# Patient Record
Sex: FEMALE | Race: WHITE | NOT HISPANIC OR LATINO | Employment: OTHER | URBAN - NONMETROPOLITAN AREA
[De-identification: names, ages, dates, MRNs, and addresses within clinical notes are randomized per-mention and may not be internally consistent; named-entity substitution may affect disease eponyms.]

---

## 2017-11-30 ENCOUNTER — TRANSFERRED RECORDS (OUTPATIENT)
Dept: HEALTH INFORMATION MANAGEMENT | Facility: HOSPITAL | Age: 77
End: 2017-11-30

## 2017-12-09 ENCOUNTER — HOSPITAL ENCOUNTER (EMERGENCY)
Facility: HOSPITAL | Age: 77
Discharge: HOME | End: 2017-12-09
Attending: INTERNAL MEDICINE | Admitting: INTERNAL MEDICINE
Payer: MEDICARE

## 2017-12-09 VITALS
DIASTOLIC BLOOD PRESSURE: 90 MMHG | RESPIRATION RATE: 20 BRPM | HEIGHT: 62 IN | SYSTOLIC BLOOD PRESSURE: 178 MMHG | OXYGEN SATURATION: 99 % | TEMPERATURE: 98 F

## 2017-12-09 DIAGNOSIS — K29.00 OTHER ACUTE GASTRITIS WITHOUT HEMORRHAGE: ICD-10-CM

## 2017-12-09 PROCEDURE — 99282 EMERGENCY DEPT VISIT SF MDM: CPT | Performed by: INTERNAL MEDICINE

## 2017-12-09 PROCEDURE — A9270 NON-COVERED ITEM OR SERVICE: HCPCS | Mod: GY | Performed by: INTERNAL MEDICINE

## 2017-12-09 PROCEDURE — 25000132 ZZH RX MED GY IP 250 OP 250 PS 637: Mod: GY | Performed by: INTERNAL MEDICINE

## 2017-12-09 PROCEDURE — 99283 EMERGENCY DEPT VISIT LOW MDM: CPT

## 2017-12-09 RX ORDER — ALUMINA, MAGNESIA, AND SIMETHICONE 2400; 2400; 240 MG/30ML; MG/30ML; MG/30ML
30 SUSPENSION ORAL ONCE
Status: COMPLETED | OUTPATIENT
Start: 2017-12-09 | End: 2017-12-09

## 2017-12-09 RX ADMIN — ALUMINUM HYDROXIDE, MAGNESIUM HYDROXIDE, AND DIMETHICONE 30 ML: 400; 400; 40 SUSPENSION ORAL at 21:50

## 2017-12-09 NOTE — ED AVS SNAPSHOT
[facility]    [address] [address]    Phone:  [phone]                                       Lita Ocasio   MRN: [id number]    Department:  [facility]   Date of Visit:  12/9/2017           After Visit Summary Signature Page     I have received my discharge instructions, and my questions have been answered. I have discussed any challenges I see with this plan with the nurse or doctor.    ..........................................................................................................................................  Patient/Patient Representative Signature      ..........................................................................................................................................  Patient Representative Print Name and Relationship to Patient    ..................................................               ................................................  Date                                            Time    ..........................................................................................................................................  Reviewed by Signature/Title    ...................................................              ..............................................  Date                                                            Time

## 2017-12-09 NOTE — ED AVS SNAPSHOT
[facility]    [address] [address]    Phone:  [phone]                                       Lita Ocasio   MRN: [id number]    Department:  [facility]   Date of Visit:  12/9/2017           Patient Information     Date Of Birth          1940        Your diagnoses for this visit were:     Other acute gastritis without hemorrhage        You were seen by Jeremías Klein MD.      Follow-up Information     Schedule an appointment as soon as possible for a visit with Hannah Almazan MD.    Specialty:  Family Practice    Contact information:    [facility]  [address] [facility] [address]  [phone]          Discharge Instructions         Abdominal Pain    Abdominal pain is pain in the stomach or belly area. Everyone has this pain from time to time. In many cases it goes away on its own. But abdominal pain can sometimes be due to a serious problem, such as appendicitis. So it s important to know when to seek help.  Causes of abdominal pain  There are many possible causes of abdominal pain. Common causes in adults include:    Constipation, diarrhea, or gas    Stomach acid flowing back up into the esophagus (acid reflux or heartburn)    Severe acid reflux, called GERD (gastroesophageal reflux disease)    A sore in the lining of the stomach or small intestine (peptic ulcer)    Inflammation of the gallbladder, liver, or pancreas    Gallstones or kidney stones    Appendicitis     Intestinal blockage     An internal organ pushing through a muscle or other tissue (hernia)    Urinary tract infections    In women, menstrual cramps, fibroids, or endometriosis    Inflammation or infection of the intestines  Diagnosing the cause of abdominal pain  Your healthcare provider will do a physical exam help find the cause of your pain. If needed, tests will be ordered. Belly pain has many possible causes. So it can be hard to find the reason for your pain.  Giving details about your pain can help. Tell your provider where and when you feel the pain, and what makes it better or worse. Also let your provider know if you have other symptoms such as:    Fever    Tiredness    Upset stomach (nausea)    Vomiting    Changes in bathroom habits  Treating abdominal pain  Some causes of pain need emergency medical treatment right away. These include appendicitis or a bowel blockage. Other problems can be treated with rest, fluids, or medicines. Your healthcare provider can give you specific instructions for treatment or self-care based on what is causing your pain.  If you have vomiting or diarrhea, sip water or other clear fluids. When you are ready to eat solid foods again, start with small amounts of easy-to-digest, low-fat foods. These include apple sauce, toast, or crackers.   When to seek medical care  Call 911 or go to the hospital right away if you:    Can t pass stool and are vomiting    Are vomiting blood or have bloody diarrhea or black, tarry diarrhea    Have chest, neck, or shoulder pain    Feel like you might pass out    Have pain in your shoulder blades with nausea    Have sudden, severe belly pain    Have new, severe pain unlike any you have felt before    Have a belly that is rigid, hard, and tender to touch  Call your healthcare provider if you have:    Pain for more than 5 days    Bloating for more than 2 days    Diarrhea for more than 5 days    A fever of 100.4 F (38.0 C) or higher, or as directed by your provider    Pain that gets worse    Weight loss for no reason    Continued lack of appetite    Blood in your stool  How to prevent abdominal pain  Here are some tips to help prevent abdominal pain:    Eat smaller amounts of food at one time.    Avoid greasy, fried, or other high-fat foods.    Avoid foods that give you gas.    Exercise regularly.    Drink plenty of fluids.  To help prevent GERD symptoms:    Quit smoking.    Reduce alcohol and certain foods that  increase stomach acid.    Avoid aspirin and over-the-counter pain and fever medicines (NSAIDS or nonsteroidal anti-inflammatory drugs), if possible    Lose extra weight.    Finish eating at least 2 hours before you go to bed or lie down.    Raise the head of your bed.  Date Last Reviewed: 7/1/2016 2000-2017 [employer]. [address] [place] [address], [place] [address]. All rights reserved. This information is not intended as a substitute for professional medical care. Always follow your healthcare professional's instructions.             Review of your medicines      Our records show that you are taking the medicines listed below. If these are incorrect, please call your family doctor or clinic.        Dose / Directions Last dose taken    ALLEGRA PO        Take 1 tablet by mouth once daily - patient unsure of dose.   Refills:  0        amLODIPine 5 MG tablet   Commonly known as:  NORVASC   Dose:  5 mg   Quantity:  30 tablet        Take 1 tablet (5 mg) by mouth daily   Refills:  0        aspirin 81 MG tablet   Dose:  1 tablet        Take 1 tablet by mouth daily   Refills:  0        CLINDAMYCIN HCL PO   Dose:  300 mg        Take 300 mg by mouth   Refills:  0        HYDROcodone-acetaminophen 5-325 MG per tablet   Commonly known as:  NORCO   Dose:  1 tablet   Quantity:  40 tablet        Take 1 tablet by mouth every 4 hours as needed for pain   Refills:  0        levothyroxine 75 MCG tablet   Commonly known as:  SYNTHROID/LEVOTHROID   Dose:  75 mcg   Quantity:  5 tablet        Take 1 tablet (75 mcg) by mouth daily for 5 days   Refills:  0        POTASSIUM CHLORIDE PO   Dose:  10 mEq        Take 10 mEq by mouth daily   Refills:  0        PREDNISONE PO        Refills:  0        simvastatin 20 MG tablet   Commonly known as:  ZOCOR   Dose:  20 mg   Quantity:  5 tablet        Take 1 tablet (20 mg) by mouth At Bedtime for 5 days   Refills:  0        SYSTANE OP   Dose:  1 drop        Place 1 drop into both eyes 4  "times daily   Refills:  0        vitamin D 2000 UNITS tablet   Dose:  1 tablet        Take 1 tablet by mouth daily   Refills:  0        zafirlukast 20 MG tablet   Commonly known as:  ACCOLATE   Dose:  20 mg   Quantity:  5 tablet        Take 1 tablet (20 mg) by mouth daily for 5 days   Refills:  0                Orders Needing Specimen Collection     None      Pending Results     No orders found from 2017 to 12/10/2017.            Pending Culture Results     No orders found from 2017 to 12/10/2017.            Thank you for choosing [facility]       Thank you for choosing [facility] for your care. Our goal is always to provide you with excellent care. Hearing back from our patients is one way we can continue to improve our services. Please take a few minutes to complete the written survey that you may receive in the mail after you visit with us. Thank you!        [employer] Information     [employer] lets you send messages to your doctor, view your test results, renew your prescriptions, schedule appointments and more. To sign up, go to [email or web address] . Click on \"Log in\" on the left side of the screen, which will take you to the Welcome page. Then click on \"Sign up Now\" on the right side of the page.     You will be asked to enter the access code listed below, as well as some personal information. Please follow the directions to create your username and password.     Your access code is: [email or web address]  Expires: 3/9/2018 10:37 PM     Your access code will  in 90 days. If you need help or a new code, please call your [facility] clinic or [phone].        Care EveryWhere ID     This is your Care EveryWhere ID. This could be used by other organizations to access your [facility] medical records  [id number]        Equal Access to Services     DIAN SLAUGHTER : Jada Shah, mike ann, cookie shah. So [facility] [phone].    ATENCIÓN: " Si habla carrie, tiene a perdomo disposición servicios gratuitos de asistencia lingüística. Llame al [phone].    We comply with applicable federal civil rights laws and [place] laws. We do not discriminate on the basis of race, color, national origin, age, disability, sex, sexual orientation, or gender identity.            After Visit Summary       This is your record. Keep this with you and show to your community pharmacist(s) and doctor(s) at your next visit.

## 2017-12-10 ASSESSMENT — ENCOUNTER SYMPTOMS
DYSURIA: 0
ARTHRALGIAS: 0
FEVER: 0
ANAL BLEEDING: 0
HEADACHES: 0
HEMATURIA: 0
PALPITATIONS: 0
COLOR CHANGE: 0
ABDOMINAL DISTENTION: 0
VOICE CHANGE: 0
SHORTNESS OF BREATH: 0
NUMBNESS: 0
BLOOD IN STOOL: 0
CRAMPS: 1
FLANK PAIN: 0
MYALGIAS: 0
NECK PAIN: 0
NAUSEA: 0
CHILLS: 0
DIAPHORESIS: 0
CHEST TIGHTNESS: 0
ABDOMINAL PAIN: 0
CONFUSION: 0
WOUND: 0
DIZZINESS: 0
COUGH: 0
NECK STIFFNESS: 0
WHEEZING: 0
BACK PAIN: 0
VOMITING: 0
LIGHT-HEADEDNESS: 0

## 2017-12-10 NOTE — DISCHARGE INSTRUCTIONS

## 2017-12-10 NOTE — ED PROVIDER NOTES
"  History     Chief Complaint   Patient presents with     Abdominal Pain     \"I started prednisone on 12/1 for fluid in my ears and almost imediately I started have burning\". Pt points to epigastric area to lower abdomen.     Nausea     Pt stated she tried prilosec without relief.     Patient is a 77 year old female presenting with cramps. The history is provided by the patient.   Abdominal Cramping   Pain location:  Epigastric  Pain quality: cramping    Pain radiates to:  Periumbilical region  Pain severity:  Mild  Onset quality:  Gradual  Timing:  Intermittent  Chronicity:  New  Associated symptoms: no chest pain, no chills, no cough, no dysuria, no fever, no hematuria, no nausea, no shortness of breath and no vomiting        Problem List:    Patient Active Problem List    Diagnosis Date Noted     Primary hyperparathyroidism (H) 09/05/2013     Priority: Medium     Decreased hearing 08/30/2013     Priority: Medium     Hypercalcemia 08/29/2013     Priority: Medium     Diagnosis updated by automated process. Provider to review and confirm.       Hypercalcemia 08/28/2013     Priority: Medium     Parathyroid related hypercalcemia (H) 08/18/2013     Priority: Medium     Dysfunction of eustachian tube 08/16/2013     Priority: Medium     Sensorineural hearing loss, asymmetrical 08/16/2013     Priority: Medium     Chronic rhinitis 08/16/2013     Priority: Medium        Past Medical History:    Past Medical History:   Diagnosis Date     Asthma      Constipation      Hearing loss      Heart trouble      Hypertension      Nasal congestion      Osteoporosis      Ringing in ears      Sneezing      Snoring      Thyroid disease      Weakness      Weight loss        Past Surgical History:    Past Surgical History:   Procedure Laterality Date     COLONOSCOPY       ENT SURGERY  2011    para thyroid surgery     PARATHYROIDECTOMY  9/10/2013    Procedure: PARATHYROIDECTOMY;  Reoperative Neck Exploration, Resection of right Parathyroid " adenoma;  Surgeon: Thalia Muller MD;  Location:  OR       Family History:    Family History   Problem Relation Age of Onset     Hearing Loss Mother      HEART DISEASE Mother      CEREBROVASCULAR DISEASE Father      Hearing Loss Brother      CANCER Sister        Social History:  Marital Status:   [5]  Social History   Substance Use Topics     Smoking status: Current Every Day Smoker     Packs/day: 1.00     Years: 50.00     Types: Cigarettes     Smokeless tobacco: Never Used     Alcohol use Yes      Comment: rarely        Medications:      PREDNISONE PO   amLODIPine (NORVASC) 5 MG tablet   levothyroxine (SYNTHROID, LEVOTHROID) 75 MCG tablet   CLINDAMYCIN HCL PO   HYDROcodone-acetaminophen 5-325 MG per tablet   zafirlukast (ACCOLATE) 20 MG tablet   simvastatin (ZOCOR) 20 MG tablet   Fexofenadine HCl (ALLEGRA PO)   Polyethyl Glycol-Propyl Glycol (SYSTANE OP)   POTASSIUM CHLORIDE PO   aspirin 81 MG tablet   Cholecalciferol (VITAMIN D) 2000 UNITS tablet         Review of Systems   Constitutional: Negative for chills, diaphoresis and fever.   HENT: Negative for voice change.    Eyes: Negative for visual disturbance.   Respiratory: Negative for cough, chest tightness, shortness of breath and wheezing.    Cardiovascular: Negative for chest pain, palpitations and leg swelling.   Gastrointestinal: Negative for abdominal distention, abdominal pain, anal bleeding, blood in stool, nausea and vomiting.   Genitourinary: Negative for decreased urine volume, dysuria, flank pain and hematuria.   Musculoskeletal: Negative for arthralgias, back pain, gait problem, myalgias, neck pain and neck stiffness.   Skin: Negative for color change, pallor, rash and wound.   Neurological: Negative for dizziness, syncope, light-headedness, numbness and headaches.   Psychiatric/Behavioral: Negative for confusion and suicidal ideas.       Physical Exam   BP: 128/61  Heart Rate: 88  Temp: 98.3  F (36.8  C)  Resp: 18  Height: 157.5 cm  "(5' 2\")  SpO2: 99 %      Physical Exam   Constitutional: She is oriented to person, place, and time. She appears well-developed and well-nourished.   HENT:   Head: Normocephalic and atraumatic.   Mouth/Throat: No oropharyngeal exudate.   Eyes: Conjunctivae are normal. Pupils are equal, round, and reactive to light.   Neck: Normal range of motion. Neck supple. No JVD present. No tracheal deviation present. No thyromegaly present.   Cardiovascular: Normal rate, regular rhythm, normal heart sounds and intact distal pulses.  Exam reveals no gallop and no friction rub.    No murmur heard.  Pulmonary/Chest: Effort normal and breath sounds normal. No stridor. No respiratory distress. She has no wheezes. She has no rales. She exhibits no tenderness.   Abdominal: Soft. Bowel sounds are normal. She exhibits no distension and no mass. There is no tenderness. There is no rebound and no guarding.   Musculoskeletal: Normal range of motion. She exhibits no edema or tenderness.   Lymphadenopathy:     She has no cervical adenopathy.   Neurological: She is alert and oriented to person, place, and time.   Skin: Skin is warm and dry. No rash noted. No erythema. No pallor.   Psychiatric: Her behavior is normal.   Nursing note and vitals reviewed.      ED Course     ED Course     Procedures           Labs Ordered and Resulted from Time of ED Arrival Up to the Time of Departure from the ED - No data to display    Assessments & Plan (with Medical Decision Making)   Epigastric pain with radiation to preumblical area for 3 days , pt related symptoms to prednisone that she take for allergic otitis  Symptoms resolved after receiving Mylenta in ER  I advised her to stop taking prednisone( she already finished 8 days)   I also explained to her although prednisone use can cause gastritis,  her symptoms potentially can also be related to other medical conditions, she responded well to antiacid therapy in ER so she can be discharge home for now, " but if symptom recurred she need to return to ER for further evalution to rule out life threatening cause of abdominal pain, she voiced understanding and agreed with the plan.   I have reviewed the nursing notes.    I have reviewed the findings, diagnosis, plan and need for follow up with the patient.      Discharge Medication List as of 12/9/2017 10:37 PM          Final diagnoses:   Other acute gastritis without hemorrhage       12/9/2017   [facility]     Jeremías Klein MD  12/10/17 0135       Jeremías Klein MD  12/10/17 0159

## 2017-12-10 NOTE — ED NOTES
Patient discharged ambulatory. Verbalized understanding to  an antacid OTC per provider recommendation, will stop the prednisone. Discharged with her daughter.

## 2017-12-10 NOTE — ED NOTES
"Patient being evaluated tonight for abdominal \"burning\" and dizziness. Patient states that she was started on prednisone on December 1st for \"fluid in her ears.\" Patient reports these symptoms started just after starting her prednisone. Patient denies and nausea or vomiting. She is resting on ED cart. Call light in reach.   "

## 2017-12-12 RX ORDER — CALCIUM CARBONATE 500 MG/1
1 TABLET, CHEWABLE ORAL AT BEDTIME
COMMUNITY

## 2017-12-12 RX ORDER — FLUTICASONE PROPIONATE 50 MCG
2 SPRAY, SUSPENSION (ML) NASAL DAILY
COMMUNITY
End: 2018-01-05

## 2017-12-12 RX ORDER — B-COMPLEX WITH VITAMIN C
1 TABLET ORAL DAILY
Status: ON HOLD | COMMUNITY
End: 2020-02-20

## 2017-12-12 RX ORDER — ZAFIRLUKAST 20 MG/1
20 TABLET, FILM COATED ORAL 2 TIMES DAILY
COMMUNITY
End: 2020-03-31

## 2017-12-13 ENCOUNTER — OFFICE VISIT (OUTPATIENT)
Dept: OTOLARYNGOLOGY | Facility: OTHER | Age: 77
End: 2017-12-13
Attending: NURSE PRACTITIONER
Payer: MEDICARE

## 2017-12-13 VITALS
HEART RATE: 83 BPM | RESPIRATION RATE: 16 BRPM | TEMPERATURE: 99.3 F | DIASTOLIC BLOOD PRESSURE: 62 MMHG | SYSTOLIC BLOOD PRESSURE: 118 MMHG | HEIGHT: 62 IN | BODY MASS INDEX: 21.9 KG/M2 | WEIGHT: 119 LBS | OXYGEN SATURATION: 97 %

## 2017-12-13 DIAGNOSIS — Z97.4 WEARS HEARING AID: ICD-10-CM

## 2017-12-13 DIAGNOSIS — H61.23 BILATERAL IMPACTED CERUMEN: ICD-10-CM

## 2017-12-13 DIAGNOSIS — H61.893 IRRITATION OF EXTERNAL AUDITORY CANAL, BILATERAL: ICD-10-CM

## 2017-12-13 DIAGNOSIS — H72.91 PERFORATION OF TYMPANIC MEMBRANE, RIGHT: Primary | ICD-10-CM

## 2017-12-13 DIAGNOSIS — H90.3 SENSORINEURAL HEARING LOSS (SNHL) OF BOTH EARS: ICD-10-CM

## 2017-12-13 DIAGNOSIS — Z71.6 TOBACCO ABUSE COUNSELING: ICD-10-CM

## 2017-12-13 DIAGNOSIS — R26.81 UNSTEADINESS: ICD-10-CM

## 2017-12-13 PROCEDURE — 92504 EAR MICROSCOPY EXAMINATION: CPT | Performed by: NURSE PRACTITIONER

## 2017-12-13 PROCEDURE — 69210 REMOVE IMPACTED EAR WAX UNI: CPT | Performed by: NURSE PRACTITIONER

## 2017-12-13 PROCEDURE — 99213 OFFICE O/P EST LOW 20 MIN: CPT | Mod: 25 | Performed by: NURSE PRACTITIONER

## 2017-12-13 PROCEDURE — 99213 OFFICE O/P EST LOW 20 MIN: CPT

## 2017-12-13 RX ORDER — CIPROFLOXACIN AND DEXAMETHASONE 3; 1 MG/ML; MG/ML
4 SUSPENSION/ DROPS AURICULAR (OTIC) 2 TIMES DAILY
Qty: 7.5 ML | Refills: 0 | Status: SHIPPED | OUTPATIENT
Start: 2017-12-13 | End: 2017-12-20

## 2017-12-13 ASSESSMENT — ANXIETY QUESTIONNAIRES
3. WORRYING TOO MUCH ABOUT DIFFERENT THINGS: NOT AT ALL
1. FEELING NERVOUS, ANXIOUS, OR ON EDGE: SEVERAL DAYS
6. BECOMING EASILY ANNOYED OR IRRITABLE: NOT AT ALL
IF YOU CHECKED OFF ANY PROBLEMS ON THIS QUESTIONNAIRE, HOW DIFFICULT HAVE THESE PROBLEMS MADE IT FOR YOU TO DO YOUR WORK, TAKE CARE OF THINGS AT HOME, OR GET ALONG WITH OTHER PEOPLE: SOMEWHAT DIFFICULT
2. NOT BEING ABLE TO STOP OR CONTROL WORRYING: NOT AT ALL
5. BEING SO RESTLESS THAT IT IS HARD TO SIT STILL: NOT AT ALL
GAD7 TOTAL SCORE: 2
7. FEELING AFRAID AS IF SOMETHING AWFUL MIGHT HAPPEN: SEVERAL DAYS

## 2017-12-13 ASSESSMENT — PAIN SCALES - GENERAL: PAINLEVEL: NO PAIN (0)

## 2017-12-13 ASSESSMENT — PATIENT HEALTH QUESTIONNAIRE - PHQ9
5. POOR APPETITE OR OVEREATING: NOT AT ALL
SUM OF ALL RESPONSES TO PHQ QUESTIONS 1-9: 6

## 2017-12-13 NOTE — PATIENT INSTRUCTIONS
Both canal with irritation. Start ciprodex drops to both ears as directed.    No fluid or infection left ear drum     Right ear drum with perforation.    Keep right ear dry.    Follow up in 3 months with me, with audiogram first.    Follow up sooner with any ear drainage, pain, etc.      Thank you for allowing Gali Dominguez CNP and our ENT team to participate in your care.  If you have a scheduling or an appointment question please contact our Health Unit Coordinator at their direct line [phone].   ALL nursing questions or concerns can be directed to your ENT nurse at: [phone]

## 2017-12-13 NOTE — NURSING NOTE
"Chief Complaint   Patient presents with     Consult     Consult regarding hole in Right eardrum, Acute effusion Bilateral, vertigo. Per Ana Maria-Sami.        Initial /62  Pulse 83  Temp 99.3  F (37.4  C) (Tympanic)  Resp 16  Ht 5' 2\" (1.575 m)  Wt 119 lb (54 kg)  SpO2 97%  BMI 21.77 kg/m2 Estimated body mass index is 21.77 kg/(m^2) as calculated from the following:    Height as of this encounter: 5' 2\" (1.575 m).    Weight as of this encounter: 119 lb (54 kg).  Medication Reconciliation: complete   Constanza Quiroga      "

## 2017-12-13 NOTE — PROGRESS NOTES
Otolaryngology Note    Patient: Lita Ocasio  : 1940         Chief Complaint:     Patient presents with:  Consult: Consult regarding hole in Right eardrum, Acute effusion Bilateral, vertigo. Per Norah.          History of Present Illness:     Lita Ocasio is a 77 year old female seen today for perforated right ear drum, effusion and vertigo, referred by Norah. Lita was last ween in ENT 13 by Dr. Wolf for evaluation of hearing loss, dx with SNHL.     Lita tells me she had a bad ear infection in right ear in November. Tried otic drops, no relief. So was placed on 8 days of prednisone, not sure if relief. ER Saturday due to ABD pain, stopped prednisone (never completed full course), was told she had a perforation, which is why she is here today.     She tells me the right ear has been crackling/popping, since November. No otalgia. Left ear she feels something is in there, no crackling/popping.No otorrhea since November.     Wears bilateral hearing aides, chronic gradual bilateral hearing loss. No acute changes. No flucatuating HL.  No history of COM or otologic surgeries.  No tinnitus.  She reports dizziness since November, has gotten worse. She explains it as 'weak, some unsteadiness'. Does not experince rotary vertigo.     Last audiogram we have on record 8/15/16: Moderate or profound SNHL, Type A Tympanograms. She gets her other audiograms elsewhere.     Lita is a current every day smoker.          Medications:     Current Outpatient Rx   Medication Sig Dispense Refill     calcium carbonate (TUMS) 500 MG chewable tablet Take 1 chew tab by mouth daily       vitamin B complex with vitamin C (VITAMIN  B COMPLEX) TABS tablet Take 1 tablet by mouth daily       Tafluprost (ZIOPTAN) 0.0015 % SOLN Place 1 drop into both eyes At Bedtime       LISINOPRIL PO Take 5 mg by mouth daily       fluticasone (FLONASE) 50 MCG/ACT spray Spray 2 sprays into both nostrils daily       SIMVASTATIN PO  Take 20 mg by mouth At Bedtime       Zafirlukast (ACCOLATE PO) Take 20 mg by mouth daily       LEVOTHYROXINE SODIUM PO Take 75 mcg by mouth daily       CLINDAMYCIN HCL PO Take 600 mg by mouth 1 hour prior to dental work       amLODIPine (NORVASC) 5 MG tablet Take 1 tablet (5 mg) by mouth daily 30 tablet 0     Fexofenadine HCl (ALLEGRA PO) Take 180 mg by mouth daily        POTASSIUM CHLORIDE PO Take 20 mEq by mouth daily        aspirin 81 MG tablet Take 1 tablet by mouth daily       Cholecalciferol (VITAMIN D) 2000 UNITS tablet Take 1 tablet by mouth daily              Allergies:     Allergies: Amoxicillin trihydrate; Combigan [brimonidine tartrate-timolol]; Evista [raloxifene]; Levaquin [levofloxacin]; Penicillins; Restasis; and Latex          Past Medical History:     Past Medical History:   Diagnosis Date     Asthma      Constipation      Hearing loss      Heart trouble      Hypertension      Nasal congestion      Osteoporosis      Ringing in ears      Sneezing      Snoring      Thyroid disease      Weakness      Weight loss             Past Surgical History:     Past Surgical History:   Procedure Laterality Date     CATARACT IOL, RT/LT Bilateral      COLONOSCOPY       ENT SURGERY  2011    para thyroid surgery     ENT SURGERY  09/2013    Parathyroid     PARATHYROIDECTOMY  9/10/2013    Procedure: PARATHYROIDECTOMY;  Reoperative Neck Exploration, Resection of right Parathyroid adenoma;  Surgeon: Thalia Muller MD;  Location: [facility] OR     TONSILLECTOMY       TUBAL LIGATION         ENT family history reviewed         Social History:     Social History   Substance Use Topics     Smoking status: Current Every Day Smoker     Packs/day: 1.00     Years: 50.00     Types: Cigarettes     Smokeless tobacco: Never Used     Alcohol use Yes      Comment: rarely            Review of Systems:     ROS: 10 point ROS neg other than the symptoms noted above in the HPI.         Physical Exam:     /62  Pulse 83  Temp 99.3  F  "(37.4  C) (Tympanic)  Resp 16  Ht 5' 2\" (1.575 m)  Wt 119 lb (54 kg)  SpO2 97%  BMI 21.77 kg/m2  General - The patient is well nourished and well developed, and appears to have good nutritional status.  Alert and oriented to person and place, answers questions and cooperates with examination appropriately.   Head and Face - Normocephalic and atraumatic, with no gross asymmetry noted.  The facial nerve is intact, with strong symmetric movements.  Voice and Breathing - The patient was breathing comfortably without the use of accessory muscles. There was no wheezing, stridor, or stertor.  The patients voice was clear and strong, and had appropriate pitch and quality.  Ears - External ear normal. Ears examined under microscopy bilaterally. Left EAC with thickened dried skin around canal, attempted removal with cupped forceps, discomfort noted and irritation of canal developed, stopped debridement. TM is intact without effusion, perforation, retraction or worrisome mass. Right EAC with dried cerumen in canal, debridement with cupped forceps, discomfort when getting off of canals, developed some canal irrigation with minor bleeding proximal anterior canal that stopped without any intervention. TM with 20% dried central perforation, no otorrhea.  Eyes - Extraocular movements intact, and the pupils were reactive to light.  Sclera were not icteric or injected, conjunctiva were pink and moist.  Mouth - Examination of the oral cavity showed pink, healthy oral mucosa. Dentition in good condition. No lesions or ulcerations noted. The tongue was mobile and midline.   Throat - The walls of the oropharynx were smooth, pink, moist, symmetric, and had no lesions or ulcerations.  The tonsillar pillars and soft palate were symmetric.  The uvula was midline on elevation.    Neck - Normal midline excursion of the laryngotracheal complex during swallowing.  Full range of motion on passive movement.  Palpation of the occipital, " submental, submandibular, internal jugular chain, and supraclavicular nodes did not demonstrate any abnormal lymph nodes or masses.  Palpation of the thyroid was soft and smooth, with no nodules or goiter appreciated.  The trachea was mobile and midline.  Nose - External contour is symmetric, no gross deflection or scars.  Nasal mucosa is pink and moist with no abnormal mucus.  The septum and turbinates were evaluated: normal.  No polyps, masses, or purulence noted on examination.         Assessment and Plan:       ICD-10-CM    1. Perforation of tympanic membrane, right H72.91 ciprofloxacin-dexamethasone (CIPRODEX) otic suspension   2. Irritation of external auditory canal, bilateral H61.893 ciprofloxacin-dexamethasone (CIPRODEX) otic suspension   3. Bilateral impacted cerumen H61.23    4. Sensorineural hearing loss (SNHL) of both ears H90.3    5. Wears hearing aid Z97.4    6. Tobacco abuse counseling Z71.6    7. Unsteadiness R26.81        Discussed with patient that it takes 20 years of quitting tobacco to be at same risk of developing head and neck cancer as a person who has never used tobacco. Patient voiced understanding to ongoing risks associated with continued tobacco use. Tobacco cessation was strongly encouraged.    The ears were cleaned today. Aural hygiene for the ear canals was discussed.  Avoidance of Q-tips was highly encouraged. The patient was told to avoid flushing the ear canal as there is a risk of perforating the ear drum.    Reports her 'dizziness' resolved after I cleaned her ears.  I do not suspect vertigo. I recommended further evaluation from vestibular therapy, she refuses. Reviewed risks of untreated unsteadiness, and she verbalized her understanding.      Start Ciprodex drops to both ears as directed due to EAC irritation from cerumen debridement.    Keep right ear dry. May continue to use hearing aides. Follow up in 3 months for re-evaluation, audiogram first to see how perforation is  doing.    She is encouraged to f/u sooner with any concerns, including acute changes in hearing, otorrhea or otalgia.     Thank you for allowing me to participate in the care of your patient.      Gali Dominguez NP  ENT  [facility]  [phone]

## 2017-12-13 NOTE — MR AVS SNAPSHOT
After Visit Summary   12/13/2017    Lita Ocasio    MRN: [id number]           Patient Information     Date Of Birth          1940        Visit Information        Provider Department      12/13/2017 3:00 PM Gali Dominguez APRN CNP [facility]        Today's Diagnoses     Perforation of tympanic membrane, right    -  1    Irritation of external auditory canal, bilateral          Care Instructions    Both canal with irritation. Start ciprodex drops to both ears as directed.    No fluid or infection left ear drum     Right ear drum with perforation.    Keep right ear dry.    Follow up in 3 months with me, with audiogram first.    Follow up sooner with any ear drainage, pain, etc.      Thank you for allowing Gali Dominguez CNP and our ENT team to participate in your care.  If you have a scheduling or an appointment question please contact our Health Unit Coordinator at their direct line [phone].   ALL nursing questions or concerns can be directed to your ENT nurse at: [phone]             Follow-ups after your visit        Follow-up notes from your care team     Return in about 3 months (around 3/13/2018) for f/u right ear, audiogram first.      Who to contact     If you have questions or need follow up information about today's clinic visit or your schedule please contact [facility] directly at [phone].  Normal or non-critical lab and imaging results will be communicated to you by MyChart, letter or phone within 4 business days after the clinic has received the results. If you do not hear from us within 7 days, please contact the clinic through MyChart or phone. If you have a critical or abnormal lab result, we will notify you by phone as soon as possible.  Submit refill requests through J[employer]Deck or call your pharmacy and they will forward the refill request to us. Please allow 3 business days for your refill to be completed.          Additional  "Information About Your Visit        MyChart Information     [employer] lets you send messages to your doctor, view your test results, renew your prescriptions, schedule appointments and more. To sign up, go to [email or web address] . Click on \"Log in\" on the left side of the screen, which will take you to the Welcome page. Then click on \"Sign up Now\" on the right side of the page.     You will be asked to enter the access code listed below, as well as some personal information. Please follow the directions to create your username and password.     Your access code is: [email or web address]  Expires: 3/9/2018 10:37 PM     Your access code will  in 90 days. If you need help or a new code, please call your [place] clinic or [phone].        Care EveryWhere ID     This is your Care EveryWhere ID. This could be used by other organizations to access your [place] medical records  [id number]        Your Vitals Were     Pulse Temperature Respirations Height Pulse Oximetry BMI (Body Mass Index)    83 99.3  F (37.4  C) (Tympanic) 16 5' 2\" (1.575 m) 97% 21.77 kg/m2       Blood Pressure from Last 3 Encounters:   17 118/62   17 178/90   09/10/13 120/78    Weight from Last 3 Encounters:   17 119 lb (54 kg)   09/10/13 121 lb 4.1 oz (55 kg)   13 122 lb 3 oz (55.4 kg)              Today, you had the following     No orders found for display         Today's Medication Changes          These changes are accurate as of: 17  3:46 PM.  If you have any questions, ask your nurse or doctor.               Start taking these medicines.        Dose/Directions    ciprofloxacin-dexamethasone otic suspension   Commonly known as:  CIPRODEX   Used for:  Perforation of tympanic membrane, right, Irritation of external auditory canal, bilateral   Started by:  Gali Dominguez APRN CNP        Dose:  4 drop   Place 4 drops into both ears 2 times daily for 7 days   Quantity:  7.5 mL   Refills:  0            Where to get " your medicines      These medications were sent to [facility] - [place], [place] - [address]  [address], [place] [address]     Phone:  [phone]     ciprofloxacin-dexamethasone otic suspension                Primary Care Provider Office Phone # Fax #    Hannah Almazan [phone]       [facility] [address] [facility] [address]        Equal Access to Services     DIAN SLAUGHTER : Hadii aad ku hadasho Soomaali, waaxda luqadaha, qaybta kaalmada adeegyada, waxay idiin hayaan adeluz madsenmarcesushma carrion . So [facility] [phone].    ATENCIÓN: Si habla español, tiene a perdomo disposición servicios gratuitos de asistencia lingüística. David al [phone].    We comply with applicable federal civil rights laws and [place] laws. We do not discriminate on the basis of race, color, national origin, age, disability, sex, sexual orientation, or gender identity.            Thank you!     Thank you for choosing [facility]  for your care. Our goal is always to provide you with excellent care. Hearing back from our patients is one way we can continue to improve our services. Please take a few minutes to complete the written survey that you may receive in the mail after your visit with us. Thank you!             Your Updated Medication List - Protect others around you: Learn how to safely use, store and throw away your medicines at www.disposemymeds.org.          This list is accurate as of: 12/13/17  3:46 PM.  Always use your most recent med list.                   Brand Name Dispense Instructions for use Diagnosis    ACCOLATE PO      Take 20 mg by mouth daily        ALLEGRA PO      Take 180 mg by mouth daily        amLODIPine 5 MG tablet    NORVASC    30 tablet    Take 1 tablet (5 mg) by mouth daily    HTN (hypertension)       aspirin 81 MG tablet      Take 1 tablet by mouth daily        calcium carbonate 500 MG chewable tablet    TUMS     Take 1 chew tab by mouth daily         ciprofloxacin-dexamethasone otic suspension    CIPRODEX    7.5 mL    Place 4 drops into both ears 2 times daily for 7 days    Perforation of tympanic membrane, right, Irritation of external auditory canal, bilateral       CLINDAMYCIN HCL PO      Take 600 mg by mouth 1 hour prior to dental work        fluticasone 50 MCG/ACT spray    FLONASE     Spray 2 sprays into both nostrils daily        LEVOTHYROXINE SODIUM PO      Take 75 mcg by mouth daily        LISINOPRIL PO      Take 5 mg by mouth daily        POTASSIUM CHLORIDE PO      Take 20 mEq by mouth daily        SIMVASTATIN PO      Take 20 mg by mouth At Bedtime        vitamin B complex with vitamin C Tabs tablet      Take 1 tablet by mouth daily        vitamin D 2000 UNITS tablet      Take 1 tablet by mouth daily        ZIOPTAN 0.0015 % Soln   Generic drug:  Tafluprost      Place 1 drop into both eyes At Bedtime

## 2017-12-14 ASSESSMENT — ANXIETY QUESTIONNAIRES: GAD7 TOTAL SCORE: 2

## 2018-01-05 ENCOUNTER — OFFICE VISIT (OUTPATIENT)
Dept: OTOLARYNGOLOGY | Facility: OTHER | Age: 78
End: 2018-01-05
Attending: NURSE PRACTITIONER
Payer: MEDICARE

## 2018-01-05 VITALS
HEART RATE: 94 BPM | HEIGHT: 62 IN | TEMPERATURE: 98.4 F | BODY MASS INDEX: 21.9 KG/M2 | WEIGHT: 119 LBS | DIASTOLIC BLOOD PRESSURE: 70 MMHG | SYSTOLIC BLOOD PRESSURE: 156 MMHG | OXYGEN SATURATION: 99 % | RESPIRATION RATE: 16 BRPM

## 2018-01-05 DIAGNOSIS — J34.89 RHINORRHEA: ICD-10-CM

## 2018-01-05 DIAGNOSIS — Z71.6 TOBACCO ABUSE COUNSELING: ICD-10-CM

## 2018-01-05 DIAGNOSIS — R09.81 NASAL CONGESTION: Primary | ICD-10-CM

## 2018-01-05 DIAGNOSIS — S00.431A HEMATOMA OF RIGHT EAR, INITIAL ENCOUNTER: ICD-10-CM

## 2018-01-05 DIAGNOSIS — H69.93 DYSFUNCTION OF BOTH EUSTACHIAN TUBES: ICD-10-CM

## 2018-01-05 DIAGNOSIS — H72.91 PERFORATION OF TYMPANIC MEMBRANE, RIGHT: ICD-10-CM

## 2018-01-05 DIAGNOSIS — H61.22 CERUMEN DEBRIS ON TYMPANIC MEMBRANE OF LEFT EAR: ICD-10-CM

## 2018-01-05 PROCEDURE — G0463 HOSPITAL OUTPT CLINIC VISIT: HCPCS

## 2018-01-05 PROCEDURE — 92504 EAR MICROSCOPY EXAMINATION: CPT | Performed by: NURSE PRACTITIONER

## 2018-01-05 PROCEDURE — 69399 UNLISTED PX EXTERNAL EAR: CPT | Performed by: NURSE PRACTITIONER

## 2018-01-05 PROCEDURE — 99213 OFFICE O/P EST LOW 20 MIN: CPT | Mod: 25 | Performed by: NURSE PRACTITIONER

## 2018-01-05 RX ORDER — FLUTICASONE PROPIONATE 50 MCG
2 SPRAY, SUSPENSION (ML) NASAL DAILY
Qty: 1 BOTTLE | Refills: 11 | Status: SHIPPED | OUTPATIENT
Start: 2018-01-05 | End: 2018-09-19

## 2018-01-05 ASSESSMENT — PAIN SCALES - GENERAL: PAINLEVEL: MILD PAIN (2)

## 2018-01-05 NOTE — MR AVS SNAPSHOT
After Visit Summary   1/5/2018    Lita Ocasio    MRN: [id number]           Patient Information     Date Of Birth          1940        Visit Information        Provider Department      1/5/2018 10:00 AM Gali Dominguez APRN CNP [facility]        Care Instructions    Right ear still with stable perforation.  Left ear wax removed.  No fluid in ears.    Vinegar ear drops for itching: Mix equal amounts of distilled water and white vinegar. Apply 1-2 drops in ears every few days.     Sinuses: Flonase (nasal spray) 2 sprays once daily. Eduin Med Sinus irrigation (get over the counter) use twice daily as needed. Ibuprofen as needed.    Follow up in 3-4 months, get audiogram before seeing me and bring copy in.    Follow up sooner as needed.    Work on smoking cessation.     Thank you for allowing Gali Dominguez CNP and our ENT team to participate in your care.  If you have a scheduling or an appointment question please contact our Health Unit Coordinator at their direct line [phone].   ALL nursing questions or concerns can be directed to your ENT nurse at: [phone]             Follow-ups after your visit        Follow-up notes from your care team     Return if symptoms worsen or fail to improve.      Your next 10 appointments already scheduled     Mar 13, 2018  2:30 PM CDT   (Arrive by 2:15 PM)   Return Visit with COCO Coronado CNP   [facility] ([facility] )    [address]  [facility] [address]   [phone]              Who to contact     If you have questions or need follow up information about today's clinic visit or your schedule please contact [facility] directly at [phone].  Normal or non-critical lab and imaging results will be communicated to you by MyChart, letter or phone within 4 business days after the clinic has received the results. If you do not hear from us within 7 days, please contact the  "clinic through Tele[employer] or phone. If you have a critical or abnormal lab result, we will notify you by phone as soon as possible.  Submit refill requests through iversity or call your pharmacy and they will forward the refill request to us. Please allow 3 business days for your refill to be completed.          Additional Information About Your Visit        TeleFliphart Information     iversity lets you send messages to your doctor, view your test results, renew your prescriptions, schedule appointments and more. To sign up, go to [email or web address] . Click on \"Log in\" on the left side of the screen, which will take you to the Welcome page. Then click on \"Sign up Now\" on the right side of the page.     You will be asked to enter the access code listed below, as well as some personal information. Please follow the directions to create your username and password.     Your access code is: [email or web address]  Expires: 3/9/2018 10:37 PM     Your access code will  in 90 days. If you need help or a new code, please call your [place] clinic or [phone].        Care EveryWhere ID     This is your Care EveryWhere ID. This could be used by other organizations to access your [place] medical records  [id number]        Your Vitals Were     Pulse Temperature Respirations Height Pulse Oximetry BMI (Body Mass Index)    94 98.4  F (36.9  C) (Tympanic) 16 1.575 m (5' 2\") 99% 21.77 kg/m2       Blood Pressure from Last 3 Encounters:   18 152/70   17 118/62   17 178/90    Weight from Last 3 Encounters:   18 54 kg (119 lb)   17 54 kg (119 lb)   09/10/13 55 kg (121 lb 4.1 oz)              Today, you had the following     No orders found for display       Primary Care Provider Office Phone # Fax #    Hannah Almazan [phone]       [facility] [address]  [facility] [address]        Equal Access to Services     DIAN SLAUGHTER AH: mike Durán " seth robbinrobinson santizowarren jahcookie michel. So [facility] [phone].    ATENCIÓN: Si sherin butler, tiene a perdomo disposición servicios gratuitos de asistencia lingüística. David al [phone].    We comply with applicable federal civil rights laws and [place] laws. We do not discriminate on the basis of race, color, national origin, age, disability, sex, sexual orientation, or gender identity.            Thank you!     Thank you for choosing [facility]  for your care. Our goal is always to provide you with excellent care. Hearing back from our patients is one way we can continue to improve our services. Please take a few minutes to complete the written survey that you may receive in the mail after your visit with us. Thank you!             Your Updated Medication List - Protect others around you: Learn how to safely use, store and throw away your medicines at www.disposemymeds.org.          This list is accurate as of: 1/5/18 10:03 AM.  Always use your most recent med list.                   Brand Name Dispense Instructions for use Diagnosis    ACCOLATE PO      Take 20 mg by mouth daily        ALLEGRA PO      Take 180 mg by mouth daily        amLODIPine 5 MG tablet    NORVASC    30 tablet    Take 1 tablet (5 mg) by mouth daily    HTN (hypertension)       aspirin 81 MG tablet      Take 1 tablet by mouth daily        calcium carbonate 500 MG chewable tablet    TUMS     Take 1 chew tab by mouth daily        CLINDAMYCIN HCL PO      Take 600 mg by mouth 1 hour prior to dental work        fluticasone 50 MCG/ACT spray    FLONASE     Spray 2 sprays into both nostrils daily        LEVOTHYROXINE SODIUM PO      Take 75 mcg by mouth daily        LISINOPRIL PO      Take 5 mg by mouth daily        POTASSIUM CHLORIDE PO      Take 20 mEq by mouth daily        SIMVASTATIN PO      Take 20 mg by mouth At Bedtime        vitamin B complex with vitamin C Tabs tablet      Take 1 tablet by  mouth daily        vitamin D 2000 UNITS tablet      Take 1 tablet by mouth daily        ZIOPTAN 0.0015 % Soln   Generic drug:  Tafluprost      Place 1 drop into both eyes At Bedtime

## 2018-01-05 NOTE — NURSING NOTE
"Chief Complaint   Patient presents with     RECHECK     Follow up right TM perferation, irritation of EAC bilateral, SNHL. Concerns has fluid in ear, pain and popping.        Initial /68  Pulse 94  Temp 98.4  F (36.9  C) (Tympanic)  Resp 16  Ht 5' 2\" (1.575 m)  Wt 119 lb (54 kg)  SpO2 99%  BMI 21.77 kg/m2 Estimated body mass index is 21.77 kg/(m^2) as calculated from the following:    Height as of this encounter: 5' 2\" (1.575 m).    Weight as of this encounter: 119 lb (54 kg).  Medication Reconciliation: complete   Constanza Quiroga        "

## 2018-01-05 NOTE — PROGRESS NOTES
"Otolaryngology Progress Note           Chief Complaint:     Patient presents with:  RECHECK: Follow up right TM perferation, irritation of EAC bilateral, SNHL. Concerns has fluid in ear, pain and popping.          History of Present Illness:     Lita Ocasio is a 77 year old female here to follow up on ears. I last saw her 12/13/17, ear debridement attempted in EAC's, some canal irritation developed so I placed her on otic drops. It was noted that left TM was intact, right TM with 20% dry central perforation, with recommendation to f/u in 3 months with audiogram, sooner as needed. Preceeding this, she was treated for AOM right ear in November, most likely the cause of the perforation.    Today she reports she is here earlier because she has been experiencing bilateral ear popping and crackling L>R. Mild intermittent otalgia bilaterally. No otorrhea. She tells me she feels her sinuses are acting up, just feels her head is full. Clear/thin rhinorrhea. Denies sinus pain. Using some sort of nasal spray, not sure if it is a steroid or saline. Mild chronic tinnitus. No vertigo. No facial weakness, numbness or dysphagia. She will get occasional ear pruritis. In the past, states the PRN vinegar/distilled water otic drops have been successful.          Physical Exam:     /70  Pulse 94  Temp 98.4  F (36.9  C) (Tympanic)  Resp 16  Ht 1.575 m (5' 2\")  Wt 54 kg (119 lb)  SpO2 99%  BMI 21.77 kg/m2  General - The patient is well nourished and well developed, and appears to have good nutritional status.  Alert and oriented to person and place, interactive.  Head and Face - Normocephalic and atraumatic, with no gross asymmetry noted of the contour of the facial features.  The facial nerve is intact, with strong symmetric movements.  Neck-no palpable lymphadenopathy or thyroid mass.  Trachea is midline.  Eyes - Extraocular movements intact.   Ears- External ears normal. Ears examined under microscopy. Right EAC with " hematoma anterior canal where it became irritated last time I saw her with cerumen debridement, did not attempt debridement at area. TM with central 20% dry perforation, healthy appearing middle ear. No otorrhea. Left EAC clear, there is a large piece of dried cerumen sitting on TM, removed with cupped forceps. TM with myringosclerosis, otherwise no effusion, perforation, retraction or worrisome mass.  Nose - Nasal mucosa is pink and moist with no abnormal mucus. The septum was grossly midline and non-obstructive, turbinates of normal size and position.  No polyps, masses, or purulence noted on examination.  Mouth - Examination of the oral cavity shows pink, healthy, moist mucosa. Dentition in good condition.  No lesions or ulceration noted. The tongue is mobile and midline.  Throat - The walls of the oropharynx were smooth, pink, moist, symmetric, and had no lesions or ulcerations.  The tonsillar pillars and soft palate were symmetric.  The uvula was midline on elevation.           Assessment and Plan:     (R09.81) Nasal congestion  (primary encounter diagnosis)  Comment: No symptoms of acute sinusitis.   Plan: Flonase 2 sprays once daily. Eduin Med sinus irrigation BID and PRN. PRN ibuprofen. F/u if sx worsen or not improving.     (J34.89) Rhinorrhea  Comment: Symptoms x 1 week with no sinus pain. Afebrile.  Plan: Flonase 2 sprays once daily. Eduin Med sinus irrigation BID and PRN. PRN ibuprofen. F/u if sx worsen or not improving.     (H69.83) Dysfunction of both eustachian tubes  Comment: Stable, likely related to current sinus symptoms.   Plan: Flonase 2 sprays once daily along with Eduin Med sinus irrigation as noted above.     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 She desires to have audiogram done in [place]. Instructed to bring in copy of review. Reviewed perforations, sometimes takes 3-6 months, if becomes  chronic discussed ongoing surveillance vs tympanoplasty if chronic otorrhea or conductive hearing loss, which would have to be recommended by Dr. Dudley.     (H61.22) Cerumen debris on tympanic membrane of left ear  Comment: Could be causing some of the crackling of the ear.   Plan: Debrided with cupped forceps.     (S00.431A) Hematoma of right EAC, initial encounter  Comment: Likely from cerumen removal at last visit. No active bleeding nor signs of infection.   Plan: Ongoing surveillance. I did not attempt removal. I suspect this will resolve with time.     PRN vinegar/distilled water otic drops of equal solution. 1-2 drops every 2-3 days. Caution on use in right ear, if causes discomfort, only use in left ear.     Discussed with patient that it takes 20 years of quitting tobacco to be at same risk of developing head and neck cancer as a person who has never used tobacco. Patient voiced understanding to ongoing risks associated with continued tobacco use. Tobacco cessation was strongly encouraged.    She will follow up with me around March with audiogram prior to appointment.  Encouraged her to f/u sooner as needed.       Gali Dominguez NP  ENT  [facility], [place]  [phone]

## 2018-01-05 NOTE — PATIENT INSTRUCTIONS
Right ear still with stable perforation.  Left ear wax removed.  No fluid in ears.    Vinegar ear drops for itching: Mix equal amounts of distilled water and white vinegar. Apply 1-2 drops in ears every few days.     Sinuses: Flonase (nasal spray) 2 sprays once daily. Eduin Med Sinus irrigation (get over the counter) use twice daily as needed. Ibuprofen as needed.    Follow up in 3-4 months, get audiogram before seeing me and bring copy in.    Follow up sooner as needed.    Work on smoking cessation.     Thank you for allowing Gali Dominguez CNP and our ENT team to participate in your care.  If you have a scheduling or an appointment question please contact our Health Unit Coordinator at their direct line [phone].   ALL nursing questions or concerns can be directed to your ENT nurse at: [phone]

## 2018-02-21 ENCOUNTER — TRANSFERRED RECORDS (OUTPATIENT)
Dept: HEALTH INFORMATION MANAGEMENT | Facility: CLINIC | Age: 78
End: 2018-02-21

## 2018-03-13 ENCOUNTER — OFFICE VISIT (OUTPATIENT)
Dept: OTOLARYNGOLOGY | Facility: OTHER | Age: 78
End: 2018-03-13
Attending: NURSE PRACTITIONER
Payer: MEDICARE

## 2018-03-13 VITALS
TEMPERATURE: 98.7 F | BODY MASS INDEX: 21.9 KG/M2 | OXYGEN SATURATION: 97 % | HEART RATE: 82 BPM | WEIGHT: 119 LBS | HEIGHT: 62 IN | SYSTOLIC BLOOD PRESSURE: 158 MMHG | DIASTOLIC BLOOD PRESSURE: 72 MMHG

## 2018-03-13 DIAGNOSIS — H72.91 PERFORATION OF TYMPANIC MEMBRANE, RIGHT: Primary | ICD-10-CM

## 2018-03-13 DIAGNOSIS — J31.0 CHRONIC RHINITIS, UNSPECIFIED TYPE: ICD-10-CM

## 2018-03-13 DIAGNOSIS — Z97.4 WEARS HEARING AID: ICD-10-CM

## 2018-03-13 DIAGNOSIS — H61.22 IMPACTED CERUMEN OF LEFT EAR: ICD-10-CM

## 2018-03-13 DIAGNOSIS — H90.3 SENSORINEURAL HEARING LOSS (SNHL) OF BOTH EARS: ICD-10-CM

## 2018-03-13 PROCEDURE — 69210 REMOVE IMPACTED EAR WAX UNI: CPT | Performed by: NURSE PRACTITIONER

## 2018-03-13 PROCEDURE — 92504 EAR MICROSCOPY EXAMINATION: CPT | Performed by: NURSE PRACTITIONER

## 2018-03-13 PROCEDURE — G0463 HOSPITAL OUTPT CLINIC VISIT: HCPCS

## 2018-03-13 PROCEDURE — 99213 OFFICE O/P EST LOW 20 MIN: CPT | Mod: 25 | Performed by: NURSE PRACTITIONER

## 2018-03-13 ASSESSMENT — PAIN SCALES - GENERAL: PAINLEVEL: NO PAIN (0)

## 2018-03-13 NOTE — MR AVS SNAPSHOT
After Visit Summary   3/13/2018    Lita Ocasio    MRN: [id number]           Patient Information     Date Of Birth          1940        Visit Information        Provider Department      3/13/2018 2:30 PM Gali Dominguez APRN CNP [facility]        Today's Diagnoses     Perforation of tympanic membrane, right    -  1    Sensorineural hearing loss (SNHL) of both ears        Wears hearing aid          Care Instructions    Right ear perforation continues with no changes.  Perforation appears healthy.    Keep right ear dry.  Continue with annual audiograms, sooner with any acute hearing changes.    Follow up in 6 months for re-evaluation, sooner as needed.      Thank you for allowing Gali Dominguez CNP and our ENT team to participate in your care.  If your medications are too expensive, please give the nurse a call.  We can possibly change this medication.  If you have a scheduling or an appointment question please contact [facility] Unit Coordinator at their direct line [phone].   ALL nursing questions or concerns can be directed to your ENT nurse at: [phone]- alex            Follow-ups after your visit        Follow-up notes from your care team     Return in about 6 months (around 9/13/2018) for recheck right ear.      Who to contact     If you have questions or need follow up information about today's clinic visit or your schedule please contact [facility] directly at [phone].  Normal or non-critical lab and imaging results will be communicated to you by MyChart, letter or phone within 4 business days after the clinic has received the results. If you do not hear from us within 7 days, please contact the clinic through MyChart or phone. If you have a critical or abnormal lab result, we will notify you by phone as soon as possible.  Submit refill requests through [employer] or call your pharmacy and they will forward the refill request to us. Please  "allow 3 business days for your refill to be completed.          Additional Information About Your Visit        MyChart Information     [facility] lets you send messages to your doctor, view your test results, renew your prescriptions, schedule appointments and more. To sign up, go to [email or web address] . Click on \"Log in\" on the left side of the screen, which will take you to the Welcome page. Then click on \"Sign up Now\" on the right side of the page.     You will be asked to enter the access code listed below, as well as some personal information. Please follow the directions to create your username and password.     Your access code is: [email or web address]  Expires: 2018  2:42 PM     Your access code will  in 90 days. If you need help or a new code, please call your [facility] clinic or [phone].        Care EveryWhere ID     This is your Care EveryWhere ID. This could be used by other organizations to access your [facility] medical records  [id number]        Your Vitals Were     Pulse Temperature Height Pulse Oximetry BMI (Body Mass Index)       82 98.7  F (37.1  C) (Tympanic) 5' 2\" (1.575 m) 97% 21.77 kg/m2        Blood Pressure from Last 3 Encounters:   18 158/72   18 156/70   17 118/62    Weight from Last 3 Encounters:   18 119 lb (54 kg)   18 119 lb (54 kg)   17 119 lb (54 kg)              Today, you had the following     No orders found for display       Primary Care Provider Office Phone # Fax #    Hannah Almazan [phone]       [facility]ly [facility] [address] [facility] [address]        Equal Access to Services     DIAN SLAUGHTER : Jada Shah, mike ann, gilberto santizoalmarilu mederos, cookie powers. So [facility] [phone].    ATENCIÓN: Si habla español, tiene a perdomo disposición servicios gratuitos de asistencia lingüística. David chaudhary [phone].    We comply with applicable federal civil " rights laws and [place] laws. We do not discriminate on the basis of race, color, national origin, age, disability, sex, sexual orientation, or gender identity.            Thank you!     Thank you for choosing [facility]  for your care. Our goal is always to provide you with excellent care. Hearing back from our patients is one way we can continue to improve our services. Please take a few minutes to complete the written survey that you may receive in the mail after your visit with us. Thank you!             Your Updated Medication List - Protect others around you: Learn how to safely use, store and throw away your medicines at www.disposemymeds.org.          This list is accurate as of 3/13/18  2:42 PM.  Always use your most recent med list.                   Brand Name Dispense Instructions for use Diagnosis    ACCOLATE PO      Take 20 mg by mouth daily        ALLEGRA PO      Take 180 mg by mouth daily        amLODIPine 5 MG tablet    NORVASC    30 tablet    Take 1 tablet (5 mg) by mouth daily    HTN (hypertension)       aspirin 81 MG tablet      Take 1 tablet by mouth daily        calcium carbonate 500 MG chewable tablet    TUMS     Take 1 chew tab by mouth daily        CLINDAMYCIN HCL PO      Take 600 mg by mouth 1 hour prior to dental work        fluticasone 50 MCG/ACT spray    FLONASE    1 Bottle    Spray 2 sprays into both nostrils daily    Nasal congestion       LEVOTHYROXINE SODIUM PO      Take 75 mcg by mouth daily        LISINOPRIL PO      Take 5 mg by mouth daily        POTASSIUM CHLORIDE PO      Take 20 mEq by mouth daily        SIMVASTATIN PO      Take 20 mg by mouth At Bedtime        vitamin B complex with vitamin C Tabs tablet      Take 1 tablet by mouth daily        vitamin D 2000 UNITS tablet      Take 1 tablet by mouth daily        ZIOPTAN 0.0015 % Soln   Generic drug:  Tafluprost      Place 1 drop into both eyes At Bedtime

## 2018-03-13 NOTE — NURSING NOTE
"Chief Complaint   Patient presents with     RECHECK     f/u nasal congestion, rhinorrhea, bilateral ETD, right TM perf, menatoma of right EAC        Initial /72 (BP Location: Right arm, Patient Position: Chair, Cuff Size: Adult Regular)  Pulse 82  Temp 98.7  F (37.1  C) (Tympanic)  Ht 5' 2\" (1.575 m)  Wt 119 lb (54 kg)  SpO2 97%  BMI 21.77 kg/m2 Estimated body mass index is 21.77 kg/(m^2) as calculated from the following:    Height as of this encounter: 5' 2\" (1.575 m).    Weight as of this encounter: 119 lb (54 kg).  Medication Reconciliation: complete     Tashia Becker LPN    "

## 2018-03-13 NOTE — PROGRESS NOTES
"Otolaryngology Progress Note           Chief Complaint:     Chief Complaint   Patient presents with     RECHECK     f/u nasal congestion, rhinorrhea, bilateral ETD, right TM perf, menatoma of right EAC           History of Present Illness:     Lita Ocasio is a 77 year old female here to fu on right ear. Wears bilateral hearing aides. I last saw her 12/13/18 after she developed a right TM perforation after an acute AOM in November. When she was in to see me, right TM perforation was 20%, dry, no otorrhea. She had previously been having 'dizziness' and 'unsteadiness', but resolved after I debrided her ears. She deferred further vestibular testing. I placed her on ciprodex drops to both ears due to irritation that developed after cerumen debridement.    Today Lita reports she continues to have some intermittent clicking in the right ear. Denies otalgia and otorrhea. Denies hearing changes. No tinnitus/vertigo. Reports no further unsteadiness. Denies facial numbness/weakness or dysphagia.    Has been using PRN nasal saline and Flonase with 100% improvement in nasal congestion. Denies sinus pain/pressure/drainage.     Lita quit smoking in February of this year, use of the patch.     Audiogram from [facility] reviewed (sent to scanning) 2/21/18: Slight decline right ear, likely conductive from ear perforation. Otherwise audiologist had no further concerns.          Review of Systems:     See HPI         Physical Exam:     /72 (BP Location: Right arm, Patient Position: Chair, Cuff Size: Adult Regular)  Pulse 82  Temp 98.7  F (37.1  C) (Tympanic)  Ht 5' 2\" (1.575 m)  Wt 119 lb (54 kg)  SpO2 97%  BMI 21.77 kg/m2    General - The patient is well nourished and well developed, and appears to have good nutritional status.  Alert and oriented to person and place, interactive.  Head and Face - Normocephalic and atraumatic, with no gross asymmetry noted of the contour of the facial features.  The facial " nerve is intact, with strong symmetric movements.  Neck-no palpable lymphadenopathy or thyroid mass.  Trachea is midline.  Eyes - Extraocular movements intact.   Ears- External ears normal. Ears examined under microscope. Left EAC with thin/dried cerumen, removed with cupped forceps. TM intact without effusion/retraction/mass. Right EAC clear, TM with anterior 20% dry perforation, no otorrhea. Remaining TM intact. Bony landmarks intact.   Nose - Nasal mucosa is pink and moist with no abnormal mucus.  The septum was grossly midline and non-obstructive, turbinates of normal size and position.  No polyps, masses, or purulence noted on examination.  Mouth - Examination of the oral cavity shows pink, healthy, moist mucosa. Dentures. No lesions or ulceration noted. The tongue is mobile and midline.    Throat - The walls of the oropharynx were smooth, pink, moist, symmetric, and had no lesions or ulcerations.  The tonsillar pillars and soft palate were symmetric.  The uvula was midline on elevation.           Assessment and Plan:       ICD-10-CM    1. Perforation of tympanic membrane, right H72.91    2. Sensorineural hearing loss (SNHL) of both ears H90.3    3. Wears hearing aid Z97.4    4. Impacted cerumen of left ear H61.22    5. Chronic rhinitis, unspecified type J31.0      Rhinitis completely controlled with PRN Flonase and nasal saline irrigation. Encouraged to continue with this.    Right ear perforation continues anterior 20% dry.   She has no concerns regarding hearing loss.    Discussed surgical options vs ongoing surveillance. She defers any surgical procedure and I agree with this due to age.   Did offer appointment with Dr. Dudley regarding surgical options, she defers.    Keep right ear dry.  Yearly audiograms, sooner with any acute hearing changes.  Continue with use of hearing aides.    Congratulated on smoking cessation.     Follow up in 6 months for routine surveillance of right TM  perforation.    Encouraged her to f/u sooner as needed.     Gali Dominguez NP  ENT  [facility] [place], [place]  [phone]

## 2018-03-13 NOTE — PATIENT INSTRUCTIONS
Right ear perforation continues with no changes.  Perforation appears healthy.    Keep right ear dry.  Continue with annual audiograms, sooner with any acute hearing changes.    Follow up in 6 months for re-evaluation, sooner as needed.      Thank you for allowing Gali Dominguez CNP and our ENT team to participate in your care.  If your medications are too expensive, please give the nurse a call.  We can possibly change this medication.  If you have a scheduling or an appointment question please contact [facility] Health Unit Coordinator at their direct line [phone].   ALL nursing questions or concerns can be directed to your ENT nurse at: [phone]- [place]

## 2018-03-13 NOTE — LETTER
"    3/13/2018         RE: Lita Ocasio  [address] [facility] [address]        Dear Colleague,    Thank you for referring your patient, Lita Ocasio, to the [facility]. Please see a copy of my visit note below.    Otolaryngology Progress Note           Chief Complaint:     Chief Complaint   Patient presents with     RECHECK     f/u nasal congestion, rhinorrhea, bilateral ETD, right TM perf, menatoma of right EAC           History of Present Illness:     Lita Ocasio is a 77 year old female here to fu on right ear. Wears bilateral hearing aides. I last saw her 12/13/18 after she developed a right TM perforation after an acute AOM in November. When she was in to see me, right TM perforation was 20%, dry, no otorrhea. She had previously been having 'dizziness' and 'unsteadiness', but resolved after I debrided her ears. She deferred further vestibular testing. I placed her on ciprodex drops to both ears due to irritation that developed after cerumen debridement.    Today Lita reports she continues to have some intermittent clicking in the right ear. Denies otalgia and otorrhea. Denies hearing changes. No tinnitus/vertigo. Reports no further unsteadiness. Denies facial numbness/weakness or dysphagia.    Has been using PRN nasal saline and Flonase with 100% improvement in nasal congestion. Denies sinus pain/pressure/drainage.     Lita quit smoking in February of this year, use of the patch.     Audi[facility] from [facility] reviewed (sent to scanning) 2/21/18: Slight decline right ear, likely conductive from ear perforation. Otherwise audiologist had no further concerns.          Review of Systems:     See HPI         Physical Exam:     /72 (BP Location: Right arm, Patient Position: Chair, Cuff Size: Adult Regular)  Pulse 82  Temp 98.7  F (37.1  C) (Tympanic)  Ht 5' 2\" (1.575 m)  Wt 119 lb (54 kg)  SpO2 97%  BMI 21.77 kg/m2    General - The patient is well nourished and well developed, " and appears to have good nutritional status.  Alert and oriented to person and place, interactive.  Head and Face - Normocephalic and atraumatic, with no gross asymmetry noted of the contour of the facial features.  The facial nerve is intact, with strong symmetric movements.  Neck-no palpable lymphadenopathy or thyroid mass.  Trachea is midline.  Eyes - Extraocular movements intact.   Ears- External ears normal. Ears examined under microscope. Left EAC with thin/dried cerumen, removed with cupped forceps. TM intact without effusion/retraction/mass. Right EAC clear, TM with anterior 20% dry perforation, no otorrhea. Remaining TM intact. Bony landmarks intact.   Nose - Nasal mucosa is pink and moist with no abnormal mucus.  The septum was grossly midline and non-obstructive, turbinates of normal size and position.  No polyps, masses, or purulence noted on examination.  Mouth - Examination of the oral cavity shows pink, healthy, moist mucosa. Dentures. No lesions or ulceration noted. The tongue is mobile and midline.    Throat - The walls of the oropharynx were smooth, pink, moist, symmetric, and had no lesions or ulcerations.  The tonsillar pillars and soft palate were symmetric.  The uvula was midline on elevation.           Assessment and Plan:       ICD-10-CM    1. Perforation of tympanic membrane, right H72.91    2. Sensorineural hearing loss (SNHL) of both ears H90.3    3. Wears hearing aid Z97.4    4. Impacted cerumen of left ear H61.22    5. Chronic rhinitis, unspecified type J31.0      Rhinitis completely controlled with PRN Flonase and nasal saline irrigation. Encouraged to continue with this.    Right ear perforation continues anterior 20% dry.   She has no concerns regarding hearing loss.    Discussed surgical options vs ongoing surveillance. She defers any surgical procedure and I agree with this due to age.   Did offer appointment with Dr. Dudley regarding surgical options, she defers.    Keep right  ear dry.  Yearly audiograms, sooner with any acute hearing changes.  Continue with use of hearing aides.    Congratulated on smoking cessation.     Follow up in 6 months for routine surveillance of right TM perforation.    Encouraged her to f/u sooner as needed.     Gali Dominguez NP  ENT  [facility], [place]  [phone]      Again, thank you for allowing me to participate in the care of your patient.        Sincerely,        COCO Jarquin CNP

## 2018-03-20 ENCOUNTER — TRANSFERRED RECORDS (OUTPATIENT)
Dept: HEALTH INFORMATION MANAGEMENT | Facility: CLINIC | Age: 78
End: 2018-03-20

## 2018-05-26 ENCOUNTER — APPOINTMENT (OUTPATIENT)
Dept: GENERAL RADIOLOGY | Facility: HOSPITAL | Age: 78
End: 2018-05-26
Attending: FAMILY MEDICINE
Payer: MEDICARE

## 2018-05-26 ENCOUNTER — HOSPITAL ENCOUNTER (EMERGENCY)
Facility: HOSPITAL | Age: 78
Discharge: HOME | End: 2018-05-27
Attending: FAMILY MEDICINE | Admitting: FAMILY MEDICINE
Payer: MEDICARE

## 2018-05-26 DIAGNOSIS — R04.2 HEMOPTYSIS: ICD-10-CM

## 2018-05-26 LAB
ANION GAP SERPL CALCULATED.3IONS-SCNC: 9 MMOL/L (ref 3–14)
BASOPHILS # BLD AUTO: 0.1 10E9/L (ref 0–0.2)
BASOPHILS NFR BLD AUTO: 0.5 %
BUN SERPL-MCNC: 12 MG/DL (ref 7–30)
CALCIUM SERPL-MCNC: 8.7 MG/DL (ref 8.5–10.1)
CHLORIDE SERPL-SCNC: 107 MMOL/L (ref 94–109)
CO2 SERPL-SCNC: 25 MMOL/L (ref 20–32)
CREAT SERPL-MCNC: 0.82 MG/DL (ref 0.52–1.04)
CRP SERPL-MCNC: <2.9 MG/L (ref 0–8)
DIFFERENTIAL METHOD BLD: ABNORMAL
EOSINOPHIL # BLD AUTO: 0.2 10E9/L (ref 0–0.7)
EOSINOPHIL NFR BLD AUTO: 1.7 %
ERYTHROCYTE [DISTWIDTH] IN BLOOD BY AUTOMATED COUNT: 13.4 % (ref 10–15)
GFR SERPL CREATININE-BSD FRML MDRD: 67 ML/MIN/1.7M2
GLUCOSE SERPL-MCNC: 93 MG/DL (ref 70–99)
HCT VFR BLD AUTO: 45.5 % (ref 35–47)
HGB BLD-MCNC: 15.7 G/DL (ref 11.7–15.7)
IMM GRANULOCYTES # BLD: 0 10E9/L (ref 0–0.4)
IMM GRANULOCYTES NFR BLD: 0.3 %
LACTATE SERPL-SCNC: 0.9 MMOL/L (ref 0.4–2)
LYMPHOCYTES # BLD AUTO: 2.4 10E9/L (ref 0.8–5.3)
LYMPHOCYTES NFR BLD AUTO: 20.7 %
MCH RBC QN AUTO: 30.6 PG (ref 26.5–33)
MCHC RBC AUTO-ENTMCNC: 34.5 G/DL (ref 31.5–36.5)
MCV RBC AUTO: 89 FL (ref 78–100)
MONOCYTES # BLD AUTO: 0.9 10E9/L (ref 0–1.3)
MONOCYTES NFR BLD AUTO: 7.9 %
NEUTROPHILS # BLD AUTO: 7.8 10E9/L (ref 1.6–8.3)
NEUTROPHILS NFR BLD AUTO: 68.9 %
NRBC # BLD AUTO: 0 10*3/UL
NRBC BLD AUTO-RTO: 0 /100
PLATELET # BLD AUTO: 258 10E9/L (ref 150–450)
POTASSIUM SERPL-SCNC: 4 MMOL/L (ref 3.4–5.3)
RBC # BLD AUTO: 5.13 10E12/L (ref 3.8–5.2)
SODIUM SERPL-SCNC: 141 MMOL/L (ref 133–144)
WBC # BLD AUTO: 11.3 10E9/L (ref 4–11)

## 2018-05-26 PROCEDURE — 71046 X-RAY EXAM CHEST 2 VIEWS: CPT | Mod: TC

## 2018-05-26 PROCEDURE — 99285 EMERGENCY DEPT VISIT HI MDM: CPT | Mod: 25

## 2018-05-26 PROCEDURE — 99285 EMERGENCY DEPT VISIT HI MDM: CPT | Mod: Z6 | Performed by: FAMILY MEDICINE

## 2018-05-26 PROCEDURE — 85025 COMPLETE CBC W/AUTO DIFF WBC: CPT | Performed by: FAMILY MEDICINE

## 2018-05-26 PROCEDURE — 80048 BASIC METABOLIC PNL TOTAL CA: CPT | Performed by: FAMILY MEDICINE

## 2018-05-26 PROCEDURE — 83605 ASSAY OF LACTIC ACID: CPT | Performed by: FAMILY MEDICINE

## 2018-05-26 PROCEDURE — 86140 C-REACTIVE PROTEIN: CPT | Performed by: FAMILY MEDICINE

## 2018-05-26 PROCEDURE — 36415 COLL VENOUS BLD VENIPUNCTURE: CPT | Performed by: FAMILY MEDICINE

## 2018-05-26 PROCEDURE — 85379 FIBRIN DEGRADATION QUANT: CPT | Performed by: FAMILY MEDICINE

## 2018-05-26 ASSESSMENT — ENCOUNTER SYMPTOMS
HEMATOLOGIC/LYMPHATIC NEGATIVE: 1
TROUBLE SWALLOWING: 1
WHEEZING: 0
APNEA: 0
FEVER: 0
CHILLS: 0
UNEXPECTED WEIGHT CHANGE: 0
STRIDOR: 0
EYES NEGATIVE: 1
FATIGUE: 0
ACTIVITY CHANGE: 0
COUGH: 1
PALPITATIONS: 0
MUSCULOSKELETAL NEGATIVE: 1
CHEST TIGHTNESS: 0
DIZZINESS: 1
GASTROINTESTINAL NEGATIVE: 1
APPETITE CHANGE: 0
RHINORRHEA: 1
DIAPHORESIS: 0
SHORTNESS OF BREATH: 0

## 2018-05-26 NOTE — ED AVS SNAPSHOT
[facility]    [address] [place] [address]    Phone:  [phone]                                       Lita Ocasio   MRN: [id number]    Department:  [facility]   Date of Visit:  5/26/2018           Patient Information     Date Of Birth          1940        Your diagnoses for this visit were:     Hemoptysis        You were seen by Shellie Sethi MD.      Follow-up Information     Follow up with Hannah Almazan MD.    Specialty:  Family Practice    Why:  as may need EGD     Contact information:    [facility]  [address] [facility] [address]  [phone]          Discharge Instructions           Hemoptysis    Hemoptysis is the medical term for coughing up blood. There are many causes for this, including minor illnesses like bronchitis. Hemoptysis can also be an early sign of a more serious illness, like a blood clot in the lung (pulmonary embolism), cancer, tuberculosis, or pneumonia.  Less common causes of hemoptysis can be hard to diagnose in an emergency department or a clinic. More testing will be needed if the symptoms continue.  Home care    Stay away from cigarette smoke. Smoke irritates the bronchial passages.    Unless you are taking daily aspirin to prevent stroke or heart attack, do not take aspirin or products that contain aspirin. Aspirin affects how readily the blood clots. Medicines that prevent clotting may make hemoptysis worse.    If you have a lung infection, drinking extra fluid will help loosen secretions in the lungs.    Over-the-counter cough medicines that contain dextromethorphan may help reduce coughing. Check with a medical provider before taking dextromethorphan if you have a chronic illness, are pregnant, or take daily medications.    If you were prescribed an antibiotic, take it until it is all gone. Take it even if you are feeling better after only a few days.  Follow-up care  Follow up with your  healthcare provider, or as advised.  When to seek medical advice  Call your healthcare provider right away if any of these occur:    Fever of 100.4 F (38 C) or higher, or as directed by your healthcare provider  Call 911  Call 911 if any of these occur:    Coughing up an increased amount of blood    Trouble breathing, wheezing, or pain with breathing    Chest pain or chest pressure    Fainting or losing consciousness    Rapid heartbeat    Weakness or dizziness  Date Last Reviewed: 9/13/2015 2000-2017 [employer]. [address] [place] [address]. All rights reserved. This information is not intended as a substitute for professional medical care. Always follow your healthcare professional's instructions.      What to expect when you have contrast    During your exam, we will inject  contrast  into your vein or artery. (Contrast is a clear liquid with iodine in it. It shows up on X-rays.)    You may feel warm or hot. You may have a metal taste in your mouth and a slight upset stomach. You may also feel pressure near the kidneys and bladder. These effects will last about 1 to 3 minutes.    Please tell us if you have:    Sneezing     Itching    Hives     Swelling in the face    A hoarse voice    Breathing problems    Other new symptoms    Serious problems are rare.  They may include:    Irregular heartbeat     Seizures    Kidney failure              Tissue damage    Shock      Death    If you have any problems during the exam, we  will treat them right away.    When you get home    Call your hospital if you have any new symptoms in the next 2 days, like hives or swelling. (Phone numbers are at the bottom of this page.) Or call your family doctor.     If you have wheezing or trouble breathing, call 911.    Self-care  -Drink at least 4 extra glasses of water today.   This reduces the stress on your kidneys.  -Keep taking your regular medicines.    The contrast will pass out of your body in  your  Urine(pee). This will happen in the next 24 hours. You  will not feel this. Your urine will not  change color.    If you have kidney problems or take metformin    Drink 4 to 8 large glasses of water for the next  2 days, if you are not on a fluid restriction.    ?If you take metformin (Glucophage or Glucovance) for diabetes, keep taking it.      ?Your kidney function tests are abnormal.  If you take Metformin, do not take it for 48 hours. Please go to your clinic for a blood test within 3 days after your exam before the restarting this medicine.     (Note to provider:please give patient prescription for lab tests.)    ?Special instructions: Drink an extra 4 glasses of water to flush out the contrast.     I have read and understand the above information.    Patient Sign Here:______________________________________Date:________Time:______    Staff Sign Here:________________________________________Date:_______Time:______      Radiology Departments:     ?[facility]: [phone] ?[facility]: [phone]     ?[place]: [phone] ?[facility]:[phone]      ?[facility]: [phone]  ?[facility]: [phone]  ?[facility]:[phone]    ?[facility]:[phone]  ?[facility]:[phone]    Your next 10 appointments already scheduled     Sep 17, 2018  2:30 PM CDT   (Arrive by 2:15 PM)   Return Visit with COCO Coronado CNP   [facility] ([facility] )    [address] [place] [address]   [phone]                 Review of your medicines      START taking        Dose / Directions Last dose taken    pantoprazole 40 MG EC tablet   Commonly known as:  PROTONIX   Dose:  40 mg   Quantity:  30 tablet        Take 1 tablet (40 mg) by mouth daily for 30 doses   Refills:  0          Our records show that you are taking the medicines listed below. If these are incorrect, please call your family doctor or clinic.        Dose / Directions Last dose taken    ACCOLATE PO   Dose:   20 mg        Take 20 mg by mouth daily   Refills:  0        ALLEGRA PO   Dose:  180 mg        Take 180 mg by mouth daily   Refills:  0        amLODIPine 5 MG tablet   Commonly known as:  NORVASC   Dose:  5 mg   Quantity:  30 tablet        Take 1 tablet (5 mg) by mouth daily   Refills:  0        aspirin 81 MG tablet   Dose:  1 tablet        Take 1 tablet by mouth daily   Refills:  0        calcium carbonate 500 MG chewable tablet   Commonly known as:  TUMS   Dose:  1 chew tab        Take 1 chew tab by mouth daily   Refills:  0        CLINDAMYCIN HCL PO   Dose:  600 mg        Take 600 mg by mouth 1 hour prior to dental work   Refills:  0        fluticasone 50 MCG/ACT spray   Commonly known as:  FLONASE   Dose:  2 spray   Quantity:  1 Bottle        Spray 2 sprays into both nostrils daily   Refills:  11        LEVOTHYROXINE SODIUM PO   Dose:  75 mcg        Take 75 mcg by mouth daily   Refills:  0        LISINOPRIL PO   Dose:  5 mg        Take 5 mg by mouth daily   Refills:  0        POTASSIUM CHLORIDE PO   Dose:  20 mEq        Take 20 mEq by mouth daily   Refills:  0        SIMVASTATIN PO   Dose:  20 mg        Take 20 mg by mouth At Bedtime   Refills:  0        vitamin B complex with vitamin C Tabs tablet   Dose:  1 tablet        Take 1 tablet by mouth daily   Refills:  0        vitamin D 2000 units tablet   Dose:  1 tablet        Take 1 tablet by mouth daily   Refills:  0        ZIOPTAN 0.0015 % Soln   Dose:  1 drop   Generic drug:  Tafluprost        Place 1 drop into both eyes At Bedtime   Refills:  0                Prescriptions were sent or printed at these locations (1 Prescription)                   [facility] [address] [place], [place] [address] [address]    Telephone:  [phone]   Fax:  [phone]   Hours:                  E-Prescribed (1 of 1)         pantoprazole (PROTONIX) 40 MG EC tablet                Procedures and tests performed during your visit      "Basic metabolic panel    CBC with platelets differential    CRP inflammation    CT Chest Angio w/o & w Contrast    Chest XR,  PA & LAT    D-Dimer ([facility])    Lactic acid      Orders Needing Specimen Collection     None      Pending Results     Date and Time Order Name Status Description    2018 0053 CT Chest Angio w/o & w Contrast In process     2018 2306 Chest XR,  PA & LAT In process             Pending Culture Results     No orders found for last 3 day(s).            Thank you for choosing [facility]       Thank you for choosing [facility] for your care. Our goal is always to provide you with excellent care. Hearing back from our patients is one way we can continue to improve our services. Please take a few minutes to complete the written survey that you may receive in the mail after you visit with us. Thank you!        [employer] Information     [employer] lets you send messages to your doctor, view your test results, renew your prescriptions, schedule appointments and more. To sign up, go to [email or web address] . Click on \"Log in\" on the left side of the screen, which will take you to the Welcome page. Then click on \"Sign up Now\" on the right side of the page.     You will be asked to enter the access code listed below, as well as some personal information. Please follow the directions to create your username and password.     Your access code is: [email or web address]  Expires: 2018  2:42 PM     Your access code will  in 90 days. If you need help or a new code, please call your [facility] clinic or [phone].        Care EveryWhere ID     This is your Care EveryWhere ID. This could be used by other organizations to access your [facility] medical records  [id number]        Equal Access to Services     CHU SLAUGHTER : Jada Shah, mike ann, cookie shah. So [facility] [phone].    ATENCIÓN: Si habla español, tiene a perdomo " disposición servicios gratuitos de asistencia lingüística. David al [phone].    We comply with applicable federal civil rights laws and [place] laws. We do not discriminate on the basis of race, color, national origin, age, disability, sex, sexual orientation, or gender identity.            After Visit Summary       This is your record. Keep this with you and show to your community pharmacist(s) and doctor(s) at your next visit.

## 2018-05-27 ENCOUNTER — APPOINTMENT (OUTPATIENT)
Dept: CT IMAGING | Facility: HOSPITAL | Age: 78
End: 2018-05-27
Attending: FAMILY MEDICINE
Payer: MEDICARE

## 2018-05-27 VITALS
SYSTOLIC BLOOD PRESSURE: 128 MMHG | HEIGHT: 61 IN | HEART RATE: 74 BPM | WEIGHT: 120 LBS | DIASTOLIC BLOOD PRESSURE: 65 MMHG | RESPIRATION RATE: 16 BRPM | BODY MASS INDEX: 22.66 KG/M2 | OXYGEN SATURATION: 97 % | TEMPERATURE: 97 F

## 2018-05-27 LAB — D DIMER PPP DDU-MCNC: 248 NG/ML D-DU (ref 0–300)

## 2018-05-27 PROCEDURE — 71275 CT ANGIOGRAPHY CHEST: CPT | Mod: TC

## 2018-05-27 PROCEDURE — A9270 NON-COVERED ITEM OR SERVICE: HCPCS | Mod: GY | Performed by: FAMILY MEDICINE

## 2018-05-27 PROCEDURE — 25000128 H RX IP 250 OP 636: Performed by: FAMILY MEDICINE

## 2018-05-27 PROCEDURE — 25000132 ZZH RX MED GY IP 250 OP 250 PS 637: Mod: GY | Performed by: FAMILY MEDICINE

## 2018-05-27 RX ORDER — PANTOPRAZOLE SODIUM 40 MG/1
40 TABLET, DELAYED RELEASE ORAL DAILY
Qty: 30 TABLET | Refills: 0 | Status: SHIPPED | OUTPATIENT
Start: 2018-05-27 | End: 2018-06-26

## 2018-05-27 RX ORDER — IOPAMIDOL 755 MG/ML
75 INJECTION, SOLUTION INTRAVASCULAR ONCE
Status: COMPLETED | OUTPATIENT
Start: 2018-05-27 | End: 2018-05-27

## 2018-05-27 RX ORDER — PANTOPRAZOLE SODIUM 40 MG/1
40 TABLET, DELAYED RELEASE ORAL
Status: DISCONTINUED | OUTPATIENT
Start: 2018-05-27 | End: 2018-05-27 | Stop reason: HOSPADM

## 2018-05-27 RX ADMIN — RANITIDINE 150 MG: 150 TABLET ORAL at 02:53

## 2018-05-27 RX ADMIN — IOPAMIDOL 75 ML: 755 INJECTION, SOLUTION INTRAVENOUS at 01:33

## 2018-05-27 NOTE — ED PROVIDER NOTES
History     Chief Complaint   Patient presents with     Hemoptysis     X 1 hour, bright red blood     Sinusitis     HPI  Lita Ocasio is a 77 year old female who presents with concern of hemoptysis that started today. Has chronic cough .  Has chronic sinus problems. , sinus headache, tenderness , post nasal drainage . Takes allegra. NO shortness of breath. Did have episode of lightheadedness but that has resolved. NO fever. No chills No sweats. NO chest pain . NO new symptoms other than hemoptysis . Is a smoker of 3/4 ppd  Has not been told she has COPD or asthma.  No irregular heartbeat. Had issue with one of her pills getting stuckwhen she tried to swallow this am     Problem List:    Patient Active Problem List    Diagnosis Date Noted     Primary hyperparathyroidism (H) 09/05/2013     Priority: Medium     Decreased hearing 08/30/2013     Priority: Medium     Hypercalcemia 08/29/2013     Priority: Medium     Diagnosis updated by automated process. Provider to review and confirm.       Hypercalcemia 08/28/2013     Priority: Medium     Parathyroid related hypercalcemia (H) 08/18/2013     Priority: Medium     Dysfunction of eustachian tube 08/16/2013     Priority: Medium     Sensorineural hearing loss, asymmetrical 08/16/2013     Priority: Medium     Chronic rhinitis 08/16/2013     Priority: Medium        Past Medical History:    Past Medical History:   Diagnosis Date     Asthma      Constipation      Hearing loss      Heart trouble      Hypertension      Nasal congestion      Osteoporosis      Ringing in ears      Sneezing      Snoring      Thyroid disease      Weakness      Weight loss        Past Surgical History:    Past Surgical History:   Procedure Laterality Date     CATARACT IOL, RT/LT Bilateral      COLONOSCOPY       COLONOSCOPY - HIM SCAN  01/01/2009    Colonoscopy - [facility]  2009     ENT SURGERY  2011    para thyroid surgery     ENT SURGERY  09/2013    Parathyroid     PARATHYROIDECTOMY  9/10/2013     Procedure: PARATHYROIDECTOMY;  Reoperative Neck Exploration, Resection of right Parathyroid adenoma;  Surgeon: Thalia Muller MD;  Location: [facility] OR     TONSILLECTOMY       TUBAL LIGATION         Family History:    Family History   Problem Relation Age of Onset     Hearing Loss Mother      HEART DISEASE Mother      CEREBROVASCULAR DISEASE Father      Hearing Loss Brother      CANCER Brother      throat     Ulcerative Colitis Brother      CANCER Sister      DIABETES Sister      Glaucoma Sister      CANCER Maternal Grandmother      HEART DISEASE Maternal Grandfather        Social History:  Marital Status:   [5]  Social History   Substance Use Topics     Smoking status: Current Every Day Smoker     Packs/day: 1.00     Years: 50.00     Types: Cigarettes     Smokeless tobacco: Never Used      Comment: cutting down     Alcohol use Yes        Medications:      amLODIPine (NORVASC) 5 MG tablet   aspirin 81 MG tablet   calcium carbonate (TUMS) 500 MG chewable tablet   Cholecalciferol (VITAMIN D) 2000 UNITS tablet   Fexofenadine HCl (ALLEGRA PO)   fluticasone (FLONASE) 50 MCG/ACT spray   LEVOTHYROXINE SODIUM PO   LISINOPRIL PO   POTASSIUM CHLORIDE PO   SIMVASTATIN PO   Tafluprost (ZIOPTAN) 0.0015 % SOLN   vitamin B complex with vitamin C (VITAMIN  B COMPLEX) TABS tablet   Zafirlukast (ACCOLATE PO)   CLINDAMYCIN HCL PO         Review of Systems   Constitutional: Negative for activity change, appetite change, chills, diaphoresis, fatigue, fever and unexpected weight change.   HENT: Positive for congestion, postnasal drip, rhinorrhea and trouble swallowing. Negative for ear discharge, ear pain and nosebleeds.    Eyes: Negative.    Respiratory: Positive for cough. Negative for apnea, chest tightness, shortness of breath, wheezing and stridor.    Cardiovascular: Negative for chest pain, palpitations and leg swelling.   Gastrointestinal: Negative.    Genitourinary: Negative.    Musculoskeletal: Negative.    Skin:  "Negative.    Neurological: Positive for dizziness.   Hematological: Negative.    All other systems reviewed and are negative.      Physical Exam   BP: 109/80  Pulse: 87  Temp: 98.3  F (36.8  C)  Resp: 20  Height: 154.9 cm (5' 1\")  Weight: 54.4 kg (120 lb)  SpO2: 96 %      Physical Exam   Constitutional: She is oriented to person, place, and time. She appears well-developed and well-nourished. No distress.   HENT:   Head: Normocephalic and atraumatic.   Right Ear: External ear normal.   Left Ear: External ear normal.   Mouth/Throat: Oropharynx is clear and moist.   Eyes: Pupils are equal, round, and reactive to light.   Neck: Normal range of motion. Neck supple. No JVD present. No tracheal deviation present. No thyromegaly present.   Cardiovascular: Normal rate and regular rhythm.    Murmur heard.  Pulmonary/Chest: Effort normal. No stridor.   Diminished breath sounds throughout    Abdominal: Soft.   Musculoskeletal: She exhibits edema.   Trace edema    Lymphadenopathy:     She has no cervical adenopathy.   Neurological: She is alert and oriented to person, place, and time.   Skin: Skin is warm and dry. She is not diaphoretic.   Nursing note and vitals reviewed.      ED Course     ED Course     Procedures          Patient arrived to ER with concern of hemoptysis starting earlier today . Patient triaged to exam room . Vital signs reviewed . NO concerning abnormalities on initial vital sign review Medical records reviewed. History and exam completed. Labs and diagnostics ordered including d dimer and chest CT scan. Review of results showing no pulmonary embolus, NO pneumonia. Discussed with patient and family reassuring findings. Uncertain cause of hemoptysis. Recommend patient schedule follow up appointment with primary care for further recommendations. Patient does have RIGO and did have an episode of one of her pills getting \" stuck\" for a short period of time. Patient was placed on protonix at discharge per family " request. Again follow up recommended with her primarycare          No results found for this or any previous visit (from the past 24 hour(s)).    Medications - No data to display    Assessments & Plan (with Medical Decision Making)     I have reviewed the nursing notes.    I have reviewed the findings, diagnosis, plan and need for follow up with the patient.      New Prescriptions    No medications on file       Final diagnoses:   Hemoptysis       5/26/2018   [facility]     Shellie Sethi MD  05/27/18 1520

## 2018-05-27 NOTE — PROGRESS NOTES
Dr Henson reviewed the CT in the ED yesterday and the pt was discharged, no change to tx plan.  Nelida Hurley Certified  Physician Assistant  5/27/2018  12:55 PM  URGENT CARE CLINIC

## 2018-05-27 NOTE — PROGRESS NOTES
Dr. Henson reviewed this CXR in the ED yesterday.  Nelida Hurley Certified  Physician Assistant  5/27/2018  12:09 PM  UR[facility]ENT [facility]ARE CLINIC

## 2018-05-27 NOTE — DISCHARGE INSTRUCTIONS
Hemoptysis    Hemoptysis is the medical term for coughing up blood. There are many causes for this, including minor illnesses like bronchitis. Hemoptysis can also be an early sign of a more serious illness, like a blood clot in the lung (pulmonary embolism), cancer, tuberculosis, or pneumonia.  Less common causes of hemoptysis can be hard to diagnose in an emergency department or a clinic. More testing will be needed if the symptoms continue.  Home care    Stay away from cigarette smoke. Smoke irritates the bronchial passages.    Unless you are taking daily aspirin to prevent stroke or heart attack, do not take aspirin or products that contain aspirin. Aspirin affects how readily the blood clots. Medicines that prevent clotting may make hemoptysis worse.    If you have a lung infection, drinking extra fluid will help loosen secretions in the lungs.    Over-the-counter cough medicines that contain dextromethorphan may help reduce coughing. Check with a medical provider before taking dextromethorphan if you have a chronic illness, are pregnant, or take daily medications.    If you were prescribed an antibiotic, take it until it is all gone. Take it even if you are feeling better after only a few days.  Follow-up care  Follow up with your healthcare provider, or as advised.  When to seek medical advice  Call your healthcare provider right away if any of these occur:    Fever of 100.4 F (38 C) or higher, or as directed by your healthcare provider  Call 911  Call 911 if any of these occur:    Coughing up an increased amount of blood    Trouble breathing, wheezing, or pain with breathing    Chest pain or chest pressure    Fainting or losing consciousness    Rapid heartbeat    Weakness or dizziness  Date Last Reviewed: 9/13/2015 2000-2017 [employer]. [address] [place] [address], [place] [address]. All rights reserved. This information is not intended as a substitute for professional medical care. Always  follow your healthcare professional's instructions.      What to expect when you have contrast    During your exam, we will inject  contrast  into your vein or artery. (Contrast is a clear liquid with iodine in it. It shows up on X-rays.)    You may feel warm or hot. You may have a metal taste in your mouth and a slight upset stomach. You may also feel pressure near the kidneys and bladder. These effects will last about 1 to 3 minutes.    Please tell us if you have:    Sneezing     Itching    Hives     Swelling in the face    A hoarse voice    Breathing problems    Other new symptoms    Serious problems are rare.  They may include:    Irregular heartbeat     Seizures    Kidney failure              Tissue damage    Shock      Death    If you have any problems during the exam, we  will treat them right away.    When you get home    Call your hospital if you have any new symptoms in the next 2 days, like hives or swelling. (Phone numbers are at the bottom of this page.) Or call your family doctor.     If you have wheezing or trouble breathing, call 911.    Self-care  -Drink at least 4 extra glasses of water today.   This reduces the stress on your kidneys.  -Keep taking your regular medicines.    The contrast will pass out of your body in your  Urine(pee). This will happen in the next 24 hours. You  will not feel this. Your urine will not  change color.    If you have kidney problems or take metformin    Drink 4 to 8 large glasses of water for the next  2 days, if you are not on a fluid restriction.    ?If you take metformin (Glucophage or Glucovance) for diabetes, keep taking it.      ?Your kidney function tests are abnormal.  If you take Metformin, do not take it for 48 hours. Please go to your clinic for a blood test within 3 days after your exam before the restarting this medicine.     (Note to provider:please give patient prescription for lab tests.)    ?Special instructions: Drink an extra 4 glasses of water to  flush out the contrast.     I have read and understand the above information.    Patient Sign Here:______________________________________Date:________Time:______    Staff Sign Here:________________________________________Date:_______Time:______      Radiology Departments:     ?[facility]: [phone] ?[facility]: [phone]     ?[facility]: [phone] ?[facility]:[phone]      ?[facility]: [phone]  ?[facility]: [phone]  ?[facility]:[phone]    ?[facility] [place]:[phone]  ?[facility]:[phone]

## 2018-05-27 NOTE — ED NOTES
Presents with co coughing up bright red blood. Started at 2100 today as she was mixing herself a drink.  States about 1100 this am she took her pills and one got stuck in her throat. States she tried to drink water and then some coffee and it finally went down.  Has sinus issues also. Small amt of red blood in a coffee cup.  States if she coughs hard bright blood otherwise clear phlegm.  Family is here with her.call light in reach.

## 2018-05-27 NOTE — ED NOTES
Continues to spit small amts of bright red blood.  Offers no complaints of pain. Daughter at the desk upset about long wait.  Informed that we do not have ct results back.

## 2018-09-19 ENCOUNTER — OFFICE VISIT (OUTPATIENT)
Dept: OTOLARYNGOLOGY | Facility: OTHER | Age: 78
End: 2018-09-19
Attending: NURSE PRACTITIONER
Payer: MEDICARE

## 2018-09-19 VITALS
TEMPERATURE: 98.5 F | BODY MASS INDEX: 20.77 KG/M2 | HEART RATE: 98 BPM | WEIGHT: 110 LBS | OXYGEN SATURATION: 97 % | SYSTOLIC BLOOD PRESSURE: 132 MMHG | DIASTOLIC BLOOD PRESSURE: 70 MMHG | HEIGHT: 61 IN

## 2018-09-19 DIAGNOSIS — H61.21 IMPACTED CERUMEN OF RIGHT EAR: ICD-10-CM

## 2018-09-19 DIAGNOSIS — J31.0 CHRONIC RHINITIS, UNSPECIFIED TYPE: ICD-10-CM

## 2018-09-19 DIAGNOSIS — H90.3 SENSORINEURAL HEARING LOSS (SNHL) OF BOTH EARS: ICD-10-CM

## 2018-09-19 DIAGNOSIS — Z97.4 WEARS HEARING AID: ICD-10-CM

## 2018-09-19 DIAGNOSIS — L29.9 EAR ITCH: ICD-10-CM

## 2018-09-19 DIAGNOSIS — Z71.6 TOBACCO ABUSE COUNSELING: ICD-10-CM

## 2018-09-19 DIAGNOSIS — H72.91 PERFORATION OF TYMPANIC MEMBRANE, RIGHT: Primary | ICD-10-CM

## 2018-09-19 PROCEDURE — 69210 REMOVE IMPACTED EAR WAX UNI: CPT | Performed by: NURSE PRACTITIONER

## 2018-09-19 PROCEDURE — G0463 HOSPITAL OUTPT CLINIC VISIT: HCPCS

## 2018-09-19 PROCEDURE — 99213 OFFICE O/P EST LOW 20 MIN: CPT | Mod: 25 | Performed by: NURSE PRACTITIONER

## 2018-09-19 PROCEDURE — 92504 EAR MICROSCOPY EXAMINATION: CPT | Performed by: NURSE PRACTITIONER

## 2018-09-19 RX ORDER — FLUTICASONE PROPIONATE 50 MCG
2 SPRAY, SUSPENSION (ML) NASAL DAILY
Qty: 1 BOTTLE | Refills: 11 | Status: SHIPPED | OUTPATIENT
Start: 2018-09-19

## 2018-09-19 RX ORDER — MOMETASONE FUROATE 1 MG/G
CREAM TOPICAL
Qty: 15 G | Refills: 0 | Status: SHIPPED | OUTPATIENT
Start: 2018-09-19 | End: 2019-03-25

## 2018-09-19 ASSESSMENT — PAIN SCALES - GENERAL: PAINLEVEL: NO PAIN (0)

## 2018-09-19 NOTE — PROGRESS NOTES
"Otolaryngology Progress Note           Chief Complaint:     Patient presents with:  RECHECK: Follow Up Right Tympanic Membrane Perforation         History of Present Illness:     Lita Ocasio is a 77 year old female, history of right TM perforation (November 2017), bilateral hearing aide use and SNHL, here to follow up. I last saw her 3/13/18, she continued to have stable right anterior dry TM perforation. She deferred any surgical option at that time.     Today Lita reports no otalgia or otorrhea.  No tinnitus or vertigo.  Hearing stable. No fluctuating hearing loss.   No facial paresthesias or dysphagia.     She has been having some itching outer right ear canal.     As far as her rhinitis, she notes significant improvement with using her Flonase.     Aud[facility] from [facility] reviewed (sent to scanning) 2/21/18: Slight decline right ear, likely conductive from ear perforation. Otherwise audiologist had no further concerns.          Review of Systems:     See HPI         Physical Exam:     /70 (Cuff Size: Adult Regular)  Pulse 98  Temp 98.5  F (36.9  C) (Tympanic)  Ht 5' 1\" (1.549 m)  Wt 110 lb (49.9 kg)  SpO2 97%  BMI 20.78 kg/m2  General - The patient is well nourished and well developed, and appears to have good nutritional status.  Alert and oriented to person and place, interactive.  Head and Face - Normocephalic and atraumatic, with no gross asymmetry noted of the contour of the facial features.  The facial nerve is intact, with strong symmetric movements.  Neck-no palpable lymphadenopathy or thyroid mass.  Trachea is midline.  Eyes - Extraocular movements intact.   Ears- External ears normal. Hearing aides present. Left EAC patent, TM intact without effusion/retraction. Right EAC with cerumen, debrided with cupped forceps, TM with centra/anterior dry perforation, healthy middle ear space.    Nose - Nasal mucosa is pink and moist with no abnormal mucus.  The septum was grossly midline " and non-obstructive, turbinates of normal size and position.  No polyps, masses, or purulence noted on examination.  Mouth - Examination of the oral cavity shows pink, healthy, moist mucosa. Dentures. No lesions or ulceration noted. The tongue is mobile and midline.    Throat - The walls of the oropharynx were smooth, pink, moist, symmetric, and had no lesions or ulcerations.  The tonsillar pillars and soft palate were symmetric.  The uvula was midline on elevation.           Assessment and Plan:       ICD-10-CM    1. Perforation of tympanic membrane, right H72.91    2. Sensorineural hearing loss (SNHL) of both ears H90.3    3. Wears hearing aid Z97.4    4. Impacted cerumen of right ear H61.21    5. Tobacco abuse counseling Z71.6    6. Chronic rhinitis, unspecified type J31.0    7. Ear itch L29.9 mometasone (ELOCON) 0.1 % cream     The ears were cleaned today. Aural hygiene for the ear canals was discussed.  Avoidance of Q-tips was highly encouraged. The patient was told to avoid flushing the ear canal as there is a risk of perforating the ear drum.  Recommend annual audiograms, sooner with any acute changes in hearing. The patient may return here as needed.    Right ear perforation stable. She is not interested in any surgical procedure for perforation.    Flonase refilled, controls her rhinitis well.    Elocon as directed to right outer ear canal for ear itching. If no improvement after 2 weeks, notify me.    Discussed with patient that it takes 20 years of quitting tobacco to be at same risk of developing head and neck cancer as a person who has never used tobacco. Patient voiced understanding to ongoing risks associated with continued tobacco use. Tobacco cessation was strongly encouraged.    She will return in 6 months for routine evaluation, per her request.    Encouraged her to f/u sooner as needed.       Gali Dominguez NP  ENT  [facility], [place]  [phone]

## 2018-09-19 NOTE — MR AVS SNAPSHOT
After Visit Summary   9/19/2018    Lita Ocasio    MRN: [id number]           Patient Information     Date Of Birth          1940        Visit Information        Provider Department      9/19/2018 1:30 PM Gali Dominguez APRN CNP [facility]        Care Instructions    Right ear perforation stable.    Recommend annual audiograms, sooner with any acute hearing changes.     Routine 6 month follow up.    Return sooner as needed.    Vinegar ear drops:  Mix 1/2 distilled water and 1/2 distilled white vinegar.  Put 2-3 drops into ears every few days for ear itching.       Thank you for allowing Gali Dominguez CNP and our ENT team to participate in your care.  If your medications are too expensive, please give the nurse a call.  We can possibly change this medication.  If you have a scheduling or an appointment question please contact [facility] Unit Coordinator at their direct line [phone].   ALL nursing questions or concerns can be directed to your ENT nurse at: [phone]- alex            Follow-ups after your visit        Follow-up notes from your care team     Return in about 6 months (around 3/19/2019) for f/u right ear perforation.      Who to contact     If you have questions or need follow up information about today's clinic visit or your schedule please contact [facility] directly at [phone].  Normal or non-critical lab and imaging results will be communicated to you by MyChart, letter or phone within 4 business days after the clinic has received the results. If you do not hear from us within 7 days, please contact the clinic through MyChart or phone. If you have a critical or abnormal lab result, we will notify you by phone as soon as possible.  Submit refill requests through [employer] or call your pharmacy and they will forward the refill request to us. Please allow 3 business days for your refill to be completed.           "Additional Information About Your Visit        Care EveryWhere ID     This is your Care EveryWhere ID. This could be used by other organizations to access your [place] medical records  [id number]        Your Vitals Were     Pulse Temperature Height Pulse Oximetry BMI (Body Mass Index)       98 98.5  F (36.9  C) (Tympanic) 5' 1\" (1.549 m) 97% 20.78 kg/m2        Blood Pressure from Last 3 Encounters:   09/19/18 132/70   05/27/18 128/65   03/13/18 158/72    Weight from Last 3 Encounters:   09/19/18 110 lb (49.9 kg)   05/26/18 120 lb (54.4 kg)   03/13/18 119 lb (54 kg)              Today, you had the following     No orders found for display       Primary Care Provider Office Phone # Fax #    Hannah Almazan [phone]       [facility] [address] [facility] [address]        Equal Access to Services     [facility]: Hadii aad ku hadasho Soomaali, waaxda luqadaha, qaybta kaalmada adeegyada, waxay idiin haybethanien mike carrion . So [facility] [phone].    ATENCIÓN: Si habla español, tiene a perdomo disposición servicios gratuitos de asistencia lingüística. Llame al [phone].    We comply with applicable federal civil rights laws and [place] laws. We do not discriminate on the basis of race, color, national origin, age, disability, sex, sexual orientation, or gender identity.            Thank you!     Thank you for choosing [facility]  for your care. Our goal is always to provide you with excellent care. Hearing back from our patients is one way we can continue to improve our services. Please take a few minutes to complete the written survey that you may receive in the mail after your visit with us. Thank you!             Your Updated Medication List - Protect others around you: Learn how to safely use, store and throw away your medicines at www.disposemymeds.org.          This list is accurate as of 9/19/18  1:53 PM.  Always use your most recent med " list.                   Brand Name Dispense Instructions for use Diagnosis    ACCOLATE PO      Take 20 mg by mouth daily        ALLEGRA PO      Take 180 mg by mouth daily        amLODIPine 5 MG tablet    NORVASC    30 tablet    Take 1 tablet (5 mg) by mouth daily    HTN (hypertension)       aspirin 81 MG tablet      Take 1 tablet by mouth daily        calcium carbonate 500 MG chewable tablet    TUMS     Take 1 chew tab by mouth daily        CLINDAMYCIN HCL PO      Take 600 mg by mouth 1 hour prior to dental work        fluticasone 50 MCG/ACT spray    FLONASE    1 Bottle    Spray 2 sprays into both nostrils daily    Nasal congestion       LEVOTHYROXINE SODIUM PO      Take 75 mcg by mouth daily        LISINOPRIL PO      Take 5 mg by mouth daily        POTASSIUM CHLORIDE PO      Take 20 mEq by mouth daily        SIMVASTATIN PO      Take 20 mg by mouth At Bedtime        vitamin B complex with vitamin C Tabs tablet      Take 1 tablet by mouth daily        vitamin D 2000 units tablet      Take 1 tablet by mouth daily        ZIOPTAN 0.0015 % Soln   Generic drug:  Tafluprost      Place 1 drop into both eyes At Bedtime

## 2018-09-19 NOTE — NURSING NOTE
"Chief Complaint   Patient presents with     RECHECK     Follow Up Right Tympanic Membrane Perforation       Initial /70 (Cuff Size: Adult Regular)  Pulse 98  Temp 98.5  F (36.9  C) (Tympanic)  Ht 5' 1\" (1.549 m)  Wt 110 lb (49.9 kg)  SpO2 97%  BMI 20.78 kg/m2 Estimated body mass index is 20.78 kg/(m^2) as calculated from the following:    Height as of this encounter: 5' 1\" (1.549 m).    Weight as of this encounter: 110 lb (49.9 kg).  Medication Reconciliation: complete    Scarlett Hartley LPN    "

## 2018-09-19 NOTE — LETTER
"    9/19/2018         RE: Lita Ocasio  [address] [facility] [address]        Dear Colleague,    Thank you for referring your patient, Lita Ocasio, to the [facility]. Please see a copy of my visit note below.    Otolaryngology Progress Note           Chief Complaint:     Patient presents with:  RECHECK: Follow Up Right Tympanic Membrane Perforation         History of Present Illness:     Lita Ocasio is a 77 year old female, history of right TM perforation (November 2017), bilateral hearing aide use and SNHL, here to follow up. I last saw her 3/13/18, she continued to have stable right anterior dry TM perforation. She deferred any surgical option at that time.     Today Lita reports no otalgia or otorrhea.  No tinnitus or vertigo.  Hearing stable. No fluctuating hearing loss.   No facial paresthesias or dysphagia.     She has been having some itching outer right ear canal.     As far as her rhinitis, she notes significant improvement with using her Flonase.     [facility] from [facility] reviewed (sent to scanning) 2/21/18: Slight decline right ear, likely conductive from ear perforation. Otherwise audiologist had no further concerns.          Review of Systems:     See HPI         Physical Exam:     /70 (Cuff Size: Adult Regular)  Pulse 98  Temp 98.5  F (36.9  C) (Tympanic)  Ht 5' 1\" (1.549 m)  Wt 110 lb (49.9 kg)  SpO2 97%  BMI 20.78 kg/m2  General - The patient is well nourished and well developed, and appears to have good nutritional status.  Alert and oriented to person and place, interactive.  Head and Face - Normocephalic and atraumatic, with no gross asymmetry noted of the contour of the facial features.  The facial nerve is intact, with strong symmetric movements.  Neck-no palpable lymphadenopathy or thyroid mass.  Trachea is midline.  Eyes - Extraocular movements intact.   Ears- External ears normal. Hearing aides present. Left EAC patent, TM intact without " effusion/retraction. Right EAC with cerumen, debrided with cupped forceps, TM with centra/anterior dry perforation, healthy middle ear space.    Nose - Nasal mucosa is pink and moist with no abnormal mucus.  The septum was grossly midline and non-obstructive, turbinates of normal size and position.  No polyps, masses, or purulence noted on examination.  Mouth - Examination of the oral cavity shows pink, healthy, moist mucosa. Dentures. No lesions or ulceration noted. The tongue is mobile and midline.    Throat - The walls of the oropharynx were smooth, pink, moist, symmetric, and had no lesions or ulcerations.  The tonsillar pillars and soft palate were symmetric.  The uvula was midline on elevation.           Assessment and Plan:       ICD-10-CM    1. Perforation of tympanic membrane, right H72.91    2. Sensorineural hearing loss (SNHL) of both ears H90.3    3. Wears hearing aid Z97.4    4. Impacted cerumen of right ear H61.21    5. Tobacco abuse counseling Z71.6    6. Chronic rhinitis, unspecified type J31.0    7. Ear itch L29.9 mometasone (ELOCON) 0.1 % cream     The ears were cleaned today. Aural hygiene for the ear canals was discussed.  Avoidance of Q-tips was highly encouraged. The patient was told to avoid flushing the ear canal as there is a risk of perforating the ear drum.  Recommend annual audiograms, sooner with any acute changes in hearing. The patient may return here as needed.    Right ear perforation stable. She is not interested in any surgical procedure for perforation.    Flonase refilled, controls her rhinitis well.    Elocon as directed to right outer ear canal for ear itching. If no improvement after 2 weeks, notify me.    Discussed with patient that it takes 20 years of quitting tobacco to be at same risk of developing head and neck cancer as a person who has never used tobacco. Patient voiced understanding to ongoing risks associated with continued tobacco use. Tobacco cessation was strongly  encouraged.    She will return in 6 months for routine evaluation, per her request.    Encouraged her to f/u sooner as needed.       Gali Dominguez NP  ENT  [facility], [place]  [phone]      Again, thank you for allowing me to participate in the care of your patient.        Sincerely,        COCO Jarquin CNP

## 2018-09-19 NOTE — PATIENT INSTRUCTIONS
Right ear perforation stable.    Recommend annual audiograms, sooner with any acute hearing changes.     Routine 6 month follow up.    Return sooner as needed.    Vinegar ear drops:  Mix 1/2 distilled water and 1/2 distilled white vinegar.  Put 2-3 drops into ears every few days for ear itching.       Thank you for allowing Gali Dominguez CNP and our ENT team to participate in your care.  If your medications are too expensive, please give the nurse a call.  We can possibly change this medication.  If you have a scheduling or an appointment question please contact [facility] Unit Coordinator at their direct line [phone].   ALL nursing questions or concerns can be directed to your ENT nurse at: [phone]- Vnce

## 2018-12-31 ENCOUNTER — HOSPITAL ENCOUNTER (EMERGENCY)
Facility: HOSPITAL | Age: 78
Discharge: ANOTHER HOSPITAL | End: 2019-01-01
Attending: INTERNAL MEDICINE | Admitting: INTERNAL MEDICINE
Payer: MEDICARE

## 2018-12-31 ENCOUNTER — APPOINTMENT (OUTPATIENT)
Dept: GENERAL RADIOLOGY | Facility: HOSPITAL | Age: 78
End: 2018-12-31
Attending: INTERNAL MEDICINE
Payer: MEDICARE

## 2018-12-31 ENCOUNTER — APPOINTMENT (OUTPATIENT)
Dept: CT IMAGING | Facility: HOSPITAL | Age: 78
End: 2018-12-31
Attending: INTERNAL MEDICINE
Payer: MEDICARE

## 2018-12-31 DIAGNOSIS — I61.9 HYPERTENSIVE INTRACEREBRAL HEMORRHAGE (H): ICD-10-CM

## 2018-12-31 LAB
ALBUMIN SERPL-MCNC: 3.9 G/DL (ref 3.4–5)
ALBUMIN UR-MCNC: NEGATIVE MG/DL
ALP SERPL-CCNC: 67 U/L (ref 40–150)
ALT SERPL W P-5'-P-CCNC: 18 U/L (ref 0–50)
ANION GAP SERPL CALCULATED.3IONS-SCNC: 10 MMOL/L (ref 3–14)
APPEARANCE UR: CLEAR
AST SERPL W P-5'-P-CCNC: 14 U/L (ref 0–45)
BACTERIA #/AREA URNS HPF: ABNORMAL /HPF
BASOPHILS # BLD AUTO: 0 10E9/L (ref 0–0.2)
BASOPHILS NFR BLD AUTO: 0.3 %
BILIRUB SERPL-MCNC: 0.4 MG/DL (ref 0.2–1.3)
BILIRUB UR QL STRIP: NEGATIVE
BUN SERPL-MCNC: 14 MG/DL (ref 7–30)
CALCIUM SERPL-MCNC: 8.9 MG/DL (ref 8.5–10.1)
CHLORIDE SERPL-SCNC: 101 MMOL/L (ref 94–109)
CK SERPL-CCNC: 71 U/L (ref 30–225)
CO2 SERPL-SCNC: 24 MMOL/L (ref 20–32)
COLOR UR AUTO: ABNORMAL
CREAT SERPL-MCNC: 1.13 MG/DL (ref 0.52–1.04)
CRP SERPL-MCNC: <2.9 MG/L (ref 0–8)
DIFFERENTIAL METHOD BLD: ABNORMAL
EOSINOPHIL # BLD AUTO: 0.1 10E9/L (ref 0–0.7)
EOSINOPHIL NFR BLD AUTO: 0.8 %
ERYTHROCYTE [DISTWIDTH] IN BLOOD BY AUTOMATED COUNT: 13.2 % (ref 10–15)
ERYTHROCYTE [SEDIMENTATION RATE] IN BLOOD BY WESTERGREN METHOD: 7 MM/H (ref 0–30)
GFR SERPL CREATININE-BSD FRML MDRD: 46 ML/MIN/{1.73_M2}
GLUCOSE BLDC GLUCOMTR-MCNC: 107 MG/DL (ref 70–99)
GLUCOSE SERPL-MCNC: 81 MG/DL (ref 70–99)
GLUCOSE UR STRIP-MCNC: NEGATIVE MG/DL
HCT VFR BLD AUTO: 42.4 % (ref 35–47)
HGB BLD-MCNC: 14.7 G/DL (ref 11.7–15.7)
HGB UR QL STRIP: NEGATIVE
IMM GRANULOCYTES # BLD: 0 10E9/L (ref 0–0.4)
IMM GRANULOCYTES NFR BLD: 0.2 %
INR PPP: 0.94 (ref 0.8–1.2)
KETONES UR STRIP-MCNC: 5 MG/DL
LACTATE BLD-SCNC: 3.1 MMOL/L (ref 0.7–2)
LEUKOCYTE ESTERASE UR QL STRIP: ABNORMAL
LYMPHOCYTES # BLD AUTO: 3.2 10E9/L (ref 0.8–5.3)
LYMPHOCYTES NFR BLD AUTO: 26.4 %
MCH RBC QN AUTO: 30.2 PG (ref 26.5–33)
MCHC RBC AUTO-ENTMCNC: 34.7 G/DL (ref 31.5–36.5)
MCV RBC AUTO: 87 FL (ref 78–100)
MONOCYTES # BLD AUTO: 1.2 10E9/L (ref 0–1.3)
MONOCYTES NFR BLD AUTO: 10 %
MUCOUS THREADS #/AREA URNS LPF: PRESENT /LPF
NEUTROPHILS # BLD AUTO: 7.5 10E9/L (ref 1.6–8.3)
NEUTROPHILS NFR BLD AUTO: 62.3 %
NITRATE UR QL: NEGATIVE
NRBC # BLD AUTO: 0 10*3/UL
NRBC BLD AUTO-RTO: 0 /100
PH UR STRIP: 6.5 PH (ref 4.7–8)
PLATELET # BLD AUTO: 264 10E9/L (ref 150–450)
POTASSIUM SERPL-SCNC: 3.6 MMOL/L (ref 3.4–5.3)
PROT SERPL-MCNC: 7 G/DL (ref 6.8–8.8)
RBC # BLD AUTO: 4.86 10E12/L (ref 3.8–5.2)
RBC #/AREA URNS AUTO: 1 /HPF (ref 0–2)
SODIUM SERPL-SCNC: 135 MMOL/L (ref 133–144)
SOURCE: ABNORMAL
SP GR UR STRIP: 1.01 (ref 1–1.03)
TROPONIN I SERPL-MCNC: 0.02 UG/L (ref 0–0.04)
UROBILINOGEN UR STRIP-MCNC: NORMAL MG/DL (ref 0–2)
WBC # BLD AUTO: 12.1 10E9/L (ref 4–11)
WBC #/AREA URNS AUTO: 2 /HPF (ref 0–5)

## 2018-12-31 PROCEDURE — 99285 EMERGENCY DEPT VISIT HI MDM: CPT | Mod: 25

## 2018-12-31 PROCEDURE — 25000125 ZZHC RX 250: Performed by: INTERNAL MEDICINE

## 2018-12-31 PROCEDURE — 70450 CT HEAD/BRAIN W/O DYE: CPT | Mod: TC

## 2018-12-31 PROCEDURE — 36415 COLL VENOUS BLD VENIPUNCTURE: CPT | Performed by: INTERNAL MEDICINE

## 2018-12-31 PROCEDURE — 83605 ASSAY OF LACTIC ACID: CPT | Performed by: INTERNAL MEDICINE

## 2018-12-31 PROCEDURE — 93010 ELECTROCARDIOGRAM REPORT: CPT | Performed by: INTERNAL MEDICINE

## 2018-12-31 PROCEDURE — 85610 PROTHROMBIN TIME: CPT | Performed by: INTERNAL MEDICINE

## 2018-12-31 PROCEDURE — 00000146 ZZHCL STATISTIC GLUCOSE BY METER IP

## 2018-12-31 PROCEDURE — 85025 COMPLETE CBC W/AUTO DIFF WBC: CPT | Performed by: INTERNAL MEDICINE

## 2018-12-31 PROCEDURE — 87040 BLOOD CULTURE FOR BACTERIA: CPT | Performed by: INTERNAL MEDICINE

## 2018-12-31 PROCEDURE — 82550 ASSAY OF CK (CPK): CPT | Performed by: INTERNAL MEDICINE

## 2018-12-31 PROCEDURE — 96365 THER/PROPH/DIAG IV INF INIT: CPT

## 2018-12-31 PROCEDURE — 25000128 H RX IP 250 OP 636: Performed by: INTERNAL MEDICINE

## 2018-12-31 PROCEDURE — 85652 RBC SED RATE AUTOMATED: CPT | Performed by: INTERNAL MEDICINE

## 2018-12-31 PROCEDURE — 86140 C-REACTIVE PROTEIN: CPT | Performed by: INTERNAL MEDICINE

## 2018-12-31 PROCEDURE — 84484 ASSAY OF TROPONIN QUANT: CPT | Performed by: INTERNAL MEDICINE

## 2018-12-31 PROCEDURE — 71046 X-RAY EXAM CHEST 2 VIEWS: CPT | Mod: TC

## 2018-12-31 PROCEDURE — 96366 THER/PROPH/DIAG IV INF ADDON: CPT

## 2018-12-31 PROCEDURE — 81001 URINALYSIS AUTO W/SCOPE: CPT | Performed by: INTERNAL MEDICINE

## 2018-12-31 PROCEDURE — 80053 COMPREHEN METABOLIC PANEL: CPT | Performed by: INTERNAL MEDICINE

## 2018-12-31 PROCEDURE — 93005 ELECTROCARDIOGRAM TRACING: CPT

## 2018-12-31 PROCEDURE — 99285 EMERGENCY DEPT VISIT HI MDM: CPT | Mod: Z6 | Performed by: INTERNAL MEDICINE

## 2018-12-31 RX ORDER — LORAZEPAM 2 MG/ML
1 INJECTION INTRAMUSCULAR ONCE
Status: COMPLETED | OUTPATIENT
Start: 2018-12-31 | End: 2019-01-01

## 2018-12-31 RX ADMIN — SODIUM CHLORIDE 1000 ML: 9 INJECTION, SOLUTION INTRAVENOUS at 22:08

## 2018-12-31 RX ADMIN — SODIUM CHLORIDE 5 MG/HR: 9 INJECTION, SOLUTION INTRAVENOUS at 23:20

## 2019-01-01 ENCOUNTER — TRANSFERRED RECORDS (OUTPATIENT)
Dept: HEALTH INFORMATION MANAGEMENT | Facility: CLINIC | Age: 79
End: 2019-01-01

## 2019-01-01 VITALS
DIASTOLIC BLOOD PRESSURE: 58 MMHG | SYSTOLIC BLOOD PRESSURE: 173 MMHG | TEMPERATURE: 99.1 F | BODY MASS INDEX: 22.67 KG/M2 | RESPIRATION RATE: 20 BRPM | OXYGEN SATURATION: 98 % | WEIGHT: 120 LBS | HEART RATE: 75 BPM

## 2019-01-01 LAB — RADIOLOGIST FLAGS: ABNORMAL

## 2019-01-01 PROCEDURE — 25000128 H RX IP 250 OP 636: Performed by: INTERNAL MEDICINE

## 2019-01-01 PROCEDURE — 96375 TX/PRO/DX INJ NEW DRUG ADDON: CPT

## 2019-01-01 RX ADMIN — LORAZEPAM 1 MG: 2 INJECTION, SOLUTION INTRAMUSCULAR; INTRAVENOUS at 00:50

## 2019-01-01 ASSESSMENT — ENCOUNTER SYMPTOMS
COLOR CHANGE: 0
DIAPHORESIS: 0
DIFFICULTY URINATING: 0
ANAL BLEEDING: 0
VOMITING: 0
VOICE CHANGE: 0
COUGH: 0
ARTHRALGIAS: 0
PALPITATIONS: 0
BLOOD IN STOOL: 0
SHORTNESS OF BREATH: 0
CHILLS: 0
EYE REDNESS: 0
CONFUSION: 1
HEMATURIA: 0
NECK STIFFNESS: 0
WOUND: 0
DIZZINESS: 0
NAUSEA: 0
ABDOMINAL PAIN: 0
DISORIENTATION: 1
NUMBNESS: 0
NECK PAIN: 0
MYALGIAS: 0
FEVER: 1
DYSURIA: 0
WHEEZING: 0
ABDOMINAL DISTENTION: 0
SPEECH DIFFICULTY: 1
LIGHT-HEADEDNESS: 0
CHEST TIGHTNESS: 0
ALTERED MENTAL STATUS: 1
HEADACHES: 0
BACK PAIN: 0
FLANK PAIN: 0

## 2019-01-01 NOTE — ED NOTES
Family at bedside, aware of plan to transfer to [facility] neurology, no questions offered at time of transfer with [facility].

## 2019-01-01 NOTE — ED NOTES
Code Stroke called for having slurred speech and thought process being off at around 2045 tonight.

## 2019-01-01 NOTE — ED NOTES
Dr Klein in with pt. Code stroke canceled. Family with pt state symptoms of slurred speech started 20 minutes PTA.

## 2019-01-01 NOTE — ED PROVIDER NOTES
"  History     Chief Complaint   Patient presents with     stroke symptoms     \"slurred speech at around 2045, thought process off, speech clear at the current time,\" stated by family member      The history is provided by the patient.   Altered Mental Status   Presenting symptoms: confusion and disorientation    Severity:  Moderate  Most recent episode:  Today  Episode history:  Single  Timing:  Sporadic  Progression:  Resolved  Chronicity:  New  Context: alcohol use    Associated symptoms: fever    Associated symptoms: no abdominal pain, no headaches, no light-headedness, no nausea, no palpitations, no rash and no vomiting          Problem List:    Patient Active Problem List    Diagnosis Date Noted     Primary hyperparathyroidism (H) 09/05/2013     Priority: Medium     Decreased hearing 08/30/2013     Priority: Medium     Hypercalcemia 08/29/2013     Priority: Medium     Diagnosis updated by automated process. Provider to review and confirm.       Hypercalcemia 08/28/2013     Priority: Medium     Parathyroid related hypercalcemia (H) 08/18/2013     Priority: Medium     Dysfunction of eustachian tube 08/16/2013     Priority: Medium     Sensorineural hearing loss, asymmetrical 08/16/2013     Priority: Medium     Chronic rhinitis 08/16/2013     Priority: Medium        Past Medical History:    Past Medical History:   Diagnosis Date     Asthma      Constipation      Hearing loss      Heart trouble      Hypertension      Nasal congestion      Osteoporosis      Ringing in ears      Sneezing      Snoring      Thyroid disease      Weakness      Weight loss        Past Surgical History:    Past Surgical History:   Procedure Laterality Date     CATARACT IOL, RT/LT Bilateral      COLONOSCOPY       COLONOSCOPY - HIM SCAN  01/01/2009    Colonoscopy - [facility]/NL  2009     ENT SURGERY  2011    para thyroid surgery     ENT SURGERY  09/2013    Parathyroid     PARATHYROIDECTOMY  9/10/2013    Procedure: PARATHYROIDECTOMY;  Reoperative " Neck Exploration, Resection of right Parathyroid adenoma;  Surgeon: Thalia Muller MD;  Location: [facility] OR     TONSILLECTOMY       TUBAL LIGATION         Family History:    Family History   Problem Relation Age of Onset     Hearing Loss Mother      Heart Disease Mother      Cerebrovascular Disease Father      Hearing Loss Brother      Cancer Brother         throat     Ulcerative Colitis Brother      Cancer Sister      Diabetes Sister      Glaucoma Sister      Cancer Maternal Grandmother      Heart Disease Maternal Grandfather        Social History:  Marital Status:   [5]  Social History     Tobacco Use     Smoking status: Current Every Day Smoker     Packs/day: 1.00     Years: 50.00     Pack years: 50.00     Types: Cigarettes     Smokeless tobacco: Never Used     Tobacco comment: cutting down   Substance Use Topics     Alcohol use: Yes     Drug use: No        Medications:      amLODIPine (NORVASC) 5 MG tablet   aspirin 81 MG tablet   calcium carbonate (TUMS) 500 MG chewable tablet   Cholecalciferol (VITAMIN D) 2000 UNITS tablet   CLINDAMYCIN HCL PO   Fexofenadine HCl (ALLEGRA PO)   fluticasone (FLONASE) 50 MCG/ACT spray   LEVOTHYROXINE SODIUM PO   LISINOPRIL PO   mometasone (ELOCON) 0.1 % cream   POTASSIUM CHLORIDE PO   SIMVASTATIN PO   Tafluprost (ZIOPTAN) 0.0015 % SOLN   vitamin B complex with vitamin C (VITAMIN  B COMPLEX) TABS tablet   Zafirlukast (ACCOLATE PO)         Review of Systems   Constitutional: Positive for fever. Negative for chills and diaphoresis.   HENT: Negative for congestion and voice change.    Eyes: Negative for redness and visual disturbance.   Respiratory: Negative for cough, chest tightness, shortness of breath and wheezing.    Cardiovascular: Negative for chest pain, palpitations and leg swelling.   Gastrointestinal: Negative for abdominal distention, abdominal pain, anal bleeding, blood in stool, nausea and vomiting.   Genitourinary: Negative for decreased urine volume,  "difficulty urinating, dysuria, flank pain and hematuria.   Musculoskeletal: Negative for arthralgias, back pain, gait problem, myalgias, neck pain and neck stiffness.   Skin: Negative for color change, pallor, rash and wound.   Neurological: Positive for speech difficulty. Negative for dizziness, syncope, light-headedness, numbness and headaches.   Psychiatric/Behavioral: Positive for confusion. Negative for suicidal ideas.   All other systems reviewed and are negative.      Physical Exam   BP: 93/62(\"normal for me\" )  Pulse: 75  Heart Rate: 83  Temp: 100.3  F (37.9  C)  Resp: 16  Weight: 54.4 kg (120 lb)  SpO2: 97 %      Physical Exam   Constitutional: She appears well-developed and well-nourished. No distress.   HENT:   Head: Normocephalic and atraumatic.   Mouth/Throat: Oropharynx is clear and moist. No oropharyngeal exudate.   Eyes: Conjunctivae are normal. Pupils are equal, round, and reactive to light. No scleral icterus.   Neck: Normal range of motion. Neck supple. No JVD present. No tracheal deviation present. No thyromegaly present.   Cardiovascular: Normal rate, regular rhythm, normal heart sounds and intact distal pulses. Exam reveals no gallop and no friction rub.   No murmur heard.  Pulmonary/Chest: Effort normal and breath sounds normal. No stridor. No respiratory distress. She has no wheezes. She has no rales. She exhibits no tenderness.   Abdominal: Soft. Bowel sounds are normal. She exhibits no distension and no mass. There is no tenderness. There is no rebound and no guarding.   Musculoskeletal: Normal range of motion. She exhibits no edema or tenderness.   Lymphadenopathy:     She has no cervical adenopathy.   Neurological: She is alert. GCS eye subscore is 4. GCS verbal subscore is 5. GCS motor subscore is 6.   Skin: Skin is warm and dry. No rash noted. She is not diaphoretic. No erythema. No pallor.   Psychiatric: Her behavior is normal.   Nursing note and vitals reviewed.      ED Course      "   Procedures               No results found for this or any previous visit (from the past 24 hour(s)).    Medications   0.9% sodium chloride BOLUS (0 mLs Intravenous Stopped 12/31/18 5478)   LORazepam (ATIVAN) injection 1 mg (1 mg Intravenous Given 1/1/19 0050)       Assessments & Plan (with Medical Decision Making)   Had episode of slurred speech and confusion at home, witnessed by daughter, by the the time arrived in ER all symptoms resolved and daughter  Believes she came back to her normal status. As per daughter she drank a glass of wife a few hours before incident and after that did feel allright. Low grade fever was noticed in ER.   In ER, AAox3 with some difficulty remembering what happened to her, no other  Neuro deficit , pt was able walk without any problem, no facial weakness, extermities strong 5/5 in tone. Considering pt drank alcohol and had low grade fever and  No clinically detectable neuro deficit , I canceled code stroke. Regardless I ordered CT and head and other workup to rule out cause of transient AMS.   CT head: intraparanchymal bleed in left external capsul of basal ganglia  Nicardipine drip started( pt allergic to labetalol)  I spoke to  stroke neurologist [facility], she recommended admision to  [place] for monitoring, Dr Ca accepted patient.  No ambulance was available for indefinite time so air transfer arranged and pt transferred.  Her neuro status remained stable throughout observation in ER, 1 mg ativan given just before transfer due to severe air travel anxiety.               DR ca  I have reviewed the nursing notes.    I have reviewed the findings, diagnosis, plan and need for follow up with the patient.         Medication List      There are no discharge medications for this visit.         Final diagnoses:   Hypertensive intracerebral hemorrhage (H)       12/31/2018   [facility]     Jeremías Klein MD  01/02/19 0100

## 2019-01-06 LAB
BACTERIA SPEC CULT: NORMAL
BACTERIA SPEC CULT: NORMAL
SPECIMEN SOURCE: NORMAL
SPECIMEN SOURCE: NORMAL

## 2019-03-25 ENCOUNTER — OFFICE VISIT (OUTPATIENT)
Dept: OTOLARYNGOLOGY | Facility: OTHER | Age: 79
End: 2019-03-25
Attending: NURSE PRACTITIONER
Payer: MEDICARE

## 2019-03-25 VITALS
TEMPERATURE: 98.7 F | BODY MASS INDEX: 22.08 KG/M2 | HEART RATE: 75 BPM | HEIGHT: 62 IN | WEIGHT: 120 LBS | SYSTOLIC BLOOD PRESSURE: 138 MMHG | DIASTOLIC BLOOD PRESSURE: 76 MMHG

## 2019-03-25 DIAGNOSIS — Z97.4 WEARS HEARING AID: ICD-10-CM

## 2019-03-25 DIAGNOSIS — Z71.6 TOBACCO ABUSE COUNSELING: ICD-10-CM

## 2019-03-25 DIAGNOSIS — H90.3 SENSORINEURAL HEARING LOSS (SNHL) OF BOTH EARS: ICD-10-CM

## 2019-03-25 DIAGNOSIS — L29.9 EAR ITCH: ICD-10-CM

## 2019-03-25 DIAGNOSIS — H72.91 PERFORATION OF TYMPANIC MEMBRANE, RIGHT: Primary | ICD-10-CM

## 2019-03-25 PROCEDURE — 99213 OFFICE O/P EST LOW 20 MIN: CPT | Mod: 25 | Performed by: NURSE PRACTITIONER

## 2019-03-25 PROCEDURE — 92504 EAR MICROSCOPY EXAMINATION: CPT | Performed by: NURSE PRACTITIONER

## 2019-03-25 PROCEDURE — G0463 HOSPITAL OUTPT CLINIC VISIT: HCPCS

## 2019-03-25 PROCEDURE — G0463 HOSPITAL OUTPT CLINIC VISIT: HCPCS | Mod: 25

## 2019-03-25 ASSESSMENT — MIFFLIN-ST. JEOR: SCORE: 977.57

## 2019-03-25 ASSESSMENT — PAIN SCALES - GENERAL: PAINLEVEL: NO PAIN (0)

## 2019-03-25 NOTE — PATIENT INSTRUCTIONS
Thank you for allowing Gali Dominguez CNP and our ENT team to participate in your care.  If your medications are too expensive, please give the nurse a call.  We can possibly change this medication.  If you have a scheduling or an appointment question please contact [facility] Unit Coordinator at their direct line [phone]  ALL nursing questions or concerns can be directed to your ENT nurse at: [phone] - Hmnz    Follow up in one year sooner as needed.  Complete hearing test.

## 2019-03-25 NOTE — LETTER
"    3/25/2019         RE: Lita Ocasio  [address] [facility] [address]        Dear Colleague,    Thank you for referring your patient, Lita Ocasio, to the [facility]. Please see a copy of my visit note below.    Otolaryngology Progress Note           Chief Complaint:     Patient presents with:  Follow Up: SNHL, R TM perforation, R cerumen impaction         History of Present Illness:     Lita Ocasio is a 78 year old female, history of chronic right TM, SNHL, hearing aide use, chronic rhinitis, itchy ears, tobacco use, here for routine follow-up.     She reports that she felt the Elocon was effective in ear itching, now just using PRN.     Continues with ongoing daily clear rhinitis, not as much relief any more with the Flonase. Denies sinus pain/pressure. Tells me she is used to it and defers any further evaluation/treatment at this time.    Hearing loss remains stable with no acute hearing chances. Denies fluctuating hearing loss.   No aural fullness.   Denies tinnitus/vertigo.  Denies otalgia/otorrhea and no facial paresthesias or dysphagia.   Remains not interested in any surgery to fix right TM perforation.          Review of Systems:     ROS: See HPI         Physical Exam:     /76 (BP Location: Right arm, Cuff Size: Adult Regular)   Pulse 75   Temp 98.7  F (37.1  C) (Tympanic)   Ht 1.575 m (5' 2\")   Wt 54.4 kg (120 lb)   BMI 21.95 kg/m     General - The patient is well nourished and well developed, and appears to have good nutritional status.  Alert and oriented to person and place, interactive.  Head and Face - Normocephalic and atraumatic, with no gross asymmetry noted of the contour of the facial features.  The facial nerve is intact, with strong symmetric movements.  Neck- No palpable lymphadenopathy or thyroid mass.  Trachea is midline.  Eyes - Extraocular movements intact.   Ears- External ears normal. Ears examined under microscope. Left EAC with scant cerumen, " removed with cerumen loop. TM intact without effusion/retraction. Right EAC with scant cerumen, removed with cerumen loop. TM with stable/dry anterior central 20-30% perforation with healthy middle ear space, no otorrhea and no polyps/granulation tissue.   Nose - Nasal mucosa is pink and moist with no abnormal mucus.  The septum was grossly midline and non-obstructive, turbinates of normal size and position.  No polyps, masses, or purulence noted on examination.  Mouth - Examination of the oral cavity shows pink, healthy, moist mucosa. Dentition in good condition.  No lesions or ulceration noted. The tongue is mobile and midline.    Throat - The walls of the oropharynx were smooth, pink, moist, symmetric, and had no lesions or ulcerations.  The tonsillar pillars and soft palate were symmetric.  The uvula was midline on elevation.           Assessment and Plan:       ICD-10-CM    1. Perforation of tympanic membrane, right H72.91    2. Sensorineural hearing loss (SNHL) of both ears H90.3    3. Wears hearing aid Z97.4    4. Tobacco abuse counseling Z71.6    5. Ear itch L29.9      Continue with PRN Elocon cream to outer EAC for itching.    She is due for an audiogram. Not sure if she had one last fall or not. Will contact [facility] in [place]. Will request copy if there is a more recent one, otherwise will put in referral to have annual audiogram done.    Right TM perforation stable with no chronic otorrhea or reported changes in hearing.  Continue to keep right ear dry.   She can follow-up on yearly basis for this, but recommend returning sooner as needed, especially if chronic otorrhea/hearing changes.    Discussed with patient that it takes 20 years of quitting tobacco to be at same risk of developing head and neck cancer as a person who has never used tobacco. Patient voiced understanding to ongoing risks associated with continued tobacco use. Tobacco cessation was strongly encouraged.    I will review  audiogram once available.    Yearly follow-up, sooner as needed.    Gali Dominguez NP  ENT  [facility], [place]  [phone]              Again, thank you for allowing me to participate in the care of your patient.        Sincerely,        COCO Jarquin CNP

## 2019-03-25 NOTE — PROGRESS NOTES
"Otolaryngology Progress Note           Chief Complaint:     Patient presents with:  Follow Up: SNHL, R TM perforation, R cerumen impaction         History of Present Illness:     Lita Ocasio is a 78 year old female, history of chronic right TM, SNHL, hearing aide use, chronic rhinitis, itchy ears, tobacco use, here for routine follow-up.     She reports that she felt the Elocon was effective in ear itching, now just using PRN.     Continues with ongoing daily clear rhinitis, not as much relief any more with the Flonase. Denies sinus pain/pressure. Tells me she is used to it and defers any further evaluation/treatment at this time.    Hearing loss remains stable with no acute hearing chances. Denies fluctuating hearing loss.   No aural fullness.   Denies tinnitus/vertigo.  Denies otalgia/otorrhea and no facial paresthesias or dysphagia.   Remains not interested in any surgery to fix right TM perforation.          Review of Systems:     ROS: See HPI         Physical Exam:     /76 (BP Location: Right arm, Cuff Size: Adult Regular)   Pulse 75   Temp 98.7  F (37.1  C) (Tympanic)   Ht 1.575 m (5' 2\")   Wt 54.4 kg (120 lb)   BMI 21.95 kg/m    General - The patient is well nourished and well developed, and appears to have good nutritional status.  Alert and oriented to person and place, interactive.  Head and Face - Normocephalic and atraumatic, with no gross asymmetry noted of the contour of the facial features.  The facial nerve is intact, with strong symmetric movements.  Neck- No palpable lymphadenopathy or thyroid mass.  Trachea is midline.  Eyes - Extraocular movements intact.   Ears- External ears normal. Ears examined under microscope. Left EAC with scant cerumen, removed with cerumen loop. TM intact without effusion/retraction. Right EAC with scant cerumen, removed with cerumen loop. TM with stable/dry anterior central 20-30% perforation with healthy middle ear space, no otorrhea and no " polyps/granulation tissue.   Nose - Nasal mucosa is pink and moist with no abnormal mucus.  The septum was grossly midline and non-obstructive, turbinates of normal size and position.  No polyps, masses, or purulence noted on examination.  Mouth - Examination of the oral cavity shows pink, healthy, moist mucosa. Dentition in good condition.  No lesions or ulceration noted. The tongue is mobile and midline.    Throat - The walls of the oropharynx were smooth, pink, moist, symmetric, and had no lesions or ulcerations.  The tonsillar pillars and soft palate were symmetric.  The uvula was midline on elevation.           Assessment and Plan:       ICD-10-CM    1. Perforation of tympanic membrane, right H72.91    2. Sensorineural hearing loss (SNHL) of both ears H90.3    3. Wears hearing aid Z97.4    4. Tobacco abuse counseling Z71.6    5. Ear itch L29.9      Continue with PRN Elocon cream to outer EAC for itching.    She is due for an audiogram. Not sure if she had one last fall or not. Will contact [facility] in [place]. Will request copy if there is a more recent one, otherwise will put in referral to have annual audiogram done.    Right TM perforation stable with no chronic otorrhea or reported changes in hearing.  Continue to keep right ear dry.   She can follow-up on yearly basis for this, but recommend returning sooner as needed, especially if chronic otorrhea/hearing changes.    Discussed with patient that it takes 20 years of quitting tobacco to be at same risk of developing head and neck cancer as a person who has never used tobacco. Patient voiced understanding to ongoing risks associated with continued tobacco use. Tobacco cessation was strongly encouraged.    I will review audiogram once available.    Yearly follow-up, sooner as needed.    Gali Dominguez NP  ENT  [facility], [place]  [phone]

## 2019-03-25 NOTE — NURSING NOTE
"Chief Complaint   Patient presents with     Follow Up     SNHL, R TM perforation, R cerumen impaction       Initial /76 (BP Location: Right arm, Cuff Size: Adult Regular)   Pulse 75   Temp 98.7  F (37.1  C) (Tympanic)   Ht 1.575 m (5' 2\")   Wt 54.4 kg (120 lb)   BMI 21.95 kg/m   Estimated body mass index is 21.95 kg/m  as calculated from the following:    Height as of this encounter: 1.575 m (5' 2\").    Weight as of this encounter: 54.4 kg (120 lb).  Medication Reconciliation: complete    Rosalee Voss LPN    "

## 2019-03-28 ENCOUNTER — TRANSFERRED RECORDS (OUTPATIENT)
Dept: HEALTH INFORMATION MANAGEMENT | Facility: CLINIC | Age: 79
End: 2019-03-28

## 2019-04-24 ENCOUNTER — HOSPITAL ENCOUNTER (EMERGENCY)
Facility: HOSPITAL | Age: 79
Discharge: HOME | End: 2019-04-24
Attending: EMERGENCY MEDICINE | Admitting: EMERGENCY MEDICINE
Payer: MEDICARE

## 2019-04-24 ENCOUNTER — APPOINTMENT (OUTPATIENT)
Dept: GENERAL RADIOLOGY | Facility: HOSPITAL | Age: 79
End: 2019-04-24
Attending: EMERGENCY MEDICINE
Payer: MEDICARE

## 2019-04-24 VITALS
OXYGEN SATURATION: 95 % | SYSTOLIC BLOOD PRESSURE: 140 MMHG | TEMPERATURE: 97.6 F | DIASTOLIC BLOOD PRESSURE: 86 MMHG | RESPIRATION RATE: 20 BRPM

## 2019-04-24 DIAGNOSIS — J43.2 CENTRILOBULAR EMPHYSEMA (H): Primary | ICD-10-CM

## 2019-04-24 DIAGNOSIS — R79.89 ELEVATED BRAIN NATRIURETIC PEPTIDE (BNP) LEVEL: ICD-10-CM

## 2019-04-24 LAB
ALBUMIN SERPL-MCNC: 3.7 G/DL (ref 3.4–5)
ALP SERPL-CCNC: 70 U/L (ref 40–150)
ALT SERPL W P-5'-P-CCNC: 17 U/L (ref 0–50)
ANION GAP SERPL CALCULATED.3IONS-SCNC: 6 MMOL/L (ref 3–14)
AST SERPL W P-5'-P-CCNC: 12 U/L (ref 0–45)
BASOPHILS # BLD AUTO: 0.1 10E9/L (ref 0–0.2)
BASOPHILS NFR BLD AUTO: 0.6 %
BILIRUB SERPL-MCNC: 0.5 MG/DL (ref 0.2–1.3)
BUN SERPL-MCNC: 14 MG/DL (ref 7–30)
CALCIUM SERPL-MCNC: 9.3 MG/DL (ref 8.5–10.1)
CHLORIDE SERPL-SCNC: 110 MMOL/L (ref 94–109)
CO2 SERPL-SCNC: 24 MMOL/L (ref 20–32)
CREAT SERPL-MCNC: 1.02 MG/DL (ref 0.52–1.04)
CRP SERPL-MCNC: 3.5 MG/L (ref 0–8)
D DIMER PPP DDU-MCNC: 332 NG/ML D-DU (ref 0–300)
DIFFERENTIAL METHOD BLD: NORMAL
EOSINOPHIL # BLD AUTO: 0.1 10E9/L (ref 0–0.7)
EOSINOPHIL NFR BLD AUTO: 1.3 %
ERYTHROCYTE [DISTWIDTH] IN BLOOD BY AUTOMATED COUNT: 13.9 % (ref 10–15)
ERYTHROCYTE [SEDIMENTATION RATE] IN BLOOD BY WESTERGREN METHOD: 8 MM/H (ref 0–30)
GFR SERPL CREATININE-BSD FRML MDRD: 53 ML/MIN/{1.73_M2}
GLUCOSE SERPL-MCNC: 80 MG/DL (ref 70–99)
HCT VFR BLD AUTO: 43.7 % (ref 35–47)
HGB BLD-MCNC: 14.4 G/DL (ref 11.7–15.7)
IMM GRANULOCYTES # BLD: 0 10E9/L (ref 0–0.4)
IMM GRANULOCYTES NFR BLD: 0.3 %
LACTATE BLD-SCNC: 1.2 MMOL/L (ref 0.7–2)
LYMPHOCYTES # BLD AUTO: 2.2 10E9/L (ref 0.8–5.3)
LYMPHOCYTES NFR BLD AUTO: 24.9 %
MCH RBC QN AUTO: 29.1 PG (ref 26.5–33)
MCHC RBC AUTO-ENTMCNC: 33 G/DL (ref 31.5–36.5)
MCV RBC AUTO: 89 FL (ref 78–100)
MONOCYTES # BLD AUTO: 0.8 10E9/L (ref 0–1.3)
MONOCYTES NFR BLD AUTO: 8.4 %
NEUTROPHILS # BLD AUTO: 5.7 10E9/L (ref 1.6–8.3)
NEUTROPHILS NFR BLD AUTO: 64.5 %
NRBC # BLD AUTO: 0 10*3/UL
NRBC BLD AUTO-RTO: 0 /100
NT-PROBNP SERPL-MCNC: 4939 PG/ML (ref 0–1800)
PLATELET # BLD AUTO: 230 10E9/L (ref 150–450)
POTASSIUM SERPL-SCNC: 4.3 MMOL/L (ref 3.4–5.3)
PROT SERPL-MCNC: 7 G/DL (ref 6.8–8.8)
RBC # BLD AUTO: 4.94 10E12/L (ref 3.8–5.2)
SODIUM SERPL-SCNC: 140 MMOL/L (ref 133–144)
TROPONIN I SERPL-MCNC: 0.02 UG/L (ref 0–0.04)
WBC # BLD AUTO: 8.9 10E9/L (ref 4–11)

## 2019-04-24 PROCEDURE — 71046 X-RAY EXAM CHEST 2 VIEWS: CPT | Mod: TC

## 2019-04-24 PROCEDURE — 83605 ASSAY OF LACTIC ACID: CPT | Performed by: EMERGENCY MEDICINE

## 2019-04-24 PROCEDURE — 99285 EMERGENCY DEPT VISIT HI MDM: CPT | Mod: Z6 | Performed by: EMERGENCY MEDICINE

## 2019-04-24 PROCEDURE — 93010 ELECTROCARDIOGRAM REPORT: CPT | Performed by: INTERNAL MEDICINE

## 2019-04-24 PROCEDURE — 36415 COLL VENOUS BLD VENIPUNCTURE: CPT | Performed by: EMERGENCY MEDICINE

## 2019-04-24 PROCEDURE — 85025 COMPLETE CBC W/AUTO DIFF WBC: CPT | Performed by: EMERGENCY MEDICINE

## 2019-04-24 PROCEDURE — 86140 C-REACTIVE PROTEIN: CPT | Performed by: EMERGENCY MEDICINE

## 2019-04-24 PROCEDURE — 85379 FIBRIN DEGRADATION QUANT: CPT | Performed by: EMERGENCY MEDICINE

## 2019-04-24 PROCEDURE — 94664 DEMO&/EVAL PT USE INHALER: CPT

## 2019-04-24 PROCEDURE — 99285 EMERGENCY DEPT VISIT HI MDM: CPT | Mod: 25

## 2019-04-24 PROCEDURE — 83880 ASSAY OF NATRIURETIC PEPTIDE: CPT | Performed by: EMERGENCY MEDICINE

## 2019-04-24 PROCEDURE — 80053 COMPREHEN METABOLIC PANEL: CPT | Performed by: EMERGENCY MEDICINE

## 2019-04-24 PROCEDURE — 85652 RBC SED RATE AUTOMATED: CPT | Performed by: EMERGENCY MEDICINE

## 2019-04-24 PROCEDURE — 84484 ASSAY OF TROPONIN QUANT: CPT | Performed by: EMERGENCY MEDICINE

## 2019-04-24 PROCEDURE — 93005 ELECTROCARDIOGRAM TRACING: CPT

## 2019-04-24 ASSESSMENT — ENCOUNTER SYMPTOMS
FEVER: 0
SHORTNESS OF BREATH: 1
ABDOMINAL PAIN: 0

## 2019-04-24 NOTE — ED PROVIDER NOTES
History     Chief Complaint   Patient presents with     Shortness of Breath     HPI  Lita Ocasio is a 78 year old female who presents to the emergency department with complaints of shortness of breath that started while they were shopping in [employer] but resolved prior to arrival at the ED.  The patient's friend who is accompanying her to the ED reports that her shortness of breath is not anything new and has been recurrent over prolonged period of time.  Patient uses an inhaler at home but no diagnosis of COPD per patient or asthma.  No chest pain, palpitations, wheezing, leg edema, fever, chills, vomiting or diarrhea.    Allergies:  Allergies   Allergen Reactions     Amoxicillin Trihydrate Other (See Comments)     Amoxil       Combigan [Brimonidine Tartrate-Timolol]      Eye swelling and itchy     Evista [Raloxifene]      Levaquin [Levofloxacin]      Penicillins      Prednisone      Restasis      Pt reports using eye gtts and having red irritated eyes      Latex Rash       Problem List:    Patient Active Problem List    Diagnosis Date Noted     Primary hyperparathyroidism (H) 09/05/2013     Priority: Medium     Decreased hearing 08/30/2013     Priority: Medium     Hypercalcemia 08/29/2013     Priority: Medium     Diagnosis updated by automated process. Provider to review and confirm.       Hypercalcemia 08/28/2013     Priority: Medium     Parathyroid related hypercalcemia (H) 08/18/2013     Priority: Medium     Dysfunction of eustachian tube 08/16/2013     Priority: Medium     Sensorineural hearing loss, asymmetrical 08/16/2013     Priority: Medium     Chronic rhinitis 08/16/2013     Priority: Medium        Past Medical History:    Past Medical History:   Diagnosis Date     Asthma      Constipation      Hearing loss      Heart trouble      Hypertension      Nasal congestion      Osteoporosis      Ringing in ears      Sneezing      Snoring      Thyroid disease      Weakness      Weight loss        Past Surgical  History:    Past Surgical History:   Procedure Laterality Date     CATARACT IOL, RT/LT Bilateral      COLONOSCOPY       COLONOSCOPY - HIM SCAN  2009    Colonoscopy - UM/NL  2009     ENT SURGERY      para thyroid surgery     ENT SURGERY  2013    Parathyroid     PARATHYROIDECTOMY  9/10/2013    Procedure: PARATHYROIDECTOMY;  Reoperative Neck Exploration, Resection of right Parathyroid adenoma;  Surgeon: Thalia Muller MD;  Location: [facility] OR     TONSILLECTOMY       TUBAL LIGATION         Family History:    Family History   Problem Relation Age of Onset     Hearing Loss Mother      Heart Disease Mother      Cerebrovascular Disease Father      Hearing Loss Brother      Cancer Brother         throat     Ulcerative Colitis Brother      Cancer Sister      Diabetes Sister      Glaucoma Sister      Cancer Maternal Grandmother      Heart Disease Maternal Grandfather        Social History:  Marital Status:   [5]  Social History     Tobacco Use     Smoking status: Former Smoker     Packs/day: 1.00     Years: 50.00     Pack years: 50.00     Types: Cigarettes     Last attempt to quit: 2019     Years since quittin.3     Smokeless tobacco: Never Used   Substance Use Topics     Alcohol use: Yes     Comment: social     Drug use: No        Medications:      albuterol (PROAIR RESPICLICK) 108 (90 Base) MCG/ACT inhaler   aspirin 81 MG tablet   calcium carbonate (TUMS) 500 MG chewable tablet   Cholecalciferol (VITAMIN D) 2000 UNITS tablet   CLINDAMYCIN HCL PO   Fexofenadine HCl (ALLEGRA PO)   fluticasone (FLONASE) 50 MCG/ACT spray   LEVOTHYROXINE SODIUM PO   POTASSIUM CHLORIDE PO   SIMVASTATIN PO   Tafluprost (ZIOPTAN) 0.0015 % SOLN   vitamin B complex with vitamin C (VITAMIN  B COMPLEX) TABS tablet   Zafirlukast (ACCOLATE PO)         Review of Systems   Constitutional: Negative for fever.   Respiratory: Positive for shortness of breath.    Cardiovascular: Negative for chest pain.   Gastrointestinal:  Negative for abdominal pain.   All other systems reviewed and are negative.      Physical Exam   BP: 149/87  Heart Rate: 81  Temp: 99.1  F (37.3  C)  Resp: 24  SpO2: 94 %      Physical Exam   Constitutional: No distress.   HENT:   Head: Atraumatic.   Mouth/Throat: Oropharynx is clear and moist. No oropharyngeal exudate.   Eyes: Pupils are equal, round, and reactive to light. No scleral icterus.   Cardiovascular: Normal heart sounds and intact distal pulses.   Pulmonary/Chest: No respiratory distress. She has rales.   Abdominal: Soft. Bowel sounds are normal. There is no tenderness.   Musculoskeletal: She exhibits no edema or tenderness.   Skin: Skin is warm. No rash noted. She is not diaphoretic.       ED Course        Procedures       EKG Interpretation:      Interpreted by Joe Jerez  Time reviewed: 2:40 PM  Symptoms at time of EKG: Resolved shortness of breath  Rhythm: normal sinus   Rate: normal  Axis: normal  Ectopy: none  Conduction: normal  ST Segments/ T Waves: No ST-T wave changes  Q Waves: none  Comparison to prior: No old EKG available    Clinical Impression: Normal sinus rhythm      Results for orders placed or performed during the hospital encounter of 04/24/19 (from the past 24 hour(s))   CBC with platelets differential   Result Value Ref Range    WBC 8.9 4.0 - 11.0 10e9/L    RBC Count 4.94 3.8 - 5.2 10e12/L    Hemoglobin 14.4 11.7 - 15.7 g/dL    Hematocrit 43.7 35.0 - 47.0 %    MCV 89 78 - 100 fl    MCH 29.1 26.5 - 33.0 pg    MCHC 33.0 31.5 - 36.5 g/dL    RDW 13.9 10.0 - 15.0 %    Platelet Count 230 150 - 450 10e9/L    Diff Method Automated Method     % Neutrophils 64.5 %    % Lymphocytes 24.9 %    % Monocytes 8.4 %    % Eosinophils 1.3 %    % Basophils 0.6 %    % Immature Granulocytes 0.3 %    Nucleated RBCs 0 0 /100    Absolute Neutrophil 5.7 1.6 - 8.3 10e9/L    Absolute Lymphocytes 2.2 0.8 - 5.3 10e9/L    Absolute Monocytes 0.8 0.0 - 1.3 10e9/L    Absolute Eosinophils 0.1 0.0 - 0.7 10e9/L     Absolute Basophils 0.1 0.0 - 0.2 10e9/L    Abs Immature Granulocytes 0.0 0 - 0.4 10e9/L    Absolute Nucleated RBC 0.0    Comprehensive metabolic panel   Result Value Ref Range    Sodium 140 133 - 144 mmol/L    Potassium 4.3 3.4 - 5.3 mmol/L    Chloride 110 (H) 94 - 109 mmol/L    Carbon Dioxide 24 20 - 32 mmol/L    Anion Gap 6 3 - 14 mmol/L    Glucose 80 70 - 99 mg/dL    Urea Nitrogen 14 7 - 30 mg/dL    Creatinine 1.02 0.52 - 1.04 mg/dL    GFR Estimate 53 (L) >60 mL/min/[1.73_m2]    GFR Estimate If Black 61 >60 mL/min/[1.73_m2]    Calcium 9.3 8.5 - 10.1 mg/dL    Bilirubin Total 0.5 0.2 - 1.3 mg/dL    Albumin 3.7 3.4 - 5.0 g/dL    Protein Total 7.0 6.8 - 8.8 g/dL    Alkaline Phosphatase 70 40 - 150 U/L    ALT 17 0 - 50 U/L    AST 12 0 - 45 U/L   CRP inflammation   Result Value Ref Range    CRP Inflammation 3.5 0.0 - 8.0 mg/L   D-Dimer ([facility])   Result Value Ref Range    D-Dimer ng/mL 332 (H) 0 - 300 ng/ml D-DU   Erythrocyte sedimentation rate auto   Result Value Ref Range    Sed Rate 8 0 - 30 mm/h   Lactic acid whole blood   Result Value Ref Range    Lactic Acid 1.2 0.7 - 2.0 mmol/L   Nt probnp inpatient (BNP)   Result Value Ref Range    N-Terminal Pro BNP Inpatient 4,939 (H) 0 - 1,800 pg/mL   Troponin I   Result Value Ref Range    Troponin I ES 0.019 0.000 - 0.045 ug/L   XR Chest 2 Views    Narrative    PROCEDURE:  XR CHEST 2 VW    HISTORY:  Recurrent shortness of breath.     COMPARISON:  None.    FINDINGS:   The cardiac silhouette is normal in size. The pulmonary vasculature is  normal.  The diaphragms are low and flattened raising the possibility  of emphysema. There is blunting the costophrenic angles posteriorly.  No pulmonary infiltrates are seen. No pleural effusion or  pneumothorax.      Impression    IMPRESSION:  No acute cardiopulmonary disease.      ELLA JACOBS MD       Medications - No data to display    Assessments & Plan (with Medical Decision Making)   Severe centrilobular emphysema: Patient has  had recurrent history of shortness of breath but no diagnosis of COPD.  Review of CT chest done last year shows severe centrilobular emphysema.  Unfortunately patient cannot be on prednisone because of recurrent epigastric abdominal pain.  She is however willing to try inhaled albuterol and a prescription was given for this.  Respiratory therapist consulted, and given instructions on proper inhaler use.  We will follow-up with PCP in 1 week.    Elevated BNP: Complete echo ordered to be done as outpatient to rule out congestive heart failure.  Discharge home in stable condition.    I have reviewed the nursing notes.    I have reviewed the findings, diagnosis, plan and need for follow up with the patient.       Medication List      Started    albuterol 108 (90 Base) MCG/ACT inhaler  Commonly known as:  PROAIR RESPICLICK  2 puffs, Inhalation, EVERY 4 HOURS PRN            Final diagnoses:   Centrilobular emphysema (H)   Elevated brain natriuretic peptide (BNP) level       4/24/2019   [facility]     Joe Jerez MD  04/24/19 [id number]

## 2019-04-24 NOTE — ED TRIAGE NOTES
Patient arrives accompanied by friend for evaluation of shortness of breath. Patient quit smoking in January and reports it has been worsening since. Reports SOB at rest and significantly worse with exertion. Denies any pain. No history of COPD or asthma. Intermittent cough. RR 24-26 with sats 95% in triage.

## 2019-04-24 NOTE — ED AVS SNAPSHOT
[facility]  [address] [address]  Phone:  [phone]                                    Lita Ocasio   MRN: [id number]    Department:  [facility]   Date of Visit:  4/24/2019           After Visit Summary Signature Page    I have received my discharge instructions, and my questions have been answered. I have discussed any challenges I see with this plan with the nurse or doctor.    ..........................................................................................................................................  Patient/Patient Representative Signature      ..........................................................................................................................................  Patient Representative Print Name and Relationship to Patient    ..................................................               ................................................  Date                                   Time    ..........................................................................................................................................  Reviewed by Signature/Title    ...................................................              ..............................................  Date                                               Time          22EPIC Rev 08/18

## 2019-04-24 NOTE — ED NOTES
Ambulated to room 1 independently, accompanied by friend, gown placed, call light in reach.  Quit smoking in January 2019.  Intermittent SOB.  Worse today.  Denies pain at this time.

## 2019-04-26 ENCOUNTER — HOSPITAL ENCOUNTER (OUTPATIENT)
Dept: CARDIOLOGY | Facility: HOSPITAL | Age: 79
Discharge: HOME | End: 2019-04-26
Attending: EMERGENCY MEDICINE | Admitting: EMERGENCY MEDICINE
Payer: MEDICARE

## 2019-04-26 DIAGNOSIS — R79.89 ELEVATED BRAIN NATRIURETIC PEPTIDE (BNP) LEVEL: ICD-10-CM

## 2019-04-26 PROCEDURE — 93306 TTE W/DOPPLER COMPLETE: CPT | Mod: TC

## 2019-04-26 PROCEDURE — 93306 TTE W/DOPPLER COMPLETE: CPT | Mod: 26 | Performed by: INTERNAL MEDICINE

## 2019-05-02 ENCOUNTER — TRANSFERRED RECORDS (OUTPATIENT)
Dept: HEALTH INFORMATION MANAGEMENT | Facility: CLINIC | Age: 79
End: 2019-05-02

## 2019-05-12 ENCOUNTER — APPOINTMENT (OUTPATIENT)
Dept: CT IMAGING | Facility: HOSPITAL | Age: 79
DRG: 291 | End: 2019-05-12
Attending: EMERGENCY MEDICINE
Payer: MEDICARE

## 2019-05-12 ENCOUNTER — APPOINTMENT (OUTPATIENT)
Dept: GENERAL RADIOLOGY | Facility: HOSPITAL | Age: 79
DRG: 291 | End: 2019-05-12
Attending: EMERGENCY MEDICINE
Payer: MEDICARE

## 2019-05-12 ENCOUNTER — HOSPITAL ENCOUNTER (INPATIENT)
Facility: HOSPITAL | Age: 79
LOS: 2 days | Discharge: HOME | DRG: 291 | End: 2019-05-14
Attending: EMERGENCY MEDICINE | Admitting: INTERNAL MEDICINE
Payer: MEDICARE

## 2019-05-12 DIAGNOSIS — J90 BILATERAL PLEURAL EFFUSION: ICD-10-CM

## 2019-05-12 DIAGNOSIS — J43.2 CENTRILOBULAR EMPHYSEMA (H): ICD-10-CM

## 2019-05-12 DIAGNOSIS — I50.23 ACUTE ON CHRONIC SYSTOLIC CONGESTIVE HEART FAILURE (H): ICD-10-CM

## 2019-05-12 DIAGNOSIS — J96.01 ACUTE RESPIRATORY FAILURE WITH HYPOXIA (H): Primary | ICD-10-CM

## 2019-05-12 PROBLEM — E03.9 ACQUIRED HYPOTHYROIDISM: Chronic | Status: ACTIVE | Noted: 2019-05-12

## 2019-05-12 PROBLEM — I10 BENIGN ESSENTIAL HYPERTENSION: Status: ACTIVE | Noted: 2019-05-12

## 2019-05-12 PROBLEM — E78.5 HYPERLIPIDEMIA: Chronic | Status: ACTIVE | Noted: 2019-05-12

## 2019-05-12 PROBLEM — I50.22 CHRONIC SYSTOLIC CONGESTIVE HEART FAILURE (H): Chronic | Status: ACTIVE | Noted: 2019-05-12

## 2019-05-12 PROBLEM — M85.80 OSTEOPENIA: Chronic | Status: ACTIVE | Noted: 2019-05-12

## 2019-05-12 PROBLEM — I34.0 NON-RHEUMATIC MITRAL REGURGITATION: Chronic | Status: ACTIVE | Noted: 2019-05-12

## 2019-05-12 LAB
ALBUMIN SERPL-MCNC: 3.7 G/DL (ref 3.4–5)
ALP SERPL-CCNC: 74 U/L (ref 40–150)
ALT SERPL W P-5'-P-CCNC: 21 U/L (ref 0–50)
ANION GAP SERPL CALCULATED.3IONS-SCNC: 10 MMOL/L (ref 3–14)
AST SERPL W P-5'-P-CCNC: 15 U/L (ref 0–45)
BASE DEFICIT BLDA-SCNC: 4.6 MMOL/L
BASOPHILS # BLD AUTO: 0.1 10E9/L (ref 0–0.2)
BASOPHILS NFR BLD AUTO: 0.7 %
BILIRUB SERPL-MCNC: 0.4 MG/DL (ref 0.2–1.3)
BUN SERPL-MCNC: 16 MG/DL (ref 7–30)
CALCIUM SERPL-MCNC: 8.7 MG/DL (ref 8.5–10.1)
CHLORIDE SERPL-SCNC: 110 MMOL/L (ref 94–109)
CO2 SERPL-SCNC: 20 MMOL/L (ref 20–32)
CREAT SERPL-MCNC: 0.98 MG/DL (ref 0.52–1.04)
D DIMER PPP DDU-MCNC: 524 NG/ML D-DU (ref 0–300)
DIFFERENTIAL METHOD BLD: NORMAL
EOSINOPHIL # BLD AUTO: 0.6 10E9/L (ref 0–0.7)
EOSINOPHIL NFR BLD AUTO: 5.6 %
ERYTHROCYTE [DISTWIDTH] IN BLOOD BY AUTOMATED COUNT: 13.6 % (ref 10–15)
GFR SERPL CREATININE-BSD FRML MDRD: 55 ML/MIN/{1.73_M2}
GLUCOSE SERPL-MCNC: 147 MG/DL (ref 70–99)
GRAM STN SPEC: ABNORMAL
HCO3 BLD-SCNC: 19 MMOL/L (ref 21–28)
HCT VFR BLD AUTO: 45.1 % (ref 35–47)
HGB BLD-MCNC: 14.9 G/DL (ref 11.7–15.7)
IMM GRANULOCYTES # BLD: 0 10E9/L (ref 0–0.4)
IMM GRANULOCYTES NFR BLD: 0.3 %
INR PPP: 0.99 (ref 0.8–1.2)
LYMPHOCYTES # BLD AUTO: 3.7 10E9/L (ref 0.8–5.3)
LYMPHOCYTES NFR BLD AUTO: 34.5 %
MAGNESIUM SERPL-MCNC: 2.2 MG/DL (ref 1.6–2.3)
MCH RBC QN AUTO: 29.1 PG (ref 26.5–33)
MCHC RBC AUTO-ENTMCNC: 33 G/DL (ref 31.5–36.5)
MCV RBC AUTO: 88 FL (ref 78–100)
MONOCYTES # BLD AUTO: 1.1 10E9/L (ref 0–1.3)
MONOCYTES NFR BLD AUTO: 10 %
NEUTROPHILS # BLD AUTO: 5.2 10E9/L (ref 1.6–8.3)
NEUTROPHILS NFR BLD AUTO: 48.9 %
NRBC # BLD AUTO: 0 10*3/UL
NRBC BLD AUTO-RTO: 0 /100
NT-PROBNP SERPL-MCNC: 4190 PG/ML (ref 0–1800)
O2/TOTAL GAS SETTING VFR VENT: ABNORMAL %
OXYHGB MFR BLD: 98 % (ref 92–100)
PCO2 BLD: 31 MM HG (ref 35–45)
PH BLD: 7.4 PH (ref 7.35–7.45)
PLATELET # BLD AUTO: 305 10E9/L (ref 150–450)
PO2 BLD: 109 MM HG (ref 80–105)
POTASSIUM SERPL-SCNC: 4.2 MMOL/L (ref 3.4–5.3)
PROCALCITONIN SERPL-MCNC: <0.05 NG/ML
PROT SERPL-MCNC: 7.5 G/DL (ref 6.8–8.8)
RBC # BLD AUTO: 5.12 10E12/L (ref 3.8–5.2)
SODIUM SERPL-SCNC: 140 MMOL/L (ref 133–144)
SPECIMEN SOURCE: ABNORMAL
TROPONIN I SERPL-MCNC: 0.05 UG/L (ref 0–0.04)
TROPONIN I SERPL-MCNC: 0.07 UG/L (ref 0–0.04)
TROPONIN I SERPL-MCNC: 0.09 UG/L (ref 0–0.04)
WBC # BLD AUTO: 10.7 10E9/L (ref 4–11)

## 2019-05-12 PROCEDURE — 25000125 ZZHC RX 250: Performed by: EMERGENCY MEDICINE

## 2019-05-12 PROCEDURE — 99284 EMERGENCY DEPT VISIT MOD MDM: CPT | Mod: Z6 | Performed by: EMERGENCY MEDICINE

## 2019-05-12 PROCEDURE — 94640 AIRWAY INHALATION TREATMENT: CPT | Mod: 76

## 2019-05-12 PROCEDURE — 94640 AIRWAY INHALATION TREATMENT: CPT

## 2019-05-12 PROCEDURE — 96375 TX/PRO/DX INJ NEW DRUG ADDON: CPT

## 2019-05-12 PROCEDURE — 36415 COLL VENOUS BLD VENIPUNCTURE: CPT | Performed by: NURSE PRACTITIONER

## 2019-05-12 PROCEDURE — 99285 EMERGENCY DEPT VISIT HI MDM: CPT | Mod: 25

## 2019-05-12 PROCEDURE — 94660 CPAP INITIATION&MGMT: CPT

## 2019-05-12 PROCEDURE — 93005 ELECTROCARDIOGRAM TRACING: CPT

## 2019-05-12 PROCEDURE — 25000128 H RX IP 250 OP 636: Performed by: EMERGENCY MEDICINE

## 2019-05-12 PROCEDURE — A9270 NON-COVERED ITEM OR SERVICE: HCPCS | Performed by: NURSE PRACTITIONER

## 2019-05-12 PROCEDURE — 25000125 ZZHC RX 250: Performed by: NURSE PRACTITIONER

## 2019-05-12 PROCEDURE — 25000132 ZZH RX MED GY IP 250 OP 250 PS 637: Performed by: NURSE PRACTITIONER

## 2019-05-12 PROCEDURE — 85025 COMPLETE CBC W/AUTO DIFF WBC: CPT | Performed by: EMERGENCY MEDICINE

## 2019-05-12 PROCEDURE — 25000128 H RX IP 250 OP 636

## 2019-05-12 PROCEDURE — 5A09357 ASSISTANCE WITH RESPIRATORY VENTILATION, LESS THAN 24 CONSECUTIVE HOURS, CONTINUOUS POSITIVE AIRWAY PRESSURE: ICD-10-PCS | Performed by: EMERGENCY MEDICINE

## 2019-05-12 PROCEDURE — 36415 COLL VENOUS BLD VENIPUNCTURE: CPT | Performed by: INTERNAL MEDICINE

## 2019-05-12 PROCEDURE — 85379 FIBRIN DEGRADATION QUANT: CPT | Performed by: EMERGENCY MEDICINE

## 2019-05-12 PROCEDURE — 99223 1ST HOSP IP/OBS HIGH 75: CPT | Performed by: NURSE PRACTITIONER

## 2019-05-12 PROCEDURE — 87205 SMEAR GRAM STAIN: CPT | Performed by: EMERGENCY MEDICINE

## 2019-05-12 PROCEDURE — 36600 WITHDRAWAL OF ARTERIAL BLOOD: CPT

## 2019-05-12 PROCEDURE — 80053 COMPREHEN METABOLIC PANEL: CPT | Performed by: EMERGENCY MEDICINE

## 2019-05-12 PROCEDURE — 83880 ASSAY OF NATRIURETIC PEPTIDE: CPT | Performed by: EMERGENCY MEDICINE

## 2019-05-12 PROCEDURE — 87070 CULTURE OTHR SPECIMN AEROBIC: CPT | Performed by: EMERGENCY MEDICINE

## 2019-05-12 PROCEDURE — 96374 THER/PROPH/DIAG INJ IV PUSH: CPT

## 2019-05-12 PROCEDURE — 83735 ASSAY OF MAGNESIUM: CPT | Performed by: EMERGENCY MEDICINE

## 2019-05-12 PROCEDURE — 82805 BLOOD GASES W/O2 SATURATION: CPT | Performed by: EMERGENCY MEDICINE

## 2019-05-12 PROCEDURE — 84484 ASSAY OF TROPONIN QUANT: CPT | Performed by: NURSE PRACTITIONER

## 2019-05-12 PROCEDURE — 40000275 ZZH STATISTIC RCP TIME EA 10 MIN

## 2019-05-12 PROCEDURE — 25000128 H RX IP 250 OP 636: Performed by: NURSE PRACTITIONER

## 2019-05-12 PROCEDURE — 20000003 ZZH R&B ICU

## 2019-05-12 PROCEDURE — 84145 PROCALCITONIN (PCT): CPT | Performed by: NURSE PRACTITIONER

## 2019-05-12 PROCEDURE — 40000786 ZZHCL STATISTIC ACTIVE MRSA SURVEILLANCE CULTURE: Performed by: INTERNAL MEDICINE

## 2019-05-12 PROCEDURE — 85610 PROTHROMBIN TIME: CPT | Performed by: EMERGENCY MEDICINE

## 2019-05-12 PROCEDURE — 84484 ASSAY OF TROPONIN QUANT: CPT | Performed by: EMERGENCY MEDICINE

## 2019-05-12 PROCEDURE — 71275 CT ANGIOGRAPHY CHEST: CPT | Mod: TC

## 2019-05-12 PROCEDURE — 93010 ELECTROCARDIOGRAM REPORT: CPT | Performed by: INTERNAL MEDICINE

## 2019-05-12 PROCEDURE — 84484 ASSAY OF TROPONIN QUANT: CPT | Performed by: INTERNAL MEDICINE

## 2019-05-12 PROCEDURE — 71045 X-RAY EXAM CHEST 1 VIEW: CPT | Mod: TC

## 2019-05-12 RX ORDER — FLUTICASONE PROPIONATE 50 MCG
2 SPRAY, SUSPENSION (ML) NASAL DAILY
Status: DISCONTINUED | OUTPATIENT
Start: 2019-05-12 | End: 2019-05-14 | Stop reason: HOSPADM

## 2019-05-12 RX ORDER — CALCIUM CARBONATE 500 MG/1
500 TABLET, CHEWABLE ORAL 3 TIMES DAILY PRN
Status: DISCONTINUED | OUTPATIENT
Start: 2019-05-12 | End: 2019-05-14 | Stop reason: HOSPADM

## 2019-05-12 RX ORDER — MORPHINE SULFATE 4 MG/ML
2 INJECTION, SOLUTION INTRAMUSCULAR; INTRAVENOUS ONCE
Status: DISCONTINUED | OUTPATIENT
Start: 2019-05-12 | End: 2019-05-13

## 2019-05-12 RX ORDER — CETIRIZINE HYDROCHLORIDE 10 MG/1
10 TABLET ORAL DAILY
Status: DISCONTINUED | OUTPATIENT
Start: 2019-05-12 | End: 2019-05-12

## 2019-05-12 RX ORDER — FUROSEMIDE 20 MG
20 TABLET ORAL DAILY
COMMUNITY
End: 2019-05-22

## 2019-05-12 RX ORDER — BUSPIRONE HYDROCHLORIDE 10 MG/1
TABLET ORAL 2 TIMES DAILY
Status: ON HOLD | COMMUNITY
End: 2019-05-12 | Stop reason: DRUGHIGH

## 2019-05-12 RX ORDER — CYANOCOBALAMIN (VITAMIN B-12) 500 MCG
LOZENGE ORAL DAILY
COMMUNITY
End: 2020-02-05

## 2019-05-12 RX ORDER — NALOXONE HYDROCHLORIDE 0.4 MG/ML
.1-.4 INJECTION, SOLUTION INTRAMUSCULAR; INTRAVENOUS; SUBCUTANEOUS
Status: DISCONTINUED | OUTPATIENT
Start: 2019-05-12 | End: 2019-05-14 | Stop reason: HOSPADM

## 2019-05-12 RX ORDER — IPRATROPIUM BROMIDE AND ALBUTEROL SULFATE 2.5; .5 MG/3ML; MG/3ML
3 SOLUTION RESPIRATORY (INHALATION)
Status: DISCONTINUED | OUTPATIENT
Start: 2019-05-12 | End: 2019-05-12

## 2019-05-12 RX ORDER — PANTOPRAZOLE SODIUM 40 MG/1
40 TABLET, DELAYED RELEASE ORAL
Status: DISCONTINUED | OUTPATIENT
Start: 2019-05-13 | End: 2019-05-14 | Stop reason: HOSPADM

## 2019-05-12 RX ORDER — LORAZEPAM 2 MG/ML
0.5 INJECTION INTRAMUSCULAR EVERY 6 HOURS PRN
Status: DISCONTINUED | OUTPATIENT
Start: 2019-05-12 | End: 2019-05-14 | Stop reason: HOSPADM

## 2019-05-12 RX ORDER — FUROSEMIDE 10 MG/ML
20 INJECTION INTRAMUSCULAR; INTRAVENOUS ONCE
Status: COMPLETED | OUTPATIENT
Start: 2019-05-12 | End: 2019-05-12

## 2019-05-12 RX ORDER — IOPAMIDOL 755 MG/ML
75 INJECTION, SOLUTION INTRAVASCULAR ONCE
Status: COMPLETED | OUTPATIENT
Start: 2019-05-12 | End: 2019-05-12

## 2019-05-12 RX ORDER — SIMVASTATIN 20 MG
20 TABLET ORAL AT BEDTIME
Status: DISCONTINUED | OUTPATIENT
Start: 2019-05-12 | End: 2019-05-14 | Stop reason: HOSPADM

## 2019-05-12 RX ORDER — FEXOFENADINE HCL 180 MG/1
180 TABLET ORAL DAILY
Status: DISCONTINUED | OUTPATIENT
Start: 2019-05-12 | End: 2019-05-14 | Stop reason: HOSPADM

## 2019-05-12 RX ORDER — ZAFIRLUKAST 20 MG/1
20 TABLET, FILM COATED ORAL
Status: DISCONTINUED | OUTPATIENT
Start: 2019-05-12 | End: 2019-05-14 | Stop reason: HOSPADM

## 2019-05-12 RX ORDER — IPRATROPIUM BROMIDE AND ALBUTEROL SULFATE 2.5; .5 MG/3ML; MG/3ML
1 SOLUTION RESPIRATORY (INHALATION) EVERY 6 HOURS PRN
COMMUNITY
End: 2019-06-04

## 2019-05-12 RX ORDER — MULTIVIT-MIN/IRON/FOLIC ACID/K 18-600-40
2 CAPSULE ORAL DAILY
COMMUNITY
End: 2020-03-19

## 2019-05-12 RX ORDER — POTASSIUM CHLORIDE 750 MG/1
40 CAPSULE, EXTENDED RELEASE ORAL 2 TIMES DAILY
Status: DISCONTINUED | OUTPATIENT
Start: 2019-05-12 | End: 2019-05-12

## 2019-05-12 RX ORDER — BUSPIRONE HYDROCHLORIDE 5 MG/1
5 TABLET ORAL 2 TIMES DAILY
Refills: 0 | COMMUNITY
Start: 2019-05-06 | End: 2019-05-29

## 2019-05-12 RX ORDER — BUSPIRONE HYDROCHLORIDE 5 MG/1
5 TABLET ORAL 2 TIMES DAILY
Status: DISCONTINUED | OUTPATIENT
Start: 2019-05-12 | End: 2019-05-14 | Stop reason: HOSPADM

## 2019-05-12 RX ORDER — ACETAMINOPHEN 325 MG/1
650 TABLET ORAL EVERY 4 HOURS PRN
Status: DISCONTINUED | OUTPATIENT
Start: 2019-05-12 | End: 2019-05-14 | Stop reason: HOSPADM

## 2019-05-12 RX ORDER — FUROSEMIDE 10 MG/ML
40 INJECTION INTRAMUSCULAR; INTRAVENOUS ONCE
Status: COMPLETED | OUTPATIENT
Start: 2019-05-12 | End: 2019-05-12

## 2019-05-12 RX ORDER — IPRATROPIUM BROMIDE AND ALBUTEROL SULFATE 2.5; .5 MG/3ML; MG/3ML
3 SOLUTION RESPIRATORY (INHALATION)
Status: DISCONTINUED | OUTPATIENT
Start: 2019-05-12 | End: 2019-05-14 | Stop reason: HOSPADM

## 2019-05-12 RX ORDER — CETIRIZINE HYDROCHLORIDE 10 MG/1
10 TABLET ORAL DAILY
Status: ON HOLD | COMMUNITY
End: 2019-05-12

## 2019-05-12 RX ORDER — PANTOPRAZOLE SODIUM 40 MG/1
40 TABLET, DELAYED RELEASE ORAL
Status: DISCONTINUED | OUTPATIENT
Start: 2019-05-13 | End: 2019-05-12

## 2019-05-12 RX ORDER — POLYETHYLENE GLYCOL 3350 17 G/17G
17 POWDER, FOR SOLUTION ORAL DAILY PRN
Status: DISCONTINUED | OUTPATIENT
Start: 2019-05-12 | End: 2019-05-14 | Stop reason: HOSPADM

## 2019-05-12 RX ORDER — MORPHINE SULFATE 4 MG/ML
INJECTION, SOLUTION INTRAMUSCULAR; INTRAVENOUS
Status: COMPLETED
Start: 2019-05-12 | End: 2019-05-12

## 2019-05-12 RX ORDER — POTASSIUM CHLORIDE 750 MG/1
20 CAPSULE, EXTENDED RELEASE ORAL 2 TIMES DAILY
Status: COMPLETED | OUTPATIENT
Start: 2019-05-12 | End: 2019-05-12

## 2019-05-12 RX ORDER — ONDANSETRON 4 MG/1
4 TABLET, ORALLY DISINTEGRATING ORAL EVERY 6 HOURS PRN
Status: DISCONTINUED | OUTPATIENT
Start: 2019-05-12 | End: 2019-05-14 | Stop reason: HOSPADM

## 2019-05-12 RX ORDER — METHYLPREDNISOLONE SODIUM SUCCINATE 125 MG/2ML
125 INJECTION, POWDER, LYOPHILIZED, FOR SOLUTION INTRAMUSCULAR; INTRAVENOUS ONCE
Status: COMPLETED | OUTPATIENT
Start: 2019-05-12 | End: 2019-05-12

## 2019-05-12 RX ORDER — NITROGLYCERIN 0.4 MG/1
0.4 TABLET SUBLINGUAL EVERY 5 MIN PRN
Status: DISCONTINUED | OUTPATIENT
Start: 2019-05-12 | End: 2019-05-14 | Stop reason: HOSPADM

## 2019-05-12 RX ORDER — ONDANSETRON 2 MG/ML
4 INJECTION INTRAMUSCULAR; INTRAVENOUS EVERY 6 HOURS PRN
Status: DISCONTINUED | OUTPATIENT
Start: 2019-05-12 | End: 2019-05-14 | Stop reason: HOSPADM

## 2019-05-12 RX ADMIN — POTASSIUM CHLORIDE 20 MEQ: 750 CAPSULE, EXTENDED RELEASE ORAL at 13:21

## 2019-05-12 RX ADMIN — POTASSIUM CHLORIDE 20 MEQ: 750 CAPSULE, EXTENDED RELEASE ORAL at 16:42

## 2019-05-12 RX ADMIN — LORAZEPAM 0.5 MG: 2 INJECTION INTRAMUSCULAR; INTRAVENOUS at 22:11

## 2019-05-12 RX ADMIN — ACETAMINOPHEN 650 MG: 325 TABLET, FILM COATED ORAL at 22:07

## 2019-05-12 RX ADMIN — ZAFIRLUKAST 20 MG: 20 TABLET, COATED ORAL at 16:42

## 2019-05-12 RX ADMIN — BUSPIRONE HYDROCHLORIDE 5 MG: 5 TABLET ORAL at 13:22

## 2019-05-12 RX ADMIN — FUROSEMIDE 40 MG: 10 INJECTION, SOLUTION INTRAMUSCULAR; INTRAVENOUS at 11:00

## 2019-05-12 RX ADMIN — IOPAMIDOL 75 ML: 755 INJECTION, SOLUTION INTRAVENOUS at 07:18

## 2019-05-12 RX ADMIN — IPRATROPIUM BROMIDE AND ALBUTEROL SULFATE 3 ML: .5; 3 SOLUTION RESPIRATORY (INHALATION) at 19:14

## 2019-05-12 RX ADMIN — FLUTICASONE PROPIONATE 2 SPRAY: 50 SPRAY, METERED NASAL at 13:23

## 2019-05-12 RX ADMIN — BUSPIRONE HYDROCHLORIDE 5 MG: 5 TABLET ORAL at 22:07

## 2019-05-12 RX ADMIN — SIMVASTATIN 20 MG: 20 TABLET, FILM COATED ORAL at 22:07

## 2019-05-12 RX ADMIN — METHYLPREDNISOLONE SODIUM SUCCINATE 125 MG: 125 INJECTION, POWDER, FOR SOLUTION INTRAMUSCULAR; INTRAVENOUS at 08:04

## 2019-05-12 RX ADMIN — MORPHINE SULFATE 2 MG: 4 INJECTION INTRAVENOUS at 06:00

## 2019-05-12 RX ADMIN — FUROSEMIDE 20 MG: 10 INJECTION, SOLUTION INTRAVENOUS at 16:43

## 2019-05-12 RX ADMIN — POLYETHYLENE GLYCOL 3350 17 G: 17 POWDER, FOR SOLUTION ORAL at 16:41

## 2019-05-12 RX ADMIN — IPRATROPIUM BROMIDE AND ALBUTEROL SULFATE 3 ML: .5; 3 SOLUTION RESPIRATORY (INHALATION) at 06:16

## 2019-05-12 RX ADMIN — IPRATROPIUM BROMIDE AND ALBUTEROL SULFATE 3 ML: .5; 3 SOLUTION RESPIRATORY (INHALATION) at 12:03

## 2019-05-12 RX ADMIN — IPRATROPIUM BROMIDE AND ALBUTEROL SULFATE 3 ML: .5; 3 SOLUTION RESPIRATORY (INHALATION) at 15:43

## 2019-05-12 RX ADMIN — FEXOFENADINE HYDROCHLORIDE 180 MG: 180 TABLET, FILM COATED ORAL at 13:24

## 2019-05-12 ASSESSMENT — ACTIVITIES OF DAILY LIVING (ADL)
ADLS_ACUITY_SCORE: 13
ADLS_ACUITY_SCORE: 12
ADLS_ACUITY_SCORE: 13

## 2019-05-12 NOTE — ED PROVIDER NOTES
History     Chief Complaint   Patient presents with     Respiratory Distress     tachypnea, anxious. pale, diaphoretic.      HPI  Lita Ocasio is a 78 year old female who presented to the emergency department via EMS.  History is obtained from the patient's friend who also works with the EMS team that brought her to the ED.  Patient has had progressively worsening shortness of breath over several weeks.  He does however become worse over the last few days and tonight patient shortness of breath became so severe that she called 911.  Patient received duo nebs in the ambulance and upon arrival to the ED was noted to be severely tachypneic and anxious.  Started on BiPAP and laboratory tests drawn.  Patient was so dyspneic that she could not give any history of self.    Allergies:  Allergies   Allergen Reactions     Evista [Raloxifene] Other (See Comments)     hypercalcemia     Prednisone GI Disturbance     Combigan [Brimonidine Tartrate-Timolol] Other (See Comments)     Eye swelling and itchy     Latex Rash     Levaquin [Levofloxacin] Muscle Pain (Myalgia)     Penicillins Rash     Restasis      Pt reports using eye gtts and having red irritated eyes        Problem List:    Patient Active Problem List    Diagnosis Date Noted     Acute respiratory failure with hypoxia (H) 05/12/2019     Priority: Medium     Centrilobular emphysema (H) 05/12/2019     Priority: Medium     Acquired hypothyroidism 05/12/2019     Priority: Medium     Acute on chronic systolic congestive heart failure (H) 05/12/2019     Priority: Medium     Chronic systolic congestive heart failure (H) 05/12/2019     Priority: Medium     Benign essential hypertension 05/12/2019     Priority: Medium     Hyperlipidemia 05/12/2019     Priority: Medium     Osteopenia 05/12/2019     Priority: Medium     Non-rheumatic mitral regurgitation 05/12/2019     Priority: Medium     Primary hyperparathyroidism (H) 09/05/2013     Priority: Medium     Chronic rhinitis  08/16/2013     Priority: Medium        Past Medical History:    Past Medical History:   Diagnosis Date     Acquired hypothyroidism 5/12/2019     Asthma      Benign essential hypertension 5/12/2019     Centrilobular emphysema (H) 5/12/2019     Chronic rhinitis 8/16/2013     Chronic systolic congestive heart failure (H) 5/12/2019     Constipation      Hearing loss      Heart trouble      Hemorrhagic cerebrovascular accident (CVA) (H) 01/2019     Hyperlipidemia 5/12/2019     Hypertension      Nasal congestion      Non-rheumatic mitral regurgitation 5/12/2019     Osteopenia 5/12/2019     Osteoporosis      Parathyroid related hypercalcemia (H) 8/18/2013     Primary hyperparathyroidism (H) 9/5/2013     Ringing in ears      Sensorineural hearing loss, asymmetrical 8/16/2013     Sneezing      Snoring      Thyroid disease      Weakness      Weight loss        Past Surgical History:    Past Surgical History:   Procedure Laterality Date     CATARACT IOL, RT/LT Bilateral      COLONOSCOPY       COLONOSCOPY - HIM SCAN  01/01/2009    Colonoscopy - [facility]  2009     ENT SURGERY  2011    para thyroid surgery     ENT SURGERY  09/2013    Parathyroid     PARATHYROIDECTOMY  9/10/2013    Procedure: PARATHYROIDECTOMY;  Reoperative Neck Exploration, Resection of right Parathyroid adenoma;  Surgeon: Thalia Muller MD;  Location: [facility] OR     TONSILLECTOMY       TUBAL LIGATION         Family History:    Family History   Problem Relation Age of Onset     Hearing Loss Mother      Heart Disease Mother      Cerebrovascular Disease Father      Hearing Loss Brother      Cancer Brother         throat     Ulcerative Colitis Brother      Cancer Sister      Diabetes Sister      Glaucoma Sister      Cancer Maternal Grandmother      Heart Disease Maternal Grandfather        Social History:  Marital Status:   [5]  Social History     Tobacco Use     Smoking status: Former Smoker     Packs/day: 1.00     Years: 50.00     Pack years: 50.00      Types: Cigarettes     Last attempt to quit: 2019     Years since quittin.3     Smokeless tobacco: Never Used   Substance Use Topics     Alcohol use: Yes     Comment: social     Drug use: No        Medications:      No current outpatient medications on file.      Review of Systems   Unable to perform ROS: Acuity of condition       Physical Exam   BP: 142/93  Heart Rate: 95  Temp: 98  F (36.7  C)  Resp: 21  Weight: 51.1 kg (112 lb 10.5 oz)  SpO2: 96 %      Physical Exam   Constitutional: She appears distressed.   HENT:   Head: Normocephalic and atraumatic.   Eyes: Pupils are equal, round, and reactive to light.   Cardiovascular: Normal heart sounds and intact distal pulses.   Pulmonary/Chest: She is in respiratory distress. She has wheezes.   Abdominal: Soft. Bowel sounds are normal. She exhibits no distension. There is no tenderness.   Musculoskeletal: She exhibits edema. She exhibits no tenderness.   Skin: Skin is warm. No rash noted. She is diaphoretic.       ED Course   Evaluated and started on DuoNeb's.  Started on BiPAP.  Laboratory test drawn.     Procedures  EKG: Sinus rhythm with fusion complexes.       Results for orders placed or performed during the hospital encounter of 19 (from the past 24 hour(s))   CBC with platelets differential   Result Value Ref Range    WBC 10.7 4.0 - 11.0 10e9/L    RBC Count 5.12 3.8 - 5.2 10e12/L    Hemoglobin 14.9 11.7 - 15.7 g/dL    Hematocrit 45.1 35.0 - 47.0 %    MCV 88 78 - 100 fl    MCH 29.1 26.5 - 33.0 pg    MCHC 33.0 31.5 - 36.5 g/dL    RDW 13.6 10.0 - 15.0 %    Platelet Count 305 150 - 450 10e9/L    Diff Method Automated Method     % Neutrophils 48.9 %    % Lymphocytes 34.5 %    % Monocytes 10.0 %    % Eosinophils 5.6 %    % Basophils 0.7 %    % Immature Granulocytes 0.3 %    Nucleated RBCs 0 0 /100    Absolute Neutrophil 5.2 1.6 - 8.3 10e9/L    Absolute Lymphocytes 3.7 0.8 - 5.3 10e9/L    Absolute Monocytes 1.1 0.0 - 1.3 10e9/L    Absolute Eosinophils 0.6  0.0 - 0.7 10e9/L    Absolute Basophils 0.1 0.0 - 0.2 10e9/L    Abs Immature Granulocytes 0.0 0 - 0.4 10e9/L    Absolute Nucleated RBC 0.0    Comprehensive metabolic panel   Result Value Ref Range    Sodium 140 133 - 144 mmol/L    Potassium 4.2 3.4 - 5.3 mmol/L    Chloride 110 (H) 94 - 109 mmol/L    Carbon Dioxide 20 20 - 32 mmol/L    Anion Gap 10 3 - 14 mmol/L    Glucose 147 (H) 70 - 99 mg/dL    Urea Nitrogen 16 7 - 30 mg/dL    Creatinine 0.98 0.52 - 1.04 mg/dL    GFR Estimate 55 (L) >60 mL/min/[1.73_m2]    GFR Estimate If Black 64 >60 mL/min/[1.73_m2]    Calcium 8.7 8.5 - 10.1 mg/dL    Bilirubin Total 0.4 0.2 - 1.3 mg/dL    Albumin 3.7 3.4 - 5.0 g/dL    Protein Total 7.5 6.8 - 8.8 g/dL    Alkaline Phosphatase 74 40 - 150 U/L    ALT 21 0 - 50 U/L    AST 15 0 - 45 U/L   Magnesium   Result Value Ref Range    Magnesium 2.2 1.6 - 2.3 mg/dL   D-Dimer ([facility])   Result Value Ref Range    D-Dimer ng/mL 524 (H) 0 - 300 ng/ml D-DU   Troponin I   Result Value Ref Range    Troponin I ES 0.046 (H) 0.000 - 0.045 ug/L   INR   Result Value Ref Range    INR 0.99 0.80 - 1.20   Nt probnp inpatient   Result Value Ref Range    N-Terminal Pro BNP Inpatient 4,190 (H) 0 - 1,800 pg/mL   Gram stain   Result Value Ref Range    Specimen Description Sputum     Gram Stain <10 Squamous epithelial cells/low power field     Gram Stain >25 PMNs/low power field     Gram Stain Moderate  Gram positive cocci in clusters   (A)     Gram Stain Few  Gram positive cocci in pairs   (A)     Gram Stain Few  Gram positive rods   (A)     Gram Stain Rare  Gram negative rods   (A)    XR Chest Port 1 View    Narrative    XR CHEST PORT 1 VW    HISTORY: 78 yearsFemale Shortness of breath    TECHNIQUE: A single view of the chest was performed    COMPARISON: 4/24/2019    FINDINGS: There are small bilateral pleural effusions. Pulmonary  vascularity is mildly prominent. Lungs otherwise clear.      Impression    IMPRESSION: Small bilateral pleural effusions.    Plantar  vascularity is mildly prominent    RAFFI LOPEZ MD   Blood gas arterial and oxyhgb   Result Value Ref Range    pH Arterial 7.40 7.35 - 7.45 pH    pCO2 Arterial 31 (L) 35 - 45 mm Hg    pO2 Arterial 109 (H) 80 - 105 mm Hg    Bicarbonate Arterial 19 (L) 21 - 28 mmol/L    FIO2 40%     Oxyhemoglobin Arterial 98 92 - 100 %    Base Deficit Art 4.6 mmol/L   CTA Chest with Contrast    Narrative    CTA CHEST WITH CONTRAST    HISTORY: 78 years Female PE suspected, high pretest prob; ; Severe  shortness of breath with elevated d-dimer.    TECHNIQUE: Axial CT imaging of the chest was performed With  intravenous contrast. Coronal and sagittal reconstructions were  obtained.    COMPARISON: 5/27/2018    FINDINGS:  There are small bilateral pleural effusions. Volume on the right is  larger than the left.    There is no evidence of pulmonary embolism, aortic aneurysm or aortic  dissection.  There is a densely calcified sizable right subcarinal lymph node.    Advanced emphysematous changes are present. There is mild dependent  atelectasis.           No concerning osseous lesions are identified      Impression    IMPRESSION: No evidence of pulmonary embolism thoracic aneurysm or  thoracic aortic dissection.    Small bilateral pleural effusions, right greater than left are  present. There is associated dependent atelectasis. There is advanced  emphysematous changes, also seen on the prior study.    RAFFI LOPEZ MD   Troponin I   Result Value Ref Range    Troponin I ES 0.093 (H) 0.000 - 0.045 ug/L   Procalcitonin   Result Value Ref Range    Procalcitonin <0.05 ng/ml   Troponin I   Result Value Ref Range    Troponin I ES 0.065 (H) 0.000 - 0.045 ug/L       Medications   morphine (PF) injection 2 mg ( Intravenous Canceled Entry 5/12/19 9585)   ipratropium - albuterol 0.5 mg/2.5 mg/3 mL (DUONEB) neb solution 3 mL (3 mLs Nebulization Given 5/12/19 1914)   naloxone (NARCAN) injection 0.1-0.4 mg (has no administration in time range)    melatonin tablet 1 mg (has no administration in time range)   acetaminophen (TYLENOL) tablet 650 mg (has no administration in time range)   polyethylene glycol (MIRALAX/GLYCOLAX) Packet 17 g (17 g Oral Given 5/12/19 1641)   ondansetron (ZOFRAN-ODT) ODT tab 4 mg (has no administration in time range)     Or   ondansetron (ZOFRAN) injection 4 mg (has no administration in time range)   calcium carbonate (TUMS) chewable tablet 500 mg (has no administration in time range)   busPIRone (BUSPAR) tablet 5 mg (5 mg Oral Given 5/12/19 1322)   fluticasone (FLONASE) 50 MCG/ACT spray 2 spray (2 sprays Both Nostrils Given 5/12/19 1323)   levothyroxine (SYNTHROID/LEVOTHROID) tablet 75 mcg (has no administration in time range)   simvastatin (ZOCOR) tablet 20 mg (has no administration in time range)   zafirlukast (ACCOLATE) tablet 20 mg (20 mg Oral Given 5/12/19 1642)   Tafluprost 0.0015 % SOLN 1 drop (has no administration in time range)   LORazepam (ATIVAN) injection 0.5 mg (has no administration in time range)   fexofenadine (ALLEGRA) tablet 180 mg (180 mg Oral Given 5/12/19 1324)   pantoprazole (PROTONIX) EC tablet 40 mg (has no administration in time range)   nitroGLYcerin (NITROSTAT) sublingual tablet 0.4 mg (has no administration in time range)   morphine (PF) 4 MG/ML injection (2 mg  Given 5/12/19 0600)   iopamidol (ISOVUE-370) solution 75 mL (75 mLs Intravenous Given 5/12/19 0718)   sodium chloride (PF) 0.9% PF flush 100 mL (100 mLs Intravenous Given 5/12/19 0719)   methylPREDNISolone sodium succinate (solu-MEDROL) injection 125 mg (125 mg Intravenous Given 5/12/19 0804)   furosemide (LASIX) injection 40 mg (40 mg Intravenous Given 5/12/19 1100)   potassium chloride ER (MICRO-K) CR capsule 20 mEq (20 mEq Oral Given 5/12/19 1642)   furosemide (LASIX) injection 20 mg (20 mg Intravenous Given 5/12/19 6477)       Assessments & Plan (with Medical Decision Making)   Acute respiratory failure with hypoxia secondary to  centrilobular emphysema and bilateral pleural effusion: Patient presented to the ED via EMS and was very dyspneic.  Upon arrival was immediately started on BiPAP and multiple doses of DuoNeb's were given which improved her condition but still required BiPAP to maintain oxygen saturations above 90%.  Laboratory test done showed normal procalcitonin, slight elevation of troponin which was attributable to demand ischemia.  Patient did not have any chest pain.  Normal CBC, CMP, magnesium, INR and elevated d-dimer.  CTA was negative for pulmonary embolism but showed small bilateral pleural effusions.  BNP was elevated to 4119.  Patient admitted to ICU under the care of Dr. Franklin.  I have reviewed the nursing notes.    I have reviewed the findings, diagnosis, plan and need for follow up with the patient.       Medication List      There are no discharge medications for this visit.         Final diagnoses:   Centrilobular emphysema (H)   Acute respiratory failure with hypoxia (H)   Bilateral pleural effusion       5/12/2019   [facility]     oJe Jerez MD  05/12/19 5272

## 2019-05-12 NOTE — PROGRESS NOTES
Assisted with transferring patient to CT. Bipap in place.  Decreased O2 from 40% to 30% per MD orders.     Jennifer Phelan RT

## 2019-05-12 NOTE — PLAN OF CARE
Pt admitted for CHF exacerbation this morning. Arrived to floor on nasal cannula after she -had been on BiPAP in the ED. Weaned O2 down to 2lpm with SpO2 ranging 92-95%. Lungs have fine crackles in the bases. IV lasix given twice this shift with great urine output. No edema noted. Miralax given for constipation this evening with results. Bowel sounds were normoactive during first assessment and hyperactive at next assessment. Has remained in SR/ST. Complained of new chest pain around 0545 this evening. Notified provider and received orders for EKG, Nitro and troponin. After going to tell pt plan of care she had a very large bowel movement and stated that the chest pain went away. Held nitroglycerin due to chest pain relief. Daughter here throughout the shift and involved in plan of care.     Face to face report given with opportunity to observe patient.    Report given to TRUNG Mahoney   5/12/2019  7:27 PM

## 2019-05-12 NOTE — PLAN OF CARE
[facility] Inpatient Admission Note:    Patient admitted to 3128/3128-1 at approximately 0900 via cart accompanied by daughter from emergency room . Report received from Merna in SBAR format at 0845 via telephone. Patient ambulated to bed via self.. Patient is alert and oriented X 3, denies pain; rates at 0 on 0-10 scale.  Patient oriented to room, unit, hourly rounding, and plan of care. Explained admission packet and patient handbook with patient bill of rights brochure. Will continue to monitor and document as needed.     Inpatient Nursing criteria listed below was met:    Health care directives status obtained and documented: No    Care Everywhere authorization obtained Yes    MRSA swab completed for patient 65 years and older: Yes    Patient identifies a surrogate decision maker: Yes If yes, who:Daughter Cris Contact Information:[phone]    Core Measure diagnosis present:No.      If initial lactic acid >2.0, repeat lactic acid drawn within one hour of arrival to unit: NA. If no, state reason:     Vaccination assessment and education completed: Yes   Vaccinations received prior to admission: Pneumovax yes  Influenza(seasonal)  N/A   Vaccination(s) ordered: patient declines    Clergy visit ordered if patient requests: N/A    Skin issues/needs documented: N/A    Isolation Patient: no Education given, correct sign in place and documentation row added to PCS:  No    Fall Prevention No: Care plan updated, education given and documented, sticker and magnet in place: No    Care Plan initiated: Yes    Education Documented (including assessment): Yes    Patient has discharge needs : No

## 2019-05-12 NOTE — ED NOTES
Bought pt to CT with RT, BIPAP in place tolerated well.  at bedside upon return.   Face to face report given with opportunity to observe patient.    Report given to Merna MEJIA   5/12/2019  7:24 AM

## 2019-05-12 NOTE — H&P
[facility]    History and Physical  Hospitalist       Date of Admission:  5/12/2019  Date of Service (when I saw the patient): 05/12/19    Assessment & Plan       Acute on chronic systolic congestive heart failure (H): Progressively aken off all of her antihypertensives and diuretics due to hypotension after hemorrhagic CVA 1/2019. Chest CT showing bilateral pleural effusion. Recent ECHO done due to worsening dyspnea showed EF 45-50%. She was started on Lasix 4 days ago at 20 mg daily. She did note increased urination and day 1 but then slowed down again. 40mg IV lasix now then every 8 hours and evaluate response. Troponin drawn in ED mildly elevated so will recheck that as well.       Acute respiratory failure with hypoxia (H): Due to hyperventilation primarily, with underlying COPD and acute CHF exacerbation. EMS noted sats 98% on their arrival though she was very distressed. She did note improvement after neb, daughter noted significant improvement after morphine. ABG supports hyperventilation with low CO2 and elevated PaO2. Oxygen PRN      Centrilobular emphysema (H): No symptoms of exacerbation leading up to event this morning, exam rasheed not support acute exacerbation either. Will not continue steroids at this time. She takes duonebs every 4 hours at home, will continue here QID as they should not be necessary that often and may be contributing to her anxiety and hyperventilation.      Benign essential hypertension: She has been taken off all her antihypertensives due to hypotension after CVA in January. Pressures are stable here, but will watch for hypotension whilst diuresing. Ideally hope to get her on at least low dose beta blocker for her CHF.    History hemorrhagic CVA: 1/2019, presented with slurred speech and transferred to [facility]. She denies and residual effects.       DVT Prophylaxis: Pneumatic Compression Devices  Code Status: Full Code    Disposition: Expected discharge in 1-2  days once dyspnea improved, diuresed.    Mindy Ware, CNP    Primary Care Physician   Hannah Almazan    Chief Complaint   Dyspnea    History is obtained from the patient    History of Present Illness   Lita Ocasio is a 78 year old female who presents with respiratory distress. Daughter relates most of the information before the patient can answer for herself. States the patient has had increased shortness of breath over the last several weeks. Over the last week she has had increasing episodes of severe dyspnea, particualrly at night. On her observation she believes her Mom starts getting anxious then proceeds to hyperventilate. She has been started on Buspar for anxiety but she does not feel that has helped at all. She has not been able to lay down for the last week due to increased dyspnea so has been sleeping sitting up. Her daughter states she snores terribly as well. She feels she has not gotten truly good sleep in over 2 weeks. She denies any chest pain but did have a burning sensation under her arms this morning prior to paramedics arriving that was relieved with duoneb. She states she gets that intermittently, mostly at night. She denies any palpitations or syncope. Paramedics found her very diaphoretic. She was initially placed on bipap due to severe dyspnea but then after she was given small dose of morphine she was rapidly titrated off of it.      Past Medical History    I have reviewed this patient's medical history and updated it with pertinent information if needed.   Past Medical History:   Diagnosis Date     Acquired hypothyroidism 5/12/2019     Asthma      Benign essential hypertension 5/12/2019     Centrilobular emphysema (H) 5/12/2019     Chronic rhinitis 8/16/2013     Chronic systolic congestive heart failure (H) 5/12/2019     Constipation      Hearing loss      Heart trouble      Hemorrhagic cerebrovascular accident (CVA) (H) 01/2019     Hyperlipidemia 5/12/2019     Hypertension       Nasal congestion      Non-rheumatic mitral regurgitation 5/12/2019     Osteopenia 5/12/2019     Osteoporosis      Parathyroid related hypercalcemia (H) 8/18/2013     Primary hyperparathyroidism (H) 9/5/2013     Ringing in ears      Sensorineural hearing loss, asymmetrical 8/16/2013     Sneezing      Snoring      Thyroid disease      Weakness      Weight loss        Past Surgical History   I have reviewed this patient's surgical history and updated it with pertinent information if needed.  Past Surgical History:   Procedure Laterality Date     CATARACT IOL, RT/LT Bilateral      COLONOSCOPY       COLONOSCOPY - HIM SCAN  01/01/2009    Colonoscopy - [facility]  2009     ENT SURGERY  2011    para thyroid surgery     ENT SURGERY  09/2013    Parathyroid     PARATHYROIDECTOMY  9/10/2013    Procedure: PARATHYROIDECTOMY;  Reoperative Neck Exploration, Resection of right Parathyroid adenoma;  Surgeon: Thalia Muller MD;  Location: [facility] OR     TONSILLECTOMY       TUBAL LIGATION         Prior to Admission Medications   Prior to Admission Medications   Prescriptions Last Dose Informant Patient Reported? Taking?   CLINDAMYCIN HCL PO Unknown at Unknown time  Yes No   Sig: Take 600 mg by mouth 1 hour prior to dental work   Fexofenadine HCl (ALLEGRA PO) Past Week at Unknown time Self Yes Yes   Sig: Take 180 mg by mouth daily    LEVOTHYROXINE SODIUM PO 5/11/2019 at Unknown time  Yes Yes   Sig: Take 75 mcg by mouth daily   NONFORMULARY 5/11/2019 at Unknown time  Yes Yes   Sig: Tumeric 500mg BID   POTASSIUM CHLORIDE PO 5/11/2019 at Unknown time Self Yes Yes   Sig: Take 20 mEq by mouth daily    SIMVASTATIN PO 5/11/2019 at Unknown time  Yes Yes   Sig: Take 20 mg by mouth At Bedtime   Tafluprost (ZIOPTAN) 0.0015 % SOLN 5/11/2019 at Unknown time  Yes Yes   Sig: Place 1 drop into both eyes At Bedtime   Vitamin D, Cholecalciferol, 1000 units TABS   Yes Yes   Sig: Take 1 tablet by mouth daily   Zafirlukast (ACCOLATE PO) 5/11/2019 at  Unknown time  Yes Yes   Sig: Take 20 mg by mouth 2 times daily (before meals)    albuterol (PROAIR RESPICLICK) 108 (90 Base) MCG/ACT inhaler 5/11/2019 at Unknown time  No Yes   Sig: Inhale 2 puffs into the lungs every 4 hours as needed for shortness of breath / dyspnea or wheezing   aspirin 81 MG tablet 5/11/2019 at Unknown time Self Yes Yes   Sig: Take 1 tablet by mouth daily   busPIRone (BUSPAR) 5 MG tablet   Yes No   Sig: Take 5 mg by mouth 2 times daily   calcium carbonate (TUMS) 500 MG chewable tablet 5/11/2019 at Unknown time  Yes Yes   Sig: Take 1 chew tab by mouth daily   fluticasone (FLONASE) 50 MCG/ACT spray 5/11/2019 at Unknown time  No Yes   Sig: Spray 2 sprays into both nostrils daily   furosemide (LASIX) 20 MG tablet 5/11/2019 at Unknown time  Yes Yes   Sig: Take 20 mg by mouth daily   ipratropium - albuterol 0.5 mg/2.5 mg/3 mL (DUONEB) 0.5-2.5 (3) MG/3ML neb solution 5/12/2019 at Unknown time  Yes Yes   Sig: Take 1 vial by nebulization every 6 hours as needed for shortness of breath / dyspnea or wheezing   vitamin B complex with vitamin C (VITAMIN  B COMPLEX) tablet 5/11/2019 at Unknown time  Yes Yes   Sig: Take 1 tablet by mouth daily Takes 500mg of Vitamin B   vitamin E 400 units TABS 5/11/2019 at Unknown time  Yes Yes   Sig: Take by mouth daily      Facility-Administered Medications: None     Allergies   Allergies   Allergen Reactions     Evista [Raloxifene] Other (See Comments)     hypercalcemia     Prednisone GI Disturbance     Combigan [Brimonidine Tartrate-Timolol] Other (See Comments)     Eye swelling and itchy     Latex Rash     Levaquin [Levofloxacin] Muscle Pain (Myalgia)     Penicillins Rash     Restasis      Pt reports using eye gtts and having red irritated eyes        Social History   I have reviewed this patient's social history and updated it with pertinent information if needed. Lita Ocasio  reports that she quit smoking about 4 months ago. Her smoking use included cigarettes. She  has a 50.00 pack-year smoking history. She has never used smokeless tobacco. She reports that she drinks alcohol. She reports that she does not use drugs.    Family History   I have reviewed this patient's family history and updated it with pertinent information if needed.   Family History   Problem Relation Age of Onset     Hearing Loss Mother      Heart Disease Mother      Cerebrovascular Disease Father      Hearing Loss Brother      Cancer Brother         throat     Ulcerative Colitis Brother      Cancer Sister      Diabetes Sister      Glaucoma Sister      Cancer Maternal Grandmother      Heart Disease Maternal Grandfather        Review of Systems   CONSTITUTIONAL:  negative for  fevers, chills and sweats  HEENT:  negative for  earaches, nasal congestion and sore throat  RESPIRATORY:  positive for  dyspnea  negative for  dry cough, cough with sputum, wheezing, hemoptysis and pleuritic pain  CARDIOVASCULAR:  positive for  dyspnea, orthopnea, fatigue  negative for  chest pain, palpitations, exertional chest pressure/discomfort  GASTROINTESTINAL:  negative for nausea, vomiting, change in bowel habits, diarrhea, constipation and abdominal pain  MUSCULOSKELETAL:  negative for  myalgias, arthralgias and muscle weakness  NEUROLOGICAL:  negative for headaches, dizziness, weakness, syncope and near syncope  BEHAVIOR/PSYCH:  positive for decreased sleep, decreased energy level, depressed mood and anxiety    Physical Exam   Temp: 98  F (36.7  C) Temp src: Tympanic BP: 127/71   Heart Rate: 86 Resp: 21 SpO2: 95 % O2 Device: Nasal cannula Oxygen Delivery: 3 LPM(turned down to 2lpm)  Vital Signs with Ranges  Temp:  [98  F (36.7  C)] 98  F (36.7  C)  Heart Rate:  [82-95] 86  Resp:  [16-31] 21  BP: (127-153)/(71-93) 127/71  SpO2:  [95 %-99 %] 95 %  112 lbs 10.48 oz    Constitutional: Awake,alert, ill appearing but nontoxic  HEENT: Normocephalic, mucous membranes moist, pink.   Respiratory: No respiratory distress. 96% on 1 liter  of oxygen. Able to speak in full sentences. Vesicular sounds bilaterally but moving air well without wheezes. Bibasilar crackles noted.  No rhonchi.  Cardiovascular: HRR, no murmur, rubs, thrills. She does have 1+ pitting edema to ankles.  GI: Soft, nontender, bowel sounds are positive.   Skin: Pale, no open areas, unusual bruising or rashes.  Musculoskeletal: Moves all extremities equally. Easily able to sit up in bed unassisted.   Neurologic: Follows commands easily, no deficits noted.  Psychiatric: Flat affect, states frustration with recent health problems.     Data   Data reviewed today:  I personally reviewed .  Recent Labs   Lab 05/12/19  0555   WBC 10.7   HGB 14.9   MCV 88      INR 0.99      POTASSIUM 4.2   CHLORIDE 110*   CO2 20   BUN 16   CR 0.98   ANIONGAP 10   BECKY 8.7   *   ALBUMIN 3.7   PROTTOTAL 7.5   BILITOTAL 0.4   ALKPHOS 74   ALT 21   AST 15   TROPI 0.046*       No results found for this or any previous visit (from the past 24 hour(s)).

## 2019-05-12 NOTE — ED NOTES
"Patient presents to emergency room via [place] [facility] with c/o difficulty breathing. Pale, diaphoretic, and anxious. \"I can't breathe!\" tachypnea. Appears in respiratory distress. Updated MDKieran Jerez into see pt. No edema noted. EMS states pt has been declining over the past couple of weeks. Audible wheezing. C/o her chest burning. Pt has a stress test scheduled for Monday. Monitors on. IV established.   "

## 2019-05-13 LAB
ANION GAP SERPL CALCULATED.3IONS-SCNC: 8 MMOL/L (ref 3–14)
BACTERIA SPEC CULT: NORMAL
BASOPHILS # BLD AUTO: 0 10E9/L (ref 0–0.2)
BASOPHILS NFR BLD AUTO: 0.1 %
BUN SERPL-MCNC: 26 MG/DL (ref 7–30)
CALCIUM SERPL-MCNC: 8.2 MG/DL (ref 8.5–10.1)
CHLORIDE SERPL-SCNC: 109 MMOL/L (ref 94–109)
CO2 SERPL-SCNC: 23 MMOL/L (ref 20–32)
CREAT SERPL-MCNC: 1.17 MG/DL (ref 0.52–1.04)
DIFFERENTIAL METHOD BLD: NORMAL
EOSINOPHIL # BLD AUTO: 0 10E9/L (ref 0–0.7)
EOSINOPHIL NFR BLD AUTO: 0 %
ERYTHROCYTE [DISTWIDTH] IN BLOOD BY AUTOMATED COUNT: 13.2 % (ref 10–15)
GFR SERPL CREATININE-BSD FRML MDRD: 44 ML/MIN/{1.73_M2}
GLUCOSE SERPL-MCNC: 109 MG/DL (ref 70–99)
HCT VFR BLD AUTO: 38.6 % (ref 35–47)
HGB BLD-MCNC: 12.9 G/DL (ref 11.7–15.7)
IMM GRANULOCYTES # BLD: 0 10E9/L (ref 0–0.4)
IMM GRANULOCYTES NFR BLD: 0.4 %
LYMPHOCYTES # BLD AUTO: 1.2 10E9/L (ref 0.8–5.3)
LYMPHOCYTES NFR BLD AUTO: 12.1 %
MCH RBC QN AUTO: 29.5 PG (ref 26.5–33)
MCHC RBC AUTO-ENTMCNC: 33.4 G/DL (ref 31.5–36.5)
MCV RBC AUTO: 88 FL (ref 78–100)
MONOCYTES # BLD AUTO: 0.9 10E9/L (ref 0–1.3)
MONOCYTES NFR BLD AUTO: 9.3 %
NEUTROPHILS # BLD AUTO: 7.7 10E9/L (ref 1.6–8.3)
NEUTROPHILS NFR BLD AUTO: 78.1 %
NRBC # BLD AUTO: 0 10*3/UL
NRBC BLD AUTO-RTO: 0 /100
PLATELET # BLD AUTO: 259 10E9/L (ref 150–450)
POTASSIUM SERPL-SCNC: 4.1 MMOL/L (ref 3.4–5.3)
RBC # BLD AUTO: 4.38 10E12/L (ref 3.8–5.2)
SODIUM SERPL-SCNC: 140 MMOL/L (ref 133–144)
SPECIMEN SOURCE: NORMAL
TROPONIN I SERPL-MCNC: 0.07 UG/L (ref 0–0.04)
TSH SERPL DL<=0.005 MIU/L-ACNC: 0.63 MU/L (ref 0.4–4)
WBC # BLD AUTO: 9.8 10E9/L (ref 4–11)

## 2019-05-13 PROCEDURE — 85025 COMPLETE CBC W/AUTO DIFF WBC: CPT | Performed by: NURSE PRACTITIONER

## 2019-05-13 PROCEDURE — 20000003 ZZH R&B ICU

## 2019-05-13 PROCEDURE — 40000275 ZZH STATISTIC RCP TIME EA 10 MIN

## 2019-05-13 PROCEDURE — 80048 BASIC METABOLIC PNL TOTAL CA: CPT | Performed by: NURSE PRACTITIONER

## 2019-05-13 PROCEDURE — 99232 SBSQ HOSP IP/OBS MODERATE 35: CPT | Performed by: NURSE PRACTITIONER

## 2019-05-13 PROCEDURE — 25000128 H RX IP 250 OP 636: Performed by: NURSE PRACTITIONER

## 2019-05-13 PROCEDURE — 84443 ASSAY THYROID STIM HORMONE: CPT | Performed by: NURSE PRACTITIONER

## 2019-05-13 PROCEDURE — 94640 AIRWAY INHALATION TREATMENT: CPT

## 2019-05-13 PROCEDURE — 94640 AIRWAY INHALATION TREATMENT: CPT | Mod: 76

## 2019-05-13 PROCEDURE — 25000125 ZZHC RX 250: Performed by: NURSE PRACTITIONER

## 2019-05-13 PROCEDURE — 84484 ASSAY OF TROPONIN QUANT: CPT | Performed by: NURSE PRACTITIONER

## 2019-05-13 PROCEDURE — 25000132 ZZH RX MED GY IP 250 OP 250 PS 637: Performed by: NURSE PRACTITIONER

## 2019-05-13 PROCEDURE — 36415 COLL VENOUS BLD VENIPUNCTURE: CPT | Performed by: NURSE PRACTITIONER

## 2019-05-13 PROCEDURE — A9270 NON-COVERED ITEM OR SERVICE: HCPCS | Performed by: NURSE PRACTITIONER

## 2019-05-13 RX ORDER — LIDOCAINE 4 G/G
2 PATCH TOPICAL
Status: DISCONTINUED | OUTPATIENT
Start: 2019-05-13 | End: 2019-05-14 | Stop reason: HOSPADM

## 2019-05-13 RX ADMIN — IPRATROPIUM BROMIDE AND ALBUTEROL SULFATE 3 ML: .5; 3 SOLUTION RESPIRATORY (INHALATION) at 07:21

## 2019-05-13 RX ADMIN — IPRATROPIUM BROMIDE AND ALBUTEROL SULFATE 3 ML: .5; 3 SOLUTION RESPIRATORY (INHALATION) at 19:48

## 2019-05-13 RX ADMIN — LORAZEPAM 0.5 MG: 2 INJECTION INTRAMUSCULAR; INTRAVENOUS at 21:33

## 2019-05-13 RX ADMIN — BUSPIRONE HYDROCHLORIDE 5 MG: 5 TABLET ORAL at 21:33

## 2019-05-13 RX ADMIN — Medication 1 PATCH: at 12:46

## 2019-05-13 RX ADMIN — ZAFIRLUKAST 20 MG: 20 TABLET, COATED ORAL at 10:05

## 2019-05-13 RX ADMIN — FEXOFENADINE HYDROCHLORIDE 180 MG: 180 TABLET, FILM COATED ORAL at 10:00

## 2019-05-13 RX ADMIN — ACETAMINOPHEN 650 MG: 325 TABLET, FILM COATED ORAL at 17:53

## 2019-05-13 RX ADMIN — IPRATROPIUM BROMIDE AND ALBUTEROL SULFATE 3 ML: .5; 3 SOLUTION RESPIRATORY (INHALATION) at 15:57

## 2019-05-13 RX ADMIN — IPRATROPIUM BROMIDE AND ALBUTEROL SULFATE 3 ML: .5; 3 SOLUTION RESPIRATORY (INHALATION) at 11:19

## 2019-05-13 RX ADMIN — LEVOTHYROXINE SODIUM 75 MCG: 50 TABLET ORAL at 10:00

## 2019-05-13 RX ADMIN — CALCIUM CARBONATE (ANTACID) CHEW TAB 500 MG 500 MG: 500 CHEW TAB at 10:00

## 2019-05-13 RX ADMIN — FLUTICASONE PROPIONATE 2 SPRAY: 50 SPRAY, METERED NASAL at 10:00

## 2019-05-13 RX ADMIN — ZAFIRLUKAST 20 MG: 20 TABLET, COATED ORAL at 21:33

## 2019-05-13 RX ADMIN — SIMVASTATIN 20 MG: 20 TABLET, FILM COATED ORAL at 21:33

## 2019-05-13 RX ADMIN — ACETAMINOPHEN 650 MG: 325 TABLET, FILM COATED ORAL at 11:45

## 2019-05-13 RX ADMIN — BUSPIRONE HYDROCHLORIDE 5 MG: 5 TABLET ORAL at 10:00

## 2019-05-13 ASSESSMENT — ACTIVITIES OF DAILY LIVING (ADL)
ADLS_ACUITY_SCORE: 12
ADLS_ACUITY_SCORE: 13
ADLS_ACUITY_SCORE: 13
ADLS_ACUITY_SCORE: 12
ADLS_ACUITY_SCORE: 13
ADLS_ACUITY_SCORE: 12

## 2019-05-13 NOTE — PLAN OF CARE
Anxious throughout the day but cooperative and re-directable. Patient fixated on her left sided rib/cage pain throughout most of morning. Attempted heat which patient stated made her pain worse, Tylenol given and Lidocaine patch applied which patient did say made a noticeable improvement in pain. O2 removed this morning and has been tolerating RA throughout day. When awake sats are >92% and when sleeping 88% and >. Is MILLS. Fine crackles in lower lung lobes. Nebs q4h. BP soft but stable throughout day, patient initially stated she was dizzy this morning but since then has been up in anderson and room with no complaints. Tele discontinued, prior to removing patient was SR with intermittent periods of frequent PAC's PVC's. Patient requesting ativan again tonight at HS. Patient's daughter called this am and was updated on patient's status.

## 2019-05-13 NOTE — PROGRESS NOTES
Patient complaining of left sided rib cage pain this morning, stated it started yesterday. Refusing tylenol but agreeing to try heat.

## 2019-05-13 NOTE — PROGRESS NOTES
[facility]    Hospitalist Progress Note    Date of Service (when I saw the patient): 05/13/2019    Assessment & Plan     Acute on chronic systolic congestive heart failure (H): Progressively aken off all of her antihypertensives and diuretics due to hypotension after hemorrhagic CVA 1/2019. Chest CT showing bilateral pleural effusion. Recent ECHO done due to worsening dyspnea showed EF 45-50%. She was started on Lasix 4 days ago at 20 mg daily. She did note increased urination and day 1 but then slowed down again. 40mg IV lasix now then 20 mg 8 hours later. 3 liter net loss. Lungs now clear. Was able to sleep last night for the first time in a long time. Mild creatinine bump so will hold further diuresis today.        Acute respiratory failure with hypoxia (H): Due to hyperventilation primarily, with underlying COPD and acute CHF exacerbation. EMS noted sats 98% on their arrival though she was very distressed. She did note improvement after neb, daughter noted significant improvement after morphine. ABG supports hyperventilation with low CO2 and elevated PaO2. Oxygen PRN overnight. She is tolerating room air well now, even while asleep.    Severe anxiety: Daughter states she has been increasingly anxious for a long time now. Buspar did not seem to help. She presented with classic signs of hyperventilation. She has been very anxious while here, fixating on different things, worried she is going to stop breathing in the middle fo the night etc. She did respond well to ativan, was able to sleep most of the night after it was given. Discussed risk factors of anxiolytics with the patient and her daughter but they are quite adamant they want this available to her. If it stops her from hyperventilating and working up into a crisis I think that is reasonable. Will check TSH to rule that out as contributing factor.       Centrilobular emphysema (H): No symptoms of exacerbation leading up to event that brought her  "in, exam rasheed not support acute exacerbation either. Will not continue steroids at this time. She takes duonebs every 4 hours at home, will continue here QID as they should not be necessary that often and may be contributing to her anxiety and hyperventilation.       Benign essential hypertension: She has been taken off all her antihypertensives due to hypotension after CVA in January. Pressures are stable here, but will watch for hypotension whilst diuresing. Ideally would hope to get her on at least low dose beta blocker for her CHF.     History hemorrhagic CVA: 1/2019, presented with slurred speech and transferred to [facility]. She denies and residual effects.      DVT Prophylaxis: Pneumatic Compression Devices  Code Status: Full Code    Disposition: Expected discharge in 1-2 days once stable.    Mindy Ware, CNP    Interval History   Feels she rested well. Notes tenderness to left ribcage and anterior and laterally. Denies nausea, abdominal pain but feels \"funny\" in her stomach-resolved with tums. Denies chest pain or palpitations, does state she feels dyspneic at rest.     -Data reviewed today: I reviewed all new labs and imaging results over the last 24 hours. I personally reviewed .    Physical Exam   Temp: 98.8  F (37.1  C) Temp src: Temporal BP: 101/68   Heart Rate: 85 Resp: 20 SpO2: 93 % O2 Device: None (Room air) Oxygen Delivery: 2 LPM  Vitals:    05/12/19 1211 05/13/19 0443   Weight: 51.1 kg (112 lb 10.5 oz) 50.4 kg (111 lb 1.6 oz)     Vital Signs with Ranges  Temp:  [97.7  F (36.5  C)-99.1  F (37.3  C)] 98.8  F (37.1  C)  Heart Rate:  [] 85  Resp:  [16-20] 20  BP: ()/(61-71) 101/68  SpO2:  [90 %-98 %] 93 %  I/O last 3 completed shifts:  In: 360 [P.O.:360]  Out: 3400 [Urine:3400]    Peripheral IV 05/12/19 Left (Active)   Site Assessment WDL 5/13/2019  7:51 AM   Line Status Saline locked 5/13/2019  7:51 AM   Phlebitis Scale 0-->no symptoms 5/13/2019  7:51 AM   Infiltration Scale 0 " 5/13/2019  7:51 AM   Number of days: 1     Line/device assessment(s) completed for medical necessity    Constitutional: Awake,alert, no acute distress  Respiratory: Clear without crackles, rhonchi or wheezes. Does still have vesicular sounds on the right.    Cardiovascular: HRR, no murmurs, rubs, thrills. No peripheral edema noted  GI: Soft, nontender, bowel sounds positive.  Skin/Integumen: No rashes, bruising or open areas.      Medications       busPIRone  5 mg Oral BID     fexofenadine  180 mg Oral Daily     fluticasone  2 spray Both Nostrils Daily     ipratropium - albuterol 0.5 mg/2.5 mg/3 mL  3 mL Nebulization 4x daily     levothyroxine  75 mcg Oral QAM AC     lidocaine  2 patch Transdermal Q24H     lidocaine   Transdermal Q8H     lidocaine   Transdermal Q24h     pantoprazole  40 mg Oral QAM AC     simvastatin  20 mg Oral At Bedtime     Tafluprost  1 drop Both Eyes At Bedtime     zafirlukast  20 mg Oral BID AC       Data   Recent Labs   Lab 05/13/19  0442 05/12/19  1809 05/12/19  1322 05/12/19  0555   WBC 9.8  --   --  10.7   HGB 12.9  --   --  14.9   MCV 88  --   --  88     --   --  305   INR  --   --   --  0.99     --   --  140   POTASSIUM 4.1  --   --  4.2   CHLORIDE 109  --   --  110*   CO2 23  --   --  20   BUN 26  --   --  16   CR 1.17*  --   --  0.98   ANIONGAP 8  --   --  10   BECKY 8.2*  --   --  8.7   *  --   --  147*   ALBUMIN  --   --   --  3.7   PROTTOTAL  --   --   --  7.5   BILITOTAL  --   --   --  0.4   ALKPHOS  --   --   --  74   ALT  --   --   --  21   AST  --   --   --  15   TROPI 0.067* 0.065* 0.093* 0.046*       No results found for this or any previous visit (from the past 24 hour(s)).

## 2019-05-13 NOTE — PROGRESS NOTES
KOURTNEY ADAMES  Patient visit during  rounds.  Patient had no spiritual care requests at this time.

## 2019-05-13 NOTE — PROGRESS NOTES
Patient was able to ambulate around room basically independently and wash herself up in bathroom. Is dyspneic with activity but sats remained 93-94% after about 10 minutes of activity. Patient requesting to rest now but stated she would walk in anderson after awhile.

## 2019-05-13 NOTE — PROGRESS NOTES
Assessment completed see flow sheet.    LOC: alert   Others present: Patient     Dx: CHF with hypoxia  Chronic Disease Management: HTN, CHF,    Lives with: alone  Living at:  1 level home in [place]  Transportation: YES, drives self    Primary PCP: Hannah Almazan, dentist is JOSE Richey, sees Dr Graves for eye care  Insurance:  Medicare/ BCBS  Medicare: Inpatient letter reviewed with Lita.    Support System:  Daughter Cris and family  Homecare/PCA: no  /[facility] Services:   no  : NO      [facility] Referral line called: n/a    Health Care Directive: NO   Guardian: no  POA: no    Pharmacy: [facility]  Meds management: YES, manages own    Adequate Resources for needs (housing, utilities, food/med): YES  Household chores: does own except grass and snow, grandchildren and neighbor do those  Work/community/social activity: YES, gets out socially 3X/wk to play cards and Bocce Ball.    ADLs: independent  Ambulation:independent  Falls: no  Nutrition: no concerns  Sleep: normally sleeps well    Equipment used: Nebulizer and daughters CPAP      Oxygen supplier: n/a     Does the supplier have valid oxygen orders: n/a    Mental health: no  Substance abuse: no  Exposure to violence/abuse: no  Stressors: denies    Able to Return to Prior Living Arrangements: YES    Choice of Vendor: n/a    Barriers: anxiety    LAWANDA: average    Plan: home.    Lita lives in a one level home in [place]. She has smoke and CO detectors. She does her own shopping and food prep and drives and has reliable transportation. She enjoys playing cards, bocce ball, going to the casino and spending time with her family.     Lita plans to go home when discharged. CM remains available.

## 2019-05-13 NOTE — PLAN OF CARE
"Pt started shift extremely anxious. Did not want to fall asleep for fear of \"not waking up\" and \"not being able to breath when sleeping\". Pt then fixated on urine color. Pt c/o left rib pain with no radiation. Tried ice pack with no relief. Then given tylenol with a dose of ativan. Pt fell asleep. Appeared to sleep all night.Tele with more ectopy this AM. Lungs clear this AM. Remains 2L overnight per pt request and maintained high 90s. Pt down a lb today. Drowsy this AM.    Face to face report given with opportunity to observe patient.    Report given to Ros PRINCE   5/13/2019  7:13 AM      "

## 2019-05-14 VITALS
BODY MASS INDEX: 20.56 KG/M2 | RESPIRATION RATE: 18 BRPM | WEIGHT: 112.43 LBS | OXYGEN SATURATION: 91 % | DIASTOLIC BLOOD PRESSURE: 63 MMHG | SYSTOLIC BLOOD PRESSURE: 98 MMHG | TEMPERATURE: 99.1 F

## 2019-05-14 LAB
ANION GAP SERPL CALCULATED.3IONS-SCNC: 7 MMOL/L (ref 3–14)
BUN SERPL-MCNC: 29 MG/DL (ref 7–30)
CALCIUM SERPL-MCNC: 8.7 MG/DL (ref 8.5–10.1)
CHLORIDE SERPL-SCNC: 110 MMOL/L (ref 94–109)
CO2 SERPL-SCNC: 25 MMOL/L (ref 20–32)
CREAT SERPL-MCNC: 1.29 MG/DL (ref 0.52–1.04)
ERYTHROCYTE [DISTWIDTH] IN BLOOD BY AUTOMATED COUNT: 13.5 % (ref 10–15)
GFR SERPL CREATININE-BSD FRML MDRD: 40 ML/MIN/{1.73_M2}
GLUCOSE SERPL-MCNC: 83 MG/DL (ref 70–99)
HCT VFR BLD AUTO: 36.5 % (ref 35–47)
HGB BLD-MCNC: 12.3 G/DL (ref 11.7–15.7)
MCH RBC QN AUTO: 29.5 PG (ref 26.5–33)
MCHC RBC AUTO-ENTMCNC: 33.7 G/DL (ref 31.5–36.5)
MCV RBC AUTO: 88 FL (ref 78–100)
PLATELET # BLD AUTO: 251 10E9/L (ref 150–450)
POTASSIUM SERPL-SCNC: 4 MMOL/L (ref 3.4–5.3)
RBC # BLD AUTO: 4.17 10E12/L (ref 3.8–5.2)
SODIUM SERPL-SCNC: 142 MMOL/L (ref 133–144)
WBC # BLD AUTO: 9.8 10E9/L (ref 4–11)

## 2019-05-14 PROCEDURE — 36415 COLL VENOUS BLD VENIPUNCTURE: CPT | Performed by: NURSE PRACTITIONER

## 2019-05-14 PROCEDURE — 85027 COMPLETE CBC AUTOMATED: CPT | Performed by: NURSE PRACTITIONER

## 2019-05-14 PROCEDURE — 25000128 H RX IP 250 OP 636: Performed by: NURSE PRACTITIONER

## 2019-05-14 PROCEDURE — 94640 AIRWAY INHALATION TREATMENT: CPT

## 2019-05-14 PROCEDURE — 99239 HOSP IP/OBS DSCHRG MGMT >30: CPT | Performed by: NURSE PRACTITIONER

## 2019-05-14 PROCEDURE — 94640 AIRWAY INHALATION TREATMENT: CPT | Mod: 76

## 2019-05-14 PROCEDURE — 80048 BASIC METABOLIC PNL TOTAL CA: CPT | Performed by: NURSE PRACTITIONER

## 2019-05-14 PROCEDURE — 40000275 ZZH STATISTIC RCP TIME EA 10 MIN

## 2019-05-14 PROCEDURE — 25000125 ZZHC RX 250: Performed by: NURSE PRACTITIONER

## 2019-05-14 PROCEDURE — A9270 NON-COVERED ITEM OR SERVICE: HCPCS | Performed by: NURSE PRACTITIONER

## 2019-05-14 PROCEDURE — 25000132 ZZH RX MED GY IP 250 OP 250 PS 637: Performed by: NURSE PRACTITIONER

## 2019-05-14 RX ORDER — LORAZEPAM 0.5 MG/1
0.25 TABLET ORAL EVERY 6 HOURS PRN
Qty: 15 TABLET | Refills: 0 | Status: SHIPPED | OUTPATIENT
Start: 2019-05-14 | End: 2019-06-06

## 2019-05-14 RX ORDER — SERTRALINE HYDROCHLORIDE 25 MG/1
25 TABLET, FILM COATED ORAL DAILY
Qty: 30 TABLET | Refills: 0 | Status: SHIPPED | OUTPATIENT
Start: 2019-05-15 | End: 2019-07-23

## 2019-05-14 RX ORDER — SERTRALINE HYDROCHLORIDE 25 MG/1
25 TABLET, FILM COATED ORAL DAILY
Status: DISCONTINUED | OUTPATIENT
Start: 2019-05-14 | End: 2019-05-14 | Stop reason: HOSPADM

## 2019-05-14 RX ADMIN — IPRATROPIUM BROMIDE AND ALBUTEROL SULFATE 3 ML: .5; 3 SOLUTION RESPIRATORY (INHALATION) at 11:17

## 2019-05-14 RX ADMIN — FEXOFENADINE HYDROCHLORIDE 180 MG: 180 TABLET, FILM COATED ORAL at 09:36

## 2019-05-14 RX ADMIN — LEVOTHYROXINE SODIUM 75 MCG: 50 TABLET ORAL at 07:20

## 2019-05-14 RX ADMIN — PANTOPRAZOLE SODIUM 40 MG: 40 TABLET, DELAYED RELEASE ORAL at 07:20

## 2019-05-14 RX ADMIN — BUSPIRONE HYDROCHLORIDE 5 MG: 5 TABLET ORAL at 09:36

## 2019-05-14 RX ADMIN — IPRATROPIUM BROMIDE AND ALBUTEROL SULFATE 3 ML: .5; 3 SOLUTION RESPIRATORY (INHALATION) at 07:04

## 2019-05-14 RX ADMIN — FLUTICASONE PROPIONATE 2 SPRAY: 50 SPRAY, METERED NASAL at 09:36

## 2019-05-14 RX ADMIN — LORAZEPAM 0.5 MG: 2 INJECTION INTRAMUSCULAR; INTRAVENOUS at 07:04

## 2019-05-14 RX ADMIN — Medication 2 PATCH: at 11:41

## 2019-05-14 RX ADMIN — SERTRALINE HYDROCHLORIDE 25 MG: 25 TABLET, FILM COATED ORAL at 09:36

## 2019-05-14 RX ADMIN — ZAFIRLUKAST 20 MG: 20 TABLET, COATED ORAL at 09:45

## 2019-05-14 RX ADMIN — ACETAMINOPHEN 650 MG: 325 TABLET, FILM COATED ORAL at 09:46

## 2019-05-14 ASSESSMENT — ACTIVITIES OF DAILY LIVING (ADL)
ADLS_ACUITY_SCORE: 13

## 2019-05-14 NOTE — PLAN OF CARE
Alert and oriented. Assessment WDL as charted. VSS. Lungs have fine crackles in the bases and maintaining sats on room air. Anxiety is minimal and pt requested ativan at bedtime as needed. Lateral, R sided rib pain continues with relief from lidocaine patch and repositioning. IV- saline locked. Will continue to monitor.

## 2019-05-14 NOTE — PLAN OF CARE
Face to face report given with opportunity to observe patient.    Report given to TRUNG Shields   5/14/2019  10:47 AM

## 2019-05-14 NOTE — PLAN OF CARE
Patient discharged at 1:29 PM via wheel chair accompanied by son and staff. Prescriptions filled and sent with patient upon discharge. All belongings sent with patient.     Discharge instructions reviewed with patient. Listed belongings gathered and returned to patient.     Patient discharged to home.     Core Measures and Vaccines  Core Measures applicable during stay: Yes. If yes, state diagnosis: Heart Failure    Surgical Patient   Surgical Procedures during stay: none  Did patient receive discharge instruction on wound care and recognition of infection symptoms? N/A    MISC  Follow up appointment made:  attempted but clinic unable to make appointment have to contact Dr. Ana Maria Gallardo nurse, she will call patient with appointment.    Home and hospital aquired medications returned to patient: Yes  Patient reports pain was well managed at discharge: Yes

## 2019-05-14 NOTE — PLAN OF CARE
Face to face report given with opportunity to observe patient.    Report given to Face to face report given with opportunity to observe patient.    Report given to Lashawn Cerda   5/13/2019  7:12 PM       Ros Cerda   5/13/2019  7:11 PM

## 2019-05-14 NOTE — PLAN OF CARE
Alert and oriented. Upon initial assessment this morning pt was tachypneic and very anxious, given neb and 0.5mg ativan and improving later this morning. Educated heavily on anxiety and depression and new medication, zoloft. Pt up eating breakfast and willing to ambulate afterwards. Lungs have fine crackles in the bases. L rib cage pain treated with prn tylenol. Son at bedside most of morning being very supportive. Will continue to monitor.

## 2019-05-14 NOTE — DISCHARGE SUMMARY
[facility]    Discharge Summary  Hospitalist    Date of Admission:  5/12/2019  Date of Discharge:  5/14/2019 12:45 PM  Discharging Provider: Mindy Ware CNP  Date of Service (when I saw the patient): 05/14/19    Discharge Diagnoses   Principal Problem:    Acute on chronic systolic congestive heart failure (H)  Active Problems:    Acute respiratory failure with hypoxia (H)    Centrilobular emphysema (H)    Benign essential hypertension    Chronic anxiety      History of Present Illness   From admission:  Lita Ocasio is a 78 year old female who presents with respiratory distress. Daughter relates most of the information before the patient can answer for herself. States the patient has had increased shortness of breath over the last several weeks. Over the last week she has had increasing episodes of severe dyspnea, particualrly at night. On her observation she believes her Mom starts getting anxious then proceeds to hyperventilate. She has been started on Buspar for anxiety but she does not feel that has helped at all. She has not been able to lay down for the last week due to increased dyspnea so has been sleeping sitting up. Her daughter states she snores terribly as well. She feels she has not gotten truly good sleep in over 2 weeks. She denies any chest pain but did have a burning sensation under her arms this morning prior to paramedics arriving that was relieved with duoneb. She states she gets that intermittently, mostly at night. She denies any palpitations or syncope. Paramedics found her very diaphoretic. She was initially placed on bipap due to severe dyspnea but then after she was given small dose of morphine she was rapidly titrated off of it and down to 1 liter nc.    Hospital Course   Lita Ocasio was admitted on 5/12/2019.  The following problems were addressed during her hospitalization:    Acute on chronic systolic congestive heart failure (H): Progressively aken off all of her  antihypertensives and diuretics due to hypotension after hemorrhagic CVA 1/2019. Chest CT showing bilateral pleural effusion. Recent ECHO done due to worsening dyspnea showed EF 45-50%. She was started on Lasix 4 days ago at 20 mg daily. She did note increased urination and day 1 but then slowed down again. 40mg IV lasix now then 20 mg 8 hours later. 3 liter net loss. Lungs now clear. Has been sleeping well the last 2 nights. Blood pressures have remained stable, she has not had any dizziness. She did bump her creatinine a bit, will continue previous dose of lasix 20mg but held today. Recheck labs at follow-up.       Acute respiratory failure with hypoxia (H): Due to hyperventilation primarily, with underlying COPD and acute CHF exacerbation. EMS noted sats 98% on their arrival though she was very distressed. She did note improvement after neb, daughter noted significant improvement after morphine in the ED. ABG supports hyperventilation with low CO2 and elevated PaO2.  She is tolerating room air well since yesterday, even while asleep.     Severe anxiety: Daughter states she has been increasingly anxious for a long time now. Buspar did not seem to help. She presented with classic signs of hyperventilation. She has been very anxious while here, fixating on different things, worried she is going to stop breathing in the middle fo the night etc. She did respond well to ativan, was able to sleep most of the night after it was given. Discussed risk factors of anxiolytics with the patient and her daughter but they are quite adamant they want this available to her. If it stops her from hyperventilating and working up into a crisis I think that is reasonable. TSH is only .44 so that may be contributing, however will not change dose but will have her follow-up with her PCP for that decision. Also started on low dose of sertraline to hopefully help with the anxiety as well as any concomitant depression as she has had a flat  affect, irritability and perseverating thoughts.     Possible sleep apnea: Daughter states severe snoring. Did write referral for sleep study.        Centrilobular emphysema (H): No symptoms of exacerbation leading up to event that brought her in, exam rasheed not support acute exacerbation either. Will not continue steroids at this time. She takes duonebs every 4 hours at home, will continue here QID as they should not be necessary that often and may be contributing to her anxiety and hyperventilation.       Benign essential hypertension: She has been taken off all her antihypertensives due to hypotension after CVA in January. Pressures are stable here, but will watch for hypotension whilst diuresing. Ideally would hope to get her on at least low dose beta blocker for her CHF at some point.     History hemorrhagic CVA: 1/2019, presented with slurred speech and transferred to [facility]. She denies any residual effects.        Mindy Ware, CNP      Pending Results     Unresulted Labs Ordered in the Past 30 Days of this Admission     Date and Time Order Name Status Description    5/12/2019 0609 Sputum Culture Aerobic Bacterial Preliminary           Code Status   Full Code       Primary Care Physician   Hannah Almazan      Discharge Disposition   Discharged to home  Condition at discharge: Stable    Consultations This Hospital Stay   None    Time Spent on this Encounter   I, Mindy Ware, personally saw the patient today and spent greater than 30 minutes discharging this patient.    Discharge Orders      SLEEP EVALUATION & MANAGEMENT REFERRAL - [facility] [place] [phone] (Age 5 and up)      Reason for your hospital stay    CHF exacerbation, anxiety     Follow-up and recommended labs and tests     Follow up with primary care provider, Hannah Almazan, within 3 days for hospital follow- up.  The following labs/tests are recommended: BMP.     Activity    Your activity  upon discharge: activity as tolerated     When to contact your care team    Call your primary doctor if you have any of the following: increased shortness of breath, chest pain, fever any other new or worsening symptoms.    Weigh yourself daily. Notify your docotor if weight goes up more than 2 pounds over night or 5 pounds in 1 week.     Discharge Instructions    Weight daily and record, bring with you to doctor's appointments.     Full Code     Diet    Follow this diet upon discharge: Orders Placed This Encounter      Combination Diet Regular Diet Adult; 2 gm NA Diet     Discharge Medications   Current Discharge Medication List      START taking these medications    Details   LORazepam (ATIVAN) 0.5 MG tablet Take 0.5 tablets (0.25 mg) by mouth every 6 hours as needed for anxiety or sleep  Qty: 15 tablet, Refills: 0    Associated Diagnoses: Centrilobular emphysema (H); Acute respiratory failure with hypoxia (H); Acute on chronic systolic congestive heart failure (H)      sertraline (ZOLOFT) 25 MG tablet Take 1 tablet (25 mg) by mouth daily  Qty: 30 tablet, Refills: 0    Associated Diagnoses: Centrilobular emphysema (H); Acute respiratory failure with hypoxia (H); Acute on chronic systolic congestive heart failure (H)         CONTINUE these medications which have NOT CHANGED    Details   albuterol (PROAIR RESPICLICK) 108 (90 Base) MCG/ACT inhaler Inhale 2 puffs into the lungs every 4 hours as needed for shortness of breath / dyspnea or wheezing  Qty: 1 Inhaler, Refills: 3      aspirin 81 MG tablet Take 1 tablet by mouth daily      calcium carbonate (TUMS) 500 MG chewable tablet Take 1 chew tab by mouth daily      Fexofenadine HCl (ALLEGRA PO) Take 180 mg by mouth daily       fluticasone (FLONASE) 50 MCG/ACT spray Spray 2 sprays into both nostrils daily  Qty: 1 Bottle, Refills: 11      furosemide (LASIX) 20 MG tablet Take 20 mg by mouth daily      ipratropium - albuterol 0.5 mg/2.5 mg/3 mL (DUONEB) 0.5-2.5 (3) MG/3ML  neb solution Take 1 vial by nebulization every 6 hours as needed for shortness of breath / dyspnea or wheezing      LEVOTHYROXINE SODIUM PO Take 75 mcg by mouth daily      NONFORMULARY Tumeric 500mg BID      POTASSIUM CHLORIDE PO Take 20 mEq by mouth daily       SIMVASTATIN PO Take 20 mg by mouth At Bedtime      Tafluprost (ZIOPTAN) 0.0015 % SOLN Place 1 drop into both eyes At Bedtime      vitamin B complex with vitamin C (VITAMIN  B COMPLEX) tablet Take 1 tablet by mouth daily Takes 500mg of Vitamin B      Vitamin D, Cholecalciferol, 1000 units TABS Take 1 tablet by mouth daily      vitamin E 400 units TABS Take by mouth daily      Zafirlukast (ACCOLATE PO) Take 20 mg by mouth 2 times daily (before meals)       busPIRone (BUSPAR) 5 MG tablet Take 5 mg by mouth 2 times daily  Refills: 0      CLINDAMYCIN HCL PO Take 600 mg by mouth 1 hour prior to dental work           Allergies   Allergies   Allergen Reactions     Evista [Raloxifene] Other (See Comments)     hypercalcemia     Prednisone GI Disturbance     Combigan [Brimonidine Tartrate-Timolol] Other (See Comments)     Eye swelling and itchy     Latex Rash     Levaquin [Levofloxacin] Muscle Pain (Myalgia)     Penicillins Rash     Restasis      Pt reports using eye gtts and having red irritated eyes      Data   Most Recent 3 CBC's:  Recent Labs   Lab Test 05/14/19 0442 05/13/19  0442 05/12/19  0555   WBC 9.8 9.8 10.7   HGB 12.3 12.9 14.9   MCV 88 88 88    259 305      Most Recent 3 BMP's:  Recent Labs   Lab Test 05/14/19 0442 05/13/19  0442 05/12/19  0555    140 140   POTASSIUM 4.0 4.1 4.2   CHLORIDE 110* 109 110*   CO2 25 23 20   BUN 29 26 16   CR 1.29* 1.17* 0.98   ANIONGAP 7 8 10   BECKY 8.7 8.2* 8.7   GLC 83 109* 147*     Most Recent 2 LFT's:  Recent Labs   Lab Test 05/12/19  0555 04/24/19  1443   AST 15 12   ALT 21 17   ALKPHOS 74 70   BILITOTAL 0.4 0.5     Most Recent INR's and Anticoagulation Dosing History:  Anticoagulation Dose History      Recent Dosing and Labs Latest Ref Rng & Units 12/31/2018 5/12/2019    INR 0.80 - 1.20 0.94 0.99        Most Recent 3 Troponin's:  Recent Labs   Lab Test 05/13/19  0442 05/12/19  1809 05/12/19  1322   TROPI 0.067* 0.065* 0.093*     Most Recent Cholesterol Panel:No lab results found.  Most Recent 6 Bacteria Isolates From Any Culture (See EPIC Reports for Culture Details):  Recent Labs   Lab Test 05/12/19  0945 05/12/19  0555 12/31/18  2258 12/31/18  2247 08/29/13  0300 08/18/13  1451   CULT No MRSA isolated Culture in progress No growth after 6 days No growth after 6 days No MRSA isolated No MRSA isolated     Most Recent TSH, T4 and A1c Labs:  Recent Labs   Lab Test 05/13/19  0442   TSH 0.63     Results for orders placed or performed during the hospital encounter of 05/12/19   XR Chest Port 1 View    Narrative    XR CHEST PORT 1 VW    HISTORY: 78 yearsFemale Shortness of breath    TECHNIQUE: A single view of the chest was performed    COMPARISON: 4/24/2019    FINDINGS: There are small bilateral pleural effusions. Pulmonary  vascularity is mildly prominent. Lungs otherwise clear.      Impression    IMPRESSION: Small bilateral pleural effusions.    Plantar vascularity is mildly prominent    RAFFI LOPEZ MD   CTA Chest with Contrast    Narrative    CTA CHEST WITH CONTRAST    HISTORY: 78 years Female PE suspected, high pretest prob; ; Severe  shortness of breath with elevated d-dimer.    TECHNIQUE: Axial CT imaging of the chest was performed With  intravenous contrast. Coronal and sagittal reconstructions were  obtained.    COMPARISON: 5/27/2018    FINDINGS:  There are small bilateral pleural effusions. Volume on the right is  larger than the left.    There is no evidence of pulmonary embolism, aortic aneurysm or aortic  dissection.  There is a densely calcified sizable right subcarinal lymph node.    Advanced emphysematous changes are present. There is mild dependent  atelectasis.           No concerning osseous  lesions are identified      Impression    IMPRESSION: No evidence of pulmonary embolism thoracic aneurysm or  thoracic aortic dissection.    Small bilateral pleural effusions, right greater than left are  present. There is associated dependent atelectasis. There is advanced  emphysematous changes, also seen on the prior study.    RAFFI LOPEZ MD

## 2019-05-14 NOTE — PROGRESS NOTES
Name: Lita Ocasio    MRN#: [id number]    Reason for Hospitalization: Bilateral pleural effusion [J90]  Acute respiratory failure with hypoxia (H) [J96.01]  Centrilobular emphysema (H) [J43.2]    Discharge Date: 5/14/2019    Patient / Family response to discharge plan: in agreement    Follow-Up Appt:   Future Appointments   Date Time Provider Department Center   3/25/2020  1:30 PM Gali Dominguez APRN CNP [facility]       Other Providers (Care Coordinator, County Services, PCA services etc): No    Discharge Disposition: home, transported by adarsh Hanna

## 2019-05-15 LAB
BACTERIA SPEC CULT: NORMAL
BACTERIA SPEC CULT: NORMAL
SPECIMEN SOURCE: NORMAL

## 2019-05-17 ENCOUNTER — OFFICE VISIT (OUTPATIENT)
Dept: FAMILY MEDICINE | Facility: OTHER | Age: 79
End: 2019-05-17
Attending: FAMILY MEDICINE
Payer: MEDICARE

## 2019-05-17 ENCOUNTER — ANCILLARY PROCEDURE (OUTPATIENT)
Dept: GENERAL RADIOLOGY | Facility: OTHER | Age: 79
End: 2019-05-17
Attending: FAMILY MEDICINE
Payer: MEDICARE

## 2019-05-17 VITALS
WEIGHT: 112 LBS | RESPIRATION RATE: 18 BRPM | BODY MASS INDEX: 20.61 KG/M2 | SYSTOLIC BLOOD PRESSURE: 123 MMHG | OXYGEN SATURATION: 96 % | HEIGHT: 62 IN | HEART RATE: 86 BPM | DIASTOLIC BLOOD PRESSURE: 73 MMHG

## 2019-05-17 DIAGNOSIS — J43.2 CENTRILOBULAR EMPHYSEMA (H): ICD-10-CM

## 2019-05-17 DIAGNOSIS — F41.9 ANXIETY: ICD-10-CM

## 2019-05-17 DIAGNOSIS — R06.00 PND (PAROXYSMAL NOCTURNAL DYSPNEA): ICD-10-CM

## 2019-05-17 DIAGNOSIS — E21.0 PRIMARY HYPERPARATHYROIDISM (H): ICD-10-CM

## 2019-05-17 DIAGNOSIS — J96.01 ACUTE RESPIRATORY FAILURE WITH HYPOXIA (H): Primary | ICD-10-CM

## 2019-05-17 DIAGNOSIS — N18.30 CKD (CHRONIC KIDNEY DISEASE) STAGE 3, GFR 30-59 ML/MIN (H): ICD-10-CM

## 2019-05-17 DIAGNOSIS — E03.9 ACQUIRED HYPOTHYROIDISM: Chronic | ICD-10-CM

## 2019-05-17 DIAGNOSIS — I50.23 ACUTE ON CHRONIC SYSTOLIC CONGESTIVE HEART FAILURE (H): ICD-10-CM

## 2019-05-17 DIAGNOSIS — I34.0 NON-RHEUMATIC MITRAL REGURGITATION: Chronic | ICD-10-CM

## 2019-05-17 LAB
ANION GAP SERPL CALCULATED.3IONS-SCNC: 6 MMOL/L (ref 3–14)
BASOPHILS # BLD AUTO: 0.1 10E9/L (ref 0–0.2)
BASOPHILS NFR BLD AUTO: 0.7 %
BUN SERPL-MCNC: 18 MG/DL (ref 7–30)
CALCIUM SERPL-MCNC: 9.7 MG/DL (ref 8.5–10.1)
CHLORIDE SERPL-SCNC: 108 MMOL/L (ref 94–109)
CO2 SERPL-SCNC: 27 MMOL/L (ref 20–32)
CREAT SERPL-MCNC: 1.08 MG/DL (ref 0.52–1.04)
DIFFERENTIAL METHOD BLD: NORMAL
EOSINOPHIL # BLD AUTO: 0.4 10E9/L (ref 0–0.7)
EOSINOPHIL NFR BLD AUTO: 4.4 %
ERYTHROCYTE [DISTWIDTH] IN BLOOD BY AUTOMATED COUNT: 13.4 % (ref 10–15)
GFR SERPL CREATININE-BSD FRML MDRD: 49 ML/MIN/{1.73_M2}
GLUCOSE SERPL-MCNC: 84 MG/DL (ref 70–99)
HCT VFR BLD AUTO: 44 % (ref 35–47)
HGB BLD-MCNC: 14.8 G/DL (ref 11.7–15.7)
IMM GRANULOCYTES # BLD: 0 10E9/L (ref 0–0.4)
IMM GRANULOCYTES NFR BLD: 0.4 %
LYMPHOCYTES # BLD AUTO: 1.7 10E9/L (ref 0.8–5.3)
LYMPHOCYTES NFR BLD AUTO: 19.8 %
MCH RBC QN AUTO: 29.4 PG (ref 26.5–33)
MCHC RBC AUTO-ENTMCNC: 33.6 G/DL (ref 31.5–36.5)
MCV RBC AUTO: 87 FL (ref 78–100)
MONOCYTES # BLD AUTO: 0.8 10E9/L (ref 0–1.3)
MONOCYTES NFR BLD AUTO: 10.1 %
NEUTROPHILS # BLD AUTO: 5.4 10E9/L (ref 1.6–8.3)
NEUTROPHILS NFR BLD AUTO: 64.6 %
NRBC # BLD AUTO: 0 10*3/UL
NRBC BLD AUTO-RTO: 0 /100
NT-PROBNP SERPL-MCNC: 5830 PG/ML (ref 0–450)
PLATELET # BLD AUTO: 310 10E9/L (ref 150–450)
POTASSIUM SERPL-SCNC: 3.6 MMOL/L (ref 3.4–5.3)
RBC # BLD AUTO: 5.04 10E12/L (ref 3.8–5.2)
SODIUM SERPL-SCNC: 141 MMOL/L (ref 133–144)
TSH SERPL DL<=0.005 MIU/L-ACNC: 2.79 MU/L (ref 0.4–4)
WBC # BLD AUTO: 8.4 10E9/L (ref 4–11)

## 2019-05-17 PROCEDURE — 36415 COLL VENOUS BLD VENIPUNCTURE: CPT | Mod: ZL | Performed by: FAMILY MEDICINE

## 2019-05-17 PROCEDURE — G0463 HOSPITAL OUTPT CLINIC VISIT: HCPCS

## 2019-05-17 PROCEDURE — G0463 HOSPITAL OUTPT CLINIC VISIT: HCPCS | Mod: 25

## 2019-05-17 PROCEDURE — 83880 ASSAY OF NATRIURETIC PEPTIDE: CPT | Mod: ZL | Performed by: FAMILY MEDICINE

## 2019-05-17 PROCEDURE — 99214 OFFICE O/P EST MOD 30 MIN: CPT | Performed by: FAMILY MEDICINE

## 2019-05-17 PROCEDURE — 84443 ASSAY THYROID STIM HORMONE: CPT | Mod: ZL | Performed by: FAMILY MEDICINE

## 2019-05-17 PROCEDURE — 80048 BASIC METABOLIC PNL TOTAL CA: CPT | Mod: ZL | Performed by: FAMILY MEDICINE

## 2019-05-17 PROCEDURE — 85025 COMPLETE CBC W/AUTO DIFF WBC: CPT | Mod: ZL | Performed by: FAMILY MEDICINE

## 2019-05-17 PROCEDURE — 71046 X-RAY EXAM CHEST 2 VIEWS: CPT | Mod: TC

## 2019-05-17 ASSESSMENT — PAIN SCALES - GENERAL: PAINLEVEL: NO PAIN (0)

## 2019-05-17 ASSESSMENT — MIFFLIN-ST. JEOR: SCORE: 941.28

## 2019-05-17 NOTE — NURSING NOTE
"Chief Complaint   Patient presents with     Hospital F/U       Initial /73   Pulse 86   Resp 18   Ht 1.575 m (5' 2\")   Wt 50.8 kg (112 lb)   SpO2 96%   BMI 20.49 kg/m   Estimated body mass index is 20.49 kg/m  as calculated from the following:    Height as of this encounter: 1.575 m (5' 2\").    Weight as of this encounter: 50.8 kg (112 lb).  Medication Reconciliation: complete    Rebeca Lozano LPN  "

## 2019-05-17 NOTE — PROGRESS NOTES
SUBJECTIVE:   Lita Ocasio is a 78 year old female who presents to clinic today for the following   health issues:    Hospital Follow-up Visit:    Hospital/Nursing Home/IP Rehab Facility: [facility]  Date of Admission: 5/12/19  Date of Discharge: 5/14/19  Reason(s) for Admission: CHF            Problems taking medications regularly:  None       Medication changes since discharge: None       Problems adhering to non-medication therapy:  None    Patient just bought oximeter but has not used it during shortness of breath episodes yet.  Patient wakes up with shortness of breath - does not lay down short of breath. Hx of tobacco abuse - 50 years or so of smoking. Quit smoking in January of this year.      Summary of hospitalization:    Patient was admitted on 5/12 for acute resp failure. Bilateral pleural effusions noted. Echo with mildly reduced EF at 45-50%. Hx of underlying severe COPD on imaging, however, patient reports not being symptomatic prior to hospitalization. Treated with diuresis and improved significantly. Some YONATAN that resolved. Notable anxiety during visit and possible hyperventilation. Placed on ativan and zoloft in addition her buspar. Discharged to home.     Diagnostic Tests/Treatments reviewed.  Follow up needed: none  Other Healthcare Providers Involved in Patient s Care:         None  Update since discharge: worsened.    Patient is here today with her son. They report she had done well for the first day or so. After that they noted return of night time shortness of breath. Describe as waking in the night extremely short of breath. Daughter apparently feels this is related to anxiety.      Additional history: as documented    Reviewed  and updated as needed this visit by clinical staff  Tobacco  Allergies  Meds  Problems  Med Hx  Surg Hx  Fam Hx         Reviewed and updated as needed this visit by Provider  Tobacco  Allergies  Meds  Problems  Med Hx  Surg Hx  Fam Hx      "      ROS:  Constitutional, HEENT, cardiovascular, pulmonary, gi and gu systems are negative, except as otherwise noted.    OBJECTIVE:                                                    /73   Pulse 86   Resp 18   Ht 1.575 m (5' 2\")   Wt 50.8 kg (112 lb)   SpO2 96%   BMI 20.49 kg/m    Body mass index is 20.49 kg/m .  GENERAL APPEARANCE: alert and no distress  EYES: Eyes grossly normal to inspection and conjunctivae and sclerae normal  RESP: decreased lung sounds, no wheeze or ronchi  CV: regular rates and rhythm, normal S1 S2, no S3 or S4, no murmur, click or rub, trace bilateral LE edema  PSYCH: affect flat and hearing poor  MENTAL STATUS EXAM:  Appearance/Behavior: No apparent distress and Neatly groomed  Speech: Normal  Mood/Affect: flat  Insight: Fair    Diagnostic test results:  Diagnostic Test Results:  Labs reviewed in Epic  Results for orders placed or performed in visit on 05/17/19   Basic metabolic panel   Result Value Ref [facility]    Sodium 141 133 - 144 mmol/L    Potassium 3.6 3.4 - 5.3 mmol/L    [place] 108 94 - 109 mmol/L    Carbon Dioxide 27 20 - 32 mmol/L    Anion Gap 6 3 - 14 mmol/L    Glucose 84 70 - 99 mg/dL    Urea Nitrogen 18 7 - 30 mg/dL    Creatinine 1.08 (H) 0.52 - 1.04 mg/dL    GFR Estimate 49 (L) >60 mL/min/[1.73_m2]    GFR Estimate If Black 57 (L) >60 mL/min/[1.73_m2]    Calcium 9.7 8.5 - 10.1 mg/dL   CBC with platelets and differential   Result Value Ref [facility]    WBC 8.4 4.0 - 11.0 10e9/L    RBC Count 5.04 3.8 - 5.2 10e12/L    Hemoglobin 14.8 11.7 - 15.7 g/dL    Hematocrit 44.0 35.0 - 47.0 %    MCV 87 78 - 100 fl    MCH 29.4 26.5 - 33.0 pg    MCHC 33.6 31.5 - 36.5 g/dL    RDW 13.4 10.0 - 15.0 %    Platelet Count 310 150 - 450 10e9/L    Diff Method Automated Method     % Neutrophils 64.6 %    % Lymphocytes 19.8 %    % Monocytes 10.1 %    % Eosinophils 4.4 %    % Basophils 0.7 %    % Immature Granulocytes 0.4 %    Nucleated RBCs 0 0 /100    Absolute Neutrophil 5.4 1.6 - 8.3 10e9/L "    Absolute Lymphocytes 1.7 0.8 - 5.3 10e9/L    Absolute Monocytes 0.8 0.0 - 1.3 10e9/L    Absolute Eosinophils 0.4 0.0 - 0.7 10e9/L    Absolute Basophils 0.1 0.0 - 0.2 10e9/L    Abs Immature Granulocytes 0.0 0 - 0.4 10e9/L    Absolute Nucleated RBC 0.0    TSH with free T4 reflex   Result Value Ref [facility]    TSH 2.79 0.40 - 4.00 mU/L   BNP-N terminal pro   Result Value Ref [facility]    N-Terminal Pro Bnp 5,830 (H) 0 - 450 pg/mL        ASSESSMENT/PLAN:                                                      Acute respiratory failure with hypoxia (H) / Acute on chronic systolic congestive heart failure (H) / Centrilobular emphysema (H) / PND (paroxysmal nocturnal dyspnea)  Patient has severe COPD and functional status is limited which I think explains the fact that she has been relatively asymptomatic with this. I do think that her more recent symptoms of shortness of breath are from the CHF rather than COPD exacerbation. Symptoms worse at night, history consistent with increasing PND since hospital discharge. Will recheck renal function and BNP today to see where she is at relative to at discharge. Suspect she will need higher dose of lasix. Cards referral placed at family request as well.   - CARDIOLOGY EVAL ADULT REFERRAL  - CBC with platelets and differential  - BNP-N terminal pro     Non-rheumatic mitral regurgitation  History of this, mild insufficiency on echo 4/2019.     Acquired hypothyroidism  TSH wnl.   - TSH with free T4 reflex    Primary hyperparathyroidism (H)  Calcium 9.7 uncorrected. Monitor for now.     CKD (chronic kidney disease) stage 3, GFR 30-59 ml/min (H)  Discussed risk of worsening renal function with increase water pill, possiblity that eventually this may limit treatment options. For now recheck and increase lasix.   - Basic metabolic panel    Anxiety  Previous PCP told patient to discontinue zoloft and ativan continue buspar. Continue Buspar. Can continue ativan for now, given the SOB which I think  is triggering the anxiety/panic symptoms. If breathing improves, stop the medication. Will review records from [facility]. Would actually prefer pt be on zoloft rather than buspar unless other contraindication in the past. Follow up 1 month.     Ophelia Troncoso MD  [facility]

## 2019-05-17 NOTE — Clinical Note
Could you check in with son or daughter about how patient is doing? Would like to recheck labs this week and see if lasix dose needs to be adjusted. Thanks.

## 2019-05-18 ENCOUNTER — TELEPHONE (OUTPATIENT)
Dept: CASE MANAGEMENT | Facility: HOSPITAL | Age: 79
End: 2019-05-18

## 2019-05-21 ENCOUNTER — MYC MEDICAL ADVICE (OUTPATIENT)
Dept: FAMILY MEDICINE | Facility: OTHER | Age: 79
End: 2019-05-21

## 2019-05-22 DIAGNOSIS — I50.23 ACUTE ON CHRONIC SYSTOLIC CONGESTIVE HEART FAILURE (H): Primary | ICD-10-CM

## 2019-05-22 DIAGNOSIS — I50.23 ACUTE ON CHRONIC SYSTOLIC CONGESTIVE HEART FAILURE (H): ICD-10-CM

## 2019-05-22 DIAGNOSIS — I50.22 CHRONIC SYSTOLIC CONGESTIVE HEART FAILURE (H): Primary | Chronic | ICD-10-CM

## 2019-05-22 LAB
ANION GAP SERPL CALCULATED.3IONS-SCNC: 9 MMOL/L (ref 3–14)
BUN SERPL-MCNC: 15 MG/DL (ref 7–30)
CALCIUM SERPL-MCNC: 8.6 MG/DL (ref 8.5–10.1)
CHLORIDE SERPL-SCNC: 105 MMOL/L (ref 94–109)
CO2 SERPL-SCNC: 25 MMOL/L (ref 20–32)
CREAT SERPL-MCNC: 1.16 MG/DL (ref 0.52–1.04)
GFR SERPL CREATININE-BSD FRML MDRD: 45 ML/MIN/{1.73_M2}
GLUCOSE SERPL-MCNC: 90 MG/DL (ref 70–99)
NT-PROBNP SERPL-MCNC: 3726 PG/ML (ref 0–450)
POTASSIUM SERPL-SCNC: 3.3 MMOL/L (ref 3.4–5.3)
SODIUM SERPL-SCNC: 139 MMOL/L (ref 133–144)

## 2019-05-22 PROCEDURE — 80048 BASIC METABOLIC PNL TOTAL CA: CPT | Mod: ZL | Performed by: FAMILY MEDICINE

## 2019-05-22 PROCEDURE — 36415 COLL VENOUS BLD VENIPUNCTURE: CPT | Mod: ZL | Performed by: FAMILY MEDICINE

## 2019-05-22 PROCEDURE — 83880 ASSAY OF NATRIURETIC PEPTIDE: CPT | Mod: ZL | Performed by: FAMILY MEDICINE

## 2019-05-22 RX ORDER — POTASSIUM CHLORIDE 1500 MG/1
20 TABLET, EXTENDED RELEASE ORAL DAILY
Qty: 30 TABLET | Refills: 3 | Status: SHIPPED | OUTPATIENT
Start: 2019-05-22 | End: 2019-06-06

## 2019-05-22 RX ORDER — FUROSEMIDE 20 MG
20 TABLET ORAL 2 TIMES DAILY
Qty: 180 TABLET | Refills: 0 | Status: SHIPPED | OUTPATIENT
Start: 2019-05-22 | End: 2019-05-31

## 2019-05-22 NOTE — TELEPHONE ENCOUNTER
Patient states she is taking 2x a day.  Med is historical.    furosemide (LASIX) 20 MG tablet     Last Written Prescription Date:    Last Fill Quantity: ,   # refills:   Last Office Visit: 5/17/19  Future Office visit:    Next 5 appointments (look out 90 days)    Jun 26, 2019  1:30 PM CDT  (Arrive by 1:15 PM)  Office Visit with Ophelia Troncoso MD  [facility] ([facility] ) [address] [place] [address] AVE  [place] [address]  [phone]           Routing refill request to provider for review/approval because:  Medication is reported/historical

## 2019-05-22 NOTE — TELEPHONE ENCOUNTER
Pt called in and states that her shortness of breath has decreased as well as the anxiety.  Gave pt your results and she will  her medication.

## 2019-05-23 ENCOUNTER — TELEPHONE (OUTPATIENT)
Dept: FAMILY MEDICINE | Facility: OTHER | Age: 79
End: 2019-05-23

## 2019-05-23 DIAGNOSIS — I50.22 CHRONIC SYSTOLIC CONGESTIVE HEART FAILURE (H): Chronic | ICD-10-CM

## 2019-05-23 DIAGNOSIS — R06.83 SNORING: Primary | ICD-10-CM

## 2019-05-23 DIAGNOSIS — J43.2 CENTRILOBULAR EMPHYSEMA (H): Chronic | ICD-10-CM

## 2019-05-23 DIAGNOSIS — R06.81 APNEA: ICD-10-CM

## 2019-05-23 DIAGNOSIS — J96.01 ACUTE RESPIRATORY FAILURE WITH HYPOXIA (H): Primary | ICD-10-CM

## 2019-05-23 RX ORDER — POTASSIUM CHLORIDE 1500 MG/1
20 TABLET, EXTENDED RELEASE ORAL 2 TIMES DAILY
Qty: 60 TABLET | Refills: 3 | Status: SHIPPED | OUTPATIENT
Start: 2019-05-23 | End: 2019-07-23

## 2019-05-23 NOTE — TELEPHONE ENCOUNTER
Pt called, states that per Dr Troncoso she was supposed to be started on another medication as well as the potassium. I don't see anything in the notes regarding this. Please advise. Thank you!

## 2019-05-23 NOTE — PROGRESS NOTES
ANGÉLICA ERIC       Name: Lita Ocasio MRN# [id number]   Age: 78 year old YOB: 1940     Stop Bang questionnaire completed with a score of >3 to allow for HST     Have you been told you snore loudly (louder than talking or loud enough to be heard through doors)? YES    Do you often feel tired, fatigued, or sleepy during the daytime? YES    Has anyone observed you stop breathing during your sleep? YES    Do you have or are you being treated for high blood pressure? YES    Is your BMI greater than 35? NO    Is your neck size circumference 16 inches or greater? NO    Are you over 50 years old? YES    Stop Bang Score (# of yes): 5  SLEEP HISTORY QUESTIONNAIRE    Please describe the main reason for your sleep appointment? Snoring, low oxygen when sleeping    How long has this been a problem? years    Have you been diagnosed with a sleep problem in the past? NO    If so, what?      What treatment was recommended?      Have you had a sleep study in the past? NO    If yes, where and when?      Sleep Habits:   Do you read in bed? No  Do you eat in bed? No  Do you watch TV in bed? No  Do you work in bed? No  Do you use a phone or computer in bed? No    Is you sleep disturbed by:   Bed partner: No  Children: No  Noise: No   Pets: No  Other:        On two or more nights per week, do you drink alcohol to help you fall asleep?NO    On two or more nights per week, do you take melatonin to help you fall asleep? NO    On two or more nights per week, do you take over the counter medicine to fall asleep?  NO    Do you take drinks with caffeine (coffee, tea, soda, energy drinks)? YES    Do you have 3 or more caffeine drinks in a day? YES    Do you have caffeine drinks within 6 hours of bedtime? NO    Do you smoke or use tobacco? NO    Do you exercise? NO    Sleep Routine:   Using a 24 Hour Clock    What time do you usually get into bed on workdays? 10 pm    Weekend/non work days?      What time do you get out of bed on  workdays? 10 am      Weekend/non work days?     Do you work the evening or night shift or do your shifts rotate? NO    How long does it usually take to fall to sleep? 30 min    How many times do you wake during the night? 3    How much time do you feel that you are awake during the entire night? 1 hour    How long does it take for you to fall back to sleep after you wake up? 15 min    Why do you think you wake up? Low oxygen, snoring    What do you do when you wake up? Lay in bed    How much sleep do you think you get on work nights?      How much sleep do you think you get on weekends/non work days?      How much sleep do you think you need to feel your best? 8 hours    How many days during a week do you take a nap on average? 0    What is the average length of your naps?      Do you feel better after taking a nap? NO    If you could chose the best sleep schedule for you, what time would you go to bed? 10 pm  What time would you get up? 10 am    Do you read in bed? NO    Do you eat in bed? NO    Do you watch TV in bed? NO    Do you do work in bed? NO    Do you use a computer or phone in bed? NO    Sleep Disruptions?   Leg movements:  Do you ever have restless, crawling, aching or other unusual feelings in your legs? NO    Do you ever wake yourself by kicking your legs during the night? NO    Are the sheets and blankets messed up or tossed about when you get up? NO    Night-time behaviors:   Do you have nightmares or night terrors? YES   How often? 2/week    Have you had times when you were sleep walking? NO    Have you been seen doing anything unusual while you sleep at nights? NO  What?    How often?      Have you ever hurt yourself or someone else while you were sleeping? NO  Please describe:      Do you clench or grind your teeth during the night? No ?    Sleep Apnea (pauses in breathing during sleep):  Do you wake with a headache in the morning? YES  How often? sometimes    Does your bed partner, family or  friends ever say that you snore? YES  How many nights per week do you snore?    Can snoring be heard outside the bedroom? yes    Do you ever wake yourself up from snoring, gasping or choking? YES    Have you ever been told that you stop breathing or have pauses in your breathing? YES    Do you wake in the morning with a dry throat or mouth? YES    Do you have trouble breathing through your nose? YES    Do you have problems with heartburn, reflux or a hiatal hernia? NO    Which positions do you usually sleep in? (stomach, back, sides, all) stomach, sides    Do you use oxygen or any other medical equipment when you sleep? NO    Do members of your family (related by blood) snore? YES    Have any members of your family been diagnosed with with sleep apnea? NO    Do other members of your family have restless leg? NO    Do other members of your family have sleep walking? NO    Have you ever had an accident, or near accident due to sleepiness while driving? NO    Does your sleepiness affect your work on the job or at school? NO    Do you ever fall asleep by accident while doing a task? NO    Have you had sudden muscle weakness when laughing, angry or surprised? NO    Have you ever been unable to move your body when falling asleep or waking up? NO    Do you ever have trouble  your dreams from real life events? NO  Please describe:      Physical Health: (including illness and injury): During the past 30 days, on how many days was your physical health not good? 30/30 days     Mental Health: (including stress, depression, and problems with emotions): During the last 30 days, how may days was your mental health not good? 0/30 days.     During the past 30 days, on how many days did poor physical or mental health keep you from doing your usual activities? This might be self-care, work, or play? 30/30 days.     Social History:   Marital status:     Who lives in your home with you? nobody    Mother (alive or dead)?  dead If has , from what? heart  Father (alive or dead)? dead If has , from what? heart    Siblings: YES  Have any ? YES  If so, from what? Heart, cancer    Currently working? NO  If yes, work:    Former jobs: paraprofessional     Sleepiness Scale:   Sitting and reading 0   Watching TV 1   Sitting in a public place 0   Riding in a car 3   Lying down to rest in the afternoon 3   Sitting and talking to someone 0   Sitting quietly after a lunch without alcohol 0   In a car, stopping for a few minutes in traffic 0       Surgical History:   Past Surgical History:   Procedure Laterality Date     CATARACT IOL, RT/LT Bilateral      COLONOSCOPY       COLONOSCOPY - HIM SCAN  2009    Colonoscopy - UM/NL  2009     ENT SURGERY      para thyroid surgery     ENT SURGERY  2013    Parathyroid     PARATHYROIDECTOMY  9/10/2013    Procedure: PARATHYROIDECTOMY;  Reoperative Neck Exploration, Resection of right Parathyroid adenoma;  Surgeon: Thalia Muller MD;  Location: [facility] OR     TONSILLECTOMY       TUBAL LIGATION         Medical Conditions:   Past Medical History:   Diagnosis Date     Acquired hypothyroidism 2019     Asthma      Benign essential hypertension 2019     Centrilobular emphysema (H) 2019     Chronic rhinitis 2013     Chronic systolic congestive heart failure (H) 2019     Constipation      Hearing loss      Heart trouble      Hemorrhagic cerebrovascular accident (CVA) (H) 2019     Hyperlipidemia 2019     Hypertension      Nasal congestion      Non-rheumatic mitral regurgitation 2019     Osteopenia 2019     Osteoporosis      Parathyroid related hypercalcemia (H) 2013     Primary hyperparathyroidism (H) 2013     Ringing in ears      Sensorineural hearing loss, asymmetrical 2013     Sneezing      Snoring      Thyroid disease      Weakness      Weight loss        Medications:   Current Outpatient Medications   Medication Sig     albuterol  (PROAIR RESPICLICK) 108 (90 Base) MCG/ACT inhaler Inhale 2 puffs into the lungs every 4 hours as needed for shortness of breath / dyspnea or wheezing     aspirin 81 MG tablet Take 1 tablet by mouth daily     busPIRone (BUSPAR) 5 MG tablet Take 5 mg by mouth 2 times daily     calcium carbonate (TUMS) 500 MG chewable tablet Take 1 chew tab by mouth daily     CLINDAMYCIN HCL PO Take 600 mg by mouth 1 hour prior to dental work     Fexofenadine HCl (ALLEGRA PO) Take 180 mg by mouth daily      fluticasone (FLONASE) 50 MCG/ACT spray Spray 2 sprays into both nostrils daily     furosemide (LASIX) 20 MG tablet Take 1 tablet (20 mg) by mouth 2 times daily     ipratropium - albuterol 0.5 mg/2.5 mg/3 mL (DUONEB) 0.5-2.5 (3) MG/3ML neb solution Take 1 vial by nebulization every 6 hours as needed for shortness of breath / dyspnea or wheezing     LEVOTHYROXINE SODIUM PO Take 75 mcg by mouth daily     LORazepam (ATIVAN) 0.5 MG tablet Take 0.5 tablets (0.25 mg) by mouth every 6 hours as needed for anxiety or sleep (Patient not taking: Reported on 5/17/2019)     NONFORMULARY Tumeric 500mg BID     potassium chloride ER (K-DUR/KLOR-CON M) 20 MEQ CR tablet Take 1 tablet (20 mEq) by mouth daily     POTASSIUM CHLORIDE PO Take 20 mEq by mouth daily      sertraline (ZOLOFT) 25 MG tablet Take 1 tablet (25 mg) by mouth daily (Patient not taking: Reported on 5/17/2019)     SIMVASTATIN PO Take 20 mg by mouth At Bedtime     Tafluprost (ZIOPTAN) 0.0015 % SOLN Place 1 drop into both eyes At Bedtime     vitamin B complex with vitamin C (VITAMIN  B COMPLEX) tablet Take 1 tablet by mouth daily Takes 500mg of Vitamin B     Vitamin D, Cholecalciferol, 1000 units TABS Take 1 tablet by mouth daily     vitamin E 400 units TABS Take by mouth daily     Zafirlukast (ACCOLATE PO) Take 20 mg by mouth 2 times daily (before meals)      No current facility-administered medications for this visit.        Are you currently having any of the following symptoms?    General:   Obvious weight gain or loss NO  Fever, chills or sweats YES  Drug allergies: penicilin    Eyes:   Changes in vision NO  Blind spots NO  Double vision NO  Other dry eyes    Ear, Nose and Throat:   Ear pain NO  Sore throat NO  Sinus pain YES  Post-nasal drip YES  Runny nose YES  Bloody nose NO    Heart:   Rapid or irregular heart beat NO  Chest pain or pressure NO  Out of breath when lying down YES  Swelling in feet or legs YES  High blood pressure YES  Heart disease YES    Nervous system   Headaches YES  Weakness in arms or legs NO  Numbness in arms of legs NO  Other:      Skin  Rashes NO  New moles or skin changes NO  Other      Lungs  Shortness of breath at rest YES  Shortness of breath with activity YES  Dry cough YES  Coughing up mucous or phlegm YES  Coughing up blood NO  Wheezing when breathing NO    Lymph System  Swollen lymph nodes NO  New lumps or bumps NO  Changes in breasts or discharge NO    Digestive System   Nausea or vomiting NO  Loose or watery stools NO  Hard, dry stools (constipation) YES  Fat or grease in stools NO  Blood in stools NO  Stools are black or bloody NO  Abdominal (belly) pain NO    Urinary Tract   Pain when you urinate (pee) NO  Blood in your urine NO  Urinate (pee) more than normal YES  Irregular periods NO    Muscles and bones   Muscle pain NO  Joint or bone pain NO  Swollen joints NO  Other      Glands  Increased thirst or urination NO  Diabetes NO  Morning glucose:    Afternoon glucose:      Mental Health  Depression NO  Anxiety YES  Other mental health issues:

## 2019-05-23 NOTE — TELEPHONE ENCOUNTER
Patient states pharmacy never received potassium prescription. Can you please resend to [facility].

## 2019-05-23 NOTE — TELEPHONE ENCOUNTER
Rx written on 5/22 for 20 meq of potassium, sent to [facility] in [place]. I did resend Was she inquiring about another medication? If so, no other medications needed. Continue lasix bid. See initial note below from Jordyn Elliott. Thanks.

## 2019-05-25 ENCOUNTER — TELEPHONE (OUTPATIENT)
Dept: CASE MANAGEMENT | Facility: HOSPITAL | Age: 79
End: 2019-05-25

## 2019-05-25 NOTE — TELEPHONE ENCOUNTER
Lita Ocasio, was discharged to home on5/14/19   from [facility]. I spoke today with her regarding her  discharge.   She indicates she has receive(d) sufficient information upon discharge. Medications were reviewed in full on discharge, including: Medications to be started; medications to be stopped; medications to be continued from preadmission and any side effects. Prescriptions were received at discharge and were able to be filled. Medications are being taken as prescribed.   She indicates she already went to an appointment with her PCP on 5/17.   She did not have any questions regarding discharge instructions or condition.  Per their request, the following employee (s) can be recognized for their outstanding services delivered:  Care was great.  Suggestions to improve service: Nothing indicated.  She was informed she  may receive a survey in the mail from the hospital. Asked if she  would kindly complete the survey in order for [facility] to know if services fully met patient needs.

## 2019-05-28 NOTE — PROGRESS NOTES
"Chart review prior to sleep testing.    Patient Summary:  79 yo F with PMHx of recent hemorrhagic CVA 1/20/2019, acute on chronic HFrEF, acute respiratory failure with hypoxemia, centrilobular emphysema, HTN, anxiety who is referred for concern for sleep-disordered breathing and potential need for non-invasive ventilation.    Recently hospitalized from 5/12/2019 - 5/14/2019 for acute respiratory failure with hypoxia presumed 2/2 to underlying COPD and acute CHF exacerbation.  SpO2 of ~98% was out of proportion to patient's reported feeling of respiratory distress.  Felt to have hyperventilation 2/2 to severe anxiety.  Treated with lorazepam.  Low TSH.  Started on low-dose sertraline given co-morbid flat affect, irritability and perseveration.  Also report of severe snoring.    Most recent echocardiogram on 4/26/2019 with LVEF 45-50%, RVSP of 28 mmHg + RA.    STOP-BANG score of 5.  Sawyer score of 7.  BMI of 20.5.    Per questionnaire: \"Snoring, low oxygen when sleeping.\"    Yes to snoring, observed apnea, choking / gasping arousals    A/P:  1.)  Increased likelihood of sleep-disordered breathing   - Given her co-morbid HFrEF (LVEF 45-50%), would recommend in-lab PSG.   - Orders placed for in-lab PSG, did not see strong indication for CO2 monitoring.   - Ok to take currently prescribed anxiety medication (Lorazepam 0.5mg) on night of PSG.  "

## 2019-05-29 DIAGNOSIS — F41.9 ANXIETY: Primary | ICD-10-CM

## 2019-05-29 RX ORDER — BUSPIRONE HYDROCHLORIDE 5 MG/1
5 TABLET ORAL 2 TIMES DAILY
Qty: 60 TABLET | Refills: 1 | Status: SHIPPED | OUTPATIENT
Start: 2019-05-29 | End: 2019-09-20

## 2019-05-29 NOTE — TELEPHONE ENCOUNTER
Buspar  Last office visit: 05/17/19  Last refill: historical  Medication historically entered on patients current medication list. Please associate dx and sign if appropriate.    Thank you.

## 2019-05-30 ENCOUNTER — TELEPHONE (OUTPATIENT)
Dept: SLEEP MEDICINE | Facility: HOSPITAL | Age: 79
End: 2019-05-30

## 2019-05-31 DIAGNOSIS — I50.23 ACUTE ON CHRONIC SYSTOLIC CONGESTIVE HEART FAILURE (H): ICD-10-CM

## 2019-05-31 RX ORDER — FUROSEMIDE 20 MG
20 TABLET ORAL 2 TIMES DAILY
Qty: 180 TABLET | Refills: 0 | Status: SHIPPED | OUTPATIENT
Start: 2019-05-31 | End: 2019-05-31

## 2019-05-31 RX ORDER — FUROSEMIDE 20 MG
20 TABLET ORAL 2 TIMES DAILY
Qty: 20 TABLET | Refills: 0 | Status: SHIPPED | OUTPATIENT
Start: 2019-05-31 | End: 2019-06-06

## 2019-05-31 NOTE — TELEPHONE ENCOUNTER
Patient called stating that her RX for furosemide was not received to her pharmacy.  Patient informed that RX will be [facility]rout[facility] and a short supply will be sent to [facility].  Writer signed previously as provider did.

## 2019-06-04 DIAGNOSIS — J96.01 ACUTE RESPIRATORY FAILURE WITH HYPOXIA (H): ICD-10-CM

## 2019-06-04 DIAGNOSIS — R79.89 ELEVATED BRAIN NATRIURETIC PEPTIDE (BNP) LEVEL: ICD-10-CM

## 2019-06-04 DIAGNOSIS — I50.23 ACUTE ON CHRONIC SYSTOLIC CONGESTIVE HEART FAILURE (H): ICD-10-CM

## 2019-06-04 DIAGNOSIS — I10 BENIGN ESSENTIAL HYPERTENSION: ICD-10-CM

## 2019-06-04 DIAGNOSIS — J43.2 CENTRILOBULAR EMPHYSEMA (H): ICD-10-CM

## 2019-06-04 DIAGNOSIS — I50.22 CHRONIC SYSTOLIC CONGESTIVE HEART FAILURE (H): Primary | Chronic | ICD-10-CM

## 2019-06-04 NOTE — TELEPHONE ENCOUNTER
Duoneb      Last Written Prescription Date:  Historical  Last Fill Quantity: 0,   # refills: 0  Last Office Visit: 5/17/19  Future Office visit:    Next 5 appointments (look out 90 days)    Jun 26, 2019  1:30 PM CDT  (Arrive by 1:15 PM)  Office Visit with Ophelia Troncoso MD  [facility] [address] [facility] [address]  [phone]           Routing refill request to provider for review/approval because:    Lorazepam      Last Written Prescription Date:  5/14/19  Last Fill Quantity: 15,   # refills: 0  Last Office Visit: 5/17/19  Future Office visit:    Next 5 appointments (look out 90 days)    Jun 26, 2019  1:30 PM CDT  (Arrive by 1:15 PM)  Office Visit with Ophelia Troncoso MD  [facility] [address]  [facility] [address]  [phone]           Routing refill request to provider for review/approval because:

## 2019-06-06 ENCOUNTER — OFFICE VISIT (OUTPATIENT)
Dept: CARDIOLOGY | Facility: OTHER | Age: 79
End: 2019-06-06
Attending: FAMILY MEDICINE
Payer: MEDICARE

## 2019-06-06 VITALS
RESPIRATION RATE: 14 BRPM | OXYGEN SATURATION: 95 % | DIASTOLIC BLOOD PRESSURE: 69 MMHG | SYSTOLIC BLOOD PRESSURE: 105 MMHG | HEART RATE: 86 BPM | TEMPERATURE: 99 F

## 2019-06-06 DIAGNOSIS — I50.23 ACUTE ON CHRONIC SYSTOLIC CONGESTIVE HEART FAILURE (H): ICD-10-CM

## 2019-06-06 DIAGNOSIS — R06.02 SHORTNESS OF BREATH: ICD-10-CM

## 2019-06-06 DIAGNOSIS — I34.0 NON-RHEUMATIC MITRAL REGURGITATION: Chronic | ICD-10-CM

## 2019-06-06 DIAGNOSIS — I50.23 ACUTE ON CHRONIC SYSTOLIC HEART FAILURE (H): Primary | ICD-10-CM

## 2019-06-06 DIAGNOSIS — J43.2 CENTRILOBULAR EMPHYSEMA (H): Chronic | ICD-10-CM

## 2019-06-06 DIAGNOSIS — J81.0 ACUTE PULMONARY EDEMA (H): ICD-10-CM

## 2019-06-06 DIAGNOSIS — E78.5 HYPERLIPIDEMIA, UNSPECIFIED HYPERLIPIDEMIA TYPE: Chronic | ICD-10-CM

## 2019-06-06 PROCEDURE — G0463 HOSPITAL OUTPT CLINIC VISIT: HCPCS | Mod: 25

## 2019-06-06 PROCEDURE — G0463 HOSPITAL OUTPT CLINIC VISIT: HCPCS

## 2019-06-06 PROCEDURE — 93010 ELECTROCARDIOGRAM REPORT: CPT | Performed by: INTERNAL MEDICINE

## 2019-06-06 PROCEDURE — 93005 ELECTROCARDIOGRAM TRACING: CPT

## 2019-06-06 PROCEDURE — 99215 OFFICE O/P EST HI 40 MIN: CPT | Performed by: NURSE PRACTITIONER

## 2019-06-06 RX ORDER — LORAZEPAM 0.5 MG/1
0.25 TABLET ORAL EVERY 6 HOURS PRN
Qty: 15 TABLET | Refills: 0 | Status: SHIPPED | OUTPATIENT
Start: 2019-06-06 | End: 2019-06-11

## 2019-06-06 RX ORDER — METOPROLOL SUCCINATE 25 MG/1
12.5 TABLET, EXTENDED RELEASE ORAL DAILY
Qty: 60 TABLET | Refills: 1 | Status: SHIPPED | OUTPATIENT
Start: 2019-06-06 | End: 2019-06-18

## 2019-06-06 RX ORDER — IPRATROPIUM BROMIDE AND ALBUTEROL SULFATE 2.5; .5 MG/3ML; MG/3ML
1 SOLUTION RESPIRATORY (INHALATION) EVERY 6 HOURS PRN
Qty: 9 VIAL | Refills: 3 | Status: SHIPPED | OUTPATIENT
Start: 2019-06-06 | End: 2019-10-22

## 2019-06-06 RX ORDER — FUROSEMIDE 20 MG
TABLET ORAL
Qty: 60 TABLET | Refills: 1 | Status: SHIPPED | OUTPATIENT
Start: 2019-06-06 | End: 2019-07-03

## 2019-06-06 ASSESSMENT — PAIN SCALES - GENERAL: PAINLEVEL: NO PAIN (0)

## 2019-06-06 NOTE — PROGRESS NOTES
[facility] HEART CARE   CARDIOLOGY CONSULT     Lita Ocasio   [address] [facility] [address]      Ophelia Troncoso     Chief Complaint   Patient presents with     Consult     Congestive heart failure.        HPI:   Ms. Ocasio is a 78 year old female who presents for cardiology consult with newly identified systolic heart failure.  Additionally, she has a history of hypertension, hyperlipidemia, nonrheumatic mitral regurgitation, central lobular emphysema, hypothyroidism, osteopenia, hyperparathyroidism, history of tobacco abuse and chronic rhinitis.    Patient was admitted to the hospital on 5/12/2019 for acute respiratory failure, she does have a history of emphysema.  She was noted to have bilateral pleural effusions on chest x-ray.  This is followed by an echocardiogram with reduced systolic function, LVEF 45 to 50%.  Currently she is diuresed on Lasix 20 mg twice daily.  She is not on any guideline directed medical therapy for systolic heart failure.  She is on aspirin 81 mg daily and simvastatin 20 mg at bedtime.  She had a recent N-terminal proBNP on 5/22/2019 which was significantly elevated at 3726.  This is improved from 5/17/2019 with an N-terminal proBNP of 5830.    Today patient is accompanied by her son who is very helpful in providing patients past medical history. He admits approximately 20 years ago patient was being followed for MR which was noted to be improved and therefore, surgery was never recommended. He admits that she was previously told it was suspected she had small MI's without coronary intervention or identified ACS. Finally, recent history just before new years this last year of acute hemorrhagic stroke. It was following this that she was taken off her beta blocker and lisinopril for HTN and possible past MI, she reports increased shortness of breath following this which has progressively worsened and likely multifactorial with COPD and HFrEF.     Today patient does not report any chest  pain or pressure. Positive for chronic shortness of breath and MILLS. Recent 6 min walk test with no hypoxia. She has not required oxygen. No palpitations. No lightheadedness. Recent syncope when straining during BM for which was suspected as vaso vagal. She is currently on stool softener. She has not had any lightheadedness or syncope since this episode. No increased LE edema. She was having symptoms of PND which resolved with increased dose of Lasix. She continues to have mild orthopnea which improved when she got a bed which elevates her head about 30 degrees.       IMAGING RESULTS:   [facility]  Echocardiography Laboratory  [address] [address]     Name: KOURTNEY ADAMES  MRN: [id number]  : 1940  Study Date: 2019 10:52 AM  Age: 78 yrs  Gender: Female  Patient Location: [facility]  Reason For Study: Elevated brain natriuretic peptide (BNP) level  History: abn labs  Ordering Physician: JOHANNA NATARAJAN  Referring Physician: JOHANNA NATARAJAN  Performed By: Mildred Glez     BSA: 1.5 m2  Height: 62 in  Weight: 120 lb  _____________________________________________________________________________  __     _____________________________________________________________________________  __        Interpretation Summary  Trivial pericardial effusion is present.  Left ventricular size is normal.  Mildly (EF 45-50%) reduced left ventricular function is present.  Left ventricular diastolic function is normal.  The right ventricle is normal size.  Mild left atrial enlargement is present.  Mild mitral insufficiency is present.  Aortic valve is normal in structure and function.  Mild tricuspid insufficiency is present.  Right ventricular systolic pressure is 28mmHg above the right atrial pressure.  The pulmonic valve is normal.  The aorta root is normal.  _____________________________________________________________________________  __        Left Ventricle  Left ventricular size is  normal. Mildly (EF 45-50%) reduced left ventricular  function is present. Left ventricular diastolic function is normal.     Right Ventricle  The right ventricle is normal size.     Atria  Mild left atrial enlargement is present.     Mitral Valve  Mild mitral insufficiency is present.     Aortic Valve  Aortic valve is normal in structure and function. The mean AoV pressure  gradient is 3.7 mmHg.        Tricuspid Valve  Mild tricuspid insufficiency is present. Right ventricular systolic pressure  is 28mmHg above the right atrial pressure.     Pulmonic Valve  The pulmonic valve is normal.     Vessels  The aorta root is normal.     Pericardium  Trivial pericardial effusion is present.     _____________________________________________________________________________  __  MMode/2D Measurements & Calculations  IVSd: 1.6 cm  IVSs: 1.9 cm  LVIDd: 5.1 cm  LVIDs: 3.8 cm  LVPWd: 1.6 cm  LVPWs: 1.9 cm  FS: 25.9 %  LV mass(C)d: 378.1 grams  LV mass(C)dI: 245.7 grams/m2  LV mass(C)sI: 198.4 grams/m2  Ao root diam: 2.8 cm  LA dimension: 4.7 cm  LA/Ao: 1.7  LVOT diam: 1.9 cm  LVOT area: 2.7 cm2     RWT: 0.64     Time Measurements  Pulm. HR: 172.3 BPM     Doppler Measurements & Calculations  MV E max valente: 183.2 cm/sec  MV A max valente: 118.2 cm/sec  MV E/A: 1.6  MV dec slope: 883.6 cm/sec2  MV dec time: 0.21 sec  Ao V2 max: 140.2 cm/sec  Ao max P.9 mmHg  Ao V2 mean: 87.8 cm/sec  Ao mean PG: 3.7 mmHg  Ao V2 VTI: 26.3 cm  MENDOZA(I,D): 2.1 cm2  MENDOZA(V,D): 1.9 cm2  LV V1 max PG: 3.8 mmHg  LV V1 max: 97.0 cm/sec  LV V1 VTI: 20.6 cm  CO(LVOT): 11.1 l/min  CI(LVOT): 7.2 l/min/m2  SV(LVOT): 56.0 ml  SI(LVOT): 36.4 ml/m2  TV max P.7 mmHg  PA V2 max: 86.6 cm/sec  PA max PG: 3.0 mmHg  PA mean P.5 mmHg  PA V2 VTI: 19.8 cm  TR max valente: 263.3 cm/sec  TR max P.7 mmHg  AV Valente Ratio (DI): 0.69  MENDOZA Index (cm2/m2): 1.4     E/E': 37.2  Peak E' Valente: 4.9  cm/sec     _____________________________________________________________________________  __           Report approved by: Ruma Giordano 04/26/2019 07:14 PM      PAST MEDICAL HISTORY:   Past Medical History:   Diagnosis Date     Acquired hypothyroidism 5/12/2019     Asthma      Benign essential hypertension 5/12/2019     Centrilobular emphysema (H) 5/12/2019     Chronic rhinitis 8/16/2013     Chronic systolic congestive heart failure (H) 5/12/2019     Constipation      Hearing loss      Heart trouble      Hemorrhagic cerebrovascular accident (CVA) (H) 01/2019     Hyperlipidemia 5/12/2019     Hypertension      Nasal congestion      Non-rheumatic mitral regurgitation 5/12/2019     Osteopenia 5/12/2019     Osteoporosis      Parathyroid related hypercalcemia (H) 8/18/2013     Primary hyperparathyroidism (H) 9/5/2013     Ringing in ears      Sensorineural hearing loss, asymmetrical 8/16/2013     Sneezing      Snoring      Thyroid disease      Weakness      Weight loss           FAMILY HISTORY:   Family History   Problem Relation Age of Onset     Hearing Loss Mother      Heart Disease Mother      Cerebrovascular Disease Father      Hearing Loss Brother      Cancer Brother         throat     Ulcerative Colitis Brother      Cancer Sister      Diabetes Sister      Glaucoma Sister      Cancer Maternal Grandmother      Heart Disease Maternal Grandfather           PAST SURGICAL HISTORY:   Past Surgical History:   Procedure Laterality Date     CATARACT IOL, RT/LT Bilateral      COLONOSCOPY       COLONOSCOPY - HIM SCAN  01/01/2009    Colonoscopy - [facility]/NL  2009     ENT SURGERY  2011    para thyroid surgery     ENT SURGERY  09/2013    Parathyroid     PARATHYROIDECTOMY  9/10/2013    Procedure: PARATHYROIDECTOMY;  Reoperative Neck Exploration, Resection of right Parathyroid adenoma;  Surgeon: Thalia Muller MD;  Location: [facility] OR     TONSILLECTOMY       TUBAL LIGATION            SOCIAL HISTORY:   Social History      Socioeconomic History     Marital status:      Spouse name: None     Number of children: None     Years of education: None     Highest education level: None   Occupational History     None   Social Needs     Financial resource strain: None     Food insecurity:     Worry: None     Inability: None     Transportation needs:     Medical: None     Non-medical: None   Tobacco Use     Smoking status: Former Smoker     Packs/day: 1.00     Years: 50.00     Pack years: 50.00     Types: Cigarettes     Last attempt to quit: 2019     Years since quittin.4     Smokeless tobacco: Never Used   Substance and Sexual Activity     Alcohol use: Yes     Comment: social     Drug use: No     Sexual activity: None   Lifestyle     Physical activity:     Days per week: None     Minutes per session: None     Stress: None   Relationships     Social connections:     Talks on phone: None     Gets together: None     Attends Buddhist service: None     Active member of club or organization: None     Attends meetings of clubs or organizations: None     Relationship status: None     Intimate partner violence:     Fear of current or ex partner: None     Emotionally abused: None     Physically abused: None     Forced sexual activity: None   Other Topics Concern     Parent/sibling w/ CABG, MI or angioplasty before 65F 55M? Not Asked   Social History Narrative     None          CURRENT MEDICATIONS:   Prior to Admission medications    Medication Sig Start Date End Date Taking? Authorizing Provider   albuterol (PROAIR RESPICLICK) 108 (90 Base) MCG/ACT inhaler Inhale 2 puffs into the lungs every 4 hours as needed for shortness of breath / dyspnea or wheezing 19  Yes Joe Jerez MD   aspirin 81 MG tablet Take 1 tablet by mouth daily   Yes Reported, Patient   busPIRone (BUSPAR) 5 MG tablet Take 1 tablet (5 mg) by mouth 2 times daily 19  Yes Ophelia Troncoso MD   calcium carbonate (TUMS) 500 MG chewable tablet Take 1 chew  tab by mouth daily   Yes Reported, Patient   Fexofenadine HCl (ALLEGRA PO) Take 180 mg by mouth daily    Yes Unknown, Entered By History   fluticasone (FLONASE) 50 MCG/ACT spray Spray 2 sprays into both nostrils daily 9/19/18  Yes Gali Dominguez APRN CNP   furosemide (LASIX) 20 MG tablet Take 1 tablet (20 mg) by mouth 2 times daily 5/31/19  Yes Ophelia Troncoso MD   ipratropium - albuterol 0.5 mg/2.5 mg/3 mL (DUONEB) 0.5-2.5 (3) MG/3ML neb solution Take 1 vial by nebulization every 6 hours as needed for shortness of breath / dyspnea or wheezing   Yes Reported, Patient   LEVOTHYROXINE SODIUM PO Take 75 mcg by mouth daily   Yes Reported, Patient   LORazepam (ATIVAN) 0.5 MG tablet Take 0.5 tablets (0.25 mg) by mouth every 6 hours as needed for anxiety or sleep 5/14/19  Yes Mindy Ware NP   NONFORMULARY Tumeric 500mg BID   Yes Reported, Patient   potassium chloride ER (K-DUR/KLOR-CON M) 20 MEQ CR tablet Take 1 tablet (20 mEq) by mouth 2 times daily 5/23/19  Yes Ophelia Troncoso MD   sertraline (ZOLOFT) 25 MG tablet Take 1 tablet (25 mg) by mouth daily 5/15/19  Yes Mindy Ware NP   SIMVASTATIN PO Take 20 mg by mouth At Bedtime   Yes Reported, Patient   Tafluprost (ZIOPTAN) 0.0015 % SOLN Place 1 drop into both eyes At Bedtime   Yes Reported, Patient   vitamin B complex with vitamin C (VITAMIN  B COMPLEX) tablet Take 1 tablet by mouth daily Takes 500mg of Vitamin B   Yes Reported, Patient   Vitamin D, Cholecalciferol, 1000 units TABS Take 1 tablet by mouth daily   Yes Reported, Patient   vitamin E 400 units TABS Take by mouth daily   Yes Reported, Patient   Zafirlukast (ACCOLATE PO) Take 20 mg by mouth 2 times daily (before meals)    Yes Reported, Patient   CLINDAMYCIN HCL PO Take 600 mg by mouth 1 hour prior to dental work    Reported, Patient          ALLERGIES:   Allergies   Allergen Reactions     Evista [Raloxifene] Other (See Comments)     hypercalcemia     Prednisone GI Disturbance      Combigan [Brimonidine Tartrate-Timolol] Other (See Comments)     Eye swelling and itchy     Latex Rash     Levaquin [Levofloxacin] Muscle Pain (Myalgia)     Penicillins Rash     Restasis      Pt reports using eye gtts and having red irritated eyes           ROS:   CONSTITUTIONAL: No reported fever or chills. No changes in weight.  ENT: No visual disturbance, ear ache, epistaxis or sore throat.   CARDIOVASCULAR: No chest pain, chest pressure or chest discomfort. No palpitations or lower extremity edema.   RESPIRATORY: Positive forshortness of breath and increased dyspnea upon exertion. No reported cough, wheezing or hemoptysis. No current PND. Positive for orthopnea.  GI: No reported abdominal pain or distension. Positive for recent constipation.  : No reported hematuria or dysuria.   NEUROLOGICAL: No lightheadedness or dizziness. No recurrence of syncope. No ataxia, paresthesias or weakness.   HEMATOLOGIC:  No bleeding or excessive bruising. No history of blood clots.   MUSCULOSKELETAL: No reported worsening joint pain or swelling, no muscle pain.  ENDOCRINOLOGIC: No temperature intolerance. No hair or skin changes.  SKIN: No abnormal rashes or sores, no unusual itching.  PSYCHIATRIC: Positive for anxiety.      PHYSICAL EXAM:   /69 (BP Location: Left arm, Patient Position: Sitting, Cuff Size: Adult Regular)   Pulse 86   Temp 99  F (37.2  C) (Tympanic)   Resp 14   SpO2 95%   GENERAL: The patient is a well-developed, well-nourished, in no apparent distress.  HEENT: Head is normocephalic and atraumatic. Eyes are symmetrical with normal visual tracking. No icterus, no xanthelasmas. Nares appeared normal without nasal drainage. Mucous membranes are moist, no cyanosis.  NECK: Supple. JVP not visible.   CHEST/ LUNGS: Lungs sounds diminished to auscultation. No rales, rhonchi or wheezes, no use of accessory muscles, no retractions, respirations unlabored and normal respiratory rate.   CARDIO: Regular rate and  rhythm normal with S1 and S2, no S3 or S4 and no murmur, click or rub. Precordium quiet with normal PMI.    ABD: Abdomen is soft and nontender, nondistended. Without hepatosplenomegaly, no palpable masses.  EXTREMITIES: No clubbing, cyanosis or edema present.   MUSCULOSKELETAL: No visible joint swelling.   NEUROLOGIC: Alert and oriented X3. Normal speech, gait and affect. No focal neurologic deficits.   SKIN: No jaundice. No rashes or visible skin lesions present. No ecchymosis.       EKG:    Sinus rhythm with PVC's present, rate 92 bpm. RSR pattern present, LVH. QTc 467 ms.    LAB RESULTS:   Orders Only on 05/22/2019   Component Date Value Ref Range Status     Sodium 05/22/2019 139  133 - 144 mmol/L Final     Potassium 05/22/2019 3.3* 3.4 - 5.3 mmol/L Final     Chloride 05/22/2019 105  94 - 109 mmol/L Final     Carbon Dioxide 05/22/2019 25  20 - 32 mmol/L Final     Anion Gap 05/22/2019 9  3 - 14 mmol/L Final     Glucose 05/22/2019 90  70 - 99 mg/dL Final     Urea Nitrogen 05/22/2019 15  7 - 30 mg/dL Final     Creatinine 05/22/2019 1.16* 0.52 - 1.04 mg/dL Final     GFR Estimate 05/22/2019 45* >60 mL/min/[1.73_m2] Final     GFR Estimate If Black 05/22/2019 52* >60 mL/min/[1.73_m2] Final     Calcium 05/22/2019 8.6  8.5 - 10.1 mg/dL Final     N-Terminal Pro Bnp 05/22/2019 3,726* 0 - 450 pg/mL Final   Office Visit on 05/17/2019   Component Date Value Ref Range Status     Sodium 05/17/2019 141  133 - 144 mmol/L Final     Potassium 05/17/2019 3.6  3.4 - 5.3 mmol/L Final     Chloride 05/17/2019 108  94 - 109 mmol/L Final     Carbon Dioxide 05/17/2019 27  20 - 32 mmol/L Final     Anion Gap 05/17/2019 6  3 - 14 mmol/L Final     Glucose 05/17/2019 84  70 - 99 mg/dL Final     Urea Nitrogen 05/17/2019 18  7 - 30 mg/dL Final     Creatinine 05/17/2019 1.08* 0.52 - 1.04 mg/dL Final     GFR Estimate 05/17/2019 49* >60 mL/min/[1.73_m2] Final     GFR Estimate If Black 05/17/2019 57* >60 mL/min/[1.73_m2] Final     Calcium 05/17/2019  9.7  8.5 - 10.1 mg/dL Final     WBC 05/17/2019 8.4  4.0 - 11.0 10e9/L Final     RBC Count 05/17/2019 5.04  3.8 - 5.2 10e12/L Final     Hemoglobin 05/17/2019 14.8  11.7 - 15.7 g/dL Final     Hematocrit 05/17/2019 44.0  35.0 - 47.0 % Final     MCV 05/17/2019 87  78 - 100 fl Final     MCH 05/17/2019 29.4  26.5 - 33.0 pg Final     MCHC 05/17/2019 33.6  31.5 - 36.5 g/dL Final     RDW 05/17/2019 13.4  10.0 - 15.0 % Final     Platelet Count 05/17/2019 310  150 - 450 10e9/L Final     Diff Method 05/17/2019 Automated Method   Final     % Neutrophils 05/17/2019 64.6  % Final     % Lymphocytes 05/17/2019 19.8  % Final     % Monocytes 05/17/2019 10.1  % Final     % Eosinophils 05/17/2019 4.4  % Final     % Basophils 05/17/2019 0.7  % Final     % Immature Granulocytes 05/17/2019 0.4  % Final     Nucleated RBCs 05/17/2019 0  0 /100 Final     Absolute Neutrophil 05/17/2019 5.4  1.6 - 8.3 10e9/L Final     Absolute Lymphocytes 05/17/2019 1.7  0.8 - 5.3 10e9/L Final     Absolute Monocytes 05/17/2019 0.8  0.0 - 1.3 10e9/L Final     Absolute Eosinophils 05/17/2019 0.4  0.0 - 0.7 10e9/L Final     Absolute Basophils 05/17/2019 0.1  0.0 - 0.2 10e9/L Final     Abs Immature Granulocytes 05/17/2019 0.0  0 - 0.4 10e9/L Final     Absolute Nucleated RBC 05/17/2019 0.0   Final     TSH 05/17/2019 2.79  0.40 - 4.00 mU/L Final     N-Terminal Pro Bnp 05/17/2019 5,830* 0 - 450 pg/mL Final          ASSESSMENT:   Lita Ocasio presents for cardiology consult with newly identified systolic heart failure.  Additionally, she has a history of hypertension, hyperlipidemia, nonrheumatic mitral regurgitation, central lobular emphysema, hypothyroidism, osteopenia, hyperparathyroidism, history of tobacco abuse and chronic rhinitis.    PLAN:   1. Acute on chronic systolic heart failure (H)  Reviewed HFrEF and possible etiology, may be secondary to history of MR or possible ischemic heart disease with questionable past infarcts.   LVEF currently 45-50%  BNP  remains elevated, she will continue on Lasix 40 mg daily with split dosing at morning and noon.  Begin low dose Toprol XL 12.5 mg daily to initiate GDMT, closely monitor soft pressures with this. Advised to change positions slowly.   Reduce sodium intake to no more than 3,000 mg daily.  Fluid intake to less than 2L daily which she is not exceeding.   She will begin weighing herself on a daily basis and call with a 3lb weight gain in one day or 5 lb weight gain in one week.  Consider low dose lisinopril at next visit only if pressures allow.  Renal function has remained stable with CKD.  Referral to CHF care coordination placed today.  Lexiscan stress testing ordered with reduced LVEF and history of possible past MI.     - metoprolol succinate ER (TOPROL-XL) 25 MG 24 hr tablet; Take 0.5 tablets (12.5 mg) by mouth daily  Dispense: 60 tablet; Refill: 1  - furosemide (LASIX) 20 MG tablet; Take 1 tablet every morning and 1 tablet at noon daily.  Dispense: 60 tablet; Refill: 1  - CARE COORDINATION REFERRAL  - NM Lexiscan stress test; Future    2. Centrilobular emphysema (H)  Chronic shortness of breath, likely multifactorial with emphysema and CHF.    3. Hyperlipidemia, unspecified hyperlipidemia type  Continue with simvastatin as previous.    4. Non-rheumatic mitral regurgitation  Mild on recent echo, stable.    5. Shortness of breath  See above.    7. Acute pulmonary edema (H)  Continue with lasix 20 mg in AM and 20 mg at noon.     Follow-up with cardiology in 2 weeks. Certainly sooner if needed.     Thank you for allowing me to participate in the care of your patient. Please do not hesitate to contact me if you have any questions.     Hailee Cortez

## 2019-06-06 NOTE — TELEPHONE ENCOUNTER
PCP not a [facility] provider. LOV with Karyna 5/17/19. Duoneb is historical, ativan last signed by Mindy Ware NP. Please advise. Thank you!

## 2019-06-06 NOTE — PATIENT INSTRUCTIONS
You were seen by Hailee Cortez NP, 6/6/2019.     1.  You will begin Metoprolol Succinate 12.5 mg daily.      2.  You will take Lasix 20 mg every morning and 20 mg at noon.     3.  Please monitor you weight daily.  If you experience a 2-3 pound increase in weight in  24 hours, or 5 pounds in one week. Please call the cardiology office as you may need your medications adjusted or you may need to be seen.      4. Please limit sodium to 3000 mg per day to reduce swelling in the extremities and excess fluid related to heart failure.      5.  If you develop new or worsening symptoms please call the cardiology office to be seen sooner.     6. Continue all other medication as prescribed.     7.  You will be referred to care coordination to assist in controlling your symptoms of heart failure.  You will be contacted by the RN coordinator to discuss services.     8.  You will be scheduled for a stress test.  You will be contacted to schedule this appointment.     You will follow up with Hailee Cortez NP in 2 weeks.       Please call the cardiology office with problems, questions, or concerns at [phone].    If you experience chest pain, chest pressure, chest tightness, shortness of breath, fainting, lightheadedness, nausea, vomiting, or other concerning symptoms, please report to the Emergency Department or call 911. These symptoms may be emergent, and best treated in the Emergency Department.       Rosetta DORANTES RN-BSN  Cardiology   [facility]  [phone]    Patient Education     Eating Heart-Healthy Foods  Eating has a big impact on your heart health. In fact, eating healthier can improve several of your heart risks at once. For instance, it helps you manage weight, cholesterol, and blood pressure. Here are ideas to help you make heart-healthy changes without giving up all the foods and flavors you love.  Getting started    Talk with your healthcare provider about eating plans, such as the DASH or  Mediterranean diet. You may also be referred to a dietitian.    Change a few things at a time. Give yourself time to get used to a few eating changes before adding more.    Work to create a tasty, healthy eating plan that you can stick to for the rest of your life.    Goals for healthy eating  Below are some tips to improve your eating habits:    Limit saturated fats and trans fats. Saturated fats raise your levels of cholesterol, so keep these fats to a minimum. They are found in foods such as fatty meats, whole milk, cheese, and palm and coconut oils. Avoid trans fats because they lower good cholesterol as well as raise bad cholesterol. Trans fats are most often found in processed foods.    Reduce sodium (salt) intake. Eating too much salt may increase your blood pressure. Limit your sodium intake to 2,300 milligrams (mg) per day (the amount in 1 teaspoon of salt), or less if your healthcare provider recommends it. Dining out less often and eating fewer processed foods are two great ways to decrease the amount of salt you consume.    Managing calories. A calorie is a unit of energy. Your body burns calories for fuel, but if you eat more calories than your body burns, the extras are stored as fat. Your healthcare provider can help you create a diet plan to manage your calories. This will likely include eating healthier foods as well as exercising regularly. To help you track your progress, keep a diary to record what you eat and how often you exercise.  Choose the right foods  Aim to make these foods staples of your diet. If you have diabetes, you may have different recommendations than what is listed here:    Fruits and vegetables provide plenty of nutrients without a lot of calories. At meals, fill half your plate with these foods. Split the other half of your plate between whole grains and lean protein.    Whole grains are high in fiber and rich in vitamins and nutrients. Good choices include whole-wheat bread,  pasta, and brown rice.    Lean proteins give you nutrition with less fat. Good choices include fish, skinless chicken, and beans.    Low-fat or nonfat dairy provides nutrients without a lot of fat. Try low-fat or nonfat milk, cheese, or yogurt.    Healthy fats can be good for you in small amounts. These are unsaturated fats, such as olive oil, nuts, and fish. Try to have at least 2 servings per week of fatty fish, such as salmon, sardines, mackerel, rainbow trout, and albacore tuna. These contain omega-3 fatty acids, which are good for your heart. Flaxseed is another source of a heart-healthy fat.  More on heart-healthy eating  Read food labels  Healthy eating starts at the grocery store. Be sure to pay attention to food labels on packaged foods. Look for products that are high in fiber and protein, and low in saturated fat, cholesterol, and sodium. Avoid products that contain trans fat. And pay close attention to serving size. For instance, if you plan to eat two servings, double all the numbers on the label.  Prepare food right  A key part of healthy cooking is cutting down on added fat and salt. Look on the internet for lower-fat, lower-sodium recipes. Also, try these tips:    Remove fat from meat and skin from poultry before cooking.    Skim fat from the surface of soups and sauces.    Broil, boil, bake, steam, grill, and microwave food without added fats.    Choose ingredients that spice up your food without adding calories, fat, or sodium. Try these items: horseradish, hot sauce, lemon, mustard, nonfat salad dressings, and vinegar. For salt-free herbs and spices, try basil, cilantro, cinnamon, pepper, and rosemary.  Date Last Reviewed: 10/1/2017    1492-3193 [employer]. [address] [place] [address], [place] [address]. All rights reserved. This information is not intended as a substitute for professional medical care. Always follow your healthcare professional's instructions.           Patient  Education     Low-Salt Diet  This diet removes foods that are high in salt. It also limits the amount of salt you use when cooking. It is most often used for people with high blood pressure, edema (fluid retention), and kidney, liver, or heart disease.  Table salt contains the mineral sodium. Your body needs sodium to work normally. But too much sodium can make your health problems worse. Your healthcare provider is recommending a low-salt (also called low-sodium) diet for you. Your total daily allowance of salt is 1,500 to 2,300 milligrams (mg). It is less than 1 teaspoon of table salt. This means you can have only about 500 to 700 mg of sodium at each meal. People with certain health problems should limit salt intake to the lower end of the recommended range.    When you cook, don t add much salt. If you can cook without using salt, even better. Don t add salt to your food at the table.  When shopping, read food labels. Salt is often called sodium on the label. Choose foods that are salt-free, low salt, or very low salt. Note that foods with reduced salt may not lower your salt intake enough.    Beans, potatoes, and pasta  Ok: Dry beans, split peas, lentils, potatoes, rice, macaroni, pasta, spaghetti without added salt  Avoid: Potato chips, tortilla chips, and similar products  Breads and cereals  Ok: Low-sodium breads, rolls, cereals, and cakes; low-salt crackers, matzo crackers  Avoid: Salted crackers, pretzels, popcorn, Nigerien toast, pancakes, muffins  Dairy  Ok: Milk, chocolate milk, hot chocolate mix, low-salt cheeses, and yogurt  Avoid: Processed cheese and cheese spreads; Roquefort, Camembert, and cottage cheese; buttermilk, instant breakfast drink  Desserts  Ok: Ice cream, frozen yogurt, juice bars, gelatin, cookies and pies, sugar, honey, jelly, hard candy  Avoid: Most pies, cakes and cookies prepared or processed with salt; instant pudding  Drinks  Ok: Tea, coffee, fizzy (carbonated) drinks,  juices  Avoid: Flavored coffees, electrolyte replacement drinks, sports drinks  Meats  Ok: All fresh meat, fish, poultry, low-salt tuna, eggs, egg substitute  Avoid: Smoked, pickled, brine-cured, or salted meats and fish. This includes florentino, chipped beef, corned beef, hot dogs, deli meats, ham, kosher meats, salt pork, sausage, canned tuna, salted codfish, smoked salmon, herring, sardines, or anchovies.  Seasonings and spices  Ok: Most seasonings are okay. Good substitutes for salt include: fresh herb blends, hot sauce, lemon, garlic, martinez, vinegar, dry mustard, parsley, cilantro, horseradish, tomato paste, regular margarine, mayonnaise, unsalted butter, cream cheese, vegetable oil, cream, low-salt salad dressing and gravy.  Avoid: Regular ketchup, relishes, pickles, soy sauce, teriyaki sauce, Worcestershire sauce, BBQ sauce, tartar sauce, meat tenderizer, chili sauce, regular gravy, regular salad dressing, salted butter  Soups  Ok: Low-salt soups and broths made with allowed foods  Avoid: Bouillon cubes, soups with smoked or salted meats, regular soup and broth  Vegetables  Ok: Most vegetables are okay; also low-salt tomato and vegetable juices  Avoid: Sauerkraut and other brine-soaked vegetables; pickles and other pickled vegetables; tomato juice, olives  Date Last Reviewed: 8/1/2016 2000-2018 [employer]. [address] [place] [address]. All rights reserved. This information is not intended as a substitute for professional medical care. Always follow your healthcare professional's instructions.           Patient Education     Heart Failure: Making Changes to Your Diet    You have a condition called heart failure. When you have heart failure, excess fluid is more likely to build up in your body because your heart isn't working well. This makes the heart work harder to pump blood. Fluid buildup causes symptoms such as shortness of breath and swelling (edema). This is often referred to  as congestive heart failure or CHF. Controlling the amount of salt (sodium) you eat may help stop fluid from building up. Your doctor may also tell you to reduce the amount of fluid you drink.  Reading food labels  Your healthcare provider will tell you how much sodium you can eat each day. Read food labels to keep track. Keep in mind that certain foods are high in salt. These include canned, frozen, and processed foods. Check the amount of sodium in each serving. Watch out for high-sodium ingredients. These include MSG (monosodium glutamate), baking soda, and sodium phosphate.   Eating less salt  Give yourself time to get used to eating less salt. It may take a little while. Here are some tips to help:    Take the saltshaker off the table. Replace it with salt-free herb mixes and spices.    Eat fresh or plain frozen vegetables. These have much less salt than canned vegetables.    Choose low-sodium snacks like sodium-free pretzels, crackers, or air-popped popcorn.    Don t add salt to your food when you re cooking. Instead, season your foods with pepper, lemon, garlic, or onion.    When you eat out, ask that your food be cooked without added salt.    Avoid eating fried foods as these often have a great deal of salt.  If you re told to limit fluids  You may need to limit how much fluid you have to help prevent swelling. This includes anything that is liquid at room temperature, such as ice cream and soup. If your doctor tells you to limit fluid, try these tips:    Measure drinks in a measuring cup before you drink them. This will help you meet daily goals.    Chill drinks to make them more refreshing.    Suck on frozen lemon wedges to quench thirst.    Only drink when you re thirsty.    Chew sugarless gum or suck on hard candy to keep your mouth moist.    Weigh yourself daily to know if your body's fluid content is rising.  My sodium goal  Your healthcare provider may give you a sodium goal to meet each day. This  includes sodium found in food as well as salt that you add. My goal is to eat no more than ___________ mg of sodium per day.     When to call your doctor  Call your doctor right away if you have any symptoms of worsening heart failure. These can include:    Sudden weight gain    Increased swelling of your legs or ankles    Trouble breathing when you re resting or at night    Increase in the number of pillows you have to sleep on    Chest pain, pressure, discomfort, or pain in the jaw, neck, or back   Date Last Reviewed: 3/21/2016    6946-9646 [employer]. [address] [place], [place] [address]. All rights reserved. This information is not intended as a substitute for professional medical care. Always follow your healthcare professional's instructions.

## 2019-06-06 NOTE — NURSING NOTE
"Chief Complaint   Patient presents with     Consult     Congestive heart failure.       Initial /69 (BP Location: Left arm, Patient Position: Sitting, Cuff Size: Adult Regular)   Pulse 86   Temp 99  F (37.2  C) (Tympanic)   Resp 14   SpO2 95%  Estimated body mass index is 20.49 kg/m  as calculated from the following:    Height as of 5/17/19: 1.575 m (5' 2\").    Weight as of 5/17/19: 50.8 kg (112 lb).  Medication Reconciliation: complete    TERRI LUU LPN    "

## 2019-06-11 ENCOUNTER — TELEPHONE (OUTPATIENT)
Dept: FAMILY MEDICINE | Facility: OTHER | Age: 79
End: 2019-06-11

## 2019-06-11 DIAGNOSIS — J43.2 CENTRILOBULAR EMPHYSEMA (H): ICD-10-CM

## 2019-06-11 DIAGNOSIS — J96.01 ACUTE RESPIRATORY FAILURE WITH HYPOXIA (H): ICD-10-CM

## 2019-06-11 DIAGNOSIS — I50.23 ACUTE ON CHRONIC SYSTOLIC CONGESTIVE HEART FAILURE (H): ICD-10-CM

## 2019-06-11 RX ORDER — LORAZEPAM 0.5 MG/1
0.25 TABLET ORAL EVERY 6 HOURS PRN
Qty: 6 TABLET | Refills: 0 | Status: SHIPPED | OUTPATIENT
Start: 2019-06-11 | End: 2019-07-16

## 2019-06-11 NOTE — TELEPHONE ENCOUNTER
Patient notified via telephone - medication refill printed,signed,and faxed by .    Rebeca Lozano LPN on 6/11/2019 at 12:55 PM

## 2019-06-11 NOTE — TELEPHONE ENCOUNTER
[facility] will not be here for a week or more. patient is needing a short term supply of medication sent into "[facility].   patient would like a phone call once completed if approved.   medication pended.  Sanjana Mann, CMA

## 2019-06-14 ENCOUNTER — HOSPITAL ENCOUNTER (OUTPATIENT)
Dept: CARDIOLOGY | Facility: HOSPITAL | Age: 79
Setting detail: NUCLEAR MEDICINE
End: 2019-06-14
Attending: NURSE PRACTITIONER
Payer: MEDICARE

## 2019-06-14 ENCOUNTER — HOSPITAL ENCOUNTER (OUTPATIENT)
Dept: NUCLEAR MEDICINE | Facility: HOSPITAL | Age: 79
Setting detail: NUCLEAR MEDICINE
Discharge: HOME | End: 2019-06-14
Attending: NURSE PRACTITIONER | Admitting: NURSE PRACTITIONER
Payer: MEDICARE

## 2019-06-14 ENCOUNTER — HOSPITAL ENCOUNTER (OUTPATIENT)
Dept: NUCLEAR MEDICINE | Facility: HOSPITAL | Age: 79
Setting detail: NUCLEAR MEDICINE
End: 2019-06-14
Attending: NURSE PRACTITIONER
Payer: MEDICARE

## 2019-06-14 DIAGNOSIS — I50.23 ACUTE ON CHRONIC SYSTOLIC HEART FAILURE (H): ICD-10-CM

## 2019-06-14 PROCEDURE — 25000128 H RX IP 250 OP 636: Performed by: INTERNAL MEDICINE

## 2019-06-14 PROCEDURE — 34300033 ZZH RX 343: Performed by: RADIOLOGY

## 2019-06-14 PROCEDURE — 93017 CV STRESS TEST TRACING ONLY: CPT | Performed by: INTERNAL MEDICINE

## 2019-06-14 PROCEDURE — A9500 TC99M SESTAMIBI: HCPCS | Performed by: RADIOLOGY

## 2019-06-14 PROCEDURE — 78452 HT MUSCLE IMAGE SPECT MULT: CPT | Mod: TC

## 2019-06-14 PROCEDURE — 93018 CV STRESS TEST I&R ONLY: CPT | Performed by: INTERNAL MEDICINE

## 2019-06-14 PROCEDURE — 93016 CV STRESS TEST SUPVJ ONLY: CPT | Performed by: INTERNAL MEDICINE

## 2019-06-14 RX ORDER — REGADENOSON 0.08 MG/ML
0.4 INJECTION, SOLUTION INTRAVENOUS ONCE
Status: COMPLETED | OUTPATIENT
Start: 2019-06-14 | End: 2019-06-14

## 2019-06-14 RX ORDER — AMINOPHYLLINE 25 MG/ML
INJECTION, SOLUTION INTRAVENOUS
Status: DISCONTINUED
Start: 2019-06-14 | End: 2019-06-14 | Stop reason: WASHOUT

## 2019-06-14 RX ADMIN — REGADENOSON 0.4 MG: 0.08 INJECTION, SOLUTION INTRAVENOUS at 09:12

## 2019-06-14 RX ADMIN — Medication 10 MILLICURIE: at 07:04

## 2019-06-14 RX ADMIN — Medication 30 MILLICURIE: at 09:12

## 2019-06-18 ENCOUNTER — OFFICE VISIT (OUTPATIENT)
Dept: CARDIOLOGY | Facility: OTHER | Age: 79
End: 2019-06-18
Attending: NURSE PRACTITIONER
Payer: MEDICARE

## 2019-06-18 ENCOUNTER — HOSPITAL ENCOUNTER (OUTPATIENT)
Facility: CLINIC | Age: 79
End: 2019-06-18
Attending: INTERNAL MEDICINE | Admitting: INTERNAL MEDICINE
Payer: MEDICARE

## 2019-06-18 VITALS
DIASTOLIC BLOOD PRESSURE: 71 MMHG | SYSTOLIC BLOOD PRESSURE: 113 MMHG | OXYGEN SATURATION: 96 % | TEMPERATURE: 98.6 F | RESPIRATION RATE: 24 BRPM | WEIGHT: 106 LBS | HEART RATE: 86 BPM | BODY MASS INDEX: 21.37 KG/M2 | HEIGHT: 59 IN

## 2019-06-18 DIAGNOSIS — I25.10 ASCVD (ARTERIOSCLEROTIC CARDIOVASCULAR DISEASE): ICD-10-CM

## 2019-06-18 DIAGNOSIS — R06.09 DOE (DYSPNEA ON EXERTION): ICD-10-CM

## 2019-06-18 DIAGNOSIS — R94.39 ABNORMAL STRESS TEST: ICD-10-CM

## 2019-06-18 DIAGNOSIS — R94.39 ABNORMAL STRESS TEST: Primary | ICD-10-CM

## 2019-06-18 DIAGNOSIS — E78.5 HYPERLIPIDEMIA, UNSPECIFIED HYPERLIPIDEMIA TYPE: ICD-10-CM

## 2019-06-18 DIAGNOSIS — I50.23 ACUTE ON CHRONIC SYSTOLIC HEART FAILURE (H): ICD-10-CM

## 2019-06-18 DIAGNOSIS — E78.5 HYPERLIPIDEMIA, UNSPECIFIED HYPERLIPIDEMIA TYPE: Chronic | ICD-10-CM

## 2019-06-18 DIAGNOSIS — I10 BENIGN ESSENTIAL HYPERTENSION: ICD-10-CM

## 2019-06-18 DIAGNOSIS — I50.21 ACUTE SYSTOLIC HEART FAILURE (H): ICD-10-CM

## 2019-06-18 DIAGNOSIS — J43.2 CENTRILOBULAR EMPHYSEMA (H): Chronic | ICD-10-CM

## 2019-06-18 PROCEDURE — 99214 OFFICE O/P EST MOD 30 MIN: CPT | Performed by: NURSE PRACTITIONER

## 2019-06-18 PROCEDURE — G0463 HOSPITAL OUTPT CLINIC VISIT: HCPCS

## 2019-06-18 RX ORDER — METOPROLOL SUCCINATE 25 MG/1
12.5 TABLET, EXTENDED RELEASE ORAL DAILY
Qty: 60 TABLET | Refills: 0 | Status: SHIPPED | OUTPATIENT
Start: 2019-06-18 | End: 2019-07-03

## 2019-06-18 ASSESSMENT — MIFFLIN-ST. JEOR: SCORE: 866.44

## 2019-06-18 ASSESSMENT — PAIN SCALES - GENERAL: PAINLEVEL: NO PAIN (0)

## 2019-06-18 NOTE — PATIENT INSTRUCTIONS
You were seen by Hailee Cortez NP, 6/18/2019.     1.  You are scheduled for a Coronary Angiogram on to be determined   at the [facility], [address] [place] [address], [place] [address]. You are to check in at the [facility] at to be determined.     When you arrive you will be escorted back to the pre procedure area called 2A. Here they will insert an IV, draw labs, and obtain a short medical history. Please bring an updated list of your current medications.     A physician will come and talk with you about the procedure and obtain consent.     A nurse from the Cardiac Catheterization Lab will then escort you to the procedure area. You will be receiving sedation during the procedure so you will need someone to drive you to and from the hospital.     After the procedure you will recover for a short period (2 - 6 hours) on 6B. You will be discharged with instructions for post procedure care. However, depending on what the angiogram shows you may have to have stents placed and this might require an overnight stay. We ask that you bring a small bag of necessities for your comfort if you would need to stay overnight. DO NOT BRING ANY VALUABLES!       Pre procedure instructions:   1. You will need a preop physical (H&P) done by your PCP within 30 days prior to the procedure. Please bring a hard copy of this with you to your procedure OR have your PCP fax to: [phone].   2. Make arrangements to have a  as you will not be allowed to drive following the procedure. Someone should stay with you the night after your procedure.   3.  Do not eat any solid food or milk products for 8 hours prior to the exam. You may drink clear liquids until 2 hours prior to the exam. Clear liquids include the following: water, Jell-O, clear broth, apple juice or any non-carbonated drink that you can see through (no pop!).   4. Do not drink alcohol or smoke 24 hours prior to test.   5. Hold these  meds:   Do not take your oral diabetic medication or short acting insulin the day of the procedure. Do not take metformin the day of and for 2 days following, contact your PCP regarding glucose control during this time.   Hold your warfarin (2-3 days prior), pradaxa (2 days prior), Eliquis/Xarelto 1 day prior.   6. You may take your other morning medications as prescribed with a sip of water. If you currently take an aspirin, continue taking your same dose. If not, take a 325 mg aspirin the day before and the day of your procedure.     If you have further questions, please utilize [employer] to contact us.   If your question concerns the above instructions, contact:   Rosetta Henriquez RN   [facility]   [phone]     If your question concerns the schedule/appointment times, contact:   [facility]    [phone]     2. Please continue Aspirin 81 mg chewable tablet daily.     3.  If you develop new or worsening symptom please call the cardiology office as you may need to be seen sooner.     You will follow up with Hailee Cortez NP in to be determined      Please call the cardiology office with problems, questions, or concerns at [phone].    If you experience chest pain, chest pressure, chest tightness, shortness of breath, fainting, lightheadedness, nausea, vomiting, or other concerning symptoms, please report to the Emergency Department or call 911. These symptoms may be emergent, and best treated in the Emergency Department.       Rosetta DORANTES RN-BSN  Cardiology   [facility]  [phone]

## 2019-06-18 NOTE — NURSING NOTE
"Chief Complaint   Patient presents with     RECHECK     2 week cardiology follow-up       Initial /71 (BP Location: Left arm, Patient Position: Chair, Cuff Size: Adult Regular)   Pulse 86   Temp 98.6  F (37  C) (Tympanic)   Resp 24   Ht 1.499 m (4' 11\")   Wt 48.1 kg (106 lb)   SpO2 96%   BMI 21.41 kg/m   Estimated body mass index is 21.41 kg/m  as calculated from the following:    Height as of this encounter: 1.499 m (4' 11\").    Weight as of this encounter: 48.1 kg (106 lb).  Medication Reconciliation: complete   Michelle Roman          "

## 2019-06-18 NOTE — TELEPHONE ENCOUNTER
metoprolol succinate ER (TOPROL-XL) 25 MG 24 hr tablet- patient [facility] supply [facility]   Last Written Prescription Date:  6/6/19  Last Fill Quantity: 60,   # refills: 1  Last Office Visit: 5/17/19  Future Office visit:    Next 5 appointments (look out 90 days)    Jun 26, 2019  1:30 PM CDT  (Arrive by 1:15 PM)  Office Visit with Ophelia Troncoso MD  [facility] ([facility] ) [address]  [place] [address]  [phone]

## 2019-06-18 NOTE — PROGRESS NOTES
[facility] HEART CARE   CARDIOLOGY CONSULT     Lita Ocasio   [address] [facility], [place] [address]      Ophelia Troncoso     Chief Complaint   Patient presents with     RECHECK     2 week cardiology follow-up        HPI:   Ms. Ocasio is a 78 year old female who presents for cardiology follow-up to visit on 6/6/19 with newly identified systolic heart failure.  Additionally, she has a history of hypertension, hyperlipidemia, nonrheumatic mitral regurgitation, central lobular emphysema, hypothyroidism, osteopenia, hyperparathyroidism, history of tobacco abuse and chronic rhinitis.    Patient was admitted to the hospital on 5/12/2019 for acute respiratory failure, she does have a history of emphysema.  She was noted to have bilateral pleural effusions on chest x-ray.  This is followed by an echocardiogram with reduced systolic function, LVEF 45 to 50%.  Currently she is diuresed on Lasix 20 mg twice daily.  She was not on any guideline directed medical therapy for systolic heart failure at discharge.  She is on aspirin 81 mg daily and simvastatin 20 mg at bedtime.  She had a recent N-terminal proBNP on 5/22/2019 which was significantly elevated at 3726.  This is improved from 5/17/2019 with an N-terminal proBNP of 5830.    At her last visit patient was accompanied by her son who was very helpful in providing patients past medical history. He admits approximately 20 years ago patient was being followed for MR which was noted to be improved and therefore, vlave surgery was never recommended. He admits that she was previously told it was suspected she had small MI's without coronary intervention or identified ACS. Finally, recent history just before new years this last year of acute hemorrhagic stroke. It was following this that she was taken off her beta blocker and lisinopril for HTN and possible past MI, she reports increased shortness of breath following this which has progressively worsened and likely multifactorial with COPD  and HFrEF.     At her last visit patient did not report any chest pain or pressure. Positive for chronic shortness of breath and increased MILLS. Recent 6 min walk test with no hypoxia. She has not required oxygen. No palpitations. No lightheadedness. Recent syncope when straining during BM for which was suspected as vaso vagal episode. She is currently on stool softener. She has not had any lightheadedness or syncope since this episode. No increased LE edema. She was having symptoms of PND which resolved with increased dose of Lasix.    At her last visit we reviewed HFrEF and possible etiology, may be secondary to history of MR or possible ischemic heart disease with questionable past infarcts. She was started on low dose Toprol XL 12.5 mg daily to initiate GDMT, closely monitor soft pressures with this. Recommended she reduce sodium intake to no more than 3,000 mg daily, goal 2,500mg daily. Fluid intake to less than 2L daily which she is not exceeding.Recommended she begin weighing herself on a daily basis and call with a 3lb weight gain in one day or 5 lb weight gain in one week. Lexiscan stress testing ordered with reduced LVEF and history of possible past MI.     Today patient reports continued MILLS and more shortness of breath in the morning. Admits that her weight and blood pressures have remained stable. She denies any chest pain or pressure. No edema. No palpitations. No lightheadedness or syncope.    Results of recent Lexiscan stress test with abnormal imaging with reversible ischemia laterally and LVEF 36%. Additionally, in review of past CT chest imaging from one year ago, she was noted to have advanced atherosclerotic disease with bulky coronary and aortic calcifications. Given these findings, current symptoms and new acute systolic failure, recommended coronary angiography.    IMAGING RESULTS:   [facility]  Echocardiography Laboratory  [address]  [address]     Name: KOURTNEY ADAMES  MRN: [id number]  : 1940  Study Date: 2019 10:52 AM  Age: 78 yrs  Gender: Female  Patient Location: [facility]  Reason For Study: Elevated brain natriuretic peptide (BNP) level  History: abn labs  Ordering Physician: JOHANNA NATARAJAN  Referring Physician: JOHANNA NATARAJAN  Performed By: Mildred Glez     BSA: 1.5 m2  Height: 62 in  Weight: 120 lb  _____________________________________________________________________________  __     _____________________________________________________________________________  __        Interpretation Summary  Trivial pericardial effusion is present.  Left ventricular size is normal.  Mildly (EF 45-50%) reduced left ventricular function is present.  Left ventricular diastolic function is normal.  The right ventricle is normal size.  Mild left atrial enlargement is present.  Mild mitral insufficiency is present.  Aortic valve is normal in structure and function.  Mild tricuspid insufficiency is present.  Right ventricular systolic pressure is 28mmHg above the right atrial pressure.  The pulmonic valve is normal.  The aorta root is normal.  _____________________________________________________________________________  __        Left Ventricle  Left ventricular size is normal. Mildly (EF 45-50%) reduced left ventricular  function is present. Left ventricular diastolic function is normal.     Right Ventricle  The right ventricle is normal size.     Atria  Mild left atrial enlargement is present.     Mitral Valve  Mild mitral insufficiency is present.     Aortic Valve  Aortic valve is normal in structure and function. The mean AoV pressure  gradient is 3.7 mmHg.        Tricuspid Valve  Mild tricuspid insufficiency is present. Right ventricular systolic pressure  is 28mmHg above the right atrial pressure.     Pulmonic Valve  The pulmonic valve is normal.     Vessels  The aorta root is normal.     Pericardium  Trivial pericardial effusion is  present.     _____________________________________________________________________________  __  MMode/2D Measurements & Calculations  IVSd: 1.6 cm  IVSs: 1.9 cm  LVIDd: 5.1 cm  LVIDs: 3.8 cm  LVPWd: 1.6 cm  LVPWs: 1.9 cm  FS: 25.9 %  LV mass(C)d: 378.1 grams  LV mass(C)dI: 245.7 grams/m2  LV mass(C)sI: 198.4 grams/m2  Ao root diam: 2.8 cm  LA dimension: 4.7 cm  LA/Ao: 1.7  LVOT diam: 1.9 cm  LVOT area: 2.7 cm2     RWT: 0.64     Time Measurements  Pulm. HR: 172.3 BPM     Doppler Measurements & Calculations  MV E max valente: 183.2 cm/sec  MV A max valente: 118.2 cm/sec  MV E/A: 1.6  MV dec slope: 883.6 cm/sec2  MV dec time: 0.21 sec  Ao V2 max: 140.2 cm/sec  Ao max P.9 mmHg  Ao V2 mean: 87.8 cm/sec  Ao mean PG: 3.7 mmHg  Ao V2 VTI: 26.3 cm  MENDOZA(I,D): 2.1 cm2  MENDOZA(V,D): 1.9 cm2  LV V1 max PG: 3.8 mmHg  LV V1 max: 97.0 cm/sec  LV V1 VTI: 20.6 cm  CO(LVOT): 11.1 l/min  CI(LVOT): 7.2 l/min/m2  SV(LVOT): 56.0 ml  SI(LVOT): 36.4 ml/m2  TV max P.7 mmHg  PA V2 max: 86.6 cm/sec  PA max PG: 3.0 mmHg  PA mean P.5 mmHg  PA V2 VTI: 19.8 cm  TR max valente: 263.3 cm/sec  TR max P.7 mmHg  AV Valente Ratio (DI): 0.69  MENDOZA Index (cm2/m2): 1.4     E/E': 37.2  Peak E' Valente: 4.9 cm/sec     _____________________________________________________________________________  __           Report approved by: Ruma Giordano 2019 07:14 PM      PAST MEDICAL HISTORY:   Past Medical History:   Diagnosis Date     Acquired hypothyroidism 2019     Asthma      Benign essential hypertension 2019     Centrilobular emphysema (H) 2019     Chronic rhinitis 2013     Chronic systolic congestive heart failure (H) 2019     Constipation      Hearing loss      Heart trouble      Hemorrhagic cerebrovascular accident (CVA) (H) 2019     Hyperlipidemia 2019     Hypertension      Nasal congestion      Non-rheumatic mitral regurgitation 2019     Osteopenia 2019     Osteoporosis      Parathyroid related  hypercalcemia (H) 2013     Primary hyperparathyroidism (H) 2013     Ringing in ears      Sensorineural hearing loss, asymmetrical 2013     Sneezing      Snoring      Thyroid disease      Weakness      Weight loss           FAMILY HISTORY:   Family History   Problem Relation Age of Onset     Hearing Loss Mother      Heart Disease Mother      Myocardial Infarction Mother      Cerebrovascular Disease Father      Cancer Brother         throat     Cancer Sister      Myocardial Infarction Sister      Cancer Maternal Grandmother      Heart Disease Maternal Grandfather      Aortic aneurysm Son           PAST SURGICAL HISTORY:   Past Surgical History:   Procedure Laterality Date     CATARACT IOL, RT/LT Bilateral      COLONOSCOPY       COLONOSCOPY - HIM SCAN  2009    Colonoscopy - [facility]/NL       ENT SURGERY      para thyroid surgery     ENT SURGERY  2013    Parathyroid     PARATHYROIDECTOMY  9/10/2013    Procedure: PARATHYROIDECTOMY;  Reoperative Neck Exploration, Resection of right Parathyroid adenoma;  Surgeon: Thalia Muller MD;  Location: [facility] OR     TONSILLECTOMY       TUBAL LIGATION            SOCIAL HISTORY:   Social History     Socioeconomic History     Marital status:      Spouse name: None     Number of children: None     Years of education: None     Highest education level: None   Occupational History     None   Social Needs     Financial resource strain: None     Food insecurity:     Worry: None     Inability: None     Transportation needs:     Medical: None     Non-medical: None   Tobacco Use     Smoking status: Former Smoker     Packs/day: 1.00     Years: 50.00     Pack years: 50.00     Types: Cigarettes     Last attempt to quit: 2019     Years since quittin.4     Smokeless tobacco: Never Used   Substance and Sexual Activity     Alcohol use: Yes     Comment: social     Drug use: No     Sexual activity: None   Lifestyle     Physical activity:     Days per week:  None     Minutes per session: None     Stress: None   Relationships     Social connections:     Talks on phone: None     Gets together: None     Attends Buddhist service: None     Active member of club or organization: None     Attends meetings of clubs or organizations: None     Relationship status: None     Intimate partner violence:     Fear of current or ex partner: None     Emotionally abused: None     Physically abused: None     Forced sexual activity: None   Other Topics Concern     Parent/sibling w/ CABG, MI or angioplasty before 65F 55M? Not Asked   Social History Narrative     None          CURRENT MEDICATIONS:   Prior to Admission medications    Medication Sig Start Date End Date Taking? Authorizing Provider   albuterol (PROAIR RESPICLICK) 108 (90 Base) MCG/ACT inhaler Inhale 2 puffs into the lungs every 4 hours as needed for shortness of breath / dyspnea or wheezing 4/24/19  Yes Joe Jerez MD   aspirin 81 MG tablet Take 1 tablet by mouth daily   Yes Reported, Patient   busPIRone (BUSPAR) 5 MG tablet Take 1 tablet (5 mg) by mouth 2 times daily 5/29/19  Yes Ophelia Troncoso MD   calcium carbonate (TUMS) 500 MG chewable tablet Take 1 chew tab by mouth daily   Yes Reported, Patient   Fexofenadine HCl (ALLEGRA PO) Take 180 mg by mouth daily    Yes Unknown, Entered By History   fluticasone (FLONASE) 50 MCG/ACT spray Spray 2 sprays into both nostrils daily 9/19/18  Yes Gali Dominguez APRN CNP   furosemide (LASIX) 20 MG tablet Take 1 tablet (20 mg) by mouth 2 times daily 5/31/19  Yes Ophelia Troncoso MD   ipratropium - albuterol 0.5 mg/2.5 mg/3 mL (DUONEB) 0.5-2.5 (3) MG/3ML neb solution Take 1 vial by nebulization every 6 hours as needed for shortness of breath / dyspnea or wheezing   Yes Reported, Patient   LEVOTHYROXINE SODIUM PO Take 75 mcg by mouth daily   Yes Reported, Patient   LORazepam (ATIVAN) 0.5 MG tablet Take 0.5 tablets (0.25 mg) by mouth every 6 hours as needed for anxiety or sleep  5/14/19  Yes Mindy Ware NP   NONFORMULARY Tumeric 500mg BID   Yes Reported, Patient   potassium chloride ER (K-DUR/KLOR-CON M) 20 MEQ CR tablet Take 1 tablet (20 mEq) by mouth 2 times daily 5/23/19  Yes Ophelia Troncoso MD   sertraline (ZOLOFT) 25 MG tablet Take 1 tablet (25 mg) by mouth daily 5/15/19  Yes Mindy Ware NP   SIMVASTATIN PO Take 20 mg by mouth At Bedtime   Yes Reported, Patient   Tafluprost (ZIOPTAN) 0.0015 % SOLN Place 1 drop into both eyes At Bedtime   Yes Reported, Patient   vitamin B complex with vitamin C (VITAMIN  B COMPLEX) tablet Take 1 tablet by mouth daily Takes 500mg of Vitamin B   Yes Reported, Patient   Vitamin D, Cholecalciferol, 1000 units TABS Take 1 tablet by mouth daily   Yes Reported, Patient   vitamin E 400 units TABS Take by mouth daily   Yes Reported, Patient   Zafirlukast (ACCOLATE PO) Take 20 mg by mouth 2 times daily (before meals)    Yes Reported, Patient   CLINDAMYCIN HCL PO Take 600 mg by mouth 1 hour prior to dental work    Reported, Patient          ALLERGIES:   Allergies   Allergen Reactions     Evista [Raloxifene] Other (See Comments)     hypercalcemia     Prednisone GI Disturbance     Combigan [Brimonidine Tartrate-Timolol] Other (See Comments)     Eye swelling and itchy     Latex Rash     Levaquin [Levofloxacin] Muscle Pain (Myalgia)     Penicillins Rash     Restasis      Pt reports using eye gtts and having red irritated eyes           ROS:   CONSTITUTIONAL: No reported fever or chills. No changes in weight.  ENT: No visual disturbance, ear ache, epistaxis or sore throat.   CARDIOVASCULAR: No chest pain, chest pressure or chest discomfort. No palpitations or lower extremity edema.   RESPIRATORY: Positive forshortness of breath and increased dyspnea upon exertion. No reported cough, wheezing or hemoptysis. No reports of orthopnea or PND.  GI: No reported abdominal pain or distension.   : No reported hematuria or dysuria.   NEUROLOGICAL: No  "lightheadedness or dizziness. No recurrence of syncope. No ataxia, paresthesias or weakness.   MUSCULOSKELETAL: No reported joint pain or swelling, no muscle pain.  ENDOCRINOLOGIC: No temperature intolerance. No hair or skin changes.  SKIN: No abnormal rashes or sores, no unusual itching.  PSYCHIATRIC: Positive for anxiety.      PHYSICAL EXAM:   /71 (BP Location: Left arm, Patient Position: Chair, Cuff Size: Adult Regular)   Pulse 86   Temp 98.6  F (37  C) (Tympanic)   Resp 24   Ht 1.499 m (4' 11\")   Wt 48.1 kg (106 lb)   SpO2 96%   BMI 21.41 kg/m    GENERAL: The patient is a well-developed, well-nourished, in no apparent distress.  HEENT: Head is normocephalic and atraumatic. Eyes are symmetrical with normal visual tracking. No icterus, no xanthelasmas. Nares appeared normal without nasal drainage. Mucous membranes are moist, no cyanosis.  NECK: Supple.   CHEST/ LUNGS: Lungs sounds diminished to auscultation. No rales, rhonchi or wheezes, no use of accessory muscles, no retractions, respirations unlabored and normal respiratory rate.   CARDIO: Regular rate and rhythm normal with S1 and S2, no S3 or S4 and no murmur, click or rub.   ABD: Abdomen is nondistended.   EXTREMITIES: No edema present.   MUSCULOSKELETAL: No visible joint swelling.   NEUROLOGIC: Alert and oriented X3. Normal speech, gait and affect. No focal neurologic deficits.   SKIN: No jaundice. No rashes or visible skin lesions present. No ecchymosis.     LAB RESULTS:   Orders Only on 05/22/2019   Component Date Value Ref Range Status     Sodium 05/22/2019 139  133 - 144 mmol/L Final     Potassium 05/22/2019 3.3* 3.4 - 5.3 mmol/L Final     Chloride 05/22/2019 105  94 - 109 mmol/L Final     Carbon Dioxide 05/22/2019 25  20 - 32 mmol/L Final     Anion Gap 05/22/2019 9  3 - 14 mmol/L Final     Glucose 05/22/2019 90  70 - 99 mg/dL Final     Urea Nitrogen 05/22/2019 15  7 - 30 mg/dL Final     Creatinine 05/22/2019 1.16* 0.52 - 1.04 mg/dL Final "     GFR Estimate 05/22/2019 45* >60 mL/min/[1.73_m2] Final     GFR Estimate If Black 05/22/2019 52* >60 mL/min/[1.73_m2] Final     Calcium 05/22/2019 8.6  8.5 - 10.1 mg/dL Final     N-Terminal Pro Bnp 05/22/2019 3,726* 0 - 450 pg/mL Final   Office Visit on 05/17/2019   Component Date Value Ref Range Status     Sodium 05/17/2019 141  133 - 144 mmol/L Final     Potassium 05/17/2019 3.6  3.4 - 5.3 mmol/L Final     Chloride 05/17/2019 108  94 - 109 mmol/L Final     Carbon Dioxide 05/17/2019 27  20 - 32 mmol/L Final     Anion Gap 05/17/2019 6  3 - 14 mmol/L Final     Glucose 05/17/2019 84  70 - 99 mg/dL Final     Urea Nitrogen 05/17/2019 18  7 - 30 mg/dL Final     Creatinine 05/17/2019 1.08* 0.52 - 1.04 mg/dL Final     GFR Estimate 05/17/2019 49* >60 mL/min/[1.73_m2] Final     GFR Estimate If Black 05/17/2019 57* >60 mL/min/[1.73_m2] Final     Calcium 05/17/2019 9.7  8.5 - 10.1 mg/dL Final     WBC 05/17/2019 8.4  4.0 - 11.0 10e9/L Final     RBC Count 05/17/2019 5.04  3.8 - 5.2 10e12/L Final     Hemoglobin 05/17/2019 14.8  11.7 - 15.7 g/dL Final     Hematocrit 05/17/2019 44.0  35.0 - 47.0 % Final     MCV 05/17/2019 87  78 - 100 fl Final     MCH 05/17/2019 29.4  26.5 - 33.0 pg Final     MCHC 05/17/2019 33.6  31.5 - 36.5 g/dL Final     RDW 05/17/2019 13.4  10.0 - 15.0 % Final     Platelet Count 05/17/2019 310  150 - 450 10e9/L Final     Diff Method 05/17/2019 Automated Method   Final     % Neutrophils 05/17/2019 64.6  % Final     % Lymphocytes 05/17/2019 19.8  % Final     % Monocytes 05/17/2019 10.1  % Final     % Eosinophils 05/17/2019 4.4  % Final     % Basophils 05/17/2019 0.7  % Final     % Immature Granulocytes 05/17/2019 0.4  % Final     Nucleated RBCs 05/17/2019 0  0 /100 Final     Absolute Neutrophil 05/17/2019 5.4  1.6 - 8.3 10e9/L Final     Absolute Lymphocytes 05/17/2019 1.7  0.8 - 5.3 10e9/L Final     Absolute Monocytes 05/17/2019 0.8  0.0 - 1.3 10e9/L Final     Absolute Eosinophils 05/17/2019 0.4  0.0 - 0.7  10e9/L Final     Absolute Basophils 05/17/2019 0.1  0.0 - 0.2 10e9/L Final     Abs Immature Granulocytes 05/17/2019 0.0  0 - 0.4 10e9/L Final     Absolute Nucleated RBC 05/17/2019 0.0   Final     TSH 05/17/2019 2.79  0.40 - 4.00 mU/L Final     N-Terminal Pro Bnp 05/17/2019 5,830* 0 - 450 pg/mL Final          ASSESSMENT:   Lita Ocasio presents for cardiology follow-up to visit on 6/6/19 with newly identified systolic heart failure.  Additionally, she has a history of hypertension, hyperlipidemia, nonrheumatic mitral regurgitation, central lobular emphysema, hypothyroidism, osteopenia, hyperparathyroidism, history of tobacco abuse and chronic rhinitis.  Results of recent Lexiscan stress test with abnormal imaging with reversible ischemia laterally and LVEF 36%. Additionally, in review of past CT chest imaging from one year ago, she was noted to have advanced atherosclerotic disease with bulky coronary and aortic calcifications. Given these findings, current symptoms and new acute systolic failure, recommended coronary angiography.    PLAN:  1. Abnormal stress test  Recommended coronary angiography with acute systolic heart failure, Lexiscan with reversible lateral ischemia present and anginal equivalent symptoms.   She will remain on ASA 81 mg daily and beta blocker.   Discussed with patient if coronary intervention indicated we would need to adjust her statin to high intensity statin therapy.    - Case Request Cath Lab: Coronary Angiogram; Future    2. MILLS (dyspnea on exertion)  See above.  - Case Request Cath Lab: Coronary Angiogram; Future    3. ASCVD (arteriosclerotic cardiovascular disease)  - Case Request Cath Lab: Coronary Angiogram; Future    4. Centrilobular emphysema (H)  Stable.    5. Hyperlipidemia, unspecified hyperlipidemia type  Discussed with patient if coronary intervention indicated we would need to adjust her statin to high intensity statin therapy.    - Case Request Cath Lab: Coronary Angiogram;  Future    6. Acute systolic heart failure (H)  Continue with Toprol XL 12.5 mg daily to initiate GDMT, pressures soft yet stable.   Reduce sodium intake to no more than 3,000 mg daily.  Fluid intake to less than 2L daily which she is not exceeding.   She will begin weighing herself on a daily basis and call with a 3lb weight gain in one day or 5 lb weight gain in one week. Weight stable.  Consider low dose lisinopril at next visit only if pressures allow.  Renal function has remained stable with CKD.  Continue with Lasix 20 mg BID.    - Case Request Cath Lab: Coronary Angiogram; Future    7. Benign essential hypertension  Stable.  - Case Request Cath Lab: Coronary Angiogram; Future      Thank you for allowing me to participate in the care of your patient. Please do not hesitate to contact me if you have any questions.     Hailee Cortez

## 2019-06-19 ENCOUNTER — THERAPY VISIT (OUTPATIENT)
Dept: SLEEP MEDICINE | Facility: HOSPITAL | Age: 79
End: 2019-06-19
Attending: FAMILY MEDICINE
Payer: MEDICARE

## 2019-06-19 DIAGNOSIS — I50.22 CHRONIC SYSTOLIC CONGESTIVE HEART FAILURE (H): Chronic | ICD-10-CM

## 2019-06-19 DIAGNOSIS — R06.83 SNORING: ICD-10-CM

## 2019-06-19 DIAGNOSIS — R06.81 APNEA: ICD-10-CM

## 2019-06-19 DIAGNOSIS — J43.2 CENTRILOBULAR EMPHYSEMA (H): Chronic | ICD-10-CM

## 2019-06-19 PROCEDURE — 95810 POLYSOM 6/> YRS 4/> PARAM: CPT | Mod: 26 | Performed by: FAMILY MEDICINE

## 2019-06-19 PROCEDURE — 95810 POLYSOM 6/> YRS 4/> PARAM: CPT

## 2019-06-19 NOTE — PROGRESS NOTES
Patient notified of date and time of coronary angiogram 7/5/19 at 0900.  Discussed patient information and reviewed Coronary Angiogram, Angioplasty, and stent placement patient guide.  Patient verbalized understanding.   Rosetta Henriquez RN-BSN

## 2019-06-20 ENCOUNTER — TELEPHONE (OUTPATIENT)
Dept: CARDIOLOGY | Facility: OTHER | Age: 79
End: 2019-06-20

## 2019-06-20 DIAGNOSIS — I50.22 CHRONIC SYSTOLIC CONGESTIVE HEART FAILURE (H): Primary | Chronic | ICD-10-CM

## 2019-06-20 NOTE — TELEPHONE ENCOUNTER
Faxed records to [facility] to both fax numbers [phone] & [phone] phone [phone].

## 2019-06-20 NOTE — TELEPHONE ENCOUNTER
Rena or Danielle,   Can we please send referral for angiogram NM lexiscan and recent echocardiogram to [facility].  Patient is requesting appointment for angiogram the week of 6/24/19-7/2/19.   Thank you, Rosetta.

## 2019-06-20 NOTE — NURSING NOTE
STUDY TYPE/INSURANCE:  PSG/MEDICARE  SLEEP AID TYPE/TIME:  none  PAP ACCLIMATION:  5cmH2O during mask fitting  RESPIRATORY EVENTS (BASELINE):<20  SNORING:none to light  TCO2 MONITORING AND ABGS:  n/a  TITRATION: Did not appear to meet criteria for titration  LEAK RATE:  n/a  HOB ELEVATION: HOB up between 30 and 45 degrees  FINAL MASK TYPE/SIZE:  Fit with [employer]Med Aifit N20 small nasal  SLEEP STAGES:  1,2,3 and REM  ECG:  Scattered PVCs  MOVEMENTS/BEHAVIORS:  Very quiet.  Up to toilet x1  CURRENTLY ON TX OR HAVE EQUIPMENT:  No

## 2019-06-20 NOTE — PROGRESS NOTES
Subjective     Lita Ocasio is a 78 year old female who presents to clinic today for the following health issues:    HPI   New Patient/Transfer of Care    Patient has CHF and abnormal stress testing. She has pending angiogram in [place] planned. Also has COPD, former smoker. Also history of anxiety currently on buspar and ativan at night. Mood has been fairly stable. Hypothyroidism with stable TSH.     Pt is here today with her son. I have met patient in the past. They have questions about whether or not the COPD vs CHF is the main contributor to pts MILLS. They note that her night time awakenings are improved with better fluid control which argues for PND vs anxiety, however, is on ativan now at night which may be helpful as well. She is using nebuilizer regularly during the day, son is concerned she will become dependent on this. Pt states that she notes it does seem to help her mills significantly.       Patient Active Problem List   Diagnosis     Chronic rhinitis     Primary hyperparathyroidism (H)     Acute respiratory failure with hypoxia (H)     Centrilobular emphysema (H)     Acquired hypothyroidism     Acute on chronic systolic congestive heart failure (H)     Chronic systolic congestive heart failure (H)     Benign essential hypertension     Hyperlipidemia     Osteopenia     Non-rheumatic mitral regurgitation     Abnormal stress test     MILLS (dyspnea on exertion)     ASCVD (arteriosclerotic cardiovascular disease)     Hyperlipidemia, unspecified hyperlipidemia type     Acute systolic heart failure (H)     CKD (chronic kidney disease) stage 3, GFR 30-59 ml/min (H)     Past Surgical History:   Procedure Laterality Date     CATARACT IOL, RT/LT Bilateral      COLONOSCOPY       COLONOSCOPY - HIM SCAN  01/01/2009    Colonoscopy - [facility]/NL  2009     ENT SURGERY  2011    para thyroid surgery     ENT SURGERY  09/2013    Parathyroid     PARATHYROIDECTOMY  9/10/2013    Procedure: PARATHYROIDECTOMY;  Reoperative Neck  "Exploration, Resection of right Parathyroid adenoma;  Surgeon: Thalia Muller MD;  Location: [facility] OR     TONSILLECTOMY       TUBAL LIGATION         Social History     Tobacco Use     Smoking status: Former Smoker     Packs/day: 1.00     Years: 50.00     Pack years: 50.00     Types: Cigarettes     Last attempt to quit: 2019     Years since quittin.4     Smokeless tobacco: Never Used   Substance Use Topics     Alcohol use: Yes     Comment: social     Family History   Problem Relation Age of Onset     Hearing Loss Mother      Heart Disease Mother      Myocardial Infarction Mother      Cerebrovascular Disease Father      Cancer Brother         throat     Cancer Sister      Myocardial Infarction Sister      Cancer Maternal Grandmother      Heart Disease Maternal Grandfather      Aortic aneurysm Son            Reviewed and updated as needed this visit by Provider  Tobacco  Allergies  Meds  Problems  Med Hx  Surg Hx  Fam Hx         Review of Systems   ROS COMP: Constitutional, HEENT, cardiovascular, pulmonary, gi and gu systems are negative, except as otherwise noted.      Objective    /62   Pulse 88   Temp 98.2  F (36.8  C) (Tympanic)   Resp 18   Ht 1.499 m (4' 11\")   Wt 46 kg (101 lb 6.4 oz)   SpO2 96%   BMI 20.48 kg/m    Body mass index is 20.48 kg/m .  Physical Exam   GENERAL: alert, no distress, frail and elderly  NECK: no adenopathy, no asymmetry, masses, or scars and thyroid normal to palpation  RESP: lungs clear to auscultation - no rales, rhonchi or wheezes  CV: regular rate and rhythm, normal S1 S2, no S3 or S4, no murmur, click or rub, no peripheral edema   MS: no gross musculoskeletal defects noted, no edema    Diagnostic Test Results:  Labs reviewed in Epic  Results for orders placed or performed in visit on 19   CBC with platelets and differential   Result Value Ref Range    WBC 7.7 4.0 - 11.0 10e9/L    RBC Count 4.69 3.8 - 5.2 10e12/L    Hemoglobin 13.3 11.7 - 15.7 g/dL "    Hematocrit 39.9 35.0 - 47.0 %    MCV 85 78 - 100 fl    MCH 28.4 26.5 - 33.0 pg    MCHC 33.3 31.5 - 36.5 g/dL    RDW 13.0 10.0 - 15.0 %    Platelet Count 356 150 - 450 10e9/L    Diff Method Automated Method     % Neutrophils 61.7 %    % Lymphocytes 22.6 %    % Monocytes 9.4 %    % Eosinophils 5.4 %    % Basophils 0.8 %    % Immature Granulocytes 0.1 %    Nucleated RBCs 0 0 /100    Absolute Neutrophil 4.8 1.6 - 8.3 10e9/L    Absolute Lymphocytes 1.8 0.8 - 5.3 10e9/L    Absolute Monocytes 0.7 0.0 - 1.3 10e9/L    Absolute Eosinophils 0.4 0.0 - 0.7 10e9/L    Absolute Basophils 0.1 0.0 - 0.2 10e9/L    Abs Immature Granulocytes 0.0 0 - 0.4 10e9/L    Absolute Nucleated RBC 0.0    Basic metabolic panel   Result Value Ref Range    Sodium 139 133 - 144 mmol/L    Potassium 4.0 3.4 - 5.3 mmol/L    Chloride 104 94 - 109 mmol/L    Carbon Dioxide 28 20 - 32 mmol/L    Anion Gap 7 3 - 14 mmol/L    Glucose 103 (H) 70 - 99 mg/dL    Urea Nitrogen 19 7 - 30 mg/dL    Creatinine 1.14 (H) 0.52 - 1.04 mg/dL    GFR Estimate 46 (L) >60 mL/min/[1.73_m2]    GFR Estimate If Black 53 (L) >60 mL/min/[1.73_m2]    Calcium 9.7 8.5 - 10.1 mg/dL           Assessment & Plan     Chronic systolic congestive heart failure (H)  Compensated at this point. Await results of angiogram.   - CBC with platelets and differential  - Basic metabolic panel    CKD (chronic kidney disease) stage 3, GFR 30-59 ml/min (H)  Creatinine Clearance calculated today at 29. Has been stable since addition of diuretics. Monitor closely.     MILLS (dyspnea on exertion)  Multifactorial. Discussed with son that will need to wait and see how things progress if intervention during angiogram. May improve MILLS significantly. Continue nebs for now, however, they do help. PFTs when cardiac work up complete.     Acquired hypothyroidism  TSH wn 5/19    Anxiety  Under fair control. Advised that I would like to see her off the ativan for safety reasons. Okay to continue as anxiety is high  surrounding this upcoming angiogram. At follow up with start zoloft and stop buspar. Taper ativan as able.     Benign essential hypertension  BP soft, but pt does not have significant orthostatic symptoms currently. Add ACEI per Cardiology discretion    Primary hyperparathyroidism (H)  Calciums reviewed. Have been normal     Centrilobular emphysema (H)  Continue nebs for now. Monitor sx after angiogram and possible intervention. COnsider spiriva or similar    Return in about 1 month (around 7/26/2019) for CHF, anxiety.    Ophelia Troncoso MD  [facility]

## 2019-06-21 NOTE — TELEPHONE ENCOUNTER
Call to [facility] 6/21/2019.  Patient's chart is currently under review by the interventionalist.  There is availability for cor angio mid to late next week per family's request.   [facility] will contact patient with appointment date, time and instructions.  Rosetta Henriquez RN-BSN

## 2019-06-25 ENCOUNTER — MEDICAL CORRESPONDENCE (OUTPATIENT)
Dept: HEALTH INFORMATION MANAGEMENT | Facility: CLINIC | Age: 79
End: 2019-06-25

## 2019-06-25 NOTE — TELEPHONE ENCOUNTER
Please sign orders for CBC and BMP for angiogram on 6/28/19. Patient is scheduled to follow up with you on 6/26/19 .  Thank you, Rosetta

## 2019-06-26 ENCOUNTER — TELEPHONE (OUTPATIENT)
Dept: CARDIOLOGY | Facility: OTHER | Age: 79
End: 2019-06-26

## 2019-06-26 ENCOUNTER — OFFICE VISIT (OUTPATIENT)
Dept: FAMILY MEDICINE | Facility: OTHER | Age: 79
End: 2019-06-26
Attending: FAMILY MEDICINE
Payer: MEDICARE

## 2019-06-26 VITALS
HEIGHT: 59 IN | BODY MASS INDEX: 20.44 KG/M2 | DIASTOLIC BLOOD PRESSURE: 62 MMHG | TEMPERATURE: 98.2 F | SYSTOLIC BLOOD PRESSURE: 102 MMHG | RESPIRATION RATE: 18 BRPM | OXYGEN SATURATION: 96 % | WEIGHT: 101.4 LBS | HEART RATE: 88 BPM

## 2019-06-26 DIAGNOSIS — I10 BENIGN ESSENTIAL HYPERTENSION: ICD-10-CM

## 2019-06-26 DIAGNOSIS — N18.30 CKD (CHRONIC KIDNEY DISEASE) STAGE 3, GFR 30-59 ML/MIN (H): ICD-10-CM

## 2019-06-26 DIAGNOSIS — I50.22 CHRONIC SYSTOLIC CONGESTIVE HEART FAILURE (H): Primary | Chronic | ICD-10-CM

## 2019-06-26 DIAGNOSIS — R06.09 DOE (DYSPNEA ON EXERTION): ICD-10-CM

## 2019-06-26 DIAGNOSIS — F41.9 ANXIETY: ICD-10-CM

## 2019-06-26 DIAGNOSIS — E21.0 PRIMARY HYPERPARATHYROIDISM (H): ICD-10-CM

## 2019-06-26 DIAGNOSIS — Z98.890 STATUS POST CORONARY ANGIOGRAM: Primary | ICD-10-CM

## 2019-06-26 DIAGNOSIS — J43.2 CENTRILOBULAR EMPHYSEMA (H): Chronic | ICD-10-CM

## 2019-06-26 DIAGNOSIS — E03.9 ACQUIRED HYPOTHYROIDISM: Chronic | ICD-10-CM

## 2019-06-26 LAB
ANION GAP SERPL CALCULATED.3IONS-SCNC: 7 MMOL/L (ref 3–14)
BASOPHILS # BLD AUTO: 0.1 10E9/L (ref 0–0.2)
BASOPHILS NFR BLD AUTO: 0.8 %
BUN SERPL-MCNC: 19 MG/DL (ref 7–30)
CALCIUM SERPL-MCNC: 9.7 MG/DL (ref 8.5–10.1)
CHLORIDE SERPL-SCNC: 104 MMOL/L (ref 94–109)
CO2 SERPL-SCNC: 28 MMOL/L (ref 20–32)
CREAT SERPL-MCNC: 1.14 MG/DL (ref 0.52–1.04)
DIFFERENTIAL METHOD BLD: NORMAL
EOSINOPHIL # BLD AUTO: 0.4 10E9/L (ref 0–0.7)
EOSINOPHIL NFR BLD AUTO: 5.4 %
ERYTHROCYTE [DISTWIDTH] IN BLOOD BY AUTOMATED COUNT: 13 % (ref 10–15)
GFR SERPL CREATININE-BSD FRML MDRD: 46 ML/MIN/{1.73_M2}
GLUCOSE SERPL-MCNC: 103 MG/DL (ref 70–99)
HCT VFR BLD AUTO: 39.9 % (ref 35–47)
HGB BLD-MCNC: 13.3 G/DL (ref 11.7–15.7)
IMM GRANULOCYTES # BLD: 0 10E9/L (ref 0–0.4)
IMM GRANULOCYTES NFR BLD: 0.1 %
LYMPHOCYTES # BLD AUTO: 1.8 10E9/L (ref 0.8–5.3)
LYMPHOCYTES NFR BLD AUTO: 22.6 %
MCH RBC QN AUTO: 28.4 PG (ref 26.5–33)
MCHC RBC AUTO-ENTMCNC: 33.3 G/DL (ref 31.5–36.5)
MCV RBC AUTO: 85 FL (ref 78–100)
MONOCYTES # BLD AUTO: 0.7 10E9/L (ref 0–1.3)
MONOCYTES NFR BLD AUTO: 9.4 %
NEUTROPHILS # BLD AUTO: 4.8 10E9/L (ref 1.6–8.3)
NEUTROPHILS NFR BLD AUTO: 61.7 %
NRBC # BLD AUTO: 0 10*3/UL
NRBC BLD AUTO-RTO: 0 /100
PLATELET # BLD AUTO: 356 10E9/L (ref 150–450)
POTASSIUM SERPL-SCNC: 4 MMOL/L (ref 3.4–5.3)
RBC # BLD AUTO: 4.69 10E12/L (ref 3.8–5.2)
SODIUM SERPL-SCNC: 139 MMOL/L (ref 133–144)
WBC # BLD AUTO: 7.7 10E9/L (ref 4–11)

## 2019-06-26 PROCEDURE — 36415 COLL VENOUS BLD VENIPUNCTURE: CPT | Mod: ZL | Performed by: FAMILY MEDICINE

## 2019-06-26 PROCEDURE — 99214 OFFICE O/P EST MOD 30 MIN: CPT | Performed by: FAMILY MEDICINE

## 2019-06-26 PROCEDURE — G0463 HOSPITAL OUTPT CLINIC VISIT: HCPCS

## 2019-06-26 PROCEDURE — 85025 COMPLETE CBC W/AUTO DIFF WBC: CPT | Mod: ZL | Performed by: FAMILY MEDICINE

## 2019-06-26 PROCEDURE — 80048 BASIC METABOLIC PNL TOTAL CA: CPT | Mod: ZL | Performed by: FAMILY MEDICINE

## 2019-06-26 ASSESSMENT — PAIN SCALES - GENERAL: PAINLEVEL: NO PAIN (0)

## 2019-06-26 ASSESSMENT — MIFFLIN-ST. JEOR: SCORE: 845.58

## 2019-06-26 NOTE — TELEPHONE ENCOUNTER
Rena or Danielle  Please fax labs CBC and BMP to [facility]'s patient is scheduled for Angiogram on 6/28/19.  Please call and schedule follow up for patient with Hailee Cortez NP in 2 weeks following procedure.   Rosetta Henriquez RN-BSN

## 2019-06-26 NOTE — PROGRESS NOTES
Patient is a 77 y/o female in with c/o snoring, observed apnea and recent acute respiratory failure.  Light snoring with scattered respiratory events noted, mainly while patient was supine.  All stages of sleep recorded, including supine REM.  Patient was not attempted on CPAP as most events were toward the end of study.  Patient tolerated study well.

## 2019-06-26 NOTE — TELEPHONE ENCOUNTER
Returned call.  Patient in for labs 6/26/2019.  Patient scheduled for angiogram at [facility] on 6/28/19.    Patient completed pre procedure labs 6/26/2019   Rosetta Henriquez RN-BSN

## 2019-06-26 NOTE — NURSING NOTE
"Chief Complaint   Patient presents with     Establish Care       Initial /62   Pulse 88   Temp 98.2  F (36.8  C) (Tympanic)   Resp 18   Ht 1.499 m (4' 11\")   Wt 46 kg (101 lb 6.4 oz)   SpO2 96%   BMI 20.48 kg/m   Estimated body mass index is 20.48 kg/m  as calculated from the following:    Height as of this encounter: 1.499 m (4' 11\").    Weight as of this encounter: 46 kg (101 lb 6.4 oz).  Medication Reconciliation: complete  "

## 2019-06-26 NOTE — TELEPHONE ENCOUNTER
Please call back patient's son, Amarjit, regarding canceling angiogram at the [place] on 7/5/2019.  Patient is going to [facility] on Friday 6/28/2019.    Son is requesting TRUNG Sargent to call him back.

## 2019-06-28 ENCOUNTER — TRANSFERRED RECORDS (OUTPATIENT)
Dept: HEALTH INFORMATION MANAGEMENT | Facility: CLINIC | Age: 79
End: 2019-06-28

## 2019-07-01 NOTE — TELEPHONE ENCOUNTER
Please request notes from [facility] angiogram which was completed on 6/29/19.   Thank you, Rosetta

## 2019-07-01 NOTE — TELEPHONE ENCOUNTER
Left a voicemail message for [facility] to send the angiogram report to the cardiology department.     Patient also scheduled her follow up with Hailee Cortez NP on 7/23/19 @ 2pm. This was the first opening for Hailee Cortez.

## 2019-07-02 ENCOUNTER — TELEPHONE (OUTPATIENT)
Dept: FAMILY MEDICINE | Facility: OTHER | Age: 79
End: 2019-07-02

## 2019-07-02 NOTE — TELEPHONE ENCOUNTER
Patient's son Zack notified via telephone for patient to begin Zoloft.     Angiogram went good - they did 2 stents.     Rebeca Lozano LPN on 7/2/2019 at 10:23 AM

## 2019-07-02 NOTE — TELEPHONE ENCOUNTER
Shanta from Cardiac rehab called earlier today and states that they received the faxed copies of patient's angiogram report and Shanta will fax to [facility] cardiology department.  Records were received and TRUNG Sargent gave to Hailee Cortez CNP.     Hailee Cortez, CNP:  Is the 7/23/2019 appointment ok for follow-up angiogram or should we work her into your schedule sooner?

## 2019-07-02 NOTE — TELEPHONE ENCOUNTER
I think that is reasonable.     If they can  the zoloft already sent, they can start with half a tab daily and increase if tolerating to the full tab after a week.     May continue buspar and ativan during this time we titrate up on zoloft. Will taper these down at our follow up.     How did the angiogram go?

## 2019-07-02 NOTE — TELEPHONE ENCOUNTER
Clinic Care Coordination Contact  Care Team Conversations    CC received a phone call this afternoon from pt's son Zack [phone].  He would like to follow up on things since his mom's angiogram/stenting last Friday 6/28.  He states they feel she would benefit from some home care nursing.  CC did explain that this would be temporary given her insurance but would definitely be of benefit at the current time for assessment of cardiac/respiratory status, medication management, and education.  CC also noted cardiac rehab is involved in this telephone encounter/discussion.  CC advised Zack that she would benefit from cardiac rehab but this needs to be ordered by cardiology.  A message is out to Hailee Cortez NP to determine if she would like to work [employer] in sooner than 7/23 to get moving on these things.  CC advised we will let them know what is advised.      Lauren Pipkin, BS-RN   CHF and General Care Coordinator  [phone] Option # 1

## 2019-07-02 NOTE — TELEPHONE ENCOUNTER
Son calls, would like mother to start Zoloft in nahum of Buspar.  Looks like Zoloft was sent to [facility]- but son said was never picked up  Wants to know opinion on hanging Buspar to Zoloft.  Please call son  Thanks    Michelle Knowles

## 2019-07-03 ENCOUNTER — TELEPHONE (OUTPATIENT)
Dept: CARDIOLOGY | Facility: OTHER | Age: 79
End: 2019-07-03

## 2019-07-03 DIAGNOSIS — Z98.890 STATUS POST CORONARY ANGIOGRAM: ICD-10-CM

## 2019-07-03 DIAGNOSIS — I50.22 CHRONIC SYSTOLIC CONGESTIVE HEART FAILURE (H): Primary | Chronic | ICD-10-CM

## 2019-07-03 DIAGNOSIS — Z95.5 S/P DRUG ELUTING CORONARY STENT PLACEMENT: ICD-10-CM

## 2019-07-03 DIAGNOSIS — E78.5 HYPERLIPIDEMIA, UNSPECIFIED HYPERLIPIDEMIA TYPE: ICD-10-CM

## 2019-07-03 RX ORDER — LISINOPRIL 5 MG/1
5 TABLET ORAL DAILY
Qty: 90 TABLET | Refills: 3 | Status: SHIPPED | OUTPATIENT
Start: 2019-07-03 | End: 2019-12-17

## 2019-07-03 RX ORDER — CLOPIDOGREL BISULFATE 75 MG/1
75 TABLET ORAL DAILY
Qty: 90 TABLET | Refills: 3 | Status: SHIPPED | OUTPATIENT
Start: 2019-07-03 | End: 2019-07-23

## 2019-07-03 RX ORDER — SIMVASTATIN 40 MG
40 TABLET ORAL AT BEDTIME
Status: CANCELLED | OUTPATIENT
Start: 2019-07-03

## 2019-07-03 RX ORDER — LOSARTAN POTASSIUM 25 MG/1
25 TABLET ORAL DAILY
Status: CANCELLED | OUTPATIENT
Start: 2019-07-03

## 2019-07-03 RX ORDER — FUROSEMIDE 40 MG
40 TABLET ORAL 2 TIMES DAILY
Qty: 60 TABLET | Refills: 3 | Status: SHIPPED | OUTPATIENT
Start: 2019-07-03 | End: 2019-07-23

## 2019-07-03 RX ORDER — METOPROLOL TARTRATE 25 MG/1
25 TABLET, FILM COATED ORAL 2 TIMES DAILY
Status: CANCELLED | OUTPATIENT
Start: 2019-07-03

## 2019-07-03 RX ORDER — ATORVASTATIN CALCIUM 40 MG/1
40 TABLET, FILM COATED ORAL AT BEDTIME
Qty: 90 TABLET | Refills: 3 | Status: SHIPPED | OUTPATIENT
Start: 2019-07-03 | End: 2019-12-17

## 2019-07-03 RX ORDER — METOPROLOL SUCCINATE 25 MG/1
25 TABLET, EXTENDED RELEASE ORAL DAILY
Qty: 90 TABLET | Refills: 3 | Status: SHIPPED | OUTPATIENT
Start: 2019-07-03 | End: 2019-07-23

## 2019-07-03 NOTE — TELEPHONE ENCOUNTER
11:29 AM    Reason for Call: Phone Call    Description: Received call from Cris Whelan (pt daughter) asking for refills on prescriptions that patient has gotten from being in the [facility] in [place] for angiogram and had 2 stints placed. States the hospital has only given her a 30 day supply of medications and needs them [facility]. I did let daughter know that losartan 25 mg dialy, plavix 75 mg daily, and xerelto 20 mg daily have never been prescribed by Hailee Cortez, HIWOT and am unsure if she would be willing to fill these without seeing patient. Has an appointment on 7-23-19 with Diego. Daughter also requesting to have new orders for increase in medications from hospitalization of increased lasix to 40 mg twice daily, increase simvastatin to 40 mg daily (not previously prescribed by Diego), and increased metoprolol to 25 mg twice daily.   Daughter stated both hospital and pharmacy said to call cardiologist for these refills.   Daughter did verbalize that [facility] is out until July 16th. Cris states that she will have enough medications until then but would like a call back to discuss these medication either way at Hailee Cortez earliest convenience. Medications pended approval.  Sent message to [facility] to get records from [facility]  [phone]       Christin Varner RN

## 2019-07-03 NOTE — TELEPHONE ENCOUNTER
Please get records from [facility] in [place] about patient recent angiogram. Thank you.

## 2019-07-03 NOTE — TELEPHONE ENCOUNTER
Clinic Care Coordination Contact  Care Team Conversations    CC phoned Zack back and advised his mom is to stop the losartan and take the lisinopril in its place.  Rx was sent to [facility] so it may take a bit for her to get it.  Ok to stay on the losartan until the lisinopril arrives and then discontinue.    Lauren Pipkin, BS-RN   CHF and General Care Coordinator  [phone] Option # 1

## 2019-07-03 NOTE — TELEPHONE ENCOUNTER
Conner, Kristina L, APRN CNP Pipkin, Lauren N, RN   Caller: Unspecified (1 week ago,  2:43 PM)             I have signed order for cardiac rehab and would be fine with short term home care services. Patient should follow-up with cardiology in 1-2 weeks if not already scheduled.   Thanks,   Hailee Cortez

## 2019-07-03 NOTE — TELEPHONE ENCOUNTER
Hailee Cortez APRN CNP  Twa, Christin SUAREZ, RN             I filled patients medications and made a few adjustments for improved therapy with her systolic heart failure. She will be on Plavix and xarelto following stent placement. She is not to take ASA along with the Xarelto and Plavix.   Thanks,   Hailee Cortez

## 2019-07-03 NOTE — TELEPHONE ENCOUNTER
Clinic Care Coordination Contact  Care Team Conversations    Pt's son Zack was updated on the medication adjustments Hailee Cortez made today.  Advised all meds were sent to [facility] as requested.      *Please clarify Hailee, however, if pt is, in fact, to continue on losartan in addition to the metoprolol and lisinopril.    Also advised orders were placed for home care and cardiac rehab.  They will be hearing from these entities to discuss scheduling.    Lauren Pipkin, BS-RN   CHF and General Care Coordinator  [phone] Option # 1

## 2019-07-03 NOTE — TELEPHONE ENCOUNTER
Hailee Cortez, COCO CNP  You 5 minutes ago (4:07 PM)      Stop Losartan, lisinopril is in place of this.   Thanks,   Hailee Cortez

## 2019-07-03 NOTE — TELEPHONE ENCOUNTER
Conner, Kristina L, APRN CNP Pipkin, Lauren N, RN   Caller: Unspecified (1 week ago,  2:43 PM)             Order signed, not sure if will qualify for services. I did make a few changes to medications to better treat systolic heart failure. I would like her to be on Plavix and xarelto. No ASA.   Thanks,   Hailee Cortez

## 2019-07-03 NOTE — TELEPHONE ENCOUNTER
Clinic Care Coordination Contact  Care Team Conversations    CC reviewed this telephone note from earlier today and noted Hailee Cortez NP has refilled most of the medications in question with refills sent to [facility].  The only one that was not filled was the losartan 25mg which the daughter is requesting and is listed as a 'patient reported' med on the med list.  Also, rx for Lipitor (atorvastatin) 40mg was sent instead of the Zocor (simvastatin) 40mg.  CC will check with Hailee to clarify if this change was intended and if pt should be on losartan can this be sent to [facility] as well.    Lauren Pipkin, BS-RN   CHF and General Care Coordinator  [phone] Option # 1

## 2019-07-05 ENCOUNTER — TELEPHONE (OUTPATIENT)
Dept: CARDIAC REHAB | Facility: HOSPITAL | Age: 79
End: 2019-07-05

## 2019-07-05 ENCOUNTER — SURGERY (OUTPATIENT)
Age: 79
End: 2019-07-05
Payer: MEDICARE

## 2019-07-05 NOTE — PROCEDURES
Patient summary:    77 yo F with complex PMHx (see summary below) who is referred for concern for sleep-disordered breathing and potential need for non-invasive ventilation.    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    Interp for PSG dated 6/19/2019.    Total sleep time 295.5 minutes, sleep efficiency 60%, sleep latency 33.5 minutes, REM latency 83 minutes.  Sleep architecture was highly fragmented with prolonged middle of the night awakening.  Supine REM was was observed.    AHI 25.6, RDI 38.2, events appearing primarily obstructive in nature.  Mean Spo2 88%, kaylee SpO2 84%, 86.1% of recording was <= 89%.  Seen to have sustained hypoxemia in mid- to high-80's in supine sleep position.  EKG appeared consistently NSR, though with biphasic P waves.  Rare PLM's observed.     A/P:  - Moderate to severe HUGH with significant sleep-associated hypoxemia and sustained hypoxemia in supine sleep position.  - Suspect that HUGH and hypoxemia may have improved since time of PSG given interim placed of cardiac stents x2 and start of treatment for paroxysmal atrial fibrillation.

## 2019-07-09 ENCOUNTER — TELEPHONE (OUTPATIENT)
Dept: CARDIAC REHAB | Facility: HOSPITAL | Age: 79
End: 2019-07-09

## 2019-07-09 NOTE — TELEPHONE ENCOUNTER
Lita called regarding the message that was previously left for her. She is not interested in starting Cardiac Rehab at this time. She would like a call back after the 26th of July.

## 2019-07-11 NOTE — TELEPHONE ENCOUNTER
Clinic Care Coordination Contact  Care Team Conversations    CC notes pt has declined cardiac rehab.  Will inform providers.    Lauren Pipkin, BS-RN   CHF and General Care Coordinator  [phone] Option # 1

## 2019-07-15 ENCOUNTER — TELEPHONE (OUTPATIENT)
Dept: CARDIOLOGY | Facility: OTHER | Age: 79
End: 2019-07-15

## 2019-07-15 DIAGNOSIS — I50.22 CHRONIC SYSTOLIC CONGESTIVE HEART FAILURE (H): Primary | Chronic | ICD-10-CM

## 2019-07-15 NOTE — TELEPHONE ENCOUNTER
Patient notified to return for labs.   Agrees and verbalized understanding  Rosetta Henriquez RN-BSN

## 2019-07-15 NOTE — TELEPHONE ENCOUNTER
"Incoming call from patient states that she feels she is taking too much lasix. Patient is currently taking Lasix 40 mg every morning.  Describes feeling dehydrated. Patient states she has a headache, lightheaded, and cramping stomach pains.  Patient also reports poor appetite. Patient states her weight is now 94#.  On 7/2/19 she weighed 104# in the office.  Patient is negative for fainting, fever, chills, vomiting and chest pain.      7/2/19 6/26/19 6/18/19 6/6/19 5/17/19   /66 102/62 113/71 105/69 123/73   Pulse 76 88 86 [on BP machine] 86 86   Resp 14 18 24 14 18   Height 1.499 m (4' 11\") 1.499 m (4' 11\") 1.499 m (4' 11\") -- 1.575 m (5' 2\")   Weight 47.2 kg (104 lb) 46 kg (101 lb 6.4 oz) 48.1 kg (106 lb) -- 50.8 kg (112 lb)   BMI (Calculated) 21.01 20.48 21.41 -- 20.48     Please advise any further changes.     Rosetta Henriquez RN-BSN    "

## 2019-07-15 NOTE — TELEPHONE ENCOUNTER
Hailee Cortez, APRN CNP  You 9 minutes ago (3:48 PM)      Order signed.   Thanks,   CATALINA    Routing comment

## 2019-07-15 NOTE — TELEPHONE ENCOUNTER
Hailee Cortez, APRN CNP  You 6 minutes ago (2:10 PM)      She may cut back to 20 mg daily. Check BMP.   Thanks,   Hailee Cortez    Routing comment

## 2019-07-16 DIAGNOSIS — I50.23 ACUTE ON CHRONIC SYSTOLIC CONGESTIVE HEART FAILURE (H): ICD-10-CM

## 2019-07-16 DIAGNOSIS — J43.2 CENTRILOBULAR EMPHYSEMA (H): ICD-10-CM

## 2019-07-16 DIAGNOSIS — J96.01 ACUTE RESPIRATORY FAILURE WITH HYPOXIA (H): ICD-10-CM

## 2019-07-16 RX ORDER — LORAZEPAM 0.5 MG/1
0.25 TABLET ORAL EVERY 6 HOURS PRN
Qty: 6 TABLET | Refills: 0 | Status: SHIPPED | OUTPATIENT
Start: 2019-07-16 | End: 2019-07-31

## 2019-07-16 NOTE — TELEPHONE ENCOUNTER
LORazepam (ATIVAN) 0.5 MG tablet      Last Written Prescription Date:  6/11/19  Last Fill Quantity: 6,   # refills: 0  Last Office Visit: 6/26/19  Future Office visit:    Next 5 appointments (look out 90 days)    Jul 23, 2019  2:15 PM CDT  (Arrive by 2:00 PM)  Return Visit with COCO Mccartney CNP  [facility] [address]  [place] [address]  [phone]   Aug 02, 2019  2:00 PM CDT  (Arrive by 1:45 PM)  SHORT with Ophelia Troncoso MD  [facility] [address] [place] [address]  [phone]           Routing refill request to provider for review/approval because:  Drug not on the G, P or [facility] refill protocol or controlled substance

## 2019-07-18 DIAGNOSIS — I50.22 CHRONIC SYSTOLIC CONGESTIVE HEART FAILURE (H): Chronic | ICD-10-CM

## 2019-07-18 LAB
ANION GAP SERPL CALCULATED.3IONS-SCNC: 6 MMOL/L (ref 3–14)
BUN SERPL-MCNC: 16 MG/DL (ref 7–30)
CALCIUM SERPL-MCNC: 8.9 MG/DL (ref 8.5–10.1)
CHLORIDE SERPL-SCNC: 101 MMOL/L (ref 94–109)
CO2 SERPL-SCNC: 27 MMOL/L (ref 20–32)
CREAT SERPL-MCNC: 1.04 MG/DL (ref 0.52–1.04)
GFR SERPL CREATININE-BSD FRML MDRD: 51 ML/MIN/{1.73_M2}
GLUCOSE SERPL-MCNC: 101 MG/DL (ref 70–99)
POTASSIUM SERPL-SCNC: 4 MMOL/L (ref 3.4–5.3)
SODIUM SERPL-SCNC: 134 MMOL/L (ref 133–144)

## 2019-07-18 PROCEDURE — 80048 BASIC METABOLIC PNL TOTAL CA: CPT | Mod: ZL | Performed by: NURSE PRACTITIONER

## 2019-07-18 PROCEDURE — 36415 COLL VENOUS BLD VENIPUNCTURE: CPT | Mod: ZL | Performed by: NURSE PRACTITIONER

## 2019-07-22 ENCOUNTER — TELEPHONE (OUTPATIENT)
Dept: FAMILY MEDICINE | Facility: OTHER | Age: 79
End: 2019-07-22

## 2019-07-22 NOTE — TELEPHONE ENCOUNTER
Pts son called and is concerned about her being on the ativan, buspar, and zoloft. He understands that she will be on the zoloft long term but is wondering if she should start weaning off the buspar. Please call son (Zack) back at  [phone]

## 2019-07-23 ENCOUNTER — OFFICE VISIT (OUTPATIENT)
Dept: CARDIOLOGY | Facility: OTHER | Age: 79
End: 2019-07-23
Attending: NURSE PRACTITIONER
Payer: MEDICARE

## 2019-07-23 VITALS
DIASTOLIC BLOOD PRESSURE: 37 MMHG | HEIGHT: 59 IN | BODY MASS INDEX: 20.4 KG/M2 | SYSTOLIC BLOOD PRESSURE: 117 MMHG | RESPIRATION RATE: 16 BRPM | OXYGEN SATURATION: 97 % | WEIGHT: 101.2 LBS | HEART RATE: 61 BPM

## 2019-07-23 DIAGNOSIS — I50.22 CHRONIC SYSTOLIC CONGESTIVE HEART FAILURE (H): Chronic | ICD-10-CM

## 2019-07-23 DIAGNOSIS — Z95.5 S/P DRUG ELUTING CORONARY STENT PLACEMENT: Primary | ICD-10-CM

## 2019-07-23 DIAGNOSIS — J43.2 CENTRILOBULAR EMPHYSEMA (H): ICD-10-CM

## 2019-07-23 DIAGNOSIS — Z98.890 STATUS POST CORONARY ANGIOGRAM: ICD-10-CM

## 2019-07-23 DIAGNOSIS — I10 BENIGN ESSENTIAL HYPERTENSION: ICD-10-CM

## 2019-07-23 PROBLEM — I50.21 ACUTE SYSTOLIC HEART FAILURE (H): Status: RESOLVED | Noted: 2019-06-18 | Resolved: 2019-07-23

## 2019-07-23 PROCEDURE — 99214 OFFICE O/P EST MOD 30 MIN: CPT | Performed by: NURSE PRACTITIONER

## 2019-07-23 PROCEDURE — G0463 HOSPITAL OUTPT CLINIC VISIT: HCPCS

## 2019-07-23 RX ORDER — METOPROLOL SUCCINATE 25 MG/1
25 TABLET, EXTENDED RELEASE ORAL DAILY
Qty: 30 TABLET | Refills: 0 | Status: SHIPPED | OUTPATIENT
Start: 2019-07-23 | End: 2019-07-23

## 2019-07-23 RX ORDER — SPIRONOLACTONE 25 MG/1
12.5 TABLET ORAL DAILY
Qty: 30 TABLET | Refills: 3 | Status: SHIPPED | OUTPATIENT
Start: 2019-07-23 | End: 2020-01-07

## 2019-07-23 RX ORDER — SPIRONOLACTONE 25 MG/1
12.5 TABLET ORAL DAILY
Qty: 30 TABLET | Refills: 3 | Status: SHIPPED | OUTPATIENT
Start: 2019-07-23 | End: 2019-07-23

## 2019-07-23 RX ORDER — FUROSEMIDE 40 MG
20 TABLET ORAL DAILY
Start: 2019-07-23 | End: 2019-12-17

## 2019-07-23 RX ORDER — METOPROLOL SUCCINATE 25 MG/1
25 TABLET, EXTENDED RELEASE ORAL DAILY
Qty: 30 TABLET | Refills: 11 | Status: SHIPPED | OUTPATIENT
Start: 2019-07-23 | End: 2020-01-20

## 2019-07-23 RX ORDER — SERTRALINE HYDROCHLORIDE 25 MG/1
25 TABLET, FILM COATED ORAL DAILY
Qty: 30 TABLET | Refills: 0 | Status: SHIPPED | OUTPATIENT
Start: 2019-07-23 | End: 2019-08-12

## 2019-07-23 ASSESSMENT — MIFFLIN-ST. JEOR: SCORE: 844.67

## 2019-07-23 ASSESSMENT — PAIN SCALES - GENERAL: PAINLEVEL: MILD PAIN (2)

## 2019-07-23 NOTE — NURSING NOTE
"Chief Complaint   Patient presents with     RECHECK     Post angiogram, 2 stents placed.  Pt denies any issues such as chest pain/pressure/tightness, SOB, dizziness or lightheadedness.       Initial BP (!) 117/37 (BP Location: Left arm, Patient Position: Chair, Cuff Size: Adult Regular)   Pulse 61   Resp 16   Ht 1.499 m (4' 11\")   Wt 45.9 kg (101 lb 3.2 oz)   SpO2 97%   BMI 20.44 kg/m   Estimated body mass index is 20.44 kg/m  as calculated from the following:    Height as of this encounter: 1.499 m (4' 11\").    Weight as of this encounter: 45.9 kg (101 lb 3.2 oz).  Medication Reconciliation: complete     Keily Frost RN    "

## 2019-07-23 NOTE — PATIENT INSTRUCTIONS
You were seen by Hailee Cortez, 7/23/2019.    1.  Start taking spironolactone 12.5 mg once daily.       2.  Decrease your dose of Lasix to 20 mg once daily in the morning.    3.  Hold your potassium supplement for now.    4.  Please return to the clinic in 1 week for labs.  We are going to check your electrolytes and kidney function after starting spironolactone.      5.  Start Cardiac Rehab.      You will follow up with Hailee Cortez CNP, in 3 months.    Please call the cardiology office with problems, questions, or concerns at [phone].    If you experience chest pain, chest pressure, chest tightness, shortness of breath, fainting, lightheadedness, nausea, vomiting, or other concerning symptoms, please report to the Emergency Department or call 911. These symptoms may be emergent, and best treated in the Emergency Department.     Keily IZAGUIRRE RN  Cardiology   [facility]  [phone]

## 2019-07-23 NOTE — PROGRESS NOTES
[facility] HEART CARE   CARDIOLOGY CONSULT     Lita Ocasio   [address] [facility] [address]      Ophelia Troncoso     Chief Complaint   Patient presents with     RECHECK     Post angiogram, 2 stents placed.  Pt denies any issues such as chest pain/pressure/tightness, SOB, dizziness or lightheadedness.        HPI:   Ms. Ocasio is a 78 year old female who presents for cardiology follow-up status post coronary angiography on 6/26/19 at [facility]. She was recently identified to have newly identified systolic heart failure.  Additionally, she has a history of hypertension, hyperlipidemia, nonrheumatic mitral regurgitation, central lobular emphysema, hypothyroidism, osteopenia, hyperparathyroidism, history of tobacco abuse and chronic rhinitis.    At her last visit patient was accompanied by her son who was very helpful in providing patients past medical history. He admits approximately 20 years ago patient was being followed for MR which was noted to be improved and therefore, vlave surgery was never recommended. He admits that she was previously told it was suspected she had small MI's without coronary intervention or identified ACS. Finally, recent history just before new years this last year of acute hemorrhagic stroke. It was following this that she was taken off her beta blocker and lisinopril for HTN and possible past MI, she reports increased shortness of breath following this which has progressively worsened and likely multifactorial with COPD and HFrEF.     At her last visit we reviewed HFrEF and possible etiology, may be secondary to history of MR or possible ischemic heart disease with questionable past infarcts. She was started on low dose Toprol XL 12.5 mg daily to initiate GDMT, closely monitor soft pressures with this. Recommended she reduce sodium intake to no more than 3,000 mg daily, goal 2,500mg daily. Fluid intake to less than 2L daily which she is not exceeding. Recommended she begin weighing herself  on a daily basis and call with a 3lb weight gain in one day or 5 lb weight gain in one week. A Lexiscan stress test was ordered at that time with reduced LVEF and history of possible past MI.     Results of recent Lexiscan stress test with abnormal imaging with reversible ischemia laterally and LVEF 36%. Additionally, in review of past CT chest imaging from one year ago, she was noted to have advanced atherosclerotic disease with bulky coronary and aortic calcifications. Given these findings, current symptoms and new acute systolic failure, recommended coronary angiography.    Patient underwent coronary angiography on 6/26/19 at [facility]. She was identified to have diffuse coronary atherosclerosis with single vessel obstructive CAD, ostial and mid LCX. Successful PCI with synergy LISS x2, mid LCX 2.5x16mm, ostial LCX 3.0 x 16 mm. LVEDP 26-29 mmHg. During revascularization she was identified to have AF for which she received DCCV x1 with return to sinus and then reverted back to AF. She was treated with IV beta blocker for rate control.     Today patient reports that she has been feeling good. Breathing has significantly improved and increased energy. No concerns today.    PAST MEDICAL HISTORY:   Past Medical History:   Diagnosis Date     Acquired hypothyroidism 5/12/2019     Asthma      Benign essential hypertension 5/12/2019     Centrilobular emphysema (H) 5/12/2019     Chronic rhinitis 8/16/2013     Chronic systolic congestive heart failure (H) 5/12/2019     Constipation      Hearing loss      Heart trouble      Hemorrhagic cerebrovascular accident (CVA) (H) 01/2019     Hyperlipidemia 5/12/2019     Hypertension      Nasal congestion      Non-rheumatic mitral regurgitation 5/12/2019     Osteopenia 5/12/2019     Osteoporosis      Parathyroid related hypercalcemia (H) 8/18/2013     Primary hyperparathyroidism (H) 9/5/2013     Ringing in ears      Sensorineural hearing loss, asymmetrical 8/16/2013     Sneezing       Snoring      Thyroid disease      Weakness      Weight loss           FAMILY HISTORY:   Family History   Problem Relation Age of Onset     Hearing Loss Mother      Heart Disease Mother      Myocardial Infarction Mother      Cerebrovascular Disease Father      Cancer Brother         throat     Cancer Sister      Myocardial Infarction Sister      Cancer Maternal Grandmother      Heart Disease Maternal Grandfather      Aortic aneurysm Son           PAST SURGICAL HISTORY:   Past Surgical History:   Procedure Laterality Date     CATARACT IOL, RT/LT Bilateral      COLONOSCOPY       COLONOSCOPY - HIM SCAN  2009    Colonoscopy - UM/NL       ENT SURGERY      para thyroid surgery     ENT SURGERY  2013    Parathyroid     PARATHYROIDECTOMY  9/10/2013    Procedure: PARATHYROIDECTOMY;  Reoperative Neck Exploration, Resection of right Parathyroid adenoma;  Surgeon: Thalia Muller MD;  Location: [facility] OR     TONSILLECTOMY       TUBAL LIGATION            SOCIAL HISTORY:   Social History     Socioeconomic History     Marital status:      Spouse name: None     Number of children: None     Years of education: None     Highest education level: None   Occupational History     None   Social Needs     Financial resource strain: None     Food insecurity:     Worry: None     Inability: None     Transportation needs:     Medical: None     Non-medical: None   Tobacco Use     Smoking status: Former Smoker     Packs/day: 1.00     Years: 50.00     Pack years: 50.00     Types: Cigarettes     Last attempt to quit: 2019     Years since quittin.4     Smokeless tobacco: Never Used   Substance and Sexual Activity     Alcohol use: Yes     Comment: social     Drug use: No     Sexual activity: None   Lifestyle     Physical activity:     Days per week: None     Minutes per session: None     Stress: None   Relationships     Social connections:     Talks on phone: None     Gets together: None     Attends [employer]  service: None     Active member of club or organization: None     Attends meetings of clubs or organizations: None     Relationship status: None     Intimate partner violence:     Fear of current or ex partner: None     Emotionally abused: None     Physically abused: None     Forced sexual activity: None   Other Topics Concern     Parent/sibling w/ CABG, MI or angioplasty before 65F 55M? Not Asked   Social History Narrative     None          CURRENT MEDICATIONS:   Prior to Admission medications    Medication Sig Start Date End Date Taking? Authorizing Provider   albuterol (PROAIR RESPICLICK) 108 (90 Base) MCG/ACT inhaler Inhale 2 puffs into the lungs every 4 hours as needed for shortness of breath / dyspnea or wheezing 4/24/19  Yes Joe Jerez MD   aspirin 81 MG tablet Take 1 tablet by mouth daily   Yes Reported, Patient   busPIRone (BUSPAR) 5 MG tablet Take 1 tablet (5 mg) by mouth 2 times daily 5/29/19  Yes Ophelia Troncoso MD   calcium carbonate (TUMS) 500 MG chewable tablet Take 1 chew tab by mouth daily   Yes Reported, Patient   Fexofenadine HCl (ALLEGRA PO) Take 180 mg by mouth daily    Yes Unknown, Entered By History   fluticasone (FLONASE) 50 MCG/ACT spray Spray 2 sprays into both nostrils daily 9/19/18  Yes Gali Dominguez APRN CNP   furosemide (LASIX) 20 MG tablet Take 1 tablet (20 mg) by mouth 2 times daily 5/31/19  Yes Ophelia Troncoso MD   ipratropium - albuterol 0.5 mg/2.5 mg/3 mL (DUONEB) 0.5-2.5 (3) MG/3ML neb solution Take 1 vial by nebulization every 6 hours as needed for shortness of breath / dyspnea or wheezing   Yes Reported, Patient   LEVOTHYROXINE SODIUM PO Take 75 mcg by mouth daily   Yes Reported, Patient   LORazepam (ATIVAN) 0.5 MG tablet Take 0.5 tablets (0.25 mg) by mouth every 6 hours as needed for anxiety or sleep 5/14/19  Yes Mindy Ware, NP   NONFORMULARY Tumeric 500mg BID   Yes Reported, Patient   potassium chloride ER (K-DUR/KLOR-CON M) 20 MEQ CR tablet Take 1  tablet (20 mEq) by mouth 2 times daily 5/23/19  Yes Ophelia Troncoso MD   sertraline (ZOLOFT) 25 MG tablet Take 1 tablet (25 mg) by mouth daily 5/15/19  Yes Mindy Ware NP   SIMVASTATIN PO Take 20 mg by mouth At Bedtime   Yes Reported, Patient   Tafluprost (ZIOPTAN) 0.0015 % SOLN Place 1 drop into both eyes At Bedtime   Yes Reported, Patient   vitamin B complex with vitamin C (VITAMIN  B COMPLEX) tablet Take 1 tablet by mouth daily Takes 500mg of Vitamin B   Yes Reported, Patient   Vitamin D, Cholecalciferol, 1000 units TABS Take 1 tablet by mouth daily   Yes Reported, Patient   vitamin E 400 units TABS Take by mouth daily   Yes Reported, Patient   Zafirlukast (ACCOLATE PO) Take 20 mg by mouth 2 times daily (before meals)    Yes Reported, Patient   CLINDAMYCIN HCL PO Take 600 mg by mouth 1 hour prior to dental work    Reported, Patient          ALLERGIES:   Allergies   Allergen Reactions     Evista [Raloxifene] Other (See Comments)     hypercalcemia     Prednisone GI Disturbance     Combigan [Brimonidine Tartrate-Timolol] Other (See Comments)     Eye swelling and itchy     Latex Rash     Levaquin [Levofloxacin] Muscle Pain (Myalgia)     Penicillins Rash     Restasis      Pt reports using eye gtts and having red irritated eyes      ROS:   CONSTITUTIONAL: No reported fever or chills. No weight gain.  ENT: No visual disturbance, ear ache, epistaxis or sore throat.   CARDIOVASCULAR: No chest pain, chest pressure or chest discomfort. No palpitations or lower extremity edema.   RESPIRATORY: Reports that her breathing has significantly improved post PCI. No reported cough, wheezing or hemoptysis. No reports of orthopnea or PND.  GI: No reported abdominal pain or distension. No reported melena or hematochezia.  : No reported hematuria or dysuria.   NEUROLOGICAL: No lightheadedness or dizziness. No recurrence of syncope. No ataxia, paresthesias or weakness.   MUSCULOSKELETAL: No reported joint pain or swelling,  "no muscle pain.  ENDOCRINOLOGIC: No temperature intolerance. No hair or skin changes.  SKIN: No abnormal rashes or sores, no unusual itching.  PSYCHIATRIC: Positive for anxiety.      PHYSICAL EXAM:   BP (!) 117/37 (BP Location: Left arm, Patient Position: Chair, Cuff Size: Adult Regular)   Pulse 61   Resp 16   Ht 1.499 m (4' 11\")   Wt 45.9 kg (101 lb 3.2 oz)   SpO2 97%   BMI 20.44 kg/m    GENERAL: The patient is a well-developed, well-nourished, in no apparent distress.  HEENT: Head is normocephalic and atraumatic. Eyes are symmetrical with normal visual tracking. No icterus, no xanthelasmas. Nares appeared normal without nasal drainage. Mucous membranes are moist, no cyanosis.  NECK: Supple. NO JVP visible.  CHEST/ LUNGS: Lungs sounds diminished to auscultation. No rales, rhonchi or wheezes, no use of accessory muscles, no retractions, respirations unlabored and normal respiratory rate.   CARDIO: Regular rate and rhythm normal with S1 and S2, no S3 or S4 and no murmur, click or rub.   ABD: Abdomen is nondistended.   EXTREMITIES: No edema present.   MUSCULOSKELETAL: No visible joint swelling.   NEUROLOGIC: Alert and oriented X3. Normal speech, gait and affect. No focal neurologic deficits.   SKIN: No jaundice. No rashes or visible skin lesions present. No ecchymosis.     LAB RESULTS:   Orders Only on 05/22/2019   Component Date Value Ref Range Status     Sodium 05/22/2019 139  133 - 144 mmol/L Final     Potassium 05/22/2019 3.3* 3.4 - 5.3 mmol/L Final     Chloride 05/22/2019 105  94 - 109 mmol/L Final     Carbon Dioxide 05/22/2019 25  20 - 32 mmol/L Final     Anion Gap 05/22/2019 9  3 - 14 mmol/L Final     Glucose 05/22/2019 90  70 - 99 mg/dL Final     Urea Nitrogen 05/22/2019 15  7 - 30 mg/dL Final     Creatinine 05/22/2019 1.16* 0.52 - 1.04 mg/dL Final     GFR Estimate 05/22/2019 45* >60 mL/min/[1.73_m2] Final     GFR Estimate If Black 05/22/2019 52* >60 mL/min/[1.73_m2] Final     Calcium 05/22/2019 8.6  8.5 " - 10.1 mg/dL Final     N-Terminal Pro Bnp 05/22/2019 3,726* 0 - 450 pg/mL Final   Office Visit on 05/17/2019   Component Date Value Ref Range Status     Sodium 05/17/2019 141  133 - 144 mmol/L Final     Potassium 05/17/2019 3.6  3.4 - 5.3 mmol/L Final     Chloride 05/17/2019 108  94 - 109 mmol/L Final     Carbon Dioxide 05/17/2019 27  20 - 32 mmol/L Final     Anion Gap 05/17/2019 6  3 - 14 mmol/L Final     Glucose 05/17/2019 84  70 - 99 mg/dL Final     Urea Nitrogen 05/17/2019 18  7 - 30 mg/dL Final     Creatinine 05/17/2019 1.08* 0.52 - 1.04 mg/dL Final     GFR Estimate 05/17/2019 49* >60 mL/min/[1.73_m2] Final     GFR Estimate If Black 05/17/2019 57* >60 mL/min/[1.73_m2] Final     Calcium 05/17/2019 9.7  8.5 - 10.1 mg/dL Final     WBC 05/17/2019 8.4  4.0 - 11.0 10e9/L Final     RBC Count 05/17/2019 5.04  3.8 - 5.2 10e12/L Final     Hemoglobin 05/17/2019 14.8  11.7 - 15.7 g/dL Final     Hematocrit 05/17/2019 44.0  35.0 - 47.0 % Final     MCV 05/17/2019 87  78 - 100 fl Final     MCH 05/17/2019 29.4  26.5 - 33.0 pg Final     MCHC 05/17/2019 33.6  31.5 - 36.5 g/dL Final     RDW 05/17/2019 13.4  10.0 - 15.0 % Final     Platelet Count 05/17/2019 310  150 - 450 10e9/L Final     Diff Method 05/17/2019 Automated Method   Final     % Neutrophils 05/17/2019 64.6  % Final     % Lymphocytes 05/17/2019 19.8  % Final     % Monocytes 05/17/2019 10.1  % Final     % Eosinophils 05/17/2019 4.4  % Final     % Basophils 05/17/2019 0.7  % Final     % Immature Granulocytes 05/17/2019 0.4  % Final     Nucleated RBCs 05/17/2019 0  0 /100 Final     Absolute Neutrophil 05/17/2019 5.4  1.6 - 8.3 10e9/L Final     Absolute Lymphocytes 05/17/2019 1.7  0.8 - 5.3 10e9/L Final     Absolute Monocytes 05/17/2019 0.8  0.0 - 1.3 10e9/L Final     Absolute Eosinophils 05/17/2019 0.4  0.0 - 0.7 10e9/L Final     Absolute Basophils 05/17/2019 0.1  0.0 - 0.2 10e9/L Final     Abs Immature Granulocytes 05/17/2019 0.0  0 - 0.4 10e9/L Final     Absolute  Nucleated RBC 05/17/2019 0.0   Final     TSH 05/17/2019 2.79  0.40 - 4.00 mU/L Final     N-Terminal Pro Bnp 05/17/2019 5,830* 0 - 450 pg/mL Final          ASSESSMENT:   Lita Ocasio presents for cardiology follow-up status post coronary angiography on 6/26/19 at [facility]. She was recently identified to have newly identified systolic heart failure.  Additionally, she has a history of hypertension, hyperlipidemia, nonrheumatic mitral regurgitation, central lobular emphysema, hypothyroidism, osteopenia, hyperparathyroidism, history of tobacco abuse and chronic rhinitis.  Patient underwent coronary angiography on 6/26/19 at [facility]. She was identified to have diffuse coronary atherosclerosis with single vessel obstructive CAD, ostial and mid LCX. Successful PCI with synergy LISS x2, mid LCX 2.5x16mm, ostial LCX 3.0 x 16 mm. LVEDP 26-29 mmHg. During revascularization she was identified to have AF for which she received DCCV x1 with return to sinus and then reverted back to AF. She was treated with IV beta blocker for rate control.   Today patient reports that she has been feeling good. Breathing has significantly improved and increased energy. No concerns today.    PLAN:  1. S/P drug eluting coronary stent placement  See above, successful PCI with synergy LISS x2, mid LCX 2.5x16mm, ostial LCX 3.0 x 16 mm.   Continue with Plavix and Xarelto.   Continue with high intensity statin.   Referral to cardiac rehab.    2. Chronic systolic congestive heart failure (H)  HFrEF, LVEF 36%, NYHA FC II  Continue with Toprol XL and ACEi  Begin spironolactone 12.5 mg daily. Return for BMP in one week.  Hold potassium supplement.  Continue on Lasix 20 mg daily.  Continue with sodium restricted diet, daily weight monitoring and fluid restriction.    3. Centrilobular emphysema (H)  Stable. Reports breathing improved post PCI.    4. Status post coronary angiogram  See above.    5. Benign essential hypertension  BP stable.    Thank you for  allowing me to participate in the care of your patient. Please do not hesitate to contact me if you have any questions.     Hailee Cortez

## 2019-07-23 NOTE — TELEPHONE ENCOUNTER
Patients adarsh Dixon notified via telephone to decrease buspar to once daily for 14 days then discontinue.     Rebeca Lozano LPN on 7/23/2019 at 8:19 AM

## 2019-07-24 ENCOUNTER — PATIENT OUTREACH (OUTPATIENT)
Dept: CARE COORDINATION | Facility: OTHER | Age: 79
End: 2019-07-24

## 2019-07-24 NOTE — PROGRESS NOTES
Clinic Care Coordination Contact  Care Team Conversations    CC and Hailee Cortez NP discussed Lita's visit yesterday for follow-up of her CHF and recent coronary angiogram.      Pt was noted to have declined cardiac rehab earlier this month.  Cardiac rehab was reordered by provider today.  Benefits of this were discussed with provider during today's visit.      CC phoned [facility] and spoke with Intake RN Jas.  She reported Lita refused home services.  With initial contact pt reported no interest in this and they waited a week and tried again but pt did not return their calls.    Will update PCP of the above information.  Will plan to try to connect with pt in clinic at her next primary care appointment 8/2.  Initial care coordination letter was sent previously but pt has not reached out for assistance.      Lauren Pipkin, BS-RN   CHF and General Care Coordinator  [phone] Option # 1

## 2019-07-29 NOTE — PROGRESS NOTES
Subjective     Lita Ocasio is a 78 year old female who presents to clinic today for the following health issues:    HPI   Heart Failure Follow-up    Are you experiencing any shortness of breath? No    Are you experiencing any swelling in your legs or feet?  No    Are you using more pillows than usual? Yes    Do you cough at night?  Yes    Do you check your weight daily?  No    Have you had a weight change recently?  No    Are you having any of the following side effects from your medications? (Select all that apply)  Cough    Since your last visit, how many times have you gone to the cardiologist, urgent care, emergency room, or hospital because of your heart failure?   1 time      Amount of exercise or physical activity: 6-7 days/week for an average of 45-60 minutes    Problems taking medications regularly: No    Medication side effects: none    Diet: regular (no restrictions)    Patient presents to clinic with her son again for evaluation. She reports she is doing well. Wants to come off some medications. Feels she is not anxious and that her anxiety medications make her tired. Son, however, notes that her energy level is significantly improved s/p angiogram and PCI. She would agree with that. Also notes her use of nebulizers is almost none at this time.     Pt has list of her medications and her vitamins today. Takes multiple vitamins. Belives she needs to take her vitamin D and b12 due deficiency in the past. She is sleeping well. Weight is decreasing, pt is not concerned. Son, however, feels her appetite is not very good. She does use protein shakes occasionally. Occasional diarrhea, a few times a week. No nausea, vomiting.     She is concerned about being too dehydrated. Note lasix just decreased by Cardiology.     Reviewed and updated as needed this visit by Provider  Tobacco  Allergies  Meds  Problems  Med Hx  Surg Hx  Fam Hx         Review of Systems   ROS COMP: Constitutional, HEENT,  "cardiovascular, pulmonary, gi and gu systems are negative, except as otherwise noted.      Objective    BP 94/48 (BP Location: Left arm, Patient Position: Sitting, Cuff Size: Adult Regular)   Pulse 67   Temp 98.6  F (37  C) (Tympanic)   Ht 1.499 m (4' 11\")   Wt 44.5 kg (98 lb)   SpO2 96%   BMI 19.79 kg/m    Body mass index is 19.79 kg/m .  Physical Exam   GENERAL: alert, no distress, frail and elderly  NECK: no adenopathy, no asymmetry, masses, or scars and thyroid normal to palpation  RESP: lungs clear to auscultation - no rales, rhonchi or wheezes  CV: regular rate and rhythm, normal S1 S2, no S3 or S4, no murmur, click or rub, no peripheral edema and peripheral pulses strong  ABDOMEN: soft, nontender, no hepatosplenomegaly, no masses and bowel sounds normal  MS: no gross musculoskeletal defects noted, no edema    Diagnostic Test Results:  Labs reviewed in Epic  Results for orders placed or performed in visit on 08/02/19   Vitamin B12   Result Value Ref Range    Vitamin B12 919 193 - 986 pg/mL   Basic metabolic panel   Result Value Ref Range    Sodium 135 133 - 144 mmol/L    Potassium 4.4 3.4 - 5.3 mmol/L    Chloride 102 94 - 109 mmol/L    Carbon Dioxide 27 20 - 32 mmol/L    Anion Gap 6 3 - 14 mmol/L    Glucose 82 70 - 99 mg/dL    Urea Nitrogen 19 7 - 30 mg/dL    Creatinine 0.98 0.52 - 1.04 mg/dL    GFR Estimate 55 (L) >60 mL/min/[1.73_m2]    GFR Estimate If Black 64 >60 mL/min/[1.73_m2]    Calcium 8.9 8.5 - 10.1 mg/dL   CBC with platelets   Result Value Ref Range    WBC 9.0 4.0 - 11.0 10e9/L    RBC Count 3.79 (L) 3.8 - 5.2 10e12/L    Hemoglobin 10.8 (L) 11.7 - 15.7 g/dL    Hematocrit 33.0 (L) 35.0 - 47.0 %    MCV 87 78 - 100 fl    MCH 28.5 26.5 - 33.0 pg    MCHC 32.7 31.5 - 36.5 g/dL    RDW 14.8 10.0 - 15.0 %    Platelet Count 313 150 - 450 10e9/L           Assessment & Plan     Weight loss  Some of this is secondary to water loss, however, will need to monitor closely. Pt interested in stopping zoloft. " This could be causing some decrease in appetite. WIll continue for now, as discontinuing other medications and family greatly concerned about anxiety.     Vitamin D deficiency / Vitamin B12 deficiency (non anemic)  Pt wonders what supplements to take and which to get rid of. Recheck Vitamin levels.   - Vitamin D Deficiency    Chronic systolic congestive heart failure (H)  Pt appears euvolemic today  - Basic metabolic panel  - CBC with platelets    CKD (chronic kidney disease) stage 3, GFR 30-59 ml/min (H)  Recheck renal function, stable today. May decrease to one potassium daily.     Generalized anxiety disorder  Stop buspar (pt has tapered) and ativan. Follow up 1 month for weight and mood follow up.     Return for weright, mood, bmp prior.    Ophelia Troncoso MD  [facility]

## 2019-07-30 ENCOUNTER — TELEPHONE (OUTPATIENT)
Dept: CARDIAC REHAB | Facility: HOSPITAL | Age: 79
End: 2019-07-30

## 2019-07-30 DIAGNOSIS — Z95.5 S/P DRUG ELUTING CORONARY STENT PLACEMENT: Primary | ICD-10-CM

## 2019-07-30 NOTE — TELEPHONE ENCOUNTER
Patient was called as her request to check with her after July 26. She made an appointment to start Phase II CR on 8/8/19.

## 2019-07-31 DIAGNOSIS — J96.01 ACUTE RESPIRATORY FAILURE WITH HYPOXIA (H): ICD-10-CM

## 2019-07-31 DIAGNOSIS — J43.2 CENTRILOBULAR EMPHYSEMA (H): ICD-10-CM

## 2019-07-31 DIAGNOSIS — I50.23 ACUTE ON CHRONIC SYSTOLIC CONGESTIVE HEART FAILURE (H): ICD-10-CM

## 2019-07-31 RX ORDER — LORAZEPAM 0.5 MG/1
0.25 TABLET ORAL EVERY 6 HOURS PRN
Qty: 6 TABLET | Refills: 0 | Status: SHIPPED | OUTPATIENT
Start: 2019-07-31 | End: 2019-08-05

## 2019-07-31 NOTE — TELEPHONE ENCOUNTER
lorazepam  Last Written Prescription Date: 7/16/19  Last Fill Quantity: 6 # of Refills: 0  Last Office Visit: 6/26/19

## 2019-08-02 ENCOUNTER — PATIENT OUTREACH (OUTPATIENT)
Dept: CARE COORDINATION | Facility: OTHER | Age: 79
End: 2019-08-02

## 2019-08-02 ENCOUNTER — OFFICE VISIT (OUTPATIENT)
Dept: FAMILY MEDICINE | Facility: OTHER | Age: 79
End: 2019-08-02
Attending: FAMILY MEDICINE
Payer: MEDICARE

## 2019-08-02 VITALS
HEART RATE: 67 BPM | BODY MASS INDEX: 19.76 KG/M2 | OXYGEN SATURATION: 96 % | HEIGHT: 59 IN | DIASTOLIC BLOOD PRESSURE: 48 MMHG | TEMPERATURE: 98.6 F | WEIGHT: 98 LBS | SYSTOLIC BLOOD PRESSURE: 94 MMHG

## 2019-08-02 DIAGNOSIS — E53.8 VITAMIN B12 DEFICIENCY (NON ANEMIC): ICD-10-CM

## 2019-08-02 DIAGNOSIS — N18.30 CKD (CHRONIC KIDNEY DISEASE) STAGE 3, GFR 30-59 ML/MIN (H): ICD-10-CM

## 2019-08-02 DIAGNOSIS — R63.4 WEIGHT LOSS: Primary | ICD-10-CM

## 2019-08-02 DIAGNOSIS — I50.22 CHRONIC SYSTOLIC CONGESTIVE HEART FAILURE (H): Chronic | ICD-10-CM

## 2019-08-02 DIAGNOSIS — F41.1 GENERALIZED ANXIETY DISORDER: ICD-10-CM

## 2019-08-02 DIAGNOSIS — E55.9 VITAMIN D DEFICIENCY: ICD-10-CM

## 2019-08-02 LAB
ANION GAP SERPL CALCULATED.3IONS-SCNC: 6 MMOL/L (ref 3–14)
BUN SERPL-MCNC: 19 MG/DL (ref 7–30)
CALCIUM SERPL-MCNC: 8.9 MG/DL (ref 8.5–10.1)
CHLORIDE SERPL-SCNC: 102 MMOL/L (ref 94–109)
CO2 SERPL-SCNC: 27 MMOL/L (ref 20–32)
CREAT SERPL-MCNC: 0.98 MG/DL (ref 0.52–1.04)
ERYTHROCYTE [DISTWIDTH] IN BLOOD BY AUTOMATED COUNT: 14.8 % (ref 10–15)
GFR SERPL CREATININE-BSD FRML MDRD: 55 ML/MIN/{1.73_M2}
GLUCOSE SERPL-MCNC: 82 MG/DL (ref 70–99)
HCT VFR BLD AUTO: 33 % (ref 35–47)
HGB BLD-MCNC: 10.8 G/DL (ref 11.7–15.7)
MCH RBC QN AUTO: 28.5 PG (ref 26.5–33)
MCHC RBC AUTO-ENTMCNC: 32.7 G/DL (ref 31.5–36.5)
MCV RBC AUTO: 87 FL (ref 78–100)
PLATELET # BLD AUTO: 313 10E9/L (ref 150–450)
POTASSIUM SERPL-SCNC: 4.4 MMOL/L (ref 3.4–5.3)
RBC # BLD AUTO: 3.79 10E12/L (ref 3.8–5.2)
SODIUM SERPL-SCNC: 135 MMOL/L (ref 133–144)
WBC # BLD AUTO: 9 10E9/L (ref 4–11)

## 2019-08-02 PROCEDURE — 82306 VITAMIN D 25 HYDROXY: CPT | Mod: ZL | Performed by: FAMILY MEDICINE

## 2019-08-02 PROCEDURE — G0463 HOSPITAL OUTPT CLINIC VISIT: HCPCS

## 2019-08-02 PROCEDURE — G0463 HOSPITAL OUTPT CLINIC VISIT: HCPCS | Mod: 25

## 2019-08-02 PROCEDURE — 80048 BASIC METABOLIC PNL TOTAL CA: CPT | Mod: ZL | Performed by: FAMILY MEDICINE

## 2019-08-02 PROCEDURE — 36415 COLL VENOUS BLD VENIPUNCTURE: CPT | Mod: ZL | Performed by: FAMILY MEDICINE

## 2019-08-02 PROCEDURE — 85027 COMPLETE CBC AUTOMATED: CPT | Mod: ZL | Performed by: FAMILY MEDICINE

## 2019-08-02 PROCEDURE — 99214 OFFICE O/P EST MOD 30 MIN: CPT | Performed by: FAMILY MEDICINE

## 2019-08-02 PROCEDURE — 82607 VITAMIN B-12: CPT | Mod: ZL | Performed by: FAMILY MEDICINE

## 2019-08-02 ASSESSMENT — MIFFLIN-ST. JEOR: SCORE: 830.16

## 2019-08-02 ASSESSMENT — PAIN SCALES - GENERAL: PAINLEVEL: NO PAIN (0)

## 2019-08-02 NOTE — NURSING NOTE
"Chief Complaint   Patient presents with     Heart Failure       Initial BP 94/48 (BP Location: Left arm, Patient Position: Sitting, Cuff Size: Adult Regular)   Pulse 67   Temp 98.6  F (37  C) (Tympanic)   Ht 1.499 m (4' 11\")   Wt 44.5 kg (98 lb)   SpO2 96%   BMI 19.79 kg/m   Estimated body mass index is 19.79 kg/m  as calculated from the following:    Height as of this encounter: 1.499 m (4' 11\").    Weight as of this encounter: 44.5 kg (98 lb).  Medication Reconciliation: complete  "

## 2019-08-02 NOTE — PROGRESS NOTES
Clinic Care Coordination Contact  Care Team Conversations    CC met with Lita and her son briefly at the conclusion of her visit today with Dr Troncoso.  She appears to not be interested in services including care coordination, however CC did give her a business card today which she put in her purse.  Encouraged her to reach out if she has any needs.  Scheduled a follow-up appointment in 1 month with Dr Troncoso.  AVS provided.      CC will continue to follow for now and check in with her again at her next cardiology appt to make sure things are stable.    Lauren Pipkin, BS-RN   CHF and General Care Coordinator  [phone] Option # 1

## 2019-08-05 LAB — VIT B12 SERPL-MCNC: 919 PG/ML (ref 193–986)

## 2019-08-06 LAB — DEPRECATED CALCIDIOL+CALCIFEROL SERPL-MC: 54 UG/L (ref 20–75)

## 2019-08-08 ENCOUNTER — HOSPITAL ENCOUNTER (OUTPATIENT)
Dept: CARDIAC REHAB | Facility: HOSPITAL | Age: 79
Setting detail: THERAPIES SERIES
End: 2019-08-08
Attending: INTERNAL MEDICINE
Payer: MEDICARE

## 2019-08-08 VITALS — BODY MASS INDEX: 19.94 KG/M2 | HEIGHT: 59 IN | WEIGHT: 98.9 LBS

## 2019-08-08 DIAGNOSIS — Z95.5 S/P DRUG ELUTING CORONARY STENT PLACEMENT: ICD-10-CM

## 2019-08-08 PROCEDURE — 93798 PHYS/QHP OP CAR RHAB W/ECG: CPT

## 2019-08-08 PROCEDURE — 40000116 ZZH STATISTIC OP CR VISIT

## 2019-08-08 ASSESSMENT — 6 MINUTE WALK TEST (6MWT)
GENDER SELECTION: FEMALE
PREDICTED: 1170.71
FEMALE CALC: 1408.49
TOTAL DISTANCE WALKED (FT): 731
MALE CALC: 1163.61

## 2019-08-08 ASSESSMENT — MIFFLIN-ST. JEOR: SCORE: 834.24

## 2019-08-08 NOTE — PROGRESS NOTES
08/08/19 1200   Session   Session Initial Evaluation and Exercise Prescription   Certified through this date 09/06/19   Cardiac Rehab Assessment   Cardiac Rehab Assessment 8/8: Lita starts Phase II Cardiac Rehab today. She states she does not know why she has to attend CR, except that her provider told her to attend for two weeks. She is agreeable to attend for two weeks. She states that she has not exercised since 1958. She does not plan on starting an exercise routine after CR completion.   She has had weight loss since the first of the year. She does not have an appetite after her cardiac intervention. She states she is continuing to cook for herself and eat even though she does not feel like doing so. She declines a referral to a dietitian stating she will not do what a dietitian tells her anyway.   Lita quit smoking in January and states she does not have the urge to start smoking again.   Patient will start CR on Monday Aug. 12 at 3PM. She will be scheduled for two weeks and additional sessions will be added should she change her mind and wish to continue for a longer period of time.  The patient's history and clinical status including hemodynamics and ECG were evaluated. The patient was assessed to be stable and appropriate to begin exercise.   The patient's functional capacity and exercise prescription were determined by the completion of the 6 minute walk test. See results above. The patient was oriented to the program.  Risk factor profile was completed. Goals and objectives were discussed. CV response was WNL. No symptoms, complaints or pain were reported. Good prognosis for reaching goals below. Skilled therapy is necessary in order to monitor CV response to exercise, to provide education on risk factors and behavior change counseling needed to achieve patient's goals.  Plan to progress to 30-40 minutes of exercise prior to discharge from cardiac rehab.  Initial THR of 20-30 beats above RHR; Effort  rating of 4-6. Initiate muscle conditioning as appropriate. Provide risk factor education and behavior change counseling.        General Information   Treatment Diagnosis Stent   Date of Treatment Diagnosis 06/28/19   Significant Past CV History History of Heart Failure   Comorbidities Pulmonary Disease;Renal Disease   Other Medical History Mitral valve - not repaired or replaced.    Lead up symptoms Patient was hospitalized for shortness of breath.    Hospital Location [facility] Discharge Date 07/02/19   Signs and Symptoms Post Hospital Discharge Appetite   Comments Patient states she has not had much of an appetite since her procedure. She states she has lost quite a bit of weight since January. She states she is making sure she cooks for herself and is eating.    Outpatient Cardiac Rehab Start Date 08/08/19   Primary Physician Karyna   Primary Physician Follow Up Completed   Surgeon Franki   Cardiologist MELISSA Cortez   Cardiologist Follow Up Completed   Ejection Fraction 36%   Risk Stratification High   Summary of Cath Report   Summary of Cath Report Available   Date Performed 06/28/19   Left Main Calcified left main coronary bifurcates to LAD and LCX distal 20% taper.    LAD Calcified LAD witih mild diffuse proximal and mid vesse disease 20-30%, moderate diffuesdistal disease 40-50%.    D1 Mocerte sever diffuse disease and no focal stenosis.   D2 Moderate severe disese and no focal stenosis. 3rddiagonal is larger in caliber with mild diffuse disease 20-30%.   LCX Moderately large circumflex with heavily calcified ostial 80% stenosis. Proximal moderate 40% mid vessel calcified 85% stenossi.    OM Moderately large in claiber with moderate diffuse disease 30-40%. OM2 moderate in claiber with moderate diffuse disease 40-50%. Terminal LPL branch very small.    RCA Diffusely calcified RCA with mild proximal 30% mid vessel segmental moderate 40-50% mild distal 20%.   PDA PDA and SIMEON branch is large in caliber  "with moderate diffuse disease 30%.    Cath Report Comments Ostial and mid LCX stenosis pre treated with Synergy LISS.   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    Preventative Medications   CMS recommended medications Ace inhibitors;Antiplatelets;Beta Blocker;Lipid Lowering;Influenza vaccination;Pneumonia vaccination   Fall Risk Screen   Fall screen completed by Cardiac Rehab   Have you fallen 2 or more times in the past year? No   Have you fallen and had an injury in the past year? No   Is patient a fall risk? No   Fall screen comments Patient is not at risk for falls.    Abuse Screen (yes response referral indicated)   Feels Unsafe at Home or Work/School no   Feels Threatened by Someone no   Does Anyone Try to Keep You From Having Contact with Others or Doing Things Outside Your Home? no   Physical Signs of Abuse Present no   Pain   Patient Currently in Pain No   Physical Assessments   Incisions WNL   Edema None   Right Lung Sounds diminished   Left Lung Sounds diminished   Limitations No limitations   Comments Patient's physical assessments are WNL.   Individualized Treatment Plan   Monitored Sessions Scheduled 24   Monitored Sessions Attended 1   Oxygen   Supplemental Oxygen needed No   Nutrition Management - Weight Management   Assessment Initial Assessment   Age 78   Weight 44.9 kg (98 lb 14.4 oz)   Height 1.499 m (4' 11\")   BMI (Calculated) 19.98   Goal Weight 54.4 kg (120 lb)   Initial Rate Your Plate Score. Dietary tool to assess eating patterns. Scores range from 24 to 72. The higher the score the healthier the eating pattern. 53   Weight Management Comments Patient has been losing weight since January,has not had an appetite.    Nutrition Management - Lipids   Lipids Labs Not Available   Prescribed Lipid Medication Yes   Statin " Intensity High Intensity   Lipid Comments Patient takes medications as prescribed.    Nutrition Management - Diabetes   Diabetes No   Nutrition Management Summary   Dietary Recommendations Low Fat;Low Cholesterol;Low Sodium   Stages of Change for Diet Compliance Action   Nutrition Summary Comments Patient does not wish to set any goals. She does not wish to meet with a dietitian regarding her weight loss.    Psychosocial Management   Psychosocial Assessment Initial   Is there history of clinical depression or increased risk of depression? No previous history   Current Level of Stress per Patient Report Denies   Current Coping Skills Patient Unable to Identify Personal Coping Skills   Initial Patient Health Questionnaire -9 Score (PHQ-9) for depression. 5-9 Minimal symptoms, 10-14 Minor depression, 15-19 Major depression, moderately severe, > 20 Major depression, severe  3   Initial [facility] Survey score.  Quality of Life:   If total score > 25 review individual areas where patient rated a 4 or 5.  Consider patients current medical condition and what role that plays on the score.   Adjust treatment protocol to improve areas of concern.  Consider the following:  PHQ9 score, DASI, and re-assessment within the next 30 days to assist with developing treatments.  12   Stages of Change Contemplation   Interventions Planned Provide resources for stress relaxation   Psychosocial Comments Patient does not feel she has any psychosocial needs. She states her children think she has anxiety and patient had been taking medication for this. She does not think she is anxious and stopped taking the meds.    Psychosocial Target Outcome Maximize coping skills   Other Core Components - Hypertension   History of or Diagnosis of Hypertension Yes   Currently taking Anti-Hypertensives Beta blocker;Ace Inhibitor   Hypertension Comments Patient sets up her medications and takes them as prescribed.    Other Core Components - Tobacco    History of Tobacco Use Yes   Quit Date or Planned Quit Date 01/01/19   Tobacco Use Status Former (Quit > 6 mo ago)   Tobacco Habit Cigarettes   Tobacco Use per Day (average) 1   Years of Tobacco Use 60   Stages of Change Maintenance   Tobacco Comments Patient is not inclined to start smoking again.    Other Core Components Summary   Interventions Planned None   Interventions In Progress or Completed None: Patient remains in maintenance for smoking cessation   Other Core Components Comments Patient does not have other core component needs.    Activity/Exercise History   Activity/Exercise Assessment Initial   Activity/Exercise Status prior to event? Was Physically Active   Number of Days Currently participating in Moderate Physical Activity? 7   Number of Days Currently performing  Aerobic Exercise (including rehab)? 0   Number of Minutes per Session Currently of Aerobic Exercise (average)? 0   Current Stage of Change (Physical Activity) Action   Current Stage of Change (Aerobic Exercise) Pre-Contemplation   Activity/Exercise Comments Patient states she has not exercised since 1958 and is not going to start at this time. She states her  told her she had to attend Phase II for two weeks.    Activity/Exercise Target Outcome An Accumulation of 150  Minutes of Aerobic Activity per Week   Exercise Assessment   6 Minute Walk Predicted - Gender Selection Female   6 Minute Walk Predicted (Female) 1408.49   Initial 6 Minute Walk Distance (Feet) 731 ft   Resting HR 66 bpm   Exercise HR 88 bpm   Post Exercise HR 59 bpm   Resting /64   Exercise /62   Post Exercise /68   Pre SpO2 97   While Exercising SpO2 98   Post SpO2 98   Effort Rating 0   Current MET Level 2.1   MET Level Goal 3-4   ECG Rhythm Sinus rhythm;Other (Comment)  (junctional)   Current Symptoms Denies symptoms   Limitations/Restrictions Other (see comments)  (hip pain with walking )   Exercise Prescription   Mode Nustep;Recumbant bike;Arm  Ergometer   Duration/Time 30-45 min   Frequency 3 daysweek   THR (85% of age predicted max HR) 120.7   OMNI Effort Rating (0-10 Scale) 4-6/10   Progression Aerobic exercise to OMNI rating of 6 or below and at or below THR;Progress peak intensity by 1/4 MET per week;Intermittent bouts   Comments Patient plans on attending CR for two weeks.    Recommended Home Exercise   Type of Exercise Walking   Frequency (days per week) 2-3   Duration (minutes per session) 15-30 min   Effort Rating Recommended 4-6/10   30 Day Exercise Plan Patient states she does not plan on exercising outside of CR.    Current Home Exercise   Type of Exercise None   Follow-up/On-going Support   Provider follow-up needed on the following No follow-up needed   Comments Patient denies the need for a dietitian consultation.    Learning Assessment   Learner Patient   Primary Language English   Preferred Learning Style Reading;Listening;Demonstration   Barriers to Learning No barriers noted   Patient Education   Education recommended Anatomy and Physiology of the Heart;Blood Pressure;Exercise Principles;Heart Failure;Medication Overview;Muscle Conditioning;Nutrition;Risk Factors   Education Comments Patient is aware of education times. She will be encouraged to attend pertinent classes.    Physician cosignature/electronic signature indicates approval of this ITP document. I have established, reviewed and made necessary changes to the individualized treatment plan and exercise prescription for this patient.

## 2019-08-12 ENCOUNTER — HOSPITAL ENCOUNTER (OUTPATIENT)
Dept: CARDIAC REHAB | Facility: HOSPITAL | Age: 79
Setting detail: THERAPIES SERIES
End: 2019-08-12
Attending: INTERNAL MEDICINE
Payer: MEDICARE

## 2019-08-12 DIAGNOSIS — E87.6 HYPOKALEMIA: Primary | ICD-10-CM

## 2019-08-12 DIAGNOSIS — J43.2 CENTRILOBULAR EMPHYSEMA (H): ICD-10-CM

## 2019-08-12 PROCEDURE — 40000116 ZZH STATISTIC OP CR VISIT

## 2019-08-12 PROCEDURE — 93798 PHYS/QHP OP CAR RHAB W/ECG: CPT

## 2019-08-12 RX ORDER — POTASSIUM CHLORIDE 750 MG/1
10 TABLET, EXTENDED RELEASE ORAL DAILY
Qty: 30 TABLET | Refills: 5 | Status: SHIPPED | OUTPATIENT
Start: 2019-08-12 | End: 2020-03-12

## 2019-08-12 RX ORDER — SERTRALINE HYDROCHLORIDE 25 MG/1
25 TABLET, FILM COATED ORAL DAILY
Qty: 30 TABLET | Refills: 5 | Status: SHIPPED | OUTPATIENT
Start: 2019-08-12 | End: 2019-09-04

## 2019-08-12 NOTE — TELEPHONE ENCOUNTER
Potassium Chloride       Last Written Prescription Date:  historical  Last Fill Quantity: 0,   # refills: 0  Last Office Visit: 8/2/2019  Future Office visit:    Next 5 appointments (look out 90 days)    Sep 04, 2019  3:00 PM CDT  (Arrive by 2:45 PM)  [facility] [address]  [place] [address]  [phone]   Oct 22, 2019  1:15 PM CDT  (Arrive by 1:00 PM)  Return Visit with COCO Mccartney CNP  [facility] [address]  [place] [address]  [phone]           Routing refill request to provider for review/approval because:  Drug not active on patient's medication list

## 2019-08-16 ENCOUNTER — HOSPITAL ENCOUNTER (OUTPATIENT)
Dept: CARDIAC REHAB | Facility: HOSPITAL | Age: 79
Setting detail: THERAPIES SERIES
End: 2019-08-16
Attending: INTERNAL MEDICINE
Payer: MEDICARE

## 2019-08-16 PROCEDURE — 93798 PHYS/QHP OP CAR RHAB W/ECG: CPT

## 2019-08-16 PROCEDURE — 40000116 ZZH STATISTIC OP CR VISIT

## 2019-08-19 ENCOUNTER — HOSPITAL ENCOUNTER (OUTPATIENT)
Dept: CARDIAC REHAB | Facility: HOSPITAL | Age: 79
Setting detail: THERAPIES SERIES
End: 2019-08-19
Attending: INTERNAL MEDICINE
Payer: MEDICARE

## 2019-08-19 PROCEDURE — 93798 PHYS/QHP OP CAR RHAB W/ECG: CPT

## 2019-08-19 PROCEDURE — 40000116 ZZH STATISTIC OP CR VISIT

## 2019-08-21 ENCOUNTER — HOSPITAL ENCOUNTER (OUTPATIENT)
Dept: CARDIAC REHAB | Facility: HOSPITAL | Age: 79
Setting detail: THERAPIES SERIES
End: 2019-08-21
Attending: INTERNAL MEDICINE
Payer: MEDICARE

## 2019-08-21 PROCEDURE — 40000116 ZZH STATISTIC OP CR VISIT

## 2019-08-21 PROCEDURE — 93798 PHYS/QHP OP CAR RHAB W/ECG: CPT

## 2019-08-26 ENCOUNTER — HOSPITAL ENCOUNTER (OUTPATIENT)
Dept: CARDIAC REHAB | Facility: HOSPITAL | Age: 79
Setting detail: THERAPIES SERIES
End: 2019-08-26
Attending: FAMILY MEDICINE
Payer: MEDICARE

## 2019-08-26 PROCEDURE — 40000116 ZZH STATISTIC OP CR VISIT

## 2019-08-26 PROCEDURE — 93798 PHYS/QHP OP CAR RHAB W/ECG: CPT

## 2019-08-27 VITALS — WEIGHT: 98 LBS | BODY MASS INDEX: 19.76 KG/M2 | HEIGHT: 59 IN

## 2019-08-27 ASSESSMENT — 6 MINUTE WALK TEST (6MWT)
PREDICTED: 1173.08
GENDER SELECTION: FEMALE
FEMALE CALC: 1411.57
TOTAL DISTANCE WALKED (FT): 731
TOTAL DISTANCE WALKED (FT): 823
MALE CALC: 1165.98

## 2019-08-27 ASSESSMENT — MIFFLIN-ST. JEOR: SCORE: 830.16

## 2019-08-27 NOTE — PROGRESS NOTES
08/27/19 1300   Session   Session Discharge Note   Certified through this date 09/25/19   Cardiac Rehab Assessment   Cardiac Rehab Assessment 8/27: Pt completes 6 sessions of Phase II rehab since initial evaluation: 6 exercise sessions and 0 educational sessions. Overall, pt progressed slowly in the program and achieved MET level of 3.3 without event. Pt's biggest limitation was the bilateral hip pain associated with increased ambulation or movement. Staff discussed with pt they were able to take intermittent breaks between exercise modalities to which pt verbally agreed and understood. During the initial evaluation staff and pt discussed attendance in rehab and it was agreed upon 2 weeks of cardio modalities. Pt completed their time with cardiac rehab staff and will continue to focus on physical activity outside the program. During the final assessment staff encouraged pt to call staff if they have any questions regarding heart disease.     Pt made significant gains in exercise tolerance. Initially patient tolerated 21 minutes at 3.3 METs, now tolerating 17 minutes at 3.3 METs. Patient also increased 6-minute walk test by 13% (an increase of 92 feet.) The PT was given instructions on frequency (4-6 d/week), intensity (OMNI Effort Scale 4-6/10), and duration (45-60 minutes) for continued exercise as well as muscle conditioning and stretching exercises.  Your PT plans to maintain physical activity and declines the need to start HEP of aerobic exercise.    General Information   Treatment Diagnosis Stent   Date of Treatment Diagnosis 06/28/19   Significant Past CV History History of Heart Failure   Comorbidities Pulmonary Disease;Renal Disease   Other Medical History Mitral valve - not repaired or replaced.    Lead up symptoms 8/27: Pt was hospitalized for shortness of breath, pt did not experience chest pain.    Hospital Location [facility] Discharge Date 07/02/19   Signs and Symptoms Post Hospital  Discharge Appetite   Comments Pt still struggles with consuming enough food to maintain current weight.   Outpatient Cardiac Rehab Start Date 08/08/19   Primary Physician Karyna   Primary Physician Follow Up Completed   Surgeon Franki   Cardiologist MELISSA Cortez   Cardiologist Follow Up Completed   Ejection Fraction 36%   Risk Stratification High   Summary of Cath Report   Summary of Cath Report Available   Date Performed 06/28/19   Left Main Calcified left main coronary bifurcates to LAD and LCX distal 20% taper.    LAD Calcified LAD witih mild diffuse proximal and mid vesse disease 20-30%, moderate diffuesdistal disease 40-50%.    D1 Mocerte sever diffuse disease and no focal stenosis.   D2 Moderate severe disese and no focal stenosis. 3rddiagonal is larger in caliber with mild diffuse disease 20-30%.   LCX Moderately large circumflex with heavily calcified ostial 80% stenosis. Proximal moderate 40% mid vessel calcified 85% stenossi.    OM Moderately large in claiber with moderate diffuse disease 30-40%. OM2 moderate in claiber with moderate diffuse disease 40-50%. Terminal LPL branch very small.    RCA Diffusely calcified RCA with mild proximal 30% mid vessel segmental moderate 40-50% mild distal 20%.   PDA PDA and SIMEON branch is large in caliber with moderate diffuse disease 30%.    Cath Report Comments Ostial and mid LCX stenosis pre treated with Synergy LISS.   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    Preventative Medications   CMS recommended medications Ace inhibitors;Antiplatelets;Beta Blocker;Lipid Lowering;Influenza vaccination;Pneumonia vaccination   Fall Risk Screen   Fall screen completed by Cardiac Rehab   Have you fallen 2 or more times in the past year? No   Have you fallen and had an injury in the past year? No   Timed Up  "and Go score (seconds) NA   Is patient a fall risk? No   Fall screen comments 8/27: Patient is not at risk for falls.    Abuse Screen (yes response referral indicated)   Feels Unsafe at Home or Work/School no   Feels Threatened by Someone no   Does Anyone Try to Keep You From Having Contact with Others or Doing Things Outside Your Home? no   Physical Signs of Abuse Present no   Pain   Patient Currently in Pain No   Physical Assessments   Incisions WNL   Edema None   Right Lung Sounds diminished   Left Lung Sounds diminished   Limitations No limitations   Comments Patient's physical assessments are WNL.   Individualized Treatment Plan   Monitored Sessions Scheduled 24   Monitored Sessions Attended 1   Oxygen   Supplemental Oxygen needed No   Nutrition Management - Weight Management   Assessment Discharge   Age 78   Weight 44.5 kg (98 lb)   Height 1.499 m (4' 11\")   BMI (Calculated) 19.79   Goal Weight 54.4 kg (120 lb)   Initial Rate Your Plate Score. Dietary tool to assess eating patterns. Scores range from 24 to 72. The higher the score the healthier the eating pattern. 53   Discharge Rate Your Plate Score 64   Weight Management Comments 8/27: Pt states they are wanting to increase their weight but states they do not have cravings since January.    Nutrition Management - Lipids   Lipids Labs Not Available   Prescribed Lipid Medication Yes   Statin Intensity High Intensity   Lipid Comments 8/27: Pt taking medications as prescribed.    Nutrition Management - Diabetes   Diabetes No   Nutrition Management Summary   Dietary Recommendations Low Fat;Low Cholesterol;Low Sodium   Stages of Change for Diet Compliance Action   Nutrition Summary Comments 8/27: Pt does not wish to set goals regarding nutrition.    Nutrition Target Outcome BMI < 25   Psychosocial Management   Psychosocial Assessment Discharge   Is there history of clinical depression or increased risk of depression? No previous history   Current Level of Stress " per Patient Report Denies   Current Coping Skills Patient Unable to Identify Personal Coping Skills   Initial Patient Health Questionnaire -9 Score (PHQ-9) for depression. 5-9 Minimal symptoms, 10-14 Minor depression, 15-19 Major depression, moderately severe, > 20 Major depression, severe  3   Discharge PHQ-9 Score for Depression 1   Initial [facility] Survey score.  Quality of Life:   If total score > 25 review individual areas where patient rated a 4 or 5.  Consider patients current medical condition and what role that plays on the score.   Adjust treatment protocol to improve areas of concern.  Consider the following:  PHQ9 score, DASI, and re-assessment within the next 30 days to assist with developing treatments.  12   Discharge [facility] COOP Survey Score 14   Stages of Change Contemplation   Interventions Planned Provide resources for stress relaxation   Patient Goal No   Psychosocial Comments Patient does not feel she has any psychosocial needs. She states her children think she has anxiety and patient had been taking medication for this. She does not think she is anxious and stopped taking the meds.    Psychosocial Target Outcome Maximize coping skills   Other Core Components - Hypertension   History of or Diagnosis of Hypertension Yes   Currently taking Anti-Hypertensives Beta blocker;Ace Inhibitor   Hypertension Comments 8/27: Patient sets up her medications and takes them as prescribed.    Other Core Components - Tobacco   History of Tobacco Use Yes   Quit Date or Planned Quit Date 01/01/19   Tobacco Use Status Former (Quit > 6 mo ago)   Tobacco Habit Cigarettes   Tobacco Use per Day (average) 1   Years of Tobacco Use 60   Stages of Change Maintenance   Tobacco Comments 8/27: Patient is not inclined to start smoking again.    Other Core Components Summary   Interventions Planned None   Interventions In Progress or Completed None: Patient remains in maintenance for smoking cessation   Patient Goals  No   Other Core Components Comments 8/27: Patient does not have other core component needs.    Other Core Components Target Outcome Compliance with Diet, Medications and Symptom Management to allow for stable Heart Failure   Activity/Exercise History   Activity/Exercise Assessment Discharge   Activity/Exercise Status prior to event? Was Physically Active   Number of Days Currently participating in Moderate Physical Activity? 7   Number of Days Currently performing  Aerobic Exercise (including rehab)? 3   Number of Minutes per Session Currently of Aerobic Exercise (average)? 17   Current Stage of Change (Physical Activity) Action   Current Stage of Change (Aerobic Exercise) Preparation   Activity/Exercise Comments 8/27: Patient states she has not exercised since 1958 and is not going to start at this time. She states her  told her she had to attend Phase II for two weeks.    Activity/Exercise Target Outcome An Accumulation of 150  Minutes of Aerobic Activity per Week   Exercise Assessment   6 Minute Walk Predicted - Gender Selection Female   6 Minute Walk Predicted (Male) 1165.98   6 Minute Walk Predicted (Female) 1411.57   Initial 6 Minute Walk Distance (Feet) 731 ft   Discharge 6 Minute Walk Distance (Feet) 823   Resting HR 74 bpm   Exercise  bpm   Post Exercise HR 75 bpm   Resting /62   Exercise /66   Post Exercise /58   Pre SpO2 99   While Exercising SpO2 98   Post SpO2 96   Effort Rating 4-5   Current MET Level 3.3   MET Level Goal 3-4   ECG Rhythm Sinus rhythm;Other (Comment)  (Junctional)   Ectopy None   Current Symptoms Denies symptoms   Limitations/Restrictions Other (see comments)  (Bilateral hip complaints)   Exercise Prescription   Mode Nustep;Arm Ergometer;Other (see comments)  (Biodex)   Duration/Time 30-45 min   Frequency 3 daysweek   THR (85% of age predicted max HR) 120.7   OMNI Effort Rating (0-10 Scale) 4-6/10   Progression Aerobic exercise to OMNI rating of 6 or below  and at or below THR;Progress peak intensity by 1/4 MET per week;Intermittent bouts   Comments 8/27: Patient plans on attending CR for two weeks.    Recommended Home Exercise   Type of Exercise Walking   Frequency (days per week) 2-3   Duration (minutes per session) 15-30 min   Effort Rating Recommended 4-6/10   30 Day Exercise Plan 8/27: Patient states she does not plan on exercising outside of CR.    Current Home Exercise   Type of Exercise None   Frequency (days per week) 0   Duration (minutes per session) 0   Follow-up/On-going Support   Provider follow-up needed on the following No follow-up needed   Learning Assessment   Learner Patient   Primary Language English   Preferred Learning Style Reading;Listening;Demonstration   Barriers to Learning No barriers noted   Patient Education   Education recommended Anatomy and Physiology of the Heart;Blood Pressure;Exercise Principles;Heart Failure;Medication Overview;Muscle Conditioning;Nutrition;Risk Factors   Education classes attended Patient Declined/Refused All Education   Education Comments 8/27: Patient is aware of education times. She will be encouraged to attend pertinent classes.    Physician cosignature/electronic signature indicates agreements with the ITP document and approval of discharge.

## 2019-08-28 NOTE — PROGRESS NOTES
"Subjective     Lita Ocasio is a 78 year old female who presents to clinic today for the following health issues:    HPI   Abnormal Mood Symptoms      Duration: Follow Up    Description:  Depression: no   Anxiety: no   Panic attacks: no      Accompanying signs and symptoms: see PHQ-9 and ANTONELLA scores    History (similar episodes/previous evaluation): None    Precipitating or alleviating factors: None    Therapies tried and outcome: Buspar and Ativan tapered/stopped at last appointment. Everything has been ok since taper.     Weight Loss      Duration: Follow up    Description (location/character/radiation): Weight today is 97lbs. Last visit 98lbs    Intensity:  mild    Accompanying signs and symptoms: none    History (similar episodes/previous evaluation): None    Precipitating or alleviating factors: None    Therapies tried and outcome: None     Continues to take zoloft. Having significant diarrhea and loose stools. She wondering if she even needs this medication. Is noting a \"terrible\" time sleeping since stopping the ativan at night. She lays in bed for a couple of hours and then will wake in the middle of the night.     Breathing has been stable. No chest pain, shortness of breath. Has graduated from Cardiac rehabilitation.  She feels this was \"kind of pointless.\" She denies any further needs in the home. Rare albuterol use after PCI.    Last labs noted normal B12 and D levels, she is continuing the supplements. Also mild anemia which was new. Recheck today for trend.       Reviewed and updated as needed this visit by Provider  Tobacco  Allergies  Meds  Problems  Med Hx  Surg Hx  Fam Hx         Review of Systems   ROS COMP: Constitutional, HEENT, cardiovascular, pulmonary, gi and gu systems are negative, except as otherwise noted.      Objective    BP 98/60   Pulse 86   Temp 98.2  F (36.8  C) (Tympanic)   Resp 16   Ht 1.499 m (4' 11\")   Wt 44 kg (97 lb)   SpO2 98%   BMI 19.59 kg/m    Body mass index " is 19.59 kg/m .  Physical Exam   GENERAL: healthy, alert and no distress  NECK: no adenopathy, no asymmetry, masses, or scars and thyroid normal to palpation  RESP: lungs clear to auscultation - no rales, rhonchi or wheezes  CV: regular rate and rhythm, normal S1 S2, no S3 or S4, no murmur, click or rub, no peripheral edema and peripheral pulses strong  MS: no gross musculoskeletal defects noted, no edema    Diagnostic Test Results:  Labs reviewed in Epic  Results for orders placed or performed in visit on 09/04/19 (from the past 24 hour(s))   CBC with platelets and differential   Result Value Ref Range    WBC 9.4 4.0 - 11.0 10e9/L    RBC Count 3.35 (L) 3.8 - 5.2 10e12/L    Hemoglobin 9.3 (L) 11.7 - 15.7 g/dL    Hematocrit 29.0 (L) 35.0 - 47.0 %    MCV 87 78 - 100 fl    MCH 27.8 26.5 - 33.0 pg    MCHC 32.1 31.5 - 36.5 g/dL    RDW 14.1 10.0 - 15.0 %    Platelet Count 392 150 - 450 10e9/L    Diff Method Automated Method     % Neutrophils 61.9 %    % Lymphocytes 27.0 %    % Monocytes 9.1 %    % Eosinophils 1.0 %    % Basophils 0.7 %    % Immature Granulocytes 0.3 %    Nucleated RBCs 0 0 /100    Absolute Neutrophil 5.8 1.6 - 8.3 10e9/L    Absolute Lymphocytes 2.5 0.8 - 5.3 10e9/L    Absolute Monocytes 0.9 0.0 - 1.3 10e9/L    Absolute Eosinophils 0.1 0.0 - 0.7 10e9/L    Absolute Basophils 0.1 0.0 - 0.2 10e9/L    Abs Immature Granulocytes 0.0 0 - 0.4 10e9/L    Absolute Nucleated RBC 0.0            Assessment & Plan     Weight loss / Mild protein-calorie malnutrition (H) / Diarrhea  Start remeron 7.5mg nightly. Stop zoloft, may be contributing to diarrhea. If not improving stool studies.   - mirtazapine (REMERON) 7.5 MG tablet; Take 1 tablet (7.5 mg) by mouth At Bedtime  Dispense: 30 tablet; Refill: 1    Centrilobular emphysema (H)  Breathing stable. No exacerbations.     Acquired hypothyroidism  TSH 5/2019 2.79    Chronic systolic congestive heart failure (H) / Benign essential hypertension / CKD (chronic kidney disease)  stage 3, GFR 30-59 ml/min (H)  Euvolemic today, renal function has been stable. BP low, but pt asymptomatic. Defer to cards.     Anemia, unspecified type  Mild, noted on last labs, recheck for trend.     Primary insomnia  Trial of remeron for weight and mood.   - mirtazapine (REMERON) 7.5 MG tablet; Take 1 tablet (7.5 mg) by mouth At Bedtime  Dispense: 30 tablet; Refill: 1    Return in about 2 months (around 11/4/2019) for weight check, mood.    Ophelia Troncoso MD  [facility] [place]

## 2019-09-04 ENCOUNTER — OFFICE VISIT (OUTPATIENT)
Dept: FAMILY MEDICINE | Facility: OTHER | Age: 79
End: 2019-09-04
Attending: FAMILY MEDICINE
Payer: MEDICARE

## 2019-09-04 VITALS
OXYGEN SATURATION: 98 % | DIASTOLIC BLOOD PRESSURE: 60 MMHG | HEART RATE: 86 BPM | BODY MASS INDEX: 19.56 KG/M2 | RESPIRATION RATE: 16 BRPM | WEIGHT: 97 LBS | TEMPERATURE: 98.2 F | HEIGHT: 59 IN | SYSTOLIC BLOOD PRESSURE: 98 MMHG

## 2019-09-04 DIAGNOSIS — I10 BENIGN ESSENTIAL HYPERTENSION: ICD-10-CM

## 2019-09-04 DIAGNOSIS — N18.30 CKD (CHRONIC KIDNEY DISEASE) STAGE 3, GFR 30-59 ML/MIN (H): ICD-10-CM

## 2019-09-04 DIAGNOSIS — R63.4 WEIGHT LOSS: Primary | ICD-10-CM

## 2019-09-04 DIAGNOSIS — I50.22 CHRONIC SYSTOLIC CONGESTIVE HEART FAILURE (H): Chronic | ICD-10-CM

## 2019-09-04 DIAGNOSIS — F51.01 PRIMARY INSOMNIA: ICD-10-CM

## 2019-09-04 DIAGNOSIS — E03.9 ACQUIRED HYPOTHYROIDISM: Chronic | ICD-10-CM

## 2019-09-04 DIAGNOSIS — E44.1 MILD PROTEIN-CALORIE MALNUTRITION (H): ICD-10-CM

## 2019-09-04 DIAGNOSIS — J43.2 CENTRILOBULAR EMPHYSEMA (H): Chronic | ICD-10-CM

## 2019-09-04 DIAGNOSIS — D64.9 ANEMIA, UNSPECIFIED TYPE: ICD-10-CM

## 2019-09-04 PROBLEM — E46 PROTEIN-CALORIE MALNUTRITION (H): Status: ACTIVE | Noted: 2019-09-04

## 2019-09-04 PROBLEM — J96.01 ACUTE RESPIRATORY FAILURE WITH HYPOXIA (H): Status: RESOLVED | Noted: 2019-05-12 | Resolved: 2019-09-04

## 2019-09-04 LAB
BASOPHILS # BLD AUTO: 0.1 10E9/L (ref 0–0.2)
BASOPHILS NFR BLD AUTO: 0.7 %
DIFFERENTIAL METHOD BLD: ABNORMAL
EOSINOPHIL # BLD AUTO: 0.1 10E9/L (ref 0–0.7)
EOSINOPHIL NFR BLD AUTO: 1 %
ERYTHROCYTE [DISTWIDTH] IN BLOOD BY AUTOMATED COUNT: 14.1 % (ref 10–15)
FERRITIN SERPL-MCNC: 22 NG/ML (ref 8–252)
HCT VFR BLD AUTO: 29 % (ref 35–47)
HGB BLD-MCNC: 9.3 G/DL (ref 11.7–15.7)
IMM GRANULOCYTES # BLD: 0 10E9/L (ref 0–0.4)
IMM GRANULOCYTES NFR BLD: 0.3 %
LYMPHOCYTES # BLD AUTO: 2.5 10E9/L (ref 0.8–5.3)
LYMPHOCYTES NFR BLD AUTO: 27 %
MCH RBC QN AUTO: 27.8 PG (ref 26.5–33)
MCHC RBC AUTO-ENTMCNC: 32.1 G/DL (ref 31.5–36.5)
MCV RBC AUTO: 87 FL (ref 78–100)
MONOCYTES # BLD AUTO: 0.9 10E9/L (ref 0–1.3)
MONOCYTES NFR BLD AUTO: 9.1 %
NEUTROPHILS # BLD AUTO: 5.8 10E9/L (ref 1.6–8.3)
NEUTROPHILS NFR BLD AUTO: 61.9 %
NRBC # BLD AUTO: 0 10*3/UL
NRBC BLD AUTO-RTO: 0 /100
PLATELET # BLD AUTO: 392 10E9/L (ref 150–450)
RBC # BLD AUTO: 3.35 10E12/L (ref 3.8–5.2)
WBC # BLD AUTO: 9.4 10E9/L (ref 4–11)

## 2019-09-04 PROCEDURE — 36415 COLL VENOUS BLD VENIPUNCTURE: CPT | Mod: ZL | Performed by: FAMILY MEDICINE

## 2019-09-04 PROCEDURE — 85025 COMPLETE CBC W/AUTO DIFF WBC: CPT | Mod: ZL | Performed by: FAMILY MEDICINE

## 2019-09-04 PROCEDURE — 99214 OFFICE O/P EST MOD 30 MIN: CPT | Performed by: FAMILY MEDICINE

## 2019-09-04 PROCEDURE — 82728 ASSAY OF FERRITIN: CPT | Mod: ZL | Performed by: FAMILY MEDICINE

## 2019-09-04 PROCEDURE — G0463 HOSPITAL OUTPT CLINIC VISIT: HCPCS

## 2019-09-04 RX ORDER — MIRTAZAPINE 7.5 MG/1
7.5 TABLET, FILM COATED ORAL AT BEDTIME
Qty: 30 TABLET | Refills: 1 | Status: SHIPPED | OUTPATIENT
Start: 2019-09-04 | End: 2019-10-14

## 2019-09-04 ASSESSMENT — MIFFLIN-ST. JEOR: SCORE: 825.62

## 2019-09-04 ASSESSMENT — PAIN SCALES - GENERAL: PAINLEVEL: NO PAIN (0)

## 2019-09-04 NOTE — NURSING NOTE
"Chief Complaint   Patient presents with     MOOD CHANGES     Anxiety     follow up       Initial BP 98/60   Pulse 86   Temp 98.2  F (36.8  C) (Tympanic)   Resp 16   Ht 1.499 m (4' 11\")   Wt 44 kg (97 lb)   SpO2 98%   BMI 19.59 kg/m   Estimated body mass index is 19.59 kg/m  as calculated from the following:    Height as of this encounter: 1.499 m (4' 11\").    Weight as of this encounter: 44 kg (97 lb).  Medication Reconciliation: complete  "

## 2019-09-04 NOTE — LETTER
My COPD Action Plan     Name: Lita Ocasio    YOB: 1940   Date: 9/4/2019    My doctor: Ophelia Troncoso MD   My clinic: [facility] [place]    [address] [place] [id number]  [phone]  My Controller Medicine: Albuterol/Ipratropium (Duoneb, Combivent)   Dose: Q6hr PRN     My Rescue Medicine: Albuterol (Proair/Ventolin/Proventil) inhaler   Dose: Q4hr PRN     My Flare Up Medicine: Seen in Clinic for flare ups   Dose: None     My COPD Severity: Mild = FeV1 > 80%      Use of Oxygen: Oxygen Not Prescribed      Make sure you've had your pneumonia   vaccines.          GREEN ZONE       Doing well today      Usual level of activity and exercise    Usual amount of cough and mucus    No shortness of breath    Usual level of health (thinking clearly, sleeping well, feel like eating) Actions:      Take daily medicines    Use oxygen as prescribed    Follow regular exercise and diet plan    Avoid cigarette smoke and other irritants that harm the lungs           YELLOW ZONE          Having a bad day or flare up      Short of breath more than usual    A lot more sputum (mucus) than usual    Sputum looks yellow, green, tan, brown or bloody    More coughing or wheezing    Fever or chills    Less energy; trouble completing activities    Trouble thinking or focusing    Using quick relief inhaler or nebulizer more often    Poor sleep; symptoms wake me up    Do not feel like eating Actions:      Get plenty of rest    Take daily medicines    Use quick relief inhaler every 4 hours    If you use oxygen, call you doctor to see if you should adjust your oxygen    Do breathing exercises or other things to help you relax    Let a loved one, friend or neighbor know you are feeling worse    Call your care team if you have 2 or more symptoms.  Start taking steroids or antibiotics if directed by your care team           RED ZONE       Need medical care now      Severe shortness of breath (feel you can't  breathe)    Fever, chills    Not enough breath to do any activity    Trouble coughing up mucus, walking or talking    Blood in mucus    Frequent coughing   Rescue medicines are not working    Not able to sleep because of breathing    Feel confused or drowsy    Chest pain    Actions:      Call your health care team.  If you cannot reach your care team, call 911 or go to the emergency room.        Annual Reminders:  Meet with Care Team, Flu Shot every Fall  Pharmacy:    [facility] - [place] - [address] [place] [address]  [facility] FAX ONLY - [place] [facility] [place] - [address] [facility] [phone]  [facility] [place] - [address] [id number] - [place] - [address] AT [facility] [address]  [facility] - [facility], [place] - [facility]

## 2019-09-20 ENCOUNTER — APPOINTMENT (OUTPATIENT)
Dept: CT IMAGING | Facility: HOSPITAL | Age: 79
DRG: 378 | End: 2019-09-20
Attending: EMERGENCY MEDICINE
Payer: MEDICARE

## 2019-09-20 ENCOUNTER — APPOINTMENT (OUTPATIENT)
Dept: GENERAL RADIOLOGY | Facility: HOSPITAL | Age: 79
DRG: 378 | End: 2019-09-20
Attending: EMERGENCY MEDICINE
Payer: MEDICARE

## 2019-09-20 ENCOUNTER — HOSPITAL ENCOUNTER (INPATIENT)
Facility: HOSPITAL | Age: 79
LOS: 2 days | Discharge: HOME | DRG: 378 | End: 2019-09-22
Attending: EMERGENCY MEDICINE | Admitting: HOSPITALIST
Payer: MEDICARE

## 2019-09-20 DIAGNOSIS — R55 SYNCOPE, UNSPECIFIED SYNCOPE TYPE: ICD-10-CM

## 2019-09-20 DIAGNOSIS — D50.9 IRON DEFICIENCY ANEMIA, UNSPECIFIED IRON DEFICIENCY ANEMIA TYPE: ICD-10-CM

## 2019-09-20 DIAGNOSIS — K92.2 GASTROINTESTINAL HEMORRHAGE, UNSPECIFIED GASTROINTESTINAL HEMORRHAGE TYPE: Primary | ICD-10-CM

## 2019-09-20 PROBLEM — D62 ACUTE BLOOD LOSS ANEMIA: Status: ACTIVE | Noted: 2019-09-20

## 2019-09-20 PROBLEM — D62 ANEMIA DUE TO BLOOD LOSS, ACUTE: Status: ACTIVE | Noted: 2019-09-20

## 2019-09-20 LAB
ALBUMIN SERPL-MCNC: 3.3 G/DL (ref 3.4–5)
ALBUMIN UR-MCNC: NEGATIVE MG/DL
ALP SERPL-CCNC: 61 U/L (ref 40–150)
ALT SERPL W P-5'-P-CCNC: 23 U/L (ref 0–50)
ANION GAP SERPL CALCULATED.3IONS-SCNC: 7 MMOL/L (ref 3–14)
APPEARANCE UR: CLEAR
AST SERPL W P-5'-P-CCNC: 13 U/L (ref 0–45)
BASOPHILS # BLD AUTO: 0 10E9/L (ref 0–0.2)
BASOPHILS NFR BLD AUTO: 0.4 %
BILIRUB SERPL-MCNC: 0.3 MG/DL (ref 0.2–1.3)
BILIRUB UR QL STRIP: NEGATIVE
BLD PROD TYP BPU: NORMAL
BLD PROD TYP BPU: NORMAL
BLD UNIT ID BPU: 0
BLD UNIT ID BPU: 0
BLOOD PRODUCT CODE: NORMAL
BLOOD PRODUCT CODE: NORMAL
BPU ID: NORMAL
BPU ID: NORMAL
BUN SERPL-MCNC: 14 MG/DL (ref 7–30)
CALCIUM SERPL-MCNC: 8.4 MG/DL (ref 8.5–10.1)
CHLORIDE SERPL-SCNC: 104 MMOL/L (ref 94–109)
CO2 SERPL-SCNC: 25 MMOL/L (ref 20–32)
COLOR UR AUTO: NORMAL
CREAT SERPL-MCNC: 1.02 MG/DL (ref 0.52–1.04)
CRP SERPL-MCNC: <2.9 MG/L (ref 0–8)
DIFFERENTIAL METHOD BLD: ABNORMAL
EOSINOPHIL # BLD AUTO: 0 10E9/L (ref 0–0.7)
EOSINOPHIL NFR BLD AUTO: 0.4 %
ERYTHROCYTE [DISTWIDTH] IN BLOOD BY AUTOMATED COUNT: 13.7 % (ref 10–15)
ERYTHROCYTE [SEDIMENTATION RATE] IN BLOOD BY WESTERGREN METHOD: 36 MM/H (ref 0–30)
GFR SERPL CREATININE-BSD FRML MDRD: 52 ML/MIN/{1.73_M2}
GLUCOSE SERPL-MCNC: 101 MG/DL (ref 70–99)
GLUCOSE UR STRIP-MCNC: NEGATIVE MG/DL
HCT VFR BLD AUTO: 22 % (ref 35–47)
HEMOCCULT SP1 STL QL: POSITIVE
HGB BLD-MCNC: 6.9 G/DL (ref 11.7–15.7)
HGB BLD-MCNC: 8.9 G/DL (ref 11.7–15.7)
HGB UR QL STRIP: NEGATIVE
IMM GRANULOCYTES # BLD: 0 10E9/L (ref 0–0.4)
IMM GRANULOCYTES NFR BLD: 0.4 %
INR PPP: 1.3 (ref 0.8–1.2)
KETONES UR STRIP-MCNC: NEGATIVE MG/DL
LACTATE BLD-SCNC: 2 MMOL/L (ref 0.7–2)
LACTATE BLD-SCNC: 2.4 MMOL/L (ref 0.7–2)
LACTATE BLD-SCNC: 2.5 MMOL/L (ref 0.7–2)
LEUKOCYTE ESTERASE UR QL STRIP: NEGATIVE
LYMPHOCYTES # BLD AUTO: 1.2 10E9/L (ref 0.8–5.3)
LYMPHOCYTES NFR BLD AUTO: 16.1 %
MCH RBC QN AUTO: 26.4 PG (ref 26.5–33)
MCHC RBC AUTO-ENTMCNC: 31.4 G/DL (ref 31.5–36.5)
MCV RBC AUTO: 84 FL (ref 78–100)
MONOCYTES # BLD AUTO: 0.6 10E9/L (ref 0–1.3)
MONOCYTES NFR BLD AUTO: 8.3 %
NEUTROPHILS # BLD AUTO: 5.4 10E9/L (ref 1.6–8.3)
NEUTROPHILS NFR BLD AUTO: 74.4 %
NITRATE UR QL: NEGATIVE
NRBC # BLD AUTO: 0 10*3/UL
NRBC BLD AUTO-RTO: 0 /100
PH UR STRIP: 7 PH (ref 4.7–8)
PLATELET # BLD AUTO: 357 10E9/L (ref 150–450)
POTASSIUM SERPL-SCNC: 3.8 MMOL/L (ref 3.4–5.3)
PROT SERPL-MCNC: 6.2 G/DL (ref 6.8–8.8)
RBC # BLD AUTO: 2.61 10E12/L (ref 3.8–5.2)
SODIUM SERPL-SCNC: 136 MMOL/L (ref 133–144)
SOURCE: NORMAL
SP GR UR STRIP: 1.01 (ref 1–1.03)
TRANSFUSION STATUS PATIENT QL: NORMAL
TROPONIN I SERPL-MCNC: <0.015 UG/L (ref 0–0.04)
UROBILINOGEN UR STRIP-MCNC: NORMAL MG/DL (ref 0–2)
WBC # BLD AUTO: 7.2 10E9/L (ref 4–11)

## 2019-09-20 PROCEDURE — P9016 RBC LEUKOCYTES REDUCED: HCPCS | Performed by: EMERGENCY MEDICINE

## 2019-09-20 PROCEDURE — 40000786 ZZHCL STATISTIC ACTIVE MRSA SURVEILLANCE CULTURE: Performed by: HOSPITALIST

## 2019-09-20 PROCEDURE — 70450 CT HEAD/BRAIN W/O DYE: CPT | Mod: TC

## 2019-09-20 PROCEDURE — 84484 ASSAY OF TROPONIN QUANT: CPT | Performed by: EMERGENCY MEDICINE

## 2019-09-20 PROCEDURE — 25000132 ZZH RX MED GY IP 250 OP 250 PS 637: Mod: GY | Performed by: HOSPITALIST

## 2019-09-20 PROCEDURE — 80053 COMPREHEN METABOLIC PANEL: CPT | Performed by: EMERGENCY MEDICINE

## 2019-09-20 PROCEDURE — 86850 RBC ANTIBODY SCREEN: CPT | Performed by: EMERGENCY MEDICINE

## 2019-09-20 PROCEDURE — 86140 C-REACTIVE PROTEIN: CPT | Performed by: EMERGENCY MEDICINE

## 2019-09-20 PROCEDURE — 12000000 ZZH R&B MED SURG/OB

## 2019-09-20 PROCEDURE — 25800030 ZZH RX IP 258 OP 636: Performed by: HOSPITALIST

## 2019-09-20 PROCEDURE — 99285 EMERGENCY DEPT VISIT HI MDM: CPT | Mod: 25

## 2019-09-20 PROCEDURE — 85025 COMPLETE CBC W/AUTO DIFF WBC: CPT | Performed by: EMERGENCY MEDICINE

## 2019-09-20 PROCEDURE — 85652 RBC SED RATE AUTOMATED: CPT | Performed by: EMERGENCY MEDICINE

## 2019-09-20 PROCEDURE — C9113 INJ PANTOPRAZOLE SODIUM, VIA: HCPCS | Performed by: HOSPITALIST

## 2019-09-20 PROCEDURE — 82274 ASSAY TEST FOR BLOOD FECAL: CPT | Performed by: EMERGENCY MEDICINE

## 2019-09-20 PROCEDURE — 93005 ELECTROCARDIOGRAM TRACING: CPT

## 2019-09-20 PROCEDURE — 85610 PROTHROMBIN TIME: CPT | Performed by: EMERGENCY MEDICINE

## 2019-09-20 PROCEDURE — 25000128 H RX IP 250 OP 636: Performed by: HOSPITALIST

## 2019-09-20 PROCEDURE — 36415 COLL VENOUS BLD VENIPUNCTURE: CPT | Performed by: EMERGENCY MEDICINE

## 2019-09-20 PROCEDURE — 36415 COLL VENOUS BLD VENIPUNCTURE: CPT | Performed by: HOSPITALIST

## 2019-09-20 PROCEDURE — 83605 ASSAY OF LACTIC ACID: CPT | Performed by: EMERGENCY MEDICINE

## 2019-09-20 PROCEDURE — 86920 COMPATIBILITY TEST SPIN: CPT | Performed by: EMERGENCY MEDICINE

## 2019-09-20 PROCEDURE — 81003 URINALYSIS AUTO W/O SCOPE: CPT | Performed by: EMERGENCY MEDICINE

## 2019-09-20 PROCEDURE — 71046 X-RAY EXAM CHEST 2 VIEWS: CPT | Mod: TC

## 2019-09-20 PROCEDURE — 93010 ELECTROCARDIOGRAM REPORT: CPT | Performed by: INTERNAL MEDICINE

## 2019-09-20 PROCEDURE — 86900 BLOOD TYPING SEROLOGIC ABO: CPT | Performed by: EMERGENCY MEDICINE

## 2019-09-20 PROCEDURE — 99285 EMERGENCY DEPT VISIT HI MDM: CPT | Mod: Z6 | Performed by: EMERGENCY MEDICINE

## 2019-09-20 PROCEDURE — 99223 1ST HOSP IP/OBS HIGH 75: CPT | Mod: AI | Performed by: HOSPITALIST

## 2019-09-20 PROCEDURE — 83605 ASSAY OF LACTIC ACID: CPT | Performed by: HOSPITALIST

## 2019-09-20 PROCEDURE — 86901 BLOOD TYPING SEROLOGIC RH(D): CPT | Performed by: EMERGENCY MEDICINE

## 2019-09-20 PROCEDURE — 85018 HEMOGLOBIN: CPT | Performed by: HOSPITALIST

## 2019-09-20 PROCEDURE — 30233N1 TRANSFUSION OF NONAUTOLOGOUS RED BLOOD CELLS INTO PERIPHERAL VEIN, PERCUTANEOUS APPROACH: ICD-10-PCS | Performed by: HOSPITALIST

## 2019-09-20 RX ORDER — LEVOTHYROXINE SODIUM 25 UG/1
75 TABLET ORAL DAILY
Status: DISCONTINUED | OUTPATIENT
Start: 2019-09-21 | End: 2019-09-22 | Stop reason: HOSPADM

## 2019-09-20 RX ORDER — NALOXONE HYDROCHLORIDE 0.4 MG/ML
.1-.4 INJECTION, SOLUTION INTRAMUSCULAR; INTRAVENOUS; SUBCUTANEOUS
Status: DISCONTINUED | OUTPATIENT
Start: 2019-09-20 | End: 2019-09-22 | Stop reason: HOSPADM

## 2019-09-20 RX ORDER — ONDANSETRON 4 MG/1
4 TABLET, ORALLY DISINTEGRATING ORAL EVERY 6 HOURS PRN
Status: DISCONTINUED | OUTPATIENT
Start: 2019-09-20 | End: 2019-09-22 | Stop reason: HOSPADM

## 2019-09-20 RX ORDER — IPRATROPIUM BROMIDE AND ALBUTEROL SULFATE 2.5; .5 MG/3ML; MG/3ML
1 SOLUTION RESPIRATORY (INHALATION)
Status: DISCONTINUED | OUTPATIENT
Start: 2019-09-20 | End: 2019-09-22 | Stop reason: HOSPADM

## 2019-09-20 RX ORDER — POTASSIUM CHLORIDE 1.5 G/1.58G
20-40 POWDER, FOR SOLUTION ORAL
Status: DISCONTINUED | OUTPATIENT
Start: 2019-09-20 | End: 2019-09-22 | Stop reason: HOSPADM

## 2019-09-20 RX ORDER — POTASSIUM CHLORIDE 1500 MG/1
20-40 TABLET, EXTENDED RELEASE ORAL
Status: DISCONTINUED | OUTPATIENT
Start: 2019-09-20 | End: 2019-09-22 | Stop reason: HOSPADM

## 2019-09-20 RX ORDER — MIRTAZAPINE 7.5 MG/1
7.5 TABLET, FILM COATED ORAL AT BEDTIME
Status: DISCONTINUED | OUTPATIENT
Start: 2019-09-20 | End: 2019-09-22 | Stop reason: HOSPADM

## 2019-09-20 RX ORDER — POTASSIUM CHLORIDE 7.45 MG/ML
10 INJECTION INTRAVENOUS
Status: DISCONTINUED | OUTPATIENT
Start: 2019-09-20 | End: 2019-09-22 | Stop reason: HOSPADM

## 2019-09-20 RX ORDER — FLUTICASONE PROPIONATE 50 MCG
2 SPRAY, SUSPENSION (ML) NASAL DAILY
Status: DISCONTINUED | OUTPATIENT
Start: 2019-09-20 | End: 2019-09-22 | Stop reason: HOSPADM

## 2019-09-20 RX ORDER — LIDOCAINE 40 MG/G
CREAM TOPICAL
Status: DISCONTINUED | OUTPATIENT
Start: 2019-09-20 | End: 2019-09-22 | Stop reason: HOSPADM

## 2019-09-20 RX ORDER — ZAFIRLUKAST 20 MG/1
20 TABLET, FILM COATED ORAL
Status: DISCONTINUED | OUTPATIENT
Start: 2019-09-20 | End: 2019-09-22 | Stop reason: HOSPADM

## 2019-09-20 RX ORDER — MORPHINE SULFATE 2 MG/ML
2-4 INJECTION, SOLUTION INTRAMUSCULAR; INTRAVENOUS
Status: DISCONTINUED | OUTPATIENT
Start: 2019-09-20 | End: 2019-09-22 | Stop reason: HOSPADM

## 2019-09-20 RX ORDER — SODIUM CHLORIDE, SODIUM LACTATE, POTASSIUM CHLORIDE, CALCIUM CHLORIDE 600; 310; 30; 20 MG/100ML; MG/100ML; MG/100ML; MG/100ML
INJECTION, SOLUTION INTRAVENOUS CONTINUOUS
Status: DISCONTINUED | OUTPATIENT
Start: 2019-09-20 | End: 2019-09-21

## 2019-09-20 RX ORDER — LATANOPROST 50 UG/ML
1 SOLUTION/ DROPS OPHTHALMIC AT BEDTIME
Status: DISCONTINUED | OUTPATIENT
Start: 2019-09-20 | End: 2019-09-22 | Stop reason: HOSPADM

## 2019-09-20 RX ORDER — CALCIUM CARBONATE 500 MG/1
500 TABLET, CHEWABLE ORAL DAILY
Status: DISCONTINUED | OUTPATIENT
Start: 2019-09-21 | End: 2019-09-22 | Stop reason: HOSPADM

## 2019-09-20 RX ORDER — ATORVASTATIN CALCIUM 40 MG/1
40 TABLET, FILM COATED ORAL AT BEDTIME
Status: DISCONTINUED | OUTPATIENT
Start: 2019-09-20 | End: 2019-09-22 | Stop reason: HOSPADM

## 2019-09-20 RX ORDER — POTASSIUM CL/LIDO/0.9 % NACL 10MEQ/0.1L
10 INTRAVENOUS SOLUTION, PIGGYBACK (ML) INTRAVENOUS
Status: DISCONTINUED | OUTPATIENT
Start: 2019-09-20 | End: 2019-09-22 | Stop reason: HOSPADM

## 2019-09-20 RX ORDER — ONDANSETRON 2 MG/ML
4 INJECTION INTRAMUSCULAR; INTRAVENOUS EVERY 6 HOURS PRN
Status: DISCONTINUED | OUTPATIENT
Start: 2019-09-20 | End: 2019-09-22 | Stop reason: HOSPADM

## 2019-09-20 RX ADMIN — SODIUM CHLORIDE, POTASSIUM CHLORIDE, SODIUM LACTATE AND CALCIUM CHLORIDE 500 ML: 600; 310; 30; 20 INJECTION, SOLUTION INTRAVENOUS at 22:02

## 2019-09-20 RX ADMIN — ATORVASTATIN CALCIUM 40 MG: 40 TABLET, FILM COATED ORAL at 22:11

## 2019-09-20 RX ADMIN — MIRTAZAPINE 7.5 MG: 7.5 TABLET ORAL at 22:11

## 2019-09-20 RX ADMIN — ZAFIRLUKAST 20 MG: 20 TABLET, COATED ORAL at 20:11

## 2019-09-20 RX ADMIN — LATANOPROST 1 DROP: 50 SOLUTION/ DROPS OPHTHALMIC at 22:12

## 2019-09-20 RX ADMIN — PANTOPRAZOLE SODIUM 8 MG/HR: 40 INJECTION, POWDER, FOR SOLUTION INTRAVENOUS at 20:23

## 2019-09-20 RX ADMIN — PANTOPRAZOLE SODIUM 80 MG: 40 INJECTION, POWDER, FOR SOLUTION INTRAVENOUS at 19:58

## 2019-09-20 ASSESSMENT — ENCOUNTER SYMPTOMS
DIZZINESS: 1
FEVER: 0
ABDOMINAL PAIN: 0
NUMBNESS: 1
SHORTNESS OF BREATH: 0

## 2019-09-20 ASSESSMENT — ACTIVITIES OF DAILY LIVING (ADL): ADLS_ACUITY_SCORE: 12

## 2019-09-20 ASSESSMENT — MIFFLIN-ST. JEOR: SCORE: 814.63

## 2019-09-20 NOTE — ED TRIAGE NOTES
Patient felt dizzy/light headed and needed to sit down. Tongue is numb. Started to become numb prior to EMS arrival at [facility]

## 2019-09-20 NOTE — ED NOTES
Spoke with patient and discussed admission and the necessity of. Patient called daughter. Spoke with daughter and explained why patient was being admitted.

## 2019-09-20 NOTE — ED NOTES
DATE:  9/20/2019   TIME OF RECEIPT FROM LAB:  1559  LAB TEST:  Lactic   LAB VALUE:  2.4  RESULTS GIVEN WITH READ-BACK TO (PROVIDER):  Joe Jerez MD  TIME LAB VALUE REPORTED TO PROVIDER:   1600

## 2019-09-20 NOTE — ED PROVIDER NOTES
History     Chief Complaint   Patient presents with     Dizziness     HPI  Lita Ocasio is a 78 year old female who presented to the emergency department after syncopal episode at [facility].  Patient was shopping in [facility] when she felt faint and sat down.  She felt dizzy but did not pass out completely.  She had numbness of her tongue which has since resolved.  Denies any chest pain, fever, chills, nausea, vomiting, diarrhea, palpitations, shortness of breath, orthopnea or paroxysmal nocturnal dyspnea.  Patient has history of chronic kidney disease stage III, status post drug-eluting stent placement in July of this year, history of dyspnea on exertion, hyperlipidemia, emphysema, hyperlipidemia, hypertension, primary hyperparathyroidism and mitral regurgitation.    Allergies:  Allergies   Allergen Reactions     Evista [Raloxifene] Other (See Comments)     hypercalcemia     Prednisone GI Disturbance     Combigan [Brimonidine Tartrate-Timolol] Other (See Comments)     Eye swelling and itchy     Latex Rash     Levaquin [Levofloxacin] Muscle Pain (Myalgia)     Penicillins Rash     Restasis      Pt reports using eye gtts and having red irritated eyes        Problem List:    Patient Active Problem List    Diagnosis Date Noted     Anemia due to blood loss, acute 09/20/2019     Priority: Medium     GI bleed 09/20/2019     Priority: Medium     Protein-calorie malnutrition (H) 09/04/2019     Priority: Medium     S/P drug eluting coronary stent placement 07/23/2019     Priority: Medium     CKD (chronic kidney disease) stage 3, GFR 30-59 ml/min (H) 06/26/2019     Priority: Medium     Abnormal stress test 06/18/2019     Priority: Medium     Added automatically from request for surgery [id number]       MILLS (dyspnea on exertion) 06/18/2019     Priority: Medium     Added automatically from request for surgery [id number]       ASCVD (arteriosclerotic cardiovascular disease) 06/18/2019     Priority: Medium     Added automatically from  request for surgery [id number]       Hyperlipidemia, unspecified hyperlipidemia type 06/18/2019     Priority: Medium     Added automatically from request for surgery [id number]       Centrilobular emphysema (H) 05/12/2019     Priority: Medium     Acquired hypothyroidism 05/12/2019     Priority: Medium     Acute on chronic systolic congestive heart failure (H) 05/12/2019     Priority: Medium     Chronic systolic congestive heart failure (H) 05/12/2019     Priority: Medium     Benign essential hypertension 05/12/2019     Priority: Medium     Hyperlipidemia 05/12/2019     Priority: Medium     Osteopenia 05/12/2019     Priority: Medium     Non-rheumatic mitral regurgitation 05/12/2019     Priority: Medium     Primary hyperparathyroidism (H) 09/05/2013     Priority: Medium     Chronic rhinitis 08/16/2013     Priority: Medium        Past Medical History:    Past Medical History:   Diagnosis Date     Acquired hypothyroidism 5/12/2019     Asthma      Benign essential hypertension 5/12/2019     Centrilobular emphysema (H) 5/12/2019     Chronic rhinitis 8/16/2013     Chronic systolic congestive heart failure (H) 5/12/2019     Constipation      Hearing loss      Heart trouble      Hemorrhagic cerebrovascular accident (CVA) (H) 01/2019     Hyperlipidemia 5/12/2019     Hypertension      Nasal congestion      Non-rheumatic mitral regurgitation 5/12/2019     Osteopenia 5/12/2019     Osteoporosis      Parathyroid related hypercalcemia (H) 8/18/2013     Primary hyperparathyroidism (H) 9/5/2013     Ringing in ears      Sensorineural hearing loss, asymmetrical 8/16/2013     Sneezing      Snoring      Thyroid disease      Weakness      Weight loss        Past Surgical History:    Past Surgical History:   Procedure Laterality Date     CATARACT IOL, RT/LT Bilateral      COLONOSCOPY       COLONOSCOPY - HIM SCAN  01/01/2009    Colonoscopy - [facility]  2009     ENT SURGERY  2011    para thyroid surgery     ENT SURGERY  09/2013    Parathyroid      PARATHYROIDECTOMY  9/10/2013    Procedure: PARATHYROIDECTOMY;  Reoperative Neck Exploration, Resection of right Parathyroid adenoma;  Surgeon: Thalia Muller MD;  Location: [facility] OR     TONSILLECTOMY       TUBAL LIGATION         Family History:    Family History   Problem Relation Age of Onset     Hearing Loss Mother      Heart Disease Mother      Myocardial Infarction Mother      Cerebrovascular Disease Father      Cancer Brother         throat     Cancer Sister      Myocardial Infarction Sister      Cancer Maternal Grandmother      Heart Disease Maternal Grandfather      Aortic aneurysm Son        Social History:  Marital Status:   [5]  Social History     Tobacco Use     Smoking status: Former Smoker     Packs/day: 1.00     Years: 50.00     Pack years: 50.00     Types: Cigarettes     Last attempt to quit: 2019     Years since quittin.7     Smokeless tobacco: Never Used     Tobacco comment: quit 2019   Substance Use Topics     Alcohol use: Yes     Comment: social     Drug use: No        Medications:      atorvastatin (LIPITOR) 40 MG tablet   calcium carbonate (TUMS) 500 MG chewable tablet   clopidogrel (PLAVIX) 75 MG tablet   Fexofenadine HCl (ALLEGRA PO)   fluticasone (FLONASE) 50 MCG/ACT spray   furosemide (LASIX) 40 MG tablet   LEVOTHYROXINE SODIUM PO   lisinopril (PRINIVIL/ZESTRIL) 5 MG tablet   metoprolol succinate ER (TOPROL-XL) 25 MG 24 hr tablet   mirtazapine (REMERON) 7.5 MG tablet   NONFORMULARY   potassium chloride ER (K-DUR/KLOR-CON M) 10 MEQ CR tablet   rivaroxaban ANTICOAGULANT (XARELTO) 20 MG TABS tablet   spironolactone (ALDACTONE) 25 MG tablet   Tafluprost (ZIOPTAN) 0.0015 % SOLN   vitamin B complex with vitamin C (VITAMIN  B COMPLEX) tablet   Vitamin D, Cholecalciferol, 1000 units TABS   vitamin E 400 units TABS   Zafirlukast (ACCOLATE PO)   albuterol (PROAIR RESPICLICK) 108 (90 Base) MCG/ACT inhaler   CLINDAMYCIN HCL PO   ipratropium - albuterol 0.5 mg/2.5 mg/3 mL (DUONEB)  0.5-2.5 (3) MG/3ML neb solution         Review of Systems   Constitutional: Negative for fever.   Respiratory: Negative for shortness of breath.    Cardiovascular: Negative for chest pain.   Gastrointestinal: Negative for abdominal pain.   Neurological: Positive for dizziness, syncope and numbness.   All other systems reviewed and are negative.      Physical Exam   BP: 108/57  Heart Rate: 76  Temp: 97.4  F (36.3  C)  Resp: 18  SpO2: 95 %      Physical Exam   Constitutional: She is oriented to person, place, and time. She appears well-developed and well-nourished. No distress.   HENT:   Head: Normocephalic and atraumatic.   Mouth/Throat: Oropharynx is clear and moist. No oropharyngeal exudate.   Eyes: Pupils are equal, round, and reactive to light. EOM are normal. No scleral icterus.   Cardiovascular: Normal rate, regular rhythm, normal heart sounds and intact distal pulses.   Pulmonary/Chest: Effort normal and breath sounds normal. No stridor. No respiratory distress. She has no wheezes.   Abdominal: Soft. Bowel sounds are normal. She exhibits no distension. There is no tenderness.   Musculoskeletal: She exhibits no edema or tenderness.   Neurological: She is alert and oriented to person, place, and time. No cranial nerve deficit.   Skin: Skin is warm. No rash noted. She is not diaphoretic.   Nursing note and vitals reviewed.      ED Course        Procedures       EKG Interpretation:      Interpreted by Joe Jerez MD  Time reviewed: 3:43 PM  Symptoms at time of EKG: Dizziness  Rhythm: normal sinus   Rate: normal  Axis: normal  Ectopy: none  Conduction: normal  ST Segments/ T Waves: No ST-T wave changes  Q Waves: none  Comparison to prior: No old EKG available    Clinical Impression: normal EKG      Results for orders placed or performed during the hospital encounter of 09/20/19 (from the past 24 hour(s))   CBC with platelets differential   Result Value Ref Range    WBC 7.2 4.0 - 11.0 10e9/L    RBC Count 2.61  (L) 3.8 - 5.2 10e12/L    Hemoglobin 6.9 (LL) 11.7 - 15.7 g/dL    Hematocrit 22.0 (L) 35.0 - 47.0 %    MCV 84 78 - 100 fl    MCH 26.4 (L) 26.5 - 33.0 pg    MCHC 31.4 (L) 31.5 - 36.5 g/dL    RDW 13.7 10.0 - 15.0 %    Platelet Count 357 150 - 450 10e9/L    Diff Method Automated Method     % Neutrophils 74.4 %    % Lymphocytes 16.1 %    % Monocytes 8.3 %    % Eosinophils 0.4 %    % Basophils 0.4 %    % Immature Granulocytes 0.4 %    Nucleated RBCs 0 0 /100    Absolute Neutrophil 5.4 1.6 - 8.3 10e9/L    Absolute Lymphocytes 1.2 0.8 - 5.3 10e9/L    Absolute Monocytes 0.6 0.0 - 1.3 10e9/L    Absolute Eosinophils 0.0 0.0 - 0.7 10e9/L    Absolute Basophils 0.0 0.0 - 0.2 10e9/L    Abs Immature Granulocytes 0.0 0 - 0.4 10e9/L    Absolute Nucleated RBC 0.0    Comprehensive metabolic panel   Result Value Ref Range    Sodium 136 133 - 144 mmol/L    Potassium 3.8 3.4 - 5.3 mmol/L    Chloride 104 94 - 109 mmol/L    Carbon Dioxide 25 20 - 32 mmol/L    Anion Gap 7 3 - 14 mmol/L    Glucose 101 (H) 70 - 99 mg/dL    Urea Nitrogen 14 7 - 30 mg/dL    Creatinine 1.02 0.52 - 1.04 mg/dL    GFR Estimate 52 (L) >60 mL/min/[1.73_m2]    GFR Estimate If Black 61 >60 mL/min/[1.73_m2]    Calcium 8.4 (L) 8.5 - 10.1 mg/dL    Bilirubin Total 0.3 0.2 - 1.3 mg/dL    Albumin 3.3 (L) 3.4 - 5.0 g/dL    Protein Total 6.2 (L) 6.8 - 8.8 g/dL    Alkaline Phosphatase 61 40 - 150 U/L    ALT 23 0 - 50 U/L    AST 13 0 - 45 U/L   CRP inflammation   Result Value Ref Range    CRP Inflammation <2.9 0.0 - 8.0 mg/L   Erythrocyte sedimentation rate auto   Result Value Ref Range    Sed Rate 36 (H) 0 - 30 mm/h   Lactic acid whole blood   Result Value Ref Range    Lactic Acid 2.4 (H) 0.7 - 2.0 mmol/L   Troponin I   Result Value Ref Range    Troponin I ES <0.015 0.000 - 0.045 ug/L   ABO/Rh type and screen   Result Value Ref Range    Units Ordered Resistant     ABO A     RH(D) Pos     Antibody Screen Neg     Test Valid Only At [facility]        Specimen Expires  09/23/2019     Crossmatch Red Blood Cells    INR   Result Value Ref Range    INR 1.30 (H) 0.80 - 1.20   Blood component   Result Value Ref Range    Unit Number [id number]     Blood Component Type Red Blood Cells Leukocyte Reduced     Division Number 00     Status of Unit Ready for patient 09/20/2019 1658     Blood Product Code [id number]     Unit Status ROSEANN    Blood component   Result Value Ref Range    Unit Number [id number]     Blood Component Type Red Blood Cells Leukocyte Reduced     Division Number 00     Status of Unit Ready for patient 09/20/2019 1658     Blood Product Code [id number]     Unit Status ROSEANN    CT Head w/o Contrast    Narrative    PROCEDURE: CT HEAD W/O CONTRAST   9/20/2019 4:12 PM    HISTORY:Female, age,  78 years, , , Focal neuro deficit, < 6 hrs,  stroke suspected; ; Tongue numbness    COMPARISON: CT scan of the brain 12/31/2018    TECHNIQUE: CT of the brain without contrast.    FINDINGS: Ventricles and sulci are prominent in size and shape. Gray  and white matter demonstrate scattered areas of decreased density.  Left frontal encephalomalacia at the site of previous intraparenchymal  hemorrhage.    There is no evidence of mass, mass effect or midline shift. No  evidence of acute hemorrhage.    Left maxillary sinus mucus retention cyst.       Impression    IMPRESSION:   No acute intracranial abnormality.  No acute fracture.     White matter changes consistent with small vessel disease and  encephalomalacia from previous intraparenchymal hemorrhage.    JAYNA MAURICIO MD   XR Chest 2 Views    Narrative    Procedure:XR CHEST 2 VW    Clinical history:Female, 78 years, Dizziness    Technique: Two views are submitted.    Comparison: 5/17/2019    Findings: The cardiac silhouette is normal. The pulmonary vasculature  is normal.    The lungs are hyperinflated however appear to be clear. There is  minimal costophrenic angle blunting seen on both sides suggesting  small effusions which have decreased  significantly when compared to  prior study. Bony structures are unremarkable.      Impression    Impression:   Persistent hyperinflation of the lungs with small bilateral pleural  effusions, decreased significantly in volume compared to prior study.  No evidence of pneumonia or CHF.    JAYNA MAURICIO MD   UA reflex to Microscopic and Culture   Result Value Ref Range    Color Urine Straw     Appearance Urine Clear     Glucose Urine Negative NEG^Negative mg/dL    Bilirubin Urine Negative NEG^Negative    Ketones Urine Negative NEG^Negative mg/dL    Specific Gravity Urine 1.006 1.003 - 1.035    Blood Urine Negative NEG^Negative    pH Urine 7.0 4.7 - 8.0 pH    Protein Albumin Urine Negative NEG^Negative mg/dL    Urobilinogen mg/dL Normal 0.0 - 2.0 mg/dL    Nitrite Urine Negative NEG^Negative    Leukocyte Esterase Urine Negative NEG^Negative    Source Midstream Urine        Medications - No data to display    Assessments & Plan (with Medical Decision Making)   Syncopal episode:  Iron deficiency anemia:  Gastrointestinal hemorrhage:  [facility]  Clinical evaluation showed pale mucous membranes and this was confirmed with a hemoglobin of 6.9.  Patient is currently on Xarelto for unknown reason and this may possibly explain the blood loss anemia probably secondary to GI bleed.  Will transfuse packed RBCs and admitted under care of Dr. Beck.  Serial hemoglobins.  May need upper and lower GI scopes.  Hold Xarelto.  I have reviewed the nursing notes.    I have reviewed the findings, diagnosis, plan and need for follow up with the patient.    New Prescriptions    No medications on file       Final diagnoses:   Syncope, unspecified syncope type   Iron deficiency anemia, unspecified iron deficiency anemia type   Gastrointestinal hemorrhage, unspecified gastrointestinal hemorrhage type       9/20/2019   [facility]     Joe Jerez,  MD  09/20/19 8558

## 2019-09-20 NOTE — ED NOTES
DATE:  9/20/2019   TIME OF RECEIPT FROM LAB:  0426; 4870  LAB TEST:  Hgb 6.9; Lactic 2.4  LAB VALUE:  6.9;2.4  RESULTS GIVEN WITH READ-BACK TO (PROVIDER): Dr. abrams  TIME LAB VALUE REPORTED TO PROVIDER:   7922

## 2019-09-21 ENCOUNTER — ANESTHESIA (OUTPATIENT)
Dept: SURGERY | Facility: HOSPITAL | Age: 79
DRG: 378 | End: 2019-09-21
Payer: MEDICARE

## 2019-09-21 ENCOUNTER — ANESTHESIA EVENT (OUTPATIENT)
Dept: SURGERY | Facility: HOSPITAL | Age: 79
DRG: 378 | End: 2019-09-21
Payer: MEDICARE

## 2019-09-21 LAB
ANION GAP SERPL CALCULATED.3IONS-SCNC: 7 MMOL/L (ref 3–14)
BUN SERPL-MCNC: 10 MG/DL (ref 7–30)
CALCIUM SERPL-MCNC: 8 MG/DL (ref 8.5–10.1)
CHLORIDE SERPL-SCNC: 111 MMOL/L (ref 94–109)
CO2 SERPL-SCNC: 23 MMOL/L (ref 20–32)
CREAT SERPL-MCNC: 0.98 MG/DL (ref 0.52–1.04)
ERYTHROCYTE [DISTWIDTH] IN BLOOD BY AUTOMATED COUNT: 13.9 % (ref 10–15)
GFR SERPL CREATININE-BSD FRML MDRD: 55 ML/MIN/{1.73_M2}
GLUCOSE SERPL-MCNC: 83 MG/DL (ref 70–99)
HCT VFR BLD AUTO: 30.5 % (ref 35–47)
HGB BLD-MCNC: 10.1 G/DL (ref 11.7–15.7)
HGB BLD-MCNC: 10.2 G/DL (ref 11.7–15.7)
HGB BLD-MCNC: 10.5 G/DL (ref 11.7–15.7)
HGB BLD-MCNC: NORMAL G/DL (ref 11.7–15.7)
MCH RBC QN AUTO: 27.8 PG (ref 26.5–33)
MCHC RBC AUTO-ENTMCNC: 33.1 G/DL (ref 31.5–36.5)
MCV RBC AUTO: 84 FL (ref 78–100)
PLATELET # BLD AUTO: 304 10E9/L (ref 150–450)
POTASSIUM SERPL-SCNC: 3.9 MMOL/L (ref 3.4–5.3)
RBC # BLD AUTO: 3.63 10E12/L (ref 3.8–5.2)
SODIUM SERPL-SCNC: 141 MMOL/L (ref 133–144)
TSH SERPL DL<=0.005 MIU/L-ACNC: 1.46 MU/L (ref 0.4–4)
WBC # BLD AUTO: 9.4 10E9/L (ref 4–11)

## 2019-09-21 PROCEDURE — 99100 ANES PT EXTEME AGE<1 YR&>70: CPT | Performed by: NURSE ANESTHETIST, CERTIFIED REGISTERED

## 2019-09-21 PROCEDURE — 85018 HEMOGLOBIN: CPT | Performed by: HOSPITALIST

## 2019-09-21 PROCEDURE — 25000128 H RX IP 250 OP 636: Performed by: NURSE ANESTHETIST, CERTIFIED REGISTERED

## 2019-09-21 PROCEDURE — 43235 EGD DIAGNOSTIC BRUSH WASH: CPT | Performed by: SURGERY

## 2019-09-21 PROCEDURE — 71000014 ZZH RECOVERY PHASE 1 LEVEL 2 FIRST HR: Performed by: SURGERY

## 2019-09-21 PROCEDURE — 43239 EGD BIOPSY SINGLE/MULTIPLE: CPT | Performed by: NURSE ANESTHETIST, CERTIFIED REGISTERED

## 2019-09-21 PROCEDURE — 25000132 ZZH RX MED GY IP 250 OP 250 PS 637: Mod: GY | Performed by: SURGERY

## 2019-09-21 PROCEDURE — 99232 SBSQ HOSP IP/OBS MODERATE 35: CPT | Mod: 25 | Performed by: INTERNAL MEDICINE

## 2019-09-21 PROCEDURE — 37000008 ZZH ANESTHESIA TECHNICAL FEE, 1ST 30 MIN: Performed by: SURGERY

## 2019-09-21 PROCEDURE — 85027 COMPLETE CBC AUTOMATED: CPT | Performed by: HOSPITALIST

## 2019-09-21 PROCEDURE — 36000050 ZZH SURGERY LEVEL 2 1ST 30 MIN: Performed by: SURGERY

## 2019-09-21 PROCEDURE — 84443 ASSAY THYROID STIM HORMONE: CPT | Performed by: HOSPITALIST

## 2019-09-21 PROCEDURE — 25800030 ZZH RX IP 258 OP 636: Performed by: HOSPITALIST

## 2019-09-21 PROCEDURE — 80048 BASIC METABOLIC PNL TOTAL CA: CPT | Performed by: HOSPITALIST

## 2019-09-21 PROCEDURE — 36415 COLL VENOUS BLD VENIPUNCTURE: CPT | Performed by: SURGERY

## 2019-09-21 PROCEDURE — 99232 SBSQ HOSP IP/OBS MODERATE 35: CPT | Mod: 25 | Performed by: SURGERY

## 2019-09-21 PROCEDURE — 25000132 ZZH RX MED GY IP 250 OP 250 PS 637: Mod: GY | Performed by: HOSPITALIST

## 2019-09-21 PROCEDURE — 36415 COLL VENOUS BLD VENIPUNCTURE: CPT | Performed by: HOSPITALIST

## 2019-09-21 PROCEDURE — 12000000 ZZH R&B MED SURG/OB

## 2019-09-21 PROCEDURE — 0DJ08ZZ INSPECTION OF UPPER INTESTINAL TRACT, VIA NATURAL OR ARTIFICIAL OPENING ENDOSCOPIC: ICD-10-PCS | Performed by: SURGERY

## 2019-09-21 PROCEDURE — 25000132 ZZH RX MED GY IP 250 OP 250 PS 637: Mod: GY | Performed by: INTERNAL MEDICINE

## 2019-09-21 PROCEDURE — 85018 HEMOGLOBIN: CPT | Performed by: SURGERY

## 2019-09-21 RX ORDER — PROPOFOL 10 MG/ML
INJECTION, EMULSION INTRAVENOUS PRN
Status: DISCONTINUED | OUTPATIENT
Start: 2019-09-21 | End: 2019-09-21

## 2019-09-21 RX ORDER — LORAZEPAM 0.5 MG/1
0.5 TABLET ORAL ONCE
Status: DISCONTINUED | OUTPATIENT
Start: 2019-09-21 | End: 2019-09-21 | Stop reason: CLARIF

## 2019-09-21 RX ORDER — PANTOPRAZOLE SODIUM 40 MG/1
40 TABLET, DELAYED RELEASE ORAL
Status: DISCONTINUED | OUTPATIENT
Start: 2019-09-21 | End: 2019-09-22 | Stop reason: HOSPADM

## 2019-09-21 RX ADMIN — PANTOPRAZOLE SODIUM 40 MG: 40 TABLET, DELAYED RELEASE ORAL at 17:12

## 2019-09-21 RX ADMIN — ZAFIRLUKAST 20 MG: 20 TABLET, COATED ORAL at 17:12

## 2019-09-21 RX ADMIN — LEVOTHYROXINE SODIUM 75 MCG: 25 TABLET ORAL at 08:46

## 2019-09-21 RX ADMIN — MIRTAZAPINE 7.5 MG: 7.5 TABLET ORAL at 21:26

## 2019-09-21 RX ADMIN — ZAFIRLUKAST 20 MG: 20 TABLET, COATED ORAL at 08:46

## 2019-09-21 RX ADMIN — SODIUM CHLORIDE, POTASSIUM CHLORIDE, SODIUM LACTATE AND CALCIUM CHLORIDE: 600; 310; 30; 20 INJECTION, SOLUTION INTRAVENOUS at 11:00

## 2019-09-21 RX ADMIN — ATORVASTATIN CALCIUM 40 MG: 40 TABLET, FILM COATED ORAL at 21:26

## 2019-09-21 RX ADMIN — PROPOFOL 20 MG: 10 INJECTION, EMULSION INTRAVENOUS at 11:05

## 2019-09-21 RX ADMIN — LATANOPROST 1 DROP: 50 SOLUTION/ DROPS OPHTHALMIC at 21:33

## 2019-09-21 RX ADMIN — PROPOFOL 10 MG: 10 INJECTION, EMULSION INTRAVENOUS at 11:07

## 2019-09-21 RX ADMIN — CALCIUM CARBONATE (ANTACID) CHEW TAB 500 MG 500 MG: 500 CHEW TAB at 08:46

## 2019-09-21 ASSESSMENT — LIFESTYLE VARIABLES: TOBACCO_USE: 1

## 2019-09-21 ASSESSMENT — MIFFLIN-ST. JEOR: SCORE: 819.63

## 2019-09-21 ASSESSMENT — ACTIVITIES OF DAILY LIVING (ADL)
ADLS_ACUITY_SCORE: 12

## 2019-09-21 ASSESSMENT — COPD QUESTIONNAIRES: COPD: 1

## 2019-09-21 ASSESSMENT — ENCOUNTER SYMPTOMS: DYSRHYTHMIAS: 1

## 2019-09-21 NOTE — PLAN OF CARE
First unit of blood is infused, second unit of blood will be given after the LR bolus, related to an elevated lactic acid level.

## 2019-09-21 NOTE — PROGRESS NOTES
Lita is asleep at this time, was gone for EGD earlier. CTS will attempt to visit with her tomorrow.

## 2019-09-21 NOTE — PLAN OF CARE
Patient A&O. VSS. BP soft. Received total of 2 units of RBC, pt tolerated well. Recheck HGB in AM. Protonix drip infusing at 8mg/hr. Pt NPO after midnight. Up with SBA to restroom. Great urine output. Lactic improved post 500ml LR bolus on previous shift. Denies weakness. Denies pain. Denies numbness or tingling.     Pt monitored via telemetry. NSR with exception of 8 beat run of Vtach. Pt asymtomatic at time. Will continue to monitor.

## 2019-09-21 NOTE — PROGRESS NOTES
[facility]  Hospitalist Progress Note          Assessment and Plan:      Lita Ocasio is a 78 year old female with history of hypertension, hyperlipidemia, chronic systolic heart failure with LVEF of 40%, coronary artery disease status post LCx stenting in July 2019, Afib, stage III CKD, acquired hypothyroidism after thyroidectomy, hemorrhagic CVA was taken to the ED after she collapsed at [facility].  Her issues are the following:     Presyncope: Most probably due to severe anemia.  She denies any chest pain or shortness of breath.  No focal weakness. Transfused and continue Telemetry.       Acute blood loss anemia: The patient's baseline hemoglobin is more than 9 but she has presented with 6.9.   She was transfused 2 units of PRBCs.  Continue to monitor H&H every 6 hours and transfuse as needed.  Hold Plavix and Xarelto for now.     Probable GI bleed: The patient denies any melena hematochezia or hematemesis but was probably has a slow GI bleed leading to blood loss anemia.  Case discussed it with Dr. Canales who is willing to do an EGD today.  Her last colonoscopy was more than 7 years ago so probably she will need a colonoscopy as well.  Keep n.p.o. Until after EGD.     Chronic systolic heart failure: The patient's left ventricular ejection fraction is 40%.  She is on metoprolol and lisinopril.  Holding both as well as her diuretics Lasix and spironolactone due to GI bleed and hypotension.  Monitor intake and output with daily weights.     Coronary artery disease: The patient is status post recent LCx stent.  According to her she was put on Xarelto and Plavix as she also had Afib.  Holding both.The patient is also on statins.     Hyperlipidemia: Continue on atorvastatin.     Anticoagulation status: The patient states that she is on Xarelto after her stent due to some report of Afib.       Acquired hypothyroidism: The patient is status post thyroidectomy.  Continue on levothyroxine and check TSH  levels.     COPD with emphysema: Continue on duo nebs.  Also on Accolate.  She does not have any wheezing.     History of tobacco dependence: She has quit 8 months ago.  Declines any further help.     CKD stage III: Creatinine is at baseline.     DVT Prophylaxis:  Pneumatic boots  Code Status: Full Code     Disposition: Expected discharge in 1-2 days once stable.               Interval History:   Patient remains stable.  Patient denies pain.                Medications:       atorvastatin  40 mg Oral At Bedtime     calcium carbonate  500 mg Oral Daily     fluticasone  2 spray Both Nostrils Daily     ipratropium - albuterol 0.5 mg/2.5 mg/3 mL  1 vial Nebulization Q6H     latanoprost  1 drop Both Eyes At Bedtime     levothyroxine  75 mcg Oral Daily     mirtazapine  7.5 mg Oral At Bedtime     sodium chloride (PF)  3 mL Intracatheter Q8H     vitamin D3  25 mcg Oral Daily     zafirlukast  20 mg Oral BID AC                  Physical Exam:     Vitals:    19 0432 19 0500 19 0600 19 0806   BP: (!) 114/44 (!) 96/43 (!) 103/46    Resp: 16  16 14   Temp:    97.9  F (36.6  C)   TempSrc:    Tympanic   SpO2:    96%   Weight:       Height:             Vital Sign Ranges  Temperature Temp  Av.6  F (37  C)  Min: 97.4  F (36.3  C)  Max: 99.8  F (37.7  C)   Blood pressure Systolic (24hrs), Av , Min:86 , Max:132        Diastolic (24hrs), Av, Min:33, Max:76      Pulse No data recorded   Respirations Resp  Av.4  Min: 14  Max: 27   Pulse oximetry SpO2  Av.5 %  Min: 94 %  Max: 100 %         Intake/Output Summary (Last 24 hours) at 2019 0839  Last data filed at 2019 0654  Gross per 24 hour   Intake 2512 ml   Output 2900 ml   Net -388 ml       General:  Alert and Orientated.  NAD.  Heart:  RRR, S1 S2, No murmurs, no rubs, no gallops.  Resp: CTA bilaterally.  No wheezes, no rhonchi, no crackles.  Abd: Soft/NT/ND/Positve BS.  Ext: No edema noted in either lower extremity  Neuro:  No focal  Neurologic deficits noted.    Peripheral IV 09/20/19 Right (Active)   Site Assessment WDL 9/21/2019  8:00 AM   Line Status Saline locked 9/21/2019  8:00 AM   Phlebitis Scale 0-->no symptoms 9/21/2019  8:00 AM   Infiltration Scale 0 9/21/2019  8:00 AM   Infiltration Site Treatment Method  None 9/20/2019 10:00 PM   Extravasation? No 9/20/2019 10:00 PM   Dressing Intervention New dressing  9/20/2019 10:00 PM   Number of days: 1       Peripheral IV 09/20/19 Left Lower forearm (Active)   Site Assessment WDL 9/21/2019  8:00 AM   Line Status Infusing 9/21/2019  8:00 AM   Phlebitis Scale 0-->no symptoms 9/21/2019  8:00 AM   Infiltration Scale 0 9/21/2019  8:00 AM   Infiltration Site Treatment Method  None 9/20/2019 10:00 PM   Extravasation? No 9/20/2019 10:00 PM   Dressing Intervention New dressing  9/20/2019 10:00 PM   Number of days: 1     No line/device             Data:     Lab Results   Component Value Date    WBC 9.4 09/21/2019    HGB 10.1 (L) 09/21/2019    HGB Duplicate request 09/21/2019    HCT 30.5 (L) 09/21/2019     09/21/2019     09/21/2019    POTASSIUM 3.9 09/21/2019    CHLORIDE 111 (H) 09/21/2019    CO2 23 09/21/2019    BUN 10 09/21/2019    CR 0.98 09/21/2019    GLC 83 09/21/2019    SED 36 (H) 09/20/2019    DIMER 524 (H) 05/12/2019    NTBNPI 4,190 (H) 05/12/2019    NTBNP 3,726 (H) 05/22/2019    TROPI <0.015 09/20/2019    TROPN  08/18/2013     <0.04  **Risk Stratification**   0.10 - 0.39   Elevated-may indicate increased                 risk of short term adverse                 cardiac events.      >=0.4      Possible cardiac injury.    AST 13 09/20/2019    ALT 23 09/20/2019    ALKPHOS 61 09/20/2019    BILITOTAL 0.3 09/20/2019    INR 1.30 (H) 09/20/2019     Lactic Acid   Date Value Ref Range Status   09/20/2019 2.0 0.7 - 2.0 mmol/L Final   09/20/2019 2.5 (H) 0.7 - 2.0 mmol/L Final     Comment:     Significant value called to and read back by  ANNIE MORALES AT 2134 ON 9/20/19 BY BRS      09/20/2019 2.4 (H) 0.7 - 2.0 mmol/L Final     Comment:     Significant value called to and read back by  MAGGIE FRYE @ 5692 ON 09/20/19 BY DMITRIY Beck DO

## 2019-09-21 NOTE — PLAN OF CARE
Face to face report given with opportunity to observe patient.    Report given to Jenna RN Annelyse J. Stromberg, RN   9/21/2019  7:33 AM

## 2019-09-21 NOTE — PLAN OF CARE
0155: 8 beat run of Vtach on telemetry. Pt sleeping, asymptomatic, denies chest pain. Will continue to monitor.

## 2019-09-21 NOTE — OP NOTE
REPORT OF OPERATION  DATE OF PROCEDURE: 9/21/2019    PATIENT: Lita Ocasio    SURGERY PREFORMED: Esophagogastroduodenoscopy with biopsies     PREOPERATIVE DIAGNOSIS: Anemia and heme positive stools    POSTOPERATIVE DIAGNOSIS:    Normal Esophagogastroduodenoscopy   Squamous columnar junction at 40   Evidence of old peptic ulcer disease.    SURGEON: Julio Canales MD    ASSISTANTS: None    ANESTHESIA: Monitored Anesthesia Care    COMPLICATIONS: None apparent    TRANSFUSIONS: None    TISSUE TO PATHOLOGY: None    FINDINGS: Normal Esophagogastroduodenoscopy and Evidence of old peptic ulcer disease.  No active source of bleeding.    INDICATIONS: This is a 78 year old female in need of an Esophagogastroduodenoscopy for And anemia and heme positive stools.  The patient will be taken to the endoscopy suite for that procedure.    DESCRIPTIONS OF PROCEDURE IN DETAIL: After consent was obtained the patient was taken to the operative suite and alexander in the left lateral decubitus position.  The patient was identified and the correct patient was confirmed. Monitored Anesthesia Care was given by anesthesia. A time out was preformed verifying the correct patient and the correct procedure.  The entire operative team was in agreement.  All necessary equipment and supplies were in the room.    The endoscope was inserted into the mouth and passed without difficulty to the third portion of the duodenum.  Duodenal biopsies were not taken.  The endoscope was then withdrawn through the duodenum, the duodenal bulb and pyloric channel and no abnormalities were noted.  The endoscope was brought back into the stomach and antral biopsies were not obtained.  The endoscope was then retroflexed and no lesions of the fundus body or antrum were seen.  There was evidence of old peptic ulcer disease.  The endoscope was straightened back out and brought into the distal esophagus and a well-defined squamocolumnar junction was identified at 40 cm.  Biopsies of the distal esophagus were not taken.  The endoscope was slowly withdrawn through the remaining esophagus no other abnormalities are seen,  The endoscope was withdrawn from the patient the patient was then taken to the recovery room in stable condition tolerated the procedure well.

## 2019-09-21 NOTE — ANESTHESIA POSTPROCEDURE EVALUATION
Patient: Lita Ocasio    Procedure(s):  ESOPHAGOGASTRODUODENOSCOPY (EGD)    Diagnosis:ANEMIA / GI BLEED  Diagnosis Additional Information: No value filed.    Anesthesia Type:  MAC    Note:  Anesthesia Post Evaluation    Patient location during evaluation: Bedside  Patient participation: Able to fully participate in evaluation  Level of consciousness: awake and alert  Pain management: adequate  Airway patency: patent  Cardiovascular status: acceptable  Respiratory status: acceptable  Hydration status: acceptable  PONV: none     Anesthetic complications: None          Last vitals:  Vitals:    09/21/19 1130 09/21/19 1135 09/21/19 1140   BP: 124/78 126/69 125/69   Resp: 22 23 12   Temp:   99.7  F (37.6  C)   SpO2: 97% 96% 93%         Electronically Signed By: COCO Brian CRNA  September 21, 2019  1:48 PM

## 2019-09-21 NOTE — PLAN OF CARE
[facility] Inpatient Admission Note:    Patient admitted to 3218/3218-1 at approximately 1830 via cart accompanied by staff. Report received from April RN in SBAR format at 1743 via telephone. Patient ambulated to bed via self.. Patient is alert and oriented X 3, denies pain; rates at 0 on 0-10 scale.  Patient oriented to room, unit, hourly rounding, and plan of care. Explained admission packet and patient handbook with patient bill of rights brochure. Will continue to monitor and document as needed.     Inpatient Nursing criteria listed below was met:    Health care directives status obtained and documented: N/A    Care Everywhere authorization obtained N/A    MRSA swab completed for patient 65 years and older: Yes    Patient identifies a surrogate decision maker: Yes If yes, who:daughter Alyson Contact Information [phone]    Core Measure diagnosis present:No.      If initial lactic acid >2.0, repeat lactic acid drawn within one hour of arrival to unit: Yes. If no, state reason: per ER not infectous    Vaccination assessment and education completed: Yes   Vaccinations received prior to admission: Pneumovax no  Influenza(seasonal)  N/A   Vaccination(s) ordered: not given today because patient to ask her primary MD     Clergy visit ordered if patient requests: N/A    Skin issues/needs documented: N/A    Isolation Patient: no Education given, correct sign in place and documentation row added to PCS:  No    Fall Prevention Yes: Care plan updated, education given and documented, sticker and magnet in place: Yes    Care Plan initiated: Yes    Education Documented (including assessment): Yes    Patient has discharge needs : No If yes, please explain:N/A

## 2019-09-21 NOTE — PLAN OF CARE
"/69   Temp 99.5  F (37.5  C) (Temporal)   Resp 14   Ht 1.499 m (4' 11\")   Wt 43.4 kg (95 lb 10.9 oz)   SpO2 94%   BMI 19.32 kg/m      Pt alert and oriented upon initial assessment. Denies pain. Lung clear. BS active. HR regular. Went down for EGD today, tolerated well. On clears post op. BP soft, 90/50s, pt baseline. Ambulating SBA. No falls or injuries this shift. Will continue to monitor.     Face to face report given with opportunity to observe patient.    Report given to TRUNG Jose RN   9/21/2019  3:05 PM      "

## 2019-09-21 NOTE — H&P
Hospital consult   9/21/2019    Patient : Lita Ocasio    Consulting  Physician : Hospitalist    Planned Procedures : Endoscopy    This is a 78 year old female who presented to the emergency room with presyncope.  There was found to be severely anemic.  She was admitted to the hospital and received 2 units of blood.  I was consulted to do an upper endoscopy as the patient is not prep for lower endoscopy.  Patient denies any bleeding.  No melena.  No hematemesis.  Bright red blood per rectum.  Last colonoscopy was 6 years ago.  Family history of polyps or colon cancer.    Past Medical History:  Past Medical History:   Diagnosis Date     Acquired hypothyroidism 5/12/2019     Asthma      Benign essential hypertension 5/12/2019     Centrilobular emphysema (H) 5/12/2019     Chronic rhinitis 8/16/2013     Chronic systolic congestive heart failure (H) 5/12/2019     Constipation      Hearing loss      Heart trouble      Hemorrhagic cerebrovascular accident (CVA) (H) 01/2019     Hyperlipidemia 5/12/2019     Hypertension      Nasal congestion      Non-rheumatic mitral regurgitation 5/12/2019     Osteopenia 5/12/2019     Osteoporosis      Parathyroid related hypercalcemia (H) 8/18/2013     Primary hyperparathyroidism (H) 9/5/2013     Ringing in ears      Sensorineural hearing loss, asymmetrical 8/16/2013     Sneezing      Snoring      Thyroid disease      Weakness      Weight loss        Past Surgical History:  Past Surgical History:   Procedure Laterality Date     CATARACT IOL, RT/LT Bilateral      COLONOSCOPY       COLONOSCOPY - HIM SCAN  01/01/2009    Colonoscopy - [facility]/NL  2009     ENT SURGERY  2011    para thyroid surgery     ENT SURGERY  09/2013    Parathyroid     PARATHYROIDECTOMY  9/10/2013    Procedure: PARATHYROIDECTOMY;  Reoperative Neck Exploration, Resection of right Parathyroid adenoma;  Surgeon: Thalia Muller MD;  Location: [facility] OR     TONSILLECTOMY       TUBAL LIGATION         Family History  "History:  Family History   Problem Relation Age of Onset     Hearing Loss Mother      Heart Disease Mother      Myocardial Infarction Mother      Cerebrovascular Disease Father      Cancer Brother         throat     Cancer Sister      Myocardial Infarction Sister      Cancer Maternal Grandmother      Heart Disease Maternal Grandfather      Aortic aneurysm Son        History of Tobacco Use:  History   Smoking Status     Former Smoker     Packs/day: 1.00     Years: 50.00     Types: Cigarettes     Quit date: 1/1/2019   Smokeless Tobacco     Never Used     Comment: quit Jan 2019       Current Medications:  No current outpatient medications on file.       Allergies:  Allergies   Allergen Reactions     Evista [Raloxifene] Other (See Comments)     hypercalcemia     Prednisone GI Disturbance     Combigan [Brimonidine Tartrate-Timolol] Other (See Comments)     Eye swelling and itchy     Latex Rash     Levaquin [Levofloxacin] Muscle Pain (Myalgia)     Penicillins Rash     Restasis      Pt reports using eye gtts and having red irritated eyes        ROS:  Pertinent items are noted in HPI.  All other systems are negative.    PHYSICAL EXAM:     Vital signs: BP (!) 103/46   Temp 97.9  F (36.6  C) (Tympanic)   Resp 14   Ht 1.499 m (4' 11\")   Wt 43.4 kg (95 lb 10.9 oz)   SpO2 96%   BMI 19.32 kg/m     Weight: [unfilled]   BMI: Body mass index is 19.32 kg/m .   General: Normal, healthy, cooperative, in no acute distress   Skin: no jaundice   HEENT: PERRLA and EOMI   Neck: supple   Lungs: clear to auscultation   CV: Regular rate and rhythm without murmer   Abdominal: abdomen is soft without significant tenderness, masses, organomegaly or guarding   Extremities: No cyanosis, clubbing or edema noted bilaterally in Upper and Lower Extremities   Neurological: without deficit    LABORATORY:    CBC RESULTS:   Recent Labs   Lab Test 09/21/19  0545 09/20/19  2332 09/20/19  1550 09/04/19  1535   WBC 9.4  --  7.2 9.4   RBC 3.63*  --  2.61* " 3.35*   HGB 10.1*  Duplicate request 8.9* 6.9* 9.3*   HCT 30.5*  --  22.0* 29.0*   MCV 84  --  84 87   MCH 27.8  --  26.4* 27.8   MCHC 33.1  --  31.4* 32.1   RDW 13.9  --  13.7 14.1     --  357 392     Assessment:   78 year old female with profound anemia.  Obvious source of bleeding.  Go ahead and plan on doing an upper endoscopy today.  She will need to have a colonoscopy at some point.     Plan:   Will schedule an esophagogastroduodenoscopy.  The procedures with their risks, benefits and alternatives were explained.  Risks include but are not limited to bleeding, perforation, missing lesions, need for additional procedures, reaction to anesthesia.  All the patients questions were answered.  The patient consents to proceed.  The procedures will be scheduled.

## 2019-09-21 NOTE — PLAN OF CARE
Patient admitted this evening with a low hemoglobin and an elevated lactic acid level, one unit of blood was administered, a fluid bolus was given and currently the second unit of blood is infusing, patient is tolerating the transfusion well, there is a Protonix drip going at present time,  patient denies numbness in her tongue at present, and patient is now fairly steady on her feet, patient has made her needs known well, patient is denying pain, stool was sent to the lab this evening, and it is hemoccult positive, MD is aware, family has been updated frequently, patient has tolerated clear liquid diet with no difficulty, lab is here to re draw the second lactic acid level and draw hemoglobin.

## 2019-09-21 NOTE — ANESTHESIA PREPROCEDURE EVALUATION
Anesthesia Pre-Procedure Evaluation    Patient: Lita Ocasio   MRN: [id number] : 1940          Preoperative Diagnosis: * No surgery found *        Past Medical History:   Diagnosis Date     Acquired hypothyroidism 2019     Asthma      Benign essential hypertension 2019     Centrilobular emphysema (H) 2019     Chronic rhinitis 2013     Chronic systolic congestive heart failure (H) 2019     Constipation      Hearing loss      Heart trouble      Hemorrhagic cerebrovascular accident (CVA) (H) 2019     Hyperlipidemia 2019     Hypertension      Nasal congestion      Non-rheumatic mitral regurgitation 2019     Osteopenia 2019     Osteoporosis      Parathyroid related hypercalcemia (H) 2013     Primary hyperparathyroidism (H) 2013     Ringing in ears      Sensorineural hearing loss, asymmetrical 2013     Sneezing      Snoring      Thyroid disease      Weakness      Weight loss      Past Surgical History:   Procedure Laterality Date     CATARACT IOL, RT/LT Bilateral      COLONOSCOPY       COLONOSCOPY - HIM SCAN  2009    Colonoscopy - [facility]/NL       ENT SURGERY      para thyroid surgery     ENT SURGERY  2013    Parathyroid     PARATHYROIDECTOMY  9/10/2013    Procedure: PARATHYROIDECTOMY;  Reoperative Neck Exploration, Resection of right Parathyroid adenoma;  Surgeon: Thalia Muller MD;  Location: [facility] OR     TONSILLECTOMY       TUBAL LIGATION         Anesthesia Evaluation     .             ROS/MED HX    ENT/Pulmonary:     (+)HUGH risk factors snores loudly, allergic rhinitis, asthma COPD, , . Other pulmonary disease CXR persistent hyperinflation, no evidence of pneumonia or CHF.    Neurologic:     (+)other neuro Head CT negative from [employer] syncope    Cardiovascular:     (+) hypertension--CAD, --stent,2019  Drug Eluting Stent .. Taking blood thinners : Instructions Given to patient: hospitalist holding xarelto and plavix. CHF Last EF:  40 . MILLS, . :. valvular problems/murmurs type: MR . Previous cardiac testing date:results:Stress Testdate:6/2019 results: date: results: date: results:          METS/Exercise Tolerance:     Hematologic:     (+) Anemia, -      Musculoskeletal:         GI/Hepatic:     (+) Other GI/Hepatic Hgb 6 on admission, today am 10.1 after two units      Renal/Genitourinary:     (+) chronic renal disease, type: CRI,       Endo:     (+) thyroid problem (s/p thyroidectomy, primary hyperparathyroid)  Thyroid disease - Other, .      Psychiatric:         Infectious Disease:         Malignancy:         Other:                                 Lab Results   Component Value Date    WBC 9.4 09/21/2019    HGB 10.1 (L) 09/21/2019    HGB Duplicate request 09/21/2019    HCT 30.5 (L) 09/21/2019     09/21/2019    CRP <2.9 09/20/2019    SED 36 (H) 09/20/2019     09/21/2019    POTASSIUM 3.9 09/21/2019    CHLORIDE 111 (H) 09/21/2019    CO2 23 09/21/2019    BUN 10 09/21/2019    CR 0.98 09/21/2019    GLC 83 09/21/2019    BECKY 8.0 (L) 09/21/2019    PHOS 2.4 (L) 09/01/2013    MAG 2.2 05/12/2019    ALBUMIN 3.3 (L) 09/20/2019    PROTTOTAL 6.2 (L) 09/20/2019    ALT 23 09/20/2019    AST 13 09/20/2019    ALKPHOS 61 09/20/2019    BILITOTAL 0.3 09/20/2019    INR 1.30 (H) 09/20/2019    TSH 1.46 09/21/2019    TROPN  08/18/2013     <0.04  **Risk Stratification**   0.10 - 0.39   Elevated-may indicate increased                 risk of short term adverse                 cardiac events.      >=0.4      Possible cardiac injury.       Preop Vitals  BP Readings from Last 3 Encounters:   09/21/19 (!) 103/46   09/04/19 98/60   08/02/19 94/48    Pulse Readings from Last 3 Encounters:   09/04/19 86   08/02/19 67   07/23/19 61      Resp Readings from Last 3 Encounters:   09/21/19 14   09/04/19 16   07/23/19 16    SpO2 Readings from Last 3 Encounters:   09/21/19 96%   09/04/19 98%   08/02/19 96%      Temp Readings from Last 1 Encounters:   09/21/19 97.9  F (36.6  " C) (Tympanic)    Ht Readings from Last 1 Encounters:   09/20/19 1.499 m (4' 11\")      Wt Readings from Last 1 Encounters:   09/21/19 43.4 kg (95 lb 10.9 oz)    Estimated body mass index is 19.32 kg/m  as calculated from the following:    Height as of this encounter: 1.499 m (4' 11\").    Weight as of this encounter: 43.4 kg (95 lb 10.9 oz).                   COCO Brian CRNA  "

## 2019-09-21 NOTE — PLAN OF CARE
Face to face report given with opportunity to observe patient.    Report given to Renetta Swartz RN   9/20/2019  11:01 PM

## 2019-09-21 NOTE — ANESTHESIA CARE TRANSFER NOTE
Patient: Lita Ocasio    Procedure(s):  ESOPHAGOGASTRODUODENOSCOPY (EGD)    Diagnosis: ANEMIA / GI BLEED  Diagnosis Additional Information: No value filed.    Anesthesia Type:   MAC     Note:  Airway :Nasal Cannula  Patient transferred to:Medical/Surgical Unit  Handoff Report: Identifed the Patient, Identified the Reponsible Provider, Reviewed the pertinent medical history, Discussed the surgical course, Reviewed Intra-OP anesthesia mangement and issues during anesthesia, Set expectations for post-procedure period and Allowed opportunity for questions and acknowledgement of understanding      Vitals: (Last set prior to Anesthesia Care Transfer)    CRNA VITALS  9/21/2019 1045 - 9/21/2019 1119      9/21/2019             Pulse:  89    SpO2:  100 %                Electronically Signed By: COCO Brian CRNA  September 21, 2019  11:19 AM

## 2019-09-21 NOTE — H&P
[facility]    History and Physical  Hospitalist       Date of Admission:  9/20/2019    Assessment & Plan   Lita Ocasio is a 78 year old female with history of hypertension, hyperlipidemia, chronic systolic heart failure with LVEF of 40%, coronary artery disease status post LCx stenting in July 2019, stage III CKD, acquired hypothyroidism after thyroidectomy, hemorrhagic CVA was taken to the ED after she collapsed at [facility].  Her issues are the following:    Presyncope: Most probably due to severe anemia.  She denies any chest pain or shortness of breath.  No focal weakness.  Monitor on telemetry and transfuse as needed.  This should improve her symptoms.    Acute blood loss anemia: The patient's baseline hemoglobin is more than 9 but she has presented with 6.9 today.  She probably has been losing blood being on anticoagulation.  She will be transfused 2 units of PRBCs.  Continue to monitor H&H every 6 hours and transfuse as needed.  Hold Plavix and Xarelto for now.    Probable GI bleed: The patient denies any melena hematochezia or hematemesis but was probably has a slow GI bleed leading to blood loss anemia.  I have discussed it with Dr. Canales who is willing to do an EGD tomorrow.  Her last colonoscopy was more than 7 years ago so probably she will need a colonoscopy as well.  Keep n.p.o. after midnight.  Continue on clear liquid diet for now.    Chronic systolic heart failure: The patient's left ventricular ejection fraction is 40%.  She is on metoprolol and lisinopril.  I will hold both as well as her diuretics including Lasix and spironolactone due to GI bleed and hypotension.  Monitor intake and output with daily weights.    Coronary artery disease: The patient is status post recent LCx stent.  According to her she was put on Xarelto and Plavix.  I would have to hold both due to GI bleed and acute blood loss anemia.  The patient is also on statins.    Hyperlipidemia: Continue on  atorvastatin.    Anticoagulation status: The patient states that she is on Xarelto after her stent.  I could not figure out any history of atrial fibrillation or left ventricular thrombus.  She had her catheterization done at [facility].    Acquired hypothyroidism: The patient is status post thyroidectomy.  Continue on levothyroxine and check TSH levels.    COPD with emphysema: Continue on duo nebs.  Also on Accolate.  She does not have any wheezing.    History of tobacco dependence: She has quit 8 months ago.  Declines any further help.    CKD stage III: Creatinine is at baseline.    DVT Prophylaxis: On Xarelto  Code Status: Full Code    Disposition: Expected discharge in 1-2 days once stable.     Colt Crawford    Primary Care Physician   Ophelia Troncoso    Chief Complaint   Presyncope    History is obtained from the patient    History of Present Illness   Lita Ocasio is a 78 year old female with history of hypertension, hyperlipidemia, chronic systolic heart failure with LVEF of 40%, coronary artery disease status post LCx stenting in July 2019, stage III CKD, acquired hypothyroidism after thyroidectomy, hemorrhagic CVA was taken to the ED after she collapsed at [facility].  The patient states that she was doing well and went to shopping when she felt extremely sweaty and weak and had to lay down.  EMS was called and the patient was taken to the ED.  Her hemoglobin was found to be 6.9.  The patient denies any melena, hematochezia, headaches or chest pain.  She did have some tongue numbness that has resolved now.  Denies any fevers or chills.  No nausea or vomiting.  Denies abdominal pain.  No blurring of vision or slurring of speech.  No focal weakness as such.  The patient had recent LCx stent placed at [facility] and according to her she was put on Xarelto afterwards in addition to Plavix.  She does not report any history of atrial fibrillation, DVT or PE.  She has a history of hemorrhagic CVA earlier this  year.    Before the event the patient has been doing well.  Her ADLs and IADLs are mostly intact.    Past Medical History    I have reviewed this patient's medical history and updated it with pertinent information if needed.   Past Medical History:   Diagnosis Date     Acquired hypothyroidism 5/12/2019     Asthma      Benign essential hypertension 5/12/2019     Centrilobular emphysema (H) 5/12/2019     Chronic rhinitis 8/16/2013     Chronic systolic congestive heart failure (H) 5/12/2019     Constipation      Hearing loss      Heart trouble      Hemorrhagic cerebrovascular accident (CVA) (H) 01/2019     Hyperlipidemia 5/12/2019     Hypertension      Nasal congestion      Non-rheumatic mitral regurgitation 5/12/2019     Osteopenia 5/12/2019     Osteoporosis      Parathyroid related hypercalcemia (H) 8/18/2013     Primary hyperparathyroidism (H) 9/5/2013     Ringing in ears      Sensorineural hearing loss, asymmetrical 8/16/2013     Sneezing      Snoring      Thyroid disease      Weakness      Weight loss        Past Surgical History   I have reviewed this patient's surgical history and updated it with pertinent information if needed.  Past Surgical History:   Procedure Laterality Date     CATARACT IOL, RT/LT Bilateral      COLONOSCOPY       COLONOSCOPY - HIM SCAN  01/01/2009    Colonoscopy - [facility]/NL  2009     ENT SURGERY  2011    para thyroid surgery     ENT SURGERY  09/2013    Parathyroid     PARATHYROIDECTOMY  9/10/2013    Procedure: PARATHYROIDECTOMY;  Reoperative Neck Exploration, Resection of right Parathyroid adenoma;  Surgeon: Thalia Muller MD;  Location: [facility] OR     TONSILLECTOMY       TUBAL LIGATION         Prior to Admission Medications   Prior to Admission Medications   Prescriptions Last Dose Informant Patient Reported? Taking?   CLINDAMYCIN HCL PO Unknown at Unknown time Self Yes No   Sig: Take 600 mg by mouth 1 hour prior to dental work   Fexofenadine HCl (ALLEGRA PO) 9/20/2019 at 1130 Self Yes  Yes   Sig: Take 180 mg by mouth daily    LEVOTHYROXINE SODIUM PO 9/20/2019 at 1000 Self Yes Yes   Sig: Take 75 mcg by mouth daily   NONFORMULARY 9/20/2019 at 2200 Self Yes Yes   Sig: Tumeric 500mg BID   Tafluprost (ZIOPTAN) 0.0015 % SOLN 9/19/2019 at 2200 Self Yes Yes   Sig: Place 1 drop into both eyes At Bedtime   Vitamin D, Cholecalciferol, 1000 units TABS 9/20/2019 at 1130 Self Yes Yes   Sig: Take 1 tablet by mouth daily   Zafirlukast (ACCOLATE PO) 9/20/2019 at 1000 Self Yes Yes   Sig: Take 20 mg by mouth 2 times daily (before meals)    albuterol (PROAIR RESPICLICK) 108 (90 Base) MCG/ACT inhaler Unknown at Unknown time Self No No   Sig: Inhale 2 puffs into the lungs every 4 hours as needed for shortness of breath / dyspnea or wheezing   atorvastatin (LIPITOR) 40 MG tablet 9/19/2019 at 1800 Self No Yes   Sig: Take 1 tablet (40 mg) by mouth At Bedtime   calcium carbonate (TUMS) 500 MG chewable tablet 9/20/2019 at 1130 Self Yes Yes   Sig: Take 1 chew tab by mouth daily   clopidogrel (PLAVIX) 75 MG tablet 9/20/2019 at 1130 Self Yes Yes   Sig: Take 75 mg by mouth daily   fluticasone (FLONASE) 50 MCG/ACT spray Unknown at Unknown time Self No No   Sig: Spray 2 sprays into both nostrils daily   furosemide (LASIX) 40 MG tablet 9/20/2019 at 1130 Self No Yes   Sig: Take 0.5 tablets (20 mg) by mouth daily   ipratropium - albuterol 0.5 mg/2.5 mg/3 mL (DUONEB) 0.5-2.5 (3) MG/3ML neb solution Unknown at Unknown time Self No No   Sig: Take 1 vial (3 mLs) by nebulization every 6 hours as needed for shortness of breath / dyspnea or wheezing   lisinopril (PRINIVIL/ZESTRIL) 5 MG tablet 9/20/2019 at 1130 Self No Yes   Sig: Take 1 tablet (5 mg) by mouth daily   metoprolol succinate ER (TOPROL-XL) 25 MG 24 hr tablet 9/20/2019 at 1130 Self No Yes   Sig: Take 1 tablet (25 mg) by mouth daily   mirtazapine (REMERON) 7.5 MG tablet 9/19/2019 at 2200  No Yes   Sig: Take 1 tablet (7.5 mg) by mouth At Bedtime   potassium chloride ER  (K-DUR/KLOR-CON M) 10 MEQ CR tablet 9/20/2019 at 1130  No Yes   Sig: Take 1 tablet (10 mEq) by mouth daily   rivaroxaban ANTICOAGULANT (XARELTO) 20 MG TABS tablet 9/19/2019 at 2200 Self No Yes   Sig: Take 1 tablet (20 mg) by mouth daily (with dinner)   spironolactone (ALDACTONE) 25 MG tablet 9/20/2019 at 1130 Self No Yes   Sig: Take 0.5 tablets (12.5 mg) by mouth daily   vitamin B complex with vitamin C (VITAMIN  B COMPLEX) tablet 9/18/2019 at 1130 Self Yes Yes   Sig: Take 1 tablet by mouth daily Takes 500mg of Vitamin B   vitamin E 400 units TABS 9/20/2019 at 1130 Self Yes Yes   Sig: Take by mouth daily      Facility-Administered Medications: None     Allergies   Allergies   Allergen Reactions     Evista [Raloxifene] Other (See Comments)     hypercalcemia     Prednisone GI Disturbance     Combigan [Brimonidine Tartrate-Timolol] Other (See Comments)     Eye swelling and itchy     Latex Rash     Levaquin [Levofloxacin] Muscle Pain (Myalgia)     Penicillins Rash     Restasis      Pt reports using eye gtts and having red irritated eyes        Social History   I have reviewed this patient's social history and updated it with pertinent information if needed. Lita Ocasio  reports that she quit smoking about 8 months ago. Her smoking use included cigarettes. She has a 50.00 pack-year smoking history. She has never used smokeless tobacco. She reports that she drinks alcohol. She reports that she does not use drugs.    Family History   I have reviewed this patient's family history and updated it with pertinent information if needed.   Family History   Problem Relation Age of Onset     Hearing Loss Mother      Heart Disease Mother      Myocardial Infarction Mother      Cerebrovascular Disease Father      Cancer Brother         throat     Cancer Sister      Myocardial Infarction Sister      Cancer Maternal Grandmother      Heart Disease Maternal Grandfather      Aortic aneurysm Son        Review of Systems    GENERAL/CONSTITUTIONAL: The patient denies fever,weight gain or weight loss.  HEAD, EYES, EARS, NOSE AND THROAT: Eyes - The patient denies pain, redness, ,Ears, nose, mouth and throat. The patient denies ringing in the ears,  nosebleeds, CARDIOVASCULAR: The patient denies chest pain, irregular heartbeats, palpitation, shortness of breath, orthopnea or PND  RESPIRATORY: The patient denies chronic dry cough, Hemoptysis,  wheezing or night sweats.  GASTROINTESTINAL: The patient denies decreased appetite, nausea, vomiting, ,hematochezia, melena or hematemesis. No abdominal pain  GENITOURINARY: The patient denies difficult urination, pain or burning with urination, blood in the urine,  MUSCULOSKELETAL: The patient denies muscle cramps. No joint or muscle pain. No muscle tenderness.  SKIN : The patient denies easy bruising, skin redness, skin rash,   NEUROLOGIC: As per HPI  PSYCHIATRIC: The patient denies thoughts of suicide,  ENDOCRINE: The patient denies intolerance to hot or cold temperature,  HEMATOLOGIC/LYMPHATIC: The patient denies  clotting tendency.    Physical Exam   Temp: 99.3  F (37.4  C) Temp src: Temporal BP: 111/54   Heart Rate: 81 Resp: 22 SpO2: 96 % O2 Device: None (Room air)    Vital Signs with Ranges  Temp:  [97.4  F (36.3  C)-99.8  F (37.7  C)] 99.3  F (37.4  C)  Heart Rate:  [70-83] 81  Resp:  [18-27] 22  BP: ()/(41-76) 111/54  SpO2:  [94 %-97 %] 96 %  94 lbs 9.24 oz  GENERAL APPEARANCE: The patient is a lean built lady in no acute distress, talking pleasantly.  HEENT: Normocephalic and atraumatic. No scleral icterus.PERRLA.  No oropharyngeal lesions.  NECK: Supple. Trachea is midline. No evidence of thyroid enlargement. No lymphadenopathy or tenderness.  CHEST: Symmetric. Nontender to palpation.  LUNGS: Breath sounds are equal and clear bilaterally. No wheezes, rhonchi, or rales.  HEART: Regular rate and rhythm with normal S1 and S2. No murmurs, gallops, or rubs.  ABDOMEN: Soft, flat, and  benign. No mass, tenderness, guarding, or rebound.  Bowel sounds are present. No CVA tenderness  RECTAL: Deferred  EXTREMITIES: No cyanosis, clubbing, or edema.  NEUROLOGIC: No focal sensory or motor deficits are noted. Cranial nerves II through XII are intact. Grossly nonfocal examination  PSYCHIATRIC: Appropriate mood and affect.  SKIN: Warm, dry, and well perfused. Good turgor. No lesions, nodules or rashes are noted.     Data   Data reviewed today:  I personally reviewed the EKG tracing showing VR: 71, NSR, nonspecific ST-T segment changes, .  Recent Labs   Lab 09/20/19  1550   WBC 7.2   HGB 6.9*   MCV 84      INR 1.30*      POTASSIUM 3.8   CHLORIDE 104   CO2 25   BUN 14   CR 1.02   ANIONGAP 7   BECKY 8.4*   *   ALBUMIN 3.3*   PROTTOTAL 6.2*   BILITOTAL 0.3   ALKPHOS 61   ALT 23   AST 13   TROPI <0.015       Recent Results (from the past 24 hour(s))   CT Head w/o Contrast    Narrative    Impression    IMPRESSION:   No acute intracranial abnormality.  No acute fracture.     White matter changes consistent with small vessel disease and  encephalomalacia from previous intraparenchymal hemorrhage.    JAYNA MAURICIO MD   XR Chest 2 Views    Narrative    Impression    Impression:   Persistent hyperinflation of the lungs with small bilateral pleural  effusions, decreased significantly in volume compared to prior study.  No evidence of pneumonia or CHF.    JAYNA MAURICIO MD       Total time spent in patient care was 55 minutes. >  50% of time was spent in coordination of care and patient counseling.

## 2019-09-21 NOTE — ANESTHESIA PREPROCEDURE EVALUATION
Anesthesia Pre-Procedure Evaluation    Patient: Lita Ocasio   MRN: [id number] : 1940          Preoperative Diagnosis: * No surgery found *        Past Medical History:   Diagnosis Date     Acquired hypothyroidism 2019     Asthma      Benign essential hypertension 2019     Centrilobular emphysema (H) 2019     Chronic rhinitis 2013     Chronic systolic congestive heart failure (H) 2019     Constipation      Hearing loss      Heart trouble      Hemorrhagic cerebrovascular accident (CVA) (H) 2019     Hyperlipidemia 2019     Hypertension      Nasal congestion      Non-rheumatic mitral regurgitation 2019     Osteopenia 2019     Osteoporosis      Parathyroid related hypercalcemia (H) 2013     Primary hyperparathyroidism (H) 2013     Ringing in ears      Sensorineural hearing loss, asymmetrical 2013     Sneezing      Snoring      Thyroid disease      Weakness      Weight loss      Past Surgical History:   Procedure Laterality Date     CATARACT IOL, RT/LT Bilateral      COLONOSCOPY       COLONOSCOPY - HIM SCAN  2009    Colonoscopy - [facility]/NL       ENT SURGERY      para thyroid surgery     ENT SURGERY  2013    Parathyroid     PARATHYROIDECTOMY  9/10/2013    Procedure: PARATHYROIDECTOMY;  Reoperative Neck Exploration, Resection of right Parathyroid adenoma;  Surgeon: Thalia Muller MD;  Location: [facility] OR     TONSILLECTOMY       TUBAL LIGATION         Anesthesia Evaluation     . Pt has had prior anesthetic. Type: General    No history of anesthetic complications          ROS/MED HX    ENT/Pulmonary:     (+)HUGH risk factors snores loudly, allergic rhinitis, tobacco use, Past use Moderate Persistent asthma Treatment: Inhaler prn,  COPD, , . Other pulmonary disease CXR persistent hyperinflation, no evidence of pneumonia or CHF.    Neurologic:     (+)migraines, CVA date: 2019 without deficitsother neuro Head CT negative after [employer]  syncope    Cardiovascular:     (+) Dyslipidemia, hypertension--CAD, --stent,6/2019  2 Drug Eluting Stent .. Taking blood thinners : Instructions Given to patient: hospitalist holding xarelto and plavix. CHF . MILLS, fainting (syncope). :. dysrhythmias a-fib, valvular problems/murmurs type: MR . Previous cardiac testing Echodate:4/2019results:Trivial pericardial effusion is present.  Left ventricular size is normal.  Mildly (EF 45-50%) reduced left ventricular function is present.  Left ventricular diastolic function is normal.  The right ventricle is normal size.  Mild left atrial enlargement is present.  Mild mitral insufficiency is present.  Aortic valve is normal in structure and function.  Mild tricuspid insufficiency is present.  Right ventricular systolic pressure is 28mmHg above the right atrial pressure.  The pulmonic valve is normal.  The aorta root is normal.date: results: date: results: date: results:          METS/Exercise Tolerance:  3 - Able to walk 1-2 blocks without stopping   Hematologic:     (+) Anemia, History of Transfusion previous transfusion reaction -      Musculoskeletal:   (+) arthritis,  -       GI/Hepatic:     (+) GERD Asymptomatic on medication, Other GI/Hepatic EGD r/o GI bleed, hgb 6 on admission this am 10.1 after two units      Renal/Genitourinary:     (+) chronic renal disease (Stage 3), type: CRI, Pt does not require dialysis, Pt has no history of transplant,       Endo:     (+) thyroid problem ( primary hyperparathyroidis)  Thyroid disease - Other, .      Psychiatric:  - neg psychiatric ROS       Infectious Disease:  - neg infectious disease ROS       Malignancy:      - no malignancy   Other:    - neg other ROS                      Physical Exam  Normal systems: pulmonary    Airway   Mallampati: II  TM distance: >3 FB  Neck ROM: full    Dental   (+) upper dentures and lower dentures    Cardiovascular   Rhythm and rate: regular and normal  (+) murmur       Pulmonary    breath sounds  "clear to auscultation            Lab Results   Component Value Date    WBC 9.4 09/21/2019    HGB 10.1 (L) 09/21/2019    HGB Duplicate request 09/21/2019    HCT 30.5 (L) 09/21/2019     09/21/2019    CRP <2.9 09/20/2019    SED 36 (H) 09/20/2019     09/21/2019    POTASSIUM 3.9 09/21/2019    CHLORIDE 111 (H) 09/21/2019    CO2 23 09/21/2019    BUN 10 09/21/2019    CR 0.98 09/21/2019    GLC 83 09/21/2019    BECKY 8.0 (L) 09/21/2019    PHOS 2.4 (L) 09/01/2013    MAG 2.2 05/12/2019    ALBUMIN 3.3 (L) 09/20/2019    PROTTOTAL 6.2 (L) 09/20/2019    ALT 23 09/20/2019    AST 13 09/20/2019    ALKPHOS 61 09/20/2019    BILITOTAL 0.3 09/20/2019    INR 1.30 (H) 09/20/2019    TSH 1.46 09/21/2019    TROPN  08/18/2013     <0.04  **Risk Stratification**   0.10 - 0.39   Elevated-may indicate increased                 risk of short term adverse                 cardiac events.      >=0.4      Possible cardiac injury.       Preop Vitals  BP Readings from Last 3 Encounters:   09/21/19 (!) 103/46   09/04/19 98/60   08/02/19 94/48    Pulse Readings from Last 3 Encounters:   09/04/19 86   08/02/19 67   07/23/19 61      Resp Readings from Last 3 Encounters:   09/21/19 14   09/04/19 16   07/23/19 16    SpO2 Readings from Last 3 Encounters:   09/21/19 96%   09/04/19 98%   08/02/19 96%      Temp Readings from Last 1 Encounters:   09/21/19 97.9  F (36.6  C) (Tympanic)    Ht Readings from Last 1 Encounters:   09/20/19 1.499 m (4' 11\")      Wt Readings from Last 1 Encounters:   09/21/19 43.4 kg (95 lb 10.9 oz)    Estimated body mass index is 19.32 kg/m  as calculated from the following:    Height as of 9/20/19: 1.499 m (4' 11\").    Weight as of an earlier encounter on 9/21/19: 43.4 kg (95 lb 10.9 oz).       Anesthesia Plan      History & Physical Review  History and physical reviewed and following examination; no interval change.    ASA Status:  4 .    NPO Status:  > 8 hours    Plan for MAC with Intravenous induction. Maintenance will be " TIVA.  Reason for MAC:  Chronic cardiopulmonary disease (G9)    Risks and benefits of MAC anesthetic discussed including dental damage, aspiration, loss of airway, conversion to general anesthetic, CV complications, MI, stroke, death. Pt wishes to proceed.       Postoperative Care  Postoperative pain management:  IV analgesics.      Consents  Anesthetic plan, risks, benefits and alternatives discussed with:  Patient.  Use of blood products discussed: Yes.   Use of blood products discussed with Patient.  Consented to blood products.  .                 COCO Brian CRNA

## 2019-09-22 VITALS
SYSTOLIC BLOOD PRESSURE: 103 MMHG | DIASTOLIC BLOOD PRESSURE: 49 MMHG | RESPIRATION RATE: 16 BRPM | OXYGEN SATURATION: 96 % | WEIGHT: 96.78 LBS | TEMPERATURE: 98.2 F | HEIGHT: 59 IN | BODY MASS INDEX: 19.51 KG/M2

## 2019-09-22 LAB
ANION GAP SERPL CALCULATED.3IONS-SCNC: 8 MMOL/L (ref 3–14)
BACTERIA SPEC CULT: NORMAL
BASOPHILS # BLD AUTO: 0.1 10E9/L (ref 0–0.2)
BASOPHILS NFR BLD AUTO: 0.8 %
BUN SERPL-MCNC: 7 MG/DL (ref 7–30)
CALCIUM SERPL-MCNC: 7.8 MG/DL (ref 8.5–10.1)
CHLORIDE SERPL-SCNC: 110 MMOL/L (ref 94–109)
CO2 SERPL-SCNC: 25 MMOL/L (ref 20–32)
CREAT SERPL-MCNC: 1.02 MG/DL (ref 0.52–1.04)
DIFFERENTIAL METHOD BLD: ABNORMAL
EOSINOPHIL # BLD AUTO: 0.3 10E9/L (ref 0–0.7)
EOSINOPHIL NFR BLD AUTO: 4 %
ERYTHROCYTE [DISTWIDTH] IN BLOOD BY AUTOMATED COUNT: 14.1 % (ref 10–15)
GFR SERPL CREATININE-BSD FRML MDRD: 52 ML/MIN/{1.73_M2}
GLUCOSE SERPL-MCNC: 84 MG/DL (ref 70–99)
HCT VFR BLD AUTO: 31.2 % (ref 35–47)
HGB BLD-MCNC: 10.1 G/DL (ref 11.7–15.7)
IMM GRANULOCYTES # BLD: 0 10E9/L (ref 0–0.4)
IMM GRANULOCYTES NFR BLD: 0.4 %
LYMPHOCYTES # BLD AUTO: 2.2 10E9/L (ref 0.8–5.3)
LYMPHOCYTES NFR BLD AUTO: 28.3 %
MCH RBC QN AUTO: 27.6 PG (ref 26.5–33)
MCHC RBC AUTO-ENTMCNC: 32.4 G/DL (ref 31.5–36.5)
MCV RBC AUTO: 85 FL (ref 78–100)
MONOCYTES # BLD AUTO: 0.8 10E9/L (ref 0–1.3)
MONOCYTES NFR BLD AUTO: 10.1 %
NEUTROPHILS # BLD AUTO: 4.5 10E9/L (ref 1.6–8.3)
NEUTROPHILS NFR BLD AUTO: 56.4 %
NRBC # BLD AUTO: 0 10*3/UL
NRBC BLD AUTO-RTO: 0 /100
PLATELET # BLD AUTO: 280 10E9/L (ref 150–450)
POTASSIUM SERPL-SCNC: 3.5 MMOL/L (ref 3.4–5.3)
RBC # BLD AUTO: 3.66 10E12/L (ref 3.8–5.2)
SODIUM SERPL-SCNC: 143 MMOL/L (ref 133–144)
SPECIMEN SOURCE: NORMAL
WBC # BLD AUTO: 7.9 10E9/L (ref 4–11)

## 2019-09-22 PROCEDURE — 99238 HOSP IP/OBS DSCHRG MGMT 30/<: CPT | Mod: 24 | Performed by: INTERNAL MEDICINE

## 2019-09-22 PROCEDURE — 25000132 ZZH RX MED GY IP 250 OP 250 PS 637: Mod: GY | Performed by: INTERNAL MEDICINE

## 2019-09-22 PROCEDURE — 80048 BASIC METABOLIC PNL TOTAL CA: CPT | Performed by: SURGERY

## 2019-09-22 PROCEDURE — 85025 COMPLETE CBC W/AUTO DIFF WBC: CPT | Performed by: SURGERY

## 2019-09-22 PROCEDURE — 40000275 ZZH STATISTIC RCP TIME EA 10 MIN

## 2019-09-22 PROCEDURE — 25000132 ZZH RX MED GY IP 250 OP 250 PS 637: Mod: GY | Performed by: SURGERY

## 2019-09-22 PROCEDURE — 36415 COLL VENOUS BLD VENIPUNCTURE: CPT | Performed by: SURGERY

## 2019-09-22 RX ORDER — PANTOPRAZOLE SODIUM 40 MG/1
40 TABLET, DELAYED RELEASE ORAL
Qty: 60 TABLET | Refills: 1 | Status: SHIPPED | OUTPATIENT
Start: 2019-09-22 | End: 2019-11-11

## 2019-09-22 RX ADMIN — CALCIUM CARBONATE (ANTACID) CHEW TAB 500 MG 500 MG: 500 CHEW TAB at 09:51

## 2019-09-22 RX ADMIN — LEVOTHYROXINE SODIUM 75 MCG: 25 TABLET ORAL at 05:37

## 2019-09-22 RX ADMIN — ZAFIRLUKAST 20 MG: 20 TABLET, COATED ORAL at 06:30

## 2019-09-22 RX ADMIN — PANTOPRAZOLE SODIUM 40 MG: 40 TABLET, DELAYED RELEASE ORAL at 06:30

## 2019-09-22 RX ADMIN — MELATONIN 25 MCG: at 09:51

## 2019-09-22 ASSESSMENT — ACTIVITIES OF DAILY LIVING (ADL)
ADLS_ACUITY_SCORE: 12

## 2019-09-22 ASSESSMENT — MIFFLIN-ST. JEOR: SCORE: 824.63

## 2019-09-22 NOTE — PLAN OF CARE
Pt A&O x3, makes needs known. VSS, denies pain, HR regular-SR 60's per ICU, lungs clear, BS active. Slept most of night, free from falls/ injury, call light within reach, bed alarms on. Will continue to monitor.   Face to face report given with opportunity to observe patient.    Report given to TRUNG Pillai RN   9/22/2019  7:08 AM

## 2019-09-22 NOTE — PLAN OF CARE
Patient doing well. She has denied pain this shift. Patient has had no complaints. BP continues to be soft on the left arm. Patient was able to tell me that her blood pressure always runs high on her right arm, 174/52. She has denied feeling dizzy or light headed, even with position changes. VSS otherwise. Oral intake adequate. Urine output adequate. No BM this shift. Remains stand by assist for safety. Alarms in place. Call light within reach. IV SL.    Face to face report given with opportunity to observe patient.    Report given to Keily NINO.    Rebeca Lundy RN   9/22/2019  12:22 AM

## 2019-09-22 NOTE — DISCHARGE INSTRUCTIONS
You will need to schedule a follow up appointment with Dr. Canales within 1 week.  Please call [phone] to schedule your appointment.

## 2019-09-22 NOTE — PLAN OF CARE
Patient discharged at 11:35 AM via wheel chair accompanied by daughter and staff. Prescriptions sent to patients preferred pharmacy. All belongings sent with patient.     Discharge instructions reviewed with PATIENT AND DAUGHTER. Listed belongings gathered and returned to patient.     Patient discharged to HOME.   Report called to na    Core Measures and Vaccines  Core Measures applicable during stay: No. If yes, state diagnosis: na  Pneumonia and Influenza given prior to discharge, if indicated: N/A    Surgical Patient   Surgical Procedures during stay: egd  Did patient receive discharge instruction on wound care and recognition of infection symptoms? Yes    MISC  Follow up appointment made:  Yes  Home and hospital aquired medications returned to patient: N/A  Patient reports pain was well managed at discharge: Yes

## 2019-09-22 NOTE — DOWNTIME EVENT NOTE
The EMR was down for 2 hours and 20 minutes on 9/22/2019.    This writer was responsible for completing the paper charting during this time period.     The following information was re-entered into the system by Keily Porras RN: Flowsheet data    The following information will remain in the paper chart: n/a    Keily Porras RN  9/22/2019

## 2019-09-22 NOTE — PROGRESS NOTES
Checked in briefly with Lita before leaving, no needs voiced. Medical record and Maurizio Score reviewed. No apparent needs noted at this time. Care Transitions will remain available if needs arise.

## 2019-09-22 NOTE — DISCHARGE SUMMARY
Admit Date:     09/20/2019   Discharge Date:           PRIMARY CARE PHYSICIAN:  Ophelia Troncoso MD      DATE OF ADMISSION:  09/20/2019      DATE OF DISCHARGE:  09/22/2019      ADMISSION DIAGNOSES:   1.  Anemia.   2.  Presyncope.      DISCHARGE DIAGNOSES:   1.  Presumed gastrointestinal bleed with acute on chronic anemia due to blood loss.   2.  Presyncope likely due to severe anemia.   3.  Chronic systolic heart failure.   4.  Coronary artery disease, status post stent placement.   5.  History of atrial fibrillation.     6.  History of hyperlipidemia.   7.  History of acquired hypothyroidism.   8.  History of tobacco dependence.   9.  Chronic kidney disease, stage III.      PENDING TEST RESULTS AT TIME OF DISCHARGE:  None.      PROCEDURES/CONSULTATIONS:  The patient was seen by Dr. Julio Canales with General Surgery and did undergo an EGD.      HISTORY OF PRESENT ILLNESS:  Please see admission H and P.      PAST MEDICAL HISTORY:  Please see admission H and P.      DISPOSITION:  Home, self-care.      HOSPITAL COURSE:  Ms. Ocasio is a pleasant 78-year-old female with a history of underlying coronary artery disease with stent placement in 06/2019, status post 2 drug-eluting stents.  In addition, she was found to have atrial fibrillation during that revascularization.  The patient was placed both on Xarelto and Plavix upon her discharge at that time and has continued those medications until this presentation.      In review of her chart, it appears as though she did have some subtle anemia starting back in 08/2019 with a progressive drop in her hemoglobin and recently a more abrupt drop from 09/04/2019 to her presentation on 09/20/2019 due to a presyncopal episode.        With her presentation, the patient was found to have a hemoglobin of 6.9 resulting in her hospitalization.  She did receive a transfusion and was kept n.p.o. and subsequently underwent EGD.      EGD was only remarkable for evidence of old peptic ulcer  disease, and no active source of bleeding was identified.  Hence Ms. Ocasio will need a followup with General Surgery to discuss repeat EGD and colonoscopy.      The patient remained stable after the EGD and obviously, her Plavix and Xarelto were both held during this entire hospitalization.  She was placed on PPI and will be discharged home today if she remains stable.      Followup will include followup with General Surgery, her primary care physician, Dr. Ophelia Troncoso, and Hailee Cortez with Cardiology.  Due to the fact that Ms. Ocasio appeared to remain in sinus rhythm throughout the duration of this hospitalization and the fact that she had a significant bleed, Xarelto will be held indefinitely.  However, due to her recent coronary intervention with drug-eluting stents, she will be placed back on her Plavix at time of discharge.  Hence, followup lab work would be indicated to assess for any recurrent bleeding.      DISCHARGE MEDICATIONS:   1.  Protonix 40 mg p.o. b.i.d.   2.  Albuterol 2 puffs every 4 hours as needed.   3.  Lipitor 40 mg daily.   4.  Plavix 75 mg daily.   5.  Allegra 180 mg daily.   6.  Lasix 20 mg daily.   7.  Synthroid 75 mcg daily.   8.  Lisinopril 5 mg daily.   9.  Toprol-XL 25 mg daily.   10.  Remeron 7.5 mg at bedtime.   11.  Potassium chloride 10 mEq daily.   12.  Aldactone 12.5 mg daily.   13.  Vitamin B Complex.   14.  Vitamin E.   15.  Vitamin D.   16.  Accolate 20 mg twice daily.   17.  Albuterol 2 puffs every 4 hours as needed.   18.  Clindamycin prior to dental work.   19.  Flonase 2 sprays in each nostril daily.   20.  DuoNeb as needed.        The patient has been instructed to discontinue her Xarelto at this time and follow up with Cardiology as to the need for this medication moving forward      PHYSICAL EXAMINATION AT TIME OF DISCHARGE:   VITAL SIGNS:  Blood pressure is 103/49, respirations 16, O2 sat 96% on room air, temperature 98.2 and pulse is 58.   GENERAL:  The patient  is alert and oriented, pleasant, conversant, no acute distress.   HEART:  Regular rate and rhythm, S1, S2.  No murmurs, rubs or gallops.   LUNGS:  Clear to auscultation bilaterally.   ABDOMEN:  Benign.   EXTREMITIES:  Without edema.   NEUROLOGIC:  No focal neurologic deficits noted.        LABORATORIES/TESTS:  At time of discharge, the patient's hemoglobin is 10.1.  As stated, status post transfusion.      ADDITIONAL DISCHARGE INSTRUCTIONS:     CODE STATUS:  FULL.      DIET:  Low sodium, low cholesterol.      ACTIVITY:  As tolerated.      FOLLOWUP APPOINTMENTS AND REFERRALS:   1.  The patient should follow up with Dr. Ophelia Troncoso this week for followup of this hospitalization and repeat CBC.   2.  The patient should follow up with Dr. Julio Canales for assessment and discussion as to repeat EGD and colonoscopy.   3.  The patient should follow up with Cardiology in the coming weeks (Helen Cortez).         LEFTY CHAIDEZ DO             D: 2019   T: 2019   MT: TEMO      Name:     KOURTNEY ADAMES   MRN:      [id number]        Account:        [id number]   :      1940           Admit Date:     2019                                  Discharge Date:       Document: [id number]       cc: HELEN SEPULVEDA, CNP       Ophelia Canales MD

## 2019-09-23 LAB
ABO + RH BLD: NORMAL
ABO + RH BLD: NORMAL
BLD GP AB SCN SERPL QL: NORMAL
BLD PROD TYP BPU: NORMAL
BLOOD BANK CMNT PATIENT-IMP: NORMAL
NUM BPU REQUESTED: 2
SPECIMEN EXP DATE BLD: NORMAL

## 2019-09-24 NOTE — PROGRESS NOTES
"Subjective     Lita Ocasio is a 78 year old female who presents to clinic today for the following health issues:    HPI       Hospital Follow-up Visit:    Hospital/Nursing Home/IP Rehab Facility: [facility]  Date of Admission: 9/20/19  Date of Discharge: 9/22/19  Reason(s) for Admission: Anemia, Presyncope, GI Bleed            Problems taking medications regularly:  None       Medication changes since discharge: None       Problems adhering to non-medication therapy:  None    Summary of hospitalization:  [facility] discharge summary reviewed  Diagnostic Tests/Treatments reviewed.  Follow up needed: Cardiology    Other Healthcare Providers Involved in Patient s Care:         Specialist appointment - Cardiology and Surgical follow-up appointment - General Surgery  Update since discharge: improved.     Patient presents for hospital follow up. Pt had acute GI bleeding with hgb of 6.9 on admission. [facility]. She was transfused 2 units and Hgb came up nicely. EGD negative for acute bleeding. Plan for repeat scopes made already. CBC rechecked yesterday and continues to improve. She is currently off Xarelto, but continues plavix. She is on protonix, tolerating well.   Patient states mood is very good. Weight is increasing, appetite better on Remeron.        Reviewed and updated as needed this visit by Provider  Tobacco  Allergies  Meds  Problems  Med Hx  Surg Hx  Fam Hx         Review of Systems   ROS COMP: Constitutional, HEENT, cardiovascular, pulmonary, gi and gu systems are negative, except as otherwise noted.      Objective    /62   Pulse 64   Temp 98.4  F (36.9  C) (Tympanic)   Resp 16   Ht 1.499 m (4' 11\")   Wt 46.6 kg (102 lb 12.8 oz)   SpO2 97%   BMI 20.76 kg/m    Body mass index is 20.76 kg/m .  Physical Exam   GENERAL: healthy, alert and no distress  NECK: no adenopathy, no asymmetry, masses, or scars and thyroid normal to " palpation  RESP: lungs clear to auscultation - no rales, rhonchi or wheezes  CV: regular rate and rhythm, normal S1 S2, no S3 or S4, no murmur, click or rub, no peripheral edema   ABDOMEN: soft, nontender, no hepatosplenomegaly, no masses and bowel sounds normal  MS: no gross musculoskeletal defects noted, no edema    Diagnostic Test Results:  Labs reviewed in Epic  Results for orders placed or performed in visit on 09/30/19   CBC with platelets   Result Value Ref Range    WBC 9.9 4.0 - 11.0 10e9/L    RBC Count 4.29 3.8 - 5.2 10e12/L    Hemoglobin 11.5 (L) 11.7 - 15.7 g/dL    Hematocrit 36.3 35.0 - 47.0 %    MCV 85 78 - 100 fl    MCH 26.8 26.5 - 33.0 pg    MCHC 31.7 31.5 - 36.5 g/dL    RDW 14.1 10.0 - 15.0 %    Platelet Count 370 150 - 450 10e9/L           Assessment & Plan     Mild protein-calorie malnutrition (H)  Weight improving. Note low albumin and calcium, recheck at follow up in November.     Gastrointestinal hemorrhage, unspecified gastrointestinal hemorrhage type  Hgb coming up. Pt otherwise feeling well. Continue ppi, egd and colonoscopy scheduled, follow up with me November as planned.     Ophelia Troncoso MD  [facility]

## 2019-09-27 ENCOUNTER — PATIENT OUTREACH (OUTPATIENT)
Dept: CARE COORDINATION | Facility: OTHER | Age: 79
End: 2019-09-27

## 2019-09-27 NOTE — PROGRESS NOTES
Clinic Care Coordination Contact    Situation: Patient chart reviewed by care coordinator.    Background: Pt hospitalized 9/20-9/22/19    Assessment: ADMISSION DIAGNOSES:   1.  Anemia.   2.  Presyncope.     Plan/Recommendations: FOLLOWUP APPOINTMENTS AND REFERRALS:   1.  The patient should follow up with Dr. Ophelia Troncoso this week for followup of this hospitalization and repeat CBC.   2.  The patient should follow up with Dr. Julio Canales for assessment and discussion as to repeat EGD and colonoscopy.   3.  The patient should follow up with Cardiology in the coming weeks (Hailee Cortez).    -Per Dr Beck      Appt 9/30 with Dr Canales- post-op  Appt 10/1 with Dr Troncoso- follow-up hospital  Appt 10/22 with Hailee Cortez NP (cardiology)    Lauren Pipkin, BS-RN   CHF and General Care Coordinator  [phone] Option # 1

## 2019-09-30 ENCOUNTER — OFFICE VISIT (OUTPATIENT)
Dept: SURGERY | Facility: OTHER | Age: 79
End: 2019-09-30
Attending: SURGERY
Payer: MEDICARE

## 2019-09-30 VITALS
HEART RATE: 67 BPM | HEIGHT: 59 IN | DIASTOLIC BLOOD PRESSURE: 68 MMHG | TEMPERATURE: 99.1 F | BODY MASS INDEX: 20.36 KG/M2 | WEIGHT: 101 LBS | OXYGEN SATURATION: 98 % | SYSTOLIC BLOOD PRESSURE: 98 MMHG

## 2019-09-30 DIAGNOSIS — K92.2 GASTROINTESTINAL HEMORRHAGE, UNSPECIFIED GASTROINTESTINAL HEMORRHAGE TYPE: Primary | ICD-10-CM

## 2019-09-30 LAB
ERYTHROCYTE [DISTWIDTH] IN BLOOD BY AUTOMATED COUNT: 14.1 % (ref 10–15)
HCT VFR BLD AUTO: 36.3 % (ref 35–47)
HGB BLD-MCNC: 11.5 G/DL (ref 11.7–15.7)
MCH RBC QN AUTO: 26.8 PG (ref 26.5–33)
MCHC RBC AUTO-ENTMCNC: 31.7 G/DL (ref 31.5–36.5)
MCV RBC AUTO: 85 FL (ref 78–100)
PLATELET # BLD AUTO: 370 10E9/L (ref 150–450)
RBC # BLD AUTO: 4.29 10E12/L (ref 3.8–5.2)
WBC # BLD AUTO: 9.9 10E9/L (ref 4–11)

## 2019-09-30 PROCEDURE — 85027 COMPLETE CBC AUTOMATED: CPT | Mod: ZL | Performed by: SURGERY

## 2019-09-30 PROCEDURE — G0463 HOSPITAL OUTPT CLINIC VISIT: HCPCS

## 2019-09-30 PROCEDURE — 36415 COLL VENOUS BLD VENIPUNCTURE: CPT | Mod: ZL | Performed by: SURGERY

## 2019-09-30 PROCEDURE — 99213 OFFICE O/P EST LOW 20 MIN: CPT | Performed by: SURGERY

## 2019-09-30 RX ORDER — BISACODYL 5 MG/1
TABLET, DELAYED RELEASE ORAL
Qty: 4 TABLET | Refills: 0 | Status: ON HOLD | OUTPATIENT
Start: 2019-09-30 | End: 2019-10-17

## 2019-09-30 RX ORDER — RIVAROXABAN 20 MG/1
20 TABLET, FILM COATED ORAL AT BEDTIME
COMMUNITY
Start: 2019-07-03 | End: 2019-10-10

## 2019-09-30 RX ORDER — ERYTHROMYCIN 5 MG/G
OINTMENT OPHTHALMIC
Refills: 0 | COMMUNITY
Start: 2019-09-28 | End: 2020-03-19

## 2019-09-30 ASSESSMENT — PAIN SCALES - GENERAL: PAINLEVEL: NO PAIN (0)

## 2019-09-30 ASSESSMENT — MIFFLIN-ST. JEOR: SCORE: 843.76

## 2019-09-30 NOTE — PATIENT INSTRUCTIONS
"We want your Colonoscopy/Upper Endoscopy to be as pleasant as possible. Please review the instructions below. If you have questions, you may contact us at any of the following numbers:     [facility] Coordinator: [phone]  Clinic Nurse: [phone]  Surgery Education Nurse: [phone]    Date of Procedure: 10/17/19 with Dr. Canales  Admit time: Surgery Department will call you the day before your procedure by 5pm with your admit time. If your surgery is on Monday, expect a call on Friday.  If you are not contacted by 5PM, please call admitting at [phone]. After hours or on weekends, call [phone] to postpone.     Call the surgery nurse if you become ill within 1 week of your procedure to reschedule.   CBC tests needed today.     7 DAYS BEFORE THE EXAM:  Fill your prescription(s) at the pharmacy as soon as possible.  Call the Surgery Education Nurse at [phone] and have a medication list ready.  No Aspirin or NSAIDS (Ibuprofen, Celebrex, Naproxen, etc) 7 days before surgery.   Stop fiber supplements, vitamins, iron and herbals. Do not eat corn, nuts or seeds.  Continue to hold your Xarelto/Plavix until after procedure.  Arrange transportation with a responsible adult or you will be be cancelled.    2 DAYS BEFORE THE EXAM:   Follow a low fiber diet. See the list of low fiber foods on page 3 of the \"Split-Dose Golytely\" packet. Nothing red or purple. Drink 4-6 large glasses of sports drink today and tomorrow.          AT BEDTIME: take 2 Dulcolax tablets.     1 DAY BEFORE THE EXAM:  No solid foods or milk products after 12:01 am. Drink only clear liquids all day. See the list of clear liquids on page 2 of the \"Split-Dose Golytely\" packet. No red, purple, or alcohol.         AT 3:00 PM: Take 2 Dulcolax tablets.         AT 6:00 PM: Drink one 8 oz. glass of Golytely every 10-15 minutes until the jug is half empty. Drink each glass quickly. Store the rest in the refrigerator. Stay near a toilet.    DAY " OF EXAM:           6 HOURS PRIOR TO THE EXAM (set an alarm):  Drink the other half of the Golytely jug. Drink one 8 oz glass every 10-15 minutes until gone. You may have clear liquids up until 4 hours before your exam.   If you must take medicine, you may take it with a sip of water.  If you have an inhaler, bring it to surgery.  Shower before arrival. Wear clean, comfortable clothes.   No jewelry, make-up, nail polish, hair spray, lotions, or perfumes.   Frazer in Admitting through the [place] Entrance.  You must have a responsible adult available to drive and stay with you for 4 hours at home or you will be cancelled.       TIPS FOR COLON CLEANSING BEFORE YOUR COLONOSCOPY  To get accurate results from your exam, your colon must be completely empty or you may need to repeat the colon prep and exam. If you followed instructions and your stool is clear or yellow liquid, you are ready. If you are not sure if your colon is clean, please call the clinic nurse.    You may use Tucks wipes, hemorrhoid treatments, hydrocortisone cream or alcohol-free baby wipes to ease anal irritation. You may also use Vaseline to help protect the skin.     You can squeeze some lemon juice or add a packet of Crystal Light lemon-lime or ice tea flavor into your preparation. You may try sucking on hard candy or a lemon or lime wedge; or washing your mouth out with water, clear soda or mouthwash.    To chill the solution, put it in your refrigerator or set it in a bowl of ice. Do not add ice in your glass. Remove from the refrigerator 15 minutes before drinking.    Quickly drink each glass. It may help to use a timer. If the liquid is too salty, use a straw. Even when you are sitting on the toilet, keep drinking every 10-15 minutes. If you have nausea or vomiting, take a break for 15 to 30 minutes and then resume drinking.    You will have loose watery stools and may also have chills. Dress for comfort. Expect to feel bloating, nausea and  other discomfort until the stool clears from your colon.

## 2019-09-30 NOTE — PROGRESS NOTES
CLINIC NOTE - FOLLOW UP  9/30/2019    Patient:Lita Ocasio    Reason for Visit: Follow up from recent hospitalization for anemia and positive stools    This is a 78 year old female here for follow up from a recent hospitalization for anemia and heme positive stools. Her prior medical records were reviewed.       At the time of her hospitalization upper endoscopy was performed.  At that time old peptic ulcer disease was noted.  No active bleeding was noted.  She was discharged home with follow-up for arrangement of an upper and lower endoscopy to rule out other causes of GI bleed.  Upper endoscopy I would repeat to biopsy the scarred areas with her stomach and rule out other causes.  She has no complaints today.    Current Medications:  Current Outpatient Medications   Medication Sig Dispense Refill     albuterol (PROAIR RESPICLICK) 108 (90 Base) MCG/ACT inhaler Inhale 2 puffs into the lungs every 4 hours as needed for shortness of breath / dyspnea or wheezing 1 Inhaler 3     atorvastatin (LIPITOR) 40 MG tablet Take 1 tablet (40 mg) by mouth At Bedtime 90 tablet 3     bisacodyl (BISACODYL LAXATIVE) 5 MG EC tablet 2 tabs po at hs 2 night before surgery. 2 tabs at 3pm day before surgery. 4 tablet 0     calcium carbonate (TUMS) 500 MG chewable tablet Take 1 chew tab by mouth daily       CLINDAMYCIN HCL PO Take 600 mg by mouth 1 hour prior to dental work       clopidogrel (PLAVIX) 75 MG tablet Take 75 mg by mouth daily       erythromycin (ROMYCIN) 5 MG/GM ophthalmic ointment APPLY ONE APPLICATION TO BOTH EYES FOUR TIMES DAILY As Needed for entropian both lower lids  0     Fexofenadine HCl (ALLEGRA PO) Take 180 mg by mouth daily        fluticasone (FLONASE) 50 MCG/ACT spray Spray 2 sprays into both nostrils daily 1 Bottle 11     furosemide (LASIX) 40 MG tablet Take 0.5 tablets (20 mg) by mouth daily       ipratropium - albuterol 0.5 mg/2.5 mg/3 mL (DUONEB) 0.5-2.5 (3) MG/3ML neb solution Take 1 vial (3 mLs) by  nebulization every 6 hours as needed for shortness of breath / dyspnea or wheezing 9 vial 3     LEVOTHYROXINE SODIUM PO Take 75 mcg by mouth daily       lisinopril (PRINIVIL/ZESTRIL) 5 MG tablet Take 1 tablet (5 mg) by mouth daily 90 tablet 3     metoprolol succinate ER (TOPROL-XL) 25 MG 24 hr tablet Take 1 tablet (25 mg) by mouth daily 30 tablet 11     mirtazapine (REMERON) 7.5 MG tablet Take 1 tablet (7.5 mg) by mouth At Bedtime 30 tablet 1     NONFORMULARY Tumeric 500mg BID       pantoprazole (PROTONIX) 40 MG EC tablet Take 1 tablet (40 mg) by mouth 2 times daily (before meals) 60 tablet 1     polyethylene glycol (GOLYTELY/NULYTELY) 236 g suspension 6 pm day before surgery drink 8oz q 10 mins until jug 1/2 empty. Refrigerate. Repeat with remainder of jug 6 hours prior to surgery. 4000 mL 0     potassium chloride ER (K-DUR/KLOR-CON M) 10 MEQ CR tablet Take 1 tablet (10 mEq) by mouth daily 30 tablet 5     spironolactone (ALDACTONE) 25 MG tablet Take 0.5 tablets (12.5 mg) by mouth daily 30 tablet 3     Tafluprost (ZIOPTAN) 0.0015 % SOLN Place 1 drop into both eyes At Bedtime       vitamin B complex with vitamin C (VITAMIN  B COMPLEX) tablet Take 1 tablet by mouth daily Takes 500mg of Vitamin B       Vitamin D, Cholecalciferol, 1000 units TABS Take 1 tablet by mouth daily       vitamin E 400 units TABS Take by mouth daily       XARELTO ANTICOAGULANT 20 MG TABS tablet Take 20 mg by mouth At Bedtime       Zafirlukast (ACCOLATE PO) Take 20 mg by mouth 2 times daily (before meals)          Allergies:  Allergies   Allergen Reactions     Evista [Raloxifene] Other (See Comments)     hypercalcemia     Prednisone GI Disturbance     Combigan [Brimonidine Tartrate-Timolol] Other (See Comments)     Eye swelling and itchy     Latex Rash     Levaquin [Levofloxacin] Muscle Pain (Myalgia)     Penicillins Rash     Restasis      Pt reports using eye gtts and having red irritated eyes        ROS:  Pertinent items are noted in HPI.  All  "other systems are negative.    PHYSICAL EXAM:     Vital signs: BP 98/68 (BP Location: Right arm, Patient Position: Sitting, Cuff Size: Adult Regular)   Pulse 67   Temp 99.1  F (37.3  C) (Tympanic)   Ht 1.499 m (4' 11\")   Wt 45.8 kg (101 lb)   SpO2 98%   BMI 20.40 kg/m     Weight: [unfilled]   BMI: Body mass index is 20.4 kg/m .   General: Normal, healthy, cooperative, in no acute distress   Skin: no jaundice   HEENT: PERRLA and EOMI   Neck: supple   Lungs: clear to auscultation   CV: Regular rate and rhythm without murmer   Abdominal: abdomen is soft without significant tenderness, masses, organomegaly or guarding   Extremities: No cyanosis, clubbing or edema noted bilaterally in Upper and Lower Extremities   Neurological: without deficit    ASSESSMENT:  78 year old female here as follow up from a recent hospitalization for anemia and heme positive stools.    PLAN: Clyde options with her and we will go ahead on planning on doing an upper and lower endoscopy.  We will also check a CBC today    The risks, benefits, and alternatives to the planned procedure were fully discussed with the patient and/or the patient's representative(s). The risks of bleeding, infection, death, missing pathology, the need for additional procedures intra-operatively, the possible need for intra-operative consults, the possible need for transfusion therapy, cardiopulmonary compromise, the possible need for additional surgery for a complication were discussed with the patient and/or the patient's representative(s). The patient's and/or patient's representative(s) questions were addressed and answered. Informed consent was obtained from the patient and/or the patient's representative(s). The patient and/or the patient's representative(s) consent to proceed.          "

## 2019-09-30 NOTE — NURSING NOTE
"Chief Complaint   Patient presents with     Surgical Followup     EGD 9/21/19       Initial BP 98/68 (BP Location: Right arm, Patient Position: Sitting, Cuff Size: Adult Regular)   Pulse 67   Temp 99.1  F (37.3  C) (Tympanic)   Ht 1.499 m (4' 11\")   Wt 45.8 kg (101 lb)   SpO2 98%   BMI 20.40 kg/m   Estimated body mass index is 20.4 kg/m  as calculated from the following:    Height as of this encounter: 1.499 m (4' 11\").    Weight as of this encounter: 45.8 kg (101 lb).  Medication Reconciliation: complete  Swathi Johnson LPN    "

## 2019-10-01 ENCOUNTER — OFFICE VISIT (OUTPATIENT)
Dept: FAMILY MEDICINE | Facility: OTHER | Age: 79
End: 2019-10-01
Attending: FAMILY MEDICINE
Payer: MEDICARE

## 2019-10-01 VITALS
DIASTOLIC BLOOD PRESSURE: 62 MMHG | OXYGEN SATURATION: 97 % | BODY MASS INDEX: 20.72 KG/M2 | HEART RATE: 64 BPM | SYSTOLIC BLOOD PRESSURE: 104 MMHG | HEIGHT: 59 IN | WEIGHT: 102.8 LBS | TEMPERATURE: 98.4 F | RESPIRATION RATE: 16 BRPM

## 2019-10-01 DIAGNOSIS — K92.2 GASTROINTESTINAL HEMORRHAGE, UNSPECIFIED GASTROINTESTINAL HEMORRHAGE TYPE: ICD-10-CM

## 2019-10-01 DIAGNOSIS — E44.1 MILD PROTEIN-CALORIE MALNUTRITION (H): Primary | ICD-10-CM

## 2019-10-01 PROCEDURE — G0463 HOSPITAL OUTPT CLINIC VISIT: HCPCS

## 2019-10-01 PROCEDURE — 99213 OFFICE O/P EST LOW 20 MIN: CPT | Performed by: FAMILY MEDICINE

## 2019-10-01 ASSESSMENT — PAIN SCALES - GENERAL: PAINLEVEL: NO PAIN (0)

## 2019-10-01 ASSESSMENT — MIFFLIN-ST. JEOR: SCORE: 851.93

## 2019-10-01 NOTE — NURSING NOTE
"Chief Complaint   Patient presents with     Hospital F/U       Initial /62   Pulse 64   Temp 98.4  F (36.9  C) (Tympanic)   Resp 16   Ht 1.499 m (4' 11\")   Wt 46.6 kg (102 lb 12.8 oz)   SpO2 97%   BMI 20.76 kg/m   Estimated body mass index is 20.76 kg/m  as calculated from the following:    Height as of this encounter: 1.499 m (4' 11\").    Weight as of this encounter: 46.6 kg (102 lb 12.8 oz).  Medication Reconciliation: complete  Rebeca Lozano LPN  "

## 2019-10-05 ENCOUNTER — HEALTH MAINTENANCE LETTER (OUTPATIENT)
Age: 79
End: 2019-10-05

## 2019-10-10 ENCOUNTER — TELEPHONE (OUTPATIENT)
Dept: CARDIOLOGY | Facility: OTHER | Age: 79
End: 2019-10-10

## 2019-10-10 DIAGNOSIS — Z95.5 HISTORY OF CORONARY ARTERY STENT PLACEMENT: Primary | ICD-10-CM

## 2019-10-10 RX ORDER — ASPIRIN 81 MG/1
81 TABLET, CHEWABLE ORAL DAILY
Qty: 100 TABLET | Refills: 0 | Status: ON HOLD | OUTPATIENT
Start: 2019-10-10 | End: 2020-01-30

## 2019-10-10 NOTE — TELEPHONE ENCOUNTER
Incoming call from Darcy Santos patient education RN, patient is scheduled for EGD and Colonoscopy 10/17/19.  Patient states she stopped the Plavix as well as the Xarelto.  On 10/1/19 the patient stated in the visit with Ophelia Troncoso she had continued on the Plavix.  Since this date she stopped the Plavix.  Patient had a recent admission for GI bleed on 9/20/19. Patient is currently not on antiplatelet therapy.   Please advise plan for patient.  Review note from 9/20/19 at 10/1/19.     Rosetta Henriquez RN-BSN

## 2019-10-10 NOTE — TELEPHONE ENCOUNTER
Hailee Cortez APRN CNP  You; Ophelia Troncoso MD 1 hour ago (11:52 AM)      She can be off the Xarelto. She should stay on Plavix and baby ASA until 12/26/19 which is 6 months post PCI or she could occlude her new coronary stenting.   After 12/26/19 can go to just baby ASA alone.   We should see her in follow-up.   Im assuming she is on PPI for gastric protection.   Thanks,   COCO Mccarty CNP

## 2019-10-10 NOTE — OR NURSING
Client called back for pre op call.  During the call, reviewed medications and states she stopped her xarelto and plavix when she was in the hospital.  She said she was told to stop them.  So I stated to her, you stopped your xarelto and your plavix during your hospital admission, and have not been taking them.  She said correct.  Notified Rosetta DORANTES At 2066 for MELISSA Diego that she stopped her plavix and xarelto and she has an EGD & colonoscopy scheduled on 10/17.  Darcy Santos RN

## 2019-10-10 NOTE — TELEPHONE ENCOUNTER
Patient instructed to begin Aspirin chewable 81 mg daily along with Plavix 75 mg daily. Please sign pended orders for Aspirin.   Rosetta Henriquez RN-BSN

## 2019-10-11 ENCOUNTER — TELEPHONE (OUTPATIENT)
Dept: SURGERY | Facility: OTHER | Age: 79
End: 2019-10-11

## 2019-10-11 NOTE — TELEPHONE ENCOUNTER
Dr. Graham, patient of Hailee Cortez NP.   Outreach to patient.  She states that Dr. Canales (gen surgery) instructed her to stop ASA and Plavix for 7 days prior to her upcoming colonoscopy and EGD. Patient had stenting on 6/28/19. Is this advisable as it has not been six months since stents were placed?  Please advise.  Rosetta Henriquez RN-BSN

## 2019-10-11 NOTE — OR NURSING
LIAM Booth RN checked with Dr. Canales and he is ok with client staying on Plavix and ASA.  Called client to tell her to stay on her plavix and ASA daily.  She verbalized back to me she is staying on her plavix and asa, not stopping them. I asked her if she has plavix tablets at home and she said yes.  She said I stopped taking my blood thinners when I was in the hospital, but I am taking them now.   Darcy Santos RN

## 2019-10-14 ENCOUNTER — ANESTHESIA EVENT (OUTPATIENT)
Dept: SURGERY | Facility: HOSPITAL | Age: 79
End: 2019-10-14
Payer: MEDICARE

## 2019-10-14 DIAGNOSIS — F51.01 PRIMARY INSOMNIA: ICD-10-CM

## 2019-10-14 DIAGNOSIS — R63.4 WEIGHT LOSS: ICD-10-CM

## 2019-10-14 RX ORDER — MIRTAZAPINE 7.5 MG/1
7.5 TABLET, FILM COATED ORAL AT BEDTIME
Qty: 90 TABLET | Refills: 0 | Status: SHIPPED | OUTPATIENT
Start: 2019-10-14 | End: 2020-01-06

## 2019-10-14 ASSESSMENT — LIFESTYLE VARIABLES: TOBACCO_USE: 1

## 2019-10-14 ASSESSMENT — ENCOUNTER SYMPTOMS: DYSRHYTHMIAS: 1

## 2019-10-14 ASSESSMENT — COPD QUESTIONNAIRES: COPD: 1

## 2019-10-14 NOTE — ANESTHESIA PREPROCEDURE EVALUATION
Anesthesia Pre-Procedure Evaluation    Patient: Lita Ocasio   MRN: [id number] : 1940          Preoperative Diagnosis: * No surgery found *        Past Medical History:   Diagnosis Date     Acquired hypothyroidism 2019     Asthma      Benign essential hypertension 2019     Centrilobular emphysema (H) 2019     Chronic rhinitis 2013     Chronic systolic congestive heart failure (H) 2019     Constipation      Coronary artery disease      Hearing loss      Heart trouble      Hemorrhagic cerebrovascular accident (CVA) (H) 2019     Hyperlipidemia 2019     Hypertension      Nasal congestion      Non-rheumatic mitral regurgitation 2019     Osteopenia 2019     Osteoporosis      Parathyroid related hypercalcemia (H) 2013     Primary hyperparathyroidism (H) 2013     Ringing in ears      Sensorineural hearing loss, asymmetrical 2013     Sneezing      Snoring      Stented coronary artery      Thyroid disease      Weakness      Weight loss      Past Surgical History:   Procedure Laterality Date     CATARACT IOL, RT/LT Bilateral      COLONOSCOPY       COLONOSCOPY - [facility] SCAN  2009    Colonoscopy - [facility]/NL  2009     ENT SURGERY      para thyroid surgery     ENT SURGERY  2013    Parathyroid     ESOPHAGOSCOPY, GASTROSCOPY, DUODENOSCOPY (EGD), COMBINED N/A 2019    Procedure: ESOPHAGOGASTRODUODENOSCOPY (EGD);  Surgeon: Julio Canales MD;  Location: [facility]     PARATHYROIDECTOMY  9/10/2013    Procedure: PARATHYROIDECTOMY;  Reoperative Neck Exploration, Resection of right Parathyroid adenoma;  Surgeon: Thalia Muller MD;  Location: [facility] OR     TONSILLECTOMY       TUBAL LIGATION         Anesthesia Evaluation     . Pt has had prior anesthetic. Type: General    No history of anesthetic complications          ROS/MED HX    ENT/Pulmonary:     (+)HUGH risk factors snores loudly, allergic rhinitis, tobacco use, Past use Moderate Persistent asthma  Treatment: Inhaler prn,  COPD, , . Other pulmonary disease CXR persistent hyperinflation, no evidence of pneumonia or CHF.    Neurologic:     (+)migraines, CVA date: Jan 2019 without deficitsother neuro Head CT negative after [employer] syncope    Cardiovascular:     (+) Dyslipidemia, hypertension--CAD, --stent,6/2019  2 Drug Eluting Stent .. Taking blood thinners : Instructions Given to patient: hospitalist holding xarelto and plavix. CHF . MILLS, fainting (syncope). :. dysrhythmias a-fib, valvular problems/murmurs type: MR . Previous cardiac testing Echodate:4/2019results:Trivial pericardial effusion is present.  Left ventricular size is normal.  Mildly (EF 45-50%) reduced left ventricular function is present.  Left ventricular diastolic function is normal.  The right ventricle is normal size.  Mild left atrial enlargement is present.  Mild mitral insufficiency is present.  Aortic valve is normal in structure and function.  Mild tricuspid insufficiency is present.  Right ventricular systolic pressure is 28mmHg above the right atrial pressure.  The pulmonic valve is normal.  The aorta root is normal.date: results: date: results: date: results:          METS/Exercise Tolerance:  3 - Able to walk 1-2 blocks without stopping   Hematologic:     (+) Anemia, History of Transfusion previous transfusion reaction -      Musculoskeletal:   (+) arthritis,  -       GI/Hepatic:     (+) GERD Asymptomatic on medication, Other GI/Hepatic EGD r/o GI bleed, hgb 6 on past admission, 10.1 after two units      Renal/Genitourinary:     (+) chronic renal disease (Stage 3), type: CRI, Pt does not require dialysis, Pt has no history of transplant,       Endo:     (+) thyroid problem ( primary hyperparathyroidis)  Thyroid disease - Other, .      Psychiatric:         Infectious Disease:  - neg infectious disease ROS       Malignancy:      - no malignancy   Other:    (+) Possibly pregnant C-spine cleared: N/A, no H/O Chronic Pain,no other  "significant disability   - neg other ROS                        Physical Exam  Normal systems: pulmonary    Airway   Mallampati: II  TM distance: >3 FB  Neck ROM: full    Dental   (+) upper dentures and lower dentures    Cardiovascular   Rhythm and rate: regular and normal      Pulmonary    breath sounds clear to auscultation            Lab Results   Component Value Date    WBC 9.9 09/30/2019    HGB 11.5 (L) 09/30/2019    HCT 36.3 09/30/2019     09/30/2019    CRP <2.9 09/20/2019    SED 36 (H) 09/20/2019     09/22/2019    POTASSIUM 3.5 09/22/2019    CHLORIDE 110 (H) 09/22/2019    CO2 25 09/22/2019    BUN 7 09/22/2019    CR 1.02 09/22/2019    GLC 84 09/22/2019    BECKY 7.8 (L) 09/22/2019    PHOS 2.4 (L) 09/01/2013    MAG 2.2 05/12/2019    ALBUMIN 3.3 (L) 09/20/2019    PROTTOTAL 6.2 (L) 09/20/2019    ALT 23 09/20/2019    AST 13 09/20/2019    ALKPHOS 61 09/20/2019    BILITOTAL 0.3 09/20/2019    INR 1.30 (H) 09/20/2019    TSH 1.46 09/21/2019    TROPN  08/18/2013     <0.04  **Risk Stratification**   0.10 - 0.39   Elevated-may indicate increased                 risk of short term adverse                 cardiac events.      >=0.4      Possible cardiac injury.       Preop Vitals  BP Readings from Last 3 Encounters:   10/01/19 104/62   09/30/19 98/68   09/22/19 (!) 103/49    Pulse Readings from Last 3 Encounters:   10/01/19 64   09/30/19 67   09/04/19 86      Resp Readings from Last 3 Encounters:   10/01/19 16   09/22/19 16   09/04/19 16    SpO2 Readings from Last 3 Encounters:   10/01/19 97%   09/30/19 98%   09/22/19 96%      Temp Readings from Last 1 Encounters:   10/01/19 98.4  F (36.9  C) (Tympanic)    Ht Readings from Last 1 Encounters:   10/01/19 1.499 m (4' 11\")      Wt Readings from Last 1 Encounters:   10/01/19 46.6 kg (102 lb 12.8 oz)    Estimated body mass index is 20.76 kg/m  as calculated from the following:    Height as of 10/1/19: 1.499 m (4' 11\").    Weight as of 10/1/19: 46.6 kg (102 lb 12.8 oz). "       Anesthesia Plan      History & Physical Review  History and physical reviewed and following examination; no interval change.    ASA Status:  4 .    NPO Status:  > 8 hours    Plan for MAC Reason for MAC:  Chronic cardiopulmonary disease (G9), Procedure to face, neck, head or breast and Deep or markedly invasive procedure (G8)    HP 10/1/19      Postoperative Care      Consents  Anesthetic plan, risks, benefits and alternatives discussed with:  Patient..                   COCO Louise CRNA

## 2019-10-14 NOTE — TELEPHONE ENCOUNTER
Patient returned phone call. Verbalizes understanding and states she was already advised that she should continue to take her aspirin and her Plavix. No other questions or concerns at this time.

## 2019-10-14 NOTE — OR NURSING
Patient called and asked what medications she should take the morning of her procedures.  She stated she was told to only take her Thyroid pill and her Metoprolol.  I confirmed that with her and instructed her to only take her Levothyroxine and Metoprolol on 10/17 morning with sips of water then when she returns home after procedure, she is to take all her medications for that day as she normally would.  She verbalized good understanding.

## 2019-10-14 NOTE — TELEPHONE ENCOUNTER
mirtazapine       Last Written Prescription Date:  9/4/19  Last Fill Quantity: 30,   # refills: 1  Last Office Visit: 10/1/19  Future Office visit:    Next 5 appointments (look out 90 days)    Oct 22, 2019 10:45 AM CDT  (Arrive by 10:30 AM)  Return Visit with COCO Mccartney CNP  [facility] ([facility] ) [address]  [place] [address]  [phone]   Nov 05, 2019  3:00 PM CST  (Arrive by 2:45 PM)  SHORT with Ophelia Troncoso MD  [facility] ([facility] ) [address]  [place] [address]  [phone]           Routing refill request to provider for review/approval because:  Pt requesting [facility]

## 2019-10-14 NOTE — TELEPHONE ENCOUNTER
Sal Graham, Rosetta Diaz, RN 3 days ago     She needs to stay on Plavix and aspirin ideally for 12 months.  For certain situations, the Plavix can be discontinued temporary after 6 months of placement.  I would not discontinue the aspirin and Plavix for the endoscopy as a stent could clot off.     Dr. Graham    Routing comment

## 2019-10-17 ENCOUNTER — HOSPITAL ENCOUNTER (OUTPATIENT)
Facility: HOSPITAL | Age: 79
Discharge: HOME | End: 2019-10-17
Attending: SURGERY | Admitting: SURGERY
Payer: MEDICARE

## 2019-10-17 ENCOUNTER — ANESTHESIA (OUTPATIENT)
Dept: SURGERY | Facility: HOSPITAL | Age: 79
End: 2019-10-17
Payer: MEDICARE

## 2019-10-17 VITALS
OXYGEN SATURATION: 89 % | SYSTOLIC BLOOD PRESSURE: 124 MMHG | TEMPERATURE: 97.9 F | DIASTOLIC BLOOD PRESSURE: 60 MMHG | RESPIRATION RATE: 16 BRPM | WEIGHT: 101 LBS | BODY MASS INDEX: 20.4 KG/M2

## 2019-10-17 PROCEDURE — 43239 EGD BIOPSY SINGLE/MULTIPLE: CPT | Performed by: SURGERY

## 2019-10-17 PROCEDURE — 25000128 H RX IP 250 OP 636: Performed by: NURSE ANESTHETIST, CERTIFIED REGISTERED

## 2019-10-17 PROCEDURE — 43239 EGD BIOPSY SINGLE/MULTIPLE: CPT | Performed by: NURSE ANESTHETIST, CERTIFIED REGISTERED

## 2019-10-17 PROCEDURE — 36000052 ZZH SURGERY LEVEL 2 EA 15 ADDTL MIN: Performed by: SURGERY

## 2019-10-17 PROCEDURE — 88305 TISSUE EXAM BY PATHOLOGIST: CPT | Mod: TC | Performed by: SURGERY

## 2019-10-17 PROCEDURE — 37000009 ZZH ANESTHESIA TECHNICAL FEE, EACH ADDTL 15 MIN: Performed by: SURGERY

## 2019-10-17 PROCEDURE — 88342 IMHCHEM/IMCYTCHM 1ST ANTB: CPT | Mod: TC | Performed by: SURGERY

## 2019-10-17 PROCEDURE — 45378 DIAGNOSTIC COLONOSCOPY: CPT | Performed by: SURGERY

## 2019-10-17 PROCEDURE — 27210794 ZZH OR GENERAL SUPPLY STERILE: Performed by: SURGERY

## 2019-10-17 PROCEDURE — 71000027 ZZH RECOVERY PHASE 2 EACH 15 MINS: Performed by: SURGERY

## 2019-10-17 PROCEDURE — 99100 ANES PT EXTEME AGE<1 YR&>70: CPT | Performed by: NURSE ANESTHETIST, CERTIFIED REGISTERED

## 2019-10-17 PROCEDURE — 40000305 ZZH STATISTIC PRE PROC ASSESS I: Performed by: SURGERY

## 2019-10-17 PROCEDURE — 25800030 ZZH RX IP 258 OP 636: Performed by: NURSE ANESTHETIST, CERTIFIED REGISTERED

## 2019-10-17 PROCEDURE — 36000050 ZZH SURGERY LEVEL 2 1ST 30 MIN: Performed by: SURGERY

## 2019-10-17 PROCEDURE — 37000008 ZZH ANESTHESIA TECHNICAL FEE, 1ST 30 MIN: Performed by: SURGERY

## 2019-10-17 RX ORDER — SODIUM CHLORIDE, SODIUM LACTATE, POTASSIUM CHLORIDE, CALCIUM CHLORIDE 600; 310; 30; 20 MG/100ML; MG/100ML; MG/100ML; MG/100ML
INJECTION, SOLUTION INTRAVENOUS CONTINUOUS
Status: DISCONTINUED | OUTPATIENT
Start: 2019-10-17 | End: 2019-10-17 | Stop reason: HOSPADM

## 2019-10-17 RX ORDER — NALOXONE HYDROCHLORIDE 0.4 MG/ML
.1-.4 INJECTION, SOLUTION INTRAMUSCULAR; INTRAVENOUS; SUBCUTANEOUS
Status: DISCONTINUED | OUTPATIENT
Start: 2019-10-17 | End: 2019-10-17 | Stop reason: HOSPADM

## 2019-10-17 RX ORDER — ONDANSETRON 2 MG/ML
4 INJECTION INTRAMUSCULAR; INTRAVENOUS EVERY 30 MIN PRN
Status: DISCONTINUED | OUTPATIENT
Start: 2019-10-17 | End: 2019-10-17 | Stop reason: HOSPADM

## 2019-10-17 RX ORDER — HYDROMORPHONE HYDROCHLORIDE 1 MG/ML
.3-.5 INJECTION, SOLUTION INTRAMUSCULAR; INTRAVENOUS; SUBCUTANEOUS EVERY 10 MIN PRN
Status: DISCONTINUED | OUTPATIENT
Start: 2019-10-17 | End: 2019-10-17 | Stop reason: HOSPADM

## 2019-10-17 RX ORDER — LIDOCAINE 40 MG/G
CREAM TOPICAL
Status: DISCONTINUED | OUTPATIENT
Start: 2019-10-17 | End: 2019-10-17 | Stop reason: HOSPADM

## 2019-10-17 RX ORDER — PROPOFOL 10 MG/ML
INJECTION, EMULSION INTRAVENOUS PRN
Status: DISCONTINUED | OUTPATIENT
Start: 2019-10-17 | End: 2019-10-17

## 2019-10-17 RX ORDER — ONDANSETRON 4 MG/1
4 TABLET, ORALLY DISINTEGRATING ORAL EVERY 30 MIN PRN
Status: DISCONTINUED | OUTPATIENT
Start: 2019-10-17 | End: 2019-10-17 | Stop reason: HOSPADM

## 2019-10-17 RX ORDER — MEPERIDINE HYDROCHLORIDE 50 MG/ML
12.5 INJECTION INTRAMUSCULAR; INTRAVENOUS; SUBCUTANEOUS
Status: DISCONTINUED | OUTPATIENT
Start: 2019-10-17 | End: 2019-10-17 | Stop reason: HOSPADM

## 2019-10-17 RX ORDER — FENTANYL CITRATE 50 UG/ML
25-50 INJECTION, SOLUTION INTRAMUSCULAR; INTRAVENOUS
Status: DISCONTINUED | OUTPATIENT
Start: 2019-10-17 | End: 2019-10-17 | Stop reason: HOSPADM

## 2019-10-17 RX ADMIN — PROPOFOL 10 MG: 10 INJECTION, EMULSION INTRAVENOUS at 15:23

## 2019-10-17 RX ADMIN — PROPOFOL 20 MG: 10 INJECTION, EMULSION INTRAVENOUS at 15:19

## 2019-10-17 RX ADMIN — PROPOFOL 20 MG: 10 INJECTION, EMULSION INTRAVENOUS at 15:09

## 2019-10-17 RX ADMIN — PROPOFOL 10 MG: 10 INJECTION, EMULSION INTRAVENOUS at 15:21

## 2019-10-17 RX ADMIN — PROPOFOL 20 MG: 10 INJECTION, EMULSION INTRAVENOUS at 15:15

## 2019-10-17 RX ADMIN — PROPOFOL 20 MG: 10 INJECTION, EMULSION INTRAVENOUS at 15:17

## 2019-10-17 RX ADMIN — PROPOFOL 20 MG: 10 INJECTION, EMULSION INTRAVENOUS at 15:11

## 2019-10-17 RX ADMIN — PROPOFOL 10 MG: 10 INJECTION, EMULSION INTRAVENOUS at 15:25

## 2019-10-17 RX ADMIN — PROPOFOL 30 MG: 10 INJECTION, EMULSION INTRAVENOUS at 15:06

## 2019-10-17 RX ADMIN — PROPOFOL 10 MG: 10 INJECTION, EMULSION INTRAVENOUS at 15:27

## 2019-10-17 RX ADMIN — PROPOFOL 20 MG: 10 INJECTION, EMULSION INTRAVENOUS at 15:13

## 2019-10-17 RX ADMIN — SODIUM CHLORIDE, POTASSIUM CHLORIDE, SODIUM LACTATE AND CALCIUM CHLORIDE: 600; 310; 30; 20 INJECTION, SOLUTION INTRAVENOUS at 13:32

## 2019-10-17 NOTE — OR NURSING
Some cramping 3/10.Abd soft.Son and pt instructed.Patient and responsible adult given discharge instructions with no questions regarding instructions. Aureliano score 19. Pain level 3/10.  Discharged from unit via w/c. Patient discharged to home.

## 2019-10-17 NOTE — ANESTHESIA POSTPROCEDURE EVALUATION
Patient: Lita Ocasio    Procedure(s):  UPPER ENDOSCOPY WITH BIOPSY  AND COLONOSCOPY    Diagnosis:GI bleed [K92.2]  Diagnosis Additional Information: No value filed.    Anesthesia Type:  MAC    Note:  Anesthesia Post Evaluation    Patient location during evaluation: Phase 2 and Bedside  Patient participation: Able to fully participate in evaluation  Level of consciousness: awake and lethargic  Pain management: adequate  Airway patency: patent  Cardiovascular status: acceptable and stable  Respiratory status: acceptable and nasal cannula  Hydration status: acceptable  PONV: none     Anesthetic complications: None          Last vitals:  Vitals:    10/17/19 1545 10/17/19 1550 10/17/19 1555   BP: 115/57 111/66 123/61   Resp: 16 16 16   Temp:      SpO2: 100% 98% 95%         Electronically Signed By: COCO Arce CRNA  October 17, 2019  4:01 PM

## 2019-10-17 NOTE — ANESTHESIA CARE TRANSFER NOTE
Patient: Lita Ocasio    Procedure(s):  UPPER ENDOSCOPY WITH BIOPSY  AND COLONOSCOPY    Diagnosis: GI bleed [K92.2]  Diagnosis Additional Information: No value filed.    Anesthesia Type:   MAC     Note:  Airway :Nasal Cannula  Patient transferred to:Phase II  Handoff Report: Identifed the Patient, Identified the Reponsible Provider, Reviewed the pertinent medical history, Discussed the surgical course, Reviewed Intra-OP anesthesia mangement and issues during anesthesia, Set expectations for post-procedure period and Allowed opportunity for questions and acknowledgement of understanding      Vitals: (Last set prior to Anesthesia Care Transfer)    CRNA VITALS  10/17/2019 1502 - 10/17/2019 1539      10/17/2019             Pulse:  64    Ht Rate:  65    SpO2:  100 %    Resp Rate (set):  8                Electronically Signed By: COCO Arce CRNA  October 17, 2019  3:39 PM

## 2019-10-17 NOTE — OP NOTE
REPORT OF OPERATION  DATE OF PROCEDURE: 10/17/2019    PATIENT: Lita Ocasio    SURGERY PREFORMED: Esophagogastroduodenoscopy with biopsies and colonoscopy     PREOPERATIVE DIAGNOSIS:   anemia   Anemia    POSTOPERATIVE DIAGNOSIS:    Mild gastritis at the pylorus otherwise Normal Esophagogastroduodenoscopy   Squamous columnar junction at 41   Normal colonoscopy   Diverticulosis was not identified.   Hemorrhoids  were  identified.    SURGEON: Julio Canales MD    ASSISTANTS: None    ANESTHESIA: Monitored Anesthesia Care    COMPLICATIONS: None apparent    TRANSFUSIONS: None    TISSUE TO PATHOLOGY: Antral    FINDINGS:   Gastritis, without bleeding   Normal colonoscopy   Diverticulosis was not identified.   Hemorrhoids  were  identified.    INDICATIONS: This is a 78 year old female in need of an Esophagogastroduodenoscopy and a colonoscopy for Anemia.  The patient will be taken to the endoscopy suite for those procedures.    DESCRIPTIONS OF PROCEDURE IN DETAIL: After consent was obtained the patient was taken to the operative suite and alexander in the left lateral decubitus position.  The patient was identified and the correct patient was confirmed. Monitored Anesthesia Care was given by anesthesia. A time out was preformed verifying the correct patient and the correct procedure.  The entire operative team was in agreement.  All necessary equipment and supplies were in the room.    The endoscope was inserted into the mouth and passed without difficulty to the third portion of the duodenum.  Duodenal biopsies were not taken.  The endoscope was then withdrawn through the duodenum, the duodenal bulb and pyloric channel and no abnormalities were noted.  The endoscope was brought back into the stomach and antral biopsies were obtained.  The endoscope was then retroflexed and mild gastritis was noted at the pylorics.  No other no lesions of the fundus body or antrum were seen.  The endoscope was straightened back out and  brought into the distal esophagus and a well-defined squamocolumnar junction was identified at 41 cm. Biopsies of the distal esophagus were not taken.  The endoscope was slowly withdrawn through the remaining esophagus no other abnormalities are seen,  The endoscope was withdrawn from the patient and the patient was positioned for colonoscopy.    Rectal exam was preformed and no lesions of the anal canal were noted.  The colonoscope was inserted into the anus and passed without difficulty to the cecum.  The cecum was identified by the ileocecal valve, the coalescence of the tinea and the appendiceal orifice.  Upon withdrawal all walls of the colon were visualized.  There were no polyps, masses or evidence of colitis seen.  Diverticulosis was not seen.  Upon reaching the rectum the scope was retroflexed and internal hemorrhoids  were  seen.  The scope was straightened back out and removed from the patient.  The patient was then taken to the recovery room in stable condition tolerating the procedure well.      Prep: good    Withdrawal time was 8 minutes.    It is recommended that the patient have another colonoscopy PRN.

## 2019-10-17 NOTE — DISCHARGE INSTRUCTIONS
UPPER ENDOSCOPY    AFTER THE PROCEDURE    You will return to Same Day Surgery to rest for about an hour before you go home.    The doctor will talk with you and your family.    A family member/friend may visit with you.    You may burp up any air remaining in your stomach.    You may feel dizzy or light-headed from the medicine.    Your nurse will go over the discharge instructions with you and your caregiver and answer any of your questions.      You will be contacted the next day to check on how you are doing.    If biopsies were taken, you will be contacted with the results usually within 3 days.  BACK AT HOME    Rest for an hour or two after you get home.    When your throat is no longer numb and you have a gag reflex, take a few sips of cool water.  If you can swallow comfortably, you may start eating again.    You may have a mild sore throat for the rest of the day.    You will be contacted with results by the surgeons office within a week. If not contacted in one week, call the surgeons office.  WHAT TO WATCH FOR:  Problems rarely occur after the exam, but it is important to be aware of the early signs of a complication.  Call your doctor immediately if you have:    Difficulty swallowing or breathing    Unusual pain in your stomach or chest    Vomiting blood or dark material that looks like coffee grounds    Black or tarry stools    Temperature over 101.5 degrees    MORE QUESTIONS?  Please ask your doctor or nurse before the exam begins  or call your doctor at the clinic.    IF YOU MUST CANCEL YOUR PROCEDURE THE EVENING/NIGHT BEFORE, PLEASE CALL HOSPITAL ADMITTING -[phone] OR TOLL FREE [phone], EXT. [phone].    Phone Numbers:  [facility] - [phone] [phone]  [facility] - [phone]  Surgery Patient Education - [phone] or toll free [phone]    INSTRUCTIONS AFTER COLONOSCOPY    WHEN YOU ARE BACK HOME:    Plan to rest for an hour or two after you get home.    You may have  some cramping or pressure until you pass gas.    You may resume your regular medications.    Eat a small, light meal at first, and then gradually return to normal meal sizes.    You will be contacted the next day to see how you are doing.  If you had a polyp removed:    Slight bleeding may occur.  You may have a slight blood stain on the toilet paper after a bowel movement.    To lessen the chance of bleeding, avoid heavy exercise for ONE WEEK.  This includes heavy lifting, vigorous sport activities, and heavy physical labor.  You may resume your normal sexual activity.      Avoid aspirin or aspirin products if instructed by your doctor.    If there is a polyp or biopsy, you will be contacted with results within one week.     WHAT TO WATCH FOR:  Problems rarely occur after the exam; however, it is important for you to watch for early signs of possible problems.  If you have     Unusual pain in your abdomen    Nausea and vomiting that persists    Excessive bleeding    Black or bloody bowel movements    Fever or temperature above 100.6 F  Please call your doctor ([facility] [phone]) or go to the nearest hospital emergency room.    Post-Anesthesia Patient Instructions    IMMEDIATELY FOLLOWING SURGERY:  Do not drive or operate machinery for the first twenty four hours after surgery.  Do not make any important decisions for twenty four hours after surgery or while taking narcotic pain medications or sedatives.  If you develop intractable nausea and vomiting or a severe headache please notify your doctor immediately.    FOLLOW-UP:  Please make an appointment with your surgeon as instructed. You do not need to follow up with anesthesia unless specifically instructed to do so.    WOUND CARE INSTRUCTIONS (if applicable):  Keep a dry clean dressing on the anesthesia/puncture wound site if there is drainage.  Once the wound has quit draining you may leave it open to air.  Generally you should leave the bandage intact  for twenty four hours unless there is drainage.  If the epidural site drains for more than 36-48 hours please call the anesthesia department.    QUESTIONS?:  Please feel free to call your physician or the hospital  if you have any questions, and they will be happy to assist you.

## 2019-10-21 DIAGNOSIS — I34.0 NON-RHEUMATIC MITRAL REGURGITATION: Primary | Chronic | ICD-10-CM

## 2019-10-21 NOTE — TELEPHONE ENCOUNTER
Clindamycin      Histrical med note: 12/9/17    Last Office Visit: 10//  Future Office visit:    Next 5 appointments (look out 90 days)    Oct 22, 2019 10:45 AM CDT  (Arrive by 10:30 AM)  Return Visit with COCO Mccartney CNP  [facility] ([facility] ) [address] [place] [address]  [phone]   Nov 05, 2019  3:00 PM CST  (Arrive by 2:45 PM)  SHORT with Ophelia Troncoso MD  [facility] ([facility] ) [address] [place] [address]  [phone]           Routing refill request to provider for review/approval because:       Yes

## 2019-10-22 ENCOUNTER — OFFICE VISIT (OUTPATIENT)
Dept: CARDIOLOGY | Facility: OTHER | Age: 79
End: 2019-10-22
Attending: NURSE PRACTITIONER
Payer: MEDICARE

## 2019-10-22 VITALS
OXYGEN SATURATION: 98 % | HEIGHT: 59 IN | DIASTOLIC BLOOD PRESSURE: 54 MMHG | TEMPERATURE: 98.4 F | SYSTOLIC BLOOD PRESSURE: 112 MMHG | BODY MASS INDEX: 21.23 KG/M2 | WEIGHT: 105.3 LBS | HEART RATE: 77 BPM | RESPIRATION RATE: 18 BRPM

## 2019-10-22 DIAGNOSIS — I50.22 CHRONIC SYSTOLIC CONGESTIVE HEART FAILURE (H): Chronic | ICD-10-CM

## 2019-10-22 DIAGNOSIS — D50.0 IRON DEFICIENCY ANEMIA DUE TO CHRONIC BLOOD LOSS: ICD-10-CM

## 2019-10-22 DIAGNOSIS — I10 BENIGN ESSENTIAL HYPERTENSION: ICD-10-CM

## 2019-10-22 DIAGNOSIS — Z23 NEED FOR PROPHYLACTIC VACCINATION AND INOCULATION AGAINST INFLUENZA: ICD-10-CM

## 2019-10-22 DIAGNOSIS — Z95.5 STATUS POST CORONARY ARTERY STENT PLACEMENT: Primary | ICD-10-CM

## 2019-10-22 DIAGNOSIS — J43.2 CENTRILOBULAR EMPHYSEMA (H): Chronic | ICD-10-CM

## 2019-10-22 PROBLEM — I50.23 ACUTE ON CHRONIC SYSTOLIC CONGESTIVE HEART FAILURE (H): Status: RESOLVED | Noted: 2019-05-12 | Resolved: 2019-10-22

## 2019-10-22 LAB
ERYTHROCYTE [DISTWIDTH] IN BLOOD BY AUTOMATED COUNT: 14.6 % (ref 10–15)
HCT VFR BLD AUTO: 36.5 % (ref 35–47)
HGB BLD-MCNC: 11.6 G/DL (ref 11.7–15.7)
MCH RBC QN AUTO: 26.5 PG (ref 26.5–33)
MCHC RBC AUTO-ENTMCNC: 31.8 G/DL (ref 31.5–36.5)
MCV RBC AUTO: 83 FL (ref 78–100)
PLATELET # BLD AUTO: 342 10E9/L (ref 150–450)
RBC # BLD AUTO: 4.38 10E12/L (ref 3.8–5.2)
WBC # BLD AUTO: 9 10E9/L (ref 4–11)

## 2019-10-22 PROCEDURE — 99214 OFFICE O/P EST MOD 30 MIN: CPT | Performed by: NURSE PRACTITIONER

## 2019-10-22 PROCEDURE — 36415 COLL VENOUS BLD VENIPUNCTURE: CPT | Mod: ZL | Performed by: NURSE PRACTITIONER

## 2019-10-22 PROCEDURE — 90662 IIV NO PRSV INCREASED AG IM: CPT

## 2019-10-22 PROCEDURE — 85027 COMPLETE CBC AUTOMATED: CPT | Mod: ZL | Performed by: NURSE PRACTITIONER

## 2019-10-22 PROCEDURE — G0008 ADMIN INFLUENZA VIRUS VAC: HCPCS

## 2019-10-22 PROCEDURE — G0463 HOSPITAL OUTPT CLINIC VISIT: HCPCS | Mod: 25

## 2019-10-22 ASSESSMENT — PAIN SCALES - GENERAL: PAINLEVEL: NO PAIN (0)

## 2019-10-22 ASSESSMENT — MIFFLIN-ST. JEOR: SCORE: 863.27

## 2019-10-22 NOTE — PROGRESS NOTES
[facility] HEART CARE   CARDIOLOGY PROGRESS NOTE    Lita Ocasio   [address] [facility] [address]      Ophelia Troncoso     Chief Complaint   Patient presents with     RECHECK     F/U.  Doing well.  Denies chest pain/pressure/tightness, SOB, dizziness or lightheadedness.  Finished Cardiac Rehab.     Flu Shot     Imm/Inj     Flu Shot        HPI:   Ms. Ocasio is a 78 year old female who presents for cardiology follow-up to visit on 7/23/19 status post coronary angiography on 6/26/19 at [facility]. She was recently identified to have newly identified systolic heart failure.  Additionally, she has a history of hypertension, hyperlipidemia, nonrheumatic mitral regurgitation, central lobular emphysema, hypothyroidism, osteopenia, hyperparathyroidism, history of tobacco abuse and chronic rhinitis.    At initial visit patient was accompanied by her son who was very helpful in providing patients past medical history. He admits approximately 20 years ago patient was being followed for MR which was noted to be improved and therefore, vlave surgery was never recommended. He admits that she was previously told it was suspected she had small MI's without coronary intervention or identified ACS. Finally, recent history just before new years this last year of acute hemorrhagic stroke. It was following this that she was taken off her beta blocker and lisinopril for HTN and possible past MI, she reports increased shortness of breath following this which has progressively worsened and likely multifactorial with COPD and HFrEF.     At her last visit we reviewed HFrEF and possible etiology, may be secondary to history of MR or possible ischemic heart disease with questionable past infarcts. She was started on low dose Toprol XL 12.5 mg daily to initiate GDMT, closely monitor soft pressures with this. Recommended she reduce sodium intake to no more than 3,000 mg daily, goal 2,500mg daily. Fluid intake to less than 2L daily which she is not  exceeding. Recommended she begin weighing herself on a daily basis and call with a 3lb weight gain in one day or 5 lb weight gain in one week. A Lexiscan stress test was ordered at that time with reduced LVEF and history of possible past MI.     Results of recent Lexiscan stress test with abnormal imaging with reversible ischemia laterally and LVEF 36%. Additionally, in review of past CT chest imaging from one year ago, she was noted to have advanced atherosclerotic disease with bulky coronary and aortic calcifications. Given these findings, current symptoms and new acute systolic failure, recommended coronary angiography.    Patient underwent coronary angiography on 6/26/19 at [facility]. She was identified to have diffuse coronary atherosclerosis with single vessel obstructive CAD, ostial and mid LCX. Successful PCI with synergy LISS x2, mid LCX 2.5x16mm, ostial LCX 3.0 x 16 mm. LVEDP 26-29 mmHg. During revascularization she was identified to have AF for which she received DCCV x1 with return to sinus and then reverted back to AF. She was treated with IV beta blocker for rate control. Patient reported feeling good following coronary intervention. Breathing had significantly improved and increased energy.     PAST MEDICAL HISTORY:   Past Medical History:   Diagnosis Date     Acquired hypothyroidism 5/12/2019     Asthma      Benign essential hypertension 5/12/2019     Centrilobular emphysema (H) 5/12/2019     Chronic rhinitis 8/16/2013     Chronic systolic congestive heart failure (H) 5/12/2019     Constipation      Coronary artery disease      Hearing loss      Heart trouble      Hemorrhagic cerebrovascular accident (CVA) (H) 01/2019     Hyperlipidemia 5/12/2019     Hypertension      Nasal congestion      Non-rheumatic mitral regurgitation 5/12/2019     Osteopenia 5/12/2019     Osteoporosis      Parathyroid related hypercalcemia (H) 8/18/2013     Primary hyperparathyroidism (H) 9/5/2013     Ringing in ears       Sensorineural hearing loss, asymmetrical 8/16/2013     Sneezing      Snoring      Stented coronary artery      Thyroid disease      Weakness      Weight loss           FAMILY HISTORY:   Family History   Problem Relation Age of Onset     Hearing Loss Mother      Heart Disease Mother      Myocardial Infarction Mother      Cerebrovascular Disease Father      Cancer Brother         throat     Cancer Sister      Myocardial Infarction Sister      Cancer Maternal Grandmother      Heart Disease Maternal Grandfather      Aortic aneurysm Son           PAST SURGICAL HISTORY:   Past Surgical History:   Procedure Laterality Date     CATARACT IOL, RT/LT Bilateral      COLONOSCOPY       COLONOSCOPY - HIM SCAN  01/01/2009    Colonoscopy - [facility]/NL  2009     ENDOSCOPY UPPER, COLONOSCOPY, COMBINED N/A 10/17/2019    Procedure: UPPER ENDOSCOPY WITH BIOPSY  AND COLONOSCOPY;  Surgeon: Julio Canales MD;  Location: [facility] OR     ENT SURGERY  2011    para thyroid surgery     ENT SURGERY  09/2013    Parathyroid     ESOPHAGOSCOPY, GASTROSCOPY, DUODENOSCOPY (EGD), COMBINED N/A 9/21/2019    Procedure: ESOPHAGOGASTRODUODENOSCOPY (EGD);  Surgeon: Julio Canales MD;  Location: [facility] OR     PARATHYROIDECTOMY  9/10/2013    Procedure: PARATHYROIDECTOMY;  Reoperative Neck Exploration, Resection of right Parathyroid adenoma;  Surgeon: Thalia Muller MD;  Location: [facility] OR     TONSILLECTOMY       TUBAL LIGATION            SOCIAL HISTORY:   Social History     Socioeconomic History     Marital status:      Spouse name: None     Number of children: None     Years of education: None     Highest education level: None   Occupational History     None   Social Needs     Financial resource strain: None     Food insecurity:     Worry: None     Inability: None     Transportation needs:     Medical: None     Non-medical: None   Tobacco Use     Smoking status: Former Smoker     Packs/day: 1.00     Years: 50.00     Pack years: 50.00     Types:  Cigarettes     Last attempt to quit: 2019     Years since quittin.4     Smokeless tobacco: Never Used   Substance and Sexual Activity     Alcohol use: Yes     Comment: social     Drug use: No     Sexual activity: None   Lifestyle     Physical activity:     Days per week: None     Minutes per session: None     Stress: None   Relationships     Social connections:     Talks on phone: None     Gets together: None     Attends Denominational service: None     Active member of club or organization: None     Attends meetings of clubs or organizations: None     Relationship status: None     Intimate partner violence:     Fear of current or ex partner: None     Emotionally abused: None     Physically abused: None     Forced sexual activity: None   Other Topics Concern     Parent/sibling w/ CABG, MI or angioplasty before 65F 55M? Not Asked   Social History Narrative     None          CURRENT MEDICATIONS:   Prior to Admission medications    Medication Sig Start Date End Date Taking? Authorizing Provider   albuterol (PROAIR RESPICLICK) 108 (90 Base) MCG/ACT inhaler Inhale 2 puffs into the lungs every 4 hours as needed for shortness of breath / dyspnea or wheezing 19  Yes Joe Jerez MD   aspirin 81 MG tablet Take 1 tablet by mouth daily   Yes Reported, Patient   busPIRone (BUSPAR) 5 MG tablet Take 1 tablet (5 mg) by mouth 2 times daily 19  Yes Ophelia Troncoso MD   calcium carbonate (TUMS) 500 MG chewable tablet Take 1 chew tab by mouth daily   Yes Reported, Patient   Fexofenadine HCl (ALLEGRA PO) Take 180 mg by mouth daily    Yes Unknown, Entered By History   fluticasone (FLONASE) 50 MCG/ACT spray Spray 2 sprays into both nostrils daily 18  Yes Gali Dominguez APRN CNP   furosemide (LASIX) 20 MG tablet Take 1 tablet (20 mg) by mouth 2 times daily 19  Yes Ophelia Troncoso MD   ipratropium - albuterol 0.5 mg/2.5 mg/3 mL (DUONEB) 0.5-2.5 (3) MG/3ML neb solution Take 1 vial by nebulization every 6  hours as needed for shortness of breath / dyspnea or wheezing   Yes Reported, Patient   LEVOTHYROXINE SODIUM PO Take 75 mcg by mouth daily   Yes Reported, Patient   LORazepam (ATIVAN) 0.5 MG tablet Take 0.5 tablets (0.25 mg) by mouth every 6 hours as needed for anxiety or sleep 5/14/19  Yes Mindy Ware NP   NONFORMULARY Tumeric 500mg BID   Yes Reported, Patient   potassium chloride ER (K-DUR/KLOR-CON M) 20 MEQ CR tablet Take 1 tablet (20 mEq) by mouth 2 times daily 5/23/19  Yes Ophelia Troncoso MD   sertraline (ZOLOFT) 25 MG tablet Take 1 tablet (25 mg) by mouth daily 5/15/19  Yes Mindy Ware NP   SIMVASTATIN PO Take 20 mg by mouth At Bedtime   Yes Reported, Patient   Tafluprost (ZIOPTAN) 0.0015 % SOLN Place 1 drop into both eyes At Bedtime   Yes Reported, Patient   vitamin B complex with vitamin C (VITAMIN  B COMPLEX) tablet Take 1 tablet by mouth daily Takes 500mg of Vitamin B   Yes Reported, Patient   Vitamin D, Cholecalciferol, 1000 units TABS Take 1 tablet by mouth daily   Yes Reported, Patient   vitamin E 400 units TABS Take by mouth daily   Yes Reported, Patient   Zafirlukast (ACCOLATE PO) Take 20 mg by mouth 2 times daily (before meals)    Yes Reported, Patient   CLINDAMYCIN HCL PO Take 600 mg by mouth 1 hour prior to dental work    Reported, Patient          ALLERGIES:   Allergies   Allergen Reactions     Evista [Raloxifene] Other (See Comments)     hypercalcemia     Prednisone GI Disturbance     Combigan [Brimonidine Tartrate-Timolol] Other (See Comments)     Eye swelling and itchy     Latex Rash     Levaquin [Levofloxacin] Muscle Pain (Myalgia)     Penicillins Rash     Restasis      Pt reports using eye gtts and having red irritated eyes      ROS:   CONSTITUTIONAL: No reported fever or chills. No weight gain.  ENT: No visual disturbance, ear ache, epistaxis or sore throat.   CARDIOVASCULAR: No chest pain, chest pressure or chest discomfort. No palpitations or lower extremity edema.    RESPIRATORY: Reports that her breathing has significantly improved post PCI. No reported cough, wheezing or hemoptysis. No reports of orthopnea or PND.  GI: No reported abdominal pain or distension. No reported melena or hematochezia.  : No reported hematuria or dysuria.   NEUROLOGICAL: No lightheadedness or dizziness. No recurrence of syncope. No ataxia, paresthesias or weakness.   MUSCULOSKELETAL: No reported joint pain or swelling, no muscle pain.  ENDOCRINOLOGIC: No temperature intolerance. No hair or skin changes.  SKIN: No abnormal rashes or sores, no unusual itching.  PSYCHIATRIC: Positive for anxiety.      PHYSICAL EXAM:   There were no vitals taken for this visit.  GENERAL: The patient is a well-developed, well-nourished, in no apparent distress.  HEENT: Head is normocephalic and atraumatic. Eyes are symmetrical with normal visual tracking. No icterus, no xanthelasmas. Nares appeared normal without nasal drainage. Mucous membranes are moist, no cyanosis.  NECK: Supple. NO JVP visible.  CHEST/ LUNGS: Lungs sounds diminished to auscultation. No rales, rhonchi or wheezes, no use of accessory muscles, no retractions, respirations unlabored and normal respiratory rate.   CARDIO: Regular rate and rhythm normal with S1 and S2, no S3 or S4 and no murmur, click or rub.   ABD: Abdomen is nondistended.   EXTREMITIES: No edema present.   MUSCULOSKELETAL: No visible joint swelling.   NEUROLOGIC: Alert and oriented X3. Normal speech, gait and affect. No focal neurologic deficits.   SKIN: No jaundice. No rashes or visible skin lesions present. No ecchymosis.     LAB RESULTS:   Orders Only on 05/22/2019   Component Date Value Ref Range Status     Sodium 05/22/2019 139  133 - 144 mmol/L Final     Potassium 05/22/2019 3.3* 3.4 - 5.3 mmol/L Final     Chloride 05/22/2019 105  94 - 109 mmol/L Final     Carbon Dioxide 05/22/2019 25  20 - 32 mmol/L Final     Anion Gap 05/22/2019 9  3 - 14 mmol/L Final     Glucose 05/22/2019  90  70 - 99 mg/dL Final     Urea Nitrogen 05/22/2019 15  7 - 30 mg/dL Final     Creatinine 05/22/2019 1.16* 0.52 - 1.04 mg/dL Final     GFR Estimate 05/22/2019 45* >60 mL/min/[1.73_m2] Final     GFR Estimate If Black 05/22/2019 52* >60 mL/min/[1.73_m2] Final     Calcium 05/22/2019 8.6  8.5 - 10.1 mg/dL Final     N-Terminal Pro Bnp 05/22/2019 3,726* 0 - 450 pg/mL Final   Office Visit on 05/17/2019   Component Date Value Ref Range Status     Sodium 05/17/2019 141  133 - 144 mmol/L Final     Potassium 05/17/2019 3.6  3.4 - 5.3 mmol/L Final     Chloride 05/17/2019 108  94 - 109 mmol/L Final     Carbon Dioxide 05/17/2019 27  20 - 32 mmol/L Final     Anion Gap 05/17/2019 6  3 - 14 mmol/L Final     Glucose 05/17/2019 84  70 - 99 mg/dL Final     Urea Nitrogen 05/17/2019 18  7 - 30 mg/dL Final     Creatinine 05/17/2019 1.08* 0.52 - 1.04 mg/dL Final     GFR Estimate 05/17/2019 49* >60 mL/min/[1.73_m2] Final     GFR Estimate If Black 05/17/2019 57* >60 mL/min/[1.73_m2] Final     Calcium 05/17/2019 9.7  8.5 - 10.1 mg/dL Final     WBC 05/17/2019 8.4  4.0 - 11.0 10e9/L Final     RBC Count 05/17/2019 5.04  3.8 - 5.2 10e12/L Final     Hemoglobin 05/17/2019 14.8  11.7 - 15.7 g/dL Final     Hematocrit 05/17/2019 44.0  35.0 - 47.0 % Final     MCV 05/17/2019 87  78 - 100 fl Final     MCH 05/17/2019 29.4  26.5 - 33.0 pg Final     MCHC 05/17/2019 33.6  31.5 - 36.5 g/dL Final     RDW 05/17/2019 13.4  10.0 - 15.0 % Final     Platelet Count 05/17/2019 310  150 - 450 10e9/L Final     Diff Method 05/17/2019 Automated Method   Final     % Neutrophils 05/17/2019 64.6  % Final     % Lymphocytes 05/17/2019 19.8  % Final     % Monocytes 05/17/2019 10.1  % Final     % Eosinophils 05/17/2019 4.4  % Final     % Basophils 05/17/2019 0.7  % Final     % Immature Granulocytes 05/17/2019 0.4  % Final     Nucleated RBCs 05/17/2019 0  0 /100 Final     Absolute Neutrophil 05/17/2019 5.4  1.6 - 8.3 10e9/L Final     Absolute Lymphocytes 05/17/2019 1.7  0.8 -  5.3 10e9/L Final     Absolute Monocytes 05/17/2019 0.8  0.0 - 1.3 10e9/L Final     Absolute Eosinophils 05/17/2019 0.4  0.0 - 0.7 10e9/L Final     Absolute Basophils 05/17/2019 0.1  0.0 - 0.2 10e9/L Final     Abs Immature Granulocytes 05/17/2019 0.0  0 - 0.4 10e9/L Final     Absolute Nucleated RBC 05/17/2019 0.0   Final     TSH 05/17/2019 2.79  0.40 - 4.00 mU/L Final     N-Terminal Pro Bnp 05/17/2019 5,830* 0 - 450 pg/mL Final          ASSESSMENT:   Lita Ocasio presents for cardiology follow-up to visit on 7/23/19 status post coronary angiography on 6/26/19 at [facility]. She was recently identified to have newly identified systolic heart failure.  Additionally, she has a history of hypertension, hyperlipidemia, nonrheumatic mitral regurgitation, central lobular emphysema, hypothyroidism, osteopenia, hyperparathyroidism, history of tobacco abuse and chronic rhinitis.  Patient underwent coronary angiography on 6/26/19 at [facility]. She was identified to have diffuse coronary atherosclerosis with single vessel obstructive CAD, ostial and mid LCX. Successful PCI with synergy LISS x2, mid LCX 2.5x16mm, ostial LCX 3.0 x 16 mm. LVEDP 26-29 mmHg. During revascularization she was identified to have AF for which she received DCCV x1 with return to sinus and then reverted back to AF. She was treated with IV beta blocker for rate control. Patient reported feeling good following coronary intervention. Breathing had significantly improved and increased energy.     PLAN:  1. Status post coronary artery stent placement  See above, successful PCI with synergy LISS x2, mid LCX 2.5x16mm, ostial LCX 3.0 x 16 mm.   Continue with Plavix and Xarelto. Plavix for optimally one year post PCI.  Continue with high intensity statin.   Completed cardiac rehab.  Repeat TTE in December    - CBC with platelets  - Echocardiogram Complete; Future    2. Iron deficiency anemia due to chronic blood loss  - CBC with platelets    3. Need for  prophylactic vaccination and inoculation against influenza  - INFLUENZA (HIGH DOSE) 3 VALENT VACCINE [27557]    4. Chronic systolic congestive heart failure (H)  HFrEF, LVEF 36%, NYHA FC II  Continue with Toprol XL and ACEi  Continue with spironolactone 12.5 mg daily, renal function and potassium have remained stable with this.   Continue on Lasix 20 mg daily.  Continue with sodium restricted diet, daily weight monitoring and fluid restriction.  Repeat TTE 12/2019    - Echocardiogram Complete; Future    5. Centrilobular emphysema (H)  Stable. Reports breathing improved post PCI.    6. Benign essential hypertension  BP at goal today, no medication adjustments recommended.     Thank you for allowing me to participate in the care of your patient. Please do not hesitate to contact me if you have any questions.     Hailee Cortez

## 2019-10-22 NOTE — PATIENT INSTRUCTIONS
You were seen by Hailee Cortez, 10/22/2019.    1.  Continue current medications as prescribed.         2.  You will have an echocardiogram performed.  This is an ultrasound of the heart, that evaluates heart function.  The hospital scheduling department will call to schedule you for this test.  Please plan on having this done in December before your follow-up with Hailee Cortez.      You will follow up with Hailee Cortez, CNP, in December.      Please call the cardiology office with problems, questions, or concerns at [phone].    If you experience chest pain, chest pressure, chest tightness, shortness of breath, fainting, lightheadedness, nausea, vomiting, or other concerning symptoms, please report to the Emergency Department or call 911. These symptoms may be emergent, and best treated in the Emergency Department.     Keily IZAGUIRRE RN  Cardiology   [facility]  [phone]

## 2019-10-22 NOTE — NURSING NOTE
"Chief Complaint   Patient presents with     RECHECK     F/U.  Doing well.  Denies chest pain/pressure/tightness, SOB, dizziness or lightheadedness.  Finished Cardiac Rehab.       Initial /54 (BP Location: Left arm, Patient Position: Chair, Cuff Size: Adult Regular)   Pulse 77   Temp 98.4  F (36.9  C) (Tympanic)   Resp 18   Ht 1.499 m (4' 11\")   Wt 47.8 kg (105 lb 4.8 oz)   SpO2 98%   BMI 21.27 kg/m   Estimated body mass index is 21.27 kg/m  as calculated from the following:    Height as of this encounter: 1.499 m (4' 11\").    Weight as of this encounter: 47.8 kg (105 lb 4.8 oz).  Medication Reconciliation: complete  Keily Frost RN    "

## 2019-10-23 RX ORDER — CLINDAMYCIN HCL 300 MG
600 CAPSULE ORAL ONCE
Qty: 2 CAPSULE | Refills: 0 | Status: SHIPPED | OUTPATIENT
Start: 2019-10-23 | End: 2019-11-05

## 2019-10-28 DIAGNOSIS — E03.9 ACQUIRED HYPOTHYROIDISM: Primary | ICD-10-CM

## 2019-10-28 RX ORDER — LEVOTHYROXINE SODIUM 75 UG/1
75 TABLET ORAL DAILY
Qty: 90 TABLET | Refills: 2 | Status: SHIPPED | OUTPATIENT
Start: 2019-10-28 | End: 2020-02-06

## 2019-10-29 LAB — COPATH REPORT: NORMAL

## 2019-10-30 ENCOUNTER — OFFICE VISIT (OUTPATIENT)
Dept: SURGERY | Facility: OTHER | Age: 79
End: 2019-10-30
Attending: SURGERY
Payer: MEDICARE

## 2019-10-30 VITALS
RESPIRATION RATE: 18 BRPM | DIASTOLIC BLOOD PRESSURE: 70 MMHG | WEIGHT: 102.8 LBS | BODY MASS INDEX: 20.72 KG/M2 | OXYGEN SATURATION: 97 % | SYSTOLIC BLOOD PRESSURE: 98 MMHG | HEART RATE: 76 BPM | HEIGHT: 59 IN | TEMPERATURE: 98.1 F

## 2019-10-30 DIAGNOSIS — K27.9 PUD (PEPTIC ULCER DISEASE): Primary | ICD-10-CM

## 2019-10-30 PROCEDURE — G0463 HOSPITAL OUTPT CLINIC VISIT: HCPCS

## 2019-10-30 PROCEDURE — 99213 OFFICE O/P EST LOW 20 MIN: CPT | Performed by: SURGERY

## 2019-10-30 RX ORDER — TAFLUPROST 0 MG/.3ML
SOLUTION/ DROPS OPHTHALMIC
COMMUNITY
Start: 2019-09-13 | End: 2020-01-07

## 2019-10-30 RX ORDER — IPRATROPIUM BROMIDE AND ALBUTEROL SULFATE 2.5; .5 MG/3ML; MG/3ML
SOLUTION RESPIRATORY (INHALATION)
COMMUNITY
Start: 2019-06-06 | End: 2019-10-30

## 2019-10-30 RX ORDER — ALBUTEROL SULFATE 90 UG/1
POWDER, METERED RESPIRATORY (INHALATION)
Refills: 3 | COMMUNITY
Start: 2019-04-24 | End: 2019-10-30

## 2019-10-30 ASSESSMENT — MIFFLIN-ST. JEOR: SCORE: 851.93

## 2019-10-30 ASSESSMENT — PAIN SCALES - GENERAL: PAINLEVEL: NO PAIN (0)

## 2019-10-30 NOTE — PROGRESS NOTES
CLINIC NOTE - POST-OP ENDOSCOPY  10/30/2019    Patient:Lita Ocasio    Procedure: Esophagogastroduodenoscopy with biopsy and Colonoscopy    This is a 78 year old female who is 2 weeks s/p Esophagogastroduodenoscopy with biopsy and Colonoscopy.  At the time of endoscopy.     Current Medications:  Current Outpatient Medications   Medication Sig Dispense Refill     aspirin (ASA) 81 MG chewable tablet Take 1 tablet (81 mg) by mouth daily 100 tablet 0     atorvastatin (LIPITOR) 40 MG tablet Take 1 tablet (40 mg) by mouth At Bedtime 90 tablet 3     calcium carbonate (TUMS) 500 MG chewable tablet Take 1 chew tab by mouth daily       clopidogrel (PLAVIX) 75 MG tablet Take 75 mg by mouth daily       erythromycin (ROMYCIN) 5 MG/GM ophthalmic ointment APPLY ONE APPLICATION TO BOTH EYES FOUR TIMES DAILY As Needed for entropian both lower lids  0     Fexofenadine HCl (ALLEGRA PO) Take 180 mg by mouth daily        fluticasone (FLONASE) 50 MCG/ACT spray Spray 2 sprays into both nostrils daily 1 Bottle 11     furosemide (LASIX) 40 MG tablet Take 0.5 tablets (20 mg) by mouth daily       levothyroxine (SYNTHROID/LEVOTHROID) 75 MCG tablet Take 1 tablet (75 mcg) by mouth daily 90 tablet 2     lisinopril (PRINIVIL/ZESTRIL) 5 MG tablet Take 1 tablet (5 mg) by mouth daily 90 tablet 3     metoprolol succinate ER (TOPROL-XL) 25 MG 24 hr tablet Take 1 tablet (25 mg) by mouth daily 30 tablet 11     mirtazapine (REMERON) 7.5 MG tablet Take 1 tablet (7.5 mg) by mouth At Bedtime 90 tablet 0     NONFORMULARY Tumeric 500mg BID       pantoprazole (PROTONIX) 40 MG EC tablet Take 1 tablet (40 mg) by mouth 2 times daily (before meals) 60 tablet 1     potassium chloride ER (K-DUR/KLOR-CON M) 10 MEQ CR tablet Take 1 tablet (10 mEq) by mouth daily 30 tablet 5     spironolactone (ALDACTONE) 25 MG tablet Take 0.5 tablets (12.5 mg) by mouth daily 30 tablet 3     Tafluprost (ZIOPTAN) 0.0015 % SOLN Place 1 drop into both eyes At Bedtime       vitamin B  "complex with vitamin C (VITAMIN  B COMPLEX) tablet Take 1 tablet by mouth daily Takes 500mg of Vitamin B       Vitamin D, Cholecalciferol, 1000 units TABS Take 1 tablet by mouth daily       vitamin E 400 units TABS Take by mouth daily       Zafirlukast (ACCOLATE PO) Take 20 mg by mouth 2 times daily (before meals)        ZIOPTAN 0.0015 % SOLN ophthalmic solution          Allergies:  Allergies   Allergen Reactions     Evista [Raloxifene] Other (See Comments)     hypercalcemia     Prednisone GI Disturbance     Combigan [Brimonidine Tartrate-Timolol] Other (See Comments)     Eye swelling and itchy     Latex Rash     Levaquin [Levofloxacin] Muscle Pain (Myalgia)     Penicillins Rash     Restasis      Pt reports using eye gtts and having red irritated eyes        PHYSICAL EXAM:   Vital signs: BP 98/70 (BP Location: Left arm, Patient Position: Sitting, Cuff Size: Adult Regular)   Pulse 76   Temp 98.1  F (36.7  C) (Tympanic)   Resp 18   Ht 1.499 m (4' 11\")   Wt 46.6 kg (102 lb 12.8 oz)   SpO2 97%   BMI 20.76 kg/m     Weight: [unfilled]   BMI: Body mass index is 20.76 kg/m .   General: Normal, healthy, cooperative, in no acute distress, thin   Lungs: clear to auscultation   CV: Regular rate and rhythm without murmer   Abdominal: abdomen is soft without significant tenderness, masses, organomegaly or guarding       PATHOLOGY:  Copath Report   Date Value Ref Range Status   10/17/2019   Final    Patient Name: KOURTNEY ADAMES  MR#: [id number]  Specimen #: [id number]  Collected: 10/17/2019  Received: 10/18/2019  Reported: 10/29/2019 15:31  Ordering Phy(s): JORDI FARIAS  Additional Phy(s): XUAN TRACY    For improved result formatting, select 'View Enhanced Report Format' under   Linked Documents section.    SPECIMEN(S):  Stomach biopsy, antrum    FINAL DIAGNOSIS:    Stomach, antrum, biopsy  - Erosion    I have personally reviewed all specimens and/or slides, including the   listed special stains, and used them  with " my medical judgement to determine or confirm the final diagnosis.    Electronically signed out by:    Ross Gant M.D.    CLINICAL HISTORY:  GI bleed; upper endoscopy with biopsy and colonoscopy    GROSS:  There is a 3 mm piece of tan soft tissue. (1 TE in 1 block). (Dictated by:   Ross Gant MD 10/18/2019 01:46  PM)    MICROSCOPIC:  There is gastric antrum with elongated tortuous crypts.  There is a focus   with a gland that has eosinophils.  There are mild regenerative changes with decreased mucin production.  An   H. pylori immunostain is performed  and the control works appropriately.  The specimen is negative for H.   pylori.    CPT Codes  A: 60934-LH2, 70376-NSG    COLLECTION SITE:  Client: [facility]  Location: [facility] (B)    The technical component of this testing was completed at the [facility], with the  professional component performed at the [facility], [address] [place] [address]  ([phone])           ASSESSMENT:    78 year old female who is 2 weeks s/p Esophagogastroduodenoscopy with biopsy and Colonoscopy.  There is evidence of peptic ulcer disease.  She is currently on Protonix 40 mg twice daily.    PLAN:   We will continue with the Protonix.  She will follow-up PRN.     Recommend follow-up Esophagogastroduodenoscopy PRN.  Recommend follow-up Colonoscopy PRN.

## 2019-10-31 ENCOUNTER — PATIENT OUTREACH (OUTPATIENT)
Dept: CARE COORDINATION | Facility: OTHER | Age: 79
End: 2019-10-31

## 2019-10-31 NOTE — PROGRESS NOTES
Clinic Care Coordination Contact    Situation: Patient chart reviewed by care coordinator.    Background: Pt declining care coordination services, however, CC has continued to monitor to assure no further intervention(s) necessary or desired    Assessment: Pt following through with all recommended appointments.  No concerns for non-compliance or poor follow through.    Plan/Recommendations: Will do no further outreach.  New referral welcome should PCP identify concerns in the future.    Lauren Pipkin, BS-RN   CHF and General Care Coordinator  [phone] Option # 1

## 2019-11-01 NOTE — PROGRESS NOTES
Subjective     Lita Ocasio is a 78 year old female who presents to clinic today for the following health issues:    HPI   Abnormal Mood Symptoms follow up      Duration: Follow up    Description:  Depression: no   Anxiety: no   Panic attacks: no      Accompanying signs and symptoms: see PHQ-9 and ANTONELLA scores    History (similar episodes/previous evaluation): None    Precipitating or alleviating factors: None    Therapies tried and outcome: Ativan (Lorezepam), Buspar (Buspirone) which were stopped and Remeron (Mirtazapine) now    Weight check      Duration: Last January.     Description (location/character/radiation): lost 20 lbs since last January but is starting to gain back now    Accompanying signs and symptoms: None    History (similar episodes/previous evaluation): Yes see medical chart    Precipitating or alleviating factors: None    Therapies tried and outcome: None     Vascular Disease Follow-up      Are you having any of the following symptoms? (Select all that apply) No complaints of shortness of breath, chest pain or pressure.  No increased sweating or nausea with activity.  No left-sided neck or arm pain.  No complaints of pain in calves when walking 1-2 blocks.    How often do you take nitroglycerin? Never    Do you take an aspirin every day? Yes    COPD Follow-Up    Overall, how are your COPD symptoms since your last clinic visit?  No change    How much fatigue or shortness of breath do you have when you are walking?  Same as usual    How much shortness of breath do you have when you are resting?  Same as usual    How often do you cough? Rarely    Have you noticed any change in your sputum/phlegm?  No    Have you experienced a recent fever? No    Please describe how far you can walk without stopping to rest:  Less than 1 block    How many flights of stairs are you able to walk up without stopping?  1    Have you had any Emergency Room Visits, Urgent Care Visits, or Hospital Admissions because of your  COPD since your last office visit?  No    History   Smoking Status     Former Smoker     Packs/day: 1.00     Years: 50.00     Types: Cigarettes     Quit date: 1/1/2019   Smokeless Tobacco     Never Used     Comment: quit Jan 2019     No results found for: FEV1, LBX9BKG    Reviewed and updated as needed this visit by Provider  Tobacco  Allergies  Meds  Problems  Med Hx  Surg Hx  Fam Hx         Review of Systems   ROS COMP: Constitutional, HEENT, cardiovascular, pulmonary, gi and gu systems are negative, except as otherwise noted.      Objective    /62 (BP Location: Left arm, Patient Position: Chair, Cuff Size: Adult Regular)   Pulse 80   Temp 99.5  F (37.5  C) (Tympanic)   Resp 20   Wt 46.7 kg (103 lb)   SpO2 96%   BMI 20.80 kg/m    Body mass index is 20.8 kg/m .  Physical Exam   GENERAL:  alert and no distress  NECK: no adenopathy, no asymmetry, masses, or scars and thyroid normal to palpation  RESP: lungs clear to auscultation - no rales, rhonchi or wheezes  CV: regular rate and rhythm, normal S1 S2, no S3 or S4, no murmur, click or rub, no peripheral edema and peripheral pulses strong  ABDOMEN: soft, nontender, no hepatosplenomegaly, no masses and bowel sounds normal  MS: no gross musculoskeletal defects noted, no edema  SKIN: no suspicious lesions or rashes  NEURO: Normal strength and tone, mentation intact and speech normal  PSYCH: mentation appears normal, affect normal/bright    Diagnostic Test Results:  Labs reviewed in Epic  No results found for any visits on 11/05/19.        Assessment & Plan     Need for pneumococcal vaccination  - ADMIN 1st VACCINE  - PNEUMOCOCCAL VACCINE,ADULT,SQ OR IM    Anemia due to blood loss, acute  Coming up very slowly. No new bleeding, lightheadedness, dizziness. Start daily Fe due to ongoing anemia, follow unit(s) for lab visit (can do at Cards visit in Dec). Follow up in clinic in 3 mo  - ferrous sulfate (FEROSUL) 325 (65 Fe) MG tablet; Take 1 tablet (325 mg)  by mouth daily with food  Dispense: 60 tablet; Refill: 0  - CBC with platelets and differential; Future    3. Centrilobular emphysema (H)  Breathing is stable. Not needing ongoing nebs.     4. Mild protein-calorie malnutrition (H)  Weight is generally stable. Continue remeron    5. Coronary artery disease involving native heart without angina pectoris, unspecified vessel or lesion type  Asymptomatic.     6. Anxiety  Pt states this is resolved. Likely related to SOB due to CAD/COPD. Continue remeron only.       Return for anemia, heart, diarrhea.    Ophelia Troncoso MD  [facility]

## 2019-11-05 ENCOUNTER — OFFICE VISIT (OUTPATIENT)
Dept: FAMILY MEDICINE | Facility: OTHER | Age: 79
End: 2019-11-05
Attending: FAMILY MEDICINE
Payer: MEDICARE

## 2019-11-05 VITALS
SYSTOLIC BLOOD PRESSURE: 110 MMHG | DIASTOLIC BLOOD PRESSURE: 62 MMHG | TEMPERATURE: 99.5 F | OXYGEN SATURATION: 96 % | BODY MASS INDEX: 20.8 KG/M2 | RESPIRATION RATE: 20 BRPM | HEART RATE: 80 BPM | WEIGHT: 103 LBS

## 2019-11-05 DIAGNOSIS — Z23 NEED FOR PNEUMOCOCCAL VACCINATION: ICD-10-CM

## 2019-11-05 DIAGNOSIS — E44.1 MILD PROTEIN-CALORIE MALNUTRITION (H): ICD-10-CM

## 2019-11-05 DIAGNOSIS — F41.9 ANXIETY: ICD-10-CM

## 2019-11-05 DIAGNOSIS — J43.2 CENTRILOBULAR EMPHYSEMA (H): Chronic | ICD-10-CM

## 2019-11-05 DIAGNOSIS — I25.10 CORONARY ARTERY DISEASE INVOLVING NATIVE HEART WITHOUT ANGINA PECTORIS, UNSPECIFIED VESSEL OR LESION TYPE: ICD-10-CM

## 2019-11-05 DIAGNOSIS — D62 ANEMIA DUE TO BLOOD LOSS, ACUTE: Primary | ICD-10-CM

## 2019-11-05 PROBLEM — Z87.19 HISTORY OF GI BLEED: Status: ACTIVE | Noted: 2019-09-20

## 2019-11-05 PROCEDURE — 99214 OFFICE O/P EST MOD 30 MIN: CPT | Performed by: FAMILY MEDICINE

## 2019-11-05 PROCEDURE — 90732 PPSV23 VACC 2 YRS+ SUBQ/IM: CPT

## 2019-11-05 PROCEDURE — G0463 HOSPITAL OUTPT CLINIC VISIT: HCPCS | Mod: 25

## 2019-11-05 PROCEDURE — G0463 HOSPITAL OUTPT CLINIC VISIT: HCPCS

## 2019-11-05 PROCEDURE — G0009 ADMIN PNEUMOCOCCAL VACCINE: HCPCS | Performed by: FAMILY MEDICINE

## 2019-11-05 RX ORDER — FERROUS SULFATE 325(65) MG
325 TABLET ORAL
Qty: 60 TABLET | Refills: 0 | Status: SHIPPED | OUTPATIENT
Start: 2019-11-05 | End: 2020-02-06

## 2019-11-05 ASSESSMENT — PAIN SCALES - GENERAL: PAINLEVEL: NO PAIN (1)

## 2019-11-05 NOTE — NURSING NOTE
"Chief Complaint   Patient presents with     Weight Check     MOOD CHANGES       Initial /62 (BP Location: Left arm, Patient Position: Chair, Cuff Size: Adult Regular)   Pulse 80   Temp 99.5  F (37.5  C) (Tympanic)   Resp 20   Wt 46.7 kg (103 lb)   SpO2 96%   BMI 20.80 kg/m   Estimated body mass index is 20.8 kg/m  as calculated from the following:    Height as of 10/30/19: 1.499 m (4' 11\").    Weight as of this encounter: 46.7 kg (103 lb).  Medication Reconciliation: complete  Erma Baez LPN    "

## 2019-11-11 ENCOUNTER — TELEPHONE (OUTPATIENT)
Dept: FAMILY MEDICINE | Facility: OTHER | Age: 79
End: 2019-11-11

## 2019-11-11 DIAGNOSIS — K92.2 GASTROINTESTINAL HEMORRHAGE, UNSPECIFIED GASTROINTESTINAL HEMORRHAGE TYPE: ICD-10-CM

## 2019-11-11 RX ORDER — PANTOPRAZOLE SODIUM 40 MG/1
40 TABLET, DELAYED RELEASE ORAL
Qty: 180 TABLET | Refills: 0 | Status: SHIPPED | OUTPATIENT
Start: 2019-11-11 | End: 2020-03-11

## 2019-11-11 RX ORDER — PANTOPRAZOLE SODIUM 40 MG/1
40 TABLET, DELAYED RELEASE ORAL
Qty: 60 TABLET | Refills: 0 | Status: SHIPPED | OUTPATIENT
Start: 2019-11-11 | End: 2019-11-11

## 2019-12-05 ENCOUNTER — HOSPITAL ENCOUNTER (OUTPATIENT)
Dept: CARDIOLOGY | Facility: HOSPITAL | Age: 79
Discharge: HOME | End: 2019-12-05
Attending: NURSE PRACTITIONER | Admitting: NURSE PRACTITIONER
Payer: MEDICARE

## 2019-12-05 DIAGNOSIS — Z95.5 STATUS POST CORONARY ARTERY STENT PLACEMENT: ICD-10-CM

## 2019-12-05 DIAGNOSIS — I50.22 CHRONIC SYSTOLIC CONGESTIVE HEART FAILURE (H): Chronic | ICD-10-CM

## 2019-12-05 PROCEDURE — 93306 TTE W/DOPPLER COMPLETE: CPT | Mod: TC

## 2019-12-05 PROCEDURE — 93306 TTE W/DOPPLER COMPLETE: CPT | Mod: 26 | Performed by: INTERNAL MEDICINE

## 2019-12-17 ENCOUNTER — TELEPHONE (OUTPATIENT)
Dept: CARDIOLOGY | Facility: OTHER | Age: 79
End: 2019-12-17

## 2019-12-17 DIAGNOSIS — I50.22 CHRONIC SYSTOLIC CONGESTIVE HEART FAILURE (H): Chronic | ICD-10-CM

## 2019-12-17 DIAGNOSIS — Z95.5 S/P DRUG ELUTING CORONARY STENT PLACEMENT: ICD-10-CM

## 2019-12-17 DIAGNOSIS — E78.5 HYPERLIPIDEMIA, UNSPECIFIED HYPERLIPIDEMIA TYPE: ICD-10-CM

## 2019-12-17 RX ORDER — LISINOPRIL 5 MG/1
5 TABLET ORAL DAILY
Qty: 90 TABLET | Refills: 1 | Status: SHIPPED | OUTPATIENT
Start: 2019-12-17 | End: 2020-03-16

## 2019-12-17 RX ORDER — FUROSEMIDE 40 MG
20 TABLET ORAL DAILY
Qty: 45 TABLET | Refills: 1 | Status: SHIPPED | OUTPATIENT
Start: 2019-12-17 | End: 2020-03-31

## 2019-12-17 RX ORDER — ATORVASTATIN CALCIUM 40 MG/1
40 TABLET, FILM COATED ORAL AT BEDTIME
Qty: 90 TABLET | Refills: 1 | Status: SHIPPED | OUTPATIENT
Start: 2019-12-17 | End: 2020-03-09

## 2020-01-07 ENCOUNTER — OFFICE VISIT (OUTPATIENT)
Dept: CARDIOLOGY | Facility: OTHER | Age: 80
End: 2020-01-07
Attending: NURSE PRACTITIONER
Payer: MEDICARE

## 2020-01-07 VITALS
WEIGHT: 107 LBS | SYSTOLIC BLOOD PRESSURE: 109 MMHG | HEIGHT: 60 IN | OXYGEN SATURATION: 96 % | TEMPERATURE: 98.8 F | RESPIRATION RATE: 20 BRPM | DIASTOLIC BLOOD PRESSURE: 75 MMHG | HEART RATE: 80 BPM | BODY MASS INDEX: 21.01 KG/M2

## 2020-01-07 DIAGNOSIS — N18.30 CKD (CHRONIC KIDNEY DISEASE) STAGE 3, GFR 30-59 ML/MIN (H): ICD-10-CM

## 2020-01-07 DIAGNOSIS — I50.22 CHRONIC SYSTOLIC CONGESTIVE HEART FAILURE (H): Chronic | ICD-10-CM

## 2020-01-07 DIAGNOSIS — Z95.5 HISTORY OF CORONARY ARTERY STENT PLACEMENT: Primary | ICD-10-CM

## 2020-01-07 DIAGNOSIS — J43.2 CENTRILOBULAR EMPHYSEMA (H): Chronic | ICD-10-CM

## 2020-01-07 DIAGNOSIS — E78.5 HYPERLIPIDEMIA, UNSPECIFIED HYPERLIPIDEMIA TYPE: Chronic | ICD-10-CM

## 2020-01-07 DIAGNOSIS — I10 ESSENTIAL HYPERTENSION: ICD-10-CM

## 2020-01-07 PROCEDURE — 99214 OFFICE O/P EST MOD 30 MIN: CPT | Performed by: NURSE PRACTITIONER

## 2020-01-07 PROCEDURE — G0463 HOSPITAL OUTPT CLINIC VISIT: HCPCS

## 2020-01-07 ASSESSMENT — PAIN SCALES - GENERAL: PAINLEVEL: NO PAIN (0)

## 2020-01-07 ASSESSMENT — MIFFLIN-ST. JEOR: SCORE: 881.85

## 2020-01-07 NOTE — PROGRESS NOTES
[facility] HEART CARE   CARDIOLOGY PROGRESS NOTE    Lita Ocasio   [address] [facility] [address]      Ophelia Troncoso     Chief Complaint   Patient presents with     RECHECK     2-1/2 month cardiology follow-up and follow-up echo results        HPI:   Ms. Ocasio is a 79 year old female who presents for cardiology follow-up to visit on 10/22/19 status post coronary angiography on 6/26/19 at [facility]. She was recently identified to have newly identified systolic heart failure.  Additionally, she has a history of hypertension, hyperlipidemia, nonrheumatic mitral regurgitation, central lobular emphysema, hypothyroidism, osteopenia, hyperparathyroidism, history of tobacco abuse and chronic rhinitis.    At initial visit patient was accompanied by her son who was very helpful in providing patients past medical history. He admits approximately 20 years ago patient was being followed for MR which was noted to be improved and therefore, vlave surgery was never recommended. He admits that she was previously told it was suspected she had small MI's without coronary intervention or identified ACS. Finally, recent history just before new years this last year of acute hemorrhagic stroke. It was following this that she was taken off her beta blocker and lisinopril for HTN and possible past MI, she reports increased shortness of breath following this which has progressively worsened and likely multifactorial with COPD and HFrEF.     At her last visit we reviewed HFrEF and possible etiology, may be secondary to history of MR or possible ischemic heart disease with questionable past infarcts. She was started on low dose Toprol XL 12.5 mg daily to initiate GDMT, closely monitor soft pressures with this. Recommended she reduce sodium intake to no more than 3,000 mg daily, goal 2,500mg daily. Fluid intake to less than 2L daily which she is not exceeding. Recommended she begin weighing herself on a daily basis and call with a 3lb weight  gain in one day or 5 lb weight gain in one week. A Lexiscan stress test was ordered at that time with reduced LVEF and history of possible past MI.     Results of recent Lexiscan stress test with abnormal imaging with reversible ischemia laterally and LVEF 36%. Additionally, in review of past CT chest imaging from one year ago, she was noted to have advanced atherosclerotic disease with bulky coronary and aortic calcifications. Given these findings, current symptoms and new acute systolic failure, recommended coronary angiography.    Patient underwent coronary angiography on 6/26/19 at [facility]. She was identified to have diffuse coronary atherosclerosis with single vessel obstructive CAD, ostial and mid LCX. Successful PCI with synergy LISS x2, mid LCX 2.5x16mm, ostial LCX 3.0 x 16 mm. LVEDP 26-29 mmHg. During revascularization she was identified to have AF for which she received DCCV x1 with return to sinus and then reverted back to AF. She was treated with IV beta blocker for rate control. Patient reported feeling good following coronary intervention. Breathing had significantly improved and increased energy.     Today patient reports feeling good. No anginal symptoms or MILLS. No palpitations or lightheadedness and no edema. Repeat TTE on 12/5/2019 with recovered systolic heart failure.  Her LVEF is improved to 60 to 65% with normal global and regional LV function.  The RV was normal in size and function.  Both atria appeared normal.  Mitral valve with mild insufficiency, aortic valve normal in structure and function.  No pericardial effusion.    PAST MEDICAL HISTORY:   Past Medical History:   Diagnosis Date     Acquired hypothyroidism 5/12/2019     Asthma      Benign essential hypertension 5/12/2019     Centrilobular emphysema (H) 5/12/2019     Chronic rhinitis 8/16/2013     Chronic systolic congestive heart failure (H) 5/12/2019     Constipation      Coronary artery disease      Hearing loss      Heart trouble       Hemorrhagic cerebrovascular accident (CVA) (H) 01/2019     Hyperlipidemia 5/12/2019     Hypertension      Nasal congestion      Non-rheumatic mitral regurgitation 5/12/2019     Osteopenia 5/12/2019     Osteoporosis      Parathyroid related hypercalcemia (H) 8/18/2013     Primary hyperparathyroidism (H) 9/5/2013     Ringing in ears      Sensorineural hearing loss, asymmetrical 8/16/2013     Sneezing      Snoring      Stented coronary artery      Thyroid disease      Weakness      Weight loss           FAMILY HISTORY:   Family History   Problem Relation Age of Onset     Hearing Loss Mother      Heart Disease Mother      Myocardial Infarction Mother      Cerebrovascular Disease Father      Cancer Brother         throat     Cancer Sister      Myocardial Infarction Sister      Cancer Maternal Grandmother      Heart Disease Maternal Grandfather      Aortic aneurysm Son           PAST SURGICAL HISTORY:   Past Surgical History:   Procedure Laterality Date     CATARACT IOL, RT/LT Bilateral      COLONOSCOPY       COLONOSCOPY - HIM SCAN  01/01/2009    Colonoscopy - [facility]  2009     ENDOSCOPY UPPER, COLONOSCOPY, COMBINED N/A 10/17/2019    Procedure: UPPER ENDOSCOPY WITH BIOPSY  AND COLONOSCOPY;  Surgeon: Julio Canales MD;  Location: [facility] OR     ENT SURGERY  2011    para thyroid surgery     ENT SURGERY  09/2013    Parathyroid     ESOPHAGOSCOPY, GASTROSCOPY, DUODENOSCOPY (EGD), COMBINED N/A 9/21/2019    Procedure: ESOPHAGOGASTRODUODENOSCOPY (EGD);  Surgeon: Julio Canales MD;  Location: [facility] OR     PARATHYROIDECTOMY  9/10/2013    Procedure: PARATHYROIDECTOMY;  Reoperative Neck Exploration, Resection of right Parathyroid adenoma;  Surgeon: Thalia Muller MD;  Location: [facility] OR     TONSILLECTOMY       TUBAL LIGATION            SOCIAL HISTORY:   Social History     Socioeconomic History     Marital status:      Spouse name: None     Number of children: None     Years of education: None     Highest  education level: None   Occupational History     None   Social Needs     Financial resource strain: None     Food insecurity:     Worry: None     Inability: None     Transportation needs:     Medical: None     Non-medical: None   Tobacco Use     Smoking status: Former Smoker     Packs/day: 1.00     Years: 50.00     Pack years: 50.00     Types: Cigarettes     Last attempt to quit: 2019     Years since quittin.4     Smokeless tobacco: Never Used   Substance and Sexual Activity     Alcohol use: Yes     Comment: social     Drug use: No     Sexual activity: None   Lifestyle     Physical activity:     Days per week: None     Minutes per session: None     Stress: None   Relationships     Social connections:     Talks on phone: None     Gets together: None     Attends Shinto service: None     Active member of club or organization: None     Attends meetings of clubs or organizations: None     Relationship status: None     Intimate partner violence:     Fear of current or ex partner: None     Emotionally abused: None     Physically abused: None     Forced sexual activity: None   Other Topics Concern     Parent/sibling w/ CABG, MI or angioplasty before 65F 55M? Not Asked   Social History Narrative     None          CURRENT MEDICATIONS:   Prior to Admission medications    Medication Sig Start Date End Date Taking? Authorizing Provider   albuterol (PROAIR RESPICLICK) 108 (90 Base) MCG/ACT inhaler Inhale 2 puffs into the lungs every 4 hours as needed for shortness of breath / dyspnea or wheezing 19  Yes Joe Jerez MD   aspirin 81 MG tablet Take 1 tablet by mouth daily   Yes Reported, Patient   busPIRone (BUSPAR) 5 MG tablet Take 1 tablet (5 mg) by mouth 2 times daily 19  Yes Ophelia Troncoso MD   calcium carbonate (TUMS) 500 MG chewable tablet Take 1 chew tab by mouth daily   Yes Reported, Patient   Fexofenadine HCl (ALLEGRA PO) Take 180 mg by mouth daily    Yes Unknown, Entered By History    fluticasone (FLONASE) 50 MCG/ACT spray Spray 2 sprays into both nostrils daily 9/19/18  Yes Gali Dominguez APRN CNP   furosemide (LASIX) 20 MG tablet Take 1 tablet (20 mg) by mouth 2 times daily 5/31/19  Yes Ophelia Troncoso MD   ipratropium - albuterol 0.5 mg/2.5 mg/3 mL (DUONEB) 0.5-2.5 (3) MG/3ML neb solution Take 1 vial by nebulization every 6 hours as needed for shortness of breath / dyspnea or wheezing   Yes Reported, Patient   LEVOTHYROXINE SODIUM PO Take 75 mcg by mouth daily   Yes Reported, Patient   LORazepam (ATIVAN) 0.5 MG tablet Take 0.5 tablets (0.25 mg) by mouth every 6 hours as needed for anxiety or sleep 5/14/19  Yes Mindy Ware NP   NONFORMULARY Tumeric 500mg BID   Yes Reported, Patient   potassium chloride ER (K-DUR/KLOR-CON M) 20 MEQ CR tablet Take 1 tablet (20 mEq) by mouth 2 times daily 5/23/19  Yes Ophelia Troncoso MD   sertraline (ZOLOFT) 25 MG tablet Take 1 tablet (25 mg) by mouth daily 5/15/19  Yes Mindy Ware NP   SIMVASTATIN PO Take 20 mg by mouth At Bedtime   Yes Reported, Patient   Tafluprost (ZIOPTAN) 0.0015 % SOLN Place 1 drop into both eyes At Bedtime   Yes Reported, Patient   vitamin B complex with vitamin C (VITAMIN  B COMPLEX) tablet Take 1 tablet by mouth daily Takes 500mg of Vitamin B   Yes Reported, Patient   Vitamin D, Cholecalciferol, 1000 units TABS Take 1 tablet by mouth daily   Yes Reported, Patient   vitamin E 400 units TABS Take by mouth daily   Yes Reported, Patient   Zafirlukast (ACCOLATE PO) Take 20 mg by mouth 2 times daily (before meals)    Yes Reported, Patient   CLINDAMYCIN HCL PO Take 600 mg by mouth 1 hour prior to dental work    Reported, Patient          ALLERGIES:   Allergies   Allergen Reactions     Evista [Raloxifene] Other (See Comments)     hypercalcemia     Prednisone GI Disturbance     Combigan [Brimonidine Tartrate-Timolol] Other (See Comments)     Eye swelling and itchy     Latex Rash     Levaquin [Levofloxacin] Muscle Pain  (Myalgia)     Penicillins Rash     Restasis      Pt reports using eye gtts and having red irritated eyes      ROS:   CONSTITUTIONAL: No reported fever or chills. No weight gain.  ENT: No visual disturbance, ear ache, epistaxis or sore throat.   CARDIOVASCULAR: No chest pain, chest pressure or chest discomfort. No palpitations or lower extremity edema.   RESPIRATORY:Positive for chronic shortness of breath  with COPD, no increased MILLS. No reported cough, wheezing or hemoptysis. No reports of orthopnea or PND.  GI: No reported abdominal pain or distension. No reported melena or hematochezia.  : No reported hematuria or dysuria.   NEUROLOGICAL: No lightheadedness or dizziness. No syncope. No ataxia, paresthesias or weakness.   MUSCULOSKELETAL: No reported joint pain or swelling, no muscle pain.  ENDOCRINOLOGIC: No temperature intolerance. No hair or skin changes.  SKIN: No abnormal rashes or sores, no unusual itching.  PSYCHIATRIC: Positive for anxiety.      PHYSICAL EXAM:   /75 (BP Location: Left arm, Patient Position: Chair, Cuff Size: Adult Regular)   Pulse 80   Temp 98.8  F (37.1  C) (Tympanic)   Resp 20   Ht 1.524 m (5')   Wt 48.5 kg (107 lb)   SpO2 96%   BMI 20.90 kg/m    GENERAL: The patient is a well-developed, well-nourished, in no apparent distress.  HEENT: Head is normocephalic and atraumatic. Eyes are symmetrical with normal visual tracking. No icterus, no xanthelasmas. Nares appeared normal without nasal drainage. Mucous membranes are moist, no cyanosis.  NECK: Supple. No JVP visible.  CHEST/ LUNGS: Lungs sounds diminished to auscultation. No rales, rhonchi or wheezes, no use of accessory muscles, no retractions, respirations unlabored and normal respiratory rate.   CARDIO: Regular rate and rhythm normal with S1 and S2, no S3 or S4 and no murmur, click or rub.   ABD: Abdomen is nondistended.   EXTREMITIES: No LE edema present.   MUSCULOSKELETAL: No visible joint swelling.   NEUROLOGIC: Alert  and oriented X3. Normal speech, gait and affect. No focal neurologic deficits.   SKIN: No jaundice. No rashes or visible skin lesions present. No ecchymosis.     LAB RESULTS:   Orders Only on 05/22/2019   Component Date Value Ref Range Status     Sodium 05/22/2019 139  133 - 144 mmol/L Final     Potassium 05/22/2019 3.3* 3.4 - 5.3 mmol/L Final     Chloride 05/22/2019 105  94 - 109 mmol/L Final     Carbon Dioxide 05/22/2019 25  20 - 32 mmol/L Final     Anion Gap 05/22/2019 9  3 - 14 mmol/L Final     Glucose 05/22/2019 90  70 - 99 mg/dL Final     Urea Nitrogen 05/22/2019 15  7 - 30 mg/dL Final     Creatinine 05/22/2019 1.16* 0.52 - 1.04 mg/dL Final     GFR Estimate 05/22/2019 45* >60 mL/min/[1.73_m2] Final     GFR Estimate If Black 05/22/2019 52* >60 mL/min/[1.73_m2] Final     Calcium 05/22/2019 8.6  8.5 - 10.1 mg/dL Final     N-Terminal Pro Bnp 05/22/2019 3,726* 0 - 450 pg/mL Final   Office Visit on 05/17/2019   Component Date Value Ref Range Status     Sodium 05/17/2019 141  133 - 144 mmol/L Final     Potassium 05/17/2019 3.6  3.4 - 5.3 mmol/L Final     Chloride 05/17/2019 108  94 - 109 mmol/L Final     Carbon Dioxide 05/17/2019 27  20 - 32 mmol/L Final     Anion Gap 05/17/2019 6  3 - 14 mmol/L Final     Glucose 05/17/2019 84  70 - 99 mg/dL Final     Urea Nitrogen 05/17/2019 18  7 - 30 mg/dL Final     Creatinine 05/17/2019 1.08* 0.52 - 1.04 mg/dL Final     GFR Estimate 05/17/2019 49* >60 mL/min/[1.73_m2] Final     GFR Estimate If Black 05/17/2019 57* >60 mL/min/[1.73_m2] Final     Calcium 05/17/2019 9.7  8.5 - 10.1 mg/dL Final     WBC 05/17/2019 8.4  4.0 - 11.0 10e9/L Final     RBC Count 05/17/2019 5.04  3.8 - 5.2 10e12/L Final     Hemoglobin 05/17/2019 14.8  11.7 - 15.7 g/dL Final     Hematocrit 05/17/2019 44.0  35.0 - 47.0 % Final     MCV 05/17/2019 87  78 - 100 fl Final     MCH 05/17/2019 29.4  26.5 - 33.0 pg Final     MCHC 05/17/2019 33.6  31.5 - 36.5 g/dL Final     RDW 05/17/2019 13.4  10.0 - 15.0 % Final      Platelet Count 05/17/2019 310  150 - 450 10e9/L Final     Diff Method 05/17/2019 Automated Method   Final     % Neutrophils 05/17/2019 64.6  % Final     % Lymphocytes 05/17/2019 19.8  % Final     % Monocytes 05/17/2019 10.1  % Final     % Eosinophils 05/17/2019 4.4  % Final     % Basophils 05/17/2019 0.7  % Final     % Immature Granulocytes 05/17/2019 0.4  % Final     Nucleated RBCs 05/17/2019 0  0 /100 Final     Absolute Neutrophil 05/17/2019 5.4  1.6 - 8.3 10e9/L Final     Absolute Lymphocytes 05/17/2019 1.7  0.8 - 5.3 10e9/L Final     Absolute Monocytes 05/17/2019 0.8  0.0 - 1.3 10e9/L Final     Absolute Eosinophils 05/17/2019 0.4  0.0 - 0.7 10e9/L Final     Absolute Basophils 05/17/2019 0.1  0.0 - 0.2 10e9/L Final     Abs Immature Granulocytes 05/17/2019 0.0  0 - 0.4 10e9/L Final     Absolute Nucleated RBC 05/17/2019 0.0   Final     TSH 05/17/2019 2.79  0.40 - 4.00 mU/L Final     N-Terminal Pro Bnp 05/17/2019 5,830* 0 - 450 pg/mL Final          ASSESSMENT:   Lita Ocasio presents for cardiology follow-up to visit on 10/22/19 status post coronary angiography on 6/26/19 at [facility]. She was recently identified to have newly identified systolic heart failure.  Additionally, she has a history of hypertension, hyperlipidemia, nonrheumatic mitral regurgitation, central lobular emphysema, hypothyroidism, osteopenia, hyperparathyroidism, history of tobacco abuse and chronic rhinitis.  Patient underwent coronary angiography on 6/26/19 at [facility]. She was identified to have diffuse coronary atherosclerosis with single vessel obstructive CAD, ostial and mid LCX. Successful PCI with synergy LISS x2, mid LCX 2.5x16mm, ostial LCX 3.0 x 16 mm. LVEDP 26-29 mmHg. During revascularization she was identified to have AF for which she received DCCV x1 with return to sinus and then reverted back to AF. She was treated with IV beta blocker for rate control. Patient reported feeling good following coronary intervention. Breathing  had significantly improved and increased energy.   Today patient reports feeling good. No anginal symptoms or MILLS. No palpitations or lightheadedness and no edema. Repeat TTE on 12/5/2019 with recovered systolic heart failure.  Her LVEF is improved to 60 to 65% with normal global and regional LV function.  The RV was normal in size and function.  Both atria appeared normal.  Mitral valve with mild insufficiency, aortic valve normal in structure and function.  No pericardial effusion.    PLAN:  1. History of coronary artery stent placement (6/26/19)  See above, successful PCI with synergy LISS x2, mid LCX 2.5x16mm, ostial LCX 3.0 x 16 mm.   Continue with Plavix and Xarelto. Plavix for optimally one year post PCI (stop 7/1/2020). Remain on ASA indefinitely.  Continue with high intensity statin.   No anginal symptoms reported.    2. Centrilobular emphysema (H)  Stable. Reports breathing improved post PCI.    3. Hyperlipidemia, unspecified hyperlipidemia type   Continue on high intensity statin for plaque stabilization.    4. Chronic systolic congestive heart failure (H)- Recovered  Ischemic cardiomyopathy HFrEF with LVEF previously 36%, NYHA FC II  Repeat TTE on 12/5/19 with recovered LVEF at 60-65%.  Continue with Toprol XL and ACEi  She may stop spironolactone at this time with recovered LVEF and stage 3 CKD.      5. CKD (chronic kidney disease) stage 3, GFR 30-59 ml/min (H)    6. Essential hypertension  BP well controlled.       Thank you for allowing me to participate in the care of your patient. Please do not hesitate to contact me if you have any questions.     Hailee Cortez

## 2020-01-07 NOTE — PATIENT INSTRUCTIONS
You were seen by COCO Lyles CNP, 01/07/20.     1.  Discontinue spironolactone.      2. Please continue all other medications as they have been prescribed.     3. If you develop new or worsening symptoms, please call the cardiology office as you may need to be evaluated.       You will follow up with COCO Lyles CNP in 6 months.       Please call the cardiology office with problems, questions, or concerns at [phone].    If you experience chest pain, chest pressure, chest tightness, shortness of breath, fainting, lightheadedness, nausea, vomiting, or other concerning symptoms, please report to the Emergency Department or call 911. These symptoms may be emergent, and best treated in the Emergency Department.       Christin TRISTAN RN  Cardiology   [facility]  [phone]

## 2020-01-07 NOTE — NURSING NOTE
Chief Complaint   Patient presents with     RECHECK     2-1/2 month cardiology follow-up and follow-up echo results       Initial /75 (BP Location: Left arm, Patient Position: Chair, Cuff Size: Adult Regular)   Pulse (!) 40   Temp 98.8  F (37.1  C) (Tympanic)   Resp 20   Ht 1.524 m (5')   Wt 48.5 kg (107 lb)   SpO2 96%   BMI 20.90 kg/m   Estimated body mass index is 20.9 kg/m  as calculated from the following:    Height as of this encounter: 1.524 m (5').    Weight as of this encounter: 48.5 kg (107 lb).  Medication Reconciliation: complete  Michelle Roman LPN

## 2020-01-20 DIAGNOSIS — I50.22 CHRONIC SYSTOLIC CONGESTIVE HEART FAILURE (H): Chronic | ICD-10-CM

## 2020-01-20 RX ORDER — METOPROLOL SUCCINATE 25 MG/1
25 TABLET, EXTENDED RELEASE ORAL DAILY
Qty: 90 TABLET | Refills: 1 | Status: SHIPPED | OUTPATIENT
Start: 2020-01-20 | End: 2020-04-20

## 2020-01-27 ENCOUNTER — ANESTHESIA EVENT (OUTPATIENT)
Dept: SURGERY | Facility: HOSPITAL | Age: 80
DRG: 811 | End: 2020-01-27
Payer: MEDICARE

## 2020-01-27 ENCOUNTER — HOSPITAL ENCOUNTER (INPATIENT)
Facility: HOSPITAL | Age: 80
LOS: 2 days | Discharge: HOME | DRG: 811 | End: 2020-01-30
Attending: EMERGENCY MEDICINE | Admitting: INTERNAL MEDICINE
Payer: MEDICARE

## 2020-01-27 ENCOUNTER — ANESTHESIA (OUTPATIENT)
Dept: SURGERY | Facility: HOSPITAL | Age: 80
DRG: 811 | End: 2020-01-27
Payer: MEDICARE

## 2020-01-27 DIAGNOSIS — K92.2 GASTROINTESTINAL HEMORRHAGE, UNSPECIFIED GASTROINTESTINAL HEMORRHAGE TYPE: ICD-10-CM

## 2020-01-27 DIAGNOSIS — K92.2 GASTROINTESTINAL HEMORRHAGE, UNSPECIFIED GASTROINTESTINAL HEMORRHAGE TYPE: Primary | ICD-10-CM

## 2020-01-27 LAB
ALBUMIN SERPL-MCNC: 3.1 G/DL (ref 3.4–5)
ALBUMIN UR-MCNC: NEGATIVE MG/DL
ALP SERPL-CCNC: 69 U/L (ref 40–150)
ALT SERPL W P-5'-P-CCNC: 23 U/L (ref 0–50)
ANION GAP SERPL CALCULATED.3IONS-SCNC: 9 MMOL/L (ref 3–14)
ANION GAP SERPL CALCULATED.3IONS-SCNC: 9 MMOL/L (ref 3–14)
APPEARANCE UR: CLEAR
AST SERPL W P-5'-P-CCNC: 16 U/L (ref 0–45)
BASOPHILS # BLD AUTO: 0.1 10E9/L (ref 0–0.2)
BASOPHILS NFR BLD AUTO: 0.3 %
BILIRUB SERPL-MCNC: 0.3 MG/DL (ref 0.2–1.3)
BILIRUB UR QL STRIP: NEGATIVE
BUN SERPL-MCNC: 26 MG/DL (ref 7–30)
BUN SERPL-MCNC: 29 MG/DL (ref 7–30)
CALCIUM SERPL-MCNC: 8.4 MG/DL (ref 8.5–10.1)
CALCIUM SERPL-MCNC: 8.5 MG/DL (ref 8.5–10.1)
CHLORIDE SERPL-SCNC: 109 MMOL/L (ref 94–109)
CHLORIDE SERPL-SCNC: 110 MMOL/L (ref 94–109)
CO2 SERPL-SCNC: 26 MMOL/L (ref 20–32)
CO2 SERPL-SCNC: 27 MMOL/L (ref 20–32)
COLOR UR AUTO: NORMAL
CREAT SERPL-MCNC: 0.81 MG/DL (ref 0.52–1.04)
CREAT SERPL-MCNC: 0.93 MG/DL (ref 0.52–1.04)
DIFFERENTIAL METHOD BLD: ABNORMAL
EOSINOPHIL # BLD AUTO: 0 10E9/L (ref 0–0.7)
EOSINOPHIL NFR BLD AUTO: 0.1 %
ERYTHROCYTE [DISTWIDTH] IN BLOOD BY AUTOMATED COUNT: 17.5 % (ref 10–15)
ERYTHROCYTE [DISTWIDTH] IN BLOOD BY AUTOMATED COUNT: 17.6 % (ref 10–15)
GFR SERPL CREATININE-BSD FRML MDRD: 59 ML/MIN/{1.73_M2}
GFR SERPL CREATININE-BSD FRML MDRD: 69 ML/MIN/{1.73_M2}
GLUCOSE SERPL-MCNC: 177 MG/DL (ref 70–99)
GLUCOSE SERPL-MCNC: 177 MG/DL (ref 70–99)
GLUCOSE UR STRIP-MCNC: NEGATIVE MG/DL
HCT VFR BLD AUTO: 26.5 % (ref 35–47)
HCT VFR BLD AUTO: 27.6 % (ref 35–47)
HCT VFR BLD AUTO: 27.6 % (ref 35–47)
HCT VFR BLD AUTO: 27.9 % (ref 35–47)
HGB BLD-MCNC: 8.8 G/DL (ref 11.7–15.7)
HGB BLD-MCNC: 9 G/DL (ref 11.7–15.7)
HGB BLD-MCNC: 9 G/DL (ref 11.7–15.7)
HGB BLD-MCNC: 9.2 G/DL (ref 11.7–15.7)
HGB UR QL STRIP: NEGATIVE
IMM GRANULOCYTES # BLD: 0.1 10E9/L (ref 0–0.4)
IMM GRANULOCYTES NFR BLD: 0.6 %
INR PPP: 1.11 (ref 0.86–1.14)
IRON SERPL-MCNC: 46 UG/DL (ref 35–180)
KETONES UR STRIP-MCNC: NEGATIVE MG/DL
LACTATE BLD-SCNC: 1.5 MMOL/L (ref 0.7–2)
LACTATE BLD-SCNC: 2.2 MMOL/L (ref 0.7–2)
LACTATE BLD-SCNC: 2.3 MMOL/L (ref 0.7–2)
LEUKOCYTE ESTERASE UR QL STRIP: NEGATIVE
LIPASE SERPL-CCNC: 101 U/L (ref 73–393)
LYMPHOCYTES # BLD AUTO: 1.6 10E9/L (ref 0.8–5.3)
LYMPHOCYTES NFR BLD AUTO: 11.5 %
MCH RBC QN AUTO: 27.8 PG (ref 26.5–33)
MCH RBC QN AUTO: 28.1 PG (ref 26.5–33)
MCHC RBC AUTO-ENTMCNC: 32.6 G/DL (ref 31.5–36.5)
MCHC RBC AUTO-ENTMCNC: 33 G/DL (ref 31.5–36.5)
MCV RBC AUTO: 85 FL (ref 78–100)
MCV RBC AUTO: 85 FL (ref 78–100)
MONOCYTES # BLD AUTO: 0.5 10E9/L (ref 0–1.3)
MONOCYTES NFR BLD AUTO: 3.7 %
NEUTROPHILS # BLD AUTO: 12 10E9/L (ref 1.6–8.3)
NEUTROPHILS NFR BLD AUTO: 83.8 %
NITRATE UR QL: NEGATIVE
NRBC # BLD AUTO: 0 10*3/UL
NRBC BLD AUTO-RTO: 0 /100
PH UR STRIP: 7 PH (ref 4.7–8)
PLATELET # BLD AUTO: 311 10E9/L (ref 150–450)
PLATELET # BLD AUTO: 355 10E9/L (ref 150–450)
POTASSIUM SERPL-SCNC: 3.7 MMOL/L (ref 3.4–5.3)
POTASSIUM SERPL-SCNC: 3.9 MMOL/L (ref 3.4–5.3)
PROT SERPL-MCNC: 6.2 G/DL (ref 6.8–8.8)
RBC # BLD AUTO: 3.24 10E12/L (ref 3.8–5.2)
RBC # BLD AUTO: 3.27 10E12/L (ref 3.8–5.2)
SODIUM SERPL-SCNC: 145 MMOL/L (ref 133–144)
SODIUM SERPL-SCNC: 145 MMOL/L (ref 133–144)
SOURCE: NORMAL
SP GR UR STRIP: 1.01 (ref 1–1.03)
TROPONIN I SERPL-MCNC: 0.03 UG/L (ref 0–0.04)
UROBILINOGEN UR STRIP-MCNC: NORMAL MG/DL (ref 0–2)
WBC # BLD AUTO: 14.3 10E9/L (ref 4–11)
WBC # BLD AUTO: 17.2 10E9/L (ref 4–11)

## 2020-01-27 PROCEDURE — 25000125 ZZHC RX 250: Performed by: INTERNAL MEDICINE

## 2020-01-27 PROCEDURE — 25000128 H RX IP 250 OP 636: Performed by: INTERNAL MEDICINE

## 2020-01-27 PROCEDURE — 83540 ASSAY OF IRON: CPT | Performed by: INTERNAL MEDICINE

## 2020-01-27 PROCEDURE — G0378 HOSPITAL OBSERVATION PER HR: HCPCS

## 2020-01-27 PROCEDURE — C9113 INJ PANTOPRAZOLE SODIUM, VIA: HCPCS | Performed by: HOSPITALIST

## 2020-01-27 PROCEDURE — 81003 URINALYSIS AUTO W/O SCOPE: CPT | Performed by: INTERNAL MEDICINE

## 2020-01-27 PROCEDURE — 25800030 ZZH RX IP 258 OP 636: Performed by: INTERNAL MEDICINE

## 2020-01-27 PROCEDURE — 84484 ASSAY OF TROPONIN QUANT: CPT | Performed by: EMERGENCY MEDICINE

## 2020-01-27 PROCEDURE — 25000128 H RX IP 250 OP 636: Performed by: EMERGENCY MEDICINE

## 2020-01-27 PROCEDURE — 99285 EMERGENCY DEPT VISIT HI MDM: CPT | Mod: Z6 | Performed by: EMERGENCY MEDICINE

## 2020-01-27 PROCEDURE — C9113 INJ PANTOPRAZOLE SODIUM, VIA: HCPCS | Performed by: EMERGENCY MEDICINE

## 2020-01-27 PROCEDURE — 86901 BLOOD TYPING SEROLOGIC RH(D): CPT | Performed by: EMERGENCY MEDICINE

## 2020-01-27 PROCEDURE — 80048 BASIC METABOLIC PNL TOTAL CA: CPT | Performed by: INTERNAL MEDICINE

## 2020-01-27 PROCEDURE — 25000128 H RX IP 250 OP 636: Performed by: NURSE ANESTHETIST, CERTIFIED REGISTERED

## 2020-01-27 PROCEDURE — 0DB78ZX EXCISION OF STOMACH, PYLORUS, VIA NATURAL OR ARTIFICIAL OPENING ENDOSCOPIC, DIAGNOSTIC: ICD-10-PCS | Performed by: SURGERY

## 2020-01-27 PROCEDURE — 83605 ASSAY OF LACTIC ACID: CPT | Performed by: INTERNAL MEDICINE

## 2020-01-27 PROCEDURE — C9113 INJ PANTOPRAZOLE SODIUM, VIA: HCPCS | Performed by: SURGERY

## 2020-01-27 PROCEDURE — 36415 COLL VENOUS BLD VENIPUNCTURE: CPT | Performed by: HOSPITALIST

## 2020-01-27 PROCEDURE — 99223 1ST HOSP IP/OBS HIGH 75: CPT | Mod: AI | Performed by: INTERNAL MEDICINE

## 2020-01-27 PROCEDURE — 85018 HEMOGLOBIN: CPT | Performed by: HOSPITALIST

## 2020-01-27 PROCEDURE — 85027 COMPLETE CBC AUTOMATED: CPT | Performed by: INTERNAL MEDICINE

## 2020-01-27 PROCEDURE — 80053 COMPREHEN METABOLIC PANEL: CPT | Performed by: EMERGENCY MEDICINE

## 2020-01-27 PROCEDURE — 43239 EGD BIOPSY SINGLE/MULTIPLE: CPT | Performed by: SURGERY

## 2020-01-27 PROCEDURE — 36000050 ZZH SURGERY LEVEL 2 1ST 30 MIN: Performed by: SURGERY

## 2020-01-27 PROCEDURE — 83605 ASSAY OF LACTIC ACID: CPT | Performed by: HOSPITALIST

## 2020-01-27 PROCEDURE — 25000128 H RX IP 250 OP 636: Performed by: HOSPITALIST

## 2020-01-27 PROCEDURE — 86900 BLOOD TYPING SEROLOGIC ABO: CPT | Performed by: EMERGENCY MEDICINE

## 2020-01-27 PROCEDURE — 96374 THER/PROPH/DIAG INJ IV PUSH: CPT

## 2020-01-27 PROCEDURE — 99222 1ST HOSP IP/OBS MODERATE 55: CPT | Mod: 25 | Performed by: SURGERY

## 2020-01-27 PROCEDURE — 99100 ANES PT EXTEME AGE<1 YR&>70: CPT | Performed by: NURSE ANESTHETIST, CERTIFIED REGISTERED

## 2020-01-27 PROCEDURE — 43239 EGD BIOPSY SINGLE/MULTIPLE: CPT | Performed by: NURSE ANESTHETIST, CERTIFIED REGISTERED

## 2020-01-27 PROCEDURE — 86920 COMPATIBILITY TEST SPIN: CPT | Performed by: EMERGENCY MEDICINE

## 2020-01-27 PROCEDURE — 25800030 ZZH RX IP 258 OP 636: Performed by: NURSE ANESTHETIST, CERTIFIED REGISTERED

## 2020-01-27 PROCEDURE — 83690 ASSAY OF LIPASE: CPT | Performed by: EMERGENCY MEDICINE

## 2020-01-27 PROCEDURE — 36415 COLL VENOUS BLD VENIPUNCTURE: CPT | Performed by: INTERNAL MEDICINE

## 2020-01-27 PROCEDURE — 71000014 ZZH RECOVERY PHASE 1 LEVEL 2 FIRST HR: Performed by: SURGERY

## 2020-01-27 PROCEDURE — 85014 HEMATOCRIT: CPT | Performed by: HOSPITALIST

## 2020-01-27 PROCEDURE — 37000008 ZZH ANESTHESIA TECHNICAL FEE, 1ST 30 MIN: Performed by: SURGERY

## 2020-01-27 PROCEDURE — 85025 COMPLETE CBC W/AUTO DIFF WBC: CPT | Performed by: EMERGENCY MEDICINE

## 2020-01-27 PROCEDURE — 96375 TX/PRO/DX INJ NEW DRUG ADDON: CPT

## 2020-01-27 PROCEDURE — 25000125 ZZHC RX 250: Performed by: SURGERY

## 2020-01-27 PROCEDURE — 27210794 ZZH OR GENERAL SUPPLY STERILE: Performed by: SURGERY

## 2020-01-27 PROCEDURE — 88305 TISSUE EXAM BY PATHOLOGIST: CPT | Mod: TC | Performed by: SURGERY

## 2020-01-27 PROCEDURE — 71000015 ZZH RECOVERY PHASE 1 LEVEL 2 EA ADDTL HR: Performed by: SURGERY

## 2020-01-27 PROCEDURE — 86850 RBC ANTIBODY SCREEN: CPT | Performed by: EMERGENCY MEDICINE

## 2020-01-27 PROCEDURE — 93010 ELECTROCARDIOGRAM REPORT: CPT | Performed by: INTERNAL MEDICINE

## 2020-01-27 PROCEDURE — 40000786 ZZHCL STATISTIC ACTIVE MRSA SURVEILLANCE CULTURE: Performed by: INTERNAL MEDICINE

## 2020-01-27 PROCEDURE — 25000128 H RX IP 250 OP 636: Performed by: SURGERY

## 2020-01-27 PROCEDURE — 99285 EMERGENCY DEPT VISIT HI MDM: CPT | Mod: 25

## 2020-01-27 PROCEDURE — 83605 ASSAY OF LACTIC ACID: CPT | Performed by: EMERGENCY MEDICINE

## 2020-01-27 PROCEDURE — 85610 PROTHROMBIN TIME: CPT | Performed by: EMERGENCY MEDICINE

## 2020-01-27 RX ORDER — METOPROLOL TARTRATE 1 MG/ML
5 INJECTION, SOLUTION INTRAVENOUS 2 TIMES DAILY
Status: DISCONTINUED | OUTPATIENT
Start: 2020-01-28 | End: 2020-01-28

## 2020-01-27 RX ORDER — FENTANYL CITRATE 50 UG/ML
100 INJECTION, SOLUTION INTRAMUSCULAR; INTRAVENOUS
Status: DISCONTINUED | OUTPATIENT
Start: 2020-01-27 | End: 2020-01-30 | Stop reason: HOSPADM

## 2020-01-27 RX ORDER — LIDOCAINE 40 MG/G
CREAM TOPICAL
Status: DISCONTINUED | OUTPATIENT
Start: 2020-01-27 | End: 2020-01-30 | Stop reason: HOSPADM

## 2020-01-27 RX ORDER — METOPROLOL TARTRATE 1 MG/ML
5 INJECTION, SOLUTION INTRAVENOUS EVERY 8 HOURS
Status: DISCONTINUED | OUTPATIENT
Start: 2020-01-27 | End: 2020-01-27

## 2020-01-27 RX ORDER — HYDRALAZINE HYDROCHLORIDE 20 MG/ML
20 INJECTION INTRAMUSCULAR; INTRAVENOUS EVERY 4 HOURS PRN
Status: DISCONTINUED | OUTPATIENT
Start: 2020-01-27 | End: 2020-01-30 | Stop reason: HOSPADM

## 2020-01-27 RX ORDER — SODIUM CHLORIDE, SODIUM LACTATE, POTASSIUM CHLORIDE, CALCIUM CHLORIDE 600; 310; 30; 20 MG/100ML; MG/100ML; MG/100ML; MG/100ML
INJECTION, SOLUTION INTRAVENOUS CONTINUOUS
Status: DISCONTINUED | OUTPATIENT
Start: 2020-01-27 | End: 2020-01-27 | Stop reason: HOSPADM

## 2020-01-27 RX ORDER — SPIRONOLACTONE 25 MG/1
25 TABLET ORAL DAILY
Status: ON HOLD | COMMUNITY
End: 2020-01-27

## 2020-01-27 RX ORDER — NALOXONE HYDROCHLORIDE 0.4 MG/ML
.1-.4 INJECTION, SOLUTION INTRAMUSCULAR; INTRAVENOUS; SUBCUTANEOUS
Status: DISCONTINUED | OUTPATIENT
Start: 2020-01-27 | End: 2020-01-30 | Stop reason: HOSPADM

## 2020-01-27 RX ORDER — ONDANSETRON 2 MG/ML
4 INJECTION INTRAMUSCULAR; INTRAVENOUS EVERY 4 HOURS PRN
Status: DISCONTINUED | OUTPATIENT
Start: 2020-01-27 | End: 2020-01-30 | Stop reason: HOSPADM

## 2020-01-27 RX ORDER — PROPOFOL 10 MG/ML
INJECTION, EMULSION INTRAVENOUS PRN
Status: DISCONTINUED | OUTPATIENT
Start: 2020-01-27 | End: 2020-01-27

## 2020-01-27 RX ORDER — ONDANSETRON 4 MG/1
4 TABLET, ORALLY DISINTEGRATING ORAL EVERY 4 HOURS PRN
Status: DISCONTINUED | OUTPATIENT
Start: 2020-01-27 | End: 2020-01-30 | Stop reason: HOSPADM

## 2020-01-27 RX ORDER — ONDANSETRON 2 MG/ML
4 INJECTION INTRAMUSCULAR; INTRAVENOUS EVERY 30 MIN PRN
Status: DISCONTINUED | OUTPATIENT
Start: 2020-01-27 | End: 2020-01-27

## 2020-01-27 RX ORDER — HYDRALAZINE HYDROCHLORIDE 20 MG/ML
5 INJECTION INTRAMUSCULAR; INTRAVENOUS EVERY 4 HOURS PRN
Status: DISCONTINUED | OUTPATIENT
Start: 2020-01-27 | End: 2020-01-27

## 2020-01-27 RX ORDER — MORPHINE SULFATE 2 MG/ML
1-2 INJECTION, SOLUTION INTRAMUSCULAR; INTRAVENOUS
Status: DISCONTINUED | OUTPATIENT
Start: 2020-01-27 | End: 2020-01-30 | Stop reason: HOSPADM

## 2020-01-27 RX ORDER — ONDANSETRON 2 MG/ML
4 INJECTION INTRAMUSCULAR; INTRAVENOUS EVERY 30 MIN PRN
Status: DISCONTINUED | OUTPATIENT
Start: 2020-01-27 | End: 2020-01-27 | Stop reason: HOSPADM

## 2020-01-27 RX ORDER — TURMERIC ROOT EXTRACT 500 MG
500 TABLET ORAL 2 TIMES DAILY
COMMUNITY
End: 2020-02-05

## 2020-01-27 RX ORDER — SODIUM CHLORIDE, SODIUM LACTATE, POTASSIUM CHLORIDE, CALCIUM CHLORIDE 600; 310; 30; 20 MG/100ML; MG/100ML; MG/100ML; MG/100ML
INJECTION, SOLUTION INTRAVENOUS CONTINUOUS PRN
Status: DISCONTINUED | OUTPATIENT
Start: 2020-01-27 | End: 2020-01-27

## 2020-01-27 RX ORDER — NALOXONE HYDROCHLORIDE 0.4 MG/ML
.1-.4 INJECTION, SOLUTION INTRAMUSCULAR; INTRAVENOUS; SUBCUTANEOUS
Status: ACTIVE | OUTPATIENT
Start: 2020-01-27 | End: 2020-01-28

## 2020-01-27 RX ORDER — HYDRALAZINE HYDROCHLORIDE 20 MG/ML
2 INJECTION INTRAMUSCULAR; INTRAVENOUS EVERY 4 HOURS PRN
Status: DISCONTINUED | OUTPATIENT
Start: 2020-01-27 | End: 2020-01-27

## 2020-01-27 RX ORDER — NITROGLYCERIN 0.4 MG/1
0.4 TABLET SUBLINGUAL EVERY 5 MIN PRN
COMMUNITY
End: 2023-09-06

## 2020-01-27 RX ORDER — ONDANSETRON 4 MG/1
4 TABLET, ORALLY DISINTEGRATING ORAL EVERY 30 MIN PRN
Status: DISCONTINUED | OUTPATIENT
Start: 2020-01-27 | End: 2020-01-27 | Stop reason: HOSPADM

## 2020-01-27 RX ORDER — FEXOFENADINE HCL 180 MG/1
180 TABLET ORAL DAILY
COMMUNITY
End: 2020-10-13

## 2020-01-27 RX ORDER — ALBUTEROL SULFATE 1.25 MG/3ML
2.5 SOLUTION RESPIRATORY (INHALATION) EVERY 6 HOURS PRN
Status: DISCONTINUED | OUTPATIENT
Start: 2020-01-27 | End: 2020-01-28

## 2020-01-27 RX ADMIN — HYDRALAZINE HYDROCHLORIDE 20 MG: 20 INJECTION INTRAMUSCULAR; INTRAVENOUS at 06:21

## 2020-01-27 RX ADMIN — ONDANSETRON 4 MG: 2 INJECTION INTRAMUSCULAR; INTRAVENOUS at 03:39

## 2020-01-27 RX ADMIN — MORPHINE SULFATE 2 MG: 2 INJECTION, SOLUTION INTRAMUSCULAR; INTRAVENOUS at 07:43

## 2020-01-27 RX ADMIN — PANTOPRAZOLE SODIUM 40 MG: 40 INJECTION, POWDER, FOR SOLUTION INTRAVENOUS at 07:38

## 2020-01-27 RX ADMIN — PROPOFOL 10 MG: 10 INJECTION, EMULSION INTRAVENOUS at 20:23

## 2020-01-27 RX ADMIN — SODIUM CHLORIDE, POTASSIUM CHLORIDE, SODIUM LACTATE AND CALCIUM CHLORIDE: 600; 310; 30; 20 INJECTION, SOLUTION INTRAVENOUS at 20:15

## 2020-01-27 RX ADMIN — HYDRALAZINE HYDROCHLORIDE 20 MG: 20 INJECTION INTRAMUSCULAR; INTRAVENOUS at 13:49

## 2020-01-27 RX ADMIN — HYDRALAZINE HYDROCHLORIDE 2 MG: 20 INJECTION INTRAMUSCULAR; INTRAVENOUS at 05:13

## 2020-01-27 RX ADMIN — PROPOFOL 10 MG: 10 INJECTION, EMULSION INTRAVENOUS at 20:26

## 2020-01-27 RX ADMIN — SODIUM CHLORIDE 1000 ML: 9 INJECTION, SOLUTION INTRAVENOUS at 04:34

## 2020-01-27 RX ADMIN — FENTANYL CITRATE 50 MCG: 50 INJECTION INTRAMUSCULAR; INTRAVENOUS at 21:04

## 2020-01-27 RX ADMIN — METOPROLOL TARTRATE 5 MG: 1 INJECTION, SOLUTION INTRAVENOUS at 04:51

## 2020-01-27 RX ADMIN — PROPOFOL 20 MG: 10 INJECTION, EMULSION INTRAVENOUS at 20:20

## 2020-01-27 RX ADMIN — METOPROLOL TARTRATE 5 MG: 1 INJECTION, SOLUTION INTRAVENOUS at 12:01

## 2020-01-27 RX ADMIN — HYDRALAZINE HYDROCHLORIDE 20 MG: 20 INJECTION INTRAMUSCULAR; INTRAVENOUS at 18:22

## 2020-01-27 RX ADMIN — PANTOPRAZOLE SODIUM 40 MG: 40 INJECTION, POWDER, FOR SOLUTION INTRAVENOUS at 02:14

## 2020-01-27 RX ADMIN — PANTOPRAZOLE SODIUM 40 MG: 40 INJECTION, POWDER, FOR SOLUTION INTRAVENOUS at 22:38

## 2020-01-27 RX ADMIN — MORPHINE SULFATE 1 MG: 2 INJECTION, SOLUTION INTRAMUSCULAR; INTRAVENOUS at 13:05

## 2020-01-27 ASSESSMENT — LIFESTYLE VARIABLES: TOBACCO_USE: 1

## 2020-01-27 ASSESSMENT — COPD QUESTIONNAIRES: COPD: 1

## 2020-01-27 ASSESSMENT — MIFFLIN-ST. JEOR: SCORE: 863.5

## 2020-01-27 NOTE — CONSULTS
[facility] Surgery Consultation    CC:  Hematemesis, epigastric pain      HPI:  This 79 year old year old female is seen at the request of Dr. Crawford for evaluation of hematemesis and upper abdominal pain. She reports acute onset of upper abdominal pain last night about 11pm. She reports dark black stools as well as dark black vomitus. This prompted her to go to the ED to be evaluated. She was admitted for similar issue back in September and has subsequently undergone 2 upper endoscopies without obvious source per reports. She denies taking NSAIDs, she is on a PPI. She is on Plavix from recent cardiac stent placement in June. She denies pain currently, she had dark black hematemesis while I was present in the room.     Past Medical History:   Diagnosis Date     Acquired hypothyroidism 5/12/2019     Asthma      Benign essential hypertension 5/12/2019     Centrilobular emphysema (H) 5/12/2019     Chronic rhinitis 8/16/2013     Chronic systolic congestive heart failure (H) 5/12/2019     Constipation      Coronary artery disease      Hearing loss      Heart trouble      Hemorrhagic cerebrovascular accident (CVA) (H) 01/2019     Hyperlipidemia 5/12/2019     Hypertension      Nasal congestion      Non-rheumatic mitral regurgitation 5/12/2019     Osteopenia 5/12/2019     Osteoporosis      Parathyroid related hypercalcemia (H) 8/18/2013     Primary hyperparathyroidism (H) 9/5/2013     Ringing in ears      Sensorineural hearing loss, asymmetrical 8/16/2013     Sneezing      Snoring      Stented coronary artery      Thyroid disease      Weakness      Weight loss        Past Surgical History:   Procedure Laterality Date     CATARACT IOL, RT/LT Bilateral      COLONOSCOPY       COLONOSCOPY - HIM SCAN  01/01/2009    Colonoscopy - [facility]/NL  2009     ENDOSCOPY UPPER, COLONOSCOPY, COMBINED N/A 10/17/2019    Procedure: UPPER ENDOSCOPY WITH BIOPSY  AND COLONOSCOPY;  Surgeon: Julio Canales MD;  Location: [facility] OR     ENT  SURGERY      para thyroid surgery     ENT SURGERY  2013    Parathyroid     ESOPHAGOSCOPY, GASTROSCOPY, DUODENOSCOPY (EGD), COMBINED N/A 2019    Procedure: ESOPHAGOGASTRODUODENOSCOPY (EGD);  Surgeon: Julio Canales MD;  Location: [facility] OR     PARATHYROIDECTOMY  9/10/2013    Procedure: PARATHYROIDECTOMY;  Reoperative Neck Exploration, Resection of right Parathyroid adenoma;  Surgeon: Thalia Muller MD;  Location: [facility] OR     TONSILLECTOMY       TUBAL LIGATION         Allergies   Allergen Reactions     Evista [Raloxifene] Other (See Comments)     hypercalcemia     Prednisone GI Disturbance     Combigan [Brimonidine Tartrate-Timolol] Other (See Comments)     Eye swelling and itchy     Latex Rash     Levaquin [Levofloxacin] Muscle Pain (Myalgia)     Penicillins Rash     Restasis      Pt reports using eye gtts and having red irritated eyes        Current Facility-Administered Medications   Medication     albuterol (ACCUNEB) nebulizer solution 2.5 mg     hydrALAZINE (APRESOLINE) injection 20 mg     metoprolol (LOPRESSOR) injection 5 mg     morphine (PF) injection 1-2 mg     naloxone (NARCAN) injection 0.1-0.4 mg     ondansetron (ZOFRAN-ODT) ODT tab 4 mg    Or     ondansetron (ZOFRAN) injection 4 mg     pantoprazole (PROTONIX) 40 mg IV push injection     sodium chloride (PF) 0.9% PF flush 3 mL       HABITS:    Social History     Tobacco Use     Smoking status: Former Smoker     Packs/day: 1.00     Years: 50.00     Pack years: 50.00     Types: Cigarettes     Last attempt to quit: 2019     Years since quittin.0     Smokeless tobacco: Never Used     Tobacco comment: quit 2019   Substance Use Topics     Alcohol use: Yes     Comment: social     Drug use: No       Family History   Problem Relation Age of Onset     Hearing Loss Mother      Heart Disease Mother      Myocardial Infarction Mother      Cerebrovascular Disease Father      Cancer Brother         throat     Cancer Sister      Myocardial  Infarction Sister      Cancer Maternal Grandmother      Heart Disease Maternal Grandfather      Aortic aneurysm Son        REVIEW OF SYSTEMS:  Ten point review of systems negative except those mentioned in the HPI.     OBJECTIVE:    /50   Pulse 105   Temp 98.8  F (37.1  C) (Tympanic)   Resp 20   Ht 1.524 m (5')   Wt 46.7 kg (102 lb 15.3 oz)   SpO2 95%   BMI 20.11 kg/m      GENERAL: Generally appears well, in no distress with appropriate affect.  HEENT:   Sclerae anicteric - normocephalic atraumatic, hearing aids    Respiratory:  No acute distress, no splinting   Cardiovascular:  Tachy rate   Abdomen: Dark black emesis while present in room  :  deferred  Extremities:  Extremities normal. No deformities, edema, or skin discoloration.  Skin:  Warm to touch  Neurological: grossly intact  Psych:  Alert, oriented, affect appropriate with normal decision making ability.    IMPRESSION:    Upper GI bleed concern for bleeding ulcer, appears hemodynamically stable with significantly high blood pressures on presentation. Concern still actively bleeding with continued hematemesis. Believe she would benefit from evaluation with possible intervention.      PLAN:    Upper endoscopy     Isrrael Parish MD,     1/27/2020  8:44 AM

## 2020-01-27 NOTE — PROVIDER NOTIFICATION
Updated MD that patient's B/P's are still 220's/60's after IV Metoprolol and PRN Hydralazine 2 mg IV. NS bolus running at 250 ml/hr. Patient sleeping.

## 2020-01-27 NOTE — PROVIDER NOTIFICATION
DATE:  1/27/2020   TIME OF RECEIPT FROM LAB:  0450  LAB TEST:  Lactic  LAB VALUE:  2.2  RESULTS GIVEN WITH READ-BACK TO (PROVIDER):  Dylan Nagel, DO  TIME LAB VALUE REPORTED TO PROVIDER:   0502        Also updated MD that Metoprolol 5 mg IV was given, requested a timeframe before giving Hydralazine PRN. Updated MD that patient's last B/P was 226/64 but patient had just received Metoprolol IV approximately 5 minutes prior. Per Dr. Nagel give Hydralazine PRN now as well.

## 2020-01-27 NOTE — PLAN OF CARE
Reason for hospital stay:  Upper GI Bleeding    Most recent vitals: /56   Pulse 111   Temp 99.3  F (37.4  C) (Tympanic)   Resp 20   Ht 1.524 m (5')   Wt 46.7 kg (102 lb 15.3 oz)   SpO2 93%   BMI 20.11 kg/m    Pain Management:  MS given for abdominal pain rated 6/10 x2 with good relief, sleeping on reassessment  Orientation:  A/O, sleeping on/off all day  Cardiac:  HR Tachy BP elevated Hydralazine given   Respiratory:  Lung sounds clear throughout   GI:  Bowel sounds audible et active denies nausea, had large emesis with clots but denied nausea et need for medication  :  WDL  Diet: NPO with ICE  Skin Issues:  Intact  IVF:  Saline locked  ABX:  n/a  Mobility:  Independently repositioning in bed  Safety:  A/O calls for assist appropriately     Comments:    1/27/2020  3:24 PM  Mildred Moscoso RN  Face to face report given with opportunity to observe patient.    Report given to Ina Moscoso RN   1/27/2020  3:28 PM

## 2020-01-27 NOTE — ED NOTES
DATE:  1/27/2020   TIME OF RECEIPT FROM LAB: 0226  LAB TEST:  Lactic acid   LAB VALUE:  2.3  RESULTS GIVEN WITH READ-BACK TO (PROVIDER):  Dr. Jerez   TIME LAB VALUE REPORTED TO PROVIDER:   0226

## 2020-01-27 NOTE — ED PROVIDER NOTES
"  History     Chief Complaint   Patient presents with     GI Problem     gastrointestinal bleed, \"vomiting up blood once around midnight and black stool yesterday morning, only 1 black stool yesterday\"     HPI  Lita Ocasio is a 79 year old female who presented to the emergency department to be evaluated for GI bleed.  Patient has had 2 episodes of dark stool  within the last 24 hours.  She vomited large dark blood clots 2 hours ago upon which she called 911 to be brought to the emergency department.  She is currently on Plavix and aspirin.  She had GI bleed in September last year.  She denies abdominal pain, diarrhea, abdominal distention, fever, chills, shortness of breath or palpitations.    Allergies:  Allergies   Allergen Reactions     Evista [Raloxifene] Other (See Comments)     hypercalcemia     Prednisone GI Disturbance     Combigan [Brimonidine Tartrate-Timolol] Other (See Comments)     Eye swelling and itchy     Latex Rash     Levaquin [Levofloxacin] Muscle Pain (Myalgia)     Penicillins Rash     Restasis      Pt reports using eye gtts and having red irritated eyes        Problem List:    Patient Active Problem List    Diagnosis Date Noted     Upper GI bleeding 01/27/2020     Priority: Medium     Anemia due to blood loss, acute 09/20/2019     Priority: Medium     History of GI bleed 09/20/2019     Priority: Medium     Protein-calorie malnutrition (H) 09/04/2019     Priority: Medium     History of coronary artery stent placement 07/23/2019     Priority: Medium     CKD (chronic kidney disease) stage 3, GFR 30-59 ml/min (H) 06/26/2019     Priority: Medium     Abnormal stress test 06/18/2019     Priority: Medium     Added automatically from request for surgery [id number]       MILLS (dyspnea on exertion) 06/18/2019     Priority: Medium     Added automatically from request for surgery [id number]       ASCVD (arteriosclerotic cardiovascular disease) 06/18/2019     Priority: Medium     Added automatically from request " for surgery [id number]       Centrilobular emphysema (H) 05/12/2019     Priority: Medium     Acquired hypothyroidism 05/12/2019     Priority: Medium     Chronic systolic congestive heart failure (H) 05/12/2019     Priority: Medium     Benign essential hypertension 05/12/2019     Priority: Medium     Hyperlipidemia 05/12/2019     Priority: Medium     Osteopenia 05/12/2019     Priority: Medium     Non-rheumatic mitral regurgitation 05/12/2019     Priority: Medium     Primary hyperparathyroidism (H) 09/05/2013     Priority: Medium     Chronic rhinitis 08/16/2013     Priority: Medium        Past Medical History:    Past Medical History:   Diagnosis Date     Acquired hypothyroidism 5/12/2019     Asthma      Benign essential hypertension 5/12/2019     Centrilobular emphysema (H) 5/12/2019     Chronic rhinitis 8/16/2013     Chronic systolic congestive heart failure (H) 5/12/2019     Constipation      Coronary artery disease      Hearing loss      Heart trouble      Hemorrhagic cerebrovascular accident (CVA) (H) 01/2019     Hyperlipidemia 5/12/2019     Hypertension      Nasal congestion      Non-rheumatic mitral regurgitation 5/12/2019     Osteopenia 5/12/2019     Osteoporosis      Parathyroid related hypercalcemia (H) 8/18/2013     Primary hyperparathyroidism (H) 9/5/2013     Ringing in ears      Sensorineural hearing loss, asymmetrical 8/16/2013     Sneezing      Snoring      Stented coronary artery      Thyroid disease      Weakness      Weight loss        Past Surgical History:    Past Surgical History:   Procedure Laterality Date     CATARACT IOL, RT/LT Bilateral      COLONOSCOPY       COLONOSCOPY - HIM SCAN  01/01/2009    Colonoscopy - [facility]  2009     ENDOSCOPY UPPER, COLONOSCOPY, COMBINED N/A 10/17/2019    Procedure: UPPER ENDOSCOPY WITH BIOPSY  AND COLONOSCOPY;  Surgeon: Julio Canales MD;  Location: [facility] OR     ENT SURGERY  2011    para thyroid surgery     ENT SURGERY  09/2013    Parathyroid     ESOPHAGOSCOPY,  GASTROSCOPY, DUODENOSCOPY (EGD), COMBINED N/A 2019    Procedure: ESOPHAGOGASTRODUODENOSCOPY (EGD);  Surgeon: Julio Canales MD;  Location: [facility] OR     PARATHYROIDECTOMY  9/10/2013    Procedure: PARATHYROIDECTOMY;  Reoperative Neck Exploration, Resection of right Parathyroid adenoma;  Surgeon: Thalia Muller MD;  Location: [facility] OR     TONSILLECTOMY       TUBAL LIGATION         Family History:    Family History   Problem Relation Age of Onset     Hearing Loss Mother      Heart Disease Mother      Myocardial Infarction Mother      Cerebrovascular Disease Father      Cancer Brother         throat     Cancer Sister      Myocardial Infarction Sister      Cancer Maternal Grandmother      Heart Disease Maternal Grandfather      Aortic aneurysm Son        Social History:  Marital Status:   [5]  Social History     Tobacco Use     Smoking status: Former Smoker     Packs/day: 1.00     Years: 50.00     Pack years: 50.00     Types: Cigarettes     Last attempt to quit: 2019     Years since quittin.0     Smokeless tobacco: Never Used     Tobacco comment: quit 2019   Substance Use Topics     Alcohol use: Yes     Comment: social     Drug use: No        Medications:    No current outpatient medications on file.        Review of Systems   All other systems reviewed and are negative.      Physical Exam   BP: (!) 208/90  Pulse: 100  Heart Rate: 98  Temp: 97.6  F (36.4  C)  Resp: 18  Height: 152.4 cm (5')  Weight: 48.5 kg (107 lb)  SpO2: 99 %      Physical Exam  Vitals signs and nursing note reviewed.   Constitutional:       General: She is not in acute distress.     Appearance: Normal appearance. She is not diaphoretic.   HENT:      Head: Normocephalic and atraumatic.      Mouth/Throat:      Pharynx: No oropharyngeal exudate.   Eyes:      General: No scleral icterus.     Pupils: Pupils are equal, round, and reactive to light.   Cardiovascular:      Heart sounds: Normal heart sounds.   Pulmonary:       Effort: No respiratory distress.      Breath sounds: Normal breath sounds.   Abdominal:      General: Bowel sounds are normal.      Palpations: Abdomen is soft.      Tenderness: There is no abdominal tenderness.   Musculoskeletal:         General: No tenderness.   Skin:     General: Skin is warm.      Findings: No rash.   Neurological:      Mental Status: She is alert.         ED Course   Patient evaluated upon arrival in the ED and laboratory tests ordered.  Started on IV fluid hydration.    Procedures      Results for orders placed or performed during the hospital encounter of 01/27/20 (from the past 24 hour(s))   CBC with platelets differential   Result Value Ref Range    WBC 14.3 (H) 4.0 - 11.0 10e9/L    RBC Count 3.27 (L) 3.8 - 5.2 10e12/L    Hemoglobin 9.2 (L) 11.7 - 15.7 g/dL    Hematocrit 27.9 (L) 35.0 - 47.0 %    MCV 85 78 - 100 fl    MCH 28.1 26.5 - 33.0 pg    MCHC 33.0 31.5 - 36.5 g/dL    RDW 17.6 (H) 10.0 - 15.0 %    Platelet Count 355 150 - 450 10e9/L    Diff Method Automated Method     % Neutrophils 83.8 %    % Lymphocytes 11.5 %    % Monocytes 3.7 %    % Eosinophils 0.1 %    % Basophils 0.3 %    % Immature Granulocytes 0.6 %    Nucleated RBCs 0 0 /100    Absolute Neutrophil 12.0 (H) 1.6 - 8.3 10e9/L    Absolute Lymphocytes 1.6 0.8 - 5.3 10e9/L    Absolute Monocytes 0.5 0.0 - 1.3 10e9/L    Absolute Eosinophils 0.0 0.0 - 0.7 10e9/L    Absolute Basophils 0.1 0.0 - 0.2 10e9/L    Abs Immature Granulocytes 0.1 0 - 0.4 10e9/L    Absolute Nucleated RBC 0.0    Comprehensive metabolic panel   Result Value Ref Range    Sodium 145 (H) 133 - 144 mmol/L    Potassium 3.7 3.4 - 5.3 mmol/L    Chloride 109 94 - 109 mmol/L    Carbon Dioxide 27 20 - 32 mmol/L    Anion Gap 9 3 - 14 mmol/L    Glucose 177 (H) 70 - 99 mg/dL    Urea Nitrogen 26 7 - 30 mg/dL    Creatinine 0.93 0.52 - 1.04 mg/dL    GFR Estimate 59 (L) >60 mL/min/[1.73_m2]    GFR Estimate If Black 68 >60 mL/min/[1.73_m2]    Calcium 8.4 (L) 8.5 - 10.1 mg/dL     Bilirubin Total 0.3 0.2 - 1.3 mg/dL    Albumin 3.1 (L) 3.4 - 5.0 g/dL    Protein Total 6.2 (L) 6.8 - 8.8 g/dL    Alkaline Phosphatase 69 40 - 150 U/L    ALT 23 0 - 50 U/L    AST 16 0 - 45 U/L   INR   Result Value Ref Range    INR 1.11 0.86 - 1.14   Lipase   Result Value Ref Range    Lipase 101 73 - 393 U/L   Lactic acid whole blood   Result Value Ref Range    Lactic Acid 2.3 (H) 0.7 - 2.0 mmol/L   Troponin I   Result Value Ref Range    Troponin I ES 0.031 0.000 - 0.045 ug/L   ABO/Rh type and screen   Result Value Ref Range    ABO A     RH(D) Pos     Antibody Screen Neg     Test Valid Only At [facility]        Specimen Expires 01/30/2020    Iron   Result Value Ref Range    Iron 46 35 - 180 ug/dL   Active MRSA Surveillance Culture   Result Value Ref Range    Specimen Description Nares     Culture Micro Culture in progress    Basic metabolic panel   Result Value Ref Range    Sodium 145 (H) 133 - 144 mmol/L    Potassium 3.9 3.4 - 5.3 mmol/L    Chloride 110 (H) 94 - 109 mmol/L    Carbon Dioxide 26 20 - 32 mmol/L    Anion Gap 9 3 - 14 mmol/L    Glucose 177 (H) 70 - 99 mg/dL    Urea Nitrogen 29 7 - 30 mg/dL    Creatinine 0.81 0.52 - 1.04 mg/dL    GFR Estimate 69 >60 mL/min/[1.73_m2]    GFR Estimate If Black 80 >60 mL/min/[1.73_m2]    Calcium 8.5 8.5 - 10.1 mg/dL   CBC with platelets   Result Value Ref Range    WBC 17.2 (H) 4.0 - 11.0 10e9/L    RBC Count 3.24 (L) 3.8 - 5.2 10e12/L    Hemoglobin 9.0 (L) 11.7 - 15.7 g/dL    Hematocrit 27.6 (L) 35.0 - 47.0 %    MCV 85 78 - 100 fl    MCH 27.8 26.5 - 33.0 pg    MCHC 32.6 31.5 - 36.5 g/dL    RDW 17.5 (H) 10.0 - 15.0 %    Platelet Count 311 150 - 450 10e9/L   Lactic acid whole blood   Result Value Ref Range    Lactic Acid 2.2 (H) 0.7 - 2.0 mmol/L   UA reflex to Microscopic and Culture   Result Value Ref Range    Color Urine Straw     Appearance Urine Clear     Glucose Urine Negative NEG^Negative mg/dL    Bilirubin Urine Negative NEG^Negative    Ketones Urine  Negative NEG^Negative mg/dL    Specific Gravity Urine 1.013 1.003 - 1.035    Blood Urine Negative NEG^Negative    pH Urine 7.0 4.7 - 8.0 pH    Protein Albumin Urine Negative NEG^Negative mg/dL    Urobilinogen mg/dL Normal 0.0 - 2.0 mg/dL    Nitrite Urine Negative NEG^Negative    Leukocyte Esterase Urine Negative NEG^Negative    Source Midstream Urine        Medications   naloxone (NARCAN) injection 0.1-0.4 mg (has no administration in time range)   ondansetron (ZOFRAN-ODT) ODT tab 4 mg (has no administration in time range)     Or   ondansetron (ZOFRAN) injection 4 mg (has no administration in time range)   morphine (PF) injection 1-2 mg (2 mg Intravenous Given 1/27/20 0743)   0.9% sodium chloride BOLUS (1,000 mLs Intravenous New Bag 1/27/20 0434)   metoprolol (LOPRESSOR) injection 5 mg (5 mg Intravenous Given 1/27/20 0451)   hydrALAZINE (APRESOLINE) injection 20 mg (20 mg Intravenous Given 1/27/20 0621)   pantoprazole (PROTONIX) 40 mg IV push injection ( Intravenous Canceled Entry 1/27/20 0900)   sodium chloride (PF) 0.9% PF flush 3 mL ( Intracatheter Canceled Entry 1/27/20 0738)   pantoprazole (PROTONIX) 40 mg IV push injection (40 mg Intravenous Given 1/27/20 0214)       Assessments & Plan (with Medical Decision Making)   Gastrointestinal hemorrhage:  Patient presented to the emergency department with history of 2 episodes of dark stool within the last 24 hours.  First episode was yesterday morning and another episode this evening.  Just prior to arrival at the ED, she passed large clots of blood in vomitus.  She denied dizziness, chest pain, shortness of breath or abdominal pain.  Abdominal exam and rest of body exam was benign except for hypertension.  Patient had an episode of significant hematemesis and hematochezia while in the emergency department.  Laboratory test done showed normal CBC (except for white count of 14.3), CMP, urinalysis, INR, lactic acid of 2.2, troponin.  Patient was started on IV fluid  hydration, type and screen ordered.  Decision made to admit patient under care of hospitalist Dr. Nagel and general surgery will be consulted for possible upper and lower GI scopes.  Meanwhile Plavix and aspirin were held.  All blood pressure medications were also held.  Patient's chart review also showed that patient had similar episode in September last year with GI bleed.  Upper GI scope did not reveal any abnormalities except for healed gastric ulcers.  Xarelto was then discontinued but patient continued Plavix and aspirin.  Will defer further evaluation to Dr. Nagel.    I have reviewed the nursing notes.    I have reviewed the findings, diagnosis, plan and need for follow up with the patient.    Current Discharge Medication List          Final diagnoses:   Gastrointestinal hemorrhage, unspecified gastrointestinal hemorrhage type       1/27/2020   [facility]     Joe Jerez MD  01/27/20 0758

## 2020-01-27 NOTE — PLAN OF CARE
Prior to Admission Medication Reconciliation:     Medications added:   [] None  [x] As listed below:    Nitrostat- rx verified, pt confirmed    Artificial tears- OTC pt confirmed    Medications deleted:   [] None  [x] As listed below:    Spironolactone- discontinued 1/7/2020 by Hailee Cortez     Changes made to existing medications:   [] None  [x] As listed below:    Zafirlukast- last filled in August 2019 for a quantity of 180 and a days supply of 90.  Patient has only been taking one daily.     Last times/dates taken verified with patient:  [] Yes- completed myself  [x] Nurse completed no changes made  [] Unable to review with patient:  [] Nurse completed/changes made:     Allergy modifications:    [x]Did not review  []Patient verified NKA  []Patient verified current existing allergies: no changes made  []New allergies: listed below    Medication reconciliation sources:   [x]Patient  []Patient family member/emergency contact: **  [x][facility] Report Review:  Home Medications   - Last Reconciled 12/18/19 by MANINDER Bahena    albuterol sulfate 90 mcg/actuation aerosol inhaler    2 puffs inhalation QID PRN     artifi.tears(hypromellose)(PF) 0.3 % eye drops    1 drp Both Eyes Q4H PRN     aspirin 81 mg tablet,delayed release (Aspirin Low Dose) 81 mg PO DAILY     buspirone 5 mg tablet 5 mg PO BID     calcium carbonate 500 mg calcium (1,250 mg) chewable tablet 500 mg PO DAILY     cholecalciferol (vitamin D3) 25 mcg (1,000 unit) capsule (Vitamin D3) 1,000 units PO DAILY     clopidogrel 75 mg tablet (Plavix) 75 mg PO DAILY 30 days     erythromycin 5 mg/gram (0.5 %) eye ointment 1 applic Left Eye QID     fexofenadine 180 mg tablet (Allergy Relief (fexofenadine)) 180 mg PO DAILY     fluticasone propionate 50 mcg/actuation nasal spray,suspension 2 sprays intranasal DAILY     ipratropium-albuterol 0.5 mg-3 mg(2.5 mg base)/3 mL nebulization soln 3 mL inhalation Q6H 90 days PRN     levothyroxine 75 mcg capsule 75 mcg PO  DAILY     lorazepam 0.5 mg tablet 0.5 mg PO TID-QID PRN     losartan 25 mg tablet 25 mg PO DAILY 30 days     metoprolol succinate 25 mg tablet,extended release 24 hr 25 mg PO BID     nitroglycerin 0.4 mg sublingual tablet 0.4 mg sublingual Q5M PRN     potassium chloride 20 mEq tablet,extended release(part/cryst) 20 mEq PO BID     rivaroxaban 20 mg tablet (Xarelto) 20 mg PO DAILY     sertraline 25 mg tablet 25 mg PO DAILY     tafluprost (PF) 0.0015 % eye drops in a dropperette (Zioptan (PF))    1 drp Both Eyes DAILY     vitamin B comp and C no.3 15 mg-10 mg-50 mg-5 mg-300 mg capsule 1 cap PO DAILY     vitamin E 400 unit capsule 400 units PO DAILY     zafirlukast 20 mg tablet (Accolate) 20 mg PO Q12H   []Epic Chart Review  []Care Everywhere review  []Pharmacy med list: **  []Pharmacy phone call  [x]Outside meds dispense report: [facility]  []Nursing home MAR:  []Other: **    Is patient on coumadin?  [x]No    Time spent on medication reconciliation:   []5-20 mins  [x]20-40 mins  []> 40 mins    Discrepancies: [x] No  []Yes: listed below    Requests for consultation by provider or pharmacist:   [] Patient understands why all of their meds were prescribed and how to take them. No questions.   [x] Fill dates coincide with compliancy for all maintenance meds.   [] Fill dates do not coincide with compliancy with maintenance meds. See notes in PTA medlist about how patient is taking.   [] Patient has questions about the following:        Comments: Patient isn't sure if she is supposed to be on Metoprolol or remeron and doesn't understand why she is taking remeron. A recent AVS stated she was prescribed the remeron for weightloss/primary insomnia. Both have been filled recently and I couldn't find any discontinuation.     Arabella Alamo on 1/27/2020 at 1:19 PM     Issues completing PTA medication reconciliation:  [] On hold for a long time  [] Waited for a call back  [] Fax didn't come through  [] Fax took a long time  []  Other:

## 2020-01-27 NOTE — PROVIDER NOTIFICATION
Clarified Hydralazine order changes with Dr. Crawford via telephone, patient had 2 mg IV Hydralazine at 0513. Order is PRN every 4 hours. Per Dr. Crawford give Hydralazine 20  Mg IV now.

## 2020-01-27 NOTE — PLAN OF CARE
"Patient admitted this shift for GI bleed, no emesis or BM since arrival to floor. B/P 216/80 upon arrival to floor. Patient received IV Metoprolol 5 mg, PRN Hydralazine 2 mg IV, and PRN Hydralazine 20 mg IV since arrival to floor, last B/P 148/47. Tachycardic P 100's. Afebrile. Patient did report abdominal pain 4/10 initially upon arrival to floor describing it as \"burning.\" Patient did fall asleep and did not report any further pain. Up to commode with SBA, voided, UA sent as ordered. Hgb this morning was 9.0. 1 L NS bolus to run over 4 hours is running @ 250 ml/hr. Lungs clear, bowel sounds active. Alarms on, call light within reach.     Face to face report given with opportunity to observe patient.    Report given to Mildred Urias RN   1/27/2020  7:05 AM    "

## 2020-01-27 NOTE — PROGRESS NOTES
Assessment completed see flowsheet.    LOC: alert, oriented   Others present: Patient     Dx: GI Bleed   Chronic Disease Management: asthma, Chronic Heart Failure     Lives with: alone   Living at:  Home   Transportation: YES Reliable, independent     Primary PCP: Ophelia Troncoso  Insurance:  Medicare and BCBS   Medicare VILLAVICENCIO letter reviewed with Lita.    Support System:  Family   Homecare/PCA: cleaning lady once a month for the deep cleaning   /County Services:   Not connected   [place]: NO      How was the [facility] notification completed: n/a    Health Care Directive: NO not interested in information   Guardian: no   POA: no     Pharmacy: mail order and [facility]   Meds management: YES independently manages, utilizes a pill box     Adequate Resources for needs (housing, utilities, food/med): YES  Household chores: independent   Work/community/social activity: YES as desired     ADLs: independent   Ambulation:independent   Falls: denies   Nutrition: no concerns   Sleep: no concerns     Equipment used: none       Oxygen supplier: n/a      Does the supplier have valid oxygen orders: n/a    Mental health: denies   Substance abuse: rare alcohol use   Exposure to violence/abuse: denies   Stressors: denies     Able to Return to Prior Living Arrangements: YES    Choice of Vendor: n/a    Barriers: no barriers identified     LAWANDA: low risk     Plan: return home, daughter, Cris to transport

## 2020-01-27 NOTE — H&P
[facility]    History and Physical - Hospitalist Service       Date of Admission:  1/27/2020    Assessment & Plan   Lita Ocasio is a 79 year old female admitted on 1/27/2020.     Upper GI bleeding: monitor Hgb, check iron stores, bowel rest, IV PPI. Consideration for upper scoping    Acute blood loss anemia: monitor Hgb, check iron stores.      Check UA       Diet: NPO for Medical/Clinical Reasons Except for: No Exceptions    DVT Prophylaxis: Pneumatic Compression Devices  Fitzgerald Catheter: not present  Code Status: Full    Disposition Plan   Expected discharge: 2 - 3 days, recommended to prior living arrangement once hemoglobin stable.  Entered: Dylan Nagel DO 01/27/2020, 3:25 AM     The patient's care was discussed with the Patient and patient's daughter    Dylan SANJUANITA Nagel,   [facility]    ______________________________________________________________________    Chief Complaint   Black stool and bloody vomit    History is obtained from the patient's daughter with some input from patient    History of Present Illness   Lita Ocasio is a 79 year old female who has CAD and is on anti platelet therapy and who has had a hospitalization for same in 9/2019, with upper scoping showing ulcer disease without active bleeding, but with acute blood loss anemia requiring transfusion.     She had been feeling her usual self until 1/26, when she had some stomach upset and noticed that her stool was black. She had a busy day and then awakened early this morning with a large bloody emesis. She had also had more black stooling    ER Course: vitals showed hypertension and she appeared pale and fatigued. Lab diagnostics resulted Hgb 9.2, preserved stage 2 renal failure and WBC 14.3. she was treated with antiemetics and IV PPI          Review of Systems  she had been feeling in her regular state of health until yesterday morning    Now:     Constitutional: No fever or chills, has generalized weakness, no  unintentional weight change, no appetite  Ears, Nose & Throat: no sore throat, no nasal drainage, no congestion. No ear pain, no ear drainage, no particular change in hearing  Eyes: no particular change in vision, no redness, no drainage  Cardiovascular: No chest pain at rest, no chest pain with familiar activities.  Pulmonary: No cough, no particular change in work of breathing, no particular change in shortness of breath with position changes  Gastrointestinal: low, central abdominal pain, some nausea, has 1 episode of vomiting blood, no diarrhea. More frequent black stools, no blood seen in stool  Genitourinary: No particular change in incontinence, no pain with urination, no particular change in stream, no particular change in amount urinated with urge, no discharge  Skin: No particular change in bruising, no rashes  Musculoskeletal: no particular change in strength, no particular change in muscle development  Neurological: no numbness and tingling, no headache, no particular change in balance, no dizziness  Psychologic: No particular change in depression and/or anxiety  Endocrine: No particular change in heat or cold intolerance           Past Medical History    I have reviewed this patient's medical history and updated it with pertinent information if needed.   Past Medical History:   Diagnosis Date     Acquired hypothyroidism 5/12/2019     Asthma      Benign essential hypertension 5/12/2019     Centrilobular emphysema (H) 5/12/2019     Chronic rhinitis 8/16/2013     Chronic systolic congestive heart failure (H) 5/12/2019     Constipation      Coronary artery disease      Hearing loss      Heart trouble      Hemorrhagic cerebrovascular accident (CVA) (H) 01/2019     Hyperlipidemia 5/12/2019     Hypertension      Nasal congestion      Non-rheumatic mitral regurgitation 5/12/2019     Osteopenia 5/12/2019     Osteoporosis      Parathyroid related hypercalcemia (H) 8/18/2013     Primary hyperparathyroidism (H)  2013     Ringing in ears      Sensorineural hearing loss, asymmetrical 2013     Sneezing      Snoring      Stented coronary artery      Thyroid disease      Weakness      Weight loss        Past Surgical History   I have reviewed this patient's surgical history and updated it with pertinent information if needed.  Past Surgical History:   Procedure Laterality Date     CATARACT IOL, RT/LT Bilateral      COLONOSCOPY       COLONOSCOPY - HIM SCAN  2009    Colonoscopy - [facility]/[place]       ENDOSCOPY UPPER, COLONOSCOPY, COMBINED N/A 10/17/2019    Procedure: UPPER ENDOSCOPY WITH BIOPSY  AND COLONOSCOPY;  Surgeon: Julio Canales MD;  Location: [facility] OR     ENT SURGERY      para thyroid surgery     ENT SURGERY  2013    Parathyroid     ESOPHAGOSCOPY, GASTROSCOPY, DUODENOSCOPY (EGD), COMBINED N/A 2019    Procedure: ESOPHAGOGASTRODUODENOSCOPY (EGD);  Surgeon: Julio Canaels MD;  Location: [facility] OR     PARATHYROIDECTOMY  9/10/2013    Procedure: PARATHYROIDECTOMY;  Reoperative Neck Exploration, Resection of right Parathyroid adenoma;  Surgeon: Thalia Muller MD;  Location: [facility] OR     TONSILLECTOMY       TUBAL LIGATION         Social History   I have reviewed this patient's social history and updated it with pertinent information if needed.  Social History     Tobacco Use     Smoking status: Former Smoker     Packs/day: 1.00     Years: 50.00     Pack years: 50.00     Types: Cigarettes     Last attempt to quit: 2019     Years since quittin.0     Smokeless tobacco: Never Used     Tobacco comment: quit 2019   Substance Use Topics     Alcohol use: Yes     Comment: social     Drug use: No       Family History   I have reviewed this patient's family history and updated it with pertinent information if needed.   Family History   Problem Relation Age of Onset     Hearing Loss Mother      Heart Disease Mother      Myocardial Infarction Mother      Cerebrovascular Disease Father       Cancer Brother         throat     Cancer Sister      Myocardial Infarction Sister      Cancer Maternal Grandmother      Heart Disease Maternal Grandfather      Aortic aneurysm Son         Prior to Admission Medications   Prior to Admission Medications   Prescriptions Last Dose Informant Patient Reported? Taking?   Fexofenadine HCl (ALLEGRA PO) Unknown at Unknown time Self Yes No   Sig: Take 180 mg by mouth daily    NONFORMULARY 1/26/2020 at 1100 Self Yes Yes   Sig: Tumeric 500mg BID   Tafluprost (ZIOPTAN) 0.0015 % SOLN 1/26/2020 at 2200 Self Yes Yes   Sig: Place 1 drop into both eyes At Bedtime   Vitamin D, Cholecalciferol, 1000 units TABS 1/26/2020 at 1100 Self Yes Yes   Sig: Take 1 tablet by mouth daily   Zafirlukast (ACCOLATE PO) 1/26/2020 at 1100 Self Yes Yes   Sig: Take 20 mg by mouth daily (with breakfast)    aspirin (ASA) 81 MG chewable tablet 1/26/2020 at 0700 Self No Yes   Sig: Take 1 tablet (81 mg) by mouth daily   atorvastatin (LIPITOR) 40 MG tablet 1/26/2020 at 2200  No Yes   Sig: Take 1 tablet (40 mg) by mouth At Bedtime   calcium carbonate (TUMS) 500 MG chewable tablet  Self Yes Yes   Sig: Take 1 chew tab by mouth daily   clopidogrel (PLAVIX) 75 MG tablet 1/26/2020 at 1100 Self Yes Yes   Sig: Take 75 mg by mouth daily   erythromycin (ROMYCIN) 5 MG/GM ophthalmic ointment  Self Yes Yes   Sig: APPLY ONE APPLICATION TO BOTH EYES FOUR TIMES DAILY As Needed for entropian both lower lids   ferrous sulfate (FEROSUL) 325 (65 Fe) MG tablet 1/26/2020 at 0800  No Yes   Sig: Take 1 tablet (325 mg) by mouth daily with food   fluticasone (FLONASE) 50 MCG/ACT spray 1/26/2020 at Unknown time Self No Yes   Sig: Spray 2 sprays into both nostrils daily   furosemide (LASIX) 40 MG tablet 1/26/2020 at 1100  No Yes   Sig: Take 0.5 tablets (20 mg) by mouth daily   levothyroxine (SYNTHROID/LEVOTHROID) 75 MCG tablet 1/26/2020 at 0700 Self No Yes   Sig: Take 1 tablet (75 mcg) by mouth daily   lisinopril (PRINIVIL/ZESTRIL) 5 MG  tablet 1/26/2020 at 1100  No Yes   Sig: Take 1 tablet (5 mg) by mouth daily   metoprolol succinate ER (TOPROL-XL) 25 MG 24 hr tablet 1/26/2020 at 1100  No Yes   Sig: Take 1 tablet (25 mg) by mouth daily   mirtazapine (REMERON) 7.5 MG tablet 1/26/2020 at 2200  No Yes   Sig: TAKE 1 TABLET AT BEDTIME   pantoprazole (PROTONIX) 40 MG EC tablet 1/26/2020 at 2200  No Yes   Sig: Take 1 tablet (40 mg) by mouth 2 times daily (before meals)   potassium chloride ER (K-DUR/KLOR-CON M) 10 MEQ CR tablet 1/26/2020 at 1100 Self No Yes   Sig: Take 1 tablet (10 mEq) by mouth daily   spironolactone (ALDACTONE) 25 MG tablet 1/26/2020 at 1100  Yes Yes   Sig: Take 25 mg by mouth daily   vitamin B complex with vitamin C (VITAMIN  B COMPLEX) tablet 1/26/2020 at 1100 Self Yes Yes   Sig: Take 1 tablet by mouth daily Takes 500mg of Vitamin B   vitamin E 400 units TABS 1/26/2020 at 1100 Self Yes Yes   Sig: Take by mouth daily      Facility-Administered Medications: None     Allergies   Allergies   Allergen Reactions     Evista [Raloxifene] Other (See Comments)     hypercalcemia     Prednisone GI Disturbance     Combigan [Brimonidine Tartrate-Timolol] Other (See Comments)     Eye swelling and itchy     Latex Rash     Levaquin [Levofloxacin] Muscle Pain (Myalgia)     Penicillins Rash     Restasis      Pt reports using eye gtts and having red irritated eyes        Physical Exam   Vital Signs: Temp: 97.6  F (36.4  C) Temp src: Oral BP: (!) 203/81 Pulse: 98 Heart Rate: 99 Resp: 18 SpO2: 96 % O2 Device: None (Room air)    Weight: 107 lbs 0 oz       Case reviewed with the ER Provider; EHR reviewed; patient seen in ER room 10 with daughter present    Vital signs:  Temp: 97.6  F (36.4  C) Temp src: Oral BP: (!) 203/81 Pulse: 98 Heart Rate: 99 Resp: 18 SpO2: 96 % O2 Device: None (Room air)     Weight: 48.5 kg (107 lb)  Estimated body mass index is 20.9 kg/m  as calculated from the following:    Height as of 1/7/20: 1.524 m (5').    Weight as of this  encounter: 48.5 kg (107 lb).      General: No distress, pale, fatigued, interactive  Head: normocephalic, no obvious trauma  Eyes: Gaze directed normally, sclera clear, no discharge, no abnormal ocular movements  Ears: Normal appearing age-related external ears, no discharge, stable hearing acuity loss  Nose: Normal age-related appearance  Mouth: Normal appearing oral mucosa, Gums and throat appear age-related normal  Neck: Normal age-related appearance, age-related range of motion, supple, no adenopathy  Pulmonary: Normal work of breathing, no expiratory delay, no coarseness, no wheezing  Cardiovascular: Distant heart sounds, regular rhythm   Abdomen: No obvious distention, soft, bowel sounds present with normal frequency and pitch. Low central generalized tenderness  Rectal: Deferred  Back: Age-related normal  Skin: Age-related normal, no rashes  Extremities: Not tender, no lower extremity edema. Moving upper and lower extremities  Neurological: Grossly in tact  Psychiatric: Mood is stable, appropriately interactive      Data   Data reviewed today: I reviewed all medications, new labs and imaging results over the last 24 hours.

## 2020-01-27 NOTE — ED NOTES
Pt up to [facility] and had large liquid melanotic stool.  Pt feels weak but is able to stand and transfer with one person assist.  Shortly after getting back into bed pt had appx 300ml hematemesis.  Looks pale and states she feels dizzy.  Dr. Jerez advised.

## 2020-01-27 NOTE — PLAN OF CARE
"[place] [facility] Observation Note:  Patient is registered to observation and is in 3222/3222-1 at approximately 0405 via cart accompanied by transport tech from emergency room . Report received from Pancho \"[place]\" in SBAR format at 0347 via telephone. Patient ambulated to bed via self.. Patient is alert and oriented X 3, reports pain; rates at 4 on 0-10 scale.  Patient oriented to room, unit, hourly rounding, and plan of care. Explained the admission packet including \"What is Observation Status\" and patient handbook with patient bill of rights brochure. Will continue to monitor and document as needed.  Nursing Observation criteria listed below was met:  Health care directives status obtained and documented: Yes  Care Everywhere authorization completed No    MRSA swab completed for patient 65 years and older: Yes    If initial lactic acid >2.0, repeat lactic acid drawn within one hour of arrival to unit: Yes.     Patient identifies a surrogate decision maker: Yes If yes, who:Cris (daughter) Contact Information:[phone]  Vaccination assessment and education completed: Yes   Vaccinations received prior to hospitalization: Pneumovax yes  Influenza(seasonal)  YES   Vaccination(s) ordered: N/A    Skin issues/needs documented:No  Isolation Patient: N/A Education given and documented, correct sign in place and documentation row added to PCS:  N/A    Fall Prevention: Observation fall risk completed:  Yes Education given and documented, sticker and magnet in place: Yes    General Care Plan initiated with observation goal(s): Yes  Education (including assessment) Documented: Yes  New medication information given to patient and documented: Yes  Patient elects to use own medications from home during hospitalization:  No If yes, a MD order was obtained to use Medications from Home:  N/A and home medications were sent to Pharmacy for verification for use during hospitalization: N/A  Patient has discharge needs (If yes, please " explain): No

## 2020-01-27 NOTE — PROVIDER NOTIFICATION
Patient c/o dry mouth, Dr. Crawford at bedside and stated patient may have ice chips. Updated NPO order to NPO except ice chips at this time and provided ice chips to patient.

## 2020-01-27 NOTE — PROGRESS NOTES
[facility]    Hospitalist Progress Note      Assessment & Plan   Lita Ocasio is a 79 year old female who was admitted on 1/27/2020.  She has a history of hypertension, hyperlipidemia, bronchial asthma with emphysema, hypothyroidism, coronary artery disease, gastritis presented to the ED with a complaint of hematemesis.  Her issues are the following:    Hematemesis: The patient is on aspirin and Plavix for her coronary artery disease.  She is also on Protonix.  Previous EGD/Colonoscopy was done in September 2019 that showed gastritis.  She had a lot of hematemesis last night.  We will continue to monitor H&H every 4 hours.  Started on Protonix IV 40 mg twice a day.  Type and screen PRBCs.  Transfuse as needed.  Spoke with Dr. Parish who will probably do an EGD today.  The patient is n.p.o.  Hemoglobin is 9 and the patient is hemodynamically stable.  She also had black stools.    Hypertension: The patient is on metoprolol and lisinopril at home.  She is n.p.o. so will give metoprolol IV for now.  Use hydralazine as needed as well.    Coronary artery disease: The patient is on aspirin and Plavix, both on hold due to hematemesis.  Continue on statins and beta-blockers.  No chest pain as such.    Bronchial asthma: Keep on albuterol as needed.  She does not have any wheezing at this point.    Hypothyroidism: Continue levothyroxine once oral intake is permitted.    Hyperlipidemia: Continue on atorvastatin.    Chronic HFpEF: The patient's left ventricular ejection fraction was preserved.  She does have mitral regurgitation.  Monitor intake and output with daily weights.  Continue lisinopril and metoprolol whenever oral intake is permitted.  Holding Lasix and spironolactone due to GI bleed.    Leukocytosis: Most probably reactive due to GI bleed.  Continue to monitor.  No need for antibiotics at this point.    DVT Prophylaxis: Pneumatic Compression Devices  Code Status: Full Code    Disposition: Expected  discharge in 1-2 days once stable.    Discussed with the patient in detail.    Colt Crawford    Interval History   The patient seen and examined.  Overnight events reviewed.  Discussed with the nursing staff.  She still feels sick to her stomach.  Continues to have upper abdominal discomfort.  She does not want to take any pain medications.  No more hematemesis as such.  Denies chest pain or shortness of breath.  No fevers or chills.    -Data reviewed today: I reviewed all new labs and imaging results over the last 24 hours. I personally reviewed no images or EKG's today.    Physical Exam   Temp: 98.8  F (37.1  C) Temp src: Tympanic BP: 170/50 Pulse: 105 Heart Rate: 103 Resp: 20 SpO2: 95 % O2 Device: None (Room air)    Vitals:    01/27/20 0201 01/27/20 0418   Weight: 48.5 kg (107 lb) 46.7 kg (102 lb 15.3 oz)     Vital Signs with Ranges  Temp:  [97.6  F (36.4  C)-99.9  F (37.7  C)] 98.8  F (37.1  C)  Pulse:  [] 105  Heart Rate:  [] 103  Resp:  [18-20] 20  BP: (119-232)/() 170/50  SpO2:  [95 %-99 %] 95 %  I/O last 3 completed shifts:  In: -   Out: 200 [Urine:200]    Peripheral IV 01/27/20 Right (Active)   Site Assessment [facility] 1/27/2020  4:56 AM   Line Status Infusing 1/27/2020  4:56 AM   Phlebitis Scale 0-->no symptoms 1/27/2020  4:56 AM   Infiltration Scale 0 1/27/2020  4:56 AM   Number of days: 0       Peripheral IV 01/27/20 Left (Active)   Site Assessment [facility] 1/27/2020  4:56 AM   Line Status Saline locked 1/27/2020  4:56 AM   Phlebitis Scale 0-->no symptoms 1/27/2020  4:56 AM   Infiltration Scale 0 1/27/2020  4:56 AM   Number of days: 0     Line/device assessment(s) completed for medical necessity    General: The patient seems to be in acute distress due to upper abdominal discomfort.  Alert oriented x3.  HEENT: PERRLA  Neck: Supple  Lungs: Bilaterally clear to auscultation  Cardiovascular:S1+S2 [place]  Abdomen: Soft, tender in epigastrium, no rebound tenderness or guarding.  Bowel sounds  present  Extremities: No pitting edema, pulses bilaterally palpable, no cyanosis  Neuro: Moving all extremities  Psych: Appropriate mood     Medications       sodium chloride 0.9%  1,000 mL Intravenous Once     metoprolol  5 mg Intravenous Q8H     pantoprazole (PROTONIX) IV  40 mg Intravenous BID     sodium chloride (PF)  3 mL Intracatheter Q8H       Data   Recent Labs   Lab 01/27/20  0444 01/27/20  0216   WBC 17.2* 14.3*   HGB 9.0* 9.2*   MCV 85 85    355   INR  --  1.11   * 145*   POTASSIUM 3.9 3.7   CHLORIDE 110* 109   CO2 26 27   BUN 29 26   CR 0.81 0.93   ANIONGAP 9 9   BECKY 8.5 8.4*   * 177*   ALBUMIN  --  3.1*   PROTTOTAL  --  6.2*   BILITOTAL  --  0.3   ALKPHOS  --  69   ALT  --  23   AST  --  16   LIPASE  --  101   TROPI  --  0.031       No results found for this or any previous visit (from the past 24 hour(s)).

## 2020-01-28 PROBLEM — D62 ACUTE BLOOD LOSS ANEMIA: Status: ACTIVE | Noted: 2020-01-28

## 2020-01-28 LAB
ANION GAP SERPL CALCULATED.3IONS-SCNC: 9 MMOL/L (ref 3–14)
BACTERIA SPEC CULT: NORMAL
BASOPHILS # BLD AUTO: 0 10E9/L (ref 0–0.2)
BASOPHILS NFR BLD AUTO: 0.2 %
BLD PROD TYP BPU: NORMAL
BLD UNIT ID BPU: 0
BLOOD PRODUCT CODE: NORMAL
BPU ID: NORMAL
BUN SERPL-MCNC: 20 MG/DL (ref 7–30)
CALCIUM SERPL-MCNC: 8.5 MG/DL (ref 8.5–10.1)
CHLORIDE SERPL-SCNC: 111 MMOL/L (ref 94–109)
CO2 SERPL-SCNC: 24 MMOL/L (ref 20–32)
CREAT SERPL-MCNC: 0.93 MG/DL (ref 0.52–1.04)
DIFFERENTIAL METHOD BLD: ABNORMAL
EOSINOPHIL # BLD AUTO: 0 10E9/L (ref 0–0.7)
EOSINOPHIL NFR BLD AUTO: 0 %
ERYTHROCYTE [DISTWIDTH] IN BLOOD BY AUTOMATED COUNT: 19 % (ref 10–15)
GFR SERPL CREATININE-BSD FRML MDRD: 58 ML/MIN/{1.73_M2}
GLUCOSE SERPL-MCNC: 158 MG/DL (ref 70–99)
HCT VFR BLD AUTO: 21.9 % (ref 35–47)
HCT VFR BLD AUTO: 22.7 % (ref 35–47)
HCT VFR BLD AUTO: 24.9 % (ref 35–47)
HCT VFR BLD AUTO: 26.4 % (ref 35–47)
HGB BLD-MCNC: 7.2 G/DL (ref 11.7–15.7)
HGB BLD-MCNC: 7.5 G/DL (ref 11.7–15.7)
HGB BLD-MCNC: 7.8 G/DL (ref 11.7–15.7)
HGB BLD-MCNC: 8.8 G/DL (ref 11.7–15.7)
IMM GRANULOCYTES # BLD: 0.3 10E9/L (ref 0–0.4)
IMM GRANULOCYTES NFR BLD: 1.1 %
LACTATE BLD-SCNC: 0.7 MMOL/L (ref 0.7–2)
LYMPHOCYTES # BLD AUTO: 1.6 10E9/L (ref 0.8–5.3)
LYMPHOCYTES NFR BLD AUTO: 6.8 %
MCH RBC QN AUTO: 27.9 PG (ref 26.5–33)
MCHC RBC AUTO-ENTMCNC: 31.3 G/DL (ref 31.5–36.5)
MCV RBC AUTO: 89 FL (ref 78–100)
MONOCYTES # BLD AUTO: 1.4 10E9/L (ref 0–1.3)
MONOCYTES NFR BLD AUTO: 6.1 %
NEUTROPHILS # BLD AUTO: 20.2 10E9/L (ref 1.6–8.3)
NEUTROPHILS NFR BLD AUTO: 85.8 %
NRBC # BLD AUTO: 0 10*3/UL
NRBC BLD AUTO-RTO: 0 /100
PLATELET # BLD AUTO: 303 10E9/L (ref 150–450)
POTASSIUM SERPL-SCNC: 3.8 MMOL/L (ref 3.4–5.3)
RBC # BLD AUTO: 2.8 10E12/L (ref 3.8–5.2)
SODIUM SERPL-SCNC: 144 MMOL/L (ref 133–144)
SPECIMEN SOURCE: NORMAL
TRANSFUSION STATUS PATIENT QL: NORMAL
TRANSFUSION STATUS PATIENT QL: NORMAL
WBC # BLD AUTO: 23.6 10E9/L (ref 4–11)

## 2020-01-28 PROCEDURE — G0378 HOSPITAL OBSERVATION PER HR: HCPCS

## 2020-01-28 PROCEDURE — 85018 HEMOGLOBIN: CPT | Performed by: HOSPITALIST

## 2020-01-28 PROCEDURE — 36415 COLL VENOUS BLD VENIPUNCTURE: CPT | Performed by: HOSPITALIST

## 2020-01-28 PROCEDURE — 25000132 ZZH RX MED GY IP 250 OP 250 PS 637: Mod: GY | Performed by: HOSPITALIST

## 2020-01-28 PROCEDURE — P9016 RBC LEUKOCYTES REDUCED: HCPCS | Performed by: EMERGENCY MEDICINE

## 2020-01-28 PROCEDURE — 99232 SBSQ HOSP IP/OBS MODERATE 35: CPT | Performed by: HOSPITALIST

## 2020-01-28 PROCEDURE — 85025 COMPLETE CBC W/AUTO DIFF WBC: CPT | Performed by: HOSPITALIST

## 2020-01-28 PROCEDURE — 83605 ASSAY OF LACTIC ACID: CPT | Performed by: HOSPITALIST

## 2020-01-28 PROCEDURE — 30233N1 TRANSFUSION OF NONAUTOLOGOUS RED BLOOD CELLS INTO PERIPHERAL VEIN, PERCUTANEOUS APPROACH: ICD-10-PCS | Performed by: HOSPITALIST

## 2020-01-28 PROCEDURE — 80048 BASIC METABOLIC PNL TOTAL CA: CPT | Performed by: SURGERY

## 2020-01-28 PROCEDURE — 12000000 ZZH R&B MED SURG/OB

## 2020-01-28 PROCEDURE — 25000128 H RX IP 250 OP 636: Performed by: SURGERY

## 2020-01-28 PROCEDURE — 85014 HEMATOCRIT: CPT | Performed by: HOSPITALIST

## 2020-01-28 PROCEDURE — 99232 SBSQ HOSP IP/OBS MODERATE 35: CPT | Performed by: SURGERY

## 2020-01-28 PROCEDURE — 25800030 ZZH RX IP 258 OP 636: Performed by: HOSPITALIST

## 2020-01-28 PROCEDURE — 25000125 ZZHC RX 250: Performed by: INTERNAL MEDICINE

## 2020-01-28 PROCEDURE — 36415 COLL VENOUS BLD VENIPUNCTURE: CPT | Performed by: SURGERY

## 2020-01-28 PROCEDURE — 25000132 ZZH RX MED GY IP 250 OP 250 PS 637: Mod: GY | Performed by: SURGERY

## 2020-01-28 RX ORDER — LISINOPRIL 2.5 MG/1
2.5 TABLET ORAL DAILY
Status: DISCONTINUED | OUTPATIENT
Start: 2020-01-29 | End: 2020-01-30 | Stop reason: HOSPADM

## 2020-01-28 RX ORDER — METOPROLOL TARTRATE 1 MG/ML
7.5 INJECTION, SOLUTION INTRAVENOUS EVERY 6 HOURS
Status: DISCONTINUED | OUTPATIENT
Start: 2020-01-28 | End: 2020-01-28

## 2020-01-28 RX ORDER — LISINOPRIL 5 MG/1
5 TABLET ORAL DAILY
Status: DISCONTINUED | OUTPATIENT
Start: 2020-01-28 | End: 2020-01-28

## 2020-01-28 RX ORDER — SUCRALFATE ORAL 1 G/10ML
1 SUSPENSION ORAL
Status: DISCONTINUED | OUTPATIENT
Start: 2020-01-28 | End: 2020-01-30 | Stop reason: HOSPADM

## 2020-01-28 RX ORDER — PANTOPRAZOLE SODIUM 40 MG/1
40 TABLET, DELAYED RELEASE ORAL
Status: DISCONTINUED | OUTPATIENT
Start: 2020-01-28 | End: 2020-01-30 | Stop reason: HOSPADM

## 2020-01-28 RX ORDER — LEVOTHYROXINE SODIUM 75 UG/1
75 TABLET ORAL
Status: DISCONTINUED | OUTPATIENT
Start: 2020-01-28 | End: 2020-01-30 | Stop reason: HOSPADM

## 2020-01-28 RX ORDER — ATORVASTATIN CALCIUM 40 MG/1
40 TABLET, FILM COATED ORAL EVERY EVENING
Status: DISCONTINUED | OUTPATIENT
Start: 2020-01-28 | End: 2020-01-30 | Stop reason: HOSPADM

## 2020-01-28 RX ORDER — METOPROLOL TARTRATE 25 MG/1
25 TABLET, FILM COATED ORAL 2 TIMES DAILY
Status: DISCONTINUED | OUTPATIENT
Start: 2020-01-28 | End: 2020-01-28

## 2020-01-28 RX ADMIN — ATORVASTATIN CALCIUM 40 MG: 40 TABLET, FILM COATED ORAL at 20:59

## 2020-01-28 RX ADMIN — MORPHINE SULFATE 2 MG: 2 INJECTION, SOLUTION INTRAMUSCULAR; INTRAVENOUS at 19:21

## 2020-01-28 RX ADMIN — METOPROLOL TARTRATE 7.5 MG: 1 INJECTION, SOLUTION INTRAVENOUS at 02:46

## 2020-01-28 RX ADMIN — MORPHINE SULFATE 2 MG: 2 INJECTION, SOLUTION INTRAMUSCULAR; INTRAVENOUS at 10:57

## 2020-01-28 RX ADMIN — PANTOPRAZOLE SODIUM 40 MG: 40 TABLET, DELAYED RELEASE ORAL at 16:35

## 2020-01-28 RX ADMIN — ONDANSETRON 4 MG: 2 INJECTION INTRAMUSCULAR; INTRAVENOUS at 10:51

## 2020-01-28 RX ADMIN — HYDRALAZINE HYDROCHLORIDE 20 MG: 20 INJECTION INTRAMUSCULAR; INTRAVENOUS at 01:00

## 2020-01-28 RX ADMIN — SUCRALFATE 1 G: 1 SUSPENSION ORAL at 09:51

## 2020-01-28 RX ADMIN — MORPHINE SULFATE 2 MG: 2 INJECTION, SOLUTION INTRAMUSCULAR; INTRAVENOUS at 15:49

## 2020-01-28 RX ADMIN — METOPROLOL 12.5 MG: 25 TABLET ORAL at 21:00

## 2020-01-28 RX ADMIN — SODIUM CHLORIDE, POTASSIUM CHLORIDE, SODIUM LACTATE AND CALCIUM CHLORIDE 500 ML: 600; 310; 30; 20 INJECTION, SOLUTION INTRAVENOUS at 09:25

## 2020-01-28 RX ADMIN — LEVOTHYROXINE SODIUM 75 MCG: 75 TABLET ORAL at 10:49

## 2020-01-28 RX ADMIN — PANTOPRAZOLE SODIUM 40 MG: 40 TABLET, DELAYED RELEASE ORAL at 09:50

## 2020-01-28 RX ADMIN — SUCRALFATE 1 G: 1 SUSPENSION ORAL at 16:35

## 2020-01-28 ASSESSMENT — ACTIVITIES OF DAILY LIVING (ADL): ADLS_ACUITY_SCORE: 14

## 2020-01-28 NOTE — PROVIDER NOTIFICATION
MD updated after student came to staff member and updated me that pt was having chest pain. Pt c/o mid sternal chest pain described as burning. Student did give morning meds with some relief. Pt c/o 6/10 burning now. Gave zofran and morphine. VSS. Upper extremities give false BPs due to ?subcalvian stenosis per MD. Lower ext checked and VSS. Continue to monitor pt.

## 2020-01-28 NOTE — PROGRESS NOTES
[facility]    Hospitalist Progress Note      Assessment & Plan   Lita Ocasio is a 79 year old female who was admitted on 1/27/2020.  She has a history of hypertension, hyperlipidemia, bronchial asthma with emphysema, hypothyroidism, coronary artery disease, gastritis presented to the ED with a complaint of hematemesis.  Her issues are the following:    Hematemesis: EGD done yesterday showed  Diffuse gastritis with stigmata of bleeding, possible minor ulcerations, no active bleeding.   She is on aspirin and Plavix for her coronary artery disease.  She is also on Protonix.  Previous EGD/Colonoscopy was done in September 2019 that showed gastritis.  Hemoglobin is down to 7.4.  Continue on Protonix.  Continue to monitor H&H every 4 hours and transfuse as needed. Dr. Parish's help appreciated.     Acute Blood loss anemia: Due to Upper GI bleed. Continue to monitor h/h and transfuse as needed. It has gone down to 7.2. I will transfuse 1unit of PRBCs as she has strong history of CAD.     Hypertension: The patient is on metoprolol and lisinopril at home.  Restarted now. Pt's blood pressure has been quite labile but it is low on the left arm and high on the right arm, could have subclavian stenosis. Will check BP over the lower extremities.  Pressure was 80/44 on the left arm and 167/48 on the right arm.    Coronary artery disease: The patient is on aspirin and Plavix, both on hold due to hematemesis.  Continue on statins and beta-blockers.  No chest pain as such.    Bronchial asthma: Keep on albuterol as needed.  She does not have any wheezing at this point.    Hypothyroidism: Continue levothyroxine.    Hyperlipidemia: Continue on atorvastatin.    Chronic HFpEF: The patient's left ventricular ejection fraction was preserved.  She does have mitral regurgitation.  Monitor intake and output with daily weights.  Continue lisinopril and metoprolol whenever oral intake is permitted.  Holding Lasix and  spironolactone due to GI bleed.    Leukocytosis: Most probably reactive due to GI bleed.  Continue to monitor.  No need for antibiotics at this point.    DVT Prophylaxis: Pneumatic Compression Devices  Code Status: Full Code    Disposition: Expected discharge in 1-2 days once stable.    Discussed with the patient in detail.    Colt Crawford    Interval History   The patient seen and examined.  Overnight events reviewed.  Discussed with the nursing staff.  She had her EGD yesterday.  Report is reviewed.  She is feeling much better today.  Still does not feel hungry as such.  Denies nausea or vomiting.  No more hematemesis.  No fevers or chills.  Denies chest pain or shortness of breath.      -Data reviewed today: I reviewed all new labs and imaging results over the last 24 hours. I personally reviewed no images or EKG's today.    Physical Exam   Temp: 99.1  F (37.3  C) Temp src: Temporal BP: (!) 80/44 Pulse: 99 Heart Rate: 113 Resp: 20 SpO2: 93 % O2 Device: None (Room air)    Vitals:    01/27/20 0201 01/27/20 0418   Weight: 48.5 kg (107 lb) 46.7 kg (102 lb 15.3 oz)     Vital Signs with Ranges  Temp:  [99.1  F (37.3  C)-100.3  F (37.9  C)] 99.1  F (37.3  C)  Pulse:  [] 99  Heart Rate:  [104-122] 113  Resp:  [17-27] 20  BP: ()/(40-67) 78/42  SpO2:  [92 %-99 %] 93 %  I/O last 3 completed shifts:  In: 1302 [I.V.:300; IV Piggyback:1002]  Out: 1050 [Urine:1000; Emesis/NG output:50]    Peripheral IV 01/27/20 Right (Active)   Site Assessment [facility] 1/28/2020  4:00 AM   Line Status Saline locked 1/28/2020  4:00 AM   Phlebitis Scale 0-->no symptoms 1/28/2020  4:00 AM   Infiltration Scale 0 1/28/2020  4:00 AM   Number of days: 1       Peripheral IV 01/27/20 Left (Active)   Site Assessment [facility] 1/28/2020  4:00 AM   Line Status Saline locked 1/28/2020  4:00 AM   Phlebitis Scale 0-->no symptoms 1/28/2020  4:00 AM   Infiltration Scale 0 1/28/2020  4:00 AM   Number of days: 1     Line/device assessment(s) completed for medical  necessity    General: The patient seems to be in acute distress due to upper abdominal discomfort.  Alert oriented x3.  HEENT: PERRLA  Neck: Supple  Lungs: Bilaterally clear to auscultation  Cardiovascular:S1+S2 [place]  Abdomen: Soft, nontender, nondistended, no rebound tenderness or guarding.  Bowel sounds present  Extremities: No pitting edema, pulses bilaterally palpable, no cyanosis  Neuro: Moving all extremities  Psych: Appropriate mood     Medications       lisinopril  5 mg Oral Daily     metoprolol tartrate  25 mg Oral BID     pantoprazole  40 mg Oral BID AC     sodium chloride (PF)  3 mL Intracatheter Q8H     sodium chloride (PF)  3 mL Intracatheter Q8H     sucralfate  1 g Oral BID AC       Data   Recent Labs   Lab 01/28/20  0950 01/28/20  0520 01/28/20  0519 01/27/20  1402  01/27/20  0444 01/27/20  0216   WBC  --  23.6*  --   --   --  17.2* 14.3*   HGB 7.5* 7.8*  --  8.8*   < > 9.0* 9.2*   MCV  --  89  --   --   --  85 85   PLT  --  303  --   --   --  311 355   INR  --   --   --   --   --   --  1.11   NA  --   --  144  --   --  145* 145*   POTASSIUM  --   --  3.8  --   --  3.9 3.7   CHLORIDE  --   --  111*  --   --  110* 109   CO2  --   --  24  --   --  26 27   BUN  --   --  20  --   --  29 26   CR  --   --  0.93  --   --  0.81 0.93   ANIONGAP  --   --  9  --   --  9 9   BECKY  --   --  8.5  --   --  8.5 8.4*   GLC  --   --  158*  --   --  177* 177*   ALBUMIN  --   --   --   --   --   --  3.1*   PROTTOTAL  --   --   --   --   --   --  6.2*   BILITOTAL  --   --   --   --   --   --  0.3   ALKPHOS  --   --   --   --   --   --  69   ALT  --   --   --   --   --   --  23   AST  --   --   --   --   --   --  16   LIPASE  --   --   --   --   --   --  101   TROPI  --   --   --   --   --   --  0.031    < > = values in this interval not displayed.     EGD:  Diffuse gastritis with stigmata of bleeding, possible minor ulcerations, no active bleeding.     No results found for this or any previous visit (from the past 24  hour(s)).

## 2020-01-28 NOTE — ANESTHESIA POSTPROCEDURE EVALUATION
Patient: Lita Ocasio    Procedure(s):  ESOPHAGOGASTRODUODENOSCOPY (EGD) WITH BIOPSY    Diagnosis:Gastrointestinal hemorrhage, unspecified gastrointestinal hemorrhage type [K92.2]  Diagnosis Additional Information: No value filed.    Anesthesia Type:  MAC    Note:  Anesthesia Post Evaluation    Patient location during evaluation: Bedside  Patient participation: Able to fully participate in evaluation  Level of consciousness: awake and lethargic  Pain management: inadequate (needing fentanyl for pain)  Airway patency: patent  Cardiovascular status: acceptable and stable  Respiratory status: acceptable and nasal cannula  Hydration status: acceptable  PONV: none     Anesthetic complications: None          Last vitals:  Vitals:    01/27/20 2045 01/27/20 2050 01/27/20 2055   BP: 119/67 106/65    Pulse: (!) 123 (!) 121    Resp: 18 24 24   Temp:      SpO2:  95% 94%         Electronically Signed By: COCO Arce CRNA  January 27, 2020  8:56 PM

## 2020-01-28 NOTE — PLAN OF CARE
Return to room from pacu.  Pt alert and orientated. Very sleeping.  /61.  IV infusing.  HOB elevated to 45 degrees.  Notified Dr. Nagel of BP- hold  Metoprolol dose at 2100.

## 2020-01-28 NOTE — PROVIDER NOTIFICATION
MD updated on pts blood pressure. Pt is lethargic but awake and alert. Will give pt bolus 500cc/hr, check H/H every 4 hours x2.

## 2020-01-28 NOTE — PROGRESS NOTES
Face to face report given with opportunity to observe patient.    Report given to SN Monroe RN  1/28/2020  12:03 PM

## 2020-01-28 NOTE — PLAN OF CARE
Pt alert and orientated. Very sleeping since procedure. IV infusing.  BP- 97/58 Hr- 114. Held metoprolol per Dr. Nagel  at 2100 dose.  Was 192/61 beginning of shift- hydralazine given at 1822.  Decreased to 167/49.  Taking ice chips. Face to face report given with opportunity to observe patient.    Report given to rick Mcgraw RN   1/27/2020  11:29 PM

## 2020-01-28 NOTE — OP NOTE
Lita Ocasio MRN# [id number]   YOB: 1940 Age: 79 year old      Date of Admission:  1/27/2020    Primary care provider: Ophelia Troncoso    PREOPERATIVE DIAGNOSIS:  Upper Gi bleed       POSTOPERATIVE DIAGNOSES:    Diffuse gastritis with stigmata of bleeding, possible minor ulcerations, no active bleeding.       PROCEDURE:  Esophagogastroduodenoscopy with cold forceps biopsies.       HISTORY OF PRESENT ILLNESS:  See consult note      OPERATIVE FINDINGS:  There was hemosiderin stained fluid in stomach and first portion of duodenum, no bright red bleeding identified, there were multiple punctate lesions throughout the body and fundus of stomach and possible cardia consistent with erosive gastritis, no bleeding source identified. Biopsies taken for h. Pylori.     Specimen(s) submitted to pathology:  Antral biopsies    PROCEDURE:  With the patient in the supine position on the transport cart, IV sedation was administered by the nurse anesthetist.  Her correct identity and planned procedure were confirmed during a requisite timeout pause.  A bite block was placed and the fiberoptic endoscope was introduced and negotiated through the cricopharyngeus without difficulty.  There was hemosiderin stained fluid in stomach and first portion of duodenum, no bright red bleeding identified, there were multiple punctate lesions/ superficial ulcerations throughout the body and fundus of stomach and possible cardia consistent with erosive gastritis, no bleeding source identified.      Random cold forceps biopsies were obtained of the antrum for H. pylori.  Hemostasis was judged adequate on visualization.   The endoscope was returned to the GE junction.   A second circumferential examination of the esophagus was performed on slow withdrawal of the endoscope without additional finding.  The procedure was satisfactorially tolerated; there was no appreciable blood loss and the patient was returned to Day Surgery in good  condition.      Isrrael Parish MD  1/27/2020  8:37 PM

## 2020-01-28 NOTE — PLAN OF CARE
Alert and oriented. SBP and HR elevated at beginning of shift. -170s. PRN hydralazine given with little to no effect. SBP decreased to 158 at lowest. -130s, slowly trending upwards while sleeping. Spoke with Dr. Nagel about BP and HR and IV lopressor 7.5 mg TID ordered. SBP 150s and HR 80-90s after IV lopressor. O2 sats adequate on room air. Lungs clear throughout. Audible bowel sounds. Up SBA to bathroom to void with adequate output. No visible s/sx of bleeding noted. No nausea, vomiting or stools this shift. Was NPO throughout the night but just transitioned to clear liquid this AM. IV locked. Denies pain. Slept majority of shift. Call light remains within reach and makes needs known.      Face to face report given with opportunity to observe patient.    Report given to TRUNG Mahoney RN   1/28/2020  7:15 AM

## 2020-01-28 NOTE — ANESTHESIA PREPROCEDURE EVALUATION
Anesthesia Pre-Procedure Evaluation    Patient: Lita Ocasio   MRN: [id number] : 1940          Preoperative Diagnosis: Gastrointestinal hemorrhage, unspecified gastrointestinal hemorrhage type [K92.2]    Procedure(s):  ESOPHAGOGASTRODUODENOSCOPY (EGD)    Past Medical History:   Diagnosis Date     Acquired hypothyroidism 2019     Asthma      Benign essential hypertension 2019     Centrilobular emphysema (H) 2019     Chronic rhinitis 2013     Chronic systolic congestive heart failure (H) 2019     Constipation      Coronary artery disease      Hearing loss      Heart trouble      Hemorrhagic cerebrovascular accident (CVA) (H) 2019     Hyperlipidemia 2019     Hypertension      Nasal congestion      Non-rheumatic mitral regurgitation 2019     Osteopenia 2019     Osteoporosis      Parathyroid related hypercalcemia (H) 2013     Primary hyperparathyroidism (H) 2013     Ringing in ears      Sensorineural hearing loss, asymmetrical 2013     Sneezing      Snoring      Stented coronary artery      Thyroid disease      Weakness      Weight loss      Past Surgical History:   Procedure Laterality Date     CATARACT IOL, RT/LT Bilateral      COLONOSCOPY       COLONOSCOPY - HIM SCAN  2009    Colonoscopy - [facility]/NL       ENDOSCOPY UPPER, COLONOSCOPY, COMBINED N/A 10/17/2019    Procedure: UPPER ENDOSCOPY WITH BIOPSY  AND COLONOSCOPY;  Surgeon: Julio Canales MD;  Location: [facility] OR     ENT SURGERY      para thyroid surgery     ENT SURGERY  2013    Parathyroid     ESOPHAGOSCOPY, GASTROSCOPY, DUODENOSCOPY (EGD), COMBINED N/A 2019    Procedure: ESOPHAGOGASTRODUODENOSCOPY (EGD);  Surgeon: Julio Canales MD;  Location: [facility] OR     PARATHYROIDECTOMY  9/10/2013    Procedure: PARATHYROIDECTOMY;  Reoperative Neck Exploration, Resection of right Parathyroid adenoma;  Surgeon: Thalia Muller MD;  Location: [facility] OR     TONSILLECTOMY       TUBAL  LIGATION         Anesthesia Evaluation     . Pt has had prior anesthetic.     No history of anesthetic complications          ROS/MED HX    ENT/Pulmonary: Comment: emphysema    (+)tobacco use, Past use asthma COPD, , . .    Neurologic: Comment: Hearing loss    (+)CVA (1 year ago) date: 1 year ago without deficits    Cardiovascular:     (+) Dyslipidemia, hypertension--CAD, --stent,last summer  2 . : . CHF . . :. . Previous cardiac testing Echodate:results:Left Ventricle  Global and regional left ventricular function is normal with an EF of 60-65%.  Grade I or early diastolic dysfunction.     Right Ventricle  The right ventricle is normal size. Global right ventricular function is  normal.     Atria  Both atria appear normal.     Mitral Valve  Mild mitral insufficiency is present.     Aortic Valve  Aortic valve is normal in structure and function. The aortic valve is  tricuspid.        Tricuspid Valve  Trace tricuspid insufficiency is present. Right ventricular systolic pressure  is 3mmHg above the right atrial pressure.     Pulmonic Valve  The pulmonic valve is normal.     Vessels  The aorta root is normal.     Pericardium  No pericardial effusion is present.     _____________________________________________________________________________  __  MMode/2D Measurements & Calculations  IVSd: 1.1 cm  IVSs: 1.9 cm  LVIDd: 3.6 cm  LVIDs: 2.4 cmdate: results: date: results: date: results:          METS/Exercise Tolerance:     Hematologic: Comments: From probable GI bleed    (+) Anemia, -      Musculoskeletal: Comment: Osteopenia and osteoporosis        GI/Hepatic: Comment: Rectal bleeding        Renal/Genitourinary: Comment: CKD stage 3    (+) chronic renal disease,       Endo:     (+) thyroid problem hypothyroidism, Other Endocrine Disorder hyperparathyriod.      Psychiatric:         Infectious Disease:  - neg infectious disease ROS       Malignancy:      - no malignancy   Other:    - neg other ROS                       Physical Exam  Normal systems: pulmonary    Airway   Mallampati: II  TM distance: >3 FB  Neck ROM: full    Dental   (+) upper dentures    Cardiovascular   Rhythm and rate: regular and normal    PE comment: Questionable murmur    Pulmonary    breath sounds clear to auscultation            Lab Results   Component Value Date    WBC 17.2 (H) 01/27/2020    HGB 8.8 (L) 01/27/2020    HCT 26.5 (L) 01/27/2020     01/27/2020    CRP <2.9 09/20/2019    SED 36 (H) 09/20/2019     (H) 01/27/2020    POTASSIUM 3.9 01/27/2020    CHLORIDE 110 (H) 01/27/2020    CO2 26 01/27/2020    BUN 29 01/27/2020    CR 0.81 01/27/2020     (H) 01/27/2020    BECKY 8.5 01/27/2020    PHOS 2.4 (L) 09/01/2013    MAG 2.2 05/12/2019    ALBUMIN 3.1 (L) 01/27/2020    PROTTOTAL 6.2 (L) 01/27/2020    ALT 23 01/27/2020    AST 16 01/27/2020    ALKPHOS 69 01/27/2020    BILITOTAL 0.3 01/27/2020    LIPASE 101 01/27/2020    INR 1.11 01/27/2020    TSH 1.46 09/21/2019    TROPN  08/18/2013     <0.04  **Risk Stratification**   0.10 - 0.39   Elevated-may indicate increased                 risk of short term adverse                 cardiac events.      >=0.4      Possible cardiac injury.       Preop Vitals  BP Readings from Last 3 Encounters:   01/27/20 (!) 167/49   01/07/20 109/75   11/05/19 110/62    Pulse Readings from Last 3 Encounters:   01/27/20 111   01/07/20 80   11/05/19 80      Resp Readings from Last 3 Encounters:   01/27/20 20   01/07/20 20   11/05/19 20    SpO2 Readings from Last 3 Encounters:   01/27/20 95%   01/07/20 96%   11/05/19 96%      Temp Readings from Last 1 Encounters:   01/27/20 100.2  F (37.9  C) (Tympanic)    Ht Readings from Last 1 Encounters:   01/27/20 1.524 m (5')      Wt Readings from Last 1 Encounters:   01/27/20 46.7 kg (102 lb 15.3 oz)    Estimated body mass index is 20.11 kg/m  as calculated from the following:    Height as of this encounter: 1.524 m (5').    Weight as of this encounter: 46.7 kg (102 lb 15.3 oz).        Anesthesia Plan      History & Physical Review  History and physical reviewed and following examination; no interval change.    ASA Status:  3 .    NPO Status:  > 8 hours    Plan for MAC with Propofol induction. Reason for MAC:  Chronic cardiopulmonary disease (G9) and Procedure to face, neck, head or breast         Postoperative Care      Consents  Anesthetic plan, risks, benefits and alternatives discussed with: .  Use of blood products discussed: No .   .                COCO Arce CRNA

## 2020-01-28 NOTE — ANESTHESIA CARE TRANSFER NOTE
Patient: Lita Ocasio    Procedure(s):  ESOPHAGOGASTRODUODENOSCOPY (EGD) WITH BIOPSY    Diagnosis: Gastrointestinal hemorrhage, unspecified gastrointestinal hemorrhage type [K92.2]  Diagnosis Additional Information: No value filed.    Anesthesia Type:   MAC     Note:  Airway :Nasal Cannula  Patient transferred to:Medical/Surgical Unit  Handoff Report: Identifed the Patient, Identified the Reponsible Provider, Reviewed the pertinent medical history, Discussed the surgical course, Reviewed Intra-OP anesthesia mangement and issues during anesthesia, Set expectations for post-procedure period and Allowed opportunity for questions and acknowledgement of understanding      Vitals: (Last set prior to Anesthesia Care Transfer)    CRNA VITALS  1/27/2020 2015 - 1/27/2020 2045 1/27/2020             Resp Rate (observed):  (!) 1    Resp Rate (set):  8                Electronically Signed By: COCO Arce CRNA  January 27, 2020  8:45 PM

## 2020-01-28 NOTE — PROGRESS NOTES
[facility] Progress Note    S: No pain this am no nausea or vomiting, no black stools.     # Pain Assessment:  Current Pain Score 2020   Patient currently in pain? denies   Pain score (0-10) -   Pain location -   Lita wynne pain level was assessed and she currently denies pain.      O:   Vitals:  BP  Min: 88/57  Max: 203/67  Temp  Av.5  F (37.5  C)  Min: 98.8  F (37.1  C)  Max: 100.3  F (37.9  C)  Pulse  Av  Min: 106  Max: 123  I/O last 3 completed shifts:  In: 1302 [I.V.:300; IV Piggyback:1002]  Out: 1050 [Urine:1000; Emesis/NG output:50]    Peripheral IV 20 Right (Active)   Site Assessment [facility] 2020  4:00 AM   Line Status Saline locked 2020  4:00 AM   Phlebitis Scale 0-->no symptoms 2020  4:00 AM   Infiltration Scale 0 2020  4:00 AM   Number of days: 1       Peripheral IV 20 Left (Active)   Site Assessment [facility] 2020  4:00 AM   Line Status Saline locked 2020  4:00 AM   Phlebitis Scale 0-->no symptoms 2020  4:00 AM   Infiltration Scale 0 2020  4:00 AM   Number of days: 1     No line/device    Physical Exam:  General: alert oriented, no acute distress   ENT: sclera non-icteric   Pulmonary: no respiratory distress   CVS: RRR  ABD: soft non-tender non-distended   Extremities: WWP     Assessment/Plan:  Admitted for upper GI bleed from erosive gastritis likely compounded by aspirin and plavix use. Taken for EGD last evening where no active bleeding identified, though stigmata of old blood present. clincally improving.     Will need to discuss with PCP and cardiology about coming off plavix  Will advance to clear liquids this am and add carafate.         Isrrael Parish MD

## 2020-01-29 ENCOUNTER — APPOINTMENT (OUTPATIENT)
Dept: GENERAL RADIOLOGY | Facility: HOSPITAL | Age: 80
DRG: 811 | End: 2020-01-29
Attending: HOSPITALIST
Payer: MEDICARE

## 2020-01-29 LAB
ABO + RH BLD: NORMAL
ABO + RH BLD: NORMAL
ANION GAP SERPL CALCULATED.3IONS-SCNC: 4 MMOL/L (ref 3–14)
BASOPHILS # BLD AUTO: 0.1 10E9/L (ref 0–0.2)
BASOPHILS NFR BLD AUTO: 0.5 %
BLD GP AB SCN SERPL QL: NORMAL
BLD PROD TYP BPU: NORMAL
BLOOD BANK CMNT PATIENT-IMP: NORMAL
BUN SERPL-MCNC: 18 MG/DL (ref 7–30)
CALCIUM SERPL-MCNC: 8.1 MG/DL (ref 8.5–10.1)
CHLORIDE SERPL-SCNC: 109 MMOL/L (ref 94–109)
CO2 SERPL-SCNC: 27 MMOL/L (ref 20–32)
CREAT SERPL-MCNC: 1.01 MG/DL (ref 0.52–1.04)
DIFFERENTIAL METHOD BLD: ABNORMAL
EOSINOPHIL # BLD AUTO: 0.1 10E9/L (ref 0–0.7)
EOSINOPHIL NFR BLD AUTO: 0.6 %
ERYTHROCYTE [DISTWIDTH] IN BLOOD BY AUTOMATED COUNT: 17.3 % (ref 10–15)
GFR SERPL CREATININE-BSD FRML MDRD: 53 ML/MIN/{1.73_M2}
GLUCOSE SERPL-MCNC: 98 MG/DL (ref 70–99)
HCT VFR BLD AUTO: 24.1 % (ref 35–47)
HCT VFR BLD AUTO: 26.2 % (ref 35–47)
HGB BLD-MCNC: 8 G/DL (ref 11.7–15.7)
HGB BLD-MCNC: 8.1 G/DL (ref 11.7–15.7)
HGB BLD-MCNC: 8.2 G/DL (ref 11.7–15.7)
IMM GRANULOCYTES # BLD: 0.1 10E9/L (ref 0–0.4)
IMM GRANULOCYTES NFR BLD: 0.5 %
LYMPHOCYTES # BLD AUTO: 2.5 10E9/L (ref 0.8–5.3)
LYMPHOCYTES NFR BLD AUTO: 22.6 %
MCH RBC QN AUTO: 28.9 PG (ref 26.5–33)
MCHC RBC AUTO-ENTMCNC: 33.2 G/DL (ref 31.5–36.5)
MCV RBC AUTO: 87 FL (ref 78–100)
MONOCYTES # BLD AUTO: 1 10E9/L (ref 0–1.3)
MONOCYTES NFR BLD AUTO: 9.2 %
NEUTROPHILS # BLD AUTO: 7.2 10E9/L (ref 1.6–8.3)
NEUTROPHILS NFR BLD AUTO: 66.6 %
NRBC # BLD AUTO: 0 10*3/UL
NRBC BLD AUTO-RTO: 0 /100
NUM BPU REQUESTED: 1
PLATELET # BLD AUTO: 193 10E9/L (ref 150–450)
POTASSIUM SERPL-SCNC: 3.6 MMOL/L (ref 3.4–5.3)
RBC # BLD AUTO: 2.77 10E12/L (ref 3.8–5.2)
SODIUM SERPL-SCNC: 140 MMOL/L (ref 133–144)
SPECIMEN EXP DATE BLD: NORMAL
WBC # BLD AUTO: 10.8 10E9/L (ref 4–11)

## 2020-01-29 PROCEDURE — 85014 HEMATOCRIT: CPT | Performed by: HOSPITALIST

## 2020-01-29 PROCEDURE — 25000132 ZZH RX MED GY IP 250 OP 250 PS 637: Mod: GY | Performed by: SURGERY

## 2020-01-29 PROCEDURE — 85018 HEMOGLOBIN: CPT | Performed by: HOSPITALIST

## 2020-01-29 PROCEDURE — 36416 COLLJ CAPILLARY BLOOD SPEC: CPT | Performed by: HOSPITALIST

## 2020-01-29 PROCEDURE — 36415 COLL VENOUS BLD VENIPUNCTURE: CPT | Performed by: HOSPITALIST

## 2020-01-29 PROCEDURE — 40000275 ZZH STATISTIC RCP TIME EA 10 MIN

## 2020-01-29 PROCEDURE — 93005 ELECTROCARDIOGRAM TRACING: CPT

## 2020-01-29 PROCEDURE — 25000128 H RX IP 250 OP 636: Performed by: SURGERY

## 2020-01-29 PROCEDURE — 71045 X-RAY EXAM CHEST 1 VIEW: CPT | Mod: TC

## 2020-01-29 PROCEDURE — 25000132 ZZH RX MED GY IP 250 OP 250 PS 637: Mod: GY | Performed by: HOSPITALIST

## 2020-01-29 PROCEDURE — 99233 SBSQ HOSP IP/OBS HIGH 50: CPT | Performed by: HOSPITALIST

## 2020-01-29 PROCEDURE — 12000000 ZZH R&B MED SURG/OB

## 2020-01-29 PROCEDURE — 25000132 ZZH RX MED GY IP 250 OP 250 PS 637: Mod: GY | Performed by: INTERNAL MEDICINE

## 2020-01-29 PROCEDURE — 25800025 ZZH RX 258: Performed by: HOSPITALIST

## 2020-01-29 PROCEDURE — 85025 COMPLETE CBC W/AUTO DIFF WBC: CPT | Performed by: SURGERY

## 2020-01-29 PROCEDURE — 80048 BASIC METABOLIC PNL TOTAL CA: CPT | Performed by: SURGERY

## 2020-01-29 PROCEDURE — 99232 SBSQ HOSP IP/OBS MODERATE 35: CPT | Performed by: SURGERY

## 2020-01-29 PROCEDURE — 36415 COLL VENOUS BLD VENIPUNCTURE: CPT | Performed by: SURGERY

## 2020-01-29 RX ORDER — IPRATROPIUM BROMIDE AND ALBUTEROL SULFATE 2.5; .5 MG/3ML; MG/3ML
3 SOLUTION RESPIRATORY (INHALATION)
Status: DISCONTINUED | OUTPATIENT
Start: 2020-01-29 | End: 2020-01-30 | Stop reason: HOSPADM

## 2020-01-29 RX ADMIN — MORPHINE SULFATE 2 MG: 2 INJECTION, SOLUTION INTRAMUSCULAR; INTRAVENOUS at 00:26

## 2020-01-29 RX ADMIN — DEXTROSE AND SODIUM CHLORIDE: 5; 450 INJECTION, SOLUTION INTRAVENOUS at 21:21

## 2020-01-29 RX ADMIN — SUCRALFATE 1 G: 1 SUSPENSION ORAL at 06:37

## 2020-01-29 RX ADMIN — BENZOCAINE AND MENTHOL 1 LOZENGE: 15; 3.6 LOZENGE ORAL at 16:16

## 2020-01-29 RX ADMIN — METOPROLOL 12.5 MG: 25 TABLET ORAL at 09:38

## 2020-01-29 RX ADMIN — METOPROLOL 12.5 MG: 25 TABLET ORAL at 21:16

## 2020-01-29 RX ADMIN — LISINOPRIL 2.5 MG: 2.5 TABLET ORAL at 09:39

## 2020-01-29 RX ADMIN — PANTOPRAZOLE SODIUM 40 MG: 40 TABLET, DELAYED RELEASE ORAL at 06:37

## 2020-01-29 RX ADMIN — SUCRALFATE 1 G: 1 SUSPENSION ORAL at 16:15

## 2020-01-29 RX ADMIN — ATORVASTATIN CALCIUM 40 MG: 40 TABLET, FILM COATED ORAL at 21:16

## 2020-01-29 RX ADMIN — DEXTROSE AND SODIUM CHLORIDE: 5; 450 INJECTION, SOLUTION INTRAVENOUS at 11:30

## 2020-01-29 RX ADMIN — BENZOCAINE AND MENTHOL 1 LOZENGE: 15; 3.6 LOZENGE ORAL at 00:26

## 2020-01-29 RX ADMIN — PANTOPRAZOLE SODIUM 40 MG: 40 TABLET, DELAYED RELEASE ORAL at 16:15

## 2020-01-29 RX ADMIN — LEVOTHYROXINE SODIUM 75 MCG: 75 TABLET ORAL at 05:17

## 2020-01-29 ASSESSMENT — ACTIVITIES OF DAILY LIVING (ADL)
ADLS_ACUITY_SCORE: 14
ADLS_ACUITY_SCORE: 13
ADLS_ACUITY_SCORE: 14
ADLS_ACUITY_SCORE: 14

## 2020-01-29 NOTE — PROVIDER NOTIFICATION
Pt reporting SOB and dizziness. Vitals obtained. /50 (BP Location: Left leg)   Pulse 65   Temp 99.1  F (37.3  C) (Tympanic)   Resp 20   Ht 1.524 m (5')   Wt 46.7 kg (102 lb 15.3 oz)   SpO2 98%   BMI 20.11 kg/m      Did put patient on 1 LPM via n/c. Patient spo2 99 on 1 LPM, removed o2 . Reporting improvement in SOB. Resting peacefully in bed.      In to see patient.     EKG, IV fluids, telemetry, HGB ordered.

## 2020-01-29 NOTE — PROGRESS NOTES
[facility] Progress Note    S: lower abdominal pain, able to sleep last night, transfused one unit of blood yesterday    # Pain Assessment:  Current Pain Score 2020   Patient currently in pain? denies   Pain score (0-10) -   Pain location -   Lita wynne pain level was assessed and she currently denies pain.      O:   Vitals:  BP  Min: 88/57  Max: 203/67  Temp  Av.5  F (37.5  C)  Min: 98.8  F (37.1  C)  Max: 100.3  F (37.9  C)  Pulse  Av  Min: 106  Max: 123  I/O last 3 completed shifts:  In: 1880 [P.O.:1050; I.V.:510]  Out: 650 [Urine:650]    Peripheral IV 20 Right (Active)   Site Assessment [facility] 2020 12:26 AM   Line Status Saline locked 2020 12:26 AM   Phlebitis Scale 0-->no symptoms 2020  5:13 PM   Infiltration Scale 0 2020  5:13 PM   Number of days: 2       Peripheral IV 20 Left (Active)   Site Assessment [facility] 2020 12:26 AM   Line Status Saline locked 2020 12:26 AM   Phlebitis Scale 0-->no symptoms 2020  5:13 PM   Infiltration Scale 0 2020  5:13 PM   Number of days: 2     No line/device    Physical Exam:  General: alert oriented, no acute distress   ENT: sclera non-icteric   Pulmonary: no respiratory distress   CVS: RRR  ABD: tender in lower quadrants. No epigastric tenderness.    Extremities: WWP     Assessment/Plan:  Admitted for upper GI bleed from erosive gastritis likely compounded by aspirin and plavix use. Taken for EGD on  where no active bleeding identified, though stigmata of old blood present. hbg continued to trend down though slowly without overt signs bleeding (vomiting, melena). Transfused 1 unit blood on .     Will make NPO again with ice chips for comfort, if requires another transfusion will try and re scope and see if there is an intervention that can help. No longer on plavix.         Isrrael Parish MD

## 2020-01-29 NOTE — PLAN OF CARE
Reason for hospital stay:  Gastrointestinal hemorrhage    Most recent vitals: /52 (BP Location: Left leg)   Pulse 79   Temp 98.3  F (36.8  C) (Temporal)   Resp 18   Ht 1.524 m (5')   Wt 46.7 kg (102 lb 15.3 oz)   SpO2 (!) 90%   BMI 20.11 kg/m      Pain Management:  Pt reports pain at 4/10 in upper abdominal region-- given Morphine 2 mg with relief    LOC:  A&O x4    Cardiac:  Apical pulse irregular, dependent 1 + edema in bilateral feet, ankles, legs. Pedal pulses 2+    Respiratory:  Anterior lung sounds clear, equal bilaterally, no shortness of breath noted, no cough. Pt dips to high 80s while asleep    GI:  Bowel sounds hypoactive in all four quadrants, soft/nontender, reports no bowel movement since Sunday    :  Voiding spontaneously     Skin Issues:  bruising noted on left upper arm and right upper arm at IV sites    IVF:  SL x2    Nutrition: Clear liquid     ADL's:  Independent    Ambulation:Stand by assist    Safety:  Call light within reach, calls appropriately    Face to face report given with opportunity to observe patient.    Report given to Rachel Alvarado   1/29/2020  7:24 AM

## 2020-01-29 NOTE — PROGRESS NOTES
Student informed instructor that pt c/o chest pain.  Instructor went to visit with patient and assess the situation.  Pt stated that pain was on the upper chest wall across from left to right.  She also stated pain was below her right arm.  Pt stated that pain was just a burning pain and that she has had it in the past but doesn't remember why it began or how it was resolved.  VS elevated at the time of complaint.  BP was 172/57 on right arm and all other VS stable.  Pt also had LR bolus running at 500ml/hr due to low BP 1 hour earlier.  Instructor worked with student to educate on what to do in this situation and that the primary RN needs to be informed right away so she can call and update the MD.  Another BP taken at 1005 was 170/49 with no change to pain status.  Pt stated pain was 6/10.  She denies pain radiating or having SOB or ALANNA.  Instructor set BP machine to take VS Q15 min until primary RN could determine what follow up was needed.  Pt appeared to be stable and not in crisis when instructor left room.  Follow up with student will be completed.  No other concerns at this time.

## 2020-01-29 NOTE — PROGRESS NOTES
[facility]    Hospitalist Progress Note      Assessment & Plan   Lita Ocasio is a 79 year old female who was admitted on 1/27/2020.  She has a history of hypertension, hyperlipidemia, bronchial asthma with emphysema, hypothyroidism, coronary artery disease, gastritis presented to the ED with a complaint of hematemesis.  Her issues are the following:    Hematemesis: EGD done on 1/27/2020 showed  Diffuse gastritis with stigmata of bleeding, possible minor ulcerations, no active bleeding.   Aspirin and Plavix have been held.  She was transfused 1 unit of PRBC yesterday as hemoglobin went down to 7.2.  Today it is 8.2.  Will monitor H&H every 6 hours and transfuse as needed.  Dr. Parish's input appreciated.  The patient was made n.p.o. again in case she needs another EGD.     Acute Blood loss anemia: Due to Upper GI bleed.  She was transfused a unit of blood yesterday.  hemoglobin is 8.2 today.  No need to transfuse.  Continue to monitor H&H every 4 hours.    Hypertension: The patient is on metoprolol and lisinopril at home.  Restarted now. Pt's blood pressure has been quite labile but it is low on the left arm and high on the right arm, could have subclavian stenosis.    Coronary artery disease: The patient is on aspirin and Plavix, both on hold due to hematemesis.  Continue on statins and beta-blockers.  No chest pain as such.    Bronchial asthma: Keep on albuterol as needed.  She does not have any wheezing at this point.    Hypothyroidism: Continue levothyroxine.    Hyperlipidemia: Continue on atorvastatin.    Chronic HFpEF: The patient's left ventricular ejection fraction was preserved.  She does have mitral regurgitation.  Monitor intake and output with daily weights.  Continue lisinopril and metoprolol whenever oral intake is permitted.  Holding Lasix and spironolactone due to GI bleed.    Leukocytosis: Most probably reactive due to GI bleed.  Resolved now.    Shortness of breath: It could be due to  her bronchial asthma.  I will keep her on duo nebs.    Dizziness: Get an EKG and place on telemetry.  She denies any chest pain.    DVT Prophylaxis: Pneumatic Compression Devices  Code Status: Full Code    Disposition: Expected discharge in 1-2 days once stable.    Discussed with the patient in detail.    Colt Crawford    Interval History   The patient seen and examined.  Overnight events reviewed.  Discussed with the nursing staff.  Patient still has not been feeling well.  She received 1 unit of PRBCs yesterday.  Her hemoglobin came up to 8.2.  Does report some upper abdominal discomfort.  No nausea or vomiting.  She has been tolerating clear liquid diet well.  She was made n.p.o. again in case she needs another EGD.  Also reports some episode of shortness of breath but that has resolved.  No fevers or chills.  No dysuria urgency or frequency.  No chest pain.    -Data reviewed today: I reviewed all new labs and imaging results over the last 24 hours. I personally reviewed no images or EKG's today.    Physical Exam   Temp: 98.4  F (36.9  C) Temp src: Oral BP: 109/54 Pulse: 70 Heart Rate: 62 Resp: 20 SpO2: 93 % O2 Device: None (Room air) Oxygen Delivery: 1 LPM  Vitals:    01/27/20 0201 01/27/20 0418   Weight: 48.5 kg (107 lb) 46.7 kg (102 lb 15.3 oz)     Vital Signs with Ranges  Temp:  [98.3  F (36.8  C)-99.6  F (37.6  C)] 98.4  F (36.9  C)  Pulse:  [] 70  Heart Rate:  [62-98] 62  Resp:  [16-22] 20  BP: ()/(41-67) 109/54  SpO2:  [90 %-98 %] 93 %  I/O last 3 completed shifts:  In: 1880 [P.O.:1050; I.V.:510]  Out: 650 [Urine:650]    Peripheral IV 01/27/20 Right (Active)   Site Assessment [facility] 1/29/2020  9:00 AM   Line Status Saline locked 1/29/2020  9:00 AM   Phlebitis Scale 0-->no symptoms 1/29/2020  9:00 AM   Infiltration Scale 0 1/29/2020  9:00 AM   Number of days: 2       Peripheral IV 01/27/20 Left (Active)   Site Assessment [facility] 1/29/2020  9:00 AM   Line Status Saline locked 1/29/2020  9:00 AM   Phlebitis  Scale 0-->no symptoms 1/29/2020  9:00 AM   Infiltration Scale 0 1/29/2020  9:00 AM   Number of days: 2     Line/device assessment(s) completed for medical necessity    General: She was in distress due to feeling a bit dizzy.  Otherwise.  Alert oriented x3.  HEENT: PERRLA  Neck: Supple  Lungs: Bilaterally clear to auscultation  Cardiovascular:S1+S2 Tripp  Abdomen: Soft, nontender, nondistended, no rebound tenderness or guarding.  Bowel sounds present  Extremities: No pitting edema, pulses bilaterally palpable, no cyanosis  Neuro: Moving all extremities  Psych: Appropriate mood     Medications     dextrose 5% and 0.45% NaCl 100 mL/hr at 01/29/20 1130       atorvastatin  40 mg Oral QPM     levothyroxine  75 mcg Oral QAM AC     lisinopril  2.5 mg Oral Daily     metoprolol tartrate  12.5 mg Oral BID     pantoprazole  40 mg Oral BID AC     sodium chloride (PF)  3 mL Intracatheter Q8H     sodium chloride (PF)  3 mL Intracatheter Q8H     sucralfate  1 g Oral BID AC       Data   Recent Labs   Lab 01/29/20  1124 01/29/20  0540 01/28/20  2100  01/28/20  0520 01/28/20  0519  01/27/20  0444 01/27/20  0216   WBC  --  10.8  --   --  23.6*  --   --  17.2* 14.3*   HGB 8.2* 8.0* 8.8*   < > 7.8*  --    < > 9.0* 9.2*   MCV  --  87  --   --  89  --   --  85 85   PLT  --  193  --   --  303  --   --  311 355   INR  --   --   --   --   --   --   --   --  1.11   NA  --  140  --   --   --  144  --  145* 145*   POTASSIUM  --  3.6  --   --   --  3.8  --  3.9 3.7   CHLORIDE  --  109  --   --   --  111*  --  110* 109   CO2  --  27  --   --   --  24  --  26 27   BUN  --  18  --   --   --  20  --  29 26   CR  --  1.01  --   --   --  0.93  --  0.81 0.93   ANIONGAP  --  4  --   --   --  9  --  9 9   BECKY  --  8.1*  --   --   --  8.5  --  8.5 8.4*   GLC  --  98  --   --   --  158*  --  177* 177*   ALBUMIN  --   --   --   --   --   --   --   --  3.1*   PROTTOTAL  --   --   --   --   --   --   --   --  6.2*   BILITOTAL  --   --   --   --   --   --   --    --  0.3   ALKPHOS  --   --   --   --   --   --   --   --  69   ALT  --   --   --   --   --   --   --   --  23   AST  --   --   --   --   --   --   --   --  16   LIPASE  --   --   --   --   --   --   --   --  101   TROPI  --   --   --   --   --   --   --   --  0.031    < > = values in this interval not displayed.     EGD:  Diffuse gastritis with stigmata of bleeding, possible minor ulcerations, no active bleeding.     No results found for this or any previous visit (from the past 24 hour(s)).

## 2020-01-29 NOTE — PROVIDER NOTIFICATION
MD Parish notified about pt requesting diet.  MD stated if 1800 hgb check is stable he will place diet order.  Updated pt.

## 2020-01-29 NOTE — PLAN OF CARE
Spoke with patients son, with permission from patient and patient present and given an update. Requesting to speak with provider. Provider aware.

## 2020-01-29 NOTE — PROVIDER NOTIFICATION
MD notified about pt low BPs of 87/38 and 97/36, HR 50-60s, pt asymptomatic.  MD called writer to check BP on right arm, BP is 138/38, MD aware.

## 2020-01-29 NOTE — PLAN OF CARE
Reason for hospital stay:  Possible GI bleed.     Most recent vitals: /54 (BP Location: Left leg)   Pulse 70   Temp 98.4  F (36.9  C) (Oral)   Resp 20   Ht 1.524 m (5')   Wt 46.7 kg (102 lb 15.3 oz)   SpO2 93%   BMI 20.11 kg/m      Pain Management:  Denies pain this shift.     LOC:  A&O x 4  Cardiac:  Apical Regular, trace edema to LE. On ICU telemetry.   Respiratory:  Lung sounds clear. Did report some SOB at one point in shift. Read other note.   GI:  Bowel sounds hypoactive x 4, soft/non tender. Denies n/v.   :  Voiding spontaneously.   Skin Issues:  Scattered bruises. Skin intact.     IVF:  D5 and 1/2 NS running at 100ml/h.  Saline locked x 1.     Nutrition: NPO  ADL's:  Independent.     Ambulation: Standby by assist.   Safety:  Call light in reach. Uses call light.         Face to face report given with opportunity to observe patient.    Report given to Ciera NINO.     Rachel Dee RN   1/29/2020  3:14 PM        1/29/2020  2:54 PM  Rachel Dee RN

## 2020-01-29 NOTE — PROGRESS NOTES
Face to face report given with opportunity to observe patient.    Report given to TRUNG duran,   1/28/2020  8:00 PM

## 2020-01-29 NOTE — PLAN OF CARE
Face to face report given with opportunity to observe patient.    Report given to TRUNG Ramos,   1/29/2020  12:01 PM

## 2020-01-29 NOTE — PLAN OF CARE
"Writer spoke to pt and pts daughter Cris.  Cris is frustrated about her mom's POC, feels she should be transferred if we can't figure out why she is bleeding.  Re-assured pt/dtr that if next 1800 hgb check was stable, we would obtain diet order.  Verbalized understanding, but pts dtr would like to speak w/ dr. mondragon in AM.     ETA- pt initially denied pain upon assessment but after above conversation w/ pt/dtr the dtr stated, \"My mom told me she is in pain but won't tell anyone cause she doesn't want to stay her longer, she wants to go home.\"  Pt verbalized abdominal pain described as burning and rating 6/10.  Pt declined pain interventions at this time.  "

## 2020-01-29 NOTE — PLAN OF CARE
Epigastric discomfort eased by IV morphine.  Dozed most of the evening.  Sepsis protocol flagged, Lactic acid OK.  Face to face report given with opportunity to observe patient.    Report given to Angi SALDANA RN   1/29/2020  12:00 AM

## 2020-01-30 VITALS
BODY MASS INDEX: 20.21 KG/M2 | RESPIRATION RATE: 20 BRPM | SYSTOLIC BLOOD PRESSURE: 138 MMHG | TEMPERATURE: 98.3 F | DIASTOLIC BLOOD PRESSURE: 44 MMHG | WEIGHT: 102.95 LBS | HEIGHT: 60 IN | HEART RATE: 74 BPM | OXYGEN SATURATION: 91 %

## 2020-01-30 LAB
ANION GAP SERPL CALCULATED.3IONS-SCNC: 7 MMOL/L (ref 3–14)
BASOPHILS # BLD AUTO: 0 10E9/L (ref 0–0.2)
BASOPHILS NFR BLD AUTO: 0.3 %
BUN SERPL-MCNC: 13 MG/DL (ref 7–30)
CALCIUM SERPL-MCNC: 7.7 MG/DL (ref 8.5–10.1)
CHLORIDE SERPL-SCNC: 111 MMOL/L (ref 94–109)
CO2 SERPL-SCNC: 23 MMOL/L (ref 20–32)
COPATH REPORT: NORMAL
CREAT SERPL-MCNC: 0.96 MG/DL (ref 0.52–1.04)
DIFFERENTIAL METHOD BLD: ABNORMAL
EOSINOPHIL # BLD AUTO: 0.3 10E9/L (ref 0–0.7)
EOSINOPHIL NFR BLD AUTO: 3.3 %
ERYTHROCYTE [DISTWIDTH] IN BLOOD BY AUTOMATED COUNT: 16.8 % (ref 10–15)
GFR SERPL CREATININE-BSD FRML MDRD: 56 ML/MIN/{1.73_M2}
GLUCOSE SERPL-MCNC: 101 MG/DL (ref 70–99)
HCT VFR BLD AUTO: 24 % (ref 35–47)
HGB BLD-MCNC: 7.9 G/DL (ref 11.7–15.7)
HGB BLD-MCNC: 8.6 G/DL (ref 11.7–15.7)
IMM GRANULOCYTES # BLD: 0.1 10E9/L (ref 0–0.4)
IMM GRANULOCYTES NFR BLD: 0.6 %
LYMPHOCYTES # BLD AUTO: 2 10E9/L (ref 0.8–5.3)
LYMPHOCYTES NFR BLD AUTO: 22.4 %
MCH RBC QN AUTO: 28.6 PG (ref 26.5–33)
MCHC RBC AUTO-ENTMCNC: 32.9 G/DL (ref 31.5–36.5)
MCV RBC AUTO: 87 FL (ref 78–100)
MONOCYTES # BLD AUTO: 0.9 10E9/L (ref 0–1.3)
MONOCYTES NFR BLD AUTO: 10.1 %
NEUTROPHILS # BLD AUTO: 5.7 10E9/L (ref 1.6–8.3)
NEUTROPHILS NFR BLD AUTO: 63.3 %
NRBC # BLD AUTO: 0 10*3/UL
NRBC BLD AUTO-RTO: 0 /100
PLATELET # BLD AUTO: 190 10E9/L (ref 150–450)
POTASSIUM SERPL-SCNC: 3.2 MMOL/L (ref 3.4–5.3)
RBC # BLD AUTO: 2.76 10E12/L (ref 3.8–5.2)
SODIUM SERPL-SCNC: 141 MMOL/L (ref 133–144)
WBC # BLD AUTO: 8.9 10E9/L (ref 4–11)

## 2020-01-30 PROCEDURE — 36415 COLL VENOUS BLD VENIPUNCTURE: CPT | Performed by: SURGERY

## 2020-01-30 PROCEDURE — 25000132 ZZH RX MED GY IP 250 OP 250 PS 637: Mod: GY | Performed by: SURGERY

## 2020-01-30 PROCEDURE — 25000132 ZZH RX MED GY IP 250 OP 250 PS 637: Mod: GY | Performed by: HOSPITALIST

## 2020-01-30 PROCEDURE — 25800025 ZZH RX 258: Performed by: HOSPITALIST

## 2020-01-30 PROCEDURE — 36415 COLL VENOUS BLD VENIPUNCTURE: CPT | Performed by: HOSPITALIST

## 2020-01-30 PROCEDURE — 80048 BASIC METABOLIC PNL TOTAL CA: CPT | Performed by: SURGERY

## 2020-01-30 PROCEDURE — 99239 HOSP IP/OBS DSCHRG MGMT >30: CPT | Performed by: HOSPITALIST

## 2020-01-30 PROCEDURE — 85025 COMPLETE CBC W/AUTO DIFF WBC: CPT | Performed by: SURGERY

## 2020-01-30 PROCEDURE — 85018 HEMOGLOBIN: CPT | Performed by: HOSPITALIST

## 2020-01-30 PROCEDURE — 40000275 ZZH STATISTIC RCP TIME EA 10 MIN

## 2020-01-30 PROCEDURE — 99231 SBSQ HOSP IP/OBS SF/LOW 25: CPT | Performed by: SURGERY

## 2020-01-30 RX ORDER — POTASSIUM CHLORIDE 20MEQ/15ML
40 LIQUID (ML) ORAL ONCE
Status: COMPLETED | OUTPATIENT
Start: 2020-01-30 | End: 2020-01-30

## 2020-01-30 RX ADMIN — METOPROLOL 12.5 MG: 25 TABLET ORAL at 09:15

## 2020-01-30 RX ADMIN — LISINOPRIL 2.5 MG: 2.5 TABLET ORAL at 09:16

## 2020-01-30 RX ADMIN — PANTOPRAZOLE SODIUM 40 MG: 40 TABLET, DELAYED RELEASE ORAL at 05:38

## 2020-01-30 RX ADMIN — POTASSIUM CHLORIDE 40 MEQ: 20 SOLUTION ORAL at 09:15

## 2020-01-30 RX ADMIN — LEVOTHYROXINE SODIUM 75 MCG: 75 TABLET ORAL at 05:38

## 2020-01-30 RX ADMIN — DEXTROSE AND SODIUM CHLORIDE: 5; 450 INJECTION, SOLUTION INTRAVENOUS at 05:41

## 2020-01-30 RX ADMIN — SUCRALFATE 1 G: 1 SUSPENSION ORAL at 05:39

## 2020-01-30 ASSESSMENT — ACTIVITIES OF DAILY LIVING (ADL)
ADLS_ACUITY_SCORE: 13

## 2020-01-30 NOTE — PLAN OF CARE
Face to face report given with opportunity to observe patient.    Report given to TRUNG Leyva RN   1/30/2020  7:13 AM

## 2020-01-30 NOTE — PLAN OF CARE
Patient discharged at 1:47 PM via wheel chair accompanied by daughter and staff. Prescriptions - None ordered for discharge. All belongings sent with patient.     Discharge instructions reviewed with Lita. Pt. Belongings went with pt. Home.    Patient discharged to home.    Core Measures and Vaccines  Core Measures applicable during stay: No.   Pneumonia and Influenza given prior to discharge, if indicated: N/A    Surgical Patient   Surgical Procedures during stay: EGD  Did patient receive discharge instruction on wound care and recognition of infection symptoms? No    MISC  Follow up appointment made:  Yes  Home and hospital aquired medications returned to patient: N/A  Patient reports pain was well managed at discharge: Yes

## 2020-01-30 NOTE — DISCHARGE SUMMARY
[facility]  Hospitalist Discharge Summary       Date of Admission:  1/27/2020  Date of Discharge:  1/30/2020  1:55 PM  Discharging Provider: Colt Crawford MD      Discharge Diagnoses   Upper GI bleed  Acute blood loss anemia    Follow-ups Needed After Discharge   Follow-up Appointments     Follow-up and recommended labs and tests       Follow up with primary care provider, Ophelia Troncoso, within 7 days for   hospital follow- up.  The following labs/tests are recommended: CBC.             Unresulted Labs Ordered in the Past 30 Days of this Admission     Date and Time Order Name Status Description    1/27/2020 2027 Surgical pathology exam In process           Discharge Disposition   Discharged to home  Condition at discharge: Stable    Hospital Course   Lita Ocasio is a 79 year old female who was admitted on 1/27/2020.  She has a history of hypertension, hyperlipidemia, bronchial asthma with emphysema, hypothyroidism, coronary artery disease, gastritis presented to the ED with a complaint of hematemesis.  The patient was on aspirin and Plavix for her coronary stent done in June 2019.  She was kept on IV Protonix 40 mg twice a day and was n.p.o.  Dr. Parish from general surgery was consulted and the patient underwent EGD that showed erosive gastritis with stigmata of bleeding but no active bleeding.  The patient did have acute blood loss anemia and hemoglobin dropped to 7.2 for which she received 1 unit of PRBCs and afterwards her hemoglobin was 8.6 at the time of discharge.  She was tolerating her regular diet well.  She was discharged home on Protonix 40 mg twice a day.  She will follow-up with her PCP.  I held aspirin and Plavix both.  In 1 week she will get her CBC checked with her PCP.  If hemoglobin is stable then she can be restarted on aspirin.  I discussed this with her PCP.  Her issues are the following:    Hematemesis: EGD done on 1/27/2020 showed  Diffuse gastritis with stigmata of bleeding,  possible minor ulcerations, no active bleeding.    Aspirin and Plavix have been held.  She was transfused 1 unit of PRBC yesterday as hemoglobin went down to 7.2.  Today it is 8.6.  Will monitor H&H every 6 hours and transfuse as neede.  Dr. Parish's input appreciated.      Acute Blood loss anemia: Due to Upper GI bleed.  She was transfused a unit of blood yesterday.  hemoglobin is 8.6.    Hypertension: The patient is on metoprolol and lisinopril at home.  Restarted now. Pt's blood pressure has been quite labile but it is low on the left arm and high on the right arm, could have subclavian stenosis.    Coronary artery disease: The patient is on aspirin and Plavix, both on hold due to hematemesis.  Continue on statins and beta-blockers.  No chest pain as such.    Bronchial asthma: kept  on albuterol as needed.  She does not have any wheezing at this point.    Hypothyroidism: Continue levothyroxine.    Hyperlipidemia: Continue on atorvastatin.    Chronic HFpEF: The patient's left ventricular ejection fraction was preserved.  She does have mitral regurgitation.  Continue lisinopril and metoprolol along with lasix and spironolactone.    Leukocytosis: Most probably reactive due to GI bleed.  Resolved now.    Dizziness: Had an episode of dizziness most probably due to volume depletion.  No chest pain.  EKG was rather unremarkable.    Colt Crawford      Consultations This Hospital Stay   SURGERY GENERAL IP CONSULT    Code Status   Full Code    Time Spent on this Encounter   IColt MD, personally saw the patient today and spent greater than 30 minutes discharging this patient.       Colt Crawford MD  [facility]  ______________________________________________________________________    Physical Exam   Vital Signs: Temp: 98.6  F (37  C) Temp src: Tympanic BP: (!) 136/40 Pulse: 74 Heart Rate: 63 Resp: 18 SpO2: (!) 90 % O2 Device: None (Room air)    Weight: 102 lbs 15.28 oz  General: She was in distress due to  feeling a bit dizzy.  Otherwise.  Alert oriented x3.  HEENT: PERRLA  Neck: Supple  Lungs: Bilaterally clear to auscultation  Cardiovascular:S1+S2 [place]  Abdomen: Soft, nontender, nondistended, no rebound tenderness or guarding.  Bowel sounds present  Extremities: No pitting edema, pulses bilaterally palpable, no cyanosis  Neuro: Moving all extremities  Psych: Appropriate mood       Primary Care Physician   Ophelia Troncoso    Discharge Orders      Reason for your hospital stay    GI bleed     Follow-up and recommended labs and tests     Follow up with primary care provider, Ophelia Troncoso, within 7 days for hospital follow- up.  The following labs/tests are recommended: CBC.     Activity    Your activity upon discharge: activity as tolerated     Full Code     Diet    Follow this diet upon discharge: Orders Placed This Encounter      Regular Diet Adult       Significant Results and Procedures   Most Recent 3 CBC's:  Recent Labs   Lab Test 01/30/20  0959 01/30/20  0517 01/29/20  1755  01/29/20  0540  01/28/20  0520   WBC  --  8.9  --   --  10.8  --  23.6*   HGB 8.6* 7.9* 8.1*   < > 8.0*   < > 7.8*   MCV  --  87  --   --  87  --  89   PLT  --  190  --   --  193  --  303    < > = values in this interval not displayed.     Most Recent 3 BMP's:  Recent Labs   Lab Test 01/30/20  0517 01/29/20  0540 01/28/20  0519    140 144   POTASSIUM 3.2* 3.6 3.8   CHLORIDE 111* 109 111*   CO2 23 27 24   BUN 13 18 20   CR 0.96 1.01 0.93   ANIONGAP 7 4 9   BECKY 7.7* 8.1* 8.5   * 98 158*   ,   Results for orders placed or performed during the hospital encounter of 01/27/20   XR Chest Port 1 View    Narrative    PROCEDURE:  XR CHEST PORT 1 VW    HISTORY:  sob, EVALUATE FOR PNEUMONIA.     COMPARISON:  September 2019    FINDINGS:   The cardiac silhouette is normal in size. The pulmonary vasculature is  normal.  The lungs are clear. No pleural effusion or pneumothorax.      Impression    IMPRESSION:  No acute cardiopulmonary  disease.      ELLA JACOBS MD       Discharge Medications   Current Discharge Medication List      CONTINUE these medications which have NOT CHANGED    Details   atorvastatin (LIPITOR) 40 MG tablet Take 1 tablet (40 mg) by mouth At Bedtime  Qty: 90 tablet, Refills: 1    Associated Diagnoses: Hyperlipidemia, unspecified hyperlipidemia type; S/P drug eluting coronary stent placement      calcium carbonate (TUMS) 500 MG chewable tablet Take 1 chew tab by mouth daily      erythromycin (ROMYCIN) 5 MG/GM ophthalmic ointment APPLY ONE APPLICATION TO BOTH EYES FOUR TIMES DAILY As Needed for entropian both lower lids  Refills: 0      ferrous sulfate (FEROSUL) 325 (65 Fe) MG tablet Take 1 tablet (325 mg) by mouth daily with food  Qty: 60 tablet, Refills: 0    Associated Diagnoses: Anemia due to blood loss, acute      fexofenadine (ALLEGRA) 180 MG tablet Take 180 mg by mouth daily      fluticasone (FLONASE) 50 MCG/ACT spray Spray 2 sprays into both nostrils daily  Qty: 1 Bottle, Refills: 11      furosemide (LASIX) 40 MG tablet Take 0.5 tablets (20 mg) by mouth daily  Qty: 45 tablet, Refills: 1    Associated Diagnoses: Chronic systolic congestive heart failure (H)      levothyroxine (SYNTHROID/LEVOTHROID) 75 MCG tablet Take 1 tablet (75 mcg) by mouth daily  Qty: 90 tablet, Refills: 2    Associated Diagnoses: Acquired hypothyroidism      lisinopril (PRINIVIL/ZESTRIL) 5 MG tablet Take 1 tablet (5 mg) by mouth daily  Qty: 90 tablet, Refills: 1    Associated Diagnoses: Chronic systolic congestive heart failure (H)      metoprolol succinate ER (TOPROL-XL) 25 MG 24 hr tablet Take 1 tablet (25 mg) by mouth daily  Qty: 90 tablet, Refills: 1    Associated Diagnoses: Chronic systolic congestive heart failure (H)      mirtazapine (REMERON) 7.5 MG tablet TAKE 1 TABLET AT BEDTIME  Qty: 90 tablet, Refills: 0    Associated Diagnoses: Weight loss; Primary insomnia      pantoprazole (PROTONIX) 40 MG EC tablet Take 1 tablet (40 mg) by mouth  2 times daily (before meals)  Qty: 180 tablet, Refills: 0    Associated Diagnoses: Gastrointestinal hemorrhage, unspecified gastrointestinal hemorrhage type      potassium chloride ER (K-DUR/KLOR-CON M) 10 MEQ CR tablet Take 1 tablet (10 mEq) by mouth daily  Qty: 30 tablet, Refills: 5    Associated Diagnoses: Hypokalemia      Tafluprost (ZIOPTAN) 0.0015 % SOLN Place 1 drop into both eyes At Bedtime      Turmeric 500 MG TABS Take 500 mg by mouth 2 times daily      vitamin B complex with vitamin C (VITAMIN  B COMPLEX) tablet Take 1 tablet by mouth daily Takes 500mg of Vitamin B      Vitamin D, Cholecalciferol, 1000 units TABS Take 1 tablet by mouth daily      vitamin E 400 units TABS Take by mouth daily      zafirlukast (ACCOLATE) 20 MG tablet Take 20 mg by mouth 2 times daily       hypromellose (GENTEAL) 0.3 % SOLN ophthalmic solution Place 1 drop into both eyes 4 times daily as needed for dry eyes      nitroGLYcerin (NITROSTAT) 0.4 MG sublingual tablet Place 0.4 mg under the tongue every 5 minutes as needed for chest pain For chest pain place 1 tablet under the tongue every 5 minutes for 3 doses. If symptoms persist 5 minutes after 1st dose call 911.         STOP taking these medications       aspirin (ASA) 81 MG chewable tablet Comments:   Reason for Stopping:         clopidogrel (PLAVIX) 75 MG tablet Comments:   Reason for Stopping:             Allergies   Allergies   Allergen Reactions     Evista [Raloxifene] Other (See Comments)     hypercalcemia     Prednisone GI Disturbance     Combigan [Brimonidine Tartrate-Timolol] Other (See Comments)     Eye swelling and itchy     Latex Rash     Levaquin [Levofloxacin] Muscle Pain (Myalgia)     Penicillins Rash     Restasis      Pt reports using eye gtts and having red irritated eyes

## 2020-01-30 NOTE — PLAN OF CARE
Pt is A&O, SBA, full liquid diet.  VS /42 HR 68 RR 16 T 98.5 O2 92% on RA, see note about pressures & pain.  Pt declined pain interventions.  Lungs are CTA, bowels active x4, passing flatus.  Pt voiding adequately.  Trace edema to BLE, no numbness/tingling.  Skin is intact.  Per ICU tele report pt is SR 60s w/ occ PVC and prolonged QT. IVF infusing, other IV SL.  Brakes locked, call light within reach, alarms on.  Frequent rounding done, free from falls.      Last hgb 8.1, diet was advanced at that time from npo status.     Face to face report given with opportunity to observe patient.    Report given to TRUNG Talbert RN   1/29/2020  10:46 PM

## 2020-01-30 NOTE — PLAN OF CARE
Pt. Greeted me with a smile upon entrance to room this morning. Pt. Was eager to advance her diet to a regular diet and go home. Pt. Plan of care is to advance to regular diet for breakfast, possibly lunch depending on what time daughter is available to  pt. Pt. Will continue to be monitored and if any needs arise, her plan of care will be adapted if needed.    Pt. Tolerated breakfast well, but did not eat dwyer than 30%. Pt. Daughter is aware of possible discharge time and is arriving after lunch.

## 2020-01-30 NOTE — PLAN OF CARE
Reason for hospital stay:  GI Bleed    Most recent vitals: BP (!) 138/37 (BP Location: Right arm)   Pulse 74   Temp 98.6  F (37  C) (Temporal)   Resp 16   Ht 1.524 m (5')   Wt 46.7 kg (102 lb 15.3 oz)   SpO2 (!) 90%   BMI 20.11 kg/m    Pain Management:  Denies pain even. Directly asked if she was still having abd burning per report from previous shift with decline  Orientation:  A&O. Flat, quiet, withdrawn. Minimal interaction or discussion with this nurse.  Cardiac:  Telemetry reading SR 60's with prolonged QT per ICU. BP's 130's/30's, NO c/o dizziness or lightheadedness  Respiratory:  LS clear. Infrequent cough noted.  GI:  BS active. Denies nausea or any GI symptoms. No stools or emesis this shift  :  Voiding without difficulty  Diet: Full liquid  Skin Issues:  Few scattered bruises. BLE trace edema  IVF:  D5 0.45NS at 100 mL/hr to left IV and right IV SL  ABX:  None  Mobility:  SBA to the BR. Steady on feet  Safety:  Call light within reach. No falls or injuries this shift    Comments: Hgb this am 7.1    1/30/2020  5:45 AM  Gali Peñaloza RN

## 2020-01-30 NOTE — PROGRESS NOTES
[facility] Progress Note    S: No pain, nausea or vomiting, no dark stools, tolerated full liquids without issue     # Pain Assessment:  Current Pain Score 2020   Patient currently in pain? denies   Pain score (0-10) -   Pain location -   Lita wynne pain level was assessed and she currently denies pain.      O:   Vitals:  BP  Min: 88/57  Max: 203/67  Temp  Av.5  F (37.5  C)  Min: 98.8  F (37.1  C)  Max: 100.3  F (37.9  C)  Pulse  Av  Min: 106  Max: 123  I/O last 3 completed shifts:  In: 2331 [P.O.:520; I.V.:1811]  Out: 900 [Urine:900]    Peripheral IV 20 Right (Active)   Site Assessment [facility] 2020 11:58 PM   Line Status Saline locked;Checked every 1-2 hour 2020 11:58 PM   Phlebitis Scale 0-->no symptoms 2020 11:58 PM   Infiltration Scale 0 2020 11:58 PM   Extravasation? No 2020  4:09 PM   Number of days: 3       Peripheral IV 20 Left (Active)   Site Assessment [facility] 2020 11:58 PM   Line Status Infusing;Checked every 1-2 hour 2020 11:58 PM   Phlebitis Scale 0-->no symptoms 2020 11:58 PM   Infiltration Scale 0 2020 11:58 PM   Extravasation? No 2020  4:09 PM   Number of days: 3     No line/device    Physical Exam:  General: alert oriented, no acute distress   ENT: sclera non-icteric   Pulmonary: no respiratory distress   CVS: RRR  ABD: soft non-tender non-distended.    Extremities: WWP     Assessment/Plan:  Admitted for upper GI bleed from erosive gastritis likely compounded by aspirin and plavix use. Taken for EGD on  where no active bleeding identified, though stigmata of old blood present. hbg continued to trend down though slowly without overt signs bleeding (vomiting, melena). Transfused 1 unit blood on .     Now on full liquids tolerating without issue, hbg last 24 hours stable. Ok to advance diet as tolerated. No plans for further intervention unless clinically deteriorates. Disposition per primary team.         Isrrael Parish MD

## 2020-01-30 NOTE — PLAN OF CARE
Face to face report given with opportunity to observe patient.    Report given to TRUNG Vang   1/29/2020  7:45 PM

## 2020-01-30 NOTE — PROVIDER NOTIFICATION
Hospitalist notified about pts blood pressures.  Systolic pressures continue to raise while diastolics get lower.  HR 60s. Will continue to monitor.

## 2020-01-30 NOTE — PROVIDER NOTIFICATION
MD notified about pt BP at time of med admin of Metoprolol.  BP on right arm is 157/42 HR 68. MD gave verbal order to give Metoprolol.

## 2020-02-04 NOTE — PROGRESS NOTES
Subjective     Lita Ocasio is a 79 year old female who presents to clinic today for the following health issues:    HPI         Hospital Follow-up Visit:    [facility]  Date of Admission: 1-27-20  Date of Discharge: 1-30-20  Reason(s) for Admission: gastrointestinal hemmorhage, anemia             Problems taking medications regularly:  None       Medication changes since discharge: None       Problems adhering to non-medication therapy:  None    Summary of hospitalization:  [facility] discharge summary reviewed    Lita Ocasio is a 79 year old female who was admitted on 1/27/2020.  She has a history of hypertension, hyperlipidemia, bronchial asthma with emphysema, hypothyroidism, coronary artery disease, gastritis presented to the ED with a complaint of hematemesis.  The patient was on aspirin and Plavix for her coronary stent done in June 2019.  She was kept on IV Protonix 40 mg twice a day and was n.p.o.  Dr. Parish from general surgery was consulted and the patient underwent EGD that showed erosive gastritis with stigmata of bleeding but no active bleeding.  The patient did have acute blood loss anemia and hemoglobin dropped to 7.2 for which she received 1 unit of PRBCs and afterwards her hemoglobin was 8.6 at the time of discharge.  She was tolerating her regular diet well.  She was discharged home on Protonix 40 mg twice a day.  She will follow-up with her PCP.  I held aspirin and Plavix both.  In 1 week she will get her CBC checked with her PCP.  If hemoglobin is stable then she can be restarted on aspirin.  I discussed this with her PCP.     Diagnostic Tests/Treatments reviewed.  Follow up needed: blood work.   Other Healthcare Providers Involved in Patient s Care:         None  Update since discharge: improved.     Post Discharge Medication Reconciliation: discharge medications reconciled, continue medications without change.    No further  "hematemesis, abd pain, nausea, vomiting, black or bloody stools.          Heart Failure Follow-up  Are you experiencing any shortness of breath? Yes, with activity only   How would you describe your shortness of breath?  Same as usual    Are you experiencing any swelling in your legs or feet?  No    Are you using more pillows than usual? No    Do you cough at night?  Yes, \"once in awhile\"    Do you check your weight daily?  No    Have you had a weight change recently?  No    Are you having any of the following side effects from your medications? (Select all that apply)  The patient does not report symptoms of dizziness, fatigue, cough, swelling, or slow heart beat.    Since your last visit, how many times have you gone to the cardiologist, urgent care, emergency room, or hospital because of your heart failure?   None    Weight loss/diarrhea      Duration: ongoing     Description (location/character/radiation): weight loss and diarrhea    Intensity:  mild    Accompanying signs and symptoms: above    History (similar episodes/previous evaluation): yes    Precipitating or alleviating factors: None    Therapies tried and outcome: remeron    Working on eating more. Weight stable throughout hospitalization. Lost her appetite for meat. Drinking protein shakes daily.        Reviewed and updated as needed this visit by Provider         Review of Systems   ROS COMP: Constitutional, HEENT, cardiovascular, pulmonary, gi and gu systems are negative, except as otherwise noted.      Objective    There were no vitals taken for this visit.  There is no height or weight on file to calculate BMI.  Physical Exam   GENERAL: healthy, alert and no distress  NECK: no adenopathy, no asymmetry, masses, or scars and thyroid normal to palpation  RESP: lungs clear to auscultation - no rales, rhonchi or wheezes  CV: regular rates and rhythm, normal S1 S2, no S3 or S4, no murmur, click or rub and no peripheral edema  ABDOMEN: soft, nontender, no " hepatosplenomegaly, no masses and bowel sounds normal  MS: no gross musculoskeletal defects noted, no edema  PSYCH: mentation appears normal, affect normal/bright    Diagnostic Test Results:  Labs reviewed in Epic  Results for orders placed or performed in visit on 02/05/20   CBC with platelets and differential     Status: Abnormal   Result Value Ref [facility]    WBC 9.8 4.0 - 11.0 10e9/L    RBC Count 3.73 (L) 3.8 - 5.2 10e12/L    Hemoglobin 10.5 (L) 11.7 - 15.7 g/dL    Hematocrit 32.6 (L) 35.0 - 47.0 %    MCV 87 78 - 100 fl    MCH 28.2 26.5 - 33.0 pg    MCHC 32.2 31.5 - 36.5 g/dL    RDW 15.6 (H) 10.0 - 15.0 %    Platelet Count 416 150 - 450 10e9/L    Diff Method Automated Method     % Neutrophils 63.9 %    % Lymphocytes 23.1 %    % Monocytes 10.1 %    % Eosinophils 1.7 %    % Basophils 0.7 %    % Immature Granulocytes 0.5 %    Nucleated RBCs 0 0 /100    Absolute Neutrophil 6.3 1.6 - 8.3 10e9/L    Absolute Lymphocytes 2.3 0.8 - 5.3 10e9/L    Absolute Monocytes 1.0 0.0 - 1.3 10e9/L    Absolute Eosinophils 0.2 0.0 - 0.7 10e9/L    Absolute Basophils 0.1 0.0 - 0.2 10e9/L    Abs Immature Granulocytes 0.1 0 - 0.4 10e9/L    Absolute Nucleated RBC 0.0            Assessment & Plan     Anemia due to blood loss, acute / History of coronary artery stent placement  Hgb back up. Asymptomatic. Discontinue Plavix as > 6 mo out from stenting and now 3 GI bleeds. Restart ASA 81. Take with food. Continue protonix bid for now.   - CBC with platelets and differential    Chronic systolic congestive heart failure (H)  Stable.     Weight Loss  Weight is stable. Continue to encourage high protein foods.     Benign essential hypertension  Bp meds restarted prior to discharge, BP wnl.       Return in about 1 month (around 3/5/2020) for hemoglobin check, weight check. .    Ophelia Troncoso MD  [facility] [place]

## 2020-02-05 ENCOUNTER — OFFICE VISIT (OUTPATIENT)
Dept: FAMILY MEDICINE | Facility: OTHER | Age: 80
End: 2020-02-05
Attending: FAMILY MEDICINE
Payer: MEDICARE

## 2020-02-05 VITALS
HEART RATE: 74 BPM | RESPIRATION RATE: 20 BRPM | TEMPERATURE: 99.1 F | SYSTOLIC BLOOD PRESSURE: 134 MMHG | DIASTOLIC BLOOD PRESSURE: 68 MMHG | WEIGHT: 106.8 LBS | BODY MASS INDEX: 20.97 KG/M2 | HEIGHT: 60 IN | OXYGEN SATURATION: 94 %

## 2020-02-05 DIAGNOSIS — R63.4 WEIGHT LOSS: ICD-10-CM

## 2020-02-05 DIAGNOSIS — I10 BENIGN ESSENTIAL HYPERTENSION: ICD-10-CM

## 2020-02-05 DIAGNOSIS — I50.22 CHRONIC SYSTOLIC CONGESTIVE HEART FAILURE (H): Chronic | ICD-10-CM

## 2020-02-05 DIAGNOSIS — D62 ANEMIA DUE TO BLOOD LOSS, ACUTE: Primary | ICD-10-CM

## 2020-02-05 DIAGNOSIS — Z95.5 HISTORY OF CORONARY ARTERY STENT PLACEMENT: ICD-10-CM

## 2020-02-05 LAB
BASOPHILS # BLD AUTO: 0.1 10E9/L (ref 0–0.2)
BASOPHILS NFR BLD AUTO: 0.7 %
DIFFERENTIAL METHOD BLD: ABNORMAL
EOSINOPHIL # BLD AUTO: 0.2 10E9/L (ref 0–0.7)
EOSINOPHIL NFR BLD AUTO: 1.7 %
ERYTHROCYTE [DISTWIDTH] IN BLOOD BY AUTOMATED COUNT: 15.6 % (ref 10–15)
HCT VFR BLD AUTO: 32.6 % (ref 35–47)
HGB BLD-MCNC: 10.5 G/DL (ref 11.7–15.7)
IMM GRANULOCYTES # BLD: 0.1 10E9/L (ref 0–0.4)
IMM GRANULOCYTES NFR BLD: 0.5 %
LYMPHOCYTES # BLD AUTO: 2.3 10E9/L (ref 0.8–5.3)
LYMPHOCYTES NFR BLD AUTO: 23.1 %
MCH RBC QN AUTO: 28.2 PG (ref 26.5–33)
MCHC RBC AUTO-ENTMCNC: 32.2 G/DL (ref 31.5–36.5)
MCV RBC AUTO: 87 FL (ref 78–100)
MONOCYTES # BLD AUTO: 1 10E9/L (ref 0–1.3)
MONOCYTES NFR BLD AUTO: 10.1 %
NEUTROPHILS # BLD AUTO: 6.3 10E9/L (ref 1.6–8.3)
NEUTROPHILS NFR BLD AUTO: 63.9 %
NRBC # BLD AUTO: 0 10*3/UL
NRBC BLD AUTO-RTO: 0 /100
PLATELET # BLD AUTO: 416 10E9/L (ref 150–450)
RBC # BLD AUTO: 3.73 10E12/L (ref 3.8–5.2)
WBC # BLD AUTO: 9.8 10E9/L (ref 4–11)

## 2020-02-05 PROCEDURE — 36415 COLL VENOUS BLD VENIPUNCTURE: CPT | Mod: ZL | Performed by: FAMILY MEDICINE

## 2020-02-05 PROCEDURE — G0463 HOSPITAL OUTPT CLINIC VISIT: HCPCS

## 2020-02-05 PROCEDURE — 85025 COMPLETE CBC W/AUTO DIFF WBC: CPT | Mod: ZL | Performed by: FAMILY MEDICINE

## 2020-02-05 PROCEDURE — 99214 OFFICE O/P EST MOD 30 MIN: CPT | Performed by: FAMILY MEDICINE

## 2020-02-05 ASSESSMENT — MIFFLIN-ST. JEOR: SCORE: 880.94

## 2020-02-05 ASSESSMENT — PAIN SCALES - GENERAL: PAINLEVEL: NO PAIN (0)

## 2020-02-05 NOTE — NURSING NOTE
Chief Complaint   Patient presents with     Hospital F/U     Heart Failure       Initial /68   Pulse 74   Temp 99.1  F (37.3  C) (Tympanic)   Resp 20   Ht 1.524 m (5')   Wt 47.2 kg (104 lb)   SpO2 94%   BMI 20.31 kg/m   Estimated body mass index is 20.31 kg/m  as calculated from the following:    Height as of this encounter: 1.524 m (5').    Weight as of this encounter: 47.2 kg (104 lb).  Medication Reconciliation: complete  Karen Arizmendi LPN

## 2020-02-06 ENCOUNTER — TELEPHONE (OUTPATIENT)
Dept: FAMILY MEDICINE | Facility: OTHER | Age: 80
End: 2020-02-06

## 2020-02-06 DIAGNOSIS — D62 ANEMIA DUE TO BLOOD LOSS, ACUTE: ICD-10-CM

## 2020-02-06 DIAGNOSIS — E03.9 ACQUIRED HYPOTHYROIDISM: ICD-10-CM

## 2020-02-06 RX ORDER — FERROUS SULFATE 325(65) MG
325 TABLET ORAL
Qty: 60 TABLET | Refills: 0 | Status: SHIPPED | OUTPATIENT
Start: 2020-02-06 | End: 2020-03-19

## 2020-02-06 RX ORDER — LEVOTHYROXINE SODIUM 75 UG/1
75 TABLET ORAL DAILY
Qty: 90 TABLET | Refills: 0 | Status: SHIPPED | OUTPATIENT
Start: 2020-02-06 | End: 2020-04-20

## 2020-02-10 ENCOUNTER — HEALTH MAINTENANCE LETTER (OUTPATIENT)
Age: 80
End: 2020-02-10

## 2020-02-10 DIAGNOSIS — I50.22 CHRONIC SYSTOLIC CONGESTIVE HEART FAILURE (H): Chronic | ICD-10-CM

## 2020-02-12 RX ORDER — SPIRONOLACTONE 25 MG/1
TABLET ORAL
Qty: 30 TABLET | Refills: 6 | OUTPATIENT
Start: 2020-02-12

## 2020-02-12 NOTE — TELEPHONE ENCOUNTER
spironolactone (ALDACTONE) 25 MG tablet     Last Written Prescription Date:  NOT ON CURRENT MED LIST  Last Fill Quantity: ,   # refills:   Last Office Visit:   Future Office visit:    Next 5 appointments (look out 90 days)    Mar 06, 2020  2:30 PM CST  (Arrive by 2:15 PM)  SHORT with Ophelia Troncoso MD  [facility] ([facility] ) [address]  [place] [address]  [phone]   Mar 25, 2020  2:00 PM CDT  (Arrive by 1:45 PM)  Return Visit with Katelynn Winters PA-C  [facility] ([facility] ) [address] [place] [address]  [phone]           Routing refill request to provider for review/approval because:

## 2020-02-19 ENCOUNTER — HOSPITAL ENCOUNTER (INPATIENT)
Facility: HOSPITAL | Age: 80
LOS: 3 days | Discharge: HOME | DRG: 378 | End: 2020-02-22
Attending: EMERGENCY MEDICINE | Admitting: INTERNAL MEDICINE
Payer: MEDICARE

## 2020-02-19 DIAGNOSIS — I10 BENIGN ESSENTIAL HYPERTENSION: ICD-10-CM

## 2020-02-19 DIAGNOSIS — K92.2 ACUTE UPPER GI BLEED: Primary | ICD-10-CM

## 2020-02-19 DIAGNOSIS — K92.2 UPPER GI BLEED: ICD-10-CM

## 2020-02-19 LAB
ANION GAP SERPL CALCULATED.3IONS-SCNC: 7 MMOL/L (ref 3–14)
APTT PPP: 29 SEC (ref 22–37)
BASOPHILS # BLD AUTO: 0 10E9/L (ref 0–0.2)
BASOPHILS NFR BLD AUTO: 0.3 %
BUN SERPL-MCNC: 12 MG/DL (ref 7–30)
CALCIUM SERPL-MCNC: 9.2 MG/DL (ref 8.5–10.1)
CHLORIDE SERPL-SCNC: 104 MMOL/L (ref 94–109)
CO2 SERPL-SCNC: 26 MMOL/L (ref 20–32)
CREAT SERPL-MCNC: 1.05 MG/DL (ref 0.52–1.04)
DIFFERENTIAL METHOD BLD: ABNORMAL
EOSINOPHIL # BLD AUTO: 0 10E9/L (ref 0–0.7)
EOSINOPHIL NFR BLD AUTO: 0.1 %
ERYTHROCYTE [DISTWIDTH] IN BLOOD BY AUTOMATED COUNT: 14.6 % (ref 10–15)
GFR SERPL CREATININE-BSD FRML MDRD: 50 ML/MIN/{1.73_M2}
GLUCOSE SERPL-MCNC: 124 MG/DL (ref 70–99)
HCT VFR BLD AUTO: 37.5 % (ref 35–47)
HEMOCCULT SP1 STL QL: POSITIVE
HGB BLD-MCNC: 12.1 G/DL (ref 11.7–15.7)
IMM GRANULOCYTES # BLD: 0.1 10E9/L (ref 0–0.4)
IMM GRANULOCYTES NFR BLD: 0.4 %
INR PPP: 0.96 (ref 0.86–1.14)
LYMPHOCYTES # BLD AUTO: 1.5 10E9/L (ref 0.8–5.3)
LYMPHOCYTES NFR BLD AUTO: 12.5 %
MCH RBC QN AUTO: 28.3 PG (ref 26.5–33)
MCHC RBC AUTO-ENTMCNC: 32.3 G/DL (ref 31.5–36.5)
MCV RBC AUTO: 88 FL (ref 78–100)
MONOCYTES # BLD AUTO: 0.5 10E9/L (ref 0–1.3)
MONOCYTES NFR BLD AUTO: 4.3 %
NEUTROPHILS # BLD AUTO: 9.8 10E9/L (ref 1.6–8.3)
NEUTROPHILS NFR BLD AUTO: 82.4 %
NRBC # BLD AUTO: 0 10*3/UL
NRBC BLD AUTO-RTO: 0 /100
PLATELET # BLD AUTO: 410 10E9/L (ref 150–450)
POTASSIUM SERPL-SCNC: 3.9 MMOL/L (ref 3.4–5.3)
RBC # BLD AUTO: 4.28 10E12/L (ref 3.8–5.2)
SODIUM SERPL-SCNC: 137 MMOL/L (ref 133–144)
WBC # BLD AUTO: 11.9 10E9/L (ref 4–11)

## 2020-02-19 PROCEDURE — 80048 BASIC METABOLIC PNL TOTAL CA: CPT | Performed by: EMERGENCY MEDICINE

## 2020-02-19 PROCEDURE — 25000128 H RX IP 250 OP 636: Performed by: EMERGENCY MEDICINE

## 2020-02-19 PROCEDURE — 25000132 ZZH RX MED GY IP 250 OP 250 PS 637: Mod: GY | Performed by: INTERNAL MEDICINE

## 2020-02-19 PROCEDURE — 25000125 ZZHC RX 250: Performed by: INTERNAL MEDICINE

## 2020-02-19 PROCEDURE — 86900 BLOOD TYPING SEROLOGIC ABO: CPT | Performed by: INTERNAL MEDICINE

## 2020-02-19 PROCEDURE — 25800030 ZZH RX IP 258 OP 636: Performed by: INTERNAL MEDICINE

## 2020-02-19 PROCEDURE — 85730 THROMBOPLASTIN TIME PARTIAL: CPT | Performed by: EMERGENCY MEDICINE

## 2020-02-19 PROCEDURE — 82274 ASSAY TEST FOR BLOOD FECAL: CPT | Performed by: EMERGENCY MEDICINE

## 2020-02-19 PROCEDURE — 85610 PROTHROMBIN TIME: CPT | Performed by: EMERGENCY MEDICINE

## 2020-02-19 PROCEDURE — 40000786 ZZHCL STATISTIC ACTIVE MRSA SURVEILLANCE CULTURE: Performed by: INTERNAL MEDICINE

## 2020-02-19 PROCEDURE — 86850 RBC ANTIBODY SCREEN: CPT | Performed by: INTERNAL MEDICINE

## 2020-02-19 PROCEDURE — 99285 EMERGENCY DEPT VISIT HI MDM: CPT | Mod: Z6 | Performed by: EMERGENCY MEDICINE

## 2020-02-19 PROCEDURE — 99223 1ST HOSP IP/OBS HIGH 75: CPT | Mod: AI | Performed by: INTERNAL MEDICINE

## 2020-02-19 PROCEDURE — 96374 THER/PROPH/DIAG INJ IV PUSH: CPT

## 2020-02-19 PROCEDURE — 85025 COMPLETE CBC W/AUTO DIFF WBC: CPT | Performed by: EMERGENCY MEDICINE

## 2020-02-19 PROCEDURE — 99285 EMERGENCY DEPT VISIT HI MDM: CPT | Mod: 25

## 2020-02-19 PROCEDURE — C9113 INJ PANTOPRAZOLE SODIUM, VIA: HCPCS | Performed by: EMERGENCY MEDICINE

## 2020-02-19 PROCEDURE — 12000000 ZZH R&B MED SURG/OB

## 2020-02-19 PROCEDURE — 86901 BLOOD TYPING SEROLOGIC RH(D): CPT | Performed by: INTERNAL MEDICINE

## 2020-02-19 RX ORDER — NITROGLYCERIN 0.4 MG/1
0.4 TABLET SUBLINGUAL EVERY 5 MIN PRN
Status: DISCONTINUED | OUTPATIENT
Start: 2020-02-19 | End: 2020-02-22 | Stop reason: HOSPADM

## 2020-02-19 RX ORDER — METOPROLOL TARTRATE 1 MG/ML
5 INJECTION, SOLUTION INTRAVENOUS EVERY 6 HOURS
Status: DISCONTINUED | OUTPATIENT
Start: 2020-02-19 | End: 2020-02-20

## 2020-02-19 RX ORDER — ONDANSETRON 4 MG/1
4 TABLET, ORALLY DISINTEGRATING ORAL EVERY 6 HOURS PRN
Status: DISCONTINUED | OUTPATIENT
Start: 2020-02-19 | End: 2020-02-22 | Stop reason: HOSPADM

## 2020-02-19 RX ORDER — ONDANSETRON 2 MG/ML
4 INJECTION INTRAMUSCULAR; INTRAVENOUS EVERY 6 HOURS PRN
Status: DISCONTINUED | OUTPATIENT
Start: 2020-02-19 | End: 2020-02-22 | Stop reason: HOSPADM

## 2020-02-19 RX ORDER — NALOXONE HYDROCHLORIDE 0.4 MG/ML
.1-.4 INJECTION, SOLUTION INTRAMUSCULAR; INTRAVENOUS; SUBCUTANEOUS
Status: DISCONTINUED | OUTPATIENT
Start: 2020-02-19 | End: 2020-02-22 | Stop reason: HOSPADM

## 2020-02-19 RX ORDER — MIRTAZAPINE 7.5 MG/1
7.5 TABLET, FILM COATED ORAL AT BEDTIME
Status: DISCONTINUED | OUTPATIENT
Start: 2020-02-19 | End: 2020-02-22 | Stop reason: HOSPADM

## 2020-02-19 RX ORDER — ACETAMINOPHEN 325 MG/1
650 TABLET ORAL EVERY 4 HOURS PRN
Status: DISCONTINUED | OUTPATIENT
Start: 2020-02-19 | End: 2020-02-22 | Stop reason: HOSPADM

## 2020-02-19 RX ORDER — SODIUM CHLORIDE 9 MG/ML
INJECTION, SOLUTION INTRAVENOUS CONTINUOUS
Status: DISCONTINUED | OUTPATIENT
Start: 2020-02-19 | End: 2020-02-21

## 2020-02-19 RX ORDER — LIDOCAINE 40 MG/G
CREAM TOPICAL
Status: DISCONTINUED | OUTPATIENT
Start: 2020-02-19 | End: 2020-02-22 | Stop reason: HOSPADM

## 2020-02-19 RX ADMIN — MIRTAZAPINE 7.5 MG: 7.5 TABLET ORAL at 22:05

## 2020-02-19 RX ADMIN — PANTOPRAZOLE SODIUM 40 MG: 40 INJECTION, POWDER, FOR SOLUTION INTRAVENOUS at 20:39

## 2020-02-19 RX ADMIN — METOPROLOL TARTRATE 5 MG: 1 INJECTION, SOLUTION INTRAVENOUS at 22:06

## 2020-02-19 RX ADMIN — SODIUM CHLORIDE: 9 INJECTION, SOLUTION INTRAVENOUS at 22:06

## 2020-02-20 ENCOUNTER — ANESTHESIA (OUTPATIENT)
Dept: SURGERY | Facility: HOSPITAL | Age: 80
DRG: 378 | End: 2020-02-20
Payer: MEDICARE

## 2020-02-20 ENCOUNTER — ANESTHESIA EVENT (OUTPATIENT)
Dept: SURGERY | Facility: HOSPITAL | Age: 80
DRG: 378 | End: 2020-02-20
Payer: MEDICARE

## 2020-02-20 PROBLEM — K92.2 UPPER GI BLEED: Status: ACTIVE | Noted: 2020-02-19

## 2020-02-20 LAB
ABO + RH BLD: NORMAL
ABO + RH BLD: NORMAL
ANION GAP SERPL CALCULATED.3IONS-SCNC: 5 MMOL/L (ref 3–14)
BLD GP AB SCN SERPL QL: NORMAL
BLOOD BANK CMNT PATIENT-IMP: NORMAL
BUN SERPL-MCNC: 13 MG/DL (ref 7–30)
CALCIUM SERPL-MCNC: 8.4 MG/DL (ref 8.5–10.1)
CHLORIDE SERPL-SCNC: 108 MMOL/L (ref 94–109)
CO2 SERPL-SCNC: 27 MMOL/L (ref 20–32)
CREAT SERPL-MCNC: 1.24 MG/DL (ref 0.52–1.04)
ERYTHROCYTE [DISTWIDTH] IN BLOOD BY AUTOMATED COUNT: 14.6 % (ref 10–15)
ERYTHROCYTE [DISTWIDTH] IN BLOOD BY AUTOMATED COUNT: 14.8 % (ref 10–15)
GFR SERPL CREATININE-BSD FRML MDRD: 41 ML/MIN/{1.73_M2}
GLUCOSE SERPL-MCNC: 89 MG/DL (ref 70–99)
HCT VFR BLD AUTO: 30.8 % (ref 35–47)
HCT VFR BLD AUTO: 32.3 % (ref 35–47)
HGB BLD-MCNC: 10.2 G/DL (ref 11.7–15.7)
HGB BLD-MCNC: 9.4 G/DL (ref 11.7–15.7)
HGB BLD-MCNC: 9.6 G/DL (ref 11.7–15.7)
MCH RBC QN AUTO: 28 PG (ref 26.5–33)
MCH RBC QN AUTO: 28 PG (ref 26.5–33)
MCHC RBC AUTO-ENTMCNC: 31.2 G/DL (ref 31.5–36.5)
MCHC RBC AUTO-ENTMCNC: 31.6 G/DL (ref 31.5–36.5)
MCV RBC AUTO: 89 FL (ref 78–100)
MCV RBC AUTO: 90 FL (ref 78–100)
PLATELET # BLD AUTO: 311 10E9/L (ref 150–450)
PLATELET # BLD AUTO: 319 10E9/L (ref 150–450)
POTASSIUM SERPL-SCNC: 4 MMOL/L (ref 3.4–5.3)
RBC # BLD AUTO: 3.43 10E12/L (ref 3.8–5.2)
RBC # BLD AUTO: 3.64 10E12/L (ref 3.8–5.2)
SODIUM SERPL-SCNC: 140 MMOL/L (ref 133–144)
SPECIMEN EXP DATE BLD: NORMAL
WBC # BLD AUTO: 7.5 10E9/L (ref 4–11)
WBC # BLD AUTO: 9.2 10E9/L (ref 4–11)

## 2020-02-20 PROCEDURE — 37000008 ZZH ANESTHESIA TECHNICAL FEE, 1ST 30 MIN: Performed by: SURGERY

## 2020-02-20 PROCEDURE — 25800030 ZZH RX IP 258 OP 636: Performed by: INTERNAL MEDICINE

## 2020-02-20 PROCEDURE — 25000125 ZZHC RX 250: Performed by: NURSE ANESTHETIST, CERTIFIED REGISTERED

## 2020-02-20 PROCEDURE — 71000014 ZZH RECOVERY PHASE 1 LEVEL 2 FIRST HR: Performed by: SURGERY

## 2020-02-20 PROCEDURE — 37000009 ZZH ANESTHESIA TECHNICAL FEE, EACH ADDTL 15 MIN: Performed by: SURGERY

## 2020-02-20 PROCEDURE — 99232 SBSQ HOSP IP/OBS MODERATE 35: CPT | Mod: 25 | Performed by: SURGERY

## 2020-02-20 PROCEDURE — 36000050 ZZH SURGERY LEVEL 2 1ST 30 MIN: Performed by: SURGERY

## 2020-02-20 PROCEDURE — 43239 EGD BIOPSY SINGLE/MULTIPLE: CPT | Performed by: SURGERY

## 2020-02-20 PROCEDURE — 43239 EGD BIOPSY SINGLE/MULTIPLE: CPT | Performed by: NURSE ANESTHETIST, CERTIFIED REGISTERED

## 2020-02-20 PROCEDURE — 25000128 H RX IP 250 OP 636: Performed by: INTERNAL MEDICINE

## 2020-02-20 PROCEDURE — C9113 INJ PANTOPRAZOLE SODIUM, VIA: HCPCS | Performed by: INTERNAL MEDICINE

## 2020-02-20 PROCEDURE — 85027 COMPLETE CBC AUTOMATED: CPT | Performed by: INTERNAL MEDICINE

## 2020-02-20 PROCEDURE — 0DB78ZX EXCISION OF STOMACH, PYLORUS, VIA NATURAL OR ARTIFICIAL OPENING ENDOSCOPIC, DIAGNOSTIC: ICD-10-PCS | Performed by: SURGERY

## 2020-02-20 PROCEDURE — 36000052 ZZH SURGERY LEVEL 2 EA 15 ADDTL MIN: Performed by: SURGERY

## 2020-02-20 PROCEDURE — 85018 HEMOGLOBIN: CPT | Performed by: INTERNAL MEDICINE

## 2020-02-20 PROCEDURE — 25800030 ZZH RX IP 258 OP 636: Performed by: NURSE ANESTHETIST, CERTIFIED REGISTERED

## 2020-02-20 PROCEDURE — 36415 COLL VENOUS BLD VENIPUNCTURE: CPT | Performed by: INTERNAL MEDICINE

## 2020-02-20 PROCEDURE — 25000128 H RX IP 250 OP 636: Performed by: NURSE ANESTHETIST, CERTIFIED REGISTERED

## 2020-02-20 PROCEDURE — 25000132 ZZH RX MED GY IP 250 OP 250 PS 637: Mod: GY | Performed by: SURGERY

## 2020-02-20 PROCEDURE — 93010 ELECTROCARDIOGRAM REPORT: CPT | Performed by: INTERNAL MEDICINE

## 2020-02-20 PROCEDURE — 27210794 ZZH OR GENERAL SUPPLY STERILE: Performed by: SURGERY

## 2020-02-20 PROCEDURE — 40000275 ZZH STATISTIC RCP TIME EA 10 MIN

## 2020-02-20 PROCEDURE — 12000000 ZZH R&B MED SURG/OB

## 2020-02-20 PROCEDURE — 80048 BASIC METABOLIC PNL TOTAL CA: CPT | Performed by: INTERNAL MEDICINE

## 2020-02-20 PROCEDURE — 25000125 ZZHC RX 250: Performed by: INTERNAL MEDICINE

## 2020-02-20 PROCEDURE — 99232 SBSQ HOSP IP/OBS MODERATE 35: CPT | Mod: 25 | Performed by: INTERNAL MEDICINE

## 2020-02-20 PROCEDURE — 99100 ANES PT EXTEME AGE<1 YR&>70: CPT | Performed by: NURSE ANESTHETIST, CERTIFIED REGISTERED

## 2020-02-20 PROCEDURE — 93005 ELECTROCARDIOGRAM TRACING: CPT

## 2020-02-20 PROCEDURE — 88305 TISSUE EXAM BY PATHOLOGIST: CPT | Mod: TC | Performed by: SURGERY

## 2020-02-20 RX ORDER — LANOLIN ALCOHOL/MO/W.PET/CERES
1000 CREAM (GRAM) TOPICAL DAILY
COMMUNITY
End: 2020-10-13

## 2020-02-20 RX ORDER — HYDRALAZINE HYDROCHLORIDE 20 MG/ML
INJECTION INTRAMUSCULAR; INTRAVENOUS PRN
Status: DISCONTINUED | OUTPATIENT
Start: 2020-02-20 | End: 2020-02-20

## 2020-02-20 RX ORDER — VITAMIN E 268 MG
400 CAPSULE ORAL DAILY
COMMUNITY
End: 2020-03-19

## 2020-02-20 RX ORDER — PROPOFOL 10 MG/ML
INJECTION, EMULSION INTRAVENOUS PRN
Status: DISCONTINUED | OUTPATIENT
Start: 2020-02-20 | End: 2020-02-20

## 2020-02-20 RX ORDER — METOPROLOL TARTRATE 1 MG/ML
INJECTION, SOLUTION INTRAVENOUS PRN
Status: DISCONTINUED | OUTPATIENT
Start: 2020-02-20 | End: 2020-02-20

## 2020-02-20 RX ORDER — SODIUM CHLORIDE 9 MG/ML
INJECTION, SOLUTION INTRAVENOUS CONTINUOUS PRN
Status: DISCONTINUED | OUTPATIENT
Start: 2020-02-20 | End: 2020-02-20

## 2020-02-20 RX ORDER — CETIRIZINE HYDROCHLORIDE 10 MG/1
10 TABLET ORAL DAILY
COMMUNITY
End: 2022-01-26

## 2020-02-20 RX ORDER — FLUMAZENIL 0.1 MG/ML
0.2 INJECTION, SOLUTION INTRAVENOUS
Status: ACTIVE | OUTPATIENT
Start: 2020-02-20 | End: 2020-02-21

## 2020-02-20 RX ORDER — NALOXONE HYDROCHLORIDE 0.4 MG/ML
.1-.4 INJECTION, SOLUTION INTRAMUSCULAR; INTRAVENOUS; SUBCUTANEOUS
Status: ACTIVE | OUTPATIENT
Start: 2020-02-20 | End: 2020-02-21

## 2020-02-20 RX ORDER — SUCRALFATE 1 G/1
1 TABLET ORAL
Status: DISCONTINUED | OUTPATIENT
Start: 2020-02-20 | End: 2020-02-22 | Stop reason: HOSPADM

## 2020-02-20 RX ORDER — ASPIRIN 81 MG/1
81 TABLET ORAL DAILY
Status: ON HOLD | COMMUNITY
End: 2020-02-22

## 2020-02-20 RX ORDER — LIDOCAINE HYDROCHLORIDE 20 MG/ML
INJECTION, SOLUTION INFILTRATION; PERINEURAL PRN
Status: DISCONTINUED | OUTPATIENT
Start: 2020-02-20 | End: 2020-02-20

## 2020-02-20 RX ORDER — LIDOCAINE 40 MG/G
CREAM TOPICAL
Status: DISCONTINUED | OUTPATIENT
Start: 2020-02-20 | End: 2020-02-22 | Stop reason: HOSPADM

## 2020-02-20 RX ADMIN — SODIUM CHLORIDE: 9 INJECTION, SOLUTION INTRAVENOUS at 08:14

## 2020-02-20 RX ADMIN — LIDOCAINE HYDROCHLORIDE 20 MG: 20 INJECTION, SOLUTION INFILTRATION; PERINEURAL at 18:56

## 2020-02-20 RX ADMIN — METOPROLOL TARTRATE 5 MG: 1 INJECTION, SOLUTION INTRAVENOUS at 04:20

## 2020-02-20 RX ADMIN — SUCRALFATE 1 G: 1 TABLET ORAL at 22:56

## 2020-02-20 RX ADMIN — MIRTAZAPINE 7.5 MG: 7.5 TABLET ORAL at 22:56

## 2020-02-20 RX ADMIN — SODIUM CHLORIDE: 9 INJECTION, SOLUTION INTRAVENOUS at 18:35

## 2020-02-20 RX ADMIN — HYDRALAZINE HYDROCHLORIDE 10 MG: 20 INJECTION INTRAMUSCULAR; INTRAVENOUS at 18:50

## 2020-02-20 RX ADMIN — METOPROLOL TARTRATE 2.5 MG: 5 INJECTION INTRAVENOUS at 18:47

## 2020-02-20 RX ADMIN — LIDOCAINE HYDROCHLORIDE 20 MG: 20 INJECTION, SOLUTION INFILTRATION; PERINEURAL at 19:00

## 2020-02-20 RX ADMIN — MIDAZOLAM 2 MG: 1 INJECTION INTRAMUSCULAR; INTRAVENOUS at 18:51

## 2020-02-20 RX ADMIN — METOPROLOL TARTRATE 2.5 MG: 5 INJECTION INTRAVENOUS at 18:42

## 2020-02-20 RX ADMIN — HYDRALAZINE HYDROCHLORIDE 10 MG: 20 INJECTION INTRAMUSCULAR; INTRAVENOUS at 18:53

## 2020-02-20 RX ADMIN — PROPOFOL 40 MG: 10 INJECTION, EMULSION INTRAVENOUS at 19:00

## 2020-02-20 RX ADMIN — PROPOFOL 40 MG: 10 INJECTION, EMULSION INTRAVENOUS at 18:56

## 2020-02-20 RX ADMIN — PANTOPRAZOLE SODIUM 40 MG: 40 INJECTION, POWDER, FOR SOLUTION INTRAVENOUS at 08:14

## 2020-02-20 ASSESSMENT — ACTIVITIES OF DAILY LIVING (ADL)
ADLS_ACUITY_SCORE: 14

## 2020-02-20 ASSESSMENT — COPD QUESTIONNAIRES: COPD: 1

## 2020-02-20 ASSESSMENT — LIFESTYLE VARIABLES: TOBACCO_USE: 1

## 2020-02-20 NOTE — PLAN OF CARE
Face to face report given with opportunity to observe patient.    Report given to Melissa Alvarez RN   2/19/2020  11:24 PM

## 2020-02-20 NOTE — ED NOTES
Pt ambulated to room 5. Pt states she is annoyed her daughter made her come in again. Pt had black stool 2 days ago, and one yesterday, today reports a brown and black mixed stool. Today had  6/10 mid abd pain that comes and goes. Denies dizziness or weakness. Denies any active bleeding. Pt is only onASA at this time. Previous GIB in January requiring hospitalization.

## 2020-02-20 NOTE — H&P
[facility]    History and Physical - Hospitalist Service       Date of Admission:  2/19/2020    Assessment & Plan   Lita Ocasio is a 79 year old female admitted on 2/19/2020.     Acute upper GI bleeding: General Surgery consult for possible scoping (case discussed with Dr Montoya), IV Protonix, bowel rest, lab diagnostic monitoring     Diet: clear, NPO past midnight  DVT Prophylaxis: Pneumatic Compression Devices  Fitzgerald Catheter: not present  Code Status: Full    Disposition Plan   Expected discharge: 2 - 3 days, recommended to prior living arrangement once procedures completed, Hgb stable.  Entered: Dylan Nagel DO 02/19/2020, 8:35 PM     The patient's care was discussed with the Patient and her daughter    Dylan GANN Robe,   [facility]    ______________________________________________________________________    Chief Complaint   Black stools and abdominal pain    History is obtained from the patient, her daughter and EHR review    History of Present Illness   Lita Ocasio is a 79 year old female who had been on Plavix and ASA before her 1/2020 hospitalization for acute upper GI bleeding; she was transfused 1 U PRBC's and upper scoping showed erosive gastritis and evidence of past bleeding. Plavix and ASA were discontinued and she had been doing well.    Two days ago, she had an asymptomatic blackish stool and she and her daughter decided to monitor; the next day, the stool was brown and then today, she had a blackish stool and a 4-5 hour episode of central abdominal pain.    ER Course: vitals were stable and there was no distress; abdominal pain had spontaneously resolved. Lab diagnostics resulted Hgb of 12.1, increased from last check 2/5/2020 (10.5), GFR remained stable stage 3 failure, with a slight pre renal dehydration. Stool was positive for occult blood. She was given IV Protonix  Review of Systems  she had been feeling well except for the episode of abdominal pain earlier  today.    Now:    Constitutional: No fever or chills, no generalized weakness, no unintentional weight change, no particular change in appetite  Ears, Nose & Throat: no sore throat, no nasal drainage, no congestion. No ear pain, no ear drainage, no particular change in hearing  Eyes: no particular change in vision, no redness, no drainage  Cardiovascular: No chest pain at rest, no chest pain with familiar activities.  Pulmonary: No cough, no particular change in work of breathing, no particular change in shortness of breath with position changes  Gastrointestinal: No abdominal pain, no nausea, no vomiting, no diarrhea. No particular change in bowel movement pattern, no black stools, no bloody stools  Genitourinary: No particular change in incontinence, no pain with urination, no particular change in stream, no particular change in amount urinated with urge, no discharge  Skin: No particular change in bruising, no rashes  Musculoskeletal: no particular change in strength, no particular change in muscle development  Neurological: no numbness and tingling, no headache, no particular change in balance, no dizziness  Psychologic: No particular change in depression and/or anxiety  Endocrine: No particular change in heat or cold intolerance           Past Medical History    I have reviewed this patient's medical history and updated it with pertinent information if needed.   Past Medical History:   Diagnosis Date     Acquired hypothyroidism 5/12/2019     Asthma      Benign essential hypertension 5/12/2019     Centrilobular emphysema (H) 5/12/2019     Chronic rhinitis 8/16/2013     Chronic systolic congestive heart failure (H) 5/12/2019     Constipation      Coronary artery disease      Hearing loss      Heart trouble      Hemorrhagic cerebrovascular accident (CVA) (H) 01/2019     Hyperlipidemia 5/12/2019     Hypertension      Nasal congestion      Non-rheumatic mitral regurgitation 5/12/2019     Osteopenia 5/12/2019      Osteoporosis      Parathyroid related hypercalcemia (H) 2013     Primary hyperparathyroidism (H) 2013     Ringing in ears      Sensorineural hearing loss, asymmetrical 2013     Sneezing      Snoring      Stented coronary artery      Thyroid disease      Weakness      Weight loss        Past Surgical History   I have reviewed this patient's surgical history and updated it with pertinent information if needed.  Past Surgical History:   Procedure Laterality Date     CATARACT IOL, RT/LT Bilateral      COLONOSCOPY       COLONOSCOPY - HIM SCAN  2009    Colonoscopy - [facility]       ENDOSCOPY UPPER, COLONOSCOPY, COMBINED N/A 10/17/2019    Procedure: UPPER ENDOSCOPY WITH BIOPSY  AND COLONOSCOPY;  Surgeon: Julio Canales MD;  Location: [facility] OR     ENT SURGERY      para thyroid surgery     ENT SURGERY  2013    Parathyroid     ESOPHAGOSCOPY, GASTROSCOPY, DUODENOSCOPY (EGD), COMBINED N/A 2019    Procedure: ESOPHAGOGASTRODUODENOSCOPY (EGD);  Surgeon: Julio Canales MD;  Location: [facility] OR     ESOPHAGOSCOPY, GASTROSCOPY, DUODENOSCOPY (EGD), COMBINED N/A 2020    Procedure: ESOPHAGOGASTRODUODENOSCOPY (EGD) WITH BIOPSY;  Surgeon: Isrrael Parish MD;  Location: [facility] OR     PARATHYROIDECTOMY  9/10/2013    Procedure: PARATHYROIDECTOMY;  Reoperative Neck Exploration, Resection of right Parathyroid adenoma;  Surgeon: Thalia Muller MD;  Location: [facility] OR     TONSILLECTOMY       TUBAL LIGATION         Social History   I have reviewed this patient's social history and updated it with pertinent information if needed.  Social History     Tobacco Use     Smoking status: Former Smoker     Packs/day: 1.00     Years: 50.00     Pack years: 50.00     Types: Cigarettes     Last attempt to quit: 2019     Years since quittin.1     Smokeless tobacco: Never Used     Tobacco comment: quit 2019   Substance Use Topics     Alcohol use: Yes     Comment: social     Drug use: No       Family  History   I have reviewed this patient's family history and updated it with pertinent information if needed.   Family History   Problem Relation Age of Onset     Hearing Loss Mother      Heart Disease Mother      Myocardial Infarction Mother      Cerebrovascular Disease Father      Cancer Brother         throat     Cancer Sister      Myocardial Infarction Sister      Cancer Maternal Grandmother      Heart Disease Maternal Grandfather      Aortic aneurysm Son         Prior to Admission Medications   Prior to Admission Medications   Prescriptions Last Dose Informant Patient Reported? Taking?   Tafluprost (ZIOPTAN) 0.0015 % SOLN  Self Yes No   Sig: Place 1 drop into both eyes At Bedtime   Vitamin D, Cholecalciferol, 1000 units TABS  Self Yes No   Sig: Take 1 tablet by mouth daily   atorvastatin (LIPITOR) 40 MG tablet   No No   Sig: Take 1 tablet (40 mg) by mouth At Bedtime   calcium carbonate (TUMS) 500 MG chewable tablet  Self Yes No   Sig: Take 1 chew tab by mouth daily   erythromycin (ROMYCIN) 5 MG/GM ophthalmic ointment  Self Yes No   Sig: APPLY ONE APPLICATION TO BOTH EYES FOUR TIMES DAILY As Needed for entropian both lower lids   ferrous sulfate (FEROSUL) 325 (65 Fe) MG tablet   No No   Sig: Take 1 tablet (325 mg) by mouth daily with food   fexofenadine (ALLEGRA) 180 MG tablet   Yes No   Sig: Take 180 mg by mouth daily   fluticasone (FLONASE) 50 MCG/ACT spray  Self No No   Sig: Spray 2 sprays into both nostrils daily   furosemide (LASIX) 40 MG tablet   No No   Sig: Take 0.5 tablets (20 mg) by mouth daily   hypromellose (GENTEAL) 0.3 % SOLN ophthalmic solution   Yes No   Sig: Place 1 drop into both eyes 4 times daily as needed for dry eyes   levothyroxine (SYNTHROID/LEVOTHROID) 75 MCG tablet   No No   Sig: Take 1 tablet (75 mcg) by mouth daily   lisinopril (PRINIVIL/ZESTRIL) 5 MG tablet   No No   Sig: Take 1 tablet (5 mg) by mouth daily   metoprolol succinate ER (TOPROL-XL) 25 MG 24 hr tablet   No No   Sig: Take 1  tablet (25 mg) by mouth daily   mirtazapine (REMERON) 7.5 MG tablet   No No   Sig: TAKE 1 TABLET AT BEDTIME   nitroGLYcerin (NITROSTAT) 0.4 MG sublingual tablet   Yes No   Sig: Place 0.4 mg under the tongue every 5 minutes as needed for chest pain For chest pain place 1 tablet under the tongue every 5 minutes for 3 doses. If symptoms persist 5 minutes after 1st dose call 911.   pantoprazole (PROTONIX) 40 MG EC tablet   No No   Sig: Take 1 tablet (40 mg) by mouth 2 times daily (before meals)   potassium chloride ER (K-DUR/KLOR-CON M) 10 MEQ CR tablet  Self No No   Sig: Take 1 tablet (10 mEq) by mouth daily   vitamin B complex with vitamin C (VITAMIN  B COMPLEX) tablet  Self Yes No   Sig: Take 1 tablet by mouth daily Takes 500mg of Vitamin B   zafirlukast (ACCOLATE) 20 MG tablet  Self Yes No   Sig: Take 20 mg by mouth 2 times daily       Facility-Administered Medications: None     Allergies   Allergies   Allergen Reactions     Evista [Raloxifene] Other (See Comments)     hypercalcemia     Prednisone GI Disturbance     Combigan [Brimonidine Tartrate-Timolol] Other (See Comments)     Eye swelling and itchy     Latex Rash     Levaquin [Levofloxacin] Muscle Pain (Myalgia)     Penicillins Rash     Restasis      Pt reports using eye gtts and having red irritated eyes        Physical Exam   Vital Signs: Temp: 98.9  F (37.2  C) Temp src: Tympanic BP: 135/60 Pulse: 105   Resp: 17 SpO2: 97 % O2 Device: None (Room air)    Weight: 0 lbs 0 oz     case discussed with the ER Provider; EHR reviewed; patient seen in ER room 5 with daughter present    Vital signs:  Temp: 98.9  F (37.2  C) Temp src: Tympanic BP: 135/60 Pulse: 105   Resp: 17 SpO2: 97 % O2 Device: None (Room air)        Estimated body mass index is 20.86 kg/m  as calculated from the following:    Height as of 2/5/20: 1.524 m (5').    Weight as of 2/5/20: 48.4 kg (106 lb 12.8 oz).      General: No distress, interactive  Head: normocephalic, no obvious trauma  Eyes: Gaze  directed normally, sclera clear, no discharge, no abnormal ocular movements  Ears: Normal appearing age-related external ears, no discharge, stable hearing acuity loss  Nose: Normal age-related appearance  Mouth: Normal appearing oral mucosa, Gums and throat appear age-related normal  Neck: Normal age-related appearance, age-related range of motion, supple, no adenopathy  Pulmonary: Normal work of breathing, no expiratory delay, no coarseness, no wheezing  Cardiovascular: Distant heart sounds, regular rhythm   Abdomen: No obvious distention, soft, bowel sounds present with normal frequency and pitch  Rectal: Deferred  Back: Age-related normal  Skin: Age-related normal, no rashes  Extremities: Not tender, no lower extremity edema. Moving upper and lower extremities  Neurological: Grossly in tact  Psychiatric: Mood is stable, appropriately interactive        Data   Data reviewed today: I reviewed all medications, new labs and imaging results over the last 24 hours.

## 2020-02-20 NOTE — PLAN OF CARE
Patients blood pressure and heart rate continue to be in question, MD was paged to update and inquire about the IV metoprolol, awaiting return call.

## 2020-02-20 NOTE — PROGRESS NOTES
Assessment completed see flowsheet.    LOC: alert and oriented, very pleasant and conversational  Others present: Patient alone    Dx: upper GI bleed    Lives with: alone   Living at:  Home in [place]  Transportation: YES She normally drives    Primary PCP: Ophelia Troncoso  Insurance:  Medicare and Blue Cross Blue Shield  Medicare IM letter reviewed with her.    Support System:  family  Homecare/PCA: none  /County Services:   none  Badger: NO      How was the [facility] notification completed: na    Health Care Directive: NO   Guardian: no  POA: not asked    Pharmacy: not asked  Meds management: YES manages independently    Adequate Resources for needs (housing, utilities, food/med): YES  Household chores: independent; has a  once/month  Work/community/social activity: YES as desired    ADLs: independent  Ambulation:independent, no device  Falls: none  Nutrition: independent with shopping and meal prep  Sleep: not asked    Equipment used: none      Oxygen supplier: na      Does the supplier have valid oxygen orders: na    Mental health: denies concern  Substance abuse: denies concern  Exposure to violence/abuse: denies  Stressors: none voiced    Able to Return to Prior Living Arrangements: YES    Choice of Vendor: na    Barriers: none known    LAWANDA: low    Plan: she plans to return home at discharge via family

## 2020-02-20 NOTE — PLAN OF CARE
"Prior to Admission Medication Reconciliation:     Medications added:   [] None  [x] As listed below:     asa 81 mg- patient was held on this med at last discharge from hospital (1/30/20), it was restarted by her primary on 2/5. Patient verified that she has been taking it since that appointment.    Vit e, vit b, vit c, zyrtec- patient reported, stated she sometimes switches from claritin to zyrtec.     Medications deleted:   [] None  [x] As listed below:    Clopidogrel-  Patient was held on this med at last discharge from hospital until she saw her primary. On 2/5/20 her primary discontinued it entirely: \"Anemia due to blood loss, acute / History of coronary artery stent placement: Hgb back up. Asymptomatic. Discontinue Plavix as > 6 mo out from stenting and now 3 GI bleeds.\"    Vitamin E and Turmeric were discontinued 2/5 as well. (looks like patient reported that she stopped taking them)    Changes made to existing medications:   [] None  [x] As listed below:    Vit D- pt reported taking 2 tablets    Need to address with patient:  On 2/10/20 patient tried to refill her spironolactone which was discontinued on 1/7/20. I deleted it last PTA med list reconciliation so I'll ask her if she had a reason and if she was still taking it.     Update: Spoke with patient and she stated her mail order called and asked if she wanted it refilled and she just said yes not knowing if she actually needed it or not but it was never refilled.     Last times/dates taken verified with patient:  [x] Yes- completed myself: input new additions to last time taken  [x] Nurse completed no changes made  [] Unable to review with patient:  [] Nurse completed/changes made:     Allergy modifications:    [x]Did not review  []Patient verified NKA  []Patient verified current existing allergies: no changes made  []New allergies: listed below      Medication reconciliation sources:   [x]Patient  []Patient family member/emergency contact: **  [][facility] Report Review  [x]Epic Chart Review  []Care Everywhere review  []Pharmacy med list: **  []Pharmacy phone call  []Outside meds dispense report  []Nursing home MAR:  [x]Other: patient handwritten med list and notes from  last med rec I completed 1/27/20      Requests for consultation by provider or pharmacist:   [x] Patient understands why all of their meds were prescribed and how to take them. No questions.   [x] Fill dates coincide with compliancy for all maintenance meds.   [] Fill dates do not coincide with compliancy with maintenance meds. See notes in PTA medlist about how patient is taking.   [] Patient has questions about the following:    Comments: patient manages her own meds. See above for issues.       Arabella Alamo on 2/20/2020 at 7:44 AM       Discrepancies: [x] No []Not Applicable []Yes: listed below    Time spent on medication reconciliation:   []5-20 mins  [x]20-40 mins  []> 40 mins    Issues completing PTA medication reconciliation:  [] On hold for a long time  [] Waited for a call back  [] Fax didn't come through  [] Fax took a long time  [x] Other: pt sleeping or unavailable when I tried to talk to her.     Notifying appropriate party of changes/additions/discrepancies:  [] Notified attending provider via text page  [] Notified attending provider in person  [x] Notified pharmacy  [] Notified nurse  [] Attending provider not available, left detailed notes  [] Changes/additions made don't need provider notification because provider has not seen patient or input any orders  [] Changes/additions made don't need provider notification because changes made are to medications not ordered    Medications Prior to Admission   Medication Sig Dispense Refill Last Dose     Ascorbic Acid (VITAMIN C PO) Take 1 tablet by mouth daily   2/19/2020 at Unknown time     aspirin 81 MG PO EC tablet Take 81 mg by mouth daily   2/19/2020 at Unknown time     atorvastatin (LIPITOR) 40 MG tablet Take 1 tablet (40 mg) by  mouth At Bedtime 90 tablet 1 2/18/2020 at PM     calcium carbonate (TUMS) 500 MG chewable tablet Take 1 chew tab by mouth daily   2/18/2020 at PM     cetirizine 10 MG PO tablet Take 10 mg by mouth daily   2/19/2020 at Unknown time     cyanocobalamin 1000 MCG PO tablet Take 1,000 mcg by mouth daily   2/19/2020 at Unknown time     erythromycin (ROMYCIN) 5 MG/GM ophthalmic ointment APPLY ONE APPLICATION TO BOTH EYES FOUR TIMES DAILY As Needed for entropian both lower lids  0 2/18/2020 at PM     ferrous sulfate (FEROSUL) 325 (65 Fe) MG tablet Take 1 tablet (325 mg) by mouth daily with food 60 tablet 0 2/19/2020 at AM     fexofenadine (ALLEGRA) 180 MG tablet Take 180 mg by mouth daily   2/19/2020 at AMtime     fluticasone (FLONASE) 50 MCG/ACT spray Spray 2 sprays into both nostrils daily 1 Bottle 11 2/18/2020 at PM     furosemide (LASIX) 40 MG tablet Take 0.5 tablets (20 mg) by mouth daily 45 tablet 1 2/19/2020 at AM     levothyroxine (SYNTHROID/LEVOTHROID) 75 MCG tablet Take 1 tablet (75 mcg) by mouth daily 90 tablet 0 2/19/2020 at AM     lisinopril (PRINIVIL/ZESTRIL) 5 MG tablet Take 1 tablet (5 mg) by mouth daily 90 tablet 1 2/19/2020 at am     metoprolol succinate ER (TOPROL-XL) 25 MG 24 hr tablet Take 1 tablet (25 mg) by mouth daily 90 tablet 1 2/19/2020 at AM     mirtazapine (REMERON) 7.5 MG tablet TAKE 1 TABLET AT BEDTIME 90 tablet 0 2/18/2020 at PM     pantoprazole (PROTONIX) 40 MG EC tablet Take 1 tablet (40 mg) by mouth 2 times daily (before meals) 180 tablet 0 2/19/2020 at AM     potassium chloride ER (K-DUR/KLOR-CON M) 10 MEQ CR tablet Take 1 tablet (10 mEq) by mouth daily 30 tablet 5 2/19/2020 at AM     Tafluprost (ZIOPTAN) 0.0015 % SOLN Place 1 drop into both eyes At Bedtime   2/18/2020 at PM     Vitamin D, Cholecalciferol, 1000 units TABS Take 2 tablets by mouth daily    2/19/2020 at AM     vitamin E 400 units (360 mg) PO capsule Take 400 Units by mouth daily   2/19/2020 at Unknown time     zafirlukast  (ACCOLATE) 20 MG tablet Take 20 mg by mouth 2 times daily    2/19/2020 at AM     hypromellose (GENTEAL) 0.3 % SOLN ophthalmic solution Place 1 drop into both eyes 4 times daily as needed for dry eyes   Unknown     nitroGLYcerin (NITROSTAT) 0.4 MG sublingual tablet Place 0.4 mg under the tongue every 5 minutes as needed for chest pain For chest pain place 1 tablet under the tongue every 5 minutes for 3 doses. If symptoms persist 5 minutes after 1st dose call 911.   Unknown at Unknown time

## 2020-02-20 NOTE — PLAN OF CARE
[facility] Inpatient Admission Note:    Patient admitted to 3106/3106-1 at approximately 2140 via bed accompanied by transport tech from emergency room . Report received from Melissa NINO in SBAR format at 2115 via telephone. Patient ambulated to bed via self.. Patient is alert and oriented X 3, denies pain; rates at  on 0-10 scale.  Patient oriented to room, unit, hourly rounding, and plan of care. Explained admission packet and patient handbook with patient bill of rights brochure. Will continue to monitor and document as needed.     Inpatient Nursing criteria listed below was met:    Health care directives status obtained and documented: Yes    Care Everywhere authorization obtained No    MRSA swab completed for patient 65 years and older: Yes    Patient identifies a surrogate decision maker: No I     If initial lactic acid >2.0, repeat lactic acid drawn within one hour of arrival to unit: No. If no, state reason: NA    Vaccination assessment and education completed: Yes   Vaccinations received prior to admission: Pneumovax yes  Influenza(seasonal)  YES   Vaccination(s) ordered: patient declines    Clergy visit ordered if patient requests: No    Skin issues/needs documented: No    Isolation Patient: no Education given, correct sign in place and documentation row added to PCS:  NA    Fall Prevention No: Care plan updated, education given and documented, sticker and magnet in place: N/A    Care Plan initiated: Yes    Education Documented (including assessment): Yes    Patient has discharge needs : No

## 2020-02-20 NOTE — PLAN OF CARE
Upon general assessment, patients blood pressures have been an issue, MD was updated and an EKG was obtained and read by MD, patient has been asymptomatic, patient has not had any stools today but has been able to void with no difficulty. Patient is hard of hearing but does make her needs known well, patient has been NPO in anticipation to her surgical procedure, patient is only reporting feeling tired. Denies pain.

## 2020-02-20 NOTE — PROGRESS NOTES
[facility]  Hospitalist Progress Note          Assessment and Plan:   Lita Ocasio is a 79 year old female who had been on Plavix and ASA before her 2020 hospitalization for acute upper GI bleeding; she was transfused 1 U PRBC's and upper scoping showed erosive gastritis and evidence of past bleeding. Plavix and ASA were discontinued and she had been doing well until this presentation where she again began to notice melena bringing her to the emergency department once again.         Lita Ocasio is a 79 year old female admitted on 2020.      Acute upper GI bleeding: General Surgery consult for possible scoping (case discussed with Dr Montoya), IV Protonix, bowel rest, lab diagnostic monitoring and repeat EGD planned for today.  Recent colonoscopy in Oct 2019 did not reveal source to explain bleeding.   May need to consider tagged RBC scan.        Diet: Clears  DVT Prophylaxis: Pneumatic Compression Devices  Fitzgerald Catheter: not present  Code Status: Full  Disposition Plan     Expected discharge: 2 - 3 days, recommended to prior living arrangement once procedures completed, Hgb stable.         Interval History:    With some transient left extremity hypotension today.  Hgb did drop overnight.   Will hold beta blocker now.                Medications:       metoprolol  5 mg Intravenous Q6H     mirtazapine  7.5 mg Oral At Bedtime     pantoprazole (PROTONIX) IV  40 mg Intravenous Daily with breakfast     sodium chloride (PF)  3 mL Intracatheter Q8H                  Physical Exam:     Vitals:    20 0820 20 0827 20 0832 20 1157   BP: (!) 136/33 (!) 80/36 150/50 (!) 152/39   Pulse: 58      Resp: 16      Temp: 97.5  F (36.4  C)      TempSrc: Tympanic      SpO2: 93%   93%         Vital Sign Ranges  Temperature Temp  Av.3  F (36.8  C)  Min: 97.5  F (36.4  C)  Max: 99  F (37.2  C)   Blood pressure Systolic (24hrs), Av , Min:80 , Max:171        Diastolic (24hrs), Av,  Min:33, Max:78      Pulse Pulse  Av.3  Min: 58  Max: 105   Respirations Resp  Av.7  Min: 15  Max: 18   Pulse oximetry SpO2  Av.8 %  Min: 93 %  Max: 97 %       No intake or output data in the 24 hours ending 20 1355    General:  Alert and Orientated.  Frustrated  Heart:  RRR, S1 S2, No murmurs, no rubs, no gallops.  Resp: CTA bilaterally.  No wheezes, no rhonchi, no crackles.  Abd: Soft/NT/ND/Positve BS.  Ext: No edema noted in either lower extremity  Neuro:  No focal Neurologic deficits noted.    Peripheral IV 20 Right (Active)   Site Assessment [facility] 2020  8:00 AM   Line Status Saline locked 2020  8:00 AM   Phlebitis Scale 0-->no symptoms 2020  8:00 AM   Infiltration Scale 0 2020  8:00 AM   Infiltration Site Treatment Method  None 2020  8:00 AM   Extravasation? No 2020  8:00 AM   Number of days: 24       Peripheral IV 20 Left (Active)   Site Assessment [facility] 2020  8:00 AM   Line Status Infusing;Checked every 1-2 hour 2020  8:00 AM   Phlebitis Scale 0-->no symptoms 2020  8:00 AM   Infiltration Scale 0 2020  8:00 AM   Infiltration Site Treatment Method  None 2020  8:00 AM   Extravasation? No 2020  8:00 AM   Number of days: 24       Peripheral IV 20 Left Upper arm (Active)   Site Assessment [facility] 2020  8:00 AM   Line Status Infusing 2020  8:00 AM   Phlebitis Scale 0-->no symptoms 2020  8:00 AM   Infiltration Scale 0 2020  8:00 AM   Infiltration Site Treatment Method  None 2020  8:00 AM   Extravasation? No 2020  8:00 AM   Number of days: 1     No line/device             Data:     Lab Results   Component Value Date    WBC 7.5 2020    HGB 9.6 (L) 2020    HCT 30.8 (L) 2020     2020     2020    POTASSIUM 4.0 2020    CHLORIDE 108 2020    CO2 27 2020    BUN 13 2020    CR 1.24 (H) 2020    GLC 89 2020    SED 36 (H) 2019     DIMER 524 (H) 05/12/2019    NTBNPI 4,190 (H) 05/12/2019    NTBNP 3,726 (H) 05/22/2019    TROPI 0.031 01/27/2020    TROPN  08/18/2013     <0.04  **Risk Stratification**   0.10 - 0.39   Elevated-may indicate increased                 risk of short term adverse                 cardiac events.      >=0.4      Possible cardiac injury.    AST 16 01/27/2020    ALT 23 01/27/2020    ALKPHOS 69 01/27/2020    BILITOTAL 0.3 01/27/2020    INR 0.96 02/19/2020     Lactic Acid   Date Value Ref Range Status   01/27/2020 2.2 (H) 0.7 - 2.0 mmol/L Final     Comment:     Significant value called to and read back by  MADONNA RATLIFF 0450 1/27/20 JS     01/27/2020 2.3 (H) 0.7 - 2.0 mmol/L Final     Comment:     Significant value called to and read back by  YE SANTILLAN 0226 1/27/20 JS     09/20/2019 2.0 0.7 - 2.0 mmol/L Final       Francis Beck, DO

## 2020-02-20 NOTE — CONSULTS
Surgery Consult Clinic Note      RE: Lita Ocasio  : 1940    Chief Complaint:  GI bleed    History of Present Illness:  Mr. Ocasio is a very pleasant 79 year old female who I am seeing at the request of Dr. Nagel for evaluation of GI bleed and for consideration for endoscopy.  She's undergone at least three EGD's in the last 6 months for this issues.  The last one was  that was positive for gastritis and stigmata of bleed.  Her aspirin and Plavix were stopped.  She's on BID Protonix.  Her last colonoscopy was in Oct 2019 that was negative for polyps, inflammation, ulceration, bleeding or AVMs.  There were some internal hemorrhoids.   She presented to the ER last night with dark and bloody stools.  Hbg 12.1, hemodynamically stable.  This morning her Hgb is 9.6.  She is not cooperative with exam secondary to frustration with the GI bleeds    Medical history:  Past Medical History:   Diagnosis Date     Acquired hypothyroidism 2019     Asthma      Benign essential hypertension 2019     Centrilobular emphysema (H) 2019     Chronic rhinitis 2013     Chronic systolic congestive heart failure (H) 2019     Constipation      Coronary artery disease      Hearing loss      Heart trouble      Hemorrhagic cerebrovascular accident (CVA) (H) 2019     Hyperlipidemia 2019     Hypertension      Nasal congestion      Non-rheumatic mitral regurgitation 2019     Osteopenia 2019     Osteoporosis      Parathyroid related hypercalcemia (H) 2013     Primary hyperparathyroidism (H) 2013     Ringing in ears      Sensorineural hearing loss, asymmetrical 2013     Sneezing      Snoring      Stented coronary artery      Thyroid disease      Weakness      Weight loss        Surgical history:  Past Surgical History:   Procedure Laterality Date     CATARACT IOL, RT/LT Bilateral      COLONOSCOPY       COLONOSCOPY - HIM SCAN  2009    Colonoscopy - [facility]/[place]        ENDOSCOPY UPPER, COLONOSCOPY, COMBINED N/A 10/17/2019    Procedure: UPPER ENDOSCOPY WITH BIOPSY  AND COLONOSCOPY;  Surgeon: Julio Canales MD;  Location: [facility] OR     ENT SURGERY  2011    para thyroid surgery     ENT SURGERY  09/2013    Parathyroid     ESOPHAGOSCOPY, GASTROSCOPY, DUODENOSCOPY (EGD), COMBINED N/A 9/21/2019    Procedure: ESOPHAGOGASTRODUODENOSCOPY (EGD);  Surgeon: Julio Canales MD;  Location: [facility] OR     ESOPHAGOSCOPY, GASTROSCOPY, DUODENOSCOPY (EGD), COMBINED N/A 1/27/2020    Procedure: ESOPHAGOGASTRODUODENOSCOPY (EGD) WITH BIOPSY;  Surgeon: Isrrael Parish MD;  Location: [facility] OR     PARATHYROIDECTOMY  9/10/2013    Procedure: PARATHYROIDECTOMY;  Reoperative Neck Exploration, Resection of right Parathyroid adenoma;  Surgeon: Thalia Muller MD;  Location: [facility] OR     TONSILLECTOMY       TUBAL LIGATION         Family history:  Family History   Problem Relation Age of Onset     Hearing Loss Mother      Heart Disease Mother      Myocardial Infarction Mother      Cerebrovascular Disease Father      Cancer Brother         throat     Cancer Sister      Myocardial Infarction Sister      Cancer Maternal Grandmother      Heart Disease Maternal Grandfather      Aortic aneurysm Son        Medications:  Prior to Admission medications    Medication Sig Start Date End Date Taking? Authorizing Provider   atorvastatin (LIPITOR) 40 MG tablet Take 1 tablet (40 mg) by mouth At Bedtime 12/17/19  Yes Hailee Cortez APRN CNP   calcium carbonate (TUMS) 500 MG chewable tablet Take 1 chew tab by mouth daily   Yes Reported, Patient   erythromycin (ROMYCIN) 5 MG/GM ophthalmic ointment APPLY ONE APPLICATION TO BOTH EYES FOUR TIMES DAILY As Needed for entropian both lower lids 9/28/19  Yes Reported, Patient   ferrous sulfate (FEROSUL) 325 (65 Fe) MG tablet Take 1 tablet (325 mg) by mouth daily with food 2/6/20  Yes Ophelia Troncoso MD   fexofenadine (ALLEGRA) 180 MG tablet Take 180 mg by mouth daily   Yes  Reported, Patient   fluticasone (FLONASE) 50 MCG/ACT spray Spray 2 sprays into both nostrils daily 9/19/18  Yes Gali Dominguez APRN CNP   furosemide (LASIX) 40 MG tablet Take 0.5 tablets (20 mg) by mouth daily 12/17/19  Yes Hailee Cortez APRN CNP   levothyroxine (SYNTHROID/LEVOTHROID) 75 MCG tablet Take 1 tablet (75 mcg) by mouth daily 2/6/20  Yes Ophelia Troncoso MD   lisinopril (PRINIVIL/ZESTRIL) 5 MG tablet Take 1 tablet (5 mg) by mouth daily 12/17/19  Yes Hailee Cortez APRN CNP   metoprolol succinate ER (TOPROL-XL) 25 MG 24 hr tablet Take 1 tablet (25 mg) by mouth daily 1/20/20  Yes Hailee Cortez APRN CNP   mirtazapine (REMERON) 7.5 MG tablet TAKE 1 TABLET AT BEDTIME 1/6/20  Yes Ophelia Troncoso MD   pantoprazole (PROTONIX) 40 MG EC tablet Take 1 tablet (40 mg) by mouth 2 times daily (before meals) 11/11/19  Yes Ophelia Troncoso MD   potassium chloride ER (K-DUR/KLOR-CON M) 10 MEQ CR tablet Take 1 tablet (10 mEq) by mouth daily 8/12/19  Yes Cynthia Burris PA   Tafluprost (ZIOPTAN) 0.0015 % SOLN Place 1 drop into both eyes At Bedtime   Yes Reported, Patient   vitamin B complex with vitamin C (VITAMIN  B COMPLEX) tablet Take 1 tablet by mouth daily Takes 500mg of Vitamin B   Yes Reported, Patient   Vitamin D, Cholecalciferol, 1000 units TABS Take 1 tablet by mouth daily   Yes Reported, Patient   zafirlukast (ACCOLATE) 20 MG tablet Take 20 mg by mouth 2 times daily    Yes Reported, Patient   hypromellose (GENTEAL) 0.3 % SOLN ophthalmic solution Place 1 drop into both eyes 4 times daily as needed for dry eyes    Reported, Patient   nitroGLYcerin (NITROSTAT) 0.4 MG sublingual tablet Place 0.4 mg under the tongue every 5 minutes as needed for chest pain For chest pain place 1 tablet under the tongue every 5 minutes for 3 doses. If symptoms persist 5 minutes after 1st dose call 911.    Reported, Patient       Allergies:  The patientis allergic to evista [raloxifene]; prednisone; combigan  [brimonidine tartrate-timolol]; latex; levaquin [levofloxacin]; penicillins; and restasis.  .  Social history:  Social History     Tobacco Use     Smoking status: Former Smoker     Packs/day: 1.00     Years: 50.00     Pack years: 50.00     Types: Cigarettes     Last attempt to quit: 2019     Years since quittin.1     Smokeless tobacco: Never Used     Tobacco comment: quit 2019   Substance Use Topics     Alcohol use: Yes     Comment: social     Marital status: .    Review of Systems:    Constitutional: Negative for fever, chills and weight loss.   HENT: Negative for ear pain, nosebleeds, congestion, sore throat, tinnitus and ear discharge.    Eyes: Negative for blurred vision, double vision, photophobia and pain.   Respiratory: Negative for cough, hemoptysis, shortness of breath, wheezing and stridor.    Cardiovascular: Negative for chest pain, palpitations and orthopnea.   Gastrointestinal: Negative for heartburn, nausea, vomiting, abdominal pain and blood in stool.   Genitourinary: Negative for urgency, frequency and hematuria.   Musculoskeletal: Negative for myalgias, back pain and joint pain.   Neurological: Negative for tingling, speech change and headaches.   Endo/Heme/Allergies: Does not bruise/bleed easily.   Psychiatric/Behavioral: Negative for depression, suicidal ideas and hallucinations. The patient is not nervous/anxious.    Physical Examination:  BP (!) 154/40   Pulse 64   Temp 98.5  F (36.9  C) (Tympanic)   Resp 15   SpO2 96%   General: sleeping, easily awaken, NAD  HEENT:NCAT, EOMI, PERRL Sclerae anicteric; Trachea mideline, no JVD  Skin: Warm, dry, < 2 sec cap refill  Neuro: CN 2-12 grossly intact, no focal deficit, GCS 15  Psych: frustrated, calm, asks appropriate questions    Results for orders placed or performed during the hospital encounter of 20 (from the past 24 hour(s))   CBC with platelets differential   Result Value Ref Range    WBC 11.9 (H) 4.0 - 11.0 10e9/L     RBC Count 4.28 3.8 - 5.2 10e12/L    Hemoglobin 12.1 11.7 - 15.7 g/dL    Hematocrit 37.5 35.0 - 47.0 %    MCV 88 78 - 100 fl    MCH 28.3 26.5 - 33.0 pg    MCHC 32.3 31.5 - 36.5 g/dL    RDW 14.6 10.0 - 15.0 %    Platelet Count 410 150 - 450 10e9/L    Diff Method Automated Method     % Neutrophils 82.4 %    % Lymphocytes 12.5 %    % Monocytes 4.3 %    % Eosinophils 0.1 %    % Basophils 0.3 %    % Immature Granulocytes 0.4 %    Nucleated RBCs 0 0 /100    Absolute Neutrophil 9.8 (H) 1.6 - 8.3 10e9/L    Absolute Lymphocytes 1.5 0.8 - 5.3 10e9/L    Absolute Monocytes 0.5 0.0 - 1.3 10e9/L    Absolute Eosinophils 0.0 0.0 - 0.7 10e9/L    Absolute Basophils 0.0 0.0 - 0.2 10e9/L    Abs Immature Granulocytes 0.1 0 - 0.4 10e9/L    Absolute Nucleated RBC 0.0    Basic metabolic panel   Result Value Ref Range    Sodium 137 133 - 144 mmol/L    Potassium 3.9 3.4 - 5.3 mmol/L    Chloride 104 94 - 109 mmol/L    Carbon Dioxide 26 20 - 32 mmol/L    Anion Gap 7 3 - 14 mmol/L    Glucose 124 (H) 70 - 99 mg/dL    Urea Nitrogen 12 7 - 30 mg/dL    Creatinine 1.05 (H) 0.52 - 1.04 mg/dL    GFR Estimate 50 (L) >60 mL/min/[1.73_m2]    GFR Estimate If Black 58 (L) >60 mL/min/[1.73_m2]    Calcium 9.2 8.5 - 10.1 mg/dL   INR   Result Value Ref Range    INR 0.96 0.86 - 1.14   Partial thromboplastin time   Result Value Ref Range    PTT 29 22 - 37 sec   Immunos occult blood   Result Value Ref Range    Occult Blood Slide 1 Positive (A) NEG^Negative   ABO/Rh type and screen   Result Value Ref Range    ABO A     RH(D) Pos     Antibody Screen Neg     Test Valid Only At [facility]        Specimen Expires 02/22/2020    Basic metabolic panel   Result Value Ref Range    Sodium 140 133 - 144 mmol/L    Potassium 4.0 3.4 - 5.3 mmol/L    Chloride 108 94 - 109 mmol/L    Carbon Dioxide 27 20 - 32 mmol/L    Anion Gap 5 3 - 14 mmol/L    Glucose 89 70 - 99 mg/dL    Urea Nitrogen 13 7 - 30 mg/dL    Creatinine 1.24 (H) 0.52 - 1.04 mg/dL    GFR Estimate 41 (L)  >60 mL/min/[1.73_m2]    GFR Estimate If Black 48 (L) >60 mL/min/[1.73_m2]    Calcium 8.4 (L) 8.5 - 10.1 mg/dL   CBC with platelets   Result Value Ref Range    WBC 7.5 4.0 - 11.0 10e9/L    RBC Count 3.43 (L) 3.8 - 5.2 10e12/L    Hemoglobin 9.6 (L) 11.7 - 15.7 g/dL    Hematocrit 30.8 (L) 35.0 - 47.0 %    MCV 90 78 - 100 fl    MCH 28.0 26.5 - 33.0 pg    MCHC 31.2 (L) 31.5 - 36.5 g/dL    RDW 14.6 10.0 - 15.0 %    Platelet Count 311 150 - 450 10e9/L       Assessment/Plan:  #1 GI bleed  #2 Anemia   #3 CAD S/P stents  #4 HTN    The indications, risks, benefits and technical aspects of esophagogastroduodenoscopy were reviewed with her questions asked and answered.  Antral biopsy for histologic examination will be performed and the place of H. pylori in gastritis was discussed.  Preoperative preparation, npo after midnight preceding the date was discussed and a request made to hold aspirin containing agents one week prior to ameliorate antiplatelet effect.  Questions asked and answered - will proceed based on scheduling availability.  We discussed the risk of any procedure needing sedation such as heart attack, stroke, PE/DVT or even death  She just had a recent colonoscopy.  If this upper endoscopy is negative, then we can either proceed with colonoscopy or get her set up with GI for a candidate for a pill endoscopy.         Dr. Jan DO, [facility] and [address], [address]  [place]    [address]    Referring Provider:  No referring provider defined for this encounter.     Primary Care Provider:  Ophelia rToncoso

## 2020-02-20 NOTE — ANESTHESIA PREPROCEDURE EVALUATION
Anesthesia Pre-Procedure Evaluation    Patient: Lita Ocasio   MRN: [id number] : 1940          Preoperative Diagnosis: Upper GI bleed [K92.2]    Procedure(s):  ESOPHAGOGASTRODUODENOSCOPY (EGD)    Past Medical History:   Diagnosis Date     Acquired hypothyroidism 2019     Asthma      Benign essential hypertension 2019     Centrilobular emphysema (H) 2019     Chronic rhinitis 2013     Chronic systolic congestive heart failure (H) 2019     Constipation      Coronary artery disease      Hearing loss      Heart trouble      Hemorrhagic cerebrovascular accident (CVA) (H) 2019     Hyperlipidemia 2019     Hypertension      Nasal congestion      Non-rheumatic mitral regurgitation 2019     Osteopenia 2019     Osteoporosis      Parathyroid related hypercalcemia (H) 2013     Primary hyperparathyroidism (H) 2013     Ringing in ears      Sensorineural hearing loss, asymmetrical 2013     Sneezing      Snoring      Stented coronary artery      Thyroid disease      Weakness      Weight loss      Past Surgical History:   Procedure Laterality Date     CATARACT IOL, RT/LT Bilateral      COLONOSCOPY       COLONOSCOPY - HIM SCAN  2009    Colonoscopy - [facility]/[place]       ENDOSCOPY UPPER, COLONOSCOPY, COMBINED N/A 10/17/2019    Procedure: UPPER ENDOSCOPY WITH BIOPSY  AND COLONOSCOPY;  Surgeon: Julio Canales MD;  Location: [facility] OR     ENT SURGERY      para thyroid surgery     ENT SURGERY  2013    Parathyroid     ESOPHAGOSCOPY, GASTROSCOPY, DUODENOSCOPY (EGD), COMBINED N/A 2019    Procedure: ESOPHAGOGASTRODUODENOSCOPY (EGD);  Surgeon: Julio Canales MD;  Location: [facility] OR     ESOPHAGOSCOPY, GASTROSCOPY, DUODENOSCOPY (EGD), COMBINED N/A 2020    Procedure: ESOPHAGOGASTRODUODENOSCOPY (EGD) WITH BIOPSY;  Surgeon: Isrrael Parish MD;  Location: [facility]     PARATHYROIDECTOMY  9/10/2013    Procedure: PARATHYROIDECTOMY;  Reoperative Neck Exploration,  Resection of right Parathyroid adenoma;  Surgeon: Thalia Muller MD;  Location: [facility] OR     TONSILLECTOMY       TUBAL LIGATION         Anesthesia Evaluation     . Pt has had prior anesthetic.     No history of anesthetic complications          ROS/MED HX    ENT/Pulmonary:     (+)HUGH risk factors snores loudly, allergic rhinitis, tobacco use, Past use asthma COPD (Centrilobular emphysema), , . .    Neurologic:     (+)CVA (hemorrhagic) other neuro ringing in ears, hearing loss    Cardiovascular:     (+) Dyslipidemia, hypertension-range: rx metoprolol, -CAD, --stent,6/2019  2 . Taking blood thinners : Instructions Given to patient: ASA only, has not been taking. CHF etiology: systolic Last EF: 60% date: 12/5/2019 . MILLS, . :. valvular problems/murmurs type: MR . Previous cardiac testing Echodate:12/5/2019results:Interpretation Summary  No pericardial effusion is present.  Global and regional left ventricular function is normal with an EF of 60-65%.  The right ventricle is normal size.  Global right ventricular function is normal.  Both atria appear normal.  Mild mitral insufficiency is present.  Aortic valve is normal in structure and function.  The aortic valve is tricuspid.  Trace tricuspid insufficiency is present.  Right ventricular systolic pressure is 3mmHg above the right atrial pressure.  The pulmonic valve is normal.date: results:ECG reviewed date:1/29/2020 results:1/29    NSR with sinus arrhythmia @64  ST & T wave abnormality, consider lateral ischemia  Prolonged QT (DH=203uh, SPe=116mu) date: results:         (-) angina and angina   METS/Exercise Tolerance:     Hematologic:  - neg hematologic  ROS       Musculoskeletal:   (+)  other musculoskeletal- osteoporosis      GI/Hepatic:     (+) Other GI/Hepatic upper GIB, weight loss, constipation      Renal/Genitourinary:     (+) chronic renal disease, type: CRI,       Endo:     (+) thyroid problem hypothyroidism, Other Endocrine Disorder Parathyroid related  hypercalcemia.      Psychiatric:  - neg psychiatric ROS       Infectious Disease:  - neg infectious disease ROS       Malignancy:      - no malignancy   Other: Comment: weakness                         Physical Exam  Normal systems: cardiovascular, pulmonary and dental    Airway   Mallampati: II  TM distance: >3 FB  Neck ROM: full    Dental     Cardiovascular   Rhythm and rate: regular and normal      Pulmonary    breath sounds clear to auscultation            Lab Results   Component Value Date    WBC 7.5 02/20/2020    HGB 9.4 (L) 02/20/2020    HCT 30.8 (L) 02/20/2020     02/20/2020    CRP <2.9 09/20/2019    SED 36 (H) 09/20/2019     02/20/2020    POTASSIUM 4.0 02/20/2020    CHLORIDE 108 02/20/2020    CO2 27 02/20/2020    BUN 13 02/20/2020    CR 1.24 (H) 02/20/2020    GLC 89 02/20/2020    BECKY 8.4 (L) 02/20/2020    PHOS 2.4 (L) 09/01/2013    MAG 2.2 05/12/2019    ALBUMIN 3.1 (L) 01/27/2020    PROTTOTAL 6.2 (L) 01/27/2020    ALT 23 01/27/2020    AST 16 01/27/2020    ALKPHOS 69 01/27/2020    BILITOTAL 0.3 01/27/2020    LIPASE 101 01/27/2020    PTT 29 02/19/2020    INR 0.96 02/19/2020    TSH 1.46 09/21/2019    TROPN  08/18/2013     <0.04  **Risk Stratification**   0.10 - 0.39   Elevated-may indicate increased                 risk of short term adverse                 cardiac events.      >=0.4      Possible cardiac injury.       Preop Vitals  BP Readings from Last 3 Encounters:   02/20/20 (!) 169/41   02/05/20 134/68   01/30/20 (!) 138/44    Pulse Readings from Last 3 Encounters:   02/20/20 62   02/05/20 74   01/29/20 74      Resp Readings from Last 3 Encounters:   02/20/20 18   02/05/20 20   01/30/20 20    SpO2 Readings from Last 3 Encounters:   02/20/20 (!) 91%   02/05/20 94%   01/30/20 (!) 91%      Temp Readings from Last 1 Encounters:   02/20/20 98.1  F (36.7  C) (Tympanic)    Ht Readings from Last 1 Encounters:   02/05/20 1.524 m (5')      Wt Readings from Last 1 Encounters:   02/05/20 48.4 kg (106 lb  12.8 oz)    Estimated body mass index is 20.86 kg/m  as calculated from the following:    Height as of 2/5/20: 1.524 m (5').    Weight as of 2/5/20: 48.4 kg (106 lb 12.8 oz).       Anesthesia Plan      History & Physical Review  History and physical reviewed and following examination; no interval change.    ASA Status:  4 .    NPO Status:  > 8 hours    Plan for MAC with Intravenous and Propofol induction. Maintenance will be TIVA.  Reason for MAC:  Chronic cardiopulmonary disease (G9) and Deep or markedly invasive procedure (G8)  PONV prophylaxis:  Ondansetron (or other 5HT-3)       Postoperative Care  Postoperative pain management:  IV analgesics.      Consents  Anesthetic plan, risks, benefits and alternatives discussed with:  Patient..                 COCO Miner CRNA

## 2020-02-20 NOTE — PLAN OF CARE
Face to face report given with opportunity to observe patient.    Report given to Arnold NINO and Arabella Swartz, RN   2/20/2020  3:55 PM

## 2020-02-20 NOTE — ED PROVIDER NOTES
"  History     Chief Complaint   Patient presents with     Rectal Bleeding     daughter with and states patient was in the hospital for 2 weeks for an upper GI bleed and they couldn't find the bleed. Patient very upset she is here. Daughter upset and states \"she has internal bleeding again.\" Denies vomiting but states patient is having dark black stools again.      HPI  Lita Ocasio is a 79 year old female who presents with a dark stool today concerning for GI bleed.  The patient had been on Xarelto, Plavix and aspirin, suffered a GI bleed, was hospitalized again in January, taken off Plavix as well and now remains only on aspirin with minimal to no alcohol use.  She had a dark stool 2 days ago, seemed to be doing well, had some intense periumbilical pain earlier this afternoon that is significantly improved and had one brown-black stool this evening 2 hours ago.  She has had no nausea or vomiting.  She denies fever, chills or other constitutional symptoms.  No syncope, chest pain, etc.    Allergies:  Allergies   Allergen Reactions     Evista [Raloxifene] Other (See Comments)     hypercalcemia     Prednisone GI Disturbance     Combigan [Brimonidine Tartrate-Timolol] Other (See Comments)     Eye swelling and itchy     Latex Rash     Levaquin [Levofloxacin] Muscle Pain (Myalgia)     Penicillins Rash     Restasis      Pt reports using eye gtts and having red irritated eyes        Problem List:    Patient Active Problem List    Diagnosis Date Noted     Weight loss 02/05/2020     Priority: Medium     Acute blood loss anemia 01/28/2020     Priority: Medium     Upper GI bleeding 01/27/2020     Priority: Medium     Gastrointestinal hemorrhage, unspecified gastrointestinal hemorrhage type 01/27/2020     Priority: Medium     Added automatically from request for surgery [id number]       Anemia due to blood loss, acute 09/20/2019     Priority: Medium     History of GI bleed 09/20/2019     Priority: Medium     Protein-calorie " malnutrition (H) 09/04/2019     Priority: Medium     History of coronary artery stent placement 07/23/2019     Priority: Medium     CKD (chronic kidney disease) stage 3, GFR 30-59 ml/min (H) 06/26/2019     Priority: Medium     Abnormal stress test 06/18/2019     Priority: Medium     Added automatically from request for surgery [id number]       MILLS (dyspnea on exertion) 06/18/2019     Priority: Medium     Added automatically from request for surgery [id number]       ASCVD (arteriosclerotic cardiovascular disease) 06/18/2019     Priority: Medium     Added automatically from request for surgery [id number]       Centrilobular emphysema (H) 05/12/2019     Priority: Medium     Acquired hypothyroidism 05/12/2019     Priority: Medium     Chronic systolic congestive heart failure (H) 05/12/2019     Priority: Medium     Benign essential hypertension 05/12/2019     Priority: Medium     Hyperlipidemia 05/12/2019     Priority: Medium     Osteopenia 05/12/2019     Priority: Medium     Non-rheumatic mitral regurgitation 05/12/2019     Priority: Medium     Primary hyperparathyroidism (H) 09/05/2013     Priority: Medium     Chronic rhinitis 08/16/2013     Priority: Medium        Past Medical History:    Past Medical History:   Diagnosis Date     Acquired hypothyroidism 5/12/2019     Asthma      Benign essential hypertension 5/12/2019     Centrilobular emphysema (H) 5/12/2019     Chronic rhinitis 8/16/2013     Chronic systolic congestive heart failure (H) 5/12/2019     Constipation      Coronary artery disease      Hearing loss      Heart trouble      Hemorrhagic cerebrovascular accident (CVA) (H) 01/2019     Hyperlipidemia 5/12/2019     Hypertension      Nasal congestion      Non-rheumatic mitral regurgitation 5/12/2019     Osteopenia 5/12/2019     Osteoporosis      Parathyroid related hypercalcemia (H) 8/18/2013     Primary hyperparathyroidism (H) 9/5/2013     Ringing in ears      Sensorineural hearing loss, asymmetrical 8/16/2013      Sneezing      Snoring      Stented coronary artery      Thyroid disease      Weakness      Weight loss        Past Surgical History:    Past Surgical History:   Procedure Laterality Date     CATARACT IOL, RT/LT Bilateral      COLONOSCOPY       COLONOSCOPY - HIM SCAN  2009    Colonoscopy - [facility]/[place]       ENDOSCOPY UPPER, COLONOSCOPY, COMBINED N/A 10/17/2019    Procedure: UPPER ENDOSCOPY WITH BIOPSY  AND COLONOSCOPY;  Surgeon: Julio Canales MD;  Location: [facility] OR     ENT SURGERY      para thyroid surgery     ENT SURGERY  2013    Parathyroid     ESOPHAGOSCOPY, GASTROSCOPY, DUODENOSCOPY (EGD), COMBINED N/A 2019    Procedure: ESOPHAGOGASTRODUODENOSCOPY (EGD);  Surgeon: Julio Canales MD;  Location: [facility] OR     ESOPHAGOSCOPY, GASTROSCOPY, DUODENOSCOPY (EGD), COMBINED N/A 2020    Procedure: ESOPHAGOGASTRODUODENOSCOPY (EGD) WITH BIOPSY;  Surgeon: Isrrael Parish MD;  Location: [facility] OR     PARATHYROIDECTOMY  9/10/2013    Procedure: PARATHYROIDECTOMY;  Reoperative Neck Exploration, Resection of right Parathyroid adenoma;  Surgeon: Thalia Muller MD;  Location: [facility] OR     TONSILLECTOMY       TUBAL LIGATION         Family History:    Family History   Problem Relation Age of Onset     Hearing Loss Mother      Heart Disease Mother      Myocardial Infarction Mother      Cerebrovascular Disease Father      Cancer Brother         throat     Cancer Sister      Myocardial Infarction Sister      Cancer Maternal Grandmother      Heart Disease Maternal Grandfather      Aortic aneurysm Son        Social History:  Marital Status:   [5]  Social History     Tobacco Use     Smoking status: Former Smoker     Packs/day: 1.00     Years: 50.00     Pack years: 50.00     Types: Cigarettes     Last attempt to quit: 2019     Years since quittin.1     Smokeless tobacco: Never Used     Tobacco comment: quit 2019   Substance Use Topics     Alcohol use: Yes     Comment: social     Drug use:  No        Medications:    atorvastatin (LIPITOR) 40 MG tablet  calcium carbonate (TUMS) 500 MG chewable tablet  erythromycin (ROMYCIN) 5 MG/GM ophthalmic ointment  ferrous sulfate (FEROSUL) 325 (65 Fe) MG tablet  fexofenadine (ALLEGRA) 180 MG tablet  fluticasone (FLONASE) 50 MCG/ACT spray  furosemide (LASIX) 40 MG tablet  hypromellose (GENTEAL) 0.3 % SOLN ophthalmic solution  levothyroxine (SYNTHROID/LEVOTHROID) 75 MCG tablet  lisinopril (PRINIVIL/ZESTRIL) 5 MG tablet  metoprolol succinate ER (TOPROL-XL) 25 MG 24 hr tablet  mirtazapine (REMERON) 7.5 MG tablet  nitroGLYcerin (NITROSTAT) 0.4 MG sublingual tablet  pantoprazole (PROTONIX) 40 MG EC tablet  potassium chloride ER (K-DUR/KLOR-CON M) 10 MEQ CR tablet  Tafluprost (ZIOPTAN) 0.0015 % SOLN  vitamin B complex with vitamin C (VITAMIN  B COMPLEX) tablet  Vitamin D, Cholecalciferol, 1000 units TABS  zafirlukast (ACCOLATE) 20 MG tablet          Review of Systems   All other systems reviewed and are negative.      Physical Exam   BP: 117/78  Pulse: 62  Temp: 98.9  F (37.2  C)  Resp: 17  SpO2: 97 %      Physical Exam  Vitals signs and nursing note reviewed.   Constitutional:       General: She is not in acute distress.     Appearance: She is well-developed.   HENT:      Head: Normocephalic and atraumatic.   Eyes:      Conjunctiva/sclera: Conjunctivae normal.   Neck:      Musculoskeletal: Normal range of motion and neck supple.   Cardiovascular:      Rate and Rhythm: Normal rate and regular rhythm.      Heart sounds: Normal heart sounds.   Pulmonary:      Effort: Pulmonary effort is normal. No respiratory distress.      Breath sounds: Normal breath sounds. No wheezing or rales.   Abdominal:      General: There is no distension.      Palpations: Abdomen is soft.      Tenderness: There is abdominal tenderness.      Comments: Rates periumbilical tenderness   Genitourinary:     Comments: Soft brown stool  Skin:     General: Skin is warm and dry.      Coloration: Skin is not  pale.   Neurological:      Mental Status: She is alert and oriented to person, place, and time.   Psychiatric:         Mood and Affect: Mood normal.         ED Course        Procedures              Critical Care time:               Results for orders placed or performed during the hospital encounter of 02/19/20 (from the past 24 hour(s))   CBC with platelets differential   Result Value Ref Range    WBC 11.9 (H) 4.0 - 11.0 10e9/L    RBC Count 4.28 3.8 - 5.2 10e12/L    Hemoglobin 12.1 11.7 - 15.7 g/dL    Hematocrit 37.5 35.0 - 47.0 %    MCV 88 78 - 100 fl    MCH 28.3 26.5 - 33.0 pg    MCHC 32.3 31.5 - 36.5 g/dL    RDW 14.6 10.0 - 15.0 %    Platelet Count 410 150 - 450 10e9/L    Diff Method Automated Method     % Neutrophils 82.4 %    % Lymphocytes 12.5 %    % Monocytes 4.3 %    % Eosinophils 0.1 %    % Basophils 0.3 %    % Immature Granulocytes 0.4 %    Nucleated RBCs 0 0 /100    Absolute Neutrophil 9.8 (H) 1.6 - 8.3 10e9/L    Absolute Lymphocytes 1.5 0.8 - 5.3 10e9/L    Absolute Monocytes 0.5 0.0 - 1.3 10e9/L    Absolute Eosinophils 0.0 0.0 - 0.7 10e9/L    Absolute Basophils 0.0 0.0 - 0.2 10e9/L    Abs Immature Granulocytes 0.1 0 - 0.4 10e9/L    Absolute Nucleated RBC 0.0    Basic metabolic panel   Result Value Ref Range    Sodium 137 133 - 144 mmol/L    Potassium 3.9 3.4 - 5.3 mmol/L    Chloride 104 94 - 109 mmol/L    Carbon Dioxide 26 20 - 32 mmol/L    Anion Gap 7 3 - 14 mmol/L    Glucose 124 (H) 70 - 99 mg/dL    Urea Nitrogen 12 7 - 30 mg/dL    Creatinine 1.05 (H) 0.52 - 1.04 mg/dL    GFR Estimate 50 (L) >60 mL/min/[1.73_m2]    GFR Estimate If Black 58 (L) >60 mL/min/[1.73_m2]    Calcium 9.2 8.5 - 10.1 mg/dL   INR   Result Value Ref Range    INR 0.96 0.86 - 1.14   Partial thromboplastin time   Result Value Ref Range    PTT 29 22 - 37 sec   Immunos occult blood   Result Value Ref Range    Occult Blood Slide 1 Positive (A) NEG^Negative       Medications   pantoprazole (PROTONIX) 40 mg IV push injection (has no  administration in time range)       Assessments & Plan (with Medical Decision Making)     I have reviewed the nursing notes.    I have reviewed the findings, diagnosis, plan and need for follow up with the patient.  Patient's history is concerning for GI bleed.  She has heme positive stool but hemoglobin is up when compared to February 5.  Vitals are unremarkable, rest of her labs are unremarkable.  She remains on aspirin which is a risk factor.  I spoke with Dr. Montoya, surgery, who would like to perform an EGD tomorrow.  Will discuss with hospitalist for admission.    New Prescriptions    No medications on file       Final diagnoses:   Upper GI bleed       2/19/2020   [facility]     Alex Bustamante MD  02/19/20 2012

## 2020-02-20 NOTE — PLAN OF CARE
Blood pressure to the right arm per space lab was 136/33, blood pressure to the left arm per space lab was 78/38, a manual blood pressure was 150/50. per icu reading, the tely was 50's to 60's with st depression, an ekg was ordered, MD updated.

## 2020-02-20 NOTE — PLAN OF CARE
Alert and oriented. Apical rate regular. Afebrile. -150s. DBP 40-60s. Lungs clear. Sats adequate on room air. Audibel bowel sounds throughout. Denies abdominal pain or tenderness. No pain repore Has not been up to the bathroom this shift despite lots of encouragement. Has been NPO since midnight. IVF at 100. Slept most of shift. Call light remains within reach and makes needs known. Refused skin assessment multiple times. Alarms on at all times     Face to face report given with opportunity to observe patient.    Report given to TRUNG Rosado RN   2/20/2020  7:06 AM

## 2020-02-21 LAB
ANION GAP SERPL CALCULATED.3IONS-SCNC: 3 MMOL/L (ref 3–14)
BACTERIA SPEC CULT: NORMAL
BUN SERPL-MCNC: 13 MG/DL (ref 7–30)
CALCIUM SERPL-MCNC: 8 MG/DL (ref 8.5–10.1)
CHLORIDE SERPL-SCNC: 115 MMOL/L (ref 94–109)
CO2 SERPL-SCNC: 25 MMOL/L (ref 20–32)
CREAT SERPL-MCNC: 1 MG/DL (ref 0.52–1.04)
ERYTHROCYTE [DISTWIDTH] IN BLOOD BY AUTOMATED COUNT: 14.7 % (ref 10–15)
ERYTHROCYTE [DISTWIDTH] IN BLOOD BY AUTOMATED COUNT: 15 % (ref 10–15)
GFR SERPL CREATININE-BSD FRML MDRD: 54 ML/MIN/{1.73_M2}
GLUCOSE SERPL-MCNC: 86 MG/DL (ref 70–99)
HCT VFR BLD AUTO: 28.8 % (ref 35–47)
HCT VFR BLD AUTO: 29.4 % (ref 35–47)
HGB BLD-MCNC: 9.4 G/DL (ref 11.7–15.7)
HGB BLD-MCNC: 9.5 G/DL (ref 11.7–15.7)
MCH RBC QN AUTO: 28.3 PG (ref 26.5–33)
MCH RBC QN AUTO: 28.4 PG (ref 26.5–33)
MCHC RBC AUTO-ENTMCNC: 32.3 G/DL (ref 31.5–36.5)
MCHC RBC AUTO-ENTMCNC: 32.6 G/DL (ref 31.5–36.5)
MCV RBC AUTO: 87 FL (ref 78–100)
MCV RBC AUTO: 88 FL (ref 78–100)
PLATELET # BLD AUTO: 256 10E9/L (ref 150–450)
PLATELET # BLD AUTO: 279 10E9/L (ref 150–450)
POTASSIUM SERPL-SCNC: 3.8 MMOL/L (ref 3.4–5.3)
RBC # BLD AUTO: 3.32 10E12/L (ref 3.8–5.2)
RBC # BLD AUTO: 3.35 10E12/L (ref 3.8–5.2)
SODIUM SERPL-SCNC: 143 MMOL/L (ref 133–144)
SPECIMEN SOURCE: NORMAL
WBC # BLD AUTO: 7.8 10E9/L (ref 4–11)
WBC # BLD AUTO: 7.8 10E9/L (ref 4–11)

## 2020-02-21 PROCEDURE — 99232 SBSQ HOSP IP/OBS MODERATE 35: CPT | Performed by: INTERNAL MEDICINE

## 2020-02-21 PROCEDURE — 12000000 ZZH R&B MED SURG/OB

## 2020-02-21 PROCEDURE — 25000132 ZZH RX MED GY IP 250 OP 250 PS 637: Mod: GY | Performed by: SURGERY

## 2020-02-21 PROCEDURE — 36415 COLL VENOUS BLD VENIPUNCTURE: CPT | Performed by: INTERNAL MEDICINE

## 2020-02-21 PROCEDURE — 85027 COMPLETE CBC AUTOMATED: CPT | Performed by: SURGERY

## 2020-02-21 PROCEDURE — 85027 COMPLETE CBC AUTOMATED: CPT | Performed by: INTERNAL MEDICINE

## 2020-02-21 PROCEDURE — 36415 COLL VENOUS BLD VENIPUNCTURE: CPT | Performed by: SURGERY

## 2020-02-21 PROCEDURE — 25000128 H RX IP 250 OP 636: Performed by: SURGERY

## 2020-02-21 PROCEDURE — 25800030 ZZH RX IP 258 OP 636: Performed by: SURGERY

## 2020-02-21 PROCEDURE — 80048 BASIC METABOLIC PNL TOTAL CA: CPT | Performed by: SURGERY

## 2020-02-21 PROCEDURE — 25000132 ZZH RX MED GY IP 250 OP 250 PS 637: Mod: GY | Performed by: INTERNAL MEDICINE

## 2020-02-21 PROCEDURE — C9113 INJ PANTOPRAZOLE SODIUM, VIA: HCPCS | Performed by: SURGERY

## 2020-02-21 RX ORDER — AMLODIPINE BESYLATE 5 MG/1
5 TABLET ORAL DAILY
Status: DISCONTINUED | OUTPATIENT
Start: 2020-02-21 | End: 2020-02-22 | Stop reason: HOSPADM

## 2020-02-21 RX ORDER — PANTOPRAZOLE SODIUM 40 MG/1
40 TABLET, DELAYED RELEASE ORAL
Status: DISCONTINUED | OUTPATIENT
Start: 2020-02-22 | End: 2020-02-22 | Stop reason: HOSPADM

## 2020-02-21 RX ADMIN — PANTOPRAZOLE SODIUM 40 MG: 40 INJECTION, POWDER, FOR SOLUTION INTRAVENOUS at 08:29

## 2020-02-21 RX ADMIN — MIRTAZAPINE 7.5 MG: 7.5 TABLET ORAL at 21:03

## 2020-02-21 RX ADMIN — AMLODIPINE BESYLATE 5 MG: 5 TABLET ORAL at 21:31

## 2020-02-21 RX ADMIN — SUCRALFATE 1 G: 1 TABLET ORAL at 08:29

## 2020-02-21 RX ADMIN — SODIUM CHLORIDE: 9 INJECTION, SOLUTION INTRAVENOUS at 03:52

## 2020-02-21 RX ADMIN — SUCRALFATE 1 G: 1 TABLET ORAL at 17:51

## 2020-02-21 RX ADMIN — SUCRALFATE 1 G: 1 TABLET ORAL at 21:03

## 2020-02-21 RX ADMIN — SUCRALFATE 1 G: 1 TABLET ORAL at 11:56

## 2020-02-21 ASSESSMENT — ACTIVITIES OF DAILY LIVING (ADL)
ADLS_ACUITY_SCORE: 14

## 2020-02-21 NOTE — PROGRESS NOTES
Discharge plan:  Lita plans to return home at discharge via a family member.       Reviewed Medicare IM letter again today and she signed off.

## 2020-02-21 NOTE — ANESTHESIA CARE TRANSFER NOTE
Patient: Lita Ocasio    Procedure(s):  ESOPHAGOGASTRODUODENOSCOPY (EGD) WITH BIOPSY    Diagnosis: Upper GI bleed [K92.2]  Diagnosis Additional Information: No value filed.    Anesthesia Type:   MAC     Note:  Airway :Nasal Cannula  Patient transferred to:Medical/Surgical Unit  Handoff Report: Identifed the Patient, Identified the Reponsible Provider, Reviewed the pertinent medical history, Discussed the surgical course, Reviewed Intra-OP anesthesia mangement and issues during anesthesia, Set expectations for post-procedure period and Allowed opportunity for questions and acknowledgement of understanding      Vitals: (Last set prior to Anesthesia Care Transfer)    CRNA VITALS  2/20/2020 1838 - 2/20/2020 1915      2/20/2020             Resp Rate (set):  8                Electronically Signed By: COCO Miner CRNA  February 20, 2020  7:15 PM

## 2020-02-21 NOTE — PROGRESS NOTES
[facility]  Hospitalist Progress Note          Assessment and Plan:   Lita Ocasio is a 79 year old female who had been on Plavix and ASA before her 2020 hospitalization for acute upper GI bleeding; she was transfused 1 U PRBC's and upper scoping showed erosive gastritis and evidence of past bleeding. Plavix and ASA were discontinued and she had been doing well until this presentation where she again began to notice melena bringing her to the emergency department once again.          Lita Ocasio is a 79 year old female admitted on 2020.      Acute upper GI bleeding: Recent General Surgery consult for possible scoping (case discussed with Dr Montoya), IV Protonix, bowel rest, lab diagnostic monitoring. EGD did reveal a peptic ulcer.       We will observe her one more night to assure no additional bleeding is present.        Diet: ADAT  DVT Prophylaxis: Pneumatic Compression Devices  Fitzgerald Catheter: not present  Code Status: Full  Disposition Plan     Expected discharge: 2 - 3 days, recommended to prior living arrangement once procedures completed, Hgb stable.             Interval History:   Patient remains stable except but BP remains elevated.  No report of abdominal pain.EGD did reveal a peptic ulcer.                Medications:       mirtazapine  7.5 mg Oral At Bedtime     pantoprazole (PROTONIX) IV  40 mg Intravenous Daily with breakfast     sodium chloride (PF)  3 mL Intracatheter Q8H     sodium chloride (PF)  3 mL Intracatheter Q8H     sucralfate  1 g Oral 4x Daily AC & HS                  Physical Exam:     Vitals:    20 2125 20 0052 20 0748 20 0815   BP: (!) 159/43 (!) 155/41  174/50   Pulse:    72   Resp:  18 16 18   Temp:  99.8  F (37.7  C)  98.7  F (37.1  C)   TempSrc:  Tympanic  Tympanic   SpO2: 94% 92%  93%         Vital Sign Ranges  Temperature Temp  Av.8  F (37.1  C)  Min: 98.1  F (36.7  C)  Max: 99.8  F (37.7  C)   Blood pressure Systolic (24hrs),  Av , Min:147 , Max:190        Diastolic (24hrs), Av, Min:39, Max:67      Pulse Pulse  Av.9  Min: 62  Max: 86   Respirations Resp  Av.7  Min: 16  Max: 28   Pulse oximetry SpO2  Av.1 %  Min: 86 %  Max: 97 %         Intake/Output Summary (Last 24 hours) at 2020 1144  Last data filed at 2020 1037  Gross per 24 hour   Intake 2965 ml   Output --   Net 2965 ml       General:  Alert and Orientated.  NAD.  Heart:  RRR, S1 S2, No murmurs, no rubs, no gallops.  Resp: CTA bilaterally.  No wheezes, no rhonchi, no crackles.  Abd: Soft/NT/ND/Positve BS.  Ext: No edema noted in either lower extremity  Neuro:  No focal Neurologic deficits noted.    Peripheral IV 20 Right (Active)   Site Assessment [facility] 2020  8:00 AM   Line Status Saline locked 2020  8:00 AM   Phlebitis Scale 0-->no symptoms 2020  8:00 AM   Infiltration Scale 0 2020  8:00 AM   Infiltration Site Treatment Method  None 2020  8:00 AM   Extravasation? No 2020  8:00 AM   Number of days: 25       Peripheral IV 20 Left (Active)   Site Assessment [facility] 2020  8:00 AM   Line Status Infusing;Checked every 1-2 hour 2020  8:00 AM   Phlebitis Scale 0-->no symptoms 2020  8:00 AM   Infiltration Scale 0 2020  8:00 AM   Infiltration Site Treatment Method  None 2020  8:00 AM   Extravasation? No 2020  8:00 AM   Number of days: 25       Peripheral IV 20 Left Upper arm (Active)   Site Assessment [facility] 2020  1:00 AM   Line Status Infusing 2020  1:00 AM   Phlebitis Scale 0-->no symptoms 2020  1:00 AM   Infiltration Scale 0 2020  1:00 AM   Infiltration Site Treatment Method  None 2020  3:57 PM   Extravasation? No 2020  3:57 PM   Number of days: 2     No line/device             Data:     Lab Results   Component Value Date    WBC 7.8 2020    HGB 9.4 (L) 2020    HCT 28.8 (L) 2020     2020     2020    POTASSIUM 3.8  02/21/2020    CHLORIDE 115 (H) 02/21/2020    CO2 25 02/21/2020    BUN 13 02/21/2020    CR 1.00 02/21/2020    GLC 86 02/21/2020    SED 36 (H) 09/20/2019    DIMER 524 (H) 05/12/2019    NTBNPI 4,190 (H) 05/12/2019    NTBNP 3,726 (H) 05/22/2019    TROPI 0.031 01/27/2020    TROPN  08/18/2013     <0.04  **Risk Stratification**   0.10 - 0.39   Elevated-may indicate increased                 risk of short term adverse                 cardiac events.      >=0.4      Possible cardiac injury.    AST 16 01/27/2020    ALT 23 01/27/2020    ALKPHOS 69 01/27/2020    BILITOTAL 0.3 01/27/2020    INR 0.96 02/19/2020     Lactic Acid   Date Value Ref Range Status   01/27/2020 2.2 (H) 0.7 - 2.0 mmol/L Final     Comment:     Significant value called to and read back by  MADONNA RATLIFF [id number] 1/27/20 JS     01/27/2020 2.3 (H) 0.7 - 2.0 mmol/L Final     Comment:     Significant value called to and read back by  YE SANTILLAN 0226 1/27/20 JS     09/20/2019 2.0 0.7 - 2.0 mmol/L Final       Francis Beck, DO

## 2020-02-21 NOTE — PLAN OF CARE
A/O. Apical rate regular. Afebrile. SBP 150s. Lungs clear. Sats adequate on room air. Audible bowel sounds throughout. Denies abdominal pain or tenderness. Up to the bathroom assist of 1 this shift. Poor appetite overnight; stated she will try to eat something more in the morning. IVF at 100. Slept majority of shift. Uneventful shift. Alarms on at all times. Call light remains within reach and makes needs known.     Face to face report given with opportunity to observe patient.    Report given to TRUNG Wang RN   2/21/2020  8:31 AM

## 2020-02-21 NOTE — PLAN OF CARE
Reason for hospital stay: Upper GI bleed  Living situation PTA: Home  Most recent vitals: BP (!) 159/43   Pulse 85   Temp 98.6  F (37  C) (Temporal)   Resp 18   SpO2 97%     Pain Management:  Pt reported having pain in her upper back when she returned of EGD, declined pain medications and pain has now resolved.   LOC:  A&O x4  Cardiac:  On tele, SR 80s prolonged QT ST depression per ICU tele report  Respiratory:  Lungs clear no c/o SOB  :  No stools  Skin Issues: None noted  IVF:  N.S. infusing at 100 ml/hr  Nutrition: Regular diet  Ambulation:Stand by assist  Safety:  Free from falls and injury this shift.      2/20/2020  11:14 PM  Arabella Alvarez RN    Face to face report given with opportunity to observe patient.    Report given to Melissa Alvarez RN   2/20/2020  11:39 PM

## 2020-02-21 NOTE — BRIEF OP NOTE
[facility]    Brief Operative Note    Pre-operative diagnosis: Upper GI bleed [K92.2]  Post-operative diagnosis Bleeding peptic ulcer    Procedure: Procedure(s):  ESOPHAGOGASTRODUODENOSCOPY (EGD)  Surgeon: Surgeon(s) and Role:     * Pedro Luis Montoya DO - Primary  Anesthesia: Choice   Estimated blood loss: None  Drains: None  Specimens:   ID Type Source Tests Collected by Time Destination   A :  Biopsy Stomach, Body SURGICAL PATHOLOGY EXAM Pedro Luis Montoya DO 2/20/2020  7:01 PM      Findings:   ulcer at insura, not bleeding, no visable vessel .  Complications: None.  Implants: * No implants in log *

## 2020-02-21 NOTE — PLAN OF CARE
Pt A&O x4, denies pain. BS active, no BM this shift. Pt tolerating a regular diet. Family in room last few hours, attentive to pt. Pt ambulates with stand-by assist. Pt makes needs known, call light within reach. Will continue to monitor.

## 2020-02-21 NOTE — ANESTHESIA POSTPROCEDURE EVALUATION
Patient: Lita Ocasio    Procedure(s):  ESOPHAGOGASTRODUODENOSCOPY (EGD) WITH BIOPSY    Diagnosis:Upper GI bleed [K92.2]  Diagnosis Additional Information: No value filed.    Anesthesia Type:  MAC    Note:  Anesthesia Post Evaluation    Patient location during evaluation: Bedside and Floor  Patient participation: Able to fully participate in evaluation  Level of consciousness: awake and alert  Pain management: adequate  Airway patency: patent  Cardiovascular status: acceptable  Respiratory status: acceptable  Hydration status: stable  PONV: none     Anesthetic complications: None          Last vitals:  Vitals:    02/20/20 1920 02/20/20 1925 02/20/20 1930   BP: 164/50 (!) 161/48 (!) 164/48   Pulse: 86 85 86   Resp: 24 20 (!) 28   Temp:      SpO2: 95% 96% (!) 90%         Electronically Signed By: COCO Miner CRNA  February 20, 2020  7:38 PM

## 2020-02-21 NOTE — PROGRESS NOTES
Hemoglobins stable overnight.  Ulcer at the insura.  This is a type 1 peptic ulcer, not from over production of acid.  Added Carafate.  Surgery will sign off and follow up with the patient with the results of the biopsy.

## 2020-02-22 VITALS
TEMPERATURE: 97.9 F | DIASTOLIC BLOOD PRESSURE: 68 MMHG | OXYGEN SATURATION: 92 % | SYSTOLIC BLOOD PRESSURE: 168 MMHG | HEART RATE: 70 BPM | RESPIRATION RATE: 14 BRPM

## 2020-02-22 LAB
ALBUMIN UR-MCNC: NEGATIVE MG/DL
ANION GAP SERPL CALCULATED.3IONS-SCNC: 6 MMOL/L (ref 3–14)
APPEARANCE UR: CLEAR
BASOPHILS # BLD AUTO: 0.1 10E9/L (ref 0–0.2)
BASOPHILS NFR BLD AUTO: 0.7 %
BILIRUB UR QL STRIP: NEGATIVE
BUN SERPL-MCNC: 7 MG/DL (ref 7–30)
CALCIUM SERPL-MCNC: 8.1 MG/DL (ref 8.5–10.1)
CHLORIDE SERPL-SCNC: 115 MMOL/L (ref 94–109)
CO2 SERPL-SCNC: 22 MMOL/L (ref 20–32)
COLOR UR AUTO: NORMAL
CREAT SERPL-MCNC: 0.9 MG/DL (ref 0.52–1.04)
DIFFERENTIAL METHOD BLD: ABNORMAL
EOSINOPHIL # BLD AUTO: 0.5 10E9/L (ref 0–0.7)
EOSINOPHIL NFR BLD AUTO: 6.2 %
ERYTHROCYTE [DISTWIDTH] IN BLOOD BY AUTOMATED COUNT: 14.8 % (ref 10–15)
GFR SERPL CREATININE-BSD FRML MDRD: 61 ML/MIN/{1.73_M2}
GLUCOSE SERPL-MCNC: 87 MG/DL (ref 70–99)
GLUCOSE UR STRIP-MCNC: NEGATIVE MG/DL
HCT VFR BLD AUTO: 29.1 % (ref 35–47)
HGB BLD-MCNC: 9.7 G/DL (ref 11.7–15.7)
HGB UR QL STRIP: NEGATIVE
IMM GRANULOCYTES # BLD: 0 10E9/L (ref 0–0.4)
IMM GRANULOCYTES NFR BLD: 0.4 %
KETONES UR STRIP-MCNC: NEGATIVE MG/DL
LEUKOCYTE ESTERASE UR QL STRIP: NEGATIVE
LYMPHOCYTES # BLD AUTO: 2.1 10E9/L (ref 0.8–5.3)
LYMPHOCYTES NFR BLD AUTO: 28.1 %
MCH RBC QN AUTO: 28.8 PG (ref 26.5–33)
MCHC RBC AUTO-ENTMCNC: 33.3 G/DL (ref 31.5–36.5)
MCV RBC AUTO: 86 FL (ref 78–100)
MONOCYTES # BLD AUTO: 0.7 10E9/L (ref 0–1.3)
MONOCYTES NFR BLD AUTO: 9.9 %
NEUTROPHILS # BLD AUTO: 4 10E9/L (ref 1.6–8.3)
NEUTROPHILS NFR BLD AUTO: 54.7 %
NITRATE UR QL: NEGATIVE
NRBC # BLD AUTO: 0 10*3/UL
NRBC BLD AUTO-RTO: 0 /100
PH UR STRIP: 6 PH (ref 4.7–8)
PLATELET # BLD AUTO: 253 10E9/L (ref 150–450)
POTASSIUM SERPL-SCNC: 3.7 MMOL/L (ref 3.4–5.3)
RBC # BLD AUTO: 3.37 10E12/L (ref 3.8–5.2)
SODIUM SERPL-SCNC: 143 MMOL/L (ref 133–144)
SOURCE: NORMAL
SP GR UR STRIP: 1.01 (ref 1–1.03)
UROBILINOGEN UR STRIP-MCNC: NORMAL MG/DL (ref 0–2)
WBC # BLD AUTO: 7.3 10E9/L (ref 4–11)

## 2020-02-22 PROCEDURE — 25000128 H RX IP 250 OP 636: Performed by: INTERNAL MEDICINE

## 2020-02-22 PROCEDURE — 85025 COMPLETE CBC W/AUTO DIFF WBC: CPT | Performed by: SURGERY

## 2020-02-22 PROCEDURE — 25000132 ZZH RX MED GY IP 250 OP 250 PS 637: Mod: GY | Performed by: INTERNAL MEDICINE

## 2020-02-22 PROCEDURE — 25000132 ZZH RX MED GY IP 250 OP 250 PS 637: Mod: GY | Performed by: SURGERY

## 2020-02-22 PROCEDURE — 99238 HOSP IP/OBS DSCHRG MGMT 30/<: CPT | Performed by: INTERNAL MEDICINE

## 2020-02-22 PROCEDURE — 80048 BASIC METABOLIC PNL TOTAL CA: CPT | Performed by: SURGERY

## 2020-02-22 PROCEDURE — 81003 URINALYSIS AUTO W/O SCOPE: CPT | Performed by: SURGERY

## 2020-02-22 PROCEDURE — 36415 COLL VENOUS BLD VENIPUNCTURE: CPT | Performed by: SURGERY

## 2020-02-22 RX ORDER — SUCRALFATE 1 G/1
1 TABLET ORAL
Qty: 120 TABLET | Refills: 1 | Status: SHIPPED | OUTPATIENT
Start: 2020-02-22 | End: 2020-03-10

## 2020-02-22 RX ORDER — AMLODIPINE BESYLATE 5 MG/1
5 TABLET ORAL DAILY
Qty: 30 TABLET | Refills: 1 | Status: SHIPPED | OUTPATIENT
Start: 2020-02-22 | End: 2020-03-10

## 2020-02-22 RX ORDER — HYDRALAZINE HYDROCHLORIDE 20 MG/ML
5 INJECTION INTRAMUSCULAR; INTRAVENOUS EVERY 4 HOURS PRN
Status: DISCONTINUED | OUTPATIENT
Start: 2020-02-22 | End: 2020-02-22 | Stop reason: HOSPADM

## 2020-02-22 RX ADMIN — AMLODIPINE BESYLATE 5 MG: 5 TABLET ORAL at 08:30

## 2020-02-22 RX ADMIN — PANTOPRAZOLE SODIUM 40 MG: 40 TABLET, DELAYED RELEASE ORAL at 07:14

## 2020-02-22 RX ADMIN — HYDRALAZINE HYDROCHLORIDE 5 MG: 20 INJECTION INTRAMUSCULAR; INTRAVENOUS at 01:09

## 2020-02-22 RX ADMIN — SUCRALFATE 1 G: 1 TABLET ORAL at 07:14

## 2020-02-22 ASSESSMENT — ACTIVITIES OF DAILY LIVING (ADL)
ADLS_ACUITY_SCORE: 14

## 2020-02-22 NOTE — PLAN OF CARE
Face to face report given with opportunity to observe patient.    Report given to Ciera Knowles   2/22/2020  7:33 AM

## 2020-02-22 NOTE — PLAN OF CARE
Face to face report given with opportunity to observe patient.    Report given to Ivonne Belle RN   2/21/2020  7:04 PM

## 2020-02-22 NOTE — PROVIDER NOTIFICATION
MD notified via textpage of pt's BP of 178/47.    MD ordered 5 mg of hydralazine q4 hrs prn for high BP

## 2020-02-22 NOTE — PLAN OF CARE
Pt is A&O, independent, regular diet.  VS as charted, no fevers, on room air and denies pain.  Lungs are clear, bowels active x4, passing flatus. Tolerating diet, no N/V. Voiding adequately, UA sent to lab this shift.  Per ICU tele report pt is SR 90s.  IV is SL, flushes well.  Brakes locked, call light within reach, makes needs known.  Frequent rounding done, free from falls.      Patient discharged at 11:00 AM via wheel chair accompanied by daughter and staff. Prescriptions sent to patients preferred pharmacy. All belongings sent with patient.     Discharge instructions reviewed with pt. Listed belongings gathered and returned to patient. yes    Patient discharged to home.   Report called to N/A    Core Measures and Vaccines  Core Measures applicable during stay: No. If yes, state diagnosis: N/A   Pneumonia and Influenza given prior to discharge, if indicated: No    Surgical Patient   Surgical Procedures during stay: no  Did patient receive discharge instruction on wound care and recognition of infection symptoms? N/A    MISC  Follow up appointment made:  No- attempted to call admitting to get appointment to no avail, pt instructed to call Monday to make f/u  Home and hospital aquired medications returned to patient: N/A  Patient reports pain was well managed at discharge: Yes

## 2020-02-22 NOTE — PLAN OF CARE
Patient indepenedent in room - resting comfortably in bed - alert and oriented - no BM yet - passing gas - BP elevated (see note provider notification) gave amlodipine and BP starting to come down now 170s - denies pain    Face to face report given with opportunity to observe patient.    Report given to Silver Jonas   2/21/2020  11:07 PM

## 2020-02-22 NOTE — DISCHARGE INSTRUCTIONS
PLEASE CALL THE CLINIC Monday MORNING TO SET UP A FOLLOW UP APPOINTMENT FOR THIS WEEK. DR FORD HAS NO OPENINGS SO THE  CAN MAKE YOUR APPOINTMENT WITH ANOTHER PROVIDER.

## 2020-02-22 NOTE — PLAN OF CARE
Here for upper GI bleed. Pt is A&O w/ elevated systolic BP in the 170s, but otherwise VSS. Tele in place reading SR 60s per ICU report. Hydralazine administered x1. Denies pain. Pt has slept well throughout the night with no issues. No BM this shift. AM Hg of 9.7. Bed in lowest position. Call light in reach. ID band in place. Makes needs known. Will continue to monitor.

## 2020-02-22 NOTE — DISCHARGE SUMMARY
Admit Date:     02/19/2020   Discharge Date:           PRIMARY CARE PHYSICIAN:  Ophelia Troncoso MD       ADMISSION DIAGNOSES:  Melena/rectal bleeding.      DISCHARGE DIAGNOSES:   1.  Bleeding peptic ulcer.   2.  Gastrointestinal bleed secondary to #1.   3.  Anemia secondary to #1.   4.  History of coronary artery disease.   5.  History of acquired hypothyroidism.   6.  History of hemorrhagic cerebrovascular accident.   7.  History of osteoporosis.   8.  History of hypertension.      DISPOSITION:  To home self-care.      PROCEDURES AND CONSULTATIONS:  Surgical consultation was obtained.  EGD was completed.      PENDING TEST RESULTS AT TIME OF DISCHARGE:  Surgical pathology from biopsies of the EGD are pending.      HISTORY OF PRESENT ILLNESS:  Please see admission H and P.      PAST MEDICAL HISTORY:  Please see admission H and P.      HOSPITAL COURSE:  Ms. Ocasio is a 79-year-old female who has a history of coronary artery disease and underwent stenting in 06/2019.  With this, she was placed on Plavix and aspirin.  In 12/19 , she experienced a GI bleed and did undergo an EGD done by Dr. Parish which revealed some diffuse gastritis with stigmata of bleeding and possible Patel ulcerations with no active bleeding.  Due to that presentation, her aspirin and Plavix were held at time of discharge.  About 1 week after this hospitalization the patient was seen by primary care provider and hemoglobin was stable.  Hence, she was started back on her aspirin only.  Unfortunately, Ms. Ocasio began to experience some melena and rectal bleeding prompting her to present to the Emergency Department on 02/19/2020.      With this presentation, she was noted to have another drop in her hemoglobin from 12.1 to 9.6.  She was made n.p.o. and subsequently had another EGD done.  She was found to have a peptic ulcer in the body of the stomach without any active bleeding with most recent EGD.      Following the procedure, the patient remained  stable and was advanced with her diet.  Over the course of the last several days, her hemoglobin has remained stable in the 9-10 range.  She did not require transfusion during this hospitalization.  Obviously her aspirin and Plavix are still on hold at this time.  The patient will continue on her oral PPI daily along with her Carafate t.i.d.      The patient does have a history of hypertension and was placed on additional medications including Norvasc 5 mg p.o. daily during this hospitalization.      Followup will also require additional assessment of her hypertension.      PHYSICAL EXAMINATION:   VITAL SIGNS:  At the time of discharge blood pressure is 170/50, respirations 16, O2 sat 93% on room air, pulse of 73,000, temperature 99.4.   GENERAL:  The patient is alert and oriented, pleasant, conversant, no acute distress.   HEART:  Regular rate and rhythm, no murmurs.   LUNGS:  Clear to auscultation.   ABDOMEN:  Soft, nontender with positive bowel sounds.   EXTREMITIES:  With trace lower extremity edema bilaterally.   NEUROLOGIC:  No focal neurologic deficits noted.      DISCHARGE MEDICATIONS:   1.  Vitamin C p.o. daily.   2.  Lipitor 40 mg daily.   3.  Tums 500 mg 1 p.o. daily.   4.  Erythromycin to both eyes 4 times daily.   5.  Iron 325 mg daily.   6.  Allegra 180 mg daily.   7.  Flonase 50 mcg p.o. daily.   8.  Lasix 20 mg p.o. daily.   9.  Synthroid 75 mcg p.o. daily.   10.  Lisinopril 5 mg p.o. daily.   11.  Metoprolol succinate 25 mg p.o. daily.   12.  K-Dur 10 mEq daily.   13.  Zioptan  0.0015 solution 1 drop to both eyes at bedtime.   14.  Vitamin D 2000 units 2 tabs daily.   15.  Vitamin E 400 units p.o. daily.   16.  Accolate 20 mg 2 times daily.   17.  Remeron 7.5 mg p.o. at bedtime.   18.  Genteal 0.3% solution 1 drop to both eyes 4 times daily as needed.   19.  Nitroglycerin as needed.   20.  Protonix 40 mg p.o. daily.   21.  Amlodipine 5 mg p.o. daily.   22.  Carafate 1 gram 4 times daily prior to  meals.      ADDITIONAL DISCHARGE INSTRUCTIONS:   1.  CODE STATUS:  Full.   2.  Diet bland for the next 3-5 days.   3.  Activity:  As tolerated.      FOLLOWUP APPOINTMENTS AND REFERRALS:  The patient should follow up with primary care physician, Dr. Troncoso in the next 7-10 days for assessment following this hospitalization and recheck of her hemoglobin.  In addition, she may need for additional titration of her blood pressure medications.  Lastly consideration as to when or if at all to restart aspirin and/or Plavix.         LEFTY CHAIDEZ DO             D: 2020   T: 2020   MT: ANTHONY      Name:     KOURTNEY ADAMES   MRN:      [id number]        Account:        [id number]   :      1940           Admit Date:     2020                                  Discharge Date:       Document: [id number]       cc: Ophelia Troncoso MD

## 2020-02-24 ENCOUNTER — TELEPHONE (OUTPATIENT)
Dept: FAMILY MEDICINE | Facility: OTHER | Age: 80
End: 2020-02-24

## 2020-02-24 LAB — COPATH REPORT: NORMAL

## 2020-02-24 NOTE — TELEPHONE ENCOUNTER
10:30 AM    Reason for Call: OVERBOOK    Patient is having the following symptoms: HOSPITAL FOLLOW UP for 1 days.    The patient is requesting an appointment for SOMETIME THIS WEEK with .    Was an appointment offered for this call? No  If yes : Appointment type              Date    Preferred method for responding to this message: Telephone Call  What is your phone number ?[phone]    If we cannot reach you directly, may we leave a detailed response at the number you provided? Yes    Can this message wait until your PCP/provider returns, if unavailable today? Yes pcp is out     Marleny Soliz

## 2020-02-26 NOTE — PROGRESS NOTES
Subjective     Lita Ocasio is a 79 year old female who presents to clinic today for the following health issues:    HPI       Hospital Follow-up Visit:    Hospital/Nursing Home/IP Rehab Facility: [facility]  Date of Admission: 2-19-20  Date of Discharge: 2-22-20  Reason(s) for Admission: acute upper GI bleed            Problems taking medications regularly:  None       Medication changes since discharge: discontinued aspirin, added amlodopine and carafate       Problems adhering to non-medication therapy:  None    Summary of hospitalization:  [facility] discharge summary reviewed    HOSPITAL COURSE:  Ms. Ocasio is a 79-year-old female who has a history of coronary artery disease and underwent stenting in 06/2019.  With this, she was placed on Plavix and aspirin.  In 12/19 , she experienced a GI bleed and did undergo an EGD done by Dr. Parish which revealed some diffuse gastritis with stigmata of bleeding and possible Patel ulcerations with no active bleeding.  Due to that presentation, her aspirin and Plavix were held at time of discharge.  About 1 week after this hospitalization the patient was seen by primary care provider and hemoglobin was stable.  Hence, she was started back on her aspirin only.  Unfortunately, Ms. Ocasio began to experience some melena and rectal bleeding prompting her to present to the Emergency Department on 02/19/2020.      With this presentation, she was noted to have another drop in her hemoglobin from 12.1 to 9.6.  She was made n.p.o. and subsequently had another EGD done.  She was found to have a peptic ulcer in the body of the stomach without any active bleeding with most recent EGD.      Following the procedure, the patient remained stable and was advanced with her diet.  Over the course of the last several days, her hemoglobin has remained stable in the 9-10 range.  She did not require transfusion during this hospitalization.  Obviously her aspirin and Plavix are  still on hold at this time.  The patient will continue on her oral PPI daily along with her Carafate t.i.d.      The patient does have a history of hypertension and was placed on additional medications including Norvasc 5 mg p.o. daily during this hospitalization.      Followup will also require additional assessment of her hypertension.     Diagnostic Tests/Treatments reviewed.  Follow up needed: lab follow up  Other Healthcare Providers Involved in Patient s Care:         None  Update since discharge: improved.     Pt states that she has not longer been having black or bloody stools. She is currently off the ASA and Plavix due to the bleeding. She is taking PPI and Carafate qid. Taking Fe and Vit C daily in the AM as well. Notes significant nausea in the AM after medications. She is wondering if this is due the vitamin C. Resolves with TUMS.     She also states that she has been more short of breath and fatigued than usual since discharge from the hospital. She notes rhinorrhea, sinus congestion as well. Mild cough, no more than usual. Some wheeze. Not using prn inhalers, but has been considering. No fevers, chills. No ill contacts. No ear, eye, tooth pain. No hemoptysis.     Note pt was having significantly elevated BPs during hospitalization. Amlodipine 5mg added. She thinks she is tolerating fine. She has no lightheadedness, dizziness, leg edema. She notes that there has been significant difference in her BPs depending on arm it is taken on.     Post Discharge Medication Reconciliation: discharge medications reconciled and changed, per note/orders (see AVS).  Plan of care communicated with patient       Reviewed and updated as needed this visit by Provider  Tobacco  Allergies  Meds  Problems  Med Hx  Surg Hx  Fam Hx         Review of Systems   ROS COMP: Constitutional, HEENT, cardiovascular, pulmonary, gi and gu systems are negative, except as otherwise noted.      Objective    BP (!) 190/72 (BP  Location: Right arm)   Pulse 85   Ht 1.524 m (5')   Wt 47.6 kg (105 lb)   SpO2 93%   BMI 20.51 kg/m    Body mass index is 20.51 kg/m .  Physical Exam   GENERAL: alert, no distress and fatigued  EYES: Eyes grossly normal to inspection, PERRL and conjunctivae and sclerae normal  HENT: ear canals and TM's normal, nose and mouth without ulcers or lesions  NECK: no adenopathy, no asymmetry, masses, or scars and thyroid normal to palpation  RESP: lungs clear to auscultation - no rales, rhonchi or wheezes  CV: regular rates and rhythm, normal S1 S2, no S3 or S4, no murmur, click or rub and no peripheral edema  ABDOMEN: soft, nontender, without hepatosplenomegaly or masses and bowel sounds normal  MS: no gross musculoskeletal defects noted, no edema  NEURO: Normal strength and tone, mentation intact and speech normal  PSYCH: mentation appears normal, affect normal/bright    Diagnostic Test Results:  Labs reviewed in Epic  Results for orders placed or performed during the hospital encounter of 02/28/20   US Carotid Bilateral     Status: None    Narrative    PROCEDURE: US CAROTID BILATERAL 2/28/2020 1:25 PM    HISTORY: Unsteadiness on feet; Chronic systolic congestive heart  failure (H); ASCVD (arteriosclerotic cardiovascular disease)    COMPARISONS: None.    TECHNIQUE: Duplex ultrasound the carotids    FINDINGS: There is atherosclerotic plaquing in both carotid  bifurcations with velocities consistent with less than 50% stenosis..  Forward flow is demonstrated in the right vertebral artery. What is  reported as retrograde flow on the left is most likely not a vertebral  artery. Since blood pressures in the left arm measured 121 mmHg.         Impression    IMPRESSION: Heavy atherosclerotic plaquing in both carotid  bifurcations with velocities consistent with less than 50% stenoses  noted.    ELLA JACOBS MD   Results for orders placed or performed during the hospital encounter of 02/28/20   US KESHAV Doppler No  Exercise     Status: None    Narrative    PROCEDURE: US KESHAV DOPPLER NO EXERCISE, 1-2 LEVELS,??? BILAT 2/28/2020  2:06 PM    HISTORY: Other specified symptoms and signs involving the circulatory  and respiratory systems; Chronic systolic congestive heart failure  (H); ASCVD (arteriosclerotic cardiovascular disease)    COMPARISONS: None.    TECHNIQUE: Ankle-brachial indices    FINDINGS: The ankle-brachial index measured 0.48 on the right and 0.68  on the left. This would be consistent with moderate arterial occlusive  disease in both lower extremities.         Impression    IMPRESSION: Moderate arterial occlusive disease in both lower  extremities    ELLA JACOBS MD   Results for orders placed or performed in visit on 02/28/20   XR Chest 2 Views     Status: None    Narrative    Procedure:XR CHEST 2 VW    Clinical history:Female, 79 years, MILLS (dyspnea on exertion)    Technique: Two views are submitted.    Comparison: 1/29/2020    Findings: The cardiac silhouette is normal. The pulmonary vasculature  is normal.    The lungs are again hyperinflated however appear to be clear. There is  minimal atelectasis versus scarring again seen at the left lung base.  Bony structures are unremarkable.      Impression    Impression:   Persistent hyperinflation of the lungs without evidence of pneumonia,  pneumothorax or other acute abnormality.    JAYNA MAURICIO MD   Results for orders placed or performed in visit on 02/28/20   CBC with platelets and differential     Status: Abnormal   Result Value Ref Range    WBC 11.8 (H) 4.0 - 11.0 10e9/L    RBC Count 4.42 3.8 - 5.2 10e12/L    Hemoglobin 12.4 11.7 - 15.7 g/dL    Hematocrit 38.2 35.0 - 47.0 %    MCV 86 78 - 100 fl    MCH 28.1 26.5 - 33.0 pg    MCHC 32.5 31.5 - 36.5 g/dL    RDW 14.6 10.0 - 15.0 %    Platelet Count 358 150 - 450 10e9/L    Diff Method Automated Method     % Neutrophils 76.6 %    % Lymphocytes 16.4 %    % Monocytes 5.7 %    % Eosinophils 0.5 %    % Basophils 0.5  "%    % Immature Granulocytes 0.3 %    Nucleated RBCs 0 0 /100    Absolute Neutrophil 9.0 (H) 1.6 - 8.3 10e9/L    Absolute Lymphocytes 1.9 0.8 - 5.3 10e9/L    Absolute Monocytes 0.7 0.0 - 1.3 10e9/L    Absolute Eosinophils 0.1 0.0 - 0.7 10e9/L    Absolute Basophils 0.1 0.0 - 0.2 10e9/L    Abs Immature Granulocytes 0.0 0 - 0.4 10e9/L    Absolute Nucleated RBC 0.0    Nt probnp inpatient     Status: None   Result Value Ref Range    N-Terminal Pro BNP Inpatient 957 0 - 1,800 pg/mL   Comprehensive metabolic panel (BMP + Alb, Alk Phos, ALT, AST, Total. Bili, TP)     Status: Abnormal   Result Value Ref Range    Sodium 140 133 - 144 mmol/L    Potassium 3.2 (L) 3.4 - 5.3 mmol/L    Chloride 105 94 - 109 mmol/L    Carbon Dioxide 26 20 - 32 mmol/L    Anion Gap 9 3 - 14 mmol/L    Glucose 103 (H) 70 - 99 mg/dL    Urea Nitrogen 10 7 - 30 mg/dL    Creatinine 0.99 0.52 - 1.04 mg/dL    GFR Estimate 54 (L) >60 mL/min/[1.73_m2]    GFR Estimate If Black 63 >60 mL/min/[1.73_m2]    Calcium 9.1 8.5 - 10.1 mg/dL    Bilirubin Total 0.4 0.2 - 1.3 mg/dL    Albumin 3.8 3.4 - 5.0 g/dL    Protein Total 7.6 6.8 - 8.8 g/dL    Alkaline Phosphatase 88 40 - 150 U/L    ALT 19 0 - 50 U/L    AST 16 0 - 45 U/L           Assessment & Plan     COPD exacerbation (H) / MILLS (dyspnea on exertion) / Chronic systolic congestive heart failure (H)  Pt with worsening MILLS since discharge from hospital for GIB. Hgb improved, BNP not elevated significantly, CXR clear, EKG without acute changes. Given URI sx, suspect COPD exacerbation. Pt declines prednisone she states she \"feels like she will die\" when she takes these. Start doxycycline. May worsen some of her nausea symptoms, pt aware.   - CBC with platelets and differential  - XR Chest 2 Views; Future  - Nt probnp inpatient  - Comprehensive metabolic panel (BMP + Alb, Alk Phos, ALT, AST, Total. Bili, TP)  - EKG 12-lead complete w/read - Clinics  - doxycycline hyclate (VIBRA-TABS) 100 MG tablet; Take 1 tablet (100 mg) " by mouth 2 times daily for 10 days  Dispense: 20 tablet; Refill: 0    Upper GI bleeding  CBC improved. Due to nausea in the AM after oral iron supplement. Okay to stop this along with VIt C for now as she is not on ASA and Plavix. Consider restart at follow up next week.   - CBC with platelets and differential    ASCVD (arteriosclerotic cardiovascular disease) / Subclavian arterial stenosis (H) / Essential hypertension  See BPs. Pt has very drastic BP differential. Dopplers today without retrograde flow. BP, however, appears to be very high. Will increase norvasc to 10mg (pt to take 2 x 5mg tabs) and follow up next week for recheck with Dr. Quezada (case discussed with him). Will also discuss with Cards. As above, pt is no longer on ASA or Plavix. I did not restart these given ongoing GI upset/complaints and recent GIB x 2. Monitor for left arm ischemic symptoms, positional dizziness, etc. Again, consider restart of ASA at follow up.   - US Carotid Bilateral; Future  - US KESHAV Doppler No Exercise; Future  - EKG 12-lead complete w/read - Clinics      No follow-ups on file.    Ophelia Troncoso MD  [facility]

## 2020-02-28 ENCOUNTER — OFFICE VISIT (OUTPATIENT)
Dept: FAMILY MEDICINE | Facility: OTHER | Age: 80
End: 2020-02-28
Attending: FAMILY MEDICINE
Payer: MEDICARE

## 2020-02-28 ENCOUNTER — HOSPITAL ENCOUNTER (OUTPATIENT)
Dept: ULTRASOUND IMAGING | Facility: HOSPITAL | Age: 80
End: 2020-02-28
Attending: FAMILY MEDICINE
Payer: MEDICARE

## 2020-02-28 ENCOUNTER — ANCILLARY PROCEDURE (OUTPATIENT)
Dept: GENERAL RADIOLOGY | Facility: OTHER | Age: 80
End: 2020-02-28
Attending: FAMILY MEDICINE
Payer: MEDICARE

## 2020-02-28 VITALS
HEIGHT: 60 IN | BODY MASS INDEX: 20.62 KG/M2 | DIASTOLIC BLOOD PRESSURE: 72 MMHG | WEIGHT: 105 LBS | OXYGEN SATURATION: 93 % | HEART RATE: 85 BPM | SYSTOLIC BLOOD PRESSURE: 190 MMHG

## 2020-02-28 DIAGNOSIS — I10 ESSENTIAL HYPERTENSION: ICD-10-CM

## 2020-02-28 DIAGNOSIS — I25.10 ASCVD (ARTERIOSCLEROTIC CARDIOVASCULAR DISEASE): ICD-10-CM

## 2020-02-28 DIAGNOSIS — R06.09 DOE (DYSPNEA ON EXERTION): ICD-10-CM

## 2020-02-28 DIAGNOSIS — J44.1 COPD EXACERBATION (H): Primary | ICD-10-CM

## 2020-02-28 DIAGNOSIS — I50.22 CHRONIC SYSTOLIC CONGESTIVE HEART FAILURE (H): Chronic | ICD-10-CM

## 2020-02-28 DIAGNOSIS — K92.2 UPPER GI BLEEDING: ICD-10-CM

## 2020-02-28 DIAGNOSIS — R09.89 OTHER SPECIFIED SYMPTOMS AND SIGNS INVOLVING THE CIRCULATORY AND RESPIRATORY SYSTEMS: ICD-10-CM

## 2020-02-28 DIAGNOSIS — I77.1 SUBCLAVIAN ARTERIAL STENOSIS (H): ICD-10-CM

## 2020-02-28 DIAGNOSIS — R26.81 UNSTEADINESS ON FEET: ICD-10-CM

## 2020-02-28 LAB
ALBUMIN SERPL-MCNC: 3.8 G/DL (ref 3.4–5)
ALP SERPL-CCNC: 88 U/L (ref 40–150)
ALT SERPL W P-5'-P-CCNC: 19 U/L (ref 0–50)
ANION GAP SERPL CALCULATED.3IONS-SCNC: 9 MMOL/L (ref 3–14)
AST SERPL W P-5'-P-CCNC: 16 U/L (ref 0–45)
BASOPHILS # BLD AUTO: 0.1 10E9/L (ref 0–0.2)
BASOPHILS NFR BLD AUTO: 0.5 %
BILIRUB SERPL-MCNC: 0.4 MG/DL (ref 0.2–1.3)
BUN SERPL-MCNC: 10 MG/DL (ref 7–30)
CALCIUM SERPL-MCNC: 9.1 MG/DL (ref 8.5–10.1)
CHLORIDE SERPL-SCNC: 105 MMOL/L (ref 94–109)
CO2 SERPL-SCNC: 26 MMOL/L (ref 20–32)
CREAT SERPL-MCNC: 0.99 MG/DL (ref 0.52–1.04)
DIFFERENTIAL METHOD BLD: ABNORMAL
EOSINOPHIL # BLD AUTO: 0.1 10E9/L (ref 0–0.7)
EOSINOPHIL NFR BLD AUTO: 0.5 %
ERYTHROCYTE [DISTWIDTH] IN BLOOD BY AUTOMATED COUNT: 14.6 % (ref 10–15)
GFR SERPL CREATININE-BSD FRML MDRD: 54 ML/MIN/{1.73_M2}
GLUCOSE SERPL-MCNC: 103 MG/DL (ref 70–99)
HCT VFR BLD AUTO: 38.2 % (ref 35–47)
HGB BLD-MCNC: 12.4 G/DL (ref 11.7–15.7)
IMM GRANULOCYTES # BLD: 0 10E9/L (ref 0–0.4)
IMM GRANULOCYTES NFR BLD: 0.3 %
LYMPHOCYTES # BLD AUTO: 1.9 10E9/L (ref 0.8–5.3)
LYMPHOCYTES NFR BLD AUTO: 16.4 %
MCH RBC QN AUTO: 28.1 PG (ref 26.5–33)
MCHC RBC AUTO-ENTMCNC: 32.5 G/DL (ref 31.5–36.5)
MCV RBC AUTO: 86 FL (ref 78–100)
MONOCYTES # BLD AUTO: 0.7 10E9/L (ref 0–1.3)
MONOCYTES NFR BLD AUTO: 5.7 %
NEUTROPHILS # BLD AUTO: 9 10E9/L (ref 1.6–8.3)
NEUTROPHILS NFR BLD AUTO: 76.6 %
NRBC # BLD AUTO: 0 10*3/UL
NRBC BLD AUTO-RTO: 0 /100
NT-PROBNP SERPL-MCNC: 957 PG/ML (ref 0–1800)
PLATELET # BLD AUTO: 358 10E9/L (ref 150–450)
POTASSIUM SERPL-SCNC: 3.2 MMOL/L (ref 3.4–5.3)
PROT SERPL-MCNC: 7.6 G/DL (ref 6.8–8.8)
RBC # BLD AUTO: 4.42 10E12/L (ref 3.8–5.2)
SODIUM SERPL-SCNC: 140 MMOL/L (ref 133–144)
WBC # BLD AUTO: 11.8 10E9/L (ref 4–11)

## 2020-02-28 PROCEDURE — 93005 ELECTROCARDIOGRAM TRACING: CPT

## 2020-02-28 PROCEDURE — 93880 EXTRACRANIAL BILAT STUDY: CPT | Mod: TC

## 2020-02-28 PROCEDURE — 83880 ASSAY OF NATRIURETIC PEPTIDE: CPT | Mod: ZL | Performed by: FAMILY MEDICINE

## 2020-02-28 PROCEDURE — G0463 HOSPITAL OUTPT CLINIC VISIT: HCPCS

## 2020-02-28 PROCEDURE — 36415 COLL VENOUS BLD VENIPUNCTURE: CPT | Mod: ZL | Performed by: FAMILY MEDICINE

## 2020-02-28 PROCEDURE — 93922 UPR/L XTREMITY ART 2 LEVELS: CPT | Mod: TC

## 2020-02-28 PROCEDURE — 99215 OFFICE O/P EST HI 40 MIN: CPT | Mod: 25 | Performed by: FAMILY MEDICINE

## 2020-02-28 PROCEDURE — 80053 COMPREHEN METABOLIC PANEL: CPT | Mod: ZL | Performed by: FAMILY MEDICINE

## 2020-02-28 PROCEDURE — 93010 ELECTROCARDIOGRAM REPORT: CPT | Performed by: INTERNAL MEDICINE

## 2020-02-28 PROCEDURE — 71046 X-RAY EXAM CHEST 2 VIEWS: CPT | Mod: TC

## 2020-02-28 PROCEDURE — 85025 COMPLETE CBC W/AUTO DIFF WBC: CPT | Mod: ZL | Performed by: FAMILY MEDICINE

## 2020-02-28 PROCEDURE — G0463 HOSPITAL OUTPT CLINIC VISIT: HCPCS | Mod: 25

## 2020-02-28 RX ORDER — DOXYCYCLINE HYCLATE 100 MG
100 TABLET ORAL 2 TIMES DAILY
Qty: 20 TABLET | Refills: 0 | Status: SHIPPED | OUTPATIENT
Start: 2020-02-28 | End: 2020-03-19

## 2020-02-28 ASSESSMENT — ANXIETY QUESTIONNAIRES
2. NOT BEING ABLE TO STOP OR CONTROL WORRYING: NOT AT ALL
3. WORRYING TOO MUCH ABOUT DIFFERENT THINGS: NOT AT ALL
5. BEING SO RESTLESS THAT IT IS HARD TO SIT STILL: NOT AT ALL
1. FEELING NERVOUS, ANXIOUS, OR ON EDGE: NOT AT ALL
7. FEELING AFRAID AS IF SOMETHING AWFUL MIGHT HAPPEN: NOT AT ALL
4. TROUBLE RELAXING: NOT AT ALL
GAD7 TOTAL SCORE: 0
6. BECOMING EASILY ANNOYED OR IRRITABLE: NOT AT ALL

## 2020-02-28 ASSESSMENT — PAIN SCALES - GENERAL: PAINLEVEL: MODERATE PAIN (4)

## 2020-02-28 ASSESSMENT — PATIENT HEALTH QUESTIONNAIRE - PHQ9: SUM OF ALL RESPONSES TO PHQ QUESTIONS 1-9: 3

## 2020-02-28 ASSESSMENT — MIFFLIN-ST. JEOR: SCORE: 872.78

## 2020-02-28 NOTE — Clinical Note
Pts BPs had severe discrepancies based on which arm we were taking. Right arm much higher in the 200s initially. Increased her norvasc. Just a heads up. Also, recent GI bleeding, so not yet back on ASA. Considering restart next week due to ongoing GI sx. Let me know if you need anything else before you see her.

## 2020-02-28 NOTE — Clinical Note
Pt left prior to scheduling with Dr. Quezada this week. Case has been discussed iwtih him, please call her and get her in with him for recheck of bp.

## 2020-02-28 NOTE — PATIENT INSTRUCTIONS
Please stop the vitamin C and Iron due to the stomach symptoms.     Please increase the norvasc to 10mg daily from 5mg. You may take 2 of the meds you already have.     Follow up next week for a BP check with Dr. Quezada.

## 2020-02-28 NOTE — NURSING NOTE
Chief Complaint   Patient presents with     Hospital F/U       Initial /80 (BP Location: Left arm)   Pulse 85   Ht 1.524 m (5')   Wt 47.6 kg (105 lb)   SpO2 93%   BMI 20.51 kg/m   Estimated body mass index is 20.51 kg/m  as calculated from the following:    Height as of this encounter: 1.524 m (5').    Weight as of this encounter: 47.6 kg (105 lb).  Medication Reconciliation: complete  Arianna Herbert LPN

## 2020-02-29 ASSESSMENT — ANXIETY QUESTIONNAIRES: GAD7 TOTAL SCORE: 0

## 2020-03-09 ENCOUNTER — TELEPHONE (OUTPATIENT)
Dept: CARDIOLOGY | Facility: OTHER | Age: 80
End: 2020-03-09

## 2020-03-09 DIAGNOSIS — E78.5 HYPERLIPIDEMIA, UNSPECIFIED HYPERLIPIDEMIA TYPE: ICD-10-CM

## 2020-03-09 DIAGNOSIS — E87.6 HYPOKALEMIA: ICD-10-CM

## 2020-03-09 DIAGNOSIS — Z95.5 S/P DRUG ELUTING CORONARY STENT PLACEMENT: ICD-10-CM

## 2020-03-09 DIAGNOSIS — K92.2 GASTROINTESTINAL HEMORRHAGE, UNSPECIFIED GASTROINTESTINAL HEMORRHAGE TYPE: ICD-10-CM

## 2020-03-09 RX ORDER — ATORVASTATIN CALCIUM 40 MG/1
40 TABLET, FILM COATED ORAL AT BEDTIME
Qty: 90 TABLET | Refills: 3 | Status: SHIPPED | OUTPATIENT
Start: 2020-03-09 | End: 2020-07-01

## 2020-03-09 NOTE — TELEPHONE ENCOUNTER
This patient is in need of a refill on her Atorvastatin to [facility]. She states that her bottle says that she has one refill remaining, but when she called her pharmacy to request the refill, she was advised that the RX went to a pharmacy somewhere in [place] and they don't have it on file anymore to refill it.    Last seen 1/7/2020.  Next visit 7/9/2020.  No lipids in chart.

## 2020-03-10 NOTE — TELEPHONE ENCOUNTER
Potassium chloride ER 10 MEQ CR       Last Written Prescription Date:  8-12-19  Last Fill Quantity: 30,   # refills: 5  Last Office Visit: 2-  Future Office visit:    Next 5 appointments (look out 90 days)    Mar 19, 2020  2:00 PM CDT  (Arrive by 1:45 PM)  SHORT with Ophelia Troncoso MD  [facility] [address]  [place] [address]  [phone]   Mar 25, 2020  2:00 PM CDT  (Arrive by 1:45 PM)  Return Visit with Katelynn Winters PA-C  [facility] ([facility] ) [address]  [place] [address]  [phone]           Routing refill request to provider for review/approval because:

## 2020-03-10 NOTE — TELEPHONE ENCOUNTER
Pantoprazole 40 MG EC      Last Written Prescription Date:  11-11-19  Last Fill Quantity: 180,   # refills: 0  Last Office Visit: 2-  Future Office visit:    Next 5 appointments (look out 90 days)    Mar 19, 2020  2:00 PM CDT  (Arrive by 1:45 PM)  SHORT with Ophelia Troncoso MD  [facility] ([facility] ) [address]  [place] [address]  [phone]   Mar 25, 2020  2:00 PM CDT  (Arrive by 1:45 PM)  Return Visit [facility]-C  [facility] ([facility] ) [address]  [place] [address]  [phone]           Routing refill request to provider for review/approval because:

## 2020-03-11 RX ORDER — PANTOPRAZOLE SODIUM 40 MG/1
TABLET, DELAYED RELEASE ORAL
Qty: 180 TABLET | Refills: 0 | Status: SHIPPED | OUTPATIENT
Start: 2020-03-11 | End: 2020-06-03

## 2020-03-12 RX ORDER — POTASSIUM CHLORIDE 750 MG/1
TABLET, EXTENDED RELEASE ORAL
Qty: 30 TABLET | Refills: 11 | Status: SHIPPED | OUTPATIENT
Start: 2020-03-12 | End: 2020-06-03

## 2020-03-16 DIAGNOSIS — I50.22 CHRONIC SYSTOLIC CONGESTIVE HEART FAILURE (H): Chronic | ICD-10-CM

## 2020-03-16 RX ORDER — LISINOPRIL 5 MG/1
5 TABLET ORAL DAILY
Qty: 90 TABLET | Refills: 1 | Status: SHIPPED | OUTPATIENT
Start: 2020-03-16 | End: 2020-07-01

## 2020-03-16 NOTE — TELEPHONE ENCOUNTER
Patient calling and states per her pharmacy she is to request a new prescription. Stated they have no refills on file even though there was one.   Please advise, thank you.    Failed protocol due to    Blood pressure under 140/90 in past 12 months Protocol Details    Normal serum potassium on file in past 12 months        lisinopril      Last Written Prescription Date:  12/17/19  Last Fill Quantity: 90,   # refills: 1  Last Office Visit: 02/28/20  Future Office visit:    Next 5 appointments (look out 90 days)    Mar 19, 2020  2:00 PM CDT  (Arrive by 1:45 PM)  SHORT with Op[facility] MD  [facility] ([facility] [place] ) [address]  [place] [address]  [phone]   Mar 25, 2020  2:00 PM CDT  (Arrive by 1:45 PM)  Return Visit with Katelynn Winters PA-C  [facility] [place] ([facility] [place] ) [address] [place] [address]  [phone]

## 2020-03-19 ENCOUNTER — VIRTUAL VISIT (OUTPATIENT)
Dept: FAMILY MEDICINE | Facility: OTHER | Age: 80
End: 2020-03-19
Attending: FAMILY MEDICINE
Payer: MEDICARE

## 2020-03-19 VITALS
DIASTOLIC BLOOD PRESSURE: 57 MMHG | HEIGHT: 60 IN | SYSTOLIC BLOOD PRESSURE: 91 MMHG | BODY MASS INDEX: 20.62 KG/M2 | WEIGHT: 105 LBS

## 2020-03-19 DIAGNOSIS — I50.22 CHRONIC SYSTOLIC CONGESTIVE HEART FAILURE (H): Chronic | ICD-10-CM

## 2020-03-19 DIAGNOSIS — K92.2 GASTROINTESTINAL HEMORRHAGE, UNSPECIFIED GASTROINTESTINAL HEMORRHAGE TYPE: ICD-10-CM

## 2020-03-19 DIAGNOSIS — I10 BENIGN ESSENTIAL HYPERTENSION: ICD-10-CM

## 2020-03-19 DIAGNOSIS — F51.01 PRIMARY INSOMNIA: ICD-10-CM

## 2020-03-19 DIAGNOSIS — J43.2 CENTRILOBULAR EMPHYSEMA (H): Primary | Chronic | ICD-10-CM

## 2020-03-19 DIAGNOSIS — R63.4 WEIGHT LOSS: ICD-10-CM

## 2020-03-19 DIAGNOSIS — I25.10 ASCVD (ARTERIOSCLEROTIC CARDIOVASCULAR DISEASE): ICD-10-CM

## 2020-03-19 PROCEDURE — 99443 ZZC PHYSICIAN TELEPHONE EVALUATION 21-30 MIN: CPT | Performed by: FAMILY MEDICINE

## 2020-03-19 RX ORDER — MIRTAZAPINE 7.5 MG/1
TABLET, FILM COATED ORAL
Qty: 90 TABLET | Refills: 3 | Status: SHIPPED | OUTPATIENT
Start: 2020-03-19 | End: 2020-06-29

## 2020-03-19 ASSESSMENT — PAIN SCALES - GENERAL: PAINLEVEL: NO PAIN (0)

## 2020-03-19 ASSESSMENT — MIFFLIN-ST. JEOR: SCORE: 872.78

## 2020-03-19 NOTE — PROGRESS NOTES
"  Lita Ocasio is a 79 year old female who is being evaluated via a billable telephone visit.      The patient has been notified of following:     \"This telephone visit will be conducted via a call between you and your physician/provider. We have found that certain health care needs can be provided without the need for a physical exam.  This service lets us provide the care you need with a short phone conversation.  If a prescription is necessary we can send it directly to your pharmacy.  If lab work is needed we can place an order for that and you can then stop by our lab to have the test done at a later time.    If during the course of the call the physician/provider feels a telephone visit is not appropriate, you will not be charged for this service.\"     Lita Ocasio complains of  No chief complaint on file.      I have reviewed and updated the patient's Past Medical History, Social History, Family History and Medication List.    ALLERGIES  Evista [raloxifene]; Prednisone; Combigan [brimonidine tartrate-timolol]; Latex; Levaquin [levofloxacin]; Penicillins; and Restasis    Hypertension Follow-up      Do you check your blood pressure regularly outside of the clinic? Yes     Are you following a low salt diet? Yes, sometimes    Are your blood pressures ever more than 140 on the top number (systolic) OR more   than 90 on the bottom number (diastolic), for example 140/90? Yes on her left arm    See BPs. Pt has very drastic BP differential. Dopplers last visit without retrograde flow. Increased norvasc to 10mg and pt was to follow up in clinic with my partner for recheck, but did not show for that appt. Today, we reviewed her BPs over the phone. She is taking on both arms . Right arm BP continues to be in the 90s systolic, left arm BPs improved from previous but remain high in the 150s systolic.     Vascular Disease Follow-up      How often do you take nitroglycerin? Never    Do you take an aspirin every day? Yes "     Pt is no longer on ASA or Plavix. I did not restart at our last visit given ongoing GI upset/complaints and recent GIB x 2. Her hemoglobin had recovered and so we stopped her iron and vitamin C. She also stopped all of her other vitamins. Nausea has improved significantly. No chest pain. No black or bloody stools.     Heart Failure Follow-up    Are you experiencing any shortness of breath? No    Are you experiencing any swelling in your legs or feet?  No    Are you using more pillows than usual? No    Do you cough at night?  No    Do you check your weight daily?  Yes    Have you had a weight change recently?  No    Are you having any of the following side effects from your medications? (Select all that apply)  The patient does not report symptoms of dizziness, fatigue, cough, swelling, or slow heart beat.    Since your last visit, how many times have you gone to the cardiologist, urgent care, emergency room, or hospital because of your heart failure?   None      COPD Follow-Up    Overall, how are your COPD symptoms since your last clinic visit?  Better    How much fatigue or shortness of breath do you have when you are walking?  Same as usual    How much shortness of breath do you have when you are resting?  Less than usual    How often do you cough? Rarely    Have you noticed any change in your sputum/phlegm?  No    Have you experienced a recent fever? No    Please describe how far you can walk without stopping to rest:  The length of 1-2 rooms    How many flights of stairs are you able to walk up without stopping?  1    Have you had any Emergency Room Visits, Urgent Care Visits, or Hospital Admissions because of your COPD since your last office visit?  No     Breathing improved significantly from last visit per report.. She was treated for COPD exacerbation with pred and abx. States this was helpful, has been off these for some time, breathing remains improved. She is not checking O2 level, but has not had  wheezing or dyspnea. No leg swelling, orthopnea.     History   Smoking Status     Former Smoker     Packs/day: 1.00     Years: 50.00     Types: Cigarettes     Quit date: 1/1/2019   Smokeless Tobacco     Never Used     Comment: quit Jan 2019     No results found for: FEV1, [id number]      Assessment/Plan:     Centrilobular emphysema (H)  Improved after exacerbation end of Feb. Continue current medications.     ASCVD / Benign essential hypertension  Remains slightly high. Given this was a virtual visit, pt high risk for fall will accept BPs in the 150s at this point rather than increase meds (likely lisinopril). Restart the ASA. Recheck in clinic in 2 months.     Chronic systolic congestive heart failure (H)  Stable fluid status per pt report.     GI Bleeding  Recurrent x 2. Nausea and stomach upset improved with cessation of fe and vit C. Hgb wnl on last check a month ago. Plan is for repeat EGD in several months. She is restarting ASA today, aware to call or be seen immediately with black or bloody stools, symptoms of presyncope, palpitations, fatigue, etc.       Phone call duration:  24 minutes    Ophelia Troncoso MD

## 2020-03-19 NOTE — TELEPHONE ENCOUNTER
mirtazapine      Last Written Prescription Date:  1-6-20  Last Fill Quantity: 90,   # refills: 0  Last Office Visit: 2-28-20  Future Office visit:    Next 5 appointments (look out 90 days)    Mar 19, 2020  2:00 PM CDT  Telephone Visit with Ophelia Troncoso MD  [facility] ) [address]  [place] [address]  [phone]   Mar 25, 2020  2:00 PM CDT  (Arrive by 1:45 PM)  Return Visit with Katelynn Winters PA-C  [facility] ([facility] ) [address]  [place] [address]  [phone]

## 2020-03-26 ENCOUNTER — TELEPHONE (OUTPATIENT)
Dept: FAMILY MEDICINE | Facility: OTHER | Age: 80
End: 2020-03-26

## 2020-03-26 DIAGNOSIS — R30.0 DYSURIA: Primary | ICD-10-CM

## 2020-03-26 NOTE — TELEPHONE ENCOUNTER
Called patient but she doesn't use my chart at all, so unable to do an E visit. Will come in tomorrow to leave a urine sample. Order pended.

## 2020-03-26 NOTE — TELEPHONE ENCOUNTER
See message below. PCP is out. Could this patient just do an E visit for this? Or just come in for lab only?

## 2020-03-30 DIAGNOSIS — I50.22 CHRONIC SYSTOLIC CONGESTIVE HEART FAILURE (H): Chronic | ICD-10-CM

## 2020-03-30 NOTE — TELEPHONE ENCOUNTER
lasix      Last Written Prescription Date:  02/06/2020  Last Fill Quantity: 90,   # refills: 0  Last Office Visit: 03/19/2020  Future Office visit:    Next 5 appointments (look out 90 days)    May 26, 2020  2:00 PM CDT  (Arrive by 1:45 PM)  SHORT with Ophelia Troncoso MD  [facility] ([facility] [place] ) [address] [place] [address]  [phone]         [email or web address]      Last Written Prescription Date:  Unknown not in epic  Last Fill Quantity: unknown,   # refills: 0

## 2020-03-31 RX ORDER — FUROSEMIDE 40 MG
20 TABLET ORAL DAILY
Qty: 45 TABLET | Refills: 1 | Status: SHIPPED | OUTPATIENT
Start: 2020-03-31 | End: 2020-06-03

## 2020-03-31 RX ORDER — ZAFIRLUKAST 20 MG/1
20 TABLET, FILM COATED ORAL DAILY
Qty: 90 TABLET | Refills: 1 | OUTPATIENT
Start: 2020-03-31

## 2020-03-31 NOTE — TELEPHONE ENCOUNTER
Lasix- Protocol failed due to    Normal serum potassium on file in past 12 months     Potassium   Date Value Ref Range Status   02/28/2020 3.2 (L) 3.4 - 5.3 mmol/L Final     Last signed by Hailee Cortez.      Accolate- Historical med. Please advise. Thanks!

## 2020-03-31 NOTE — TELEPHONE ENCOUNTER
Patient would like to continue with all this stuff going on she feels she should continue it. She is requesting a refill.     Anita Alejandre MA

## 2020-04-01 RX ORDER — ZAFIRLUKAST 20 MG/1
20 TABLET, FILM COATED ORAL 2 TIMES DAILY
Qty: 60 TABLET | Refills: 3 | Status: SHIPPED | OUTPATIENT
Start: 2020-04-01 | End: 2020-09-16

## 2020-04-20 DIAGNOSIS — I50.22 CHRONIC SYSTOLIC CONGESTIVE HEART FAILURE (H): Chronic | ICD-10-CM

## 2020-04-20 DIAGNOSIS — E03.9 ACQUIRED HYPOTHYROIDISM: ICD-10-CM

## 2020-04-20 RX ORDER — LEVOTHYROXINE SODIUM 75 UG/1
75 TABLET ORAL DAILY
Qty: 90 TABLET | Refills: 0 | Status: SHIPPED | OUTPATIENT
Start: 2020-04-20 | End: 2020-07-20

## 2020-04-20 RX ORDER — METOPROLOL SUCCINATE 25 MG/1
25 TABLET, EXTENDED RELEASE ORAL DAILY
Qty: 90 TABLET | Refills: 0 | Status: SHIPPED | OUTPATIENT
Start: 2020-04-20 | End: 2020-07-20

## 2020-05-28 ENCOUNTER — HOSPITAL ENCOUNTER (OUTPATIENT)
Dept: ULTRASOUND IMAGING | Facility: HOSPITAL | Age: 80
End: 2020-05-28
Attending: FAMILY MEDICINE
Payer: MEDICARE

## 2020-05-28 ENCOUNTER — OFFICE VISIT (OUTPATIENT)
Dept: FAMILY MEDICINE | Facility: OTHER | Age: 80
End: 2020-05-28
Attending: FAMILY MEDICINE
Payer: MEDICARE

## 2020-05-28 VITALS
BODY MASS INDEX: 21.79 KG/M2 | WEIGHT: 111 LBS | DIASTOLIC BLOOD PRESSURE: 88 MMHG | HEIGHT: 60 IN | OXYGEN SATURATION: 97 % | SYSTOLIC BLOOD PRESSURE: 154 MMHG | HEART RATE: 70 BPM

## 2020-05-28 DIAGNOSIS — I50.22 CHRONIC SYSTOLIC CONGESTIVE HEART FAILURE (H): Primary | Chronic | ICD-10-CM

## 2020-05-28 DIAGNOSIS — M79.89 LEG SWELLING: ICD-10-CM

## 2020-05-28 DIAGNOSIS — E44.1 MILD PROTEIN-CALORIE MALNUTRITION (H): ICD-10-CM

## 2020-05-28 DIAGNOSIS — R10.9 FLANK PAIN: ICD-10-CM

## 2020-05-28 DIAGNOSIS — K13.0 CHEILITIS: ICD-10-CM

## 2020-05-28 DIAGNOSIS — Z87.19 HISTORY OF GI BLEED: ICD-10-CM

## 2020-05-28 LAB
ALBUMIN UR-MCNC: NEGATIVE MG/DL
ANION GAP SERPL CALCULATED.3IONS-SCNC: 8 MMOL/L (ref 3–14)
APPEARANCE UR: CLEAR
BASOPHILS # BLD AUTO: 0.1 10E9/L (ref 0–0.2)
BASOPHILS NFR BLD AUTO: 1 %
BILIRUB UR QL STRIP: NEGATIVE
BUN SERPL-MCNC: 11 MG/DL (ref 7–30)
CALCIUM SERPL-MCNC: 8.8 MG/DL (ref 8.5–10.1)
CHLORIDE SERPL-SCNC: 108 MMOL/L (ref 94–109)
CO2 SERPL-SCNC: 25 MMOL/L (ref 20–32)
COLOR UR AUTO: NORMAL
CREAT SERPL-MCNC: 1.11 MG/DL (ref 0.52–1.04)
D DIMER PPP FEU-MCNC: 3.2 UG/ML FEU (ref 0–0.5)
DIFFERENTIAL METHOD BLD: ABNORMAL
EOSINOPHIL # BLD AUTO: 0.3 10E9/L (ref 0–0.7)
EOSINOPHIL NFR BLD AUTO: 3.2 %
ERYTHROCYTE [DISTWIDTH] IN BLOOD BY AUTOMATED COUNT: 14.3 % (ref 10–15)
GFR SERPL CREATININE-BSD FRML MDRD: 47 ML/MIN/{1.73_M2}
GLUCOSE SERPL-MCNC: 101 MG/DL (ref 70–99)
GLUCOSE UR STRIP-MCNC: NEGATIVE MG/DL
HCT VFR BLD AUTO: 38.9 % (ref 35–47)
HGB BLD-MCNC: 12.6 G/DL (ref 11.7–15.7)
HGB UR QL STRIP: NEGATIVE
IMM GRANULOCYTES # BLD: 0 10E9/L (ref 0–0.4)
IMM GRANULOCYTES NFR BLD: 0.5 %
KETONES UR STRIP-MCNC: NEGATIVE MG/DL
LEUKOCYTE ESTERASE UR QL STRIP: NEGATIVE
LYMPHOCYTES # BLD AUTO: 2.4 10E9/L (ref 0.8–5.3)
LYMPHOCYTES NFR BLD AUTO: 27.3 %
MCH RBC QN AUTO: 26 PG (ref 26.5–33)
MCHC RBC AUTO-ENTMCNC: 32.4 G/DL (ref 31.5–36.5)
MCV RBC AUTO: 80 FL (ref 78–100)
MONOCYTES # BLD AUTO: 0.9 10E9/L (ref 0–1.3)
MONOCYTES NFR BLD AUTO: 10.6 %
NEUTROPHILS # BLD AUTO: 5 10E9/L (ref 1.6–8.3)
NEUTROPHILS NFR BLD AUTO: 57.4 %
NITRATE UR QL: NEGATIVE
NRBC # BLD AUTO: 0 10*3/UL
NRBC BLD AUTO-RTO: 0 /100
NT-PROBNP SERPL-MCNC: 579 PG/ML (ref 0–450)
PH UR STRIP: 7 PH (ref 4.7–8)
PLATELET # BLD AUTO: 389 10E9/L (ref 150–450)
POTASSIUM SERPL-SCNC: 3.4 MMOL/L (ref 3.4–5.3)
RBC # BLD AUTO: 4.84 10E12/L (ref 3.8–5.2)
SODIUM SERPL-SCNC: 141 MMOL/L (ref 133–144)
SOURCE: NORMAL
SP GR UR STRIP: 1.01 (ref 1–1.03)
UROBILINOGEN UR STRIP-MCNC: NORMAL MG/DL (ref 0–2)
WBC # BLD AUTO: 8.7 10E9/L (ref 4–11)

## 2020-05-28 PROCEDURE — 80048 BASIC METABOLIC PNL TOTAL CA: CPT | Mod: ZL | Performed by: FAMILY MEDICINE

## 2020-05-28 PROCEDURE — 81003 URINALYSIS AUTO W/O SCOPE: CPT | Mod: ZL | Performed by: FAMILY MEDICINE

## 2020-05-28 PROCEDURE — 85379 FIBRIN DEGRADATION QUANT: CPT | Mod: ZL | Performed by: FAMILY MEDICINE

## 2020-05-28 PROCEDURE — G0463 HOSPITAL OUTPT CLINIC VISIT: HCPCS

## 2020-05-28 PROCEDURE — G0463 HOSPITAL OUTPT CLINIC VISIT: HCPCS | Mod: 25

## 2020-05-28 PROCEDURE — 83880 ASSAY OF NATRIURETIC PEPTIDE: CPT | Mod: ZL | Performed by: FAMILY MEDICINE

## 2020-05-28 PROCEDURE — 93971 EXTREMITY STUDY: CPT | Mod: TC,LT

## 2020-05-28 PROCEDURE — 99214 OFFICE O/P EST MOD 30 MIN: CPT | Performed by: FAMILY MEDICINE

## 2020-05-28 PROCEDURE — 85025 COMPLETE CBC W/AUTO DIFF WBC: CPT | Mod: ZL | Performed by: FAMILY MEDICINE

## 2020-05-28 PROCEDURE — 36415 COLL VENOUS BLD VENIPUNCTURE: CPT | Mod: ZL | Performed by: FAMILY MEDICINE

## 2020-05-28 ASSESSMENT — MIFFLIN-ST. JEOR: SCORE: 899.99

## 2020-05-28 ASSESSMENT — PAIN SCALES - GENERAL: PAINLEVEL: NO PAIN (0)

## 2020-05-28 NOTE — NURSING NOTE
Chief Complaint   Patient presents with     Back Pain     Musculoskeletal Problem     Mouth/Lip Problem       Initial BP (!) 154/88 (Patient Position: Sitting)   Pulse 70   Ht 1.524 m (5')   Wt 50.3 kg (111 lb)   SpO2 97%   BMI 21.68 kg/m   Estimated body mass index is 21.68 kg/m  as calculated from the following:    Height as of this encounter: 1.524 m (5').    Weight as of this encounter: 50.3 kg (111 lb).  Medication Reconciliation: complete  Arianna Herbert LPN

## 2020-05-28 NOTE — PROGRESS NOTES
Subjective     Lita Ocasio is a 79 year old female who presents to clinic today for the following health issues:    HPI     Heart Failure Follow-up  Are you experiencing any shortness of breath? Yes, with activity only   How would you describe your shortness of breath?  Same as usual    Are you experiencing any swelling in your legs or feet?  Worse than usual    Are you using more pillows than usual? No    Do you cough at night?  Yes    Do you check your weight daily?  No    Have you had a weight change recently?  Weight increase    Are you having any of the following side effects from your medications? (Select all that apply)  The patient does not report symptoms of dizziness, fatigue, cough, swelling, or slow heart beat.    Since your last visit, how many times have you gone to the cardiologist, urgent care, emergency room, or hospital because of your heart failure?   None    COPD Follow-Up    Overall, how are your COPD symptoms since your last clinic visit?  No change    How much fatigue or shortness of breath do you have when you are walking?  More than usual    How much shortness of breath do you have when you are resting?  Same as usual    How often do you cough? Sometimes    Have you noticed any change in your sputum/phlegm?  Yes- clear    Have you experienced a recent fever? No    Please describe how far you can walk without stopping to rest:  The length of 1-2 rooms    How many flights of stairs are you able to walk up without stopping?  2    Have you had any Emergency Room Visits, Urgent Care Visits, or Hospital Admissions because of your COPD since your last office visit?  No    History   Smoking Status     Former Smoker     Packs/day: 1.00     Years: 50.00     Types: Cigarettes     Quit date: 1/1/2019   Smokeless Tobacco     Never Used     Comment: quit Jan 2019       Flank pain      Duration: few months    Description (location/character/radiation): burning feeling In flank area    Intensity:   "mild    Accompanying signs and symptoms: \"burning\" feeling in her mid back. Flank area. Denies any other sx or urinary sx. Does not she has had this feeling in the past when she had a UTI    History (similar episodes/previous evaluation): UTI    Precipitating or alleviating factors: None    Therapies tried and outcome: None    This intermittent and mild, across both sides of the low back. Worse with sitting. Would like urine testing. Not taking anything for this pain.      Rash      Duration: a couple weeks    Description  Location: around her lips/mouth  Itching: mild    Intensity:  mild    Accompanying signs and symptoms: None    History (similar episodes/previous evaluation): None    Precipitating or alleviating factors:  New exposures:  None  Recent travel: no      Therapies tried and outcome: chapstick  Reviewed and updated as needed this visit by Provider  Tobacco  Allergies  Meds  Problems  Med Hx  Surg Hx  Fam Hx         Review of Systems   Constitutional, HEENT, cardiovascular, pulmonary, gi and gu systems are negative, except as otherwise noted.      Objective    BP (!) 154/88 (Patient Position: Sitting)   Pulse 70   Ht 1.524 m (5')   Wt 50.3 kg (111 lb)   SpO2 97%   BMI 21.68 kg/m    Body mass index is 21.68 kg/m .  Physical Exam   GENERAL: healthy, alert and no distress  NECK: no adenopathy, no asymmetry, masses, or scars and thyroid normal to palpation  RESP: lungs clear to auscultation - no rales, rhonchi or wheezes  CV: regular rates and rhythm, normal S1 S2, no S3 or S4, no murmur, click or rub and 1+ left lower extremity pitting edema to knee    ABDOMEN: soft, nontender, no hepatosplenomegaly, no masses and bowel sounds normal  PSYCH: mentation appears normal, affect normal/bright  SKIN: pt has mild redness and irritation under lower lip and corners of the mouth.     Diagnostic Test Results:  Labs reviewed in Epic  Results for orders placed or performed during the hospital encounter of " 05/28/20   US Lower Extremity Venous Duplex Left     Status: None    Narrative    Exam:US LOWER EXTREMITY VENOUS DUPLEX LEFT    History: Left leg swelling    Comparisons: None    Technique: Venous duplex ultrasonography of the left lower extremity  was performed.     Findings: The common femoral vein, superficial femoral vein and  popliteal vein are fully compressible with spontaneous and augmentable  venous flow.           Impression    Impression: No evidence of deep venous thrombosis within the left  lower extremity.    ELLA JACOBS MD   Results for orders placed or performed in visit on 05/28/20   UA reflex to Microscopic and Culture - [place]     Status: None    Specimen: Midstream Urine   Result Value Ref Range    Color Urine Straw     Appearance Urine Clear     Glucose Urine Negative NEG^Negative mg/dL    Bilirubin Urine Negative NEG^Negative    Ketones Urine Negative NEG^Negative mg/dL    Specific Gravity Urine 1.007 1.003 - 1.035    Blood Urine Negative NEG^Negative    pH Urine 7.0 4.7 - 8.0 pH    Protein Albumin Urine Negative NEG^Negative mg/dL    Urobilinogen mg/dL Normal 0.0 - 2.0 mg/dL    Nitrite Urine Negative NEG^Negative    Leukocyte Esterase Urine Negative NEG^Negative    Source Midstream Urine    CBC with platelets and differential     Status: Abnormal   Result Value Ref Range    WBC 8.7 4.0 - 11.0 10e9/L    RBC Count 4.84 3.8 - 5.2 10e12/L    Hemoglobin 12.6 11.7 - 15.7 g/dL    Hematocrit 38.9 35.0 - 47.0 %    MCV 80 78 - 100 fl    MCH 26.0 (L) 26.5 - 33.0 pg    MCHC 32.4 31.5 - 36.5 g/dL    RDW 14.3 10.0 - 15.0 %    Platelet Count 389 150 - 450 10e9/L    Diff Method Automated Method     % Neutrophils 57.4 %    % Lymphocytes 27.3 %    % Monocytes 10.6 %    % Eosinophils 3.2 %    % Basophils 1.0 %    % Immature Granulocytes 0.5 %    Nucleated RBCs 0 0 /100    Absolute Neutrophil 5.0 1.6 - 8.3 10e9/L    Absolute Lymphocytes 2.4 0.8 - 5.3 10e9/L    Absolute Monocytes 0.9 0.0 - 1.3 10e9/L     Absolute Eosinophils 0.3 0.0 - 0.7 10e9/L    Absolute Basophils 0.1 0.0 - 0.2 10e9/L    Abs Immature Granulocytes 0.0 0 - 0.4 10e9/L    Absolute Nucleated RBC 0.0    BNP-N terminal pro     Status: Abnormal   Result Value Ref Range    N-Terminal Pro Bnp 579 (H) 0 - 450 pg/mL   Basic metabolic panel     Status: Abnormal   Result Value Ref Range    Sodium 141 133 - 144 mmol/L    Potassium 3.4 3.4 - 5.3 mmol/L    Chloride 108 94 - 109 mmol/L    Carbon Dioxide 25 20 - 32 mmol/L    Anion Gap 8 3 - 14 mmol/L    Glucose 101 (H) 70 - 99 mg/dL    Urea Nitrogen 11 7 - 30 mg/dL    Creatinine 1.11 (H) 0.52 - 1.04 mg/dL    GFR Estimate 47 (L) >60 mL/min/[1.73_m2]    GFR Estimate If Black 55 (L) >60 mL/min/[1.73_m2]    Calcium 8.8 8.5 - 10.1 mg/dL   D dimer quantitative     Status: Abnormal   Result Value Ref Range    D Dimer 3.2 (H) 0.0 - 0.50 ug/ml FEU           Assessment & Plan     Chronic systolic congestive heart failure (H)  LEg swelling and weight is up. Increase lasix to full tab for the next week. If swelling returns or if MILLS, pt to call. DDimer is up, DVT US negative (swelling L>>R).   - BNP-N terminal pro  - D dimer quantitative  - US Lower Extremity Venous Duplex Left; Future    History of GI bleed  Stable. No new bleeding.   - CBC with platelets and differential    Flank pain  Unclear cause, pt declines XR today, reassured she does have have a kidney infection.   - UA reflex to Microscopic and Culture - [place]    Cheilitis  Started as chapped lips, licking a lot. Trial of azole.   - Clotrimazole 1 % OINT; Externally apply topically 2 times daily  Dispense: 56.7 g; Refill: 0    Mild protein-calorie malnutrition (H)  Weight is up, but suspect due to fluid. Continue remeron.   - Basic metabolic panel       Return in about 3 months (around 8/28/2020) for BP Recheck, COPD.    Ophelia Troncoso MD  [facility] [place]

## 2020-06-03 ENCOUNTER — TELEPHONE (OUTPATIENT)
Dept: FAMILY MEDICINE | Facility: OTHER | Age: 80
End: 2020-06-03

## 2020-06-03 ENCOUNTER — TRANSFERRED RECORDS (OUTPATIENT)
Dept: HEALTH INFORMATION MANAGEMENT | Facility: CLINIC | Age: 80
End: 2020-06-03

## 2020-06-03 DIAGNOSIS — K92.2 ACUTE UPPER GI BLEED: ICD-10-CM

## 2020-06-03 DIAGNOSIS — I50.22 CHRONIC SYSTOLIC CONGESTIVE HEART FAILURE (H): Chronic | ICD-10-CM

## 2020-06-03 DIAGNOSIS — K92.2 GASTROINTESTINAL HEMORRHAGE, UNSPECIFIED GASTROINTESTINAL HEMORRHAGE TYPE: ICD-10-CM

## 2020-06-03 DIAGNOSIS — E87.6 HYPOKALEMIA: ICD-10-CM

## 2020-06-03 RX ORDER — FUROSEMIDE 40 MG
40 TABLET ORAL DAILY
Qty: 90 TABLET | Refills: 1 | Status: SHIPPED | OUTPATIENT
Start: 2020-06-03 | End: 2020-06-22

## 2020-06-03 RX ORDER — SUCRALFATE 1 G/1
1 TABLET ORAL
Qty: 120 TABLET | Refills: 4 | Status: SHIPPED | OUTPATIENT
Start: 2020-06-03 | End: 2020-06-22

## 2020-06-03 RX ORDER — POTASSIUM CHLORIDE 750 MG/1
10 TABLET, EXTENDED RELEASE ORAL DAILY
Qty: 90 TABLET | Refills: 0 | Status: SHIPPED | OUTPATIENT
Start: 2020-06-03 | End: 2020-06-22

## 2020-06-03 RX ORDER — PANTOPRAZOLE SODIUM 40 MG/1
TABLET, DELAYED RELEASE ORAL
Qty: 180 TABLET | Refills: 1 | Status: SHIPPED | OUTPATIENT
Start: 2020-06-03 | End: 2020-06-22

## 2020-06-03 NOTE — TELEPHONE ENCOUNTER
"Pt called reports Dr. Troncoso requested she update her regarding furosemide tablet 40 mg, one tablet daily. Pt stated, \"it'sworking good, I want to continue to take this dose.\" med request pended to PCP. Thank you  "

## 2020-06-22 DIAGNOSIS — I50.22 CHRONIC SYSTOLIC CONGESTIVE HEART FAILURE (H): Chronic | ICD-10-CM

## 2020-06-22 DIAGNOSIS — K92.2 ACUTE UPPER GI BLEED: ICD-10-CM

## 2020-06-22 DIAGNOSIS — I10 BENIGN ESSENTIAL HYPERTENSION: ICD-10-CM

## 2020-06-22 DIAGNOSIS — E87.6 HYPOKALEMIA: ICD-10-CM

## 2020-06-22 DIAGNOSIS — K92.2 GASTROINTESTINAL HEMORRHAGE, UNSPECIFIED GASTROINTESTINAL HEMORRHAGE TYPE: ICD-10-CM

## 2020-06-22 RX ORDER — POTASSIUM CHLORIDE 750 MG/1
10 TABLET, EXTENDED RELEASE ORAL DAILY
Qty: 90 TABLET | Refills: 0 | Status: SHIPPED | OUTPATIENT
Start: 2020-06-22 | End: 2020-09-14 | Stop reason: DRUGHIGH

## 2020-06-22 RX ORDER — FUROSEMIDE 40 MG
40 TABLET ORAL DAILY
Qty: 90 TABLET | Refills: 1 | Status: SHIPPED | OUTPATIENT
Start: 2020-06-22 | End: 2020-10-13

## 2020-06-22 RX ORDER — SUCRALFATE 1 G/1
1 TABLET ORAL
Qty: 120 TABLET | Refills: 4 | Status: SHIPPED | OUTPATIENT
Start: 2020-06-22 | End: 2020-09-14

## 2020-06-22 RX ORDER — AMLODIPINE BESYLATE 5 MG/1
10 TABLET ORAL DAILY
Qty: 90 TABLET | Refills: 1 | Status: SHIPPED | OUTPATIENT
Start: 2020-06-22 | End: 2020-08-20

## 2020-06-22 RX ORDER — PANTOPRAZOLE SODIUM 40 MG/1
TABLET, DELAYED RELEASE ORAL
Qty: 180 TABLET | Refills: 1 | Status: SHIPPED | OUTPATIENT
Start: 2020-06-22 | End: 2020-07-29

## 2020-06-22 NOTE — TELEPHONE ENCOUNTER
Call from patients daughter requesting refills on the following medications as patient has switched from [facility] and did not receive the refills from 6/3/20:       sucralfate (CARAFATE) 1 GM tablet        Last Written Prescription Date:  6/3/20  Last Fill Quantity: 120,   # refills: 4  Last Office Visit: 5/28/20  Future Office visit:    Next 5 appointments (look out 90 days)    Jul 09, 2020  1:15 PM CDT  (Arrive by 1:00 PM)  [facility] with COCO Mccartney C[facility] [address]  [place] [address]  [phone]   Jul 14, 2020  1:15 PM CDT  (Arrive by 1:00 PM)  Return Visit with Katelynn Winters PA-C  [facility] [address]  [place] [address]  [phone]   Sep 01, 2020  1:30 PM CDT  (Arrive by 1:15 PM)  SHORT with Ophelia Troncoso MD  [facility] [address]  [place] [address]  [phone]           Routing refill request to provider for review/approval because:      furosemide (LASIX) 40 MG tablet         Last Written Prescription Date:  6/3/20  Last Fill Quantity: 90,   # refills: 1  Last Office Visit: 5/28/20  Future Office visit:    Next 5 appointments (look out 90 days)    Jul 09, 2020  1:15 PM CDT  (Arrive by 1:00 PM)  Return Visit with COCO Mccartney CNP  [facility] ([facility] ) [address]  [place] [address]  [phone]   Jul 14, 2020  1:15 PM CDT  (Arrive by 1:00 PM)  Return Visit with Katelynn Winters PA-C  [facility] [address]  [place] [address]  [phone]   Sep 01, 2020  1:30 PM CDT  (Arrive by 1:15 PM)  SHORT with Ophelia Troncoso MD  [facility] [place] ([facility] [place] ) [address]  [place] [address]  [phone]           Routing  refill request to provider for review/approval because:      amLODIPine (NORVASC) 5 MG tablet     Last Written Prescription Date:  3/10/20  Last Fill Quantity: 90,   # refills: 1  Last Office Visit: 5/28/20  Future Office visit:    Next 5 appointments (look out 90 days)    Jul 09, 2020  1:15 PM CDT  (Arrive by 1:00 PM)  Return Visit with COCO Mccartney CNP  [facility] ([facility] [place] ) [address]  [place] [address]  [phone]   Jul 14, 2020  1:15 PM CDT  (Arrive by 1:00 PM)  Return Visit with Katelynn Winters PA-C  [facility] ([facility] [place] ) [address]  [place] [address]  [phone]   Sep 01, 2020  1:30 PM CDT  (Arrive by 1:15 PM)  SHORT with Ophelia Troncoso MD  [facility] [place] ([facility] [place] ) [address]  [place] [address]  [phone]           Routing refill request to provider for review/approval because:      pantoprazole (PROTONIX) 40 MG EC tablet         Last Written Prescription Date:  6/3/20  Last Fill Quantity: 180,   # refills: 1  Last Office Visit: 5/28/20  Future Office visit:    Next 5 appointments (look out 90 days)    Jul 09, 2020  1:15 PM CDT  (Arrive by 1:00 PM)  Return Visit with COCO Mccartney CNP  [facility] [place] ([facility] [place] ) [address]  [place] [address]  [phone]   Jul 14, 2020  1:15 PM CDT  (Arrive by 1:00 PM)  Return Visit with Katelynn Winters PA-C  [facility] [place] ([facility] [place] ) [address]  [place] [address]  [phone]   Sep 01, 2020  1:30 PM CDT  (Arrive by 1:15 PM)  SHORT with Ophelia Troncoso MD  [facility] ([facility] [place] ) [address]  [place] [address]  [phone]           Routing refill request to provider for review/approval because:      potassium chloride ER (KLOR-CON M) 10 MEQ CR  tablet         Last Written Prescription Date:  6/3/20  Last Fill Quantity: 90,   # refills: 0  Last Office Visit: 5/28/20  Future Office visit:    Next 5 appointments (look out 90 days)    Jul 09, 2020  1:15 PM CDT  (Arrive by 1:00 PM)  Return Visit with COCO Mccartney CNP  [facility] [address]  [place] [address]  [phone]   Jul 14, 2020  1:15 PM CDT  (Arrive by 1:00 PM)  Return Visit with Katelynn Winters PA-C  [facility] ([facility] ) [address] [place] [address]  [phone]   Sep 01, 2020  1:30 PM CDT  (Arrive by 1:15 PM)  SHORT with Ophelia Troncoso MD  [facility] ([facility] ) [address] [place] [address]  [phone]           Routing refill request to provider for review/approval because:

## 2020-06-24 ENCOUNTER — TRANSFERRED RECORDS (OUTPATIENT)
Dept: HEALTH INFORMATION MANAGEMENT | Facility: CLINIC | Age: 80
End: 2020-06-24

## 2020-06-29 DIAGNOSIS — Z95.5 S/P DRUG ELUTING CORONARY STENT PLACEMENT: ICD-10-CM

## 2020-06-29 DIAGNOSIS — R63.4 WEIGHT LOSS: ICD-10-CM

## 2020-06-29 DIAGNOSIS — F51.01 PRIMARY INSOMNIA: ICD-10-CM

## 2020-06-29 DIAGNOSIS — I50.22 CHRONIC SYSTOLIC CONGESTIVE HEART FAILURE (H): Chronic | ICD-10-CM

## 2020-06-29 DIAGNOSIS — E78.5 HYPERLIPIDEMIA, UNSPECIFIED HYPERLIPIDEMIA TYPE: ICD-10-CM

## 2020-06-29 NOTE — TELEPHONE ENCOUNTER
Patient calling requesting refills on medications to be sent to [facility]service. Medications/pharmacy pended.     LOV: 5/28/2020    Ashley LeChevalier LPN

## 2020-07-01 ENCOUNTER — TRANSFERRED RECORDS (OUTPATIENT)
Dept: HEALTH INFORMATION MANAGEMENT | Facility: CLINIC | Age: 80
End: 2020-07-01

## 2020-07-01 RX ORDER — ATORVASTATIN CALCIUM 40 MG/1
40 TABLET, FILM COATED ORAL AT BEDTIME
Qty: 90 TABLET | Refills: 0 | Status: SHIPPED | OUTPATIENT
Start: 2020-07-01 | End: 2020-10-07

## 2020-07-01 RX ORDER — MIRTAZAPINE 7.5 MG/1
7.5 TABLET, FILM COATED ORAL AT BEDTIME
Qty: 90 TABLET | Refills: 0 | Status: SHIPPED | OUTPATIENT
Start: 2020-07-01 | End: 2020-10-16

## 2020-07-01 RX ORDER — LISINOPRIL 5 MG/1
5 TABLET ORAL DAILY
Qty: 90 TABLET | Refills: 0 | Status: SHIPPED | OUTPATIENT
Start: 2020-07-01 | End: 2020-10-07

## 2020-07-01 NOTE — TELEPHONE ENCOUNTER
ACE Inhibitors (Including Combos) Protocol [id number]/29/2020 11:38 AM   Blood pressure under 140/90 in past 12 months Protocol Details    Normal serum creatinine on file in past 12 months        Statins Protocol Pemeib1606/29/2020 11:38 AM   LDL on file in past 12 months     No results found for: LDL  BP Readings from Last 3 Encounters:   05/28/20 (!) 154/88   03/19/20 91/57   02/28/20 (!) 190/72     Creatinine   Date Value Ref Range Status   05/28/2020 1.11 (H) 0.52 - 1.04 mg/dL Final     Please see above.Pended.Placed a lipid profile.      Barb Adkins RN

## 2020-07-20 DIAGNOSIS — I50.22 CHRONIC SYSTOLIC CONGESTIVE HEART FAILURE (H): Chronic | ICD-10-CM

## 2020-07-20 DIAGNOSIS — E03.9 ACQUIRED HYPOTHYROIDISM: ICD-10-CM

## 2020-07-20 RX ORDER — METOPROLOL SUCCINATE 25 MG/1
25 TABLET, EXTENDED RELEASE ORAL DAILY
Qty: 90 TABLET | Refills: 1 | Status: SHIPPED | OUTPATIENT
Start: 2020-07-20 | End: 2020-11-05

## 2020-07-20 RX ORDER — LEVOTHYROXINE SODIUM 75 UG/1
75 TABLET ORAL DAILY
Qty: 90 TABLET | Refills: 1 | Status: SHIPPED | OUTPATIENT
Start: 2020-07-20 | End: 2020-11-05

## 2020-07-20 NOTE — TELEPHONE ENCOUNTER
Metoprolol failed protocol due to Blood pressure under 140/90 in past 12 months  Please advise, thank you.    levothyroxine (SYNTHROID/LEVOTHROID) 75 MCG tablet       Last Written Prescription Date:  04/20/20  Last Fill Quantity: 90,   # refills: 0  Last Office Visit: 05/28/20  Future Office visit:    Next 5 appointments (look out 90 days)    Aug 20, 2020  1:45 PM CDT  (Arrive by 1:30 PM)  Return Visit with COCO Mccartney CNP  [facility] ([facility] ) [address] [facility] [address]  [phone]   Sep 01, 2020  1:30 PM CDT  (Arrive by 1:15 PM)  SHORT with Ophelia Troncoso MD  [facility] ([facility] ) [address]  [facility] [place] [address]  [phone]         metoprolol succinate ER (TOPROL-XL) 25 MG 24 hr tablet       Last Written Prescription Date:  04/20/20  Last Fill Quantity: 90,   # refills: 0

## 2020-07-27 DIAGNOSIS — K92.2 GASTROINTESTINAL HEMORRHAGE, UNSPECIFIED GASTROINTESTINAL HEMORRHAGE TYPE: ICD-10-CM

## 2020-07-29 RX ORDER — PANTOPRAZOLE SODIUM 40 MG/1
TABLET, DELAYED RELEASE ORAL
Qty: 180 TABLET | Refills: 1 | Status: SHIPPED | OUTPATIENT
Start: 2020-07-29 | End: 2020-09-16

## 2020-08-20 ENCOUNTER — OFFICE VISIT (OUTPATIENT)
Dept: CARDIOLOGY | Facility: OTHER | Age: 80
End: 2020-08-20
Attending: NURSE PRACTITIONER
Payer: MEDICARE

## 2020-08-20 VITALS
OXYGEN SATURATION: 94 % | BODY MASS INDEX: 21.05 KG/M2 | WEIGHT: 107.8 LBS | HEART RATE: 73 BPM | SYSTOLIC BLOOD PRESSURE: 91 MMHG | DIASTOLIC BLOOD PRESSURE: 60 MMHG | TEMPERATURE: 99.3 F

## 2020-08-20 DIAGNOSIS — Z95.5 HISTORY OF CORONARY ARTERY STENT PLACEMENT: ICD-10-CM

## 2020-08-20 DIAGNOSIS — R60.0 EDEMA OF LEFT LOWER LEG: ICD-10-CM

## 2020-08-20 DIAGNOSIS — I50.22 CHRONIC SYSTOLIC CONGESTIVE HEART FAILURE (H): Primary | ICD-10-CM

## 2020-08-20 DIAGNOSIS — N18.30 CKD (CHRONIC KIDNEY DISEASE) STAGE 3, GFR 30-59 ML/MIN (H): ICD-10-CM

## 2020-08-20 DIAGNOSIS — E78.5 HYPERLIPIDEMIA, UNSPECIFIED HYPERLIPIDEMIA TYPE: ICD-10-CM

## 2020-08-20 DIAGNOSIS — I10 ESSENTIAL HYPERTENSION: ICD-10-CM

## 2020-08-20 DIAGNOSIS — J43.2 CENTRILOBULAR EMPHYSEMA (H): ICD-10-CM

## 2020-08-20 PROCEDURE — G0463 HOSPITAL OUTPT CLINIC VISIT: HCPCS

## 2020-08-20 PROCEDURE — 99214 OFFICE O/P EST MOD 30 MIN: CPT | Performed by: NURSE PRACTITIONER

## 2020-08-20 RX ORDER — TAFLUPROST 0 MG/.3ML
SOLUTION/ DROPS OPHTHALMIC
COMMUNITY
Start: 2020-06-23 | End: 2022-03-14

## 2020-08-20 RX ORDER — ERYTHROMYCIN 5 MG/G
OINTMENT OPHTHALMIC
COMMUNITY
Start: 2020-06-26 | End: 2021-12-22

## 2020-08-20 RX ORDER — TORSEMIDE 20 MG/1
20 TABLET ORAL EVERY MORNING
Qty: 60 TABLET | Refills: 1 | Status: SHIPPED | OUTPATIENT
Start: 2020-08-20 | End: 2021-02-16

## 2020-08-20 ASSESSMENT — PAIN SCALES - GENERAL: PAINLEVEL: NO PAIN (0)

## 2020-08-20 NOTE — NURSING NOTE
Chief Complaint   Patient presents with     RECHECK       Initial BP 91/60 (BP Location: Left arm, Patient Position: Sitting, Cuff Size: Adult Regular)   Pulse 73   Temp 99.3  F (37.4  C) (Tympanic)   Wt 48.9 kg (107 lb 12.8 oz)   SpO2 94%   BMI 21.05 kg/m   Estimated body mass index is 21.05 kg/m  as calculated from the following:    Height as of 5/28/20: 1.524 m (5').    Weight as of this encounter: 48.9 kg (107 lb 12.8 oz).  Medication Reconciliation: complete  Swathi Johnson LPN

## 2020-08-20 NOTE — PROGRESS NOTES
[facility] HEART CARE   CARDIOLOGY PROGRESS NOTE    Lita Ocasio   [address] [facility] [address]      Ophelia Troncoso     Chief Complaint   Patient presents with     RECHECK        HPI:   Ms. Ocasio is a 79 year old female who presents for cardiology follow-up to visit on 1/7/2020.  She is status post coronary angiography on 6/26/19 at [facility]. She was recently identified to have newly identified systolic heart failure.  Additionally, she has a history of hypertension, hyperlipidemia, nonrheumatic mitral regurgitation, central lobular emphysema, hypothyroidism, osteopenia, hyperparathyroidism, history of tobacco abuse and chronic rhinitis.    At initial visit patient was accompanied by her son who was very helpful in providing patients past medical history. He admits approximately 20 years ago patient was being followed for MR which was noted to be improved and therefore, vlave surgery was never recommended. He admits that she was previously told it was suspected she had small MI's without coronary intervention or identified ACS. Finally, recent history just before new years of 2020 acute hemorrhagic stroke. It was following this, that she was taken off her beta blocker and lisinopril for HTN and possible past MI, she reported increased shortness of breath following this which has progressively worsened and likely multifactorial with COPD and HFrEF.     6/4/2019-Lexiscan stress test with abnormal imaging with reversible ischemia laterally and LVEF 36%. Additionally, in review of past CT chest imaging from one year ago, she was noted to have advanced atherosclerotic disease with bulky coronary and aortic calcifications. Given these findings, current symptoms and new acute systolic failure, recommended coronary angiography.    Patient underwent coronary angiography on 6/26/19 at [facility]. She was identified to have diffuse coronary atherosclerosis with single vessel obstructive CAD, ostial and mid LCX. Successful  PCI with synergy LISS x2, mid LCX 2.5x16mm, ostial LCX 3.0 x 16 mm. LVEDP 26-29 mmHg. During revascularization she was identified to have AF for which she received DCCV x1 with return to sinus and then reverted back to AF. She was treated with IV beta blocker for rate control. Patient reported feeling good following coronary intervention. Breathing had significantly improved and increased energy.     Today patient reports feeling good. No anginal symptoms or MILLS.  No orthopnea or PND.  No palpitations or lightheadedness.  She has noticed increased lower extremity edema without weight gain.  Repeat TTE on 12/5/2019 with recovered systolic LV function.  Her LVEF had improved to 60 to 65% with normal global and regional LV function.  The RV was normal in size and function. Mitral valve with mild insufficiency, aortic valve normal in structure and function.      PAST MEDICAL HISTORY:   Past Medical History:   Diagnosis Date     Acquired hypothyroidism 5/12/2019     Asthma      Benign essential hypertension 5/12/2019     Centrilobular emphysema (H) 5/12/2019     Chronic rhinitis 8/16/2013     Chronic systolic congestive heart failure (H) 5/12/2019     Constipation      Coronary artery disease      Hearing loss      Heart trouble      Hemorrhagic cerebrovascular accident (CVA) (H) 01/2019     Hyperlipidemia 5/12/2019     Hypertension      Nasal congestion      Non-rheumatic mitral regurgitation 5/12/2019     Osteopenia 5/12/2019     Osteoporosis      Parathyroid related hypercalcemia (H) 8/18/2013     Primary hyperparathyroidism (H) 9/5/2013     Ringing in ears      Sensorineural hearing loss, asymmetrical 8/16/2013     Sneezing      Snoring      Stented coronary artery      Thyroid disease      Weakness      Weight loss           FAMILY HISTORY:   Family History   Problem Relation Age of Onset     Hearing Loss Mother      Heart Disease Mother      Myocardial Infarction Mother      Cerebrovascular Disease Father       Cancer Brother         throat     Cancer Sister      Myocardial Infarction Sister      Cancer Maternal Grandmother      Heart Disease Maternal Grandfather      Aortic aneurysm Son           PAST SURGICAL HISTORY:   Past Surgical History:   Procedure Laterality Date     CATARACT IOL, RT/LT Bilateral      COLONOSCOPY       COLONOSCOPY - HIM SCAN  01/01/2009    Colonoscopy - [facility]/NL  2009     ENDOSCOPY UPPER, COLONOSCOPY, COMBINED N/A 10/17/2019    Procedure: UPPER ENDOSCOPY WITH BIOPSY  AND COLONOSCOPY;  Surgeon: Julio Canales MD;  Location: [facility] OR     ENT SURGERY  2011    para thyroid surgery     ENT SURGERY  09/2013    Parathyroid     ESOPHAGOSCOPY, GASTROSCOPY, DUODENOSCOPY (EGD), COMBINED N/A 9/21/2019    Procedure: ESOPHAGOGASTRODUODENOSCOPY (EGD);  Surgeon: Julio Canales MD;  Location: [facility] OR     ESOPHAGOSCOPY, GASTROSCOPY, DUODENOSCOPY (EGD), COMBINED N/A 1/27/2020    Procedure: ESOPHAGOGASTRODUODENOSCOPY (EGD) WITH BIOPSY;  Surgeon: Isrrael Parish MD;  Location: [facility] OR     ESOPHAGOSCOPY, GASTROSCOPY, DUODENOSCOPY (EGD), COMBINED N/A 2/20/2020    Procedure: ESOPHAGOGASTRODUODENOSCOPY (EGD) WITH BIOPSY;  Surgeon: Pedro Luis Montoya DO;  Location: [facility] OR     PARATHYROIDECTOMY  9/10/2013    Procedure: PARATHYROIDECTOMY;  Reoperative Neck Exploration, Resection of right Parathyroid adenoma;  Surgeon: Thalia Muller MD;  Location: [facility] OR     TONSILLECTOMY       TUBAL LIGATION            SOCIAL HISTORY:   Social History     Socioeconomic History     Marital status:      Spouse name: None     Number of children: None     Years of education: None     Highest education level: None   Occupational History     None   Social Needs     Financial resource strain: None     Food insecurity:     Worry: None     Inability: None     Transportation needs:     Medical: None     Non-medical: None   Tobacco Use     Smoking status: Former Smoker     Packs/day: 1.00     Years: 50.00     Pack years: 50.00      Types: Cigarettes     Last attempt to quit: 2019     Years since quittin.4     Smokeless tobacco: Never Used   Substance and Sexual Activity     Alcohol use: Yes     Comment: social     Drug use: No     Sexual activity: None   Lifestyle     Physical activity:     Days per week: None     Minutes per session: None     Stress: None   Relationships     Social connections:     Talks on phone: None     Gets together: None     Attends Christianity service: None     Active member of club or organization: None     Attends meetings of clubs or organizations: None     Relationship status: None     Intimate partner violence:     Fear of current or ex partner: None     Emotionally abused: None     Physically abused: None     Forced sexual activity: None   Other Topics Concern     Parent/sibling w/ CABG, MI or angioplasty before 65F 55M? Not Asked   Social History Narrative     None          CURRENT MEDICATIONS:   Prior to Admission medications    Medication Sig Start Date End Date Taking? Authorizing Provider   albuterol (PROAIR RESPICLICK) 108 (90 Base) MCG/ACT inhaler Inhale 2 puffs into the lungs every 4 hours as needed for shortness of breath / dyspnea or wheezing 19  Yes Joe Jerez MD   aspirin 81 MG tablet Take 1 tablet by mouth daily   Yes Reported, Patient   busPIRone (BUSPAR) 5 MG tablet Take 1 tablet (5 mg) by mouth 2 times daily 19  Yes Ophelia Troncoso MD   calcium carbonate (TUMS) 500 MG chewable tablet Take 1 chew tab by mouth daily   Yes Reported, Patient   Fexofenadine HCl (ALLEGRA PO) Take 180 mg by mouth daily    Yes Unknown, Entered By History   fluticasone (FLONASE) 50 MCG/ACT spray Spray 2 sprays into both nostrils daily 18  Yes Gali Dominguez APRN CNP   furosemide (LASIX) 20 MG tablet Take 1 tablet (20 mg) by mouth 2 times daily 19  Yes Ophelia Troncoso MD   ipratropium - albuterol 0.5 mg/2.5 mg/3 mL (DUONEB) 0.5-2.5 (3) MG/3ML neb solution Take 1 vial by  nebulization every 6 hours as needed for shortness of breath / dyspnea or wheezing   Yes Reported, Patient   LEVOTHYROXINE SODIUM PO Take 75 mcg by mouth daily   Yes Reported, Patient   LORazepam (ATIVAN) 0.5 MG tablet Take 0.5 tablets (0.25 mg) by mouth every 6 hours as needed for anxiety or sleep 5/14/19  Yes Mindy Ware NP   NONFORMULARY Tumeric 500mg BID   Yes Reported, Patient   potassium chloride ER (K-DUR/KLOR-CON M) 20 MEQ CR tablet Take 1 tablet (20 mEq) by mouth 2 times daily 5/23/19  Yes Ophelia Troncoso MD   sertraline (ZOLOFT) 25 MG tablet Take 1 tablet (25 mg) by mouth daily 5/15/19  Yes Mindy Ware NP   SIMVASTATIN PO Take 20 mg by mouth At Bedtime   Yes Reported, Patient   Tafluprost (ZIOPTAN) 0.0015 % SOLN Place 1 drop into both eyes At Bedtime   Yes Reported, Patient   vitamin B complex with vitamin C (VITAMIN  B COMPLEX) tablet Take 1 tablet by mouth daily Takes 500mg of Vitamin B   Yes Reported, Patient   Vitamin D, Cholecalciferol, 1000 units TABS Take 1 tablet by mouth daily   Yes Reported, Patient   vitamin E 400 units TABS Take by mouth daily   Yes Reported, Patient   Zafirlukast (ACCOLATE PO) Take 20 mg by mouth 2 times daily (before meals)    Yes Reported, Patient   CLINDAMYCIN HCL PO Take 600 mg by mouth 1 hour prior to dental work    Reported, Patient          ALLERGIES:   Allergies   Allergen Reactions     Evista [Raloxifene] Other (See Comments)     hypercalcemia     Prednisone GI Disturbance     Combigan [Brimonidine Tartrate-Timolol] Other (See Comments)     Eye swelling and itchy     Latex Rash     Levaquin [Levofloxacin] Muscle Pain (Myalgia)     Penicillins Rash     Restasis      Pt reports using eye gtts and having red irritated eyes      ROS:   CONSTITUTIONAL: No reported fever or chills. No weight gain.  ENT: No visual disturbance, ear ache, epistaxis or sore throat.   CARDIOVASCULAR: No chest pain, chest pressure or chest discomfort. No palpitations. Positive  for increased lower extremity edema.   RESPIRATORY: Positive for chronic shortness of breath  with COPD, no increased MILLS. No reported cough, wheezing or hemoptysis. No reports of orthopnea or PND.  GI: No reported abdominal pain or distension. No reported melena or hematochezia.  : No reported hematuria or dysuria.   NEUROLOGICAL: No lightheadedness or dizziness. No syncope. No ataxia, paresthesias or weakness.   MUSCULOSKELETAL: No reported new joint pain or swelling, no muscle pain.  ENDOCRINOLOGIC: No temperature intolerance. No hair or skin changes.  SKIN: No abnormal rashes or sores, no unusual itching.  PSYCHIATRIC: Positive for anxiety.      PHYSICAL EXAM:   BP 91/60 (BP Location: Left arm, Patient Position: Sitting, Cuff Size: Adult Regular)   Pulse 73   Temp 99.3  F (37.4  C) (Tympanic)   Wt 48.9 kg (107 lb 12.8 oz)   SpO2 94%   BMI 21.05 kg/m    GENERAL: The patient is a well-developed, well-nourished, in no apparent distress.  HEENT: Head is normocephalic and atraumatic. Eyes are symmetrical with normal visual tracking. No icterus, no xanthelasmas. Nares appeared normal without nasal drainage. Mucous membranes are moist, no cyanosis.  NECK: Supple. No JVP visible.  CHEST/ LUNGS: Lungs sounds diminished to auscultation. No rales, rhonchi or wheezes, no use of accessory muscles, no retractions, respirations unlabored and normal respiratory rate.   CARDIO: Regular rate and rhythm normal with S1 and S2, no S3 or S4 and no murmur, click or rub.   ABD: Abdomen is nondistended.   EXTREMITIES: 1+ LE edema present.   MUSCULOSKELETAL: No visible joint swelling.   NEUROLOGIC: Alert and oriented X3. Normal speech, gait and affect. No focal neurologic deficits.   SKIN: No jaundice. No rashes or visible skin lesions present. No ecchymosis.     LAB RESULTS:   Orders Only on 05/22/2019   Component Date Value Ref Range Status     Sodium 05/22/2019 139  133 - 144 mmol/L Final     Potassium 05/22/2019 3.3* 3.4 - 5.3  mmol/L Final     Chloride 05/22/2019 105  94 - 109 mmol/L Final     Carbon Dioxide 05/22/2019 25  20 - 32 mmol/L Final     Anion Gap 05/22/2019 9  3 - 14 mmol/L Final     Glucose 05/22/2019 90  70 - 99 mg/dL Final     Urea Nitrogen 05/22/2019 15  7 - 30 mg/dL Final     Creatinine 05/22/2019 1.16* 0.52 - 1.04 mg/dL Final     GFR Estimate 05/22/2019 45* >60 mL/min/[1.73_m2] Final     GFR Estimate If Black 05/22/2019 52* >60 mL/min/[1.73_m2] Final     Calcium 05/22/2019 8.6  8.5 - 10.1 mg/dL Final     N-Terminal Pro Bnp 05/22/2019 3,726* 0 - 450 pg/mL Final   Office Visit on 05/17/2019   Component Date Value Ref Range Status     Sodium 05/17/2019 141  133 - 144 mmol/L Final     Potassium 05/17/2019 3.6  3.4 - 5.3 mmol/L Final     Chloride 05/17/2019 108  94 - 109 mmol/L Final     Carbon Dioxide 05/17/2019 27  20 - 32 mmol/L Final     Anion Gap 05/17/2019 6  3 - 14 mmol/L Final     Glucose 05/17/2019 84  70 - 99 mg/dL Final     Urea Nitrogen 05/17/2019 18  7 - 30 mg/dL Final     Creatinine 05/17/2019 1.08* 0.52 - 1.04 mg/dL Final     GFR Estimate 05/17/2019 49* >60 mL/min/[1.73_m2] Final     GFR Estimate If Black 05/17/2019 57* >60 mL/min/[1.73_m2] Final     Calcium 05/17/2019 9.7  8.5 - 10.1 mg/dL Final     WBC 05/17/2019 8.4  4.0 - 11.0 10e9/L Final     RBC Count 05/17/2019 5.04  3.8 - 5.2 10e12/L Final     Hemoglobin 05/17/2019 14.8  11.7 - 15.7 g/dL Final     Hematocrit 05/17/2019 44.0  35.0 - 47.0 % Final     MCV 05/17/2019 87  78 - 100 fl Final     MCH 05/17/2019 29.4  26.5 - 33.0 pg Final     MCHC 05/17/2019 33.6  31.5 - 36.5 g/dL Final     RDW 05/17/2019 13.4  10.0 - 15.0 % Final     Platelet Count 05/17/2019 310  150 - 450 10e9/L Final     Diff Method 05/17/2019 Automated Method   Final     % Neutrophils 05/17/2019 64.6  % Final     % Lymphocytes 05/17/2019 19.8  % Final     % Monocytes 05/17/2019 10.1  % Final     % Eosinophils 05/17/2019 4.4  % Final     % Basophils 05/17/2019 0.7  % Final     % Immature  Granulocytes 05/17/2019 0.4  % Final     Nucleated RBCs 05/17/2019 0  0 /100 Final     Absolute Neutrophil 05/17/2019 5.4  1.6 - 8.3 10e9/L Final     Absolute Lymphocytes 05/17/2019 1.7  0.8 - 5.3 10e9/L Final     Absolute Monocytes 05/17/2019 0.8  0.0 - 1.3 10e9/L Final     Absolute Eosinophils 05/17/2019 0.4  0.0 - 0.7 10e9/L Final     Absolute Basophils 05/17/2019 0.1  0.0 - 0.2 10e9/L Final     Abs Immature Granulocytes 05/17/2019 0.0  0 - 0.4 10e9/L Final     Absolute Nucleated RBC 05/17/2019 0.0   Final     TSH 05/17/2019 2.79  0.40 - 4.00 mU/L Final     N-Terminal Pro Bnp 05/17/2019 5,830* 0 - 450 pg/mL Final          ASSESSMENT:   Lita Ocasio presents for cardiology follow-up to visit on 1/7/2020.  She is status post coronary angiography on 6/26/19 at [facility]. She was recently identified to have newly identified systolic heart failure.  Additionally, she has a history of hypertension, hyperlipidemia, nonrheumatic mitral regurgitation, central lobular emphysema, hypothyroidism, osteopenia, hyperparathyroidism, history of tobacco abuse and chronic rhinitis.  Patient underwent coronary angiography on 6/26/19 at [facility]. She was identified to have diffuse coronary atherosclerosis with single vessel obstructive CAD, ostial and mid LCX. Successful PCI with synergy LISS x2, mid LCX 2.5x16mm, ostial LCX 3.0 x 16 mm. LVEDP 26-29 mmHg. During revascularization she was identified to have AF for which she received DCCV x1 with return to sinus and then reverted back to AF. She was treated with IV beta blocker for rate control. Patient reported feeling good following coronary intervention. Breathing had significantly improved and increased energy.   Today patient reports feeling good. No anginal symptoms or MILLS.  No orthopnea or PND.  No palpitations or lightheadedness.  She has noticed increased lower extremity edema without weight gain.  Repeat TTE on 12/5/2019 with recovered systolic LV function.  Her LVEF had  "improved to 60 to 65% with normal global and regional LV function.  The RV was normal in size and function. Mitral valve with mild insufficiency, aortic valve normal in structure and function.        PLAN:  1. Chronic systolic congestive heart failure (H)- Recovered   Ischemic cardiomyopathy, HFrEF with LVEF previously 36%, NYHA FC II  Repeat TTE on 12/5/19 with recovered LVEF at 60-65%.  Continue with Toprol XL and ACEi  Recently stopped spironolactone with recovered LVEF and stage 3 CKD.  She does report increased edema. She will increase her Demadex to 20 mg daily.     - torsemide (DEMADEX) 20 MG tablet; Take 1 tablet (20 mg) by mouth every morning  Dispense: 60 tablet; Refill: 1    2. CKD (chronic kidney disease) stage 3, GFR 30-59 ml/min (H)  - Basic metabolic panel; Future    3. Essential hypertension  BP in right arm 157/63, left arm 91/60.  Patient admits to the difference in pressures between both arms for \"many years\".  Recommended CT imaging to assess aorta and assess  presence of any coarctation. Patient declines any imaging or work up for this. Admits that it has never caused her any problems.     5. History of coronary artery stent placement (6/26/19)  Successful PCI with synergy LISS x2, mid LCX 2.5x16mm, ostial LCX 3.0 x 16 mm.   Continue with Plavix and Xarelto. Plavix for optimally one year post PCI (stop 7/1/2020). Remain on ASA indefinitely following 7/1/2020 and will continue with Xarelto.   Continue with high intensity statin.   No anginal symptoms reported.      6. Centrilobular emphysema (H)  Stable.     7. Hyperlipidemia, unspecified hyperlipidemia type  Continue with high intensity statin.         Thank you for allowing me to participate in the care of your patient. Please do not hesitate to contact me if you have any questions.     Hailee Cortez     "

## 2020-08-20 NOTE — PATIENT INSTRUCTIONS
Thank you for allowing Hailee Cortez and our team to participate in your care. Please call our office at [phone] with scheduling questions or if you need to cancel or change your appointment. With any other questions or concerns you may call Swathi cardiology nurse at [phone].       If you experience chest pain, chest pressure, chest tightness, shortness of breath, fainting, lightheadedness, nausea, vomiting, or other concerning symptoms, please report to the Emergency Department or call 911. These symptoms may be emergent, and best treated in the Emergency Department.    Follow up in 6 month     Return for Lab in 3 weeks     Stop Lasix and start  Demadex 20 mg in the AM

## 2020-09-09 ENCOUNTER — TRANSFERRED RECORDS (OUTPATIENT)
Dept: HEALTH INFORMATION MANAGEMENT | Facility: CLINIC | Age: 80
End: 2020-09-09

## 2020-09-10 DIAGNOSIS — N18.30 CKD (CHRONIC KIDNEY DISEASE) STAGE 3, GFR 30-59 ML/MIN (H): ICD-10-CM

## 2020-09-10 LAB
ANION GAP SERPL CALCULATED.3IONS-SCNC: 6 MMOL/L (ref 3–14)
BUN SERPL-MCNC: 13 MG/DL (ref 7–30)
CALCIUM SERPL-MCNC: 9.1 MG/DL (ref 8.5–10.1)
CHLORIDE SERPL-SCNC: 105 MMOL/L (ref 94–109)
CO2 SERPL-SCNC: 26 MMOL/L (ref 20–32)
CREAT SERPL-MCNC: 1.15 MG/DL (ref 0.52–1.04)
GFR SERPL CREATININE-BSD FRML MDRD: 45 ML/MIN/{1.73_M2}
GLUCOSE SERPL-MCNC: 98 MG/DL (ref 70–99)
POTASSIUM SERPL-SCNC: 3.2 MMOL/L (ref 3.4–5.3)
SODIUM SERPL-SCNC: 137 MMOL/L (ref 133–144)

## 2020-09-10 PROCEDURE — 36415 COLL VENOUS BLD VENIPUNCTURE: CPT | Mod: ZL | Performed by: NURSE PRACTITIONER

## 2020-09-10 PROCEDURE — 80048 BASIC METABOLIC PNL TOTAL CA: CPT | Mod: ZL | Performed by: NURSE PRACTITIONER

## 2020-09-13 DIAGNOSIS — K92.2 ACUTE UPPER GI BLEED: ICD-10-CM

## 2020-09-14 ENCOUNTER — TELEPHONE (OUTPATIENT)
Dept: CARDIOLOGY | Facility: OTHER | Age: 80
End: 2020-09-14

## 2020-09-14 DIAGNOSIS — E87.6 HYPOKALEMIA: Primary | ICD-10-CM

## 2020-09-14 RX ORDER — POTASSIUM CHLORIDE 1500 MG/1
20 TABLET, EXTENDED RELEASE ORAL DAILY
Qty: 180 TABLET | Refills: 0 | Status: SHIPPED | OUTPATIENT
Start: 2020-09-14 | End: 2020-11-05

## 2020-09-14 RX ORDER — AMLODIPINE BESYLATE 10 MG/1
TABLET ORAL
Qty: 90 TABLET | Refills: 0 | Status: SHIPPED | OUTPATIENT
Start: 2020-09-14 | End: 2020-09-21

## 2020-09-14 RX ORDER — SUCRALFATE 1 G/1
TABLET ORAL
Qty: 360 TABLET | Refills: 1 | Status: SHIPPED | OUTPATIENT
Start: 2020-09-14 | End: 2021-04-12

## 2020-09-14 RX ORDER — POTASSIUM CHLORIDE 750 MG/1
TABLET, EXTENDED RELEASE ORAL
Qty: 90 TABLET | Refills: 0 | Status: SHIPPED | OUTPATIENT
Start: 2020-09-14 | End: 2020-10-13

## 2020-09-14 NOTE — TELEPHONE ENCOUNTER
Per lab note     Hailee Cortez APRN CNP   9/11/2020  1:39 PM       Please notify patient of lab results.  Her potassium was low at 3.2.  I would like you to increase her potassium chloride to 20 mEq daily.  Her renal function remained stable.   Thanks,   COCO Mccarty CNP     Please sign. Thank you!

## 2020-09-14 NOTE — TELEPHONE ENCOUNTER
Potassium       Last Written Prescription Date:  6/22/2020  Last Fill Quantity: 90,   # refills: 0    Carafate       Last Written Prescription Date:  6/22/2020  Last Fill Quantity: 120,   # refills: 4    Norvasc       Last Written Prescription Date:  Request   Last Fill Quantity: 90,   # refills: 0  Last Office Visit: 5/28/2020  Future Office visit:       Routing refill request to provider for review/approval because:  Drug not active on patient's medication list

## 2020-09-14 NOTE — TELEPHONE ENCOUNTER
Pt called and stated that she was told at her last apt to start taking 20 meq of Potassium Chloride instead of 10 meq. She stated that [facility] pharmacy needed a new prescription for that and she has been taking one because she doesn't have enough to take two and have it last her 90 days.     Heather Millan, AYEN

## 2020-09-15 DIAGNOSIS — I50.22 CHRONIC SYSTOLIC CONGESTIVE HEART FAILURE (H): Chronic | ICD-10-CM

## 2020-09-15 DIAGNOSIS — K92.2 GASTROINTESTINAL HEMORRHAGE, UNSPECIFIED GASTROINTESTINAL HEMORRHAGE TYPE: ICD-10-CM

## 2020-09-16 RX ORDER — PANTOPRAZOLE SODIUM 40 MG/1
TABLET, DELAYED RELEASE ORAL
Qty: 180 TABLET | Refills: 1 | Status: SHIPPED | OUTPATIENT
Start: 2020-09-16 | End: 2021-09-27

## 2020-09-16 RX ORDER — ZAFIRLUKAST 20 MG/1
20 TABLET, FILM COATED ORAL 2 TIMES DAILY
Qty: 60 TABLET | Refills: 11 | Status: SHIPPED | OUTPATIENT
Start: 2020-09-16 | End: 2021-12-22

## 2020-09-16 NOTE — TELEPHONE ENCOUNTER
zafirlukast       Last Written Prescription Date:  4/1/20  Last Fill Quantity: 60,   # refills: 3  Last Office Visit: 5/28/20  Future Office visit:

## 2020-09-21 DIAGNOSIS — K92.2 ACUTE UPPER GI BLEED: ICD-10-CM

## 2020-09-21 RX ORDER — AMLODIPINE BESYLATE 10 MG/1
TABLET ORAL
Qty: 90 TABLET | Refills: 1 | Status: SHIPPED | OUTPATIENT
Start: 2020-09-21 | End: 2020-12-17

## 2020-09-21 NOTE — TELEPHONE ENCOUNTER
PCP out. Please advise, thank you.    amLODIPine (NORVASC) 10 MG tablet       Last Written Prescription Date:  09/14/20  Last Fill Quantity: 90,   # refills: 0  Last Office Visit: 05/28/20  Future Office visit:       Routing refill request to provider for review/approval because:  Patient is needing medication sent to a different pharmacy.  Failed protocol due to   Normal serum creatinine on file in past 12 months         Recent Labs   Lab Test 09/10/20  1402   CR 1.15*

## 2020-09-25 ENCOUNTER — TELEPHONE (OUTPATIENT)
Dept: CARDIOLOGY | Facility: OTHER | Age: 80
End: 2020-09-25

## 2020-09-25 NOTE — TELEPHONE ENCOUNTER
Patient is having trouble getting her [facility] to [facility] and is stating should not be sent to [facility]. Writer called pharmacy and to see what they have at the pharmacy for . They have Furosamide, Carafate and Pottasium 20meq, and Amlodipine. For , she should of discontinued the Lasix and she should of started on torsemide. Writer confirmed the patient is taking the torsemide which she states is not working, but did not want to make a sooner appointment she was going to continue with it to see if swelling will go down. Patient advised writer that she will  what is at [facility] and will call when she is in need of refills. And these refills [facility]. Writer will try switching mail order as primary and  or erase [facility] per patient request.   Swathi Johnson LPN

## 2020-10-06 DIAGNOSIS — Z95.5 S/P DRUG ELUTING CORONARY STENT PLACEMENT: ICD-10-CM

## 2020-10-06 DIAGNOSIS — E78.5 HYPERLIPIDEMIA, UNSPECIFIED HYPERLIPIDEMIA TYPE: ICD-10-CM

## 2020-10-06 DIAGNOSIS — I50.22 CHRONIC SYSTOLIC CONGESTIVE HEART FAILURE (H): Chronic | ICD-10-CM

## 2020-10-07 RX ORDER — LISINOPRIL 5 MG/1
TABLET ORAL
Qty: 90 TABLET | Refills: 0 | Status: SHIPPED | OUTPATIENT
Start: 2020-10-07 | End: 2020-12-22

## 2020-10-07 RX ORDER — ATORVASTATIN CALCIUM 40 MG/1
TABLET, FILM COATED ORAL
Qty: 90 TABLET | Refills: 0 | Status: SHIPPED | OUTPATIENT
Start: 2020-10-07 | End: 2020-12-22

## 2020-10-07 NOTE — TELEPHONE ENCOUNTER
Lipitor       Last Written Prescription Date:  7/1/2020  Last Fill Quantity: 90,   # refills: 0    Lisinopril       Last Written Prescription Date:  7/1/2020  Last Fill Quantity: 90,   # refills: 0  Last Office Visit: 5/28/2020  Future Office visit:

## 2020-10-12 ENCOUNTER — NURSE TRIAGE (OUTPATIENT)
Dept: FAMILY MEDICINE | Facility: OTHER | Age: 80
End: 2020-10-12

## 2020-10-12 NOTE — TELEPHONE ENCOUNTER
"    Additional Information    [1] MODERATE back pain (e.g., interferes with normal activities) AND [2] present > 3 days    Answer Assessment - Initial Assessment Questions  1. ONSET: \"When did the pain begin?\"       A while back  2. LOCATION: \"Where does it hurt?\" (upper, mid or lower back)      Lower back  3. SEVERITY: \"How bad is the pain?\"  (e.g., Scale 1-10; mild, moderate, or severe)    - MILD (1-3): doesn't interfere with normal activities     - MODERATE (4-7): interferes with normal activities or awakens from sleep     - SEVERE (8-10): excruciating pain, unable to do any normal activities       6/10  4. PATTERN: \"Is the pain constant?\" (e.g., yes, no; constant, intermittent)       intermittent  5. RADIATION: \"Does the pain shoot into your legs or elsewhere?\"      no  6. CAUSE:  \"What do you think is causing the back pain?\"       unknown  7. BACK OVERUSE:  \"Any recent lifting of heavy objects, strenuous work or exercise?\"      unknown  8. MEDICATIONS: \"What have you taken so far for the pain?\" (e.g., nothing, acetaminophen, NSAIDS)      Patient states she also feels nauseated and it has been going on for a wile.  Denies uri, chest pain, fever, diarrhea, vomiting.  Requesting to be seen.  9. NEUROLOGIC SYMPTOMS: \"Do you have any weakness, numbness, or problems with bowel/bladder control?\"      no  10. OTHER SYMPTOMS: \"Do you have any other symptoms?\" (e.g., fever, abdominal pain, burning with urination, blood in urine)        no  11. PREGNANCY: \"Is there any chance you are pregnant?\" (e.g., yes, no; LMP)        no    Protocols used: BACK PAIN-A-AH      "

## 2020-10-12 NOTE — PROGRESS NOTES
Subjective     Lita Ocasio is a 79 year old female who presents to clinic today for the following health issues:    HPI         Back Pain  Onset/Duration: 1 week   Description:   Location of pain: middle of back bilateral  Character of pain: dull ache, burning and intermittent  Pain radiation: none  New numbness or weakness in legs, not attributed to pain: no   Intensity: Currently 6/10, At its worst 6/10  Progression of Symptoms: same  History:   Specific cause: none  Pain interferes with job: not applicable  History of back problems: no prior back problems  Any previous MRI or X-rays: None  Sees a specialist for back pain: No  Alleviating factors:   Improved by: cold, heat and rest    Precipitating factors:  Worsened by: overuse  Therapies tried and outcome: rest, heat, ice, heat helps best    Accompanying Signs & Symptoms:  Risk of Fracture: Osteoporosis and Age >64  Risk of Cauda Equina: None  Risk of Infection: None  Risk of Cancer: None  Risk of Ankylosing Spondylitis: Onset at age <35, male, AND morning back stiffness no    Pain is chronic, but worsened about a week ago. Not sudden onset. No new injury or fall. Does not radiate. Has been stable over the weeks. Laying or sitting helps the pain. Walking a lot makes it worse. Pain is across the bilateral low back above the sacrum.     Hypothyroidism Follow-up      Since last visit, patient describes the following symptoms: Weight stable, no hair loss, no skin changes, no constipation, no loose stools      Heart Failure Follow-up  Are you experiencing any shortness of breath? Yes, with activity only   How would you describe your shortness of breath?  Slightly worse        Are you experiencing any swelling in your legs or feet?  No    Are you using more pillows than usual? No    Do you cough at night?  Yes    Do you check your weight daily?  No    Have you had a weight change recently?  No    Are you having any of the following side effects from your  medications? (Select all that apply)  Dizziness, Fatigue and Other:  SOB, arm pain    Since your last visit, how many times have you gone to the cardiologist, urgent care, emergency room, or hospital because of your heart failure?   1 time      Feels her breathing and energy have been worsened since starting torsemide. She was previously on lasix 40mg daily. She would like to get back on this instead. She has no LE edema, orthopnea, pnd, etc. Dizzy occasionally, but pt states this is not new.     Review of Systems   Constitutional, HEENT, cardiovascular, pulmonary, gi and gu systems are negative, except as otherwise noted.      Objective    /50 (BP Location: Right arm, Patient Position: Sitting)   Pulse 81   Wt 47.7 kg (105 lb 3.2 oz)   SpO2 98%   BMI 20.55 kg/m    Body mass index is 20.55 kg/m .  Physical Exam   GENERAL: alert and no distress  NECK: no adenopathy, no asymmetry, masses, or scars and thyroid normal to palpation  RESP: lungs clear to auscultation - no rales, rhonchi or wheezes  CV: regular rates and rhythm, normal S1 S2, no S3 or S4, no murmur, click or rub and no peripheral edema  ABDOMEN: soft, nontender, no hepatosplenomegaly, no masses and bowel sounds normal  NEURO: Normal strength and tone, mentation intact, speech normal and gait normal including heel/toe walking  BACK: no CVA tenderness, no paralumbar tenderness  Comprehensive back pain exam:  No tenderness, Lower extremity strength functional and equal on both sides, Lower extremity sensation normal and equal on both sides and Straight leg raise negative bilaterally  PSYCH: mentation appears normal, affect normal/bright    Results for orders placed or performed in visit on 10/13/20   XR LUMBAR SPINE 2/3 VIEWS (Clinic Performed)     Status: None    Narrative    XR LUMBAR SPINE 2-3 VIEWS    HISTORY: low back pain; Chronic bilateral low back pain without  sciatica; Chronic bilateral low back pain without sciatica .    COMPARISON:  None.    TECHNIQUE: 3 views of lumbosacral spine.    FINDINGS:    Osteopenia/osteoporosis. Preserved vertebral body heights. Relatively  preserved disc heights. Preserved lordosis. Facet degenerative changes  best seen at L4-5 and L5-S1. Vascular calcifications.        Impression    IMPRESSION:     Facet degenerative changes best seen at L4-5 and L5-S1.      LEXY MCINTYRE MD   Results for orders placed or performed in visit on 10/13/20   TSH with free T4 reflex     Status: None   Result Value Ref Range    TSH 2.06 0.40 - 4.00 mU/L   Albumin Random Urine Quantitative with Creat Ratio     Status: None   Result Value Ref Range    Creatinine Urine 50 mg/dL    Albumin Urine mg/L <5 mg/L    Albumin Urine mg/g Cr Unable to calculate due to low value 0 - 25 mg/g Cr   Basic metabolic panel     Status: Abnormal   Result Value Ref Range    Sodium 134 133 - 144 mmol/L    Potassium 4.1 3.4 - 5.3 mmol/L    Chloride 100 94 - 109 mmol/L    Carbon Dioxide 26 20 - 32 mmol/L    Anion Gap 8 3 - 14 mmol/L    Glucose 88 70 - 99 mg/dL    Urea Nitrogen 23 7 - 30 mg/dL    Creatinine 1.49 (H) 0.52 - 1.04 mg/dL    GFR Estimate 33 (L) >60 mL/min/[1.73_m2]    GFR Estimate If Black 38 (L) >60 mL/min/[1.73_m2]    Calcium 9.5 8.5 - 10.1 mg/dL           Assessment & Plan     Benign essential hypertension / Stage 3 chronic kidney disease, unspecified whether stage 3a or 3b CKD  Controlled, creat is up slightly recheck scheduled.   - Albumin Random Urine Quantitative with Creat Ratio  - Basic metabolic panel    Chronic systolic congestive heart failure (H)  Appears euvolemic to dry. No crackles or edema. Transition back to lasix 40mg daily. HOLD torsemide. Will see if she feels better back on this. Will need K monitoring, labs scheduled.   - furosemide (LASIX) 40 MG tablet; Take 1 tablet (40 mg) by mouth daily    Chronic bilateral low back pain without sciatica  XR with facet joint arthritis which would be c/w with her pattern of pain.  Recommend otc pain medication (pt has been avoiding taking) to start. If worsening or changing consider MRI.   - XR LUMBAR SPINE 2/3 VIEWS (Clinic Performed); Future    Encounter for screening mammogram for breast cancer  - MA Screen Bilateral w/Larry; Future    Acquired hypothyroidism  Stable.   - TSH with free T4 reflex      Ophelia Troncoso MD  [facility] - [place]

## 2020-10-13 ENCOUNTER — OFFICE VISIT (OUTPATIENT)
Dept: FAMILY MEDICINE | Facility: OTHER | Age: 80
End: 2020-10-13
Attending: FAMILY MEDICINE
Payer: MEDICARE

## 2020-10-13 ENCOUNTER — ANCILLARY PROCEDURE (OUTPATIENT)
Dept: GENERAL RADIOLOGY | Facility: OTHER | Age: 80
End: 2020-10-13
Attending: FAMILY MEDICINE
Payer: MEDICARE

## 2020-10-13 VITALS
BODY MASS INDEX: 20.55 KG/M2 | OXYGEN SATURATION: 98 % | SYSTOLIC BLOOD PRESSURE: 110 MMHG | DIASTOLIC BLOOD PRESSURE: 50 MMHG | HEART RATE: 81 BPM | WEIGHT: 105.2 LBS

## 2020-10-13 DIAGNOSIS — N18.30 STAGE 3 CHRONIC KIDNEY DISEASE, UNSPECIFIED WHETHER STAGE 3A OR 3B CKD (H): ICD-10-CM

## 2020-10-13 DIAGNOSIS — G89.29 CHRONIC BILATERAL LOW BACK PAIN WITHOUT SCIATICA: ICD-10-CM

## 2020-10-13 DIAGNOSIS — I10 BENIGN ESSENTIAL HYPERTENSION: ICD-10-CM

## 2020-10-13 DIAGNOSIS — I50.22 CHRONIC SYSTOLIC CONGESTIVE HEART FAILURE (H): Primary | Chronic | ICD-10-CM

## 2020-10-13 DIAGNOSIS — Z12.31 ENCOUNTER FOR SCREENING MAMMOGRAM FOR BREAST CANCER: ICD-10-CM

## 2020-10-13 DIAGNOSIS — M54.50 CHRONIC BILATERAL LOW BACK PAIN WITHOUT SCIATICA: ICD-10-CM

## 2020-10-13 DIAGNOSIS — E03.9 ACQUIRED HYPOTHYROIDISM: Chronic | ICD-10-CM

## 2020-10-13 LAB
ANION GAP SERPL CALCULATED.3IONS-SCNC: 8 MMOL/L (ref 3–14)
BUN SERPL-MCNC: 23 MG/DL (ref 7–30)
CALCIUM SERPL-MCNC: 9.5 MG/DL (ref 8.5–10.1)
CHLORIDE SERPL-SCNC: 100 MMOL/L (ref 94–109)
CO2 SERPL-SCNC: 26 MMOL/L (ref 20–32)
CREAT SERPL-MCNC: 1.49 MG/DL (ref 0.52–1.04)
CREAT UR-MCNC: 50 MG/DL
GFR SERPL CREATININE-BSD FRML MDRD: 33 ML/MIN/{1.73_M2}
GLUCOSE SERPL-MCNC: 88 MG/DL (ref 70–99)
MICROALBUMIN UR-MCNC: <5 MG/L
MICROALBUMIN/CREAT UR: NORMAL MG/G CR (ref 0–25)
POTASSIUM SERPL-SCNC: 4.1 MMOL/L (ref 3.4–5.3)
SODIUM SERPL-SCNC: 134 MMOL/L (ref 133–144)
TSH SERPL DL<=0.005 MIU/L-ACNC: 2.06 MU/L (ref 0.4–4)

## 2020-10-13 PROCEDURE — 36415 COLL VENOUS BLD VENIPUNCTURE: CPT | Mod: ZL | Performed by: FAMILY MEDICINE

## 2020-10-13 PROCEDURE — G0463 HOSPITAL OUTPT CLINIC VISIT: HCPCS | Mod: 25

## 2020-10-13 PROCEDURE — 82043 UR ALBUMIN QUANTITATIVE: CPT | Mod: ZL | Performed by: FAMILY MEDICINE

## 2020-10-13 PROCEDURE — 80048 BASIC METABOLIC PNL TOTAL CA: CPT | Mod: ZL | Performed by: FAMILY MEDICINE

## 2020-10-13 PROCEDURE — 72100 X-RAY EXAM L-S SPINE 2/3 VWS: CPT | Mod: TC

## 2020-10-13 PROCEDURE — 99214 OFFICE O/P EST MOD 30 MIN: CPT | Performed by: FAMILY MEDICINE

## 2020-10-13 PROCEDURE — 84443 ASSAY THYROID STIM HORMONE: CPT | Mod: ZL | Performed by: FAMILY MEDICINE

## 2020-10-13 RX ORDER — FUROSEMIDE 40 MG
40 TABLET ORAL DAILY
Qty: 30 TABLET | Refills: 0 | Status: SHIPPED | OUTPATIENT
Start: 2020-10-13 | End: 2020-11-23

## 2020-10-13 ASSESSMENT — PAIN SCALES - GENERAL: PAINLEVEL: SEVERE PAIN (6)

## 2020-10-13 NOTE — NURSING NOTE
Chief Complaint   Patient presents with     Musculoskeletal Problem       Initial /50 (BP Location: Right arm, Patient Position: Sitting)   Pulse 81   Wt 47.7 kg (105 lb 3.2 oz)   SpO2 98%   BMI 20.55 kg/m   Estimated body mass index is 20.55 kg/m  as calculated from the following:    Height as of 5/28/20: 1.524 m (5').    Weight as of this encounter: 47.7 kg (105 lb 3.2 oz).  Medication Reconciliation: complete  Arianna Herbert LPN

## 2020-10-14 DIAGNOSIS — I50.22 CHRONIC SYSTOLIC CONGESTIVE HEART FAILURE (H): Primary | Chronic | ICD-10-CM

## 2020-10-16 DIAGNOSIS — R63.4 WEIGHT LOSS: ICD-10-CM

## 2020-10-16 DIAGNOSIS — F51.01 PRIMARY INSOMNIA: ICD-10-CM

## 2020-10-16 RX ORDER — MIRTAZAPINE 7.5 MG/1
7.5 TABLET, FILM COATED ORAL AT BEDTIME
Qty: 90 TABLET | Refills: 0 | Status: SHIPPED | OUTPATIENT
Start: 2020-10-16 | End: 2021-02-10

## 2020-10-19 DIAGNOSIS — I50.22 CHRONIC SYSTOLIC CONGESTIVE HEART FAILURE (H): Chronic | ICD-10-CM

## 2020-10-19 DIAGNOSIS — E87.6 HYPOKALEMIA: ICD-10-CM

## 2020-10-19 DIAGNOSIS — E03.9 ACQUIRED HYPOTHYROIDISM: ICD-10-CM

## 2020-10-19 NOTE — TELEPHONE ENCOUNTER
potassium chloride ER (KLOR-CON M) 20 MEQ CR tablet  Last Written Prescription Date: 9/17/2020 Last Fill Quantity: 180,   # refills:0  levothyroxine (SYNTHROID/LEVOTHROID) 75 MCG tablet Last Written Prescription Date: 7/20/2020 Last Fill Quantity:90,   # refills: 1  metoprolol succinate ER (TOPROL-XL) 25 MG 24 hr tablet Last Written Prescription Date:  7/20/2020   Last Fill Quantity: 90,   # refills: 1    Last Office Visit:8/20/2020  Future Office visit: Dr. Troncoso 11/17/2020 and Hailee Cortez   2/23/2021  Next 5 appointments (look out 90 days)    Nov 17, 2020  3:00 PM  (Arrive by 2:45 PM)  Office Visit with Ophelia Troncoso MD  [facility] ([facility] ) [address]  [place] [address]  [phone]

## 2020-10-22 RX ORDER — POTASSIUM CHLORIDE 1500 MG/1
20 TABLET, EXTENDED RELEASE ORAL DAILY
Qty: 180 TABLET | Refills: 0 | OUTPATIENT
Start: 2020-10-22

## 2020-10-22 RX ORDER — METOPROLOL SUCCINATE 25 MG/1
25 TABLET, EXTENDED RELEASE ORAL DAILY
Qty: 90 TABLET | Refills: 1 | OUTPATIENT
Start: 2020-10-22

## 2020-10-22 RX ORDER — LEVOTHYROXINE SODIUM 75 UG/1
75 TABLET ORAL DAILY
Qty: 90 TABLET | Refills: 1 | OUTPATIENT
Start: 2020-10-22

## 2020-10-28 DIAGNOSIS — I50.22 CHRONIC SYSTOLIC CONGESTIVE HEART FAILURE (H): Chronic | ICD-10-CM

## 2020-10-28 LAB
ALBUMIN SERPL-MCNC: 3.8 G/DL (ref 3.4–5)
ALP SERPL-CCNC: 94 U/L (ref 40–150)
ALT SERPL W P-5'-P-CCNC: 16 U/L (ref 0–50)
ANION GAP SERPL CALCULATED.3IONS-SCNC: 10 MMOL/L (ref 3–14)
AST SERPL W P-5'-P-CCNC: 14 U/L (ref 0–45)
BILIRUB SERPL-MCNC: 0.4 MG/DL (ref 0.2–1.3)
BUN SERPL-MCNC: 16 MG/DL (ref 7–30)
CALCIUM SERPL-MCNC: 9 MG/DL (ref 8.5–10.1)
CHLORIDE SERPL-SCNC: 103 MMOL/L (ref 94–109)
CO2 SERPL-SCNC: 22 MMOL/L (ref 20–32)
CREAT SERPL-MCNC: 1.5 MG/DL (ref 0.52–1.04)
GFR SERPL CREATININE-BSD FRML MDRD: 33 ML/MIN/{1.73_M2}
GLUCOSE SERPL-MCNC: 154 MG/DL (ref 70–99)
POTASSIUM SERPL-SCNC: 3.8 MMOL/L (ref 3.4–5.3)
PROT SERPL-MCNC: 7.7 G/DL (ref 6.8–8.8)
SODIUM SERPL-SCNC: 135 MMOL/L (ref 133–144)

## 2020-10-28 PROCEDURE — 36415 COLL VENOUS BLD VENIPUNCTURE: CPT | Mod: ZL | Performed by: FAMILY MEDICINE

## 2020-10-28 PROCEDURE — 80053 COMPREHEN METABOLIC PANEL: CPT | Mod: ZL | Performed by: FAMILY MEDICINE

## 2020-11-05 DIAGNOSIS — E87.6 HYPOKALEMIA: ICD-10-CM

## 2020-11-05 DIAGNOSIS — I50.22 CHRONIC SYSTOLIC CONGESTIVE HEART FAILURE (H): Chronic | ICD-10-CM

## 2020-11-05 DIAGNOSIS — E03.9 ACQUIRED HYPOTHYROIDISM: ICD-10-CM

## 2020-11-05 RX ORDER — LEVOTHYROXINE SODIUM 75 UG/1
75 TABLET ORAL DAILY
Qty: 90 TABLET | Refills: 1 | Status: SHIPPED | OUTPATIENT
Start: 2020-11-05 | End: 2021-01-25

## 2020-11-05 RX ORDER — METOPROLOL SUCCINATE 25 MG/1
25 TABLET, EXTENDED RELEASE ORAL DAILY
Qty: 90 TABLET | Refills: 1 | Status: SHIPPED | OUTPATIENT
Start: 2020-11-05 | End: 2021-01-25

## 2020-11-05 RX ORDER — POTASSIUM CHLORIDE 1500 MG/1
20 TABLET, EXTENDED RELEASE ORAL DAILY
Qty: 180 TABLET | Refills: 0 | Status: SHIPPED | OUTPATIENT
Start: 2020-11-05 | End: 2021-01-25

## 2020-11-11 ENCOUNTER — TRANSFERRED RECORDS (OUTPATIENT)
Dept: HEALTH INFORMATION MANAGEMENT | Facility: CLINIC | Age: 80
End: 2020-11-11

## 2020-11-14 ENCOUNTER — HEALTH MAINTENANCE LETTER (OUTPATIENT)
Age: 80
End: 2020-11-14

## 2020-11-17 ENCOUNTER — VIRTUAL VISIT (OUTPATIENT)
Dept: FAMILY MEDICINE | Facility: OTHER | Age: 80
End: 2020-11-17
Attending: FAMILY MEDICINE
Payer: MEDICARE

## 2020-11-17 DIAGNOSIS — J43.2 CENTRILOBULAR EMPHYSEMA (H): Chronic | ICD-10-CM

## 2020-11-17 DIAGNOSIS — I25.10 ASCVD (ARTERIOSCLEROTIC CARDIOVASCULAR DISEASE): ICD-10-CM

## 2020-11-17 DIAGNOSIS — N18.30 STAGE 3 CHRONIC KIDNEY DISEASE, UNSPECIFIED WHETHER STAGE 3A OR 3B CKD (H): ICD-10-CM

## 2020-11-17 DIAGNOSIS — R06.09 DOE (DYSPNEA ON EXERTION): ICD-10-CM

## 2020-11-17 DIAGNOSIS — R53.82 CHRONIC FATIGUE: Primary | ICD-10-CM

## 2020-11-17 PROCEDURE — 99442 PR PHYSICIAN TELEPHONE EVALUATION 11-20 MIN: CPT | Mod: 95 | Performed by: FAMILY MEDICINE

## 2020-11-17 PROCEDURE — G0463 HOSPITAL OUTPT CLINIC VISIT: HCPCS | Mod: 25,TEL

## 2020-11-17 NOTE — PROGRESS NOTES
"Lita Ocasio is a 79 year old female who is being evaluated via a billable telephone visit.      The patient has been notified of following:     \"This telephone visit will be conducted via a call between you and your physician/provider. We have found that certain health care needs can be provided without the need for a physical exam.  This service lets us provide the care you need with a short phone conversation.  If a prescription is necessary we can send it directly to your pharmacy.  If lab work is needed we can place an order for that and you can then stop by our lab to have the test done at a later time.    Telephone visits are billed at different rates depending on your insurance coverage. During this emergency period, for some insurers they may be billed the same as an in-person visit.  Please reach out to your insurance provider with any questions.    If during the course of the call the physician/provider feels a telephone visit is not appropriate, you will not be charged for this service.\"    Patient has given verbal consent for Telephone visit?  Yes    What phone number would you like to be contacted at? [phone]    How would you like to obtain your AVS? Zafar David     Lita Ocasio is a 79 year old female who presents via phone visit today for the following health issues:    HPI     Heart Failure Follow-up  Are you experiencing any shortness of breath? Yes, with activity only   How would you describe your shortness of breath?  Same as usual    Are you experiencing any swelling in your legs or feet?  Better than usual    Are you using more pillows than usual? No    Do you cough at night?  Yes    Do you check your weight daily?  No    Have you had a weight change recently?  No    Are you having any of the following side effects from your medications? (Select all that apply)  Fatigue and Other:  no appetite    Since your last visit, how many times have you gone to the cardiologist, urgent " care, emergency room, or hospital because of your heart failure?   None     Feels much improved with return to lasix from torsemide.     Chronic/Recurring Back Pain Follow Up      Where is your back pain located? (Select all that apply) middle of back bilateral    How would you describe your back pain?  burning    Where does your back pain spread? nowhere    Since your last clinic visit for back pain, how has your pain changed? always present, but gets better and worse    Does your back pain interfere with your job? Not applicable    Since your last visit, have you tried any new treatment? No    Depression Followup    How are you doing with your depression since your last visit? No change    Are you having other symptoms that might be associated with depression? Yes:  fatigue, low motivation    Have you had a significant life event?  Health Concerns     Are you feeling anxious or having panic attacks?   No    Do you have any concerns with your use of alcohol or other drugs? No     Pt denies feelings of depression or anxiety, however, notes significant fatigue. States she has no appetite, no energy, weight has been stable. Daughter also on the call and corroborates this as well as some lack of motivation, anhedonia. Note she takes remeron 7.5mg nightly. Sleeps until noon, falls asleep a little after 10. Sleep is good. Daughter also has concerns about pt intake of grapefruit/grapfruit juice and other acidic foods like tomato juice and possible impact on absorption of medications and stomach upset.     Social History     Tobacco Use     Smoking status: Former Smoker     Packs/day: 1.00     Years: 50.00     Pack years: 50.00     Types: Cigarettes     Quit date: 2019     Years since quittin.8     Smokeless tobacco: Never Used     Tobacco comment: quit 2019   Substance Use Topics     Alcohol use: Yes     Comment: social     Drug use: No     PHQ 2017   PHQ-9 Total Score 6 3   Q9: Thoughts of  better off dead/self-harm past 2 weeks Not at all Not at all     ANTONELLA-7 SCORE 12/13/2017 2/28/2020   Total Score 2 0     Review of Systems   Constitutional, HEENT, cardiovascular, pulmonary, gi and gu systems are negative, except as otherwise noted.       Objective          Vitals:  No vitals were obtained today due to virtual visit.    healthy, alert and no distress  PSYCH: Alert and oriented times 3; coherent speech, normal   rate and volume, able to articulate logical thoughts, able   to abstract reason, no tangential thoughts, no hallucinations   or delusions  Her affect is normal  RESP: No cough, no audible wheezing, able to talk in full sentences  Remainder of exam unable to be completed due to telephone visits    No results found for any visits on 11/17/20.        Assessment/Plan:    Chronic fatigue  TSH, Labs recently wnl. ? Effect of remeron as pt is sleeping long into the AM. Will trial off this medication for the time being and see what happens. May be some component of depression with the anhedonia, lack of motivation as well. Can consider alternative ssri if indicated in the future.     MILLS (dyspnea on exertion) / ASCVD (arteriosclerotic cardiovascular disease) / Centrilobular emphysema (H)  Multi factorial MILLS. Encouraged physical activity as deconditioning may be playing a role as well. Pt is s/p PCI just last year.     Stage 3 chronic kidney disease, unspecified whether stage 3a or 3b CKD  Renal function bumped between Sept and October of this year. New baseline appears to be around 1.5 creat. Unclear why. Pt is otherwise stable. Continue to monitor for worsening and consider renal US, nephrology consultation if indicated.     No follow-ups on file.    Ophelia Troncoso MD  [facility] - [place]    Phone call duration:  13 minutes

## 2020-11-23 DIAGNOSIS — I50.22 CHRONIC SYSTOLIC CONGESTIVE HEART FAILURE (H): Chronic | ICD-10-CM

## 2020-11-23 RX ORDER — FUROSEMIDE 40 MG
40 TABLET ORAL DAILY
Qty: 90 TABLET | Refills: 0 | Status: SHIPPED | OUTPATIENT
Start: 2020-11-23 | End: 2021-02-02

## 2020-11-23 NOTE — TELEPHONE ENCOUNTER
furosemide (LASIX) 40 MG tablet        Last Written Prescription Date:  10/13/20  Last Fill Quantity: 30,   # refills: 0  Last Office Visit: 11/17/20  Future Office visit:       Routing refill request to provider for review/approval because:    Diuretics (Including Combos) Protocol Failed    Normal serum creatinine on file in past 12 months    Creatinine   Date Value Ref Range Status   10/28/2020 1.50 (H) 0.52 - 1.04 mg/dL Final

## 2020-12-07 NOTE — TELEPHONE ENCOUNTER
"Pt called, requesting refills. Pt was advised to contact her pharmacy per usual process. Pt refuses, irritable with this writer. States that she needs her lisinopril refilled. Pt advised that med was signed on 10/7/20 #90. Again, advised that pt contact pharmacy as this prescription may have a new number. Pt again irritable with this writer. Pt stating \"Fine I'll call them.\" Pt then hangs up on this writer.   "

## 2020-12-17 DIAGNOSIS — K92.2 ACUTE UPPER GI BLEED: ICD-10-CM

## 2020-12-17 RX ORDER — AMLODIPINE BESYLATE 10 MG/1
TABLET ORAL
Qty: 90 TABLET | Refills: 1 | Status: SHIPPED | OUTPATIENT
Start: 2020-12-17 | End: 2021-06-22

## 2020-12-17 NOTE — TELEPHONE ENCOUNTER
Amlodipine      Last Written Prescription Date:  09/21/20  Last Fill Quantity: 90,   # refills: 1  Last Office Visit: 11/17/20  Future Office visit:    Next 5 appointments (look out 90 days)    Feb 23, 2021  1:45 PM  (Arrive by 1:30 PM)  Return Visit with COCO Mccartney CNP  [facility] ([facility] ) [address]  [place] [address]  [phone]           Routing refill request to provider for review/approval because:  Normal serum creatinine on file in past 12 months        Recent Labs   Lab Test 10/28/20  1435   CR 1.50*

## 2020-12-21 DIAGNOSIS — E78.5 HYPERLIPIDEMIA, UNSPECIFIED HYPERLIPIDEMIA TYPE: ICD-10-CM

## 2020-12-21 DIAGNOSIS — Z95.5 S/P DRUG ELUTING CORONARY STENT PLACEMENT: ICD-10-CM

## 2020-12-21 DIAGNOSIS — I50.22 CHRONIC SYSTOLIC CONGESTIVE HEART FAILURE (H): Chronic | ICD-10-CM

## 2020-12-21 NOTE — TELEPHONE ENCOUNTER
Lipitor  Last Written Prescription Date: 10/7/20  Last Fill Quantity: 90 # of Refills: 0  Last Office Visit: 11/17/20    Lisinopril  Last Written Prescription Date: 10/7/20  Last Fill Quantity: 90 # of Refills: 0  Last Office Visit: 11/17/20

## 2020-12-22 RX ORDER — ATORVASTATIN CALCIUM 40 MG/1
TABLET, FILM COATED ORAL
Qty: 90 TABLET | Refills: 0 | Status: SHIPPED | OUTPATIENT
Start: 2020-12-22 | End: 2021-03-25

## 2020-12-22 RX ORDER — LISINOPRIL 5 MG/1
TABLET ORAL
Qty: 90 TABLET | Refills: 0 | Status: SHIPPED | OUTPATIENT
Start: 2020-12-22 | End: 2021-03-23

## 2020-12-30 ENCOUNTER — PATIENT OUTREACH (OUTPATIENT)
Dept: CARE COORDINATION | Facility: OTHER | Age: 80
End: 2020-12-30

## 2020-12-30 ASSESSMENT — ACTIVITIES OF DAILY LIVING (ADL): DEPENDENT_IADLS:: INDEPENDENT

## 2020-12-30 NOTE — LETTER
December 30, 2020      Lita Ocasio  [address] [facility] [address]        Dear Lita,     I am a clinic care coordinator who works with Cardiology at [facility]. I wanted to thank you for spending the time to talk with me.  Below is a description of clinic care coordination and how I can further assist you.      The clinic care coordinator is a registered nurse and/or  who understand the health care system. The goal of clinic care coordination is to help you manage your health and improve access to the [facility] in the most efficient manner. The registered nurse can assist you in meeting your health care goals by providing education, coordinating services, and strengthening the communication among your providers.   Please feel free to contact me at [phone] option #8, with any questions or concerns. We at [facility] are focused on providing you with the highest-quality healthcare experience possible and that all starts with you.      Sincerely,      Riccardo HALL RN  Clinic Care Coordination      Enclosed: I have enclosed a copy of a Heart Failure Action Plan. Please keep this in an easy to access place to use as needed. Please review and call me with any questions.\

## 2020-12-30 NOTE — LETTER
My Heart Failure Action Plan   Name: Lita Ocasio    YOB: 1940   Date: 12/30/2020    My doctor: Ophelia Troncoso     [facility] [place]     [address] [facility] [place] [place] [address] [phone]  My Diagnosis: 60-65%   My Exercise Goal: 30 minutes daily  .     My Weight Plan:   Wt Readings from Last 2 Encounters:   10/13/20 47.7 kg (105 lb 3.2 oz)   08/20/20 48.9 kg (107 lb 12.8 oz)     Weigh yourself daily using the same scale. If you gain more than 2 pounds in 24 hours or 5 pounds in a week call NATALIYA Gu RN    My Diet Goal: low sodium    Emergency Room Visits:    Our goal is to improve your quality of life and help you avoid a visit to the emergency room or hospital.  If we work together, we can achieve this goal. But, if you feel you need to call 911 or go to the emergency room, please do so.  If you go to the emergency room, please bring your list of medicines and your daily weight chart with you.       GREEN ZONE     Doing well today    Weight gained is no more than 2 pounds a day or 5 pounds a week.    No swelling in feet, ankles, legs or stomach.    No more swelling than usual.    No more trouble breathing than usual.    No change in my sleep.    No other problems. Actions:    I am doing fine.  I will take my medicine, follow my diet, see my doctor, exercise, and watch for symptoms.           YELLOW ZONE         Having a bad day or flare up    Weight gain of more than 2 pounds in one day or 5 pounds in one week.    New swelling in ankle, leg, knee or thigh.    Bloating in belly, pants feel tighter.    Swelling in hands or face.    Coughing or trouble breathing while walking or talking.    Harder to breathe last night.    Have trouble sleeping, wake up short of breath.    Much more tired than usual.    Not eating.    Pain in my chest or bad leg cramps.    Feel weak or dizzy. Actions:    I need to take action and call my doctor or nurse today.                 RED ZONE         Need  medical care now    Weight gain of 5 pounds overnight.    Chest pain or pressure that does not go away.    Feel less alert.    Wheezing or have trouble breathing when at rest.    Cannot sleep lying down.    Cannot take my water pill.    Pass out or faint. Actions:    I need to call my doctor or nurse now!    Call 911 if I have chest pain or cannot breathe.

## 2020-12-30 NOTE — PROGRESS NOTES
Clinic Care Coordination Contact    Clinic Care Coordination Contact  OUTREACH    Referral Information:       Chief Complaint   Patient presents with     Clinic Care Coordination - Initial     CHF        Universal Utilization:  Clinic Utilization  Difficulty keeping appointments:: No  Compliance Concerns: No  No-Show Concerns: No  Utilization    Last refreshed: 12/30/2020  7:55 AM: Hospital Admissions 2           Last refreshed: 12/30/2020  7:55 AM: ED Visits 2           Last refreshed: 12/30/2020  7:55 AM: No Show Count (past year) 0              Current as of: 12/30/2020  7:55 AM              Clinical Concerns:  Current Medical Concerns:  none    Current Behavioral Concerns: none    Education Provided to patient: yes      Health Maintenance Reviewed:    Clinical Pathway: Clinic Care Coordination CHF Assessment    CHF:    Home scale available:  Yes  Home scale weight this morning: did not weigh this morning  Hospital discharge weight: NA   Current weight: 99 lbs    Heart Failure Zones sheet on refrigerator or available: No- will mail out 12/30/20  Any increased SOB since hospital discharge:  N/A  Any increased edema since hospital discharge:  N/A  What number to call for YELLOW zones: [phone] option #8  Symptom Review:   Heart Failure Symptoms  Shortness of breath:: Yes  Shortness of breath symptom course:: Stable  Waking up at night short of breath?: No  Prop up on pillows or sleep in chair to breathe easier? : No  Trouble walking or talking while short of breath?: No  Wheezing or noisy breathing?: No  Cough: No  Increased sputum: No  Fever: No  Chest pain: : No  Heartbeat: Regular  Dizzy or Lightheaded: No  Checking weight daily? : No  Does the patient have understanding of Diuretic self-management?: Yes  Diet:: 2000 mg of sodium  Appetite:: Normal  Bloating:: None  Urination:: Normal  Fatigue: No  Weakness (Heaviness in limbs):: No  What Heart Failure zone are you currently in?: Green  Overall your CHF symptoms  "are (GOAL):: Stable  How confident are you with the plan we have identified?: 8    Medications:  \"Do you have questions about your medications?\"  No  Is patient on Warfarin?  No  Is Ejection Fraction <40%: No      Medication Management:  Continue current regimen      Functional Status:  Dependent ADLs:: Independent  Dependent IADLs:: Independent  Bed or wheelchair confined:: No  Mobility Status: Independent  Fallen 2 or more times in the past year?: No  Any fall with injury in the past year?: No    Living Situation:  Current living arrangement:: I live in a private home, I live alone  Type of residence:: Private home - stairs(just basement stairs, rarely uses)    Lifestyle & Psychosocial Needs:        Diet:: Other(low sodium)  Inadequate nutrition (GOAL):: No  Tube Feeding: No  Inadequate activity/exercise (GOAL):: No  Significant changes in sleep pattern (GOAL): No  Transportation means:: Accessible car     Moravian or spiritual beliefs that impact treatment:: No  Mental health DX:: No  Mental health management concern (GOAL):: No  Informal Support system:: Children, Family   Socioeconomic History     Marital status:      Spouse name: Not on file     Number of children: Not on file     Years of education: Not on file     Highest education level: Not on file     Tobacco Use     Smoking status: Former Smoker     Packs/day: 1.00     Years: 50.00     Pack years: 50.00     Types: Cigarettes     Quit date: 2019     Years since quittin.9     Smokeless tobacco: Never Used     Tobacco comment: quit 2019   Substance and Sexual Activity     Alcohol use: Yes     Comment: social     Drug use: No     Sexual activity: Not Currently       Equipment Currently Used at Home: none     Goals:       Patient/Caregiver understanding: yes       Future Appointments              In 1 month Hailee Cortez APRN [facility] [place]          Plan: Continue current medication regimen. " Appears to feel much better on lasix versus the torsemide. Continue to monitor salt and fluid intake. Monitor for SOB and swelling. Will mail out Heart Failure Self Help Guide and Heart failure action plan 12/30/20.       Feeling better off of the torsemide.   Swelling when on feet a lot but otherwise no swelling.   SOB on exertion- nothing abnormal.     Not taking Torsemide returned to Lasix per PCP on 10-.  Note from Dr. Troncoso below:  Feels her breathing and energy have been worsened since starting torsemide. She was previously on lasix 40mg daily. She would like to get back on this instead. She has no LE edema, orthopnea, pnd, etc. Dizzy occasionally, but pt states this is not new.   Appears euvolemic to dry. No crackles or edema. Transition back to lasix 40mg daily. HOLD torsemide. Will see if she feels better back on this. Will need K monitoring, labs scheduled.   - furosemide (LASIX) 40 MG tablet; Take 1 tablet (40 mg) by mouth daily  Potassium   Date Value Ref Range Status   10/28/2020 3.8 3.4 - 5.3 mmol/L Final     Per last echocardiogram on 12-5-19:  Normal heart function at top end of normal with LVEF 60-65%. The right ventricle was normal in size and function. Both atria appear normal. No concerning valvular abnormalities. No pericardial effusion.        Riccardo TRISTAN RN  Cardiology Care Coordinator   [phone] Option #8

## 2021-01-07 ENCOUNTER — OFFICE VISIT (OUTPATIENT)
Dept: OTOLARYNGOLOGY | Facility: OTHER | Age: 81
End: 2021-01-07
Attending: PHYSICIAN ASSISTANT
Payer: MEDICARE

## 2021-01-07 VITALS
WEIGHT: 99 LBS | HEIGHT: 60 IN | SYSTOLIC BLOOD PRESSURE: 126 MMHG | HEART RATE: 79 BPM | OXYGEN SATURATION: 96 % | RESPIRATION RATE: 16 BRPM | TEMPERATURE: 97.4 F | DIASTOLIC BLOOD PRESSURE: 50 MMHG | BODY MASS INDEX: 19.44 KG/M2

## 2021-01-07 DIAGNOSIS — Z71.6 TOBACCO ABUSE COUNSELING: ICD-10-CM

## 2021-01-07 DIAGNOSIS — H90.3 SENSORINEURAL HEARING LOSS (SNHL) OF BOTH EARS: ICD-10-CM

## 2021-01-07 DIAGNOSIS — H72.91 PERFORATION OF TYMPANIC MEMBRANE, RIGHT: Primary | ICD-10-CM

## 2021-01-07 DIAGNOSIS — Z97.4 WEARS HEARING AID: ICD-10-CM

## 2021-01-07 DIAGNOSIS — H92.11 OTORRHEA, RIGHT: ICD-10-CM

## 2021-01-07 PROCEDURE — 99213 OFFICE O/P EST LOW 20 MIN: CPT | Mod: 25 | Performed by: PHYSICIAN ASSISTANT

## 2021-01-07 PROCEDURE — 92504 EAR MICROSCOPY EXAMINATION: CPT | Performed by: PHYSICIAN ASSISTANT

## 2021-01-07 PROCEDURE — G0463 HOSPITAL OUTPT CLINIC VISIT: HCPCS | Mod: 25

## 2021-01-07 RX ORDER — CIPROFLOXACIN HYDROCHLORIDE 3.5 MG/ML
SOLUTION/ DROPS TOPICAL
Qty: 1 BOTTLE | Refills: 0 | Status: SHIPPED | OUTPATIENT
Start: 2021-01-07 | End: 2021-12-22

## 2021-01-07 ASSESSMENT — MIFFLIN-ST. JEOR: SCORE: 840.56

## 2021-01-07 ASSESSMENT — PAIN SCALES - GENERAL: PAINLEVEL: NO PAIN (0)

## 2021-01-07 NOTE — LETTER
1/7/2021         RE: Lita Ocasio  [address] [facility] [address]        Dear Colleague,    Thank you for referring your patient, Lita Ocasio, to the [facility]. Please see a copy of my visit note below.    Chief Complaint   Patient presents with     Ent Problem     possible HA part removal       Lita is an 80 year old female, history of Chronic Right TM perforation, cerumen impaction, SNHL/ MHL, hearing aid use who presents for ear cleaning.   She was last seen in ENT on 3/25/19 by kavya Dominguez NP. She was scheduled for 3/25/20 but due to COVID it was postponed.   Today, she has  Felt her wax guard fell off her aid and might be in her ears.       Hearing loss remains stable with no acute hearing chances. Denies fluctuating hearing loss.   No aural fullness.   Denies tinnitus/vertigo.  Denies otalgia/otorrhea and no facial paresthesias or dysphagia.   Remains not interested in any surgery to fix right TM perforation.     Past Medical History:   Diagnosis Date     Acquired hypothyroidism 5/12/2019     Asthma      Benign essential hypertension 5/12/2019     Centrilobular emphysema (H) 5/12/2019     Chronic rhinitis 8/16/2013     Chronic systolic congestive heart failure (H) 5/12/2019     Constipation      Coronary artery disease      Hearing loss      Heart trouble      Hemorrhagic cerebrovascular accident (CVA) (H) 01/2019     Hyperlipidemia 5/12/2019     Hypertension      Nasal congestion      Non-rheumatic mitral regurgitation 5/12/2019     Osteopenia 5/12/2019     Osteoporosis      Parathyroid related hypercalcemia (H) 8/18/2013     Primary hyperparathyroidism (H) 9/5/2013     Ringing in ears      Sensorineural hearing loss, asymmetrical 8/16/2013     Sneezing      Snoring      Stented coronary artery      Thyroid disease      Weakness      Weight loss         Allergies   Allergen Reactions     Evista [Raloxifene] Other (See Comments)     hypercalcemia     Prednisone GI  Disturbance     Combigan [Brimonidine Tartrate-Timolol] Other (See Comments)     Eye swelling and itchy     Latex Rash     Levaquin [Levofloxacin] Muscle Pain (Myalgia)     Penicillins Rash     Restasis      Pt reports using eye gtts and having red irritated eyes      Current Outpatient Medications   Medication     amLODIPine (NORVASC) 10 MG tablet     atorvastatin (LIPITOR) 40 MG tablet     calcium carbonate (TUMS) 500 MG chewable tablet     cetirizine 10 MG PO tablet     erythromycin (ROMYCIN) 5 MG/GM ophthalmic ointment     fluticasone (FLONASE) 50 MCG/ACT spray     furosemide (LASIX) 40 MG tablet     hypromellose (GENTEAL) 0.3 % SOLN ophthalmic solution     levothyroxine (SYNTHROID/LEVOTHROID) 75 MCG tablet     lisinopril (ZESTRIL) 5 MG tablet     metoprolol succinate ER (TOPROL-XL) 25 MG 24 hr tablet     mirtazapine (REMERON) 7.5 MG tablet     nitroGLYcerin (NITROSTAT) 0.4 MG sublingual tablet     pantoprazole (PROTONIX) 40 MG EC tablet     potassium chloride ER (KLOR-CON M) 20 MEQ CR tablet     sucralfate (CARAFATE) 1 GM tablet     Tafluprost (ZIOPTAN) 0.0015 % SOLN     torsemide (DEMADEX) 20 MG tablet     zafirlukast (ACCOLATE) 20 MG tablet     ZIOPTAN 0.0015 % SOLN ophthalmic solution     No current facility-administered medications for this visit.       ROS: 10 point ROS neg other than the symptoms noted above in the HPI.  Vital signs:  Temp: 97.4  F (36.3  C) Temp src: Tympanic BP: 126/50 Pulse: 79   Resp: 16 SpO2: 96 %     Height: 152.4 cm (5') Weight: 44.9 kg (99 lb)    General - The patient is well nourished and well developed, and appears to have good nutritional status.  Alert and oriented to person and place, interactive.  Head and Face - Normocephalic and atraumatic, with no gross asymmetry noted of the contour of the facial features.  The facial nerve is intact, with strong symmetric movements.  Neck- No palpable lymphadenopathy or thyroid mass.  Trachea is midline.  Eyes - Extraocular movements  intact.   Ears- External ears normal. Ears examined under microscope. Left EAC with scant cerumen, removed with cerumen loop. NO FB noted.  TM intact without effusion/retraction. Right EAC with cerumen and debris, scant moisture along distal canal. TM with stable/dry anterior central 5-10% perforation with healthy middle ear space; no polyps/granulation tissue.   Nose - Nasal mucosa is pink and moist with no abnormal mucus.  The septum was grossly midline and non-obstructive, turbinates of normal size and position.  No polyps, masses, or purulence noted on examination.  Mouth - Examination of the oral cavity shows pink, healthy, moist mucosa. Dentition in good condition.  No lesions or ulceration noted. The tongue is mobile and midline.    Throat - The walls of the oropharynx were smooth, pink, moist, symmetric, and had no lesions or ulcerations.  The tonsillar pillars and soft palate were symmetric.  The uvula was midline on elevation.         ASSESSMENT:    ICD-10-CM    1. Perforation of tympanic membrane, right  H72.91 ciprofloxacin (CILOXAN) 0.3 % ophthalmic solution   2. Otorrhea, right  H92.11 ciprofloxacin (CILOXAN) 0.3 % ophthalmic solution   3. Sensorineural hearing loss (SNHL) of both ears  H90.3    4. Wears hearing aid  Z97.4    5. Tobacco abuse counseling  Z71.6        Sent in Cipro drops for right ear due to debris/ canal irritation on exam. Complete for 5 days.   Complete ear drops- 4 drops twice a day for 5 days to right ear.     Right TM perforation stable with no chronic otorrhea or reported changes in hearing.  Continue to keep right ear dry.   She can follow-up on yearly basis for this, but recommend returning sooner as needed, especially if chronic otorrhea/hearing changes.     Continue with hearing aid use. Follow up at [facility] for audiogram and HAC.   Quit tobacco.     Tobacco cessation was strongly encouraged.  The associated risk of head and neck cancer was discussed.  Every year  of cessation offers health benefits. This was discussed with the patient today and they voiced understanding.        Katelynn Winters PA-C  [facility], [place]  [phone]        Again, thank you for allowing me to participate in the care of your patient.        Sincerely,        Katelynn Winters PA-C

## 2021-01-07 NOTE — NURSING NOTE
Chief Complaint   Patient presents with     Ent Problem     possible HA part removal       Initial /50   Pulse 79   Temp 97.4  F (36.3  C) (Tympanic)   Resp 16   Ht 1.524 m (5')   Wt 44.9 kg (99 lb)   SpO2 96%   BMI 19.33 kg/m   Estimated body mass index is 19.33 kg/m  as calculated from the following:    Height as of this encounter: 1.524 m (5').    Weight as of this encounter: 44.9 kg (99 lb).  Medication Reconciliation: complete  Marisol Dominguez LPN

## 2021-01-07 NOTE — PATIENT INSTRUCTIONS
Ears were checked.   Right ear has stable perforation. Scant drainage there today.   Complete ear drops- 4 drops twice a day for 5 days to right ear.   Keep right ear dry  Follow up in 1 year  Follow up with [facility] for audiogram and have them forward results here.     Thank you for allowing Katelynn Winters PA-C and our ENT team to participate in your care.  If your medications are too expensive, please give the nurse a call.  We can possibly change this medication.  If you have a scheduling or an appointment question please contact our Health Unit Coordinator at their direct line [phone].   ALL nursing questions or concerns can be directed to your ENT nurse at: [phone] [place]

## 2021-01-07 NOTE — PROGRESS NOTES
Chief Complaint   Patient presents with     Ent Problem     possible HA part removal       Lita is an 80 year old female, history of Chronic Right TM perforation, cerumen impaction, SNHL/ MHL, hearing aid use who presents for ear cleaning.   She was last seen in ENT on 3/25/19 by kavya Dominguez NP. She was scheduled for 3/25/20 but due to COVID it was postponed.   Today, she has  Felt her wax guard fell off her aid and might be in her ears.       Hearing loss remains stable with no acute hearing chances. Denies fluctuating hearing loss.   No aural fullness.   Denies tinnitus/vertigo.  Denies otalgia/otorrhea and no facial paresthesias or dysphagia.   Remains not interested in any surgery to fix right TM perforation.     Past Medical History:   Diagnosis Date     Acquired hypothyroidism 5/12/2019     Asthma      Benign essential hypertension 5/12/2019     Centrilobular emphysema (H) 5/12/2019     Chronic rhinitis 8/16/2013     Chronic systolic congestive heart failure (H) 5/12/2019     Constipation      Coronary artery disease      Hearing loss      Heart trouble      Hemorrhagic cerebrovascular accident (CVA) (H) 01/2019     Hyperlipidemia 5/12/2019     Hypertension      Nasal congestion      Non-rheumatic mitral regurgitation 5/12/2019     Osteopenia 5/12/2019     Osteoporosis      Parathyroid related hypercalcemia (H) 8/18/2013     Primary hyperparathyroidism (H) 9/5/2013     Ringing in ears      Sensorineural hearing loss, asymmetrical 8/16/2013     Sneezing      Snoring      Stented coronary artery      Thyroid disease      Weakness      Weight loss         Allergies   Allergen Reactions     Evista [Raloxifene] Other (See Comments)     hypercalcemia     Prednisone GI Disturbance     Combigan [Brimonidine Tartrate-Timolol] Other (See Comments)     Eye swelling and itchy     Latex Rash     Levaquin [Levofloxacin] Muscle Pain (Myalgia)     Penicillins Rash     Restasis      Pt reports using eye gtts and having  red irritated eyes      Current Outpatient Medications   Medication     amLODIPine (NORVASC) 10 MG tablet     atorvastatin (LIPITOR) 40 MG tablet     calcium carbonate (TUMS) 500 MG chewable tablet     cetirizine 10 MG PO tablet     erythromycin (ROMYCIN) 5 MG/GM ophthalmic ointment     fluticasone (FLONASE) 50 MCG/ACT spray     furosemide (LASIX) 40 MG tablet     hypromellose (GENTEAL) 0.3 % SOLN ophthalmic solution     levothyroxine (SYNTHROID/LEVOTHROID) 75 MCG tablet     lisinopril (ZESTRIL) 5 MG tablet     metoprolol succinate ER (TOPROL-XL) 25 MG 24 hr tablet     mirtazapine (REMERON) 7.5 MG tablet     nitroGLYcerin (NITROSTAT) 0.4 MG sublingual tablet     pantoprazole (PROTONIX) 40 MG EC tablet     potassium chloride ER (KLOR-CON M) 20 MEQ CR tablet     sucralfate (CARAFATE) 1 GM tablet     Tafluprost (ZIOPTAN) 0.0015 % SOLN     torsemide (DEMADEX) 20 MG tablet     zafirlukast (ACCOLATE) 20 MG tablet     ZIOPTAN 0.0015 % SOLN ophthalmic solution     No current facility-administered medications for this visit.       ROS: 10 point ROS neg other than the symptoms noted above in the HPI.  Vital signs:  Temp: 97.4  F (36.3  C) Temp src: Tympanic BP: 126/50 Pulse: 79   Resp: 16 SpO2: 96 %     Height: 152.4 cm (5') Weight: 44.9 kg (99 lb)    General - The patient is well nourished and well developed, and appears to have good nutritional status.  Alert and oriented to person and place, interactive.  Head and Face - Normocephalic and atraumatic, with no gross asymmetry noted of the contour of the facial features.  The facial nerve is intact, with strong symmetric movements.  Neck- No palpable lymphadenopathy or thyroid mass.  Trachea is midline.  Eyes - Extraocular movements intact.   Ears- External ears normal. Ears examined under microscope. Left EAC with scant cerumen, removed with cerumen loop. NO FB noted.  TM intact without effusion/retraction. Right EAC with cerumen and debris, scant moisture along distal  canal. TM with stable/dry anterior central 5-10% perforation with healthy middle ear space; no polyps/granulation tissue.   Nose - Nasal mucosa is pink and moist with no abnormal mucus.  The septum was grossly midline and non-obstructive, turbinates of normal size and position.  No polyps, masses, or purulence noted on examination.  Mouth - Examination of the oral cavity shows pink, healthy, moist mucosa. Dentition in good condition.  No lesions or ulceration noted. The tongue is mobile and midline.    Throat - The walls of the oropharynx were smooth, pink, moist, symmetric, and had no lesions or ulcerations.  The tonsillar pillars and soft palate were symmetric.  The uvula was midline on elevation.         ASSESSMENT:    ICD-10-CM    1. Perforation of tympanic membrane, right  H72.91 ciprofloxacin (CILOXAN) 0.3 % ophthalmic solution   2. Otorrhea, right  H92.11 ciprofloxacin (CILOXAN) 0.3 % ophthalmic solution   3. Sensorineural hearing loss (SNHL) of both ears  H90.3    4. Wears hearing aid  Z97.4    5. Tobacco abuse counseling  Z71.6        Sent in Cipro drops for right ear due to debris/ canal irritation on exam. Complete for 5 days.   Complete ear drops- 4 drops twice a day for 5 days to right ear.     Right TM perforation stable with no chronic otorrhea or reported changes in hearing.  Continue to keep right ear dry.   She can follow-up on yearly basis for this, but recommend returning sooner as needed, especially if chronic otorrhea/hearing changes.     Continue with hearing aid use. Follow up at [facility] for audiogram and HAC.   Quit tobacco.     Tobacco cessation was strongly encouraged.  The associated risk of head and neck cancer was discussed.  Every year of cessation offers health benefits. This was discussed with the patient today and they voiced understanding.        Katelynn Winters PA-C  ENT  [facility], [place]  [phone]

## 2021-01-25 DIAGNOSIS — I50.22 CHRONIC SYSTOLIC CONGESTIVE HEART FAILURE (H): Chronic | ICD-10-CM

## 2021-01-25 DIAGNOSIS — E03.9 ACQUIRED HYPOTHYROIDISM: ICD-10-CM

## 2021-01-25 DIAGNOSIS — E87.6 HYPOKALEMIA: ICD-10-CM

## 2021-01-25 RX ORDER — LEVOTHYROXINE SODIUM 75 UG/1
75 TABLET ORAL DAILY
Qty: 90 TABLET | Refills: 1 | Status: SHIPPED | OUTPATIENT
Start: 2021-01-25 | End: 2021-08-03

## 2021-01-25 RX ORDER — METOPROLOL SUCCINATE 25 MG/1
25 TABLET, EXTENDED RELEASE ORAL DAILY
Qty: 90 TABLET | Refills: 1 | Status: SHIPPED | OUTPATIENT
Start: 2021-01-25 | End: 2021-08-03

## 2021-01-25 RX ORDER — POTASSIUM CHLORIDE 1500 MG/1
20 TABLET, EXTENDED RELEASE ORAL DAILY
Qty: 180 TABLET | Refills: 0 | Status: SHIPPED | OUTPATIENT
Start: 2021-01-25 | End: 2021-02-02

## 2021-01-25 NOTE — TELEPHONE ENCOUNTER
Levothyroxine      Last Written Prescription Date:  11/5/20  Last Fill Quantity: 90 ,   # refills: 1  Last Office Visit: 8/20/20    Metoprolol      Last Written Prescription Date:  11/5/20  Last Fill Quantity: 90,   # refills: 1  Last Office Visit: 8/20/20    Potassium      Last Written Prescription Date:  11/5/20  Last Fill Quantity: 180,   # refills: 0  Last Office Visit: 8/20/20  Future Office visit:    Next 5 appointments (look out 90 days)    Feb 23, 2021  1:45 PM  (Arrive by 1:30 PM)  Return Visit with [facility]ccart[facility] [address] [place] [address]  [phone]           Routing refill request to provider for review/approval because:

## 2021-02-01 ENCOUNTER — TELEPHONE (OUTPATIENT)
Dept: CARDIOLOGY | Facility: OTHER | Age: 81
End: 2021-02-01

## 2021-02-01 DIAGNOSIS — E87.6 HYPOKALEMIA: ICD-10-CM

## 2021-02-01 DIAGNOSIS — I50.22 CHRONIC SYSTOLIC CONGESTIVE HEART FAILURE (H): Chronic | ICD-10-CM

## 2021-02-01 NOTE — TELEPHONE ENCOUNTER
"Patient called today 2/1/2021 and I am sending this to CATALINA and Riccardo as I see she is on Riccardo's outreach. She has been having swelling in her Left Foot and has been happening for 2 months about, and she states it does not happen daily only when she is on her feet a lot. Patient has been weighing herself and no increase in wt. She is asking and states \" I was told I could get a water pill\". Please advise. FYI she states she is trying to get covid vaccine as well and writer will work on finding out if and when we will be distributing it here as writer is not aware of it at this time due to being on vacation.   "

## 2021-02-02 RX ORDER — POTASSIUM CHLORIDE 1500 MG/1
20 TABLET, EXTENDED RELEASE ORAL 2 TIMES DAILY
Qty: 180 TABLET | Refills: 0 | Status: SHIPPED | OUTPATIENT
Start: 2021-02-02 | End: 2021-02-16

## 2021-02-02 RX ORDER — FUROSEMIDE 40 MG
60 TABLET ORAL DAILY
Qty: 90 TABLET | Refills: 0 | Status: SHIPPED | OUTPATIENT
Start: 2021-02-02 | End: 2021-02-10

## 2021-02-02 NOTE — TELEPHONE ENCOUNTER
Clinic Care Coordination Contact  Care Team Conversations    Outreach to pateint regarding message below. Patient agrees with plan and will start taking lasix 60 mg daily and potassium 20 mEq twice per day. Will update CC on symptoms in a few days.     Riccardo TRISTAN RN  Cardiology Care Coordinator   [phone] Option #8

## 2021-02-02 NOTE — TELEPHONE ENCOUNTER
Hailee Cortez APRN CNP [place], Roberta, LPN 14 hours ago (5:01 PM)     Please have patient increase Lasix to 60 mg daily and increase potassium to 20 mew BID.   Thanks,   COCO Mccarty CNP    Message text

## 2021-02-04 ENCOUNTER — IMMUNIZATION (OUTPATIENT)
Dept: FAMILY MEDICINE | Facility: OTHER | Age: 81
End: 2021-02-04
Attending: FAMILY MEDICINE
Payer: MEDICARE

## 2021-02-04 PROCEDURE — 91300 PR COVID VAC PFIZER DIL RECON 30 MCG/0.3 ML IM: CPT

## 2021-02-08 DIAGNOSIS — I50.22 CHRONIC SYSTOLIC CONGESTIVE HEART FAILURE (H): Chronic | ICD-10-CM

## 2021-02-08 DIAGNOSIS — F51.01 PRIMARY INSOMNIA: ICD-10-CM

## 2021-02-08 DIAGNOSIS — R63.4 WEIGHT LOSS: ICD-10-CM

## 2021-02-10 RX ORDER — FUROSEMIDE 40 MG
TABLET ORAL
Qty: 90 TABLET | Refills: 0 | Status: SHIPPED | OUTPATIENT
Start: 2021-02-10 | End: 2021-05-18

## 2021-02-10 RX ORDER — MIRTAZAPINE 7.5 MG/1
TABLET, FILM COATED ORAL
Qty: 90 TABLET | Refills: 0 | Status: SHIPPED | OUTPATIENT
Start: 2021-02-10 | End: 2021-05-10

## 2021-02-10 NOTE — TELEPHONE ENCOUNTER
lasix  Just filled with 90 tabs      remeron      Last Written Prescription Date:  10/16/2020  Last Fill Quantity: 90,   # refills: 0  Last Office Visit: 11/17/2020  Future Office visit:    Next 5 appointments (look out 90 days)    Feb 16, 2021  1:15 PM  (Arrive by 1:00 PM)  Return Visit with [facility]OCO Mccartney [facility] [place] ([facility] ) [address] [place] [address]  [phone]

## 2021-02-16 ENCOUNTER — OFFICE VISIT (OUTPATIENT)
Dept: CARDIOLOGY | Facility: OTHER | Age: 81
End: 2021-02-16
Attending: NURSE PRACTITIONER
Payer: MEDICARE

## 2021-02-16 VITALS
BODY MASS INDEX: 20.31 KG/M2 | DIASTOLIC BLOOD PRESSURE: 56 MMHG | SYSTOLIC BLOOD PRESSURE: 124 MMHG | TEMPERATURE: 97.5 F | HEART RATE: 83 BPM | OXYGEN SATURATION: 95 % | WEIGHT: 104 LBS

## 2021-02-16 DIAGNOSIS — Z95.5 HISTORY OF CORONARY ARTERY STENT PLACEMENT: ICD-10-CM

## 2021-02-16 DIAGNOSIS — E78.5 HYPERLIPIDEMIA, UNSPECIFIED HYPERLIPIDEMIA TYPE: ICD-10-CM

## 2021-02-16 DIAGNOSIS — N18.31 STAGE 3A CHRONIC KIDNEY DISEASE (H): ICD-10-CM

## 2021-02-16 DIAGNOSIS — E87.6 DIURETIC-INDUCED HYPOKALEMIA: ICD-10-CM

## 2021-02-16 DIAGNOSIS — I50.22 CHRONIC SYSTOLIC CONGESTIVE HEART FAILURE (H): Primary | ICD-10-CM

## 2021-02-16 DIAGNOSIS — T50.2X5A DIURETIC-INDUCED HYPOKALEMIA: ICD-10-CM

## 2021-02-16 PROCEDURE — 93010 ELECTROCARDIOGRAM REPORT: CPT | Performed by: INTERNAL MEDICINE

## 2021-02-16 PROCEDURE — 99214 OFFICE O/P EST MOD 30 MIN: CPT | Performed by: NURSE PRACTITIONER

## 2021-02-16 PROCEDURE — G0463 HOSPITAL OUTPT CLINIC VISIT: HCPCS | Mod: 25

## 2021-02-16 PROCEDURE — 93005 ELECTROCARDIOGRAM TRACING: CPT

## 2021-02-16 RX ORDER — POTASSIUM CHLORIDE 1500 MG/1
20 TABLET, EXTENDED RELEASE ORAL DAILY
Qty: 180 TABLET | Refills: 0
Start: 2021-02-16 | End: 2021-07-27

## 2021-02-16 RX ORDER — FUROSEMIDE 20 MG
TABLET ORAL
Qty: 90 TABLET | Refills: 1 | Status: SHIPPED | OUTPATIENT
Start: 2021-02-16 | End: 2021-07-14

## 2021-02-16 ASSESSMENT — PAIN SCALES - GENERAL: PAINLEVEL: MODERATE PAIN (5)

## 2021-02-16 NOTE — NURSING NOTE
Chief Complaint   Patient presents with     Heart Problem       Initial /56   Pulse 83   Temp 97.5  F (36.4  C)   Wt 47.2 kg (104 lb)   SpO2 95%   BMI 20.31 kg/m   Estimated body mass index is 20.31 kg/m  as calculated from the following:    Height as of 1/7/21: 1.524 m (5').    Weight as of this encounter: 47.2 kg (104 lb).  Medication Reconciliation: complete  Jessa Behrman, LPN

## 2021-02-16 NOTE — PATIENT INSTRUCTIONS
Thank you for allowing Hailee Cortez and our team to participate in your care. Please call our office at [phone] with scheduling questions or if you need to cancel or change your appointment. With any other questions or concerns you may call Swathi cardiology nurse at [phone].       If you experience chest pain, chest pressure, chest tightness, shortness of breath, fainting, lightheadedness, nausea, vomiting, or other concerning symptoms, please report to the Emergency Department or call 911. These symptoms may be emergent, and best treated in the Emergency Department.

## 2021-02-16 NOTE — PROGRESS NOTES
[facility] HEART CARE   CARDIOLOGY PROGRESS NOTE    Lita Ocasio   [address] [facility] [address]      Ophelia Troncoso     Chief Complaint   Patient presents with     Heart Problem        HPI:   Ms. Ocasio is a 80 year old female who presents for cardiology follow-up to visit on 1/7/2020.  She is status post coronary angiography on 6/26/19 at [facility]. She was recently identified to have newly identified systolic heart failure.  Additionally, she has a history of hypertension, hyperlipidemia, nonrheumatic mitral regurgitation, central lobular emphysema, hypothyroidism, osteopenia, hyperparathyroidism, history of tobacco abuse and chronic rhinitis.    At initial visit patient was accompanied by her son who was very helpful in providing patients past medical history. He admits approximately 20 years ago patient was being followed for MR which was noted to be improved and therefore, vlave surgery was never recommended. He admits that she was previously told it was suspected she had small MI's without coronary intervention or identified ACS. Finally, recent history just before new years of 2020 acute hemorrhagic stroke. It was following this, that she was taken off her beta blocker and lisinopril for HTN and possible past MI, she reported increased shortness of breath following this which has progressively worsened and likely multifactorial with COPD and HFrEF.     6/4/2019-Lexiscan stress test with abnormal imaging with reversible ischemia laterally and LVEF 36%. Additionally, in review of past CT chest imaging from one year ago, she was noted to have advanced atherosclerotic disease with bulky coronary and aortic calcifications. Given these findings, current symptoms and new acute systolic failure, recommended coronary angiography.    Patient underwent coronary angiography on 6/26/19 at [facility]. She was identified to have diffuse coronary atherosclerosis with single vessel obstructive CAD, ostial and mid LCX.  "Successful PCI with synergy LISS x2, mid LCX 2.5x16mm, ostial LCX 3.0 x 16 mm. LVEDP 26-29 mmHg. During revascularization she was identified to have AF for which she received DCCV x1 with return to sinus and then reverted back to AF. She was treated with IV beta blocker for rate control. Patient reported feeling good following coronary intervention. Breathing had significantly improved and increased energy.     Today patient reports feeling good. No anginal symptoms or MILLS.  No orthopnea or PND.  No palpitations or lightheadedness. No increased edema and weight has remained stable. She is mostly bothered by back pain and \"ulcer\". Repeat TTE on 12/5/2019 with recovered systolic LV function.  Her LVEF had improved to 60 to 65% with normal global and regional LV function.      PAST MEDICAL HISTORY:   Past Medical History:   Diagnosis Date     Acquired hypothyroidism 5/12/2019     Asthma      Benign essential hypertension 5/12/2019     Centrilobular emphysema (H) 5/12/2019     Chronic rhinitis 8/16/2013     Chronic systolic congestive heart failure (H) 5/12/2019     Constipation      Coronary artery disease      Hearing loss      Heart trouble      Hemorrhagic cerebrovascular accident (CVA) (H) 01/2019     Hyperlipidemia 5/12/2019     Hypertension      Nasal congestion      Non-rheumatic mitral regurgitation 5/12/2019     Osteopenia 5/12/2019     Osteoporosis      Parathyroid related hypercalcemia (H) 8/18/2013     Primary hyperparathyroidism (H) 9/5/2013     Ringing in ears      Sensorineural hearing loss, asymmetrical 8/16/2013     Sneezing      Snoring      Stented coronary artery      Thyroid disease      Weakness      Weight loss           FAMILY HISTORY:   Family History   Problem Relation Age of Onset     Hearing Loss Mother      Heart Disease Mother      Myocardial Infarction Mother      Cerebrovascular Disease Father      Cancer Brother         throat     Cancer Sister      Myocardial Infarction Sister      " Cancer Maternal Grandmother      Heart Disease Maternal Grandfather      Aortic aneurysm Son           PAST SURGICAL HISTORY:   Past Surgical History:   Procedure Laterality Date     CATARACT IOL, RT/LT Bilateral      COLONOSCOPY       COLONOSCOPY - HIM SCAN  2009    Colonoscopy - [facility]       ENDOSCOPY UPPER, COLONOSCOPY, COMBINED N/A 10/17/2019    Procedure: UPPER ENDOSCOPY WITH BIOPSY  AND COLONOSCOPY;  Surgeon: Julio Canales MD;  Location: [facility] OR     ENT SURGERY      para thyroid surgery     ENT SURGERY  2013    Parathyroid     ESOPHAGOSCOPY, GASTROSCOPY, DUODENOSCOPY (EGD), COMBINED N/A 2019    Procedure: ESOPHAGOGASTRODUODENOSCOPY (EGD);  Surgeon: Julio Canales MD;  Location: [facility] OR     ESOPHAGOSCOPY, GASTROSCOPY, DUODENOSCOPY (EGD), COMBINED N/A 2020    Procedure: ESOPHAGOGASTRODUODENOSCOPY (EGD) WITH BIOPSY;  Surgeon: Isrrael Parish MD;  Location: [facility] OR     ESOPHAGOSCOPY, GASTROSCOPY, DUODENOSCOPY (EGD), COMBINED N/A 2020    Procedure: ESOPHAGOGASTRODUODENOSCOPY (EGD) WITH BIOPSY;  Surgeon: Pedro Luis Montoya DO;  Location: [facility] OR     PARATHYROIDECTOMY  9/10/2013    Procedure: PARATHYROIDECTOMY;  Reoperative Neck Exploration, Resection of right Parathyroid adenoma;  Surgeon: Thalia Muller MD;  Location: [facility] OR     TONSILLECTOMY       TUBAL LIGATION            SOCIAL HISTORY:   Social History     Socioeconomic History     Marital status:      Spouse name: None     Number of children: None     Years of education: None     Highest education level: None   Occupational History     None   Social Needs     Financial resource strain: None     Food insecurity:     Worry: None     Inability: None     Transportation needs:     Medical: None     Non-medical: None   Tobacco Use     Smoking status: Former Smoker     Packs/day: 1.00     Years: 50.00     Pack years: 50.00     Types: Cigarettes     Last attempt to quit: 2019     Years since quittin.4      Smokeless tobacco: Never Used   Substance and Sexual Activity     Alcohol use: Yes     Comment: social     Drug use: No     Sexual activity: None   Lifestyle     Physical activity:     Days per week: None     Minutes per session: None     Stress: None   Relationships     Social connections:     Talks on phone: None     Gets together: None     Attends Mandaeism service: None     Active member of club or organization: None     Attends meetings of clubs or organizations: None     Relationship status: None     Intimate partner violence:     Fear of current or ex partner: None     Emotionally abused: None     Physically abused: None     Forced sexual activity: None   Other Topics Concern     Parent/sibling w/ CABG, MI or angioplasty before 65F 55M? Not Asked   Social History Narrative     None          CURRENT MEDICATIONS:   Prior to Admission medications    Medication Sig Start Date End Date Taking? Authorizing Provider   albuterol (PROAIR RESPICLICK) 108 (90 Base) MCG/ACT inhaler Inhale 2 puffs into the lungs every 4 hours as needed for shortness of breath / dyspnea or wheezing 4/24/19  Yes Joe Jerez MD   aspirin 81 MG tablet Take 1 tablet by mouth daily   Yes Reported, Patient   busPIRone (BUSPAR) 5 MG tablet Take 1 tablet (5 mg) by mouth 2 times daily 5/29/19  Yes Ophelia Troncoso MD   calcium carbonate (TUMS) 500 MG chewable tablet Take 1 chew tab by mouth daily   Yes Reported, Patient   Fexofenadine HCl (ALLEGRA PO) Take 180 mg by mouth daily    Yes Unknown, Entered By History   fluticasone (FLONASE) 50 MCG/ACT spray Spray 2 sprays into both nostrils daily 9/19/18  Yes Gali Dominguez APRN CNP   furosemide (LASIX) 20 MG tablet Take 1 tablet (20 mg) by mouth 2 times daily 5/31/19  Yes Ophelia Troncoso MD   ipratropium - albuterol 0.5 mg/2.5 mg/3 mL (DUONEB) 0.5-2.5 (3) MG/3ML neb solution Take 1 vial by nebulization every 6 hours as needed for shortness of breath / dyspnea or wheezing   Yes  Reported, Patient   LEVOTHYROXINE SODIUM PO Take 75 mcg by mouth daily   Yes Reported, Patient   LORazepam (ATIVAN) 0.5 MG tablet Take 0.5 tablets (0.25 mg) by mouth every 6 hours as needed for anxiety or sleep 5/14/19  Yes Mindy Ware NP   NONFORMULARY Tumeric 500mg BID   Yes Reported, Patient   potassium chloride ER (K-DUR/KLOR-CON M) 20 MEQ CR tablet Take 1 tablet (20 mEq) by mouth 2 times daily 5/23/19  Yes Ophelia Troncoso MD   sertraline (ZOLOFT) 25 MG tablet Take 1 tablet (25 mg) by mouth daily 5/15/19  Yes Mindy Ware NP   SIMVASTATIN PO Take 20 mg by mouth At Bedtime   Yes Reported, Patient   Tafluprost (ZIOPTAN) 0.0015 % SOLN Place 1 drop into both eyes At Bedtime   Yes Reported, Patient   vitamin B complex with vitamin C (VITAMIN  B COMPLEX) tablet Take 1 tablet by mouth daily Takes 500mg of Vitamin B   Yes Reported, Patient   Vitamin D, Cholecalciferol, 1000 units TABS Take 1 tablet by mouth daily   Yes Reported, Patient   vitamin E 400 units TABS Take by mouth daily   Yes Reported, Patient   Zafirlukast (ACCOLATE PO) Take 20 mg by mouth 2 times daily (before meals)    Yes Reported, Patient   CLINDAMYCIN HCL PO Take 600 mg by mouth 1 hour prior to dental work    Reported, Patient          ALLERGIES:   Allergies   Allergen Reactions     Evista [Raloxifene] Other (See Comments)     hypercalcemia     Prednisone GI Disturbance     Combigan [Brimonidine Tartrate-Timolol] Other (See Comments)     Eye swelling and itchy     Latex Rash     Levaquin [Levofloxacin] Muscle Pain (Myalgia)     Penicillins Rash     Restasis      Pt reports using eye gtts and having red irritated eyes      ROS:   CONSTITUTIONAL: No reported fever or chills. No weight gain.  ENT: No visual disturbance, ear ache, epistaxis or sore throat.   CARDIOVASCULAR: No chest pain, chest pressure or chest discomfort. No palpitations. No increase in lower extremity edema.   RESPIRATORY: Positive for chronic shortness of breath   with COPD, no increased MILLS. No reported cough, wheezing or hemoptysis. No reports of orthopnea or PND.  GI: No reported abdominal pain or distension. No reported melena or hematochezia.  : No reported hematuria or dysuria.   NEUROLOGICAL: No lightheadedness or dizziness. No syncope. No ataxia, paresthesias or weakness.   MUSCULOSKELETAL: No reported new joint pain or swelling, no muscle pain.  ENDOCRINOLOGIC: No temperature intolerance. No hair or skin changes.  SKIN: No abnormal rashes or sores, no unusual itching.  PSYCHIATRIC: Positive for anxiety.      PHYSICAL EXAM:   /56   Pulse 83   Temp 97.5  F (36.4  C)   Wt 47.2 kg (104 lb)   SpO2 95%   BMI 20.31 kg/m    GENERAL: The patient is a well-developed, well-nourished, in no apparent distress.  HEENT: Head is normocephalic and atraumatic. Eyes are symmetrical with normal visual tracking. No icterus, no xanthelasmas. Nares appeared normal without nasal drainage. Mucous membranes are moist, no cyanosis.  NECK: Supple. No JVP visible.   CHEST/ LUNGS: Lungs sounds diminished to auscultation. No rales, rhonchi or wheezes, no use of accessory muscles, no retractions, respirations unlabored and normal respiratory rate.   CARDIO: Regular rate and rhythm normal with S1 and S2, no S3 or S4 and no murmur, click or rub.   ABD: Abdomen is nondistended.   EXTREMITIES: No LE edema present.   MUSCULOSKELETAL: No visible joint swelling.   NEUROLOGIC: Alert and oriented X3. Normal speech, gait and affect. No focal neurologic deficits.   SKIN: No jaundice. No rashes or visible skin lesions present. No ecchymosis.     LAB RESULTS:   Orders Only on 05/22/2019   Component Date Value Ref Range Status     Sodium 05/22/2019 139  133 - 144 mmol/L Final     Potassium 05/22/2019 3.3* 3.4 - 5.3 mmol/L Final     Chloride 05/22/2019 105  94 - 109 mmol/L Final     Carbon Dioxide 05/22/2019 25  20 - 32 mmol/L Final     Anion Gap 05/22/2019 9  3 - 14 mmol/L Final     Glucose  05/22/2019 90  70 - 99 mg/dL Final     Urea Nitrogen 05/22/2019 15  7 - 30 mg/dL Final     Creatinine 05/22/2019 1.16* 0.52 - 1.04 mg/dL Final     GFR Estimate 05/22/2019 45* >60 mL/min/[1.73_m2] Final     GFR Estimate If Black 05/22/2019 52* >60 mL/min/[1.73_m2] Final     Calcium 05/22/2019 8.6  8.5 - 10.1 mg/dL Final     N-Terminal Pro Bnp 05/22/2019 3,726* 0 - 450 pg/mL Final   Office Visit on 05/17/2019   Component Date Value Ref Range Status     Sodium 05/17/2019 141  133 - 144 mmol/L Final     Potassium 05/17/2019 3.6  3.4 - 5.3 mmol/L Final     Chloride 05/17/2019 108  94 - 109 mmol/L Final     Carbon Dioxide 05/17/2019 27  20 - 32 mmol/L Final     Anion Gap 05/17/2019 6  3 - 14 mmol/L Final     Glucose 05/17/2019 84  70 - 99 mg/dL Final     Urea Nitrogen 05/17/2019 18  7 - 30 mg/dL Final     Creatinine 05/17/2019 1.08* 0.52 - 1.04 mg/dL Final     GFR Estimate 05/17/2019 49* >60 mL/min/[1.73_m2] Final     GFR Estimate If Black 05/17/2019 57* >60 mL/min/[1.73_m2] Final     Calcium 05/17/2019 9.7  8.5 - 10.1 mg/dL Final     WBC 05/17/2019 8.4  4.0 - 11.0 10e9/L Final     RBC Count 05/17/2019 5.04  3.8 - 5.2 10e12/L Final     Hemoglobin 05/17/2019 14.8  11.7 - 15.7 g/dL Final     Hematocrit 05/17/2019 44.0  35.0 - 47.0 % Final     MCV 05/17/2019 87  78 - 100 fl Final     MCH 05/17/2019 29.4  26.5 - 33.0 pg Final     MCHC 05/17/2019 33.6  31.5 - 36.5 g/dL Final     RDW 05/17/2019 13.4  10.0 - 15.0 % Final     Platelet Count 05/17/2019 310  150 - 450 10e9/L Final     Diff Method 05/17/2019 Automated Method   Final     % Neutrophils 05/17/2019 64.6  % Final     % Lymphocytes 05/17/2019 19.8  % Final     % Monocytes 05/17/2019 10.1  % Final     % Eosinophils 05/17/2019 4.4  % Final     % Basophils 05/17/2019 0.7  % Final     % Immature Granulocytes 05/17/2019 0.4  % Final     Nucleated RBCs 05/17/2019 0  0 /100 Final     Absolute Neutrophil 05/17/2019 5.4  1.6 - 8.3 10e9/L Final     Absolute Lymphocytes 05/17/2019  "1.7  0.8 - 5.3 10e9/L Final     Absolute Monocytes 05/17/2019 0.8  0.0 - 1.3 10e9/L Final     Absolute Eosinophils 05/17/2019 0.4  0.0 - 0.7 10e9/L Final     Absolute Basophils 05/17/2019 0.1  0.0 - 0.2 10e9/L Final     Abs Immature Granulocytes 05/17/2019 0.0  0 - 0.4 10e9/L Final     Absolute Nucleated RBC 05/17/2019 0.0   Final     TSH 05/17/2019 2.79  0.40 - 4.00 mU/L Final     N-Terminal Pro Bnp 05/17/2019 5,830* 0 - 450 pg/mL Final          ASSESSMENT:   Lita Ocasio presents for cardiology follow-up to visit on 1/7/2020.  She is status post coronary angiography on 6/26/19 at [facility]. She was recently identified to have newly identified systolic heart failure.  Additionally, she has a history of hypertension, hyperlipidemia, nonrheumatic mitral regurgitation, central lobular emphysema, hypothyroidism, osteopenia, hyperparathyroidism, history of tobacco abuse and chronic rhinitis.  Today patient reports feeling good. No anginal symptoms or MILLS.  No orthopnea or PND.  No palpitations or lightheadedness. No increased edema and weight has remained stable. She is mostly bothered by back pain and \"ulcer\". Repeat TTE on 12/5/2019 with recovered systolic LV function.  Her LVEF had improved to 60 to 65% with normal global and regional LV function.        PLAN:  1. Chronic systolic congestive heart failure (H)- LVEF Recovered  Ischemic cardiomyopathy, HFrEF with LVEF previously 36%, NYHA FC II  Repeat TTE on 12/5/19 with recovered LVEF at 60-65%.  Continue with Toprol XL 25 mg daily and ACEi- Lisinopril 5 mg daily.  Continue on Lasix 60 mg daily. Renal function and potassium have remained stable. She is taking potassium 20 meq daily.    - furosemide (LASIX) 20 MG tablet; Take one 20 mg tab with one 40 mg tab for a total dose of 60 mg every morning  Dispense: 90 tablet; Refill: 1  - EKG 12-lead complete w/read - (Clinic Performed)    2. Diuretic-induced hypokalemia  See above.     - potassium chloride ER (KLOR-CON M) " 20 MEQ CR tablet; Take 1 tablet (20 mEq) by mouth daily  Dispense: 180 tablet; Refill: 0    3. Stage 3a chronic kidney disease  Stable.     4. History of coronary artery stent placement (6/26/19)  No anginal symptoms endorsed.   Successful PCI with synergy LISS x2, mid LCX 2.5x16mm, ostial LCX 3.0 x 16 mm.   Continue on ASA indefinitely.  Continue with high intensity statin.     - EKG 12-lead complete w/read - (Clinic Performed)    5. Hyperlipidemia, unspecified hyperlipidemia type      Thank you for allowing me to participate in the care of your patient. Please do not hesitate to contact me if you have any questions.     Hailee Cortez

## 2021-02-25 ENCOUNTER — IMMUNIZATION (OUTPATIENT)
Dept: FAMILY MEDICINE | Facility: OTHER | Age: 81
End: 2021-02-25
Attending: FAMILY MEDICINE
Payer: MEDICARE

## 2021-02-25 PROCEDURE — 91300 PR COVID VAC PFIZER DIL RECON 30 MCG/0.3 ML IM: CPT

## 2021-03-02 ENCOUNTER — TELEPHONE (OUTPATIENT)
Dept: FAMILY MEDICINE | Facility: OTHER | Age: 81
End: 2021-03-02

## 2021-03-02 DIAGNOSIS — H90.3 BILATERAL SENSORINEURAL HEARING LOSS: Primary | ICD-10-CM

## 2021-03-02 NOTE — TELEPHONE ENCOUNTER
Pt called, requesting referral for [facility] in [place] for her hearing aide. Phone number for [facility] is [phone]. Fax- [phone]

## 2021-03-05 NOTE — TELEPHONE ENCOUNTER
[facility] in [place] calling and state they have not received the order for hearing test. Could this please be re faxed to [phone]. States patient's appointment is scheduled for Monday. Thank you.

## 2021-03-19 ENCOUNTER — TELEPHONE (OUTPATIENT)
Dept: FAMILY MEDICINE | Facility: OTHER | Age: 81
End: 2021-03-19

## 2021-03-19 DIAGNOSIS — I34.0 NON-RHEUMATIC MITRAL REGURGITATION: Primary | Chronic | ICD-10-CM

## 2021-03-19 RX ORDER — CLINDAMYCIN HCL 300 MG
600 CAPSULE ORAL
Qty: 2 CAPSULE | Refills: 0 | Status: SHIPPED | OUTPATIENT
Start: 2021-03-19 | End: 2023-11-07

## 2021-03-19 NOTE — TELEPHONE ENCOUNTER
Pt called, requesting script for antibiotics prior to dental exam. Reports that she is unsure why she needs antibiotics before dental work but states that she does have some heart problems. Allergies to penicillins and levaquin. Please advise. Thank you!

## 2021-03-22 DIAGNOSIS — I50.22 CHRONIC SYSTOLIC CONGESTIVE HEART FAILURE (H): Chronic | ICD-10-CM

## 2021-03-23 RX ORDER — LISINOPRIL 5 MG/1
TABLET ORAL
Qty: 90 TABLET | Refills: 0 | Status: SHIPPED | OUTPATIENT
Start: 2021-03-23 | End: 2021-04-12

## 2021-03-23 NOTE — TELEPHONE ENCOUNTER
Lisinopril 5 mg      Last Written Prescription Date:  12-  Last Fill Quantity: 90,   # refills: 0  Last Office Visit: 11-

## 2021-03-24 ENCOUNTER — NURSE TRIAGE (OUTPATIENT)
Dept: NURSING | Facility: CLINIC | Age: 81
End: 2021-03-24

## 2021-03-24 DIAGNOSIS — E78.5 HYPERLIPIDEMIA, UNSPECIFIED HYPERLIPIDEMIA TYPE: ICD-10-CM

## 2021-03-24 DIAGNOSIS — Z95.5 S/P DRUG ELUTING CORONARY STENT PLACEMENT: ICD-10-CM

## 2021-03-24 NOTE — TELEPHONE ENCOUNTER
"[facility] pharmacy- [place], [place] is calling    Reports they requested a refill of Atorvastatin about one week ago.   They haven't received the prescription yet.   They are requesting a refill be sent as soon as possible. Patient is out of the medication.     Fax # is [phone]    Will refill request to the clinic.     Valery Rodriguez RN/[facility]      Reason for Disposition    Caller requesting a NON-URGENT new prescription or refill and triager unable to refill per unit policy    Additional Information    Negative: Drug overdose and nurse unable to answer question    Negative: Caller requesting information not related to medicine    Negative: Caller requesting a prescription for Strep throat and has a positive culture result    Negative: Rash while taking a medication or within 3 days of stopping it    Negative: Immunization reaction suspected    Negative: [1] Asthma AND [2] having symptoms of asthma (cough, wheezing, etc)    Negative: MORE THAN A DOUBLE DOSE of a prescription or over-the-counter (OTC) drug    Negative: [1] DOUBLE DOSE (an extra dose or lesser amount) of over-the-counter (OTC) drug AND [2] any symptoms (e.g., dizziness, nausea, pain, sleepiness)    Negative: [1] DOUBLE DOSE (an extra dose or lesser amount) of prescription drug AND [2] any symptoms (e.g., dizziness, nausea, pain, sleepiness)    Negative: Took another person's prescription drug    Negative: [1] DOUBLE DOSE (an extra dose or lesser amount) of prescription drug AND [2] NO symptoms (Exception: a double dose of antibiotics)    Negative: Diabetes drug error or overdose (e.g., insulin or extra dose)    Negative: [1] Request for URGENT new prescription or refill of \"essential\" medication (i.e., likelihood of harm to patient if not taken) AND [2] triager unable to fill per unit policy    Negative: [1] Prescription not at pharmacy AND [2] was prescribed today by PCP    Negative: Pharmacy calling " with prescription questions and triager unable to answer question    Negative: Caller has urgent medication question about med that PCP prescribed and triager unable to answer question    Negative: Caller has NON-URGENT medication question about med that PCP prescribed and triager unable to answer question    Protocols used: MEDICATION QUESTION CALL-A-AH

## 2021-03-25 RX ORDER — ATORVASTATIN CALCIUM 40 MG/1
40 TABLET, FILM COATED ORAL AT BEDTIME
Qty: 90 TABLET | Refills: 0 | Status: SHIPPED | OUTPATIENT
Start: 2021-03-25 | End: 2021-06-22

## 2021-03-28 ENCOUNTER — HEALTH MAINTENANCE LETTER (OUTPATIENT)
Age: 81
End: 2021-03-28

## 2021-03-30 ENCOUNTER — TELEPHONE (OUTPATIENT)
Dept: FAMILY MEDICINE | Facility: OTHER | Age: 81
End: 2021-03-30

## 2021-03-30 DIAGNOSIS — Z95.5 S/P DRUG ELUTING CORONARY STENT PLACEMENT: ICD-10-CM

## 2021-03-30 DIAGNOSIS — E78.5 HYPERLIPIDEMIA, UNSPECIFIED HYPERLIPIDEMIA TYPE: ICD-10-CM

## 2021-03-30 RX ORDER — ATORVASTATIN CALCIUM 40 MG/1
40 TABLET, FILM COATED ORAL AT BEDTIME
Qty: 90 TABLET | Refills: 0 | Status: CANCELLED | OUTPATIENT
Start: 2021-03-30

## 2021-04-07 ENCOUNTER — TRANSFERRED RECORDS (OUTPATIENT)
Dept: HEALTH INFORMATION MANAGEMENT | Facility: CLINIC | Age: 81
End: 2021-04-07

## 2021-04-12 DIAGNOSIS — I50.22 CHRONIC SYSTOLIC CONGESTIVE HEART FAILURE (H): Chronic | ICD-10-CM

## 2021-04-12 DIAGNOSIS — K92.2 ACUTE UPPER GI BLEED: ICD-10-CM

## 2021-04-12 NOTE — TELEPHONE ENCOUNTER
Lisinopril 5 mg      Last Written Prescription Date:  3/23/21  Last Fill Quantity: 90,   # refills: 0  Last Office Visit: 11/17/20  Future Office visit:    Next 5 appointments (look out 90 days)    May 11, 2021  9:00 AM  (Arrive by 8:45 AM)  SHORT with Ophelia Troncoso MD  [facility] [place] ([facility] [place] ) [address]  [place] [address]  [phone]           Routing refill request to provider for review/approval because:      Carafate 1 mg     Last Written Prescription Date:  9/14/20  Last Fill Quantity: 360,   # refills: 1  Last Office Visit: 11/17/20  Future Office visit:    Next 5 appointments (look out 90 days)    May 11, 2021  9:00 AM  (Arrive by 8:45 AM)  SHORT with Ophelia Troncoso MD  [facility] ([facility] [place] ) [address]  [place] [address]  [phone]           Routing refill request to provider for review/approval because:

## 2021-04-13 RX ORDER — LISINOPRIL 5 MG/1
5 TABLET ORAL DAILY
Qty: 90 TABLET | Refills: 0 | Status: SHIPPED | OUTPATIENT
Start: 2021-04-13 | End: 2021-10-12

## 2021-04-13 RX ORDER — SUCRALFATE 1 G/1
TABLET ORAL
Qty: 360 TABLET | Refills: 1 | Status: SHIPPED | OUTPATIENT
Start: 2021-04-13 | End: 2022-04-06

## 2021-05-10 DIAGNOSIS — R63.4 WEIGHT LOSS: ICD-10-CM

## 2021-05-10 DIAGNOSIS — F51.01 PRIMARY INSOMNIA: ICD-10-CM

## 2021-05-10 RX ORDER — MIRTAZAPINE 7.5 MG/1
TABLET, FILM COATED ORAL
Qty: 90 TABLET | Refills: 0 | Status: SHIPPED | OUTPATIENT
Start: 2021-05-10 | End: 2021-06-15

## 2021-05-10 NOTE — TELEPHONE ENCOUNTER
Having a procedure on 10/17/19 has a question on her Plavix and child Aspirin. They took her off of Plavix and now they put her back on it. Please advise. She got a call from the hospital.  
Voicemail messages received from both Darcy in surgery education and the patient about Plavix and Aspirin.     Darcy's message indicated that she has discussed this patient with her cardiology office and was told that this patient can NOT stop plavix and aspirin until 12/26/19 when she is re-evaluated.     Returned call to Darcy who indicates that she has already spoken with Dr. Canales who says that patient can proceed with upper endoscopy and colonoscopy and that she has already discussed with with patient.   
86

## 2021-05-17 DIAGNOSIS — I50.22 CHRONIC SYSTOLIC CONGESTIVE HEART FAILURE (H): Chronic | ICD-10-CM

## 2021-05-18 RX ORDER — FUROSEMIDE 40 MG
TABLET ORAL
Qty: 90 TABLET | Refills: 0 | Status: SHIPPED | OUTPATIENT
Start: 2021-05-18 | End: 2021-07-27

## 2021-05-18 NOTE — TELEPHONE ENCOUNTER
Lasix      Last Written Prescription Date:  2/16/21  Last Fill Quantity: 90,   # refills: 1  Last Office Visit: 2/16/21  Future Office visit:    Next 5 appointments (look out 90 days)    Otby 15, 2021  4:00 PM  (Arrive by 3:45 PM)  SHORT with Ophelia Troncoso MD  [facility] [place] ([facility] [place] ) [address]  [place] [address]  [phone]           Routing refill request to provider for review/approval because:

## 2021-06-15 ENCOUNTER — OFFICE VISIT (OUTPATIENT)
Dept: FAMILY MEDICINE | Facility: OTHER | Age: 81
End: 2021-06-15
Attending: FAMILY MEDICINE
Payer: MEDICARE

## 2021-06-15 VITALS
BODY MASS INDEX: 19.26 KG/M2 | HEART RATE: 74 BPM | OXYGEN SATURATION: 98 % | SYSTOLIC BLOOD PRESSURE: 164 MMHG | DIASTOLIC BLOOD PRESSURE: 58 MMHG | WEIGHT: 98.6 LBS

## 2021-06-15 DIAGNOSIS — E53.8 VITAMIN B12 DEFICIENCY (NON ANEMIC): ICD-10-CM

## 2021-06-15 DIAGNOSIS — E55.9 VITAMIN D DEFICIENCY: ICD-10-CM

## 2021-06-15 DIAGNOSIS — E03.9 ACQUIRED HYPOTHYROIDISM: Chronic | ICD-10-CM

## 2021-06-15 DIAGNOSIS — Z12.31 ENCOUNTER FOR SCREENING MAMMOGRAM FOR BREAST CANCER: ICD-10-CM

## 2021-06-15 DIAGNOSIS — E78.5 HYPERLIPIDEMIA, UNSPECIFIED HYPERLIPIDEMIA TYPE: Chronic | ICD-10-CM

## 2021-06-15 DIAGNOSIS — N18.31 STAGE 3A CHRONIC KIDNEY DISEASE (H): ICD-10-CM

## 2021-06-15 DIAGNOSIS — I50.22 CHRONIC SYSTOLIC CONGESTIVE HEART FAILURE (H): Primary | Chronic | ICD-10-CM

## 2021-06-15 LAB
ALBUMIN SERPL-MCNC: 3.7 G/DL (ref 3.4–5)
ALP SERPL-CCNC: 91 U/L (ref 40–150)
ALT SERPL W P-5'-P-CCNC: 16 U/L (ref 0–50)
ANION GAP SERPL CALCULATED.3IONS-SCNC: 7 MMOL/L (ref 3–14)
AST SERPL W P-5'-P-CCNC: 14 U/L (ref 0–45)
BASOPHILS # BLD AUTO: 0.1 10E9/L (ref 0–0.2)
BASOPHILS NFR BLD AUTO: 0.6 %
BILIRUB SERPL-MCNC: 0.3 MG/DL (ref 0.2–1.3)
BUN SERPL-MCNC: 20 MG/DL (ref 7–30)
CALCIUM SERPL-MCNC: 8.1 MG/DL (ref 8.5–10.1)
CHLORIDE SERPL-SCNC: 111 MMOL/L (ref 94–109)
CHOLEST SERPL-MCNC: 136 MG/DL
CO2 SERPL-SCNC: 23 MMOL/L (ref 20–32)
CREAT SERPL-MCNC: 1.63 MG/DL (ref 0.52–1.04)
DIFFERENTIAL METHOD BLD: ABNORMAL
EOSINOPHIL # BLD AUTO: 0.2 10E9/L (ref 0–0.7)
EOSINOPHIL NFR BLD AUTO: 1.9 %
ERYTHROCYTE [DISTWIDTH] IN BLOOD BY AUTOMATED COUNT: 14.6 % (ref 10–15)
GFR SERPL CREATININE-BSD FRML MDRD: 29 ML/MIN/{1.73_M2}
GLUCOSE SERPL-MCNC: 89 MG/DL (ref 70–99)
HCT VFR BLD AUTO: 32.3 % (ref 35–47)
HDLC SERPL-MCNC: 54 MG/DL
HGB BLD-MCNC: 10 G/DL (ref 11.7–15.7)
IMM GRANULOCYTES # BLD: 0 10E9/L (ref 0–0.4)
IMM GRANULOCYTES NFR BLD: 0.4 %
LDLC SERPL CALC-MCNC: 61 MG/DL
LYMPHOCYTES # BLD AUTO: 1.9 10E9/L (ref 0.8–5.3)
LYMPHOCYTES NFR BLD AUTO: 23.1 %
MCH RBC QN AUTO: 24.7 PG (ref 26.5–33)
MCHC RBC AUTO-ENTMCNC: 31 G/DL (ref 31.5–36.5)
MCV RBC AUTO: 80 FL (ref 78–100)
MONOCYTES # BLD AUTO: 0.7 10E9/L (ref 0–1.3)
MONOCYTES NFR BLD AUTO: 8.9 %
NEUTROPHILS # BLD AUTO: 5.4 10E9/L (ref 1.6–8.3)
NEUTROPHILS NFR BLD AUTO: 65.1 %
NONHDLC SERPL-MCNC: 82 MG/DL
NRBC # BLD AUTO: 0 10*3/UL
NRBC BLD AUTO-RTO: 0 /100
PLATELET # BLD AUTO: 375 10E9/L (ref 150–450)
POTASSIUM SERPL-SCNC: 3.1 MMOL/L (ref 3.4–5.3)
PROT SERPL-MCNC: 7.3 G/DL (ref 6.8–8.8)
RBC # BLD AUTO: 4.05 10E12/L (ref 3.8–5.2)
SODIUM SERPL-SCNC: 141 MMOL/L (ref 133–144)
TRIGL SERPL-MCNC: 105 MG/DL
TSH SERPL DL<=0.005 MIU/L-ACNC: 0.8 MU/L (ref 0.4–4)
WBC # BLD AUTO: 8.3 10E9/L (ref 4–11)

## 2021-06-15 PROCEDURE — 99214 OFFICE O/P EST MOD 30 MIN: CPT | Performed by: FAMILY MEDICINE

## 2021-06-15 PROCEDURE — 82607 VITAMIN B-12: CPT | Mod: ZL | Performed by: FAMILY MEDICINE

## 2021-06-15 PROCEDURE — 80053 COMPREHEN METABOLIC PANEL: CPT | Mod: ZL | Performed by: FAMILY MEDICINE

## 2021-06-15 PROCEDURE — 84443 ASSAY THYROID STIM HORMONE: CPT | Mod: ZL | Performed by: FAMILY MEDICINE

## 2021-06-15 PROCEDURE — 85025 COMPLETE CBC W/AUTO DIFF WBC: CPT | Mod: ZL | Performed by: FAMILY MEDICINE

## 2021-06-15 PROCEDURE — 80061 LIPID PANEL: CPT | Mod: ZL | Performed by: FAMILY MEDICINE

## 2021-06-15 PROCEDURE — 82306 VITAMIN D 25 HYDROXY: CPT | Mod: ZL | Performed by: FAMILY MEDICINE

## 2021-06-15 PROCEDURE — G0463 HOSPITAL OUTPT CLINIC VISIT: HCPCS | Performed by: FAMILY MEDICINE

## 2021-06-15 PROCEDURE — 36415 COLL VENOUS BLD VENIPUNCTURE: CPT | Mod: ZL | Performed by: FAMILY MEDICINE

## 2021-06-15 ASSESSMENT — PAIN SCALES - GENERAL: PAINLEVEL: NO PAIN (0)

## 2021-06-15 NOTE — PROGRESS NOTES
Assessment & Plan     Fatigue / Acquired hypothyroidism  Recheck TSH, vitamin levels below. STOP remeron.   - TSH with free T4 reflex    Chronic systolic congestive heart failure (H) / Stage 3a chronic kidney disease  Ongoing L>R edema (dvt US in the past negative, chronic). Continue current lasix dose. Increase potassium to bid. Recheck renal function in 2 weeks as this has progressed.   - CBC with platelets and differential  - Comprehensive metabolic panel (BMP + Alb, Alk Phos, ALT, AST, Total. Bili, TP)    Hyperlipidemia, unspecified hyperlipidemia type  Rechekc  - Lipid Profile (Chol, Trig, HDL, LDL calc)    Vitamin B12 deficiency (non anemic) / Vitamin D deficiency  Recheck with haylee  - Vitamin B12  - Vitamin D Deficiency    Encounter for screening mammogram for breast cancer  - MA Screen Bilateral w/Larry    30 minutes spent on the date of the encounter doing chart review, review of test results, interpretation of tests, patient visit and documentation     Return in about 3 months (around 9/15/2021) for BP Recheck, Mood, Fatigue, COPD, Cornary Artery Disease.    Ophelia Troncoso MD  [facility] - ABRAHAM London is a 80 year old who presents for the following health issues:     HPI     Pt is here for a yearly breast exam. She states she needs a mammogram done and thought she needed to see the provider for a breast exam prior.     Concern - fatigue   Onset: chronic   Description: fatigue   Intensity: moderate  Progression of Symptoms:  same  Accompanying Signs & Symptoms: severe fatigue, poor appetite, low energy, denies any depression or mood changes. Sleep is good. Sleeps 11-11. Pt is still taking remeron   Previous history of similar problem: yes  Precipitating factors:        Worsened by: possibly remeron. Pt was to stop taking this per last visit note on 11-17-20, but she did not.  Alleviating factors:        Improved by: none  Therapies tried and outcome:   none     Vascular Disease Follow-up      How often do you take nitroglycerin? Never    Do you take an aspirin every day? Yes    Heart Failure Follow-up  Are you experiencing any shortness of breath? Yes, with activity only   How would you describe your shortness of breath?  Same as usual        Are you experiencing any swelling in your legs or feet?  Worse than usual (L>R)     Are you using more pillows than usual? No    Do you cough at night?  No    Do you check your weight daily?  Yes    Have you had a weight change recently?  No    Are you having any of the following side effects from your medications? (Select all that apply)  Fatigue    Since your last visit, how many times have you gone to the cardiologist, urgent care, emergency room, or hospital because of your heart failure?   None    Chronic Kidney Disease Follow-up      Do you take any over the counter pain medicine?: No    Hypothyroidism Follow-up      Since last visit, patient describes the following symptoms: Weight stable, no hair loss, no skin changes, no constipation, no loose stools    Review of Systems   Constitutional, HEENT, cardiovascular, pulmonary, gi and gu systems are negative, except as otherwise noted.      Objective    Wt 44.7 kg (98 lb 9.6 oz)   BMI 19.26 kg/m    Body mass index is 19.26 kg/m .  Physical Exam   GENERAL: alert and no distress  NECK: no adenopathy, no asymmetry, masses, or scars and thyroid normal to palpation  RESP: lungs clear to auscultation - no rales, rhonchi or wheezes  CV: regular rates and rhythm, normal S1 S2, no S3 or S4, no murmur, click or rub and 1+ right and 2+ left lower extremity pitting edema to ankle  SKIN: no suspicious lesions or rashes  NEURO: Normal strength and tone, mentation intact and speech normal  PSYCH: mentation appears normal, affect normal/bright    Results for orders placed or performed in visit on 06/15/21   Vitamin D Deficiency     Status: None   Result Value Ref Range    Vitamin D  Deficiency screening 38 20 - 75 ug/L   CBC with platelets and differential     Status: Abnormal   Result Value Ref Range    WBC 8.3 4.0 - 11.0 10e9/L    RBC Count 4.05 3.8 - 5.2 10e12/L    Hemoglobin 10.0 (L) 11.7 - 15.7 g/dL    Hematocrit 32.3 (L) 35.0 - 47.0 %    MCV 80 78 - 100 fl    MCH 24.7 (L) 26.5 - 33.0 pg    MCHC 31.0 (L) 31.5 - 36.5 g/dL    RDW 14.6 10.0 - 15.0 %    Platelet Count 375 150 - 450 10e9/L    Diff Method Automated Method     % Neutrophils 65.1 %    % Lymphocytes 23.1 %    % Monocytes 8.9 %    % Eosinophils 1.9 %    % Basophils 0.6 %    % Immature Granulocytes 0.4 %    Nucleated RBCs 0 0 /100    Absolute Neutrophil 5.4 1.6 - 8.3 10e9/L    Absolute Lymphocytes 1.9 0.8 - 5.3 10e9/L    Absolute Monocytes 0.7 0.0 - 1.3 10e9/L    Absolute Eosinophils 0.2 0.0 - 0.7 10e9/L    Absolute Basophils 0.1 0.0 - 0.2 10e9/L    Abs Immature Granulocytes 0.0 0 - 0.4 10e9/L    Absolute Nucleated RBC 0.0    Comprehensive metabolic panel (BMP + Alb, Alk Phos, ALT, AST, Total. Bili, TP)     Status: Abnormal   Result Value Ref Range    Sodium 141 133 - 144 mmol/L    Potassium 3.1 (L) 3.4 - 5.3 mmol/L    Chloride 111 (H) 94 - 109 mmol/L    Carbon Dioxide 23 20 - 32 mmol/L    Anion Gap 7 3 - 14 mmol/L    Glucose 89 70 - 99 mg/dL    Urea Nitrogen 20 7 - 30 mg/dL    Creatinine 1.63 (H) 0.52 - 1.04 mg/dL    GFR Estimate 29 (L) >60 mL/min/[1.73_m2]    GFR Estimate If Black 34 (L) >60 mL/min/[1.73_m2]    Calcium 8.1 (L) 8.5 - 10.1 mg/dL    Bilirubin Total 0.3 0.2 - 1.3 mg/dL    Albumin 3.7 3.4 - 5.0 g/dL    Protein Total 7.3 6.8 - 8.8 g/dL    Alkaline Phosphatase 91 40 - 150 U/L    ALT 16 0 - 50 U/L    AST 14 0 - 45 U/L   TSH with free T4 reflex     Status: None   Result Value Ref Range    TSH 0.80 0.40 - 4.00 mU/L   Vitamin B12     Status: None   Result Value Ref Range    Vitamin B12 274 193 - 986 pg/mL   Lipid Profile (Chol, Trig, HDL, LDL calc)     Status: None   Result Value Ref Range    Cholesterol 136 <200 mg/dL     Triglycerides 105 <150 mg/dL    HDL Cholesterol 54 >49 mg/dL    LDL Cholesterol Calculated 61 <100 mg/dL    Non HDL Cholesterol 82 <130 mg/dL

## 2021-06-15 NOTE — NURSING NOTE
Chief Complaint   Patient presents with     Breast Exam       Initial BP (!) 164/58 (BP Location: Right arm)   Pulse 74   Wt 44.7 kg (98 lb 9.6 oz)   SpO2 98%   BMI 19.26 kg/m   Estimated body mass index is 19.26 kg/m  as calculated from the following:    Height as of 1/7/21: 1.524 m (5').    Weight as of this encounter: 44.7 kg (98 lb 9.6 oz).  Medication Reconciliation: complete  Arianna Herbert LPN

## 2021-06-16 ENCOUNTER — TELEPHONE (OUTPATIENT)
Dept: FAMILY MEDICINE | Facility: OTHER | Age: 81
End: 2021-06-16

## 2021-06-16 DIAGNOSIS — I10 BENIGN ESSENTIAL HYPERTENSION: Primary | ICD-10-CM

## 2021-06-16 LAB — VIT B12 SERPL-MCNC: 274 PG/ML (ref 193–986)

## 2021-06-17 LAB — DEPRECATED CALCIDIOL+CALCIFEROL SERPL-MC: 38 UG/L (ref 20–75)

## 2021-06-17 NOTE — TELEPHONE ENCOUNTER
Pt updated on all lab result notes from PCP and next lab appointment pt verbalizes understanding.

## 2021-06-21 DIAGNOSIS — E78.5 HYPERLIPIDEMIA, UNSPECIFIED HYPERLIPIDEMIA TYPE: ICD-10-CM

## 2021-06-21 DIAGNOSIS — K92.2 ACUTE UPPER GI BLEED: ICD-10-CM

## 2021-06-21 DIAGNOSIS — Z95.5 S/P DRUG ELUTING CORONARY STENT PLACEMENT: ICD-10-CM

## 2021-06-22 RX ORDER — AMLODIPINE BESYLATE 10 MG/1
TABLET ORAL
Qty: 90 TABLET | Refills: 1 | Status: SHIPPED | OUTPATIENT
Start: 2021-06-22 | End: 2021-12-14

## 2021-06-22 RX ORDER — ATORVASTATIN CALCIUM 40 MG/1
TABLET, FILM COATED ORAL
Qty: 90 TABLET | Refills: 1 | Status: SHIPPED | OUTPATIENT
Start: 2021-06-22 | End: 2021-12-27

## 2021-06-22 NOTE — TELEPHONE ENCOUNTER
NORVASC      Last Written Prescription Date:  12-  Last Fill Quantity: 90,   # refills: 1  Last Office Visit: 6-  Future Office visit:       Routing refill request to provider for review/approval because:  '

## 2021-06-28 ENCOUNTER — ANCILLARY PROCEDURE (OUTPATIENT)
Dept: MAMMOGRAPHY | Facility: OTHER | Age: 81
End: 2021-06-28
Attending: FAMILY MEDICINE
Payer: MEDICARE

## 2021-06-28 DIAGNOSIS — Z12.31 ENCOUNTER FOR SCREENING MAMMOGRAM FOR BREAST CANCER: ICD-10-CM

## 2021-06-28 PROCEDURE — 77063 BREAST TOMOSYNTHESIS BI: CPT | Mod: TC

## 2021-06-30 DIAGNOSIS — N18.4 CHRONIC KIDNEY DISEASE, STAGE 4 (SEVERE) (H): Primary | ICD-10-CM

## 2021-06-30 DIAGNOSIS — I10 BENIGN ESSENTIAL HYPERTENSION: ICD-10-CM

## 2021-06-30 DIAGNOSIS — R09.89 OTHER SPECIFIED SYMPTOMS AND SIGNS INVOLVING THE CIRCULATORY AND RESPIRATORY SYSTEMS: ICD-10-CM

## 2021-06-30 LAB
ALBUMIN SERPL-MCNC: 3.7 G/DL (ref 3.4–5)
ANION GAP SERPL CALCULATED.3IONS-SCNC: 6 MMOL/L (ref 3–14)
ANION GAP SERPL CALCULATED.3IONS-SCNC: 8 MMOL/L (ref 3–14)
BUN SERPL-MCNC: 23 MG/DL (ref 7–30)
BUN SERPL-MCNC: 23 MG/DL (ref 7–30)
CALCIUM SERPL-MCNC: 8.7 MG/DL (ref 8.5–10.1)
CALCIUM SERPL-MCNC: 8.7 MG/DL (ref 8.5–10.1)
CHLORIDE SERPL-SCNC: 107 MMOL/L (ref 94–109)
CHLORIDE SERPL-SCNC: 107 MMOL/L (ref 94–109)
CO2 SERPL-SCNC: 23 MMOL/L (ref 20–32)
CO2 SERPL-SCNC: 24 MMOL/L (ref 20–32)
CREAT SERPL-MCNC: 1.67 MG/DL (ref 0.52–1.04)
CREAT SERPL-MCNC: 1.7 MG/DL (ref 0.52–1.04)
GFR SERPL CREATININE-BSD FRML MDRD: 28 ML/MIN/{1.73_M2}
GFR SERPL CREATININE-BSD FRML MDRD: 28 ML/MIN/{1.73_M2}
GLUCOSE SERPL-MCNC: 90 MG/DL (ref 70–99)
GLUCOSE SERPL-MCNC: 91 MG/DL (ref 70–99)
MAGNESIUM SERPL-MCNC: 2.2 MG/DL (ref 1.6–2.3)
PHOSPHATE SERPL-MCNC: 3.2 MG/DL (ref 2.5–4.5)
POTASSIUM SERPL-SCNC: 4 MMOL/L (ref 3.4–5.3)
POTASSIUM SERPL-SCNC: 4 MMOL/L (ref 3.4–5.3)
SODIUM SERPL-SCNC: 137 MMOL/L (ref 133–144)
SODIUM SERPL-SCNC: 138 MMOL/L (ref 133–144)
URATE SERPL-MCNC: 8.1 MG/DL (ref 2.6–6)

## 2021-06-30 PROCEDURE — 80069 RENAL FUNCTION PANEL: CPT | Mod: ZL | Performed by: FAMILY MEDICINE

## 2021-06-30 PROCEDURE — 80048 BASIC METABOLIC PNL TOTAL CA: CPT | Mod: ZL | Performed by: FAMILY MEDICINE

## 2021-06-30 PROCEDURE — 84550 ASSAY OF BLOOD/URIC ACID: CPT | Mod: ZL | Performed by: FAMILY MEDICINE

## 2021-06-30 PROCEDURE — 83735 ASSAY OF MAGNESIUM: CPT | Mod: ZL | Performed by: FAMILY MEDICINE

## 2021-06-30 PROCEDURE — 36415 COLL VENOUS BLD VENIPUNCTURE: CPT | Mod: ZL | Performed by: FAMILY MEDICINE

## 2021-07-07 ENCOUNTER — TELEPHONE (OUTPATIENT)
Dept: FAMILY MEDICINE | Facility: OTHER | Age: 81
End: 2021-07-07

## 2021-07-07 ENCOUNTER — TRANSFERRED RECORDS (OUTPATIENT)
Dept: HEALTH INFORMATION MANAGEMENT | Facility: CLINIC | Age: 81
End: 2021-07-07

## 2021-07-07 NOTE — TELEPHONE ENCOUNTER
Patient called and stated that she needs a pre-op done as she is having eyelid repair surgery. Patient does not have a surgery date scheduled and insists that her doctor (Dr Liriano) directed her to schedule the pre-op first then surgery will be scheduled. Patient does not have the information as to where the surgery will be taking place. Patient insisted that a message be sent to schedule an appt for pre-op on 07/12/2021. Please call patient to advise

## 2021-07-08 NOTE — PROGRESS NOTES
[facility]  [address]  [facility] [address]  Phone: [phone]  Primary Provider: Ophelia Troncoso  Pre-op Performing Provider: POWER PERDOMO      PREOPERATIVE EVALUATION:  Today's date: 7/9/2021    Lita Ocasio is a 80 year old female who presents for a preoperative evaluation.    Surgical Information:  Surgery/Procedure: Eyelid Repair  Surgery Location: [facility]  Surgeon: Dr. Graves  Surgery Date: 7/15/21  Time of Surgery: TBD   Where patient plans to recover: At home with family - Son will be helping her during the immediate post operative.  time period. She reports a strong support network with family.   Fax number for surgical facility:     Type of Anesthesia Anticipated: to be determined    Assessment & Plan     The proposed surgical procedure is considered LOW risk.    Problem List Items Addressed This Visit     None      Visit Diagnoses     Pre-op testing    -  Primary               Risks and Recommendations:  The patient has the following additional risks and recommendations for perioperative complications:   - No identified additional risk factors other than previously addressed    Medication Instructions:  Patient is to take all scheduled medications on the day of surgery EXCEPT for modifications listed below:   - Diuretics: HOLD on the day of surgery.    RECOMMENDATION:  APPROVAL GIVEN to proceed with proposed procedure, without further diagnostic evaluation.          Subjective     HPI related to upcoming procedure: Lita Ocasio is an 80 year old female patient who has had both of her lower eye lids turning inwards which causes a burning sensation to both eyes. She is having surgery to correct this.       Preop Questions 7/9/2021   1. Have you ever had a heart attack or stroke? YES - Stroke 2.5 years ago. Reports no deficits from this. Independent with ADL.   2. Have you ever had surgery on your heart or blood vessels, such as a stent  placement, a coronary artery bypass, or surgery on an artery in your head, neck, heart, or legs? YES - 2 stents placed about 1.5 years ago. Denies SOB with a activity. Grocery shops without SOB, leg pain, or chest pain.   3. Do you have chest pain with activity? Denies   4. Do you have a history of  heart failure? YES - Stable   5. Do you currently have a cold, bronchitis or symptoms of other infection? No   6. Do you have a cough, shortness of breath, or wheezing? No   7. Do you or anyone in your family have previous history of blood clots? No   8. Do you or does anyone in your family have a serious bleeding problem such as prolonged bleeding following surgeries or cuts? No   9. Have you ever had problems with anemia or been told to take iron pills? No   10. Have you had any abnormal blood loss such as black, tarry or bloody stools, or abnormal vaginal bleeding? No   11. Have you ever had a blood transfusion? No   12. Are you willing to have a blood transfusion if it is medically needed before, during, or after your surgery? Yes   13. Have you or any of your relatives ever had problems with anesthesia? No   14. Do you have sleep apnea, excessive snoring or daytime drowsiness? No   15. Do you have any artifical heart valves or other implanted medical devices like a pacemaker, defibrillator, or continuous glucose monitor? No   16. Do you have artificial joints? No   17. Are you allergic to latex? No     Health Care Directive:  Patient does not have a Health Care Directive or Living Will: Discussed advance care planning with patient; however, patient declined at this time.  Patient reports she if full code.     Preoperative Review of :   reviewed - no record of controlled substances prescribed.      Status of Chronic Conditions:   See problem list for active medical problems.  Problems all longstanding and stable, except as noted/documented.  See ROS for pertinent symptoms related to these  conditions.      Review of Systems  Constitutional, neuro, ENT, endocrine, pulmonary, cardiac, gastrointestinal, genitourinary, musculoskeletal, integument and psychiatric systems are negative, except as otherwise noted.    Patient Active Problem List    Diagnosis Date Noted     Weight loss 02/05/2020     Priority: Medium     Acute blood loss anemia 01/28/2020     Priority: Medium     Anemia due to blood loss, acute 09/20/2019     Priority: Medium     History of GI bleed 09/20/2019     Priority: Medium     Protein-calorie malnutrition (H) 09/04/2019     Priority: Medium     History of coronary artery stent placement 07/23/2019     Priority: Medium     CKD (chronic kidney disease) stage 3, GFR 30-59 ml/min 06/26/2019     Priority: Medium     Abnormal stress test 06/18/2019     Priority: Medium     Added automatically from request for surgery [id number]       MILLS (dyspnea on exertion) 06/18/2019     Priority: Medium     Added automatically from request for surgery [id number]       ASCVD (arteriosclerotic cardiovascular disease) 06/18/2019     Priority: Medium     Added automatically from request for surgery [id number]       Centrilobular emphysema (H) 05/12/2019     Priority: Medium     Acquired hypothyroidism 05/12/2019     Priority: Medium     Chronic systolic congestive heart failure (H) 05/12/2019     Priority: Medium     Benign essential hypertension 05/12/2019     Priority: Medium     Hyperlipidemia 05/12/2019     Priority: Medium     Osteopenia 05/12/2019     Priority: Medium     Non-rheumatic mitral regurgitation 05/12/2019     Priority: Medium     Primary hyperparathyroidism (H) 09/05/2013     Priority: Medium     Chronic rhinitis 08/16/2013     Priority: Medium      Past Medical History:   Diagnosis Date     Acquired hypothyroidism 5/12/2019     Asthma      Benign essential hypertension 5/12/2019     Centrilobular emphysema (H) 5/12/2019     Chronic rhinitis 8/16/2013     Chronic systolic congestive heart failure (H)  5/12/2019     Constipation      Coronary artery disease      Hearing loss      Heart trouble      Hemorrhagic cerebrovascular accident (CVA) (H) 01/2019     Hyperlipidemia 5/12/2019     Hypertension      Nasal congestion      Non-rheumatic mitral regurgitation 5/12/2019     Osteopenia 5/12/2019     Osteoporosis      Parathyroid related hypercalcemia (H) 8/18/2013     Primary hyperparathyroidism (H) 9/5/2013     Ringing in ears      Sensorineural hearing loss, asymmetrical 8/16/2013     Sneezing      Snoring      Stented coronary artery      Thyroid disease      Weakness      Weight loss      Past Surgical History:   Procedure Laterality Date     CATARACT IOL, RT/LT Bilateral      COLONOSCOPY       COLONOSCOPY - HIM SCAN  01/01/2009    Colonoscopy - [facility]/NL  2009     ENDOSCOPY UPPER, COLONOSCOPY, COMBINED N/A 10/17/2019    Procedure: UPPER ENDOSCOPY WITH BIOPSY  AND COLONOSCOPY;  Surgeon: Julio Canales MD;  Location: [facility] OR     ENT SURGERY  2011    para thyroid surgery     ENT SURGERY  09/2013    Parathyroid     ESOPHAGOSCOPY, GASTROSCOPY, DUODENOSCOPY (EGD), COMBINED N/A 9/21/2019    Procedure: ESOPHAGOGASTRODUODENOSCOPY (EGD);  Surgeon: Julio Canales MD;  Location: [facility] OR     ESOPHAGOSCOPY, GASTROSCOPY, DUODENOSCOPY (EGD), COMBINED N/A 1/27/2020    Procedure: ESOPHAGOGASTRODUODENOSCOPY (EGD) WITH BIOPSY;  Surgeon: Isrrael Parish MD;  Location: [facility] OR     ESOPHAGOSCOPY, GASTROSCOPY, DUODENOSCOPY (EGD), COMBINED N/A 2/20/2020    Procedure: ESOPHAGOGASTRODUODENOSCOPY (EGD) WITH BIOPSY;  Surgeon: Pedro Luis Montoya DO;  Location: [facility] OR     PARATHYROIDECTOMY  9/10/2013    Procedure: PARATHYROIDECTOMY;  Reoperative Neck Exploration, Resection of right Parathyroid adenoma;  Surgeon: Thalia Muller MD;  Location: [facility] OR     TONSILLECTOMY       TUBAL LIGATION       Current Outpatient Medications   Medication Sig Dispense Refill     amLODIPine (NORVASC) 10 MG tablet TAKE 1 TABLET DAILY 90 tablet 1      atorvastatin (LIPITOR) 40 MG tablet TAKE 1 TABLET AT BEDTIME 90 tablet 1     calcium carbonate (TUMS) 500 MG chewable tablet Take 1 chew tab by mouth daily       cetirizine 10 MG PO tablet Take 10 mg by mouth daily       ciprofloxacin (CILOXAN) 0.3 % ophthalmic solution Place 4 drops twice a day for 5 days to RIGHT ear. 1 Bottle 0     clindamycin (CLEOCIN) 300 MG capsule Take 2 capsules (600 mg) by mouth once as needed (prior to dental procedure) 2 capsule 0     erythromycin (ROMYCIN) 5 MG/GM ophthalmic ointment APPLY THIN RIBBON INTO BOTH EYES FOUR TIMES DAILY       fluticasone (FLONASE) 50 MCG/ACT spray Spray 2 sprays into both nostrils daily 1 Bottle 11     furosemide (LASIX) 20 MG tablet Take one 20 mg tab with one 40 mg tab for a total dose of 60 mg every morning 90 tablet 1     furosemide (LASIX) 40 MG tablet TAKE 1 AND 1/2 TABLETS     DAILY 90 tablet 0     hypromellose (GENTEAL) 0.3 % SOLN ophthalmic solution Place 1 drop into both eyes 4 times daily as needed for dry eyes       levothyroxine (SYNTHROID/LEVOTHROID) 75 MCG tablet Take 1 tablet (75 mcg) by mouth daily 90 tablet 1     lisinopril (ZESTRIL) 5 MG tablet Take 1 tablet (5 mg) by mouth daily 90 tablet 0     metoprolol succinate ER (TOPROL-XL) 25 MG 24 hr tablet Take 1 tablet (25 mg) by mouth daily 90 tablet 1     pantoprazole (PROTONIX) 40 MG EC tablet TAKE 1 TABLET TWICE A DAY BEFORE MEALS 180 tablet 1     potassium chloride ER (KLOR-CON M) 20 MEQ CR tablet Take 1 tablet (20 mEq) by mouth daily 180 tablet 0     sucralfate (CARAFATE) 1 GM tablet TAKE 1 TABLET BY MOUTH FOUR TIMES A DAY BEFORE MEALS AND AT NIGHT 360 tablet 1     Tafluprost (ZIOPTAN) 0.0015 % SOLN Place 1 drop into both eyes At Bedtime       zafirlukast (ACCOLATE) 20 MG tablet Take 1 tablet (20 mg) by mouth 2 times daily 60 tablet 11     ZIOPTAN 0.0015 % SOLN ophthalmic solution        nitroGLYcerin (NITROSTAT) 0.4 MG sublingual tablet Place 0.4 mg under the tongue every 5 minutes as  needed for chest pain For chest pain place 1 tablet under the tongue every 5 minutes for 3 doses. If symptoms persist 5 minutes after 1st dose call 911.         Allergies   Allergen Reactions     Evista [Raloxifene] Other (See Comments)     hypercalcemia     Prednisone GI Disturbance     Combigan [Brimonidine Tartrate-Timolol] Other (See Comments)     Eye swelling and itchy     Latex Rash     Levaquin [Levofloxacin] Muscle Pain (Myalgia)     Penicillins Rash     Restasis      Pt reports using eye gtts and having red irritated eyes         Social History     Tobacco Use     Smoking status: Former Smoker     Packs/day: 1.00     Years: 50.00     Pack years: 50.00     Types: Cigarettes     Quit date: 2019     Years since quittin.5     Smokeless tobacco: Never Used     Tobacco comment: quit 2019   Substance Use Topics     Alcohol use: Yes     Comment: social       History   Drug Use No         Objective     /50 (BP Location: Right arm, Patient Position: Chair, Cuff Size: Adult Regular)   Pulse 70   Temp 99.1  F (37.3  C) (Tympanic)   Wt 44.2 kg (97 lb 8 oz)   SpO2 97%   BMI 19.04 kg/m      Physical Exam    GENERAL APPEARANCE: healthy, alert and no distress     EYES: EOMI, PERRL Eyelids are inverted.     HENT: ear canals and TM's normal and nose and mouth without ulcers or lesions     NECK: no adenopathy, no asymmetry, masses, or scars and thyroid normal to palpation     RESP: lungs clear to auscultation - no rales, rhonchi or wheezes     CV: regular rates and rhythm, normal S1 S2, no S3 or S4 and no murmur, click or rub     ABDOMEN:  soft, nontender, no HSM or masses and bowel sounds normal     MS: extremities normal- no gross deformities noted, no evidence of inflammation in joints, FROM in all extremities.     SKIN: no suspicious lesions or rashes     NEURO: Normal strength and tone, sensory exam grossly normal, mentation intact and speech normal     PSYCH: mentation appears normal. and affect  normal/bright     LYMPHATICS: No cervical adenopathy    Recent Labs   Lab Test 06/30/21  1352 06/15/21  1643 05/28/20  1338 05/28/20  1338 02/19/20 1928 02/19/20 1928 01/27/20 0216 01/27/20 0216   HGB  --  10.0*  --  12.6   < > 12.1   < > 9.2*   PLT  --  375  --  389   < > 410   < > 355   INR  --   --   --   --   --  0.96  --  1.11     137 141   < > 141   < > 137   < > 145*   POTASSIUM 4.0  4.0 3.1*   < > 3.4   < > 3.9   < > 3.7   CR 1.67*  1.70* 1.63*   < > 1.11*   < > 1.05*   < > 0.93    < > = values in this interval not displayed.        Diagnostics:  Results for orders placed or performed in visit on 07/09/21   CBC with platelets     Status: Abnormal   Result Value Ref Range    WBC 7.8 4.0 - 11.0 10e9/L    RBC Count 4.07 3.8 - 5.2 10e12/L    Hemoglobin 10.3 (L) 11.7 - 15.7 g/dL    Hematocrit 32.4 (L) 35.0 - 47.0 %    MCV 80 78 - 100 fl    MCH 25.3 (L) 26.5 - 33.0 pg    MCHC 31.8 31.5 - 36.5 g/dL    RDW 16.1 (H) 10.0 - 15.0 %    Platelet Count 361 150 - 450 10e9/L   Renal panel (Alb, BUN, Ca, Cl, CO2, Creat, Gluc, Phos, K, Na)     Status: Abnormal   Result Value Ref Range    Sodium 136 133 - 144 mmol/L    Potassium 4.5 3.4 - 5.3 mmol/L    Chloride 106 94 - 109 mmol/L    Carbon Dioxide 23 20 - 32 mmol/L    Anion Gap 7 3 - 14 mmol/L    Glucose 101 (H) 70 - 99 mg/dL    Urea Nitrogen 16 7 - 30 mg/dL    Creatinine 1.58 (H) 0.52 - 1.04 mg/dL    GFR Estimate 30 (L) >60 mL/min/[1.73_m2]    GFR Estimate If Black 35 (L) >60 mL/min/[1.73_m2]    Calcium 8.9 8.5 - 10.1 mg/dL    Phosphorus 3.5 2.5 - 4.5 mg/dL    Albumin 3.8 3.4 - 5.0 g/dL   Vitamin D Deficiency     Status: None   Result Value Ref Range    Vitamin D Deficiency screening 36 20 - 75 ug/L   *UA reflex to Microscopic and Culture - [facility]     Status: None    Specimen: Midstream Urine   Result Value Ref Range    Color Urine Yellow     Appearance Urine Clear     Glucose Urine Negative NEG^Negative mg/dL    Bilirubin Urine Negative NEG^Negative     Ketones Urine Negative NEG^Negative mg/dL    Specific Gravity Urine 1.015 1.003 - 1.035    Blood Urine Negative NEG^Negative    pH Urine 5.5 5.0 - 7.0 pH    Protein Albumin Urine Negative NEG^Negative mg/dL    Urobilinogen Urine 0.2 0.2 - 1.0 EU/dL    Nitrite Urine Negative NEG^Negative    Leukocyte Esterase Urine Negative NEG^Negative    Source Midstream Urine         No EKG required for low risk surgery (cataract, skin procedure, breast biopsy, etc).    Revised Cardiac Risk Index (RCRI):  The patient has the following serious cardiovascular risks for perioperative complications:   - Coronary Artery Disease (MI, positive stress test, angina, Qs on EKG) = 1 point   - Congestive Heart Failure (pulmonary edema, PND, s3 young, CXR with pulmonary congestion, basilar rales) = 1 point     RCRI Interpretation: 2 points: Class III (moderate risk - 6.6% complication rate)     Estimated Functional Capacity: Performs 4 METS exercise without symptoms (e.g., light housework, stairs, 4 mph walk, 7 mph bike, slow step dance)           Signed Electronically by: Cathi Rodriguez CNP  Copy of this evaluation report is provided to requesting physician.

## 2021-07-09 ENCOUNTER — OFFICE VISIT (OUTPATIENT)
Dept: FAMILY MEDICINE | Facility: OTHER | Age: 81
End: 2021-07-09
Attending: NURSE PRACTITIONER
Payer: MEDICARE

## 2021-07-09 VITALS
TEMPERATURE: 99.1 F | DIASTOLIC BLOOD PRESSURE: 50 MMHG | OXYGEN SATURATION: 97 % | WEIGHT: 97.5 LBS | SYSTOLIC BLOOD PRESSURE: 138 MMHG | BODY MASS INDEX: 19.04 KG/M2 | HEART RATE: 70 BPM

## 2021-07-09 DIAGNOSIS — E78.5 HYPERLIPIDEMIA, UNSPECIFIED HYPERLIPIDEMIA TYPE: Chronic | ICD-10-CM

## 2021-07-09 DIAGNOSIS — J43.2 CENTRILOBULAR EMPHYSEMA (H): Chronic | ICD-10-CM

## 2021-07-09 DIAGNOSIS — K92.2 UPPER GI BLEEDING: ICD-10-CM

## 2021-07-09 DIAGNOSIS — I25.10 ASCVD (ARTERIOSCLEROTIC CARDIOVASCULAR DISEASE): ICD-10-CM

## 2021-07-09 DIAGNOSIS — N18.4 CHRONIC KIDNEY DISEASE, STAGE 4 (SEVERE) (H): ICD-10-CM

## 2021-07-09 DIAGNOSIS — E03.9 ACQUIRED HYPOTHYROIDISM: Chronic | ICD-10-CM

## 2021-07-09 DIAGNOSIS — N18.31 STAGE 3A CHRONIC KIDNEY DISEASE (H): ICD-10-CM

## 2021-07-09 DIAGNOSIS — J31.0 CHRONIC RHINITIS: ICD-10-CM

## 2021-07-09 DIAGNOSIS — E21.0 PRIMARY HYPERPARATHYROIDISM (H): ICD-10-CM

## 2021-07-09 DIAGNOSIS — I50.22 CHRONIC SYSTOLIC CONGESTIVE HEART FAILURE (H): Chronic | ICD-10-CM

## 2021-07-09 DIAGNOSIS — Z01.818 PRE-OP TESTING: Primary | ICD-10-CM

## 2021-07-09 DIAGNOSIS — I10 BENIGN ESSENTIAL HYPERTENSION: ICD-10-CM

## 2021-07-09 LAB
ALBUMIN SERPL-MCNC: 3.8 G/DL (ref 3.4–5)
ALBUMIN UR-MCNC: NEGATIVE MG/DL
ANION GAP SERPL CALCULATED.3IONS-SCNC: 7 MMOL/L (ref 3–14)
APPEARANCE UR: CLEAR
BILIRUB UR QL STRIP: NEGATIVE
BUN SERPL-MCNC: 16 MG/DL (ref 7–30)
CALCIUM SERPL-MCNC: 8.9 MG/DL (ref 8.5–10.1)
CHLORIDE SERPL-SCNC: 106 MMOL/L (ref 94–109)
CO2 SERPL-SCNC: 23 MMOL/L (ref 20–32)
COLOR UR AUTO: YELLOW
CREAT SERPL-MCNC: 1.58 MG/DL (ref 0.52–1.04)
ERYTHROCYTE [DISTWIDTH] IN BLOOD BY AUTOMATED COUNT: 16.1 % (ref 10–15)
GFR SERPL CREATININE-BSD FRML MDRD: 30 ML/MIN/{1.73_M2}
GLUCOSE SERPL-MCNC: 101 MG/DL (ref 70–99)
GLUCOSE UR STRIP-MCNC: NEGATIVE MG/DL
HCT VFR BLD AUTO: 32.4 % (ref 35–47)
HGB BLD-MCNC: 10.3 G/DL (ref 11.7–15.7)
HGB UR QL STRIP: NEGATIVE
KETONES UR STRIP-MCNC: NEGATIVE MG/DL
LEUKOCYTE ESTERASE UR QL STRIP: NEGATIVE
MCH RBC QN AUTO: 25.3 PG (ref 26.5–33)
MCHC RBC AUTO-ENTMCNC: 31.8 G/DL (ref 31.5–36.5)
MCV RBC AUTO: 80 FL (ref 78–100)
NITRATE UR QL: NEGATIVE
PH UR STRIP: 5.5 PH (ref 5–7)
PHOSPHATE SERPL-MCNC: 3.5 MG/DL (ref 2.5–4.5)
PLATELET # BLD AUTO: 361 10E9/L (ref 150–450)
POTASSIUM SERPL-SCNC: 4.5 MMOL/L (ref 3.4–5.3)
RBC # BLD AUTO: 4.07 10E12/L (ref 3.8–5.2)
SODIUM SERPL-SCNC: 136 MMOL/L (ref 133–144)
SOURCE: NORMAL
SP GR UR STRIP: 1.01 (ref 1–1.03)
UROBILINOGEN UR STRIP-ACNC: 0.2 EU/DL (ref 0.2–1)
WBC # BLD AUTO: 7.8 10E9/L (ref 4–11)

## 2021-07-09 PROCEDURE — 80069 RENAL FUNCTION PANEL: CPT | Mod: ZL | Performed by: NURSE PRACTITIONER

## 2021-07-09 PROCEDURE — 36415 COLL VENOUS BLD VENIPUNCTURE: CPT | Mod: ZL | Performed by: NURSE PRACTITIONER

## 2021-07-09 PROCEDURE — 82306 VITAMIN D 25 HYDROXY: CPT | Mod: ZL | Performed by: NURSE PRACTITIONER

## 2021-07-09 PROCEDURE — G0463 HOSPITAL OUTPT CLINIC VISIT: HCPCS

## 2021-07-09 PROCEDURE — 99214 OFFICE O/P EST MOD 30 MIN: CPT | Performed by: NURSE PRACTITIONER

## 2021-07-09 PROCEDURE — 81003 URINALYSIS AUTO W/O SCOPE: CPT | Mod: ZL | Performed by: NURSE PRACTITIONER

## 2021-07-09 PROCEDURE — 85027 COMPLETE CBC AUTOMATED: CPT | Mod: ZL | Performed by: NURSE PRACTITIONER

## 2021-07-09 ASSESSMENT — PAIN SCALES - GENERAL: PAINLEVEL: NO PAIN (0)

## 2021-07-09 NOTE — PATIENT INSTRUCTIONS
Patient Education   Preparing for Your Surgery  Getting started  A nurse will call you to review your health history and instructions. They will give you an arrival time based on your scheduled surgery time.  Please be ready to share the following:    Your doctor's clinic name and phone number    Your medical, surgical and anesthesia history    A list of allergies and sensitivities    A list of medicines, including herbal treatments and over-the-counter drugs    Whether the patient has a legal guardian (ask how to send us the papers in advance)  If you have a child who's having surgery, please ask for a copy of Preparing for Your Child's Surgery.    Preparing for surgery    Within 30 days of surgery: Have a pre-op exam (sometimes called an H&P, or History and Physical). This can be done at a clinic or pre-operative center.  ? If you're having a , you may not need this exam. Talk to your care team    At your pre-op exam, talk to your care team about all medicines you take. If you need to stop any medicines before surgery, ask when to start taking them again.  ? We do this for your safety. Many medicines can make you bleed too much during surgery. Some change how well surgery (anesthesia) drugs work.    Call your insurance company to let them know you're having surgery. (If you don't have insurance, call [phone].)    Call your clinic if there's any change in your health. This includes signs of a cold or flu (sore throat, runny nose, cough, rash, fever). It also includes a scrape or scratch near the surgery site.    If you have questions on the day of surgery, call your hospital or surgery center.  Eating and drinking guidelines  For your safety: Unless your surgeon tells you otherwise, follow the guidelines below.    Eat and drink as usual until 8 hours before surgery. After that, no food or milk.    Drink clear liquids until 2 hours before surgery. These are liquids you can see through, like water,  Gatorade and Propel Water. You may also have black coffee and tea (no cream or milk).    Nothing by mouth within 2 hours of surgery. This includes gum, candy and breath mints.    If you drink, stop drinking alcohol the night before surgery.    If your care team tells you to take medicine on the morning of surgery, it's okay to take it with a sip of water.  Preventing infection    Shower or bathe the night before and morning of your surgery. Follow the instructions your clinic gave you. (If no instructions, use regular soap.)    Don't shave or clip hair near your surgery site. We'll remove the hair if needed.    Don't smoke or vape the morning of surgery. You may chew nicotine gum up to 2 hours before surgery. A nicotine patch is okay.  ? Note: Some surgeries require you to completely quit smoking and nicotine. Check with your surgeon.    Your care team will make every effort to keep you safe from infection. We will:  ? Clean our hands often with soap and water (or an alcohol-based hand rub).  ? Clean the skin at your surgery site with a special soap that kills germs.  ? Give you a special gown to keep you warm. (Cold raises the risk of infection.)  ? Wear special hair covers, masks, gowns and gloves during surgery.  ? Give antibiotic medicine, if prescribed. Not all surgeries need antibiotics.  What to bring on the day of surgery    Photo ID and insurance card    Copy of your health care directive, if you have one    Glasses and hearing aides (bring cases)  ? You can't wear contacts during surgery    Inhaler and eye drops, if you use them (tell us about these when you arrive)    CPAP machine or breathing device, if you use them    A few personal items, if spending the night    If you have . . .  ? A pacemaker or ICD (cardiac defibrillator): Bring the ID card.  ? An implanted stimulator: Bring the remote control.  ? A legal guardian: Bring a copy of the certified (court-stamped) guardianship papers.  Please remove  any jewelry, including body piercings. Leave jewelry and other valuables at home.  If you're going home the day of surgery  Important: If you don't follow the rules below, we must cancel your surgery.     Arrange for someone to drive you home after surgery. You may not drive, take a taxi or take public transportation by yourself (unless you'll have local anesthesia only).    Arrange for a responsible adult to stay with you overnight. If you don't, we may keep you in the hospital overnight, and you may need to pay the costs yourself.  Questions?   If you have any questions for your care team, list them here: _________________________________________________________________________________________________________________________________________________________________________________________________________________________________________________________________________________________________________________________  For informational purposes only. Not to replace the advice of your health care provider. Copyright   2003, 2019 [facility]. All rights reserved. Clinically reviewed by Candice Fitzpatrick MD. [employer] 536912 - REV 4/20.         MEDICATIONS:   Continue taking all prescribed medications as reported during your visit except for your furosemide. Do not take furosemide the morning of surgery. Restart this after surgery.   Stop all supplements (herbal, non-prescription vitamins and minerals) one week prior to surgery.   Stop all NSAIDS (naproxen, aspirin, ibuprofen) 1 week prior to surgery or as instructed by your surgical team.   Do not start any new medications prior to your surgery.

## 2021-07-09 NOTE — NURSING NOTE
Chief Complaint   Patient presents with     Pre Op Exam       Initial /50 (BP Location: Right arm, Patient Position: Chair, Cuff Size: Adult Regular)   Pulse 70   Temp 99.1  F (37.3  C) (Tympanic)   Wt 44.2 kg (97 lb 8 oz)   SpO2 97%   BMI 19.04 kg/m   Estimated body mass index is 19.04 kg/m  as calculated from the following:    Height as of 1/7/21: 1.524 m (5').    Weight as of this encounter: 44.2 kg (97 lb 8 oz).  Medication Reconciliation: complete  Meka Segura LPN

## 2021-07-12 ENCOUNTER — HOSPITAL ENCOUNTER (OUTPATIENT)
Dept: ULTRASOUND IMAGING | Facility: HOSPITAL | Age: 81
Discharge: HOME | End: 2021-07-12
Attending: FAMILY MEDICINE | Admitting: FAMILY MEDICINE
Payer: MEDICARE

## 2021-07-12 DIAGNOSIS — R09.89 OTHER SPECIFIED SYMPTOMS AND SIGNS INVOLVING THE CIRCULATORY AND RESPIRATORY SYSTEMS: ICD-10-CM

## 2021-07-12 DIAGNOSIS — N18.4 CHRONIC KIDNEY DISEASE, STAGE 4 (SEVERE) (H): ICD-10-CM

## 2021-07-12 LAB — DEPRECATED CALCIDIOL+CALCIFEROL SERPL-MC: 36 UG/L (ref 20–75)

## 2021-07-12 PROCEDURE — 93975 VASCULAR STUDY: CPT

## 2021-07-13 ENCOUNTER — APPOINTMENT (OUTPATIENT)
Dept: GENERAL RADIOLOGY | Facility: HOSPITAL | Age: 81
End: 2021-07-13
Attending: PHYSICIAN ASSISTANT
Payer: MEDICARE

## 2021-07-13 ENCOUNTER — HOSPITAL ENCOUNTER (EMERGENCY)
Facility: HOSPITAL | Age: 81
Discharge: HOME | End: 2021-07-13
Attending: PHYSICIAN ASSISTANT | Admitting: PHYSICIAN ASSISTANT
Payer: MEDICARE

## 2021-07-13 ENCOUNTER — TELEPHONE (OUTPATIENT)
Dept: FAMILY MEDICINE | Facility: OTHER | Age: 81
End: 2021-07-13

## 2021-07-13 VITALS
DIASTOLIC BLOOD PRESSURE: 50 MMHG | WEIGHT: 95 LBS | HEART RATE: 76 BPM | OXYGEN SATURATION: 94 % | RESPIRATION RATE: 21 BRPM | TEMPERATURE: 99.1 F | BODY MASS INDEX: 18.55 KG/M2 | SYSTOLIC BLOOD PRESSURE: 135 MMHG

## 2021-07-13 DIAGNOSIS — E86.0 DEHYDRATION: ICD-10-CM

## 2021-07-13 DIAGNOSIS — D64.9 ANEMIA: ICD-10-CM

## 2021-07-13 LAB
ALBUMIN SERPL-MCNC: 3.8 G/DL (ref 3.4–5)
ALBUMIN UR-MCNC: NEGATIVE MG/DL
ALP SERPL-CCNC: 81 U/L (ref 40–150)
ALT SERPL W P-5'-P-CCNC: 16 U/L (ref 0–50)
ANION GAP SERPL CALCULATED.3IONS-SCNC: 11 MMOL/L (ref 3–14)
APPEARANCE UR: CLEAR
AST SERPL W P-5'-P-CCNC: 9 U/L (ref 0–45)
BASOPHILS # BLD AUTO: 0.1 10E3/UL (ref 0–0.2)
BASOPHILS NFR BLD AUTO: 1 %
BILIRUB SERPL-MCNC: 0.3 MG/DL (ref 0.2–1.3)
BILIRUB UR QL STRIP: NEGATIVE
BUN SERPL-MCNC: 29 MG/DL (ref 7–30)
CALCIUM SERPL-MCNC: 8.5 MG/DL (ref 8.5–10.1)
CHLORIDE BLD-SCNC: 103 MMOL/L (ref 94–109)
CO2 SERPL-SCNC: 20 MMOL/L (ref 20–32)
COLOR UR AUTO: ABNORMAL
CREAT SERPL-MCNC: 2.03 MG/DL (ref 0.52–1.04)
EOSINOPHIL # BLD AUTO: 0 10E3/UL (ref 0–0.7)
EOSINOPHIL NFR BLD AUTO: 0 %
ERYTHROCYTE [DISTWIDTH] IN BLOOD BY AUTOMATED COUNT: 16 % (ref 10–15)
GFR SERPL CREATININE-BSD FRML MDRD: 23 ML/MIN/1.73M2
GLUCOSE BLD-MCNC: 110 MG/DL (ref 70–99)
GLUCOSE UR STRIP-MCNC: NEGATIVE MG/DL
HCT VFR BLD AUTO: 29.8 % (ref 35–47)
HGB BLD-MCNC: 9.5 G/DL (ref 11.7–15.7)
HGB UR QL STRIP: NEGATIVE
HOLD SPECIMEN: NORMAL
IMM GRANULOCYTES # BLD: 0 10E3/UL
IMM GRANULOCYTES NFR BLD: 0 %
KETONES UR STRIP-MCNC: NEGATIVE MG/DL
LEUKOCYTE ESTERASE UR QL STRIP: NEGATIVE
LYMPHOCYTES # BLD AUTO: 1.8 10E3/UL (ref 0.8–5.3)
LYMPHOCYTES NFR BLD AUTO: 19 %
MCH RBC QN AUTO: 25.1 PG (ref 26.5–33)
MCHC RBC AUTO-ENTMCNC: 31.9 G/DL (ref 31.5–36.5)
MCV RBC AUTO: 79 FL (ref 78–100)
MONOCYTES # BLD AUTO: 0.8 10E3/UL (ref 0–1.3)
MONOCYTES NFR BLD AUTO: 8 %
MUCOUS THREADS #/AREA URNS LPF: PRESENT /LPF
NEUTROPHILS # BLD AUTO: 6.9 10E3/UL (ref 1.6–8.3)
NEUTROPHILS NFR BLD AUTO: 72 %
NITRATE UR QL: NEGATIVE
NRBC # BLD AUTO: 0 10E3/UL
NRBC BLD AUTO-RTO: 0 /100
NT-PROBNP SERPL-MCNC: 593 PG/ML (ref 0–1800)
PH UR STRIP: 5 [PH] (ref 4.7–8)
PLATELET # BLD AUTO: 348 10E3/UL (ref 150–450)
POTASSIUM BLD-SCNC: 4 MMOL/L (ref 3.4–5.3)
PROT SERPL-MCNC: 7 G/DL (ref 6.8–8.8)
RBC # BLD AUTO: 3.79 10E6/UL (ref 3.8–5.2)
RBC URINE: 1 /HPF
SODIUM SERPL-SCNC: 134 MMOL/L (ref 133–144)
SP GR UR STRIP: 1 (ref 1–1.03)
TROPONIN I SERPL-MCNC: <0.015 UG/L (ref 0–0.04)
TROPONIN I SERPL-MCNC: <0.015 UG/L (ref 0–0.04)
UROBILINOGEN UR STRIP-MCNC: NORMAL MG/DL
WBC # BLD AUTO: 9.6 10E3/UL (ref 4–11)
WBC URINE: <1 /HPF

## 2021-07-13 PROCEDURE — 85025 COMPLETE CBC W/AUTO DIFF WBC: CPT | Performed by: PHYSICIAN ASSISTANT

## 2021-07-13 PROCEDURE — 99284 EMERGENCY DEPT VISIT MOD MDM: CPT | Performed by: PHYSICIAN ASSISTANT

## 2021-07-13 PROCEDURE — 93005 ELECTROCARDIOGRAM TRACING: CPT

## 2021-07-13 PROCEDURE — 96360 HYDRATION IV INFUSION INIT: CPT

## 2021-07-13 PROCEDURE — 84484 ASSAY OF TROPONIN QUANT: CPT | Performed by: PHYSICIAN ASSISTANT

## 2021-07-13 PROCEDURE — 36415 COLL VENOUS BLD VENIPUNCTURE: CPT | Performed by: PHYSICIAN ASSISTANT

## 2021-07-13 PROCEDURE — 82040 ASSAY OF SERUM ALBUMIN: CPT | Performed by: PHYSICIAN ASSISTANT

## 2021-07-13 PROCEDURE — 93010 ELECTROCARDIOGRAM REPORT: CPT | Performed by: INTERNAL MEDICINE

## 2021-07-13 PROCEDURE — 258N000003 HC RX IP 258 OP 636: Performed by: PHYSICIAN ASSISTANT

## 2021-07-13 PROCEDURE — 99285 EMERGENCY DEPT VISIT HI MDM: CPT | Mod: 25

## 2021-07-13 PROCEDURE — 83880 ASSAY OF NATRIURETIC PEPTIDE: CPT | Performed by: PHYSICIAN ASSISTANT

## 2021-07-13 PROCEDURE — 81001 URINALYSIS AUTO W/SCOPE: CPT | Performed by: PHYSICIAN ASSISTANT

## 2021-07-13 PROCEDURE — 71045 X-RAY EXAM CHEST 1 VIEW: CPT

## 2021-07-13 RX ADMIN — SODIUM CHLORIDE 1000 ML: 9 INJECTION, SOLUTION INTRAVENOUS at 19:07

## 2021-07-13 ASSESSMENT — ENCOUNTER SYMPTOMS
LIGHT-HEADEDNESS: 1
FATIGUE: 0
NECK STIFFNESS: 0
NECK PAIN: 0
PALPITATIONS: 0
APPETITE CHANGE: 0
CHEST TIGHTNESS: 1
CHILLS: 0
DIZZINESS: 1
WHEEZING: 0
ACTIVITY CHANGE: 0
SHORTNESS OF BREATH: 1

## 2021-07-13 NOTE — TELEPHONE ENCOUNTER
Can you check on status of nephrology referral? Pt states she has not yet been contacted to schedule

## 2021-07-14 ENCOUNTER — PATIENT OUTREACH (OUTPATIENT)
Dept: CARE COORDINATION | Facility: OTHER | Age: 81
End: 2021-07-14

## 2021-07-14 NOTE — PROGRESS NOTES
Clinic Care Coordination Contact  Care Team Conversations    Outreach to patient regarding message below. Patient agrees with plan and will only take 40 mg of lasix daily unless otherwise directed. Medication list updated in chart. Will call CC with any questions or concerns.       Riccardo TRISTAN RN  CHF Care Coordinator   [phone] Option #8  Ext. *[phone]

## 2021-07-14 NOTE — PROGRESS NOTES
Hailee Cortez APRN CNP  You Just now (1:45 PM)   [place]  Please have patient reduce her Lasix to 40 mg daily.  She was previously on 60 mg daily.  Therefore, I would like her to drop the 20 mg dose.   Thanks,   Hailee NORTON. COCO Cortez CNP    Message text

## 2021-07-14 NOTE — PROGRESS NOTES
Clinic Care Coordination Contact  Care Team Conversations    In ER on 7- for dehydration- denies current SOB or swelling in the legs, states she is feeling better today.     ER Plan below:  Assessments & Plan (with Medical Decision Making)   Findings as above.  Pleasant and talkative 80-year-old female who presented with symptoms of dizziness, chest tightness, and shortness of breath throughout the day.  Virtually no symptoms in the emergency department.  EKG is unchanged.  Troponin is negative x2.  Chest x-ray is unremarkable.  Laboratory evaluation shows some mild dehydration acute kidney injury.  She was hydrated in the emergency department and would like to go home.  Daughter is requesting discharge and a multitude of occasions.  This is reasonable.  I believe an acute coronary event is unlikely.  Not consistent with pulmonary embolism or other catastrophe.  Rest and stay hydrated.  Return here for any changes in the patient's condition or worsening.  Patient and daughter voiced complete understanding and were agreeable.        Diuretic? Lasix 60 mg daily.     Medication compliance issue? denies    New symptoms or concerns relating to CHF? denies    Follow up scheduled for :        Next 5 appointments (look out 90 days)    Jul 27, 2021 11:45 AM  (Arrive by 11:30 AM)  Return Visit with COCO Mccartney CNP  [facility] [address] [facility] [address]  [phone]      Ref Range & Units 7-    N terminal Pro BNP Inpatient 593     Patient agreeable to plan and will contact CC with any questions or concerns prior to appt with COCO Lyles CNP.     Riccardo TRISTAN RN  CHF Care Coordinator   [phone] Option #8  Ext. *[phone]

## 2021-07-14 NOTE — ED PROVIDER NOTES
"  History     Chief Complaint   Patient presents with     Chest Pain     The history is provided by the patient.     Lita Ocasio is a 80 year old female who presented to the emergency department along with daughter for evaluation of some intermittent chest pressure, dizziness, and dyspnea throughout the day today.  At baseline yesterday.  She was able to play [employer] ball today.  No fevers or chills.  Chronic cough.  No hemoptysis.  No abdominal pain.  No vomiting.  No headaches.  No lower urinary tract symptoms.  During my interview the patient reports \"well, I feel pretty good now.\"    Allergies:  Allergies   Allergen Reactions     Evista [Raloxifene] Other (See Comments)     hypercalcemia     Prednisone GI Disturbance     Combigan [Brimonidine Tartrate-Timolol] Other (See Comments)     Eye swelling and itchy     Latex Rash     Levaquin [Levofloxacin] Muscle Pain (Myalgia)     Penicillins Rash     Restasis      Pt reports using eye gtts and having red irritated eyes        Problem List:    Patient Active Problem List    Diagnosis Date Noted     Weight loss 02/05/2020     Priority: Medium     Acute blood loss anemia 01/28/2020     Priority: Medium     Anemia due to blood loss, acute 09/20/2019     Priority: Medium     History of GI bleed 09/20/2019     Priority: Medium     Protein-calorie malnutrition (H) 09/04/2019     Priority: Medium     History of coronary artery stent placement 07/23/2019     Priority: Medium     CKD (chronic kidney disease) stage 3, GFR 30-59 ml/min 06/26/2019     Priority: Medium     Abnormal stress test 06/18/2019     Priority: Medium     Added automatically from request for surgery [id number]       MILLS (dyspnea on exertion) 06/18/2019     Priority: Medium     Added automatically from request for surgery [id number]       ASCVD (arteriosclerotic cardiovascular disease) 06/18/2019     Priority: Medium     Added automatically from request for surgery [id number]       Centrilobular emphysema (H) " 05/12/2019     Priority: Medium     Acquired hypothyroidism 05/12/2019     Priority: Medium     Chronic systolic congestive heart failure (H) 05/12/2019     Priority: Medium     Benign essential hypertension 05/12/2019     Priority: Medium     Hyperlipidemia 05/12/2019     Priority: Medium     Osteopenia 05/12/2019     Priority: Medium     Non-rheumatic mitral regurgitation 05/12/2019     Priority: Medium     Primary hyperparathyroidism (H) 09/05/2013     Priority: Medium     Chronic rhinitis 08/16/2013     Priority: Medium        Past Medical History:    Past Medical History:   Diagnosis Date     Acquired hypothyroidism 5/12/2019     Asthma      Benign essential hypertension 5/12/2019     Centrilobular emphysema (H) 5/12/2019     Chronic rhinitis 8/16/2013     Chronic systolic congestive heart failure (H) 5/12/2019     Constipation      Coronary artery disease      Hearing loss      Heart trouble      Hemorrhagic cerebrovascular accident (CVA) (H) 01/2019     Hyperlipidemia 5/12/2019     Hypertension      Nasal congestion      Non-rheumatic mitral regurgitation 5/12/2019     Osteopenia 5/12/2019     Osteoporosis      Parathyroid related hypercalcemia (H) 8/18/2013     Primary hyperparathyroidism (H) 9/5/2013     Ringing in ears      Sensorineural hearing loss, asymmetrical 8/16/2013     Sneezing      Snoring      Stented coronary artery      Thyroid disease      Weakness      Weight loss        Past Surgical History:    Past Surgical History:   Procedure Laterality Date     CATARACT IOL, RT/LT Bilateral      COLONOSCOPY       COLONOSCOPY - HIM SCAN  01/01/2009    Colonoscopy - [facility]/NL  2009     ENDOSCOPY UPPER, COLONOSCOPY, COMBINED N/A 10/17/2019    Procedure: UPPER ENDOSCOPY WITH BIOPSY  AND COLONOSCOPY;  Surgeon: Julio Canales MD;  Location: [facility] OR     ENT SURGERY  2011    para thyroid surgery     ENT SURGERY  09/2013    Parathyroid     ESOPHAGOSCOPY, GASTROSCOPY, DUODENOSCOPY (EGD), COMBINED N/A  2019    Procedure: ESOPHAGOGASTRODUODENOSCOPY (EGD);  Surgeon: Julio Canales MD;  Location: [facility] OR     ESOPHAGOSCOPY, GASTROSCOPY, DUODENOSCOPY (EGD), COMBINED N/A 2020    Procedure: ESOPHAGOGASTRODUODENOSCOPY (EGD) WITH BIOPSY;  Surgeon: Isrrael Parish MD;  Location: [facility] OR     ESOPHAGOSCOPY, GASTROSCOPY, DUODENOSCOPY (EGD), COMBINED N/A 2020    Procedure: ESOPHAGOGASTRODUODENOSCOPY (EGD) WITH BIOPSY;  Surgeon: Pedro Luis Montoya DO;  Location: [facility] OR     PARATHYROIDECTOMY  9/10/2013    Procedure: PARATHYROIDECTOMY;  Reoperative Neck Exploration, Resection of right Parathyroid adenoma;  Surgeon: Thalia Muller MD;  Location: [facility] OR     TONSILLECTOMY       TUBAL LIGATION         Family History:    Family History   Problem Relation Age of Onset     Hearing Loss Mother      Heart Disease Mother      Myocardial Infarction Mother      Cerebrovascular Disease Father      Cancer Brother         throat     Cancer Sister      Myocardial Infarction Sister      Cancer Maternal Grandmother      Heart Disease Maternal Grandfather      Aortic aneurysm Son        Social History:  Marital Status:   [5]  Social History     Tobacco Use     Smoking status: Former Smoker     Packs/day: 1.00     Years: 50.00     Pack years: 50.00     Types: Cigarettes     Quit date: 2019     Years since quittin.5     Smokeless tobacco: Never Used     Tobacco comment: quit 2019   Substance Use Topics     Alcohol use: Yes     Comment: social     Drug use: No        Medications:    amLODIPine (NORVASC) 10 MG tablet  atorvastatin (LIPITOR) 40 MG tablet  calcium carbonate (TUMS) 500 MG chewable tablet  cetirizine 10 MG PO tablet  ciprofloxacin (CILOXAN) 0.3 % ophthalmic solution  clindamycin (CLEOCIN) 300 MG capsule  erythromycin (ROMYCIN) 5 MG/GM ophthalmic ointment  fluticasone (FLONASE) 50 MCG/ACT spray  furosemide (LASIX) 20 MG tablet  furosemide (LASIX) 40 MG tablet  hypromellose (GENTEAL) 0.3 % SOLN  ophthalmic solution  levothyroxine (SYNTHROID/LEVOTHROID) 75 MCG tablet  lisinopril (ZESTRIL) 5 MG tablet  metoprolol succinate ER (TOPROL-XL) 25 MG 24 hr tablet  nitroGLYcerin (NITROSTAT) 0.4 MG sublingual tablet  pantoprazole (PROTONIX) 40 MG EC tablet  potassium chloride ER (KLOR-CON M) 20 MEQ CR tablet  sucralfate (CARAFATE) 1 GM tablet  Tafluprost (ZIOPTAN) 0.0015 % SOLN  zafirlukast (ACCOLATE) 20 MG tablet  ZIOPTAN 0.0015 % SOLN ophthalmic solution          Review of Systems   Constitutional: Negative for activity change, appetite change, chills and fatigue.   Respiratory: Positive for chest tightness and shortness of breath. Negative for wheezing.         He denies shortness of breath.   Cardiovascular: Positive for chest pain. Negative for palpitations.        Currently denies chest pain.   Musculoskeletal: Negative for neck pain and neck stiffness.   Skin: Negative.    Neurological: Positive for dizziness and light-headedness.        Currently denies lightheadedness or dizziness.       Physical Exam   BP: 139/52  Pulse: 79  Temp: 97.9  F (36.6  C)  Resp: 18  Weight: 43.1 kg (95 lb)  SpO2: 99 %      Physical Exam  Vitals and nursing note reviewed.   Constitutional:       General: She is not in acute distress.     Appearance: Normal appearance. She is not ill-appearing, toxic-appearing or diaphoretic.      Comments: This a very pleasant and talkative frail appearing 80-year-old female found semireclined on the exam bed in no distress.     Cardiovascular:      Rate and Rhythm: Normal rate and regular rhythm.   Pulmonary:      Effort: Pulmonary effort is normal.      Breath sounds: Normal breath sounds.   Abdominal:      General: There is no distension.      Palpations: Abdomen is soft.      Tenderness: There is no abdominal tenderness. There is no guarding.   Musculoskeletal:      Cervical back: Normal range of motion and neck supple.      Right lower leg: No edema.      Left lower leg: No edema.   Skin:      General: Skin is warm and dry.      Capillary Refill: Capillary refill takes less than 2 seconds.   Neurological:      General: No focal deficit present.      Mental Status: She is alert and oriented to person, place, and time.   Psychiatric:         Mood and Affect: Mood normal.         ED Course     ED Course as of Jul 13 2110   Tue Jul 13, 2021 2042 Lita reports feeling better.  Daughter is requesting discharge.      2042 Plan will be to collect urine and repeat troponin.        Procedures         EKG shows a sinus rhythm with PACs.  There is evidence of RSR prime in V1.  There is no significant ST concerns.  There is no concerning T waves.  There is no evidence of other ectopy, preexcitation, or ischemia.  Comparison the previous EKG shows no change.    Critical Care time:  none               Results for orders placed or performed during the hospital encounter of 07/13/21 (from the past 24 hour(s))   [place] Draw    Narrative    The following orders were created for panel order [place] Draw.  Procedure                               Abnormality         Status                     ---------                               -----------         ------                     Extra Blue Top Tube[[id number]]                              Final result               Extra Red Top Tube[[id number]]                               Final result               Extra Green Top (Lithium...[[id number]]                      Final result               Extra Green Top (Lithium...[[id number]]                      Final result               Extra Purple Top Tube[[id number]]                            Final result                 Please view results for these tests on the individual orders.   Extra Blue Top Tube   Result Value Ref Range    Hold Specimen JIC    Extra Red Top Tube   Result Value Ref Range    Hold Specimen JIC    Extra Green Top (Lithium Heparin) Tube   Result Value Ref Range    Hold Specimen JIC    Extra Green Top (Lithium Heparin)  Tube   Result Value Ref Range    Hold Specimen JIC    Extra Purple Top Tube   Result Value Ref Range    Hold Specimen JIC    CBC with platelets differential    Narrative    The following orders were created for panel order CBC with platelets differential.  Procedure                               Abnormality         Status                     ---------                               -----------         ------                     CBC with platelets and d...[[id number]]  Abnormal            Final result                 Please view results for these tests on the individual orders.   Comprehensive metabolic panel   Result Value Ref Range    Sodium 134 133 - 144 mmol/L    Potassium 4.0 3.4 - 5.3 mmol/L    Chloride 103 94 - 109 mmol/L    Carbon Dioxide (CO2) 20 20 - 32 mmol/L    Anion Gap 11 3 - 14 mmol/L    Urea Nitrogen 29 7 - 30 mg/dL    Creatinine 2.03 (H) 0.52 - 1.04 mg/dL    Calcium 8.5 8.5 - 10.1 mg/dL    Glucose 110 (H) 70 - 99 mg/dL    Alkaline Phosphatase 81 40 - 150 U/L    AST 9 0 - 45 U/L    ALT 16 0 - 50 U/L    Protein Total 7.0 6.8 - 8.8 g/dL    Albumin 3.8 3.4 - 5.0 g/dL    Bilirubin Total 0.3 0.2 - 1.3 mg/dL    GFR Estimate 23 (L) >60 mL/min/1.73m2   Troponin I   Result Value Ref Range    Troponin I <0.015 0.000 - 0.045 ug/L   Nt probnp inpatient (BNP)   Result Value Ref Range    N terminal Pro BNP Inpatient 593 0-1,800 pg/mL   CBC with platelets and differential   Result Value Ref Range    WBC Count 9.6 4.0 - 11.0 10e3/uL    RBC Count 3.79 (L) 3.80 - 5.20 10e6/uL    Hemoglobin 9.5 (L) 11.7 - 15.7 g/dL    Hematocrit 29.8 (L) 35.0 - 47.0 %    MCV 79 78 - 100 fL    MCH 25.1 (L) 26.5 - 33.0 pg    MCHC 31.9 31.5 - 36.5 g/dL    RDW 16.0 (H) 10.0 - 15.0 %    Platelet Count 348 150 - 450 10e3/uL    % Neutrophils 72 %    % Lymphocytes 19 %    % Monocytes 8 %    % Eosinophils 0 %    % Basophils 1 %    % Immature Granulocytes 0 %    NRBCs per 100 WBC 0 <1 /100    Absolute Neutrophils 6.9 1.6 - 8.3 10e3/uL    Absolute  Lymphocytes 1.8 0.8 - 5.3 10e3/uL    Absolute Monocytes 0.8 0.0 - 1.3 10e3/uL    Absolute Eosinophils 0.0 0.0 - 0.7 10e3/uL    Absolute Basophils 0.1 0.0 - 0.2 10e3/uL    Absolute Immature Granulocytes 0.0 <=0.0 10e3/uL    Absolute NRBCs 0.0 10e3/uL   XR Chest Port 1 View    Narrative    PROCEDURE:  XR CHEST PORT 1 VIEW    HISTORY: dyspnea. .    COMPARISON:  2/28/2020    FINDINGS:    The cardiomediastinal contours are unchanged. There is calcific aortic  atherosclerosis.   The lungs are chronically hyperinflated. Interstitial coarsening is  chronic. No focal consolidation, effusion or pneumothorax.      Impression    IMPRESSION: Chronic emphysema.    LEXY MCINTYRE MD         SYSTEM ID:  [id number]       Medications   0.9% sodium chloride BOLUS (0 mLs Intravenous Stopped 7/13/21 2013)       Assessments & Plan (with Medical Decision Making)   Findings as above.  Pleasant and talkative 80-year-old female who presented with symptoms of dizziness, chest tightness, and shortness of breath throughout the day.  Virtually no symptoms in the emergency department.  EKG is unchanged.  Troponin is negative x2.  Chest x-ray is unremarkable.  Laboratory evaluation shows some mild dehydration acute kidney injury.  She was hydrated in the emergency department and would like to go home.  Daughter is requesting discharge and a multitude of occasions.  This is reasonable.  I believe an acute coronary event is unlikely.  Not consistent with pulmonary embolism or other catastrophe.  Rest and stay hydrated.  Return here for any changes in the patient's condition or worsening.  Patient and daughter voiced complete understanding and were agreeable.    This document was prepared using a combination of typing and voice generated software.  While every attempt was made for accuracy, spelling and grammatical errors may exist.    I have reviewed the nursing notes.    I have reviewed the findings, diagnosis, plan and need for follow up with  the patient.       New Prescriptions    No medications on file       Final diagnoses:   Anemia   Dehydration       7/13/2021   [facility]     Ladarius Snowden PA-C  07/13/21 2111

## 2021-07-14 NOTE — DISCHARGE INSTRUCTIONS
Rest and stay hydrated.    Follow-up in the clinic this week for recheck.    Return here for any other questions or concerns.

## 2021-07-14 NOTE — ED NOTES
Patient complains of midsternal chest tightness, dizziness, and some SOB throughout the day. States she felt fine yesterday. EKG done and labs sent. Patient appears NSR per telemetry. No other sicknesses recently. Daughter states she does get dehydrated at times.

## 2021-07-21 ENCOUNTER — TRANSFERRED RECORDS (OUTPATIENT)
Dept: HEALTH INFORMATION MANAGEMENT | Facility: CLINIC | Age: 81
End: 2021-07-21

## 2021-07-27 ENCOUNTER — VIRTUAL VISIT (OUTPATIENT)
Dept: CARDIOLOGY | Facility: OTHER | Age: 81
End: 2021-07-27
Attending: NURSE PRACTITIONER
Payer: MEDICARE

## 2021-07-27 VITALS — BODY MASS INDEX: 18.31 KG/M2 | WEIGHT: 97 LBS | HEIGHT: 61 IN

## 2021-07-27 DIAGNOSIS — I50.22 CHRONIC SYSTOLIC CONGESTIVE HEART FAILURE (H): Primary | ICD-10-CM

## 2021-07-27 DIAGNOSIS — I50.22 CHRONIC SYSTOLIC CONGESTIVE HEART FAILURE (H): Chronic | ICD-10-CM

## 2021-07-27 DIAGNOSIS — Z95.5 HISTORY OF CORONARY ARTERY STENT PLACEMENT: ICD-10-CM

## 2021-07-27 DIAGNOSIS — N18.4 CKD (CHRONIC KIDNEY DISEASE) STAGE 4, GFR 15-29 ML/MIN (H): ICD-10-CM

## 2021-07-27 PROCEDURE — 99443 PR PHYSICIAN TELEPHONE EVALUATION 21-30 MIN: CPT | Mod: 95 | Performed by: NURSE PRACTITIONER

## 2021-07-27 RX ORDER — FUROSEMIDE 20 MG
20 TABLET ORAL EVERY MORNING
Start: 2021-07-27 | End: 2021-11-15

## 2021-07-27 ASSESSMENT — MIFFLIN-ST. JEOR: SCORE: 847.37

## 2021-07-27 ASSESSMENT — PAIN SCALES - GENERAL: PAINLEVEL: NO PAIN (0)

## 2021-07-27 NOTE — NURSING NOTE
"Chief Complaint   Patient presents with     Follow Up     ER follow up 7/13/21-dehydration- seeing a kidney specialist on August 5, 2021- Patient has no SOB/Leg swelling-states she feels great.       Initial Ht 1.549 m (5' 1\")   Wt 44 kg (97 lb)   BMI 18.33 kg/m   Estimated body mass index is 18.33 kg/m  as calculated from the following:    Height as of this encounter: 1.549 m (5' 1\").    Weight as of this encounter: 44 kg (97 lb).  Medication Reconciliation: complete  BRADY PONCE LPN    "

## 2021-07-27 NOTE — PROGRESS NOTES
Lita is a 80 year old who is being evaluated via a billable telephone visit.      What phone number would you like to be contacted at? [phone]  How would you like to obtain your AVS? Zafar    Assessment & Plan     1. Chronic systolic congestive heart failure (H)- Recovered   Ischemic cardiomyopathy, HFrEF with LVEF previously 36%, NYHA FC II  Repeat TTE on 12/5/19 with recovered LVEF at 60-65%.  Continue with Toprol XL 25 mg daily and ACEi- Lisinopril 5 mg daily.  Recent dehydration with YONATAN, no recent hyerpervolemia or edema. It has been recommended that she reduce her Lasix down to 20 mg daily, she has been on 40 mg daily.   Stop use of potassium.     2. CKD (chronic kidney disease) stage 4, GFR 15-29 ml/min (H)  She was referred to establish with nephrology which is scheduled for next week.  Recommended she return for BMP in one week with reduction in Lasix dose. Suspected decline in renal function secondary to hypovolemia.    3. History of coronary artery stent placement (6/26/19)  No recurrence of anginal symptoms, no further testing needed at this time.   Hx of PCI with synergy LISS x2, mid LCX 2.5x16mm, ostial LCX 3.0 x 16 mm.   Continue on ASA indefinitely.  Continue with high intensity statin.      25 minutes spent on the date of the encounter doing chart review, review of test results, patient visit and documentation       No follow-ups on file.    Hailee Cortez, APRN CNP CHFN  [facility] [place]    Subjective   Lita is a 80 year old who presents for the following health issues: Follow-up to ED visit on 7/13/21.    HPI   Ms. Ocasio is an 80 year old female who presents for cardiology follow-up with history of CAD s/p coronary angiography on 6/26/19 at [facility]. She was recently identified to have newly identified systolic heart failure.  Additionally, she has a history of hypertension, hyperlipidemia, nonrheumatic mitral regurgitation, central lobular emphysema, hypothyroidism,  osteopenia, hyperparathyroidism, history of tobacco abuse and chronic rhinitis.     At initial visit patient was accompanied by her son who was very helpful in providing patients past medical history. He admitted approximately 20 years ago patient was being followed for MR which was noted to be improved and therefore, vlave surgery was never recommended. He admitted that she was previously told it was suspected she had small MI's without coronary intervention or identified ACS. Finally, recent history just before new years of 2020 acute hemorrhagic stroke. It was following this, that she was taken off her beta blocker and lisinopril for HTN and possible past MI, she reported increased shortness of breath following this which has progressively worsened and likely multifactorial with COPD and HFrEF.      6/4/2019-Lexiscan stress test with abnormal imaging with reversible ischemia laterally and LVEF 36%. Additionally, in review of past CT chest imaging from one year ago, she was noted to have advanced atherosclerotic disease with bulky coronary and aortic calcifications. Given these findings, current symptoms and new acute systolic failure, recommended coronary angiography.     Patient underwent coronary angiography on 6/26/19 at [facility]. She was identified to have diffuse coronary atherosclerosis with single vessel obstructive CAD, ostial and mid LCX. Successful PCI with synergy LISS x2, mid LCX 2.5x16mm, ostial LCX 3.0 x 16 mm. LVEDP 26-29 mmHg. During revascularization she was identified to have AF for which she received DCCV x1 with return to sinus and then reverted back to AF. She was treated with IV beta blocker for rate control. Patient reported feeling good following coronary intervention. Breathing had significantly improved and increased energy.      At her last visit on 2/16/21 patient reported feeling good. No anginal symptoms or MILLS.  No orthopnea or PND.  No palpitations or lightheadedness. No increased  "edema and weight has remained stable. She is mostly bothered by back pain and \"ulcer\". Repeat TTE on 12/5/2019 with recovered systolic LV function.  Her LVEF had improved to 60 to 65% with normal global and regional LV function.       Patient presented to the ED on 7/13/21 with reports of chest pressure, dizziness and increased dyspnea. She was noted to be playing [employer] ball that same day with symptoms. ECG revealing SR with SVE, no ST-T changes. Troponin negative x 2 and chest XR unremarkable. Lab evaluation with decline in renal function and suspected dehydration.     Today patient reports feeling very good. No recurrence of chest pain or pressure. No pain in the arm, jaw, neck or through to the back. No palpitations or lightheadedness. Dyspnea improved. She feels that her symptoms where secondary to dehydration and feels much better with pushing more water during the day. She has not noted any increased edema, weight gain and no symptoms of orthopnea or PND reported.       Review of Systems   Constitutional, HEENT, cardiovascular, pulmonary, gi and gu systems are negative, except as otherwise noted.      Objective    Vitals - Patient Reported  Pain Score: No Pain (0)        Physical Exam   healthy, alert and no distress  PSYCH: Alert and oriented times 3; coherent speech, normal   rate and volume, able to articulate logical thoughts, able   to abstract reason, no tangential thoughts, no hallucinations   or delusions  Her affect is normal and pleasant  RESP: No cough, no audible wheezing, able to talk in full sentences  Remainder of exam unable to be completed due to telephone visits       Phone call duration: 8 minutes    "

## 2021-07-27 NOTE — PATIENT INSTRUCTIONS
Thank you for allowing Hailee Cortez and our team to participate in your care. Please call our office at [phone] with scheduling questions or if you need to cancel or change your appointment. With any other questions or concerns you may call Swathi cardiology nurse at [phone].       If you experience chest pain, chest pressure, chest tightness, shortness of breath, fainting, lightheadedness, nausea, vomiting, or other concerning symptoms, please report to the Emergency Department or call 911. These symptoms may be emergent, and best treated in the Emergency Department.    Follow up in 6 months.     Decrease lasix to 20 mg daily.     Stop potassium.

## 2021-08-02 DIAGNOSIS — I50.22 CHRONIC SYSTOLIC CONGESTIVE HEART FAILURE (H): Chronic | ICD-10-CM

## 2021-08-02 DIAGNOSIS — E03.9 ACQUIRED HYPOTHYROIDISM: ICD-10-CM

## 2021-08-02 NOTE — TELEPHONE ENCOUNTER
metoprolol      Last Written Prescription Date:  1/25/21  Last Fill Quantity: 90,   # refills: 1  Last Office Visit: 7/9/21  Future Office visit:    Next 5 appointments (look out 90 days)    Sep 22, 2021  2:30 PM  (Arrive by 2:15 PM)  Office Visit with Ophelia Troncoso MD  [facility] [place] ([facility] [place] ) [address]  [place] [address]  [phone]           synthroid      Last Written Prescription Date:  1/25/21  Last Fill Quantity: 90,   # refills: 1

## 2021-08-03 RX ORDER — LEVOTHYROXINE SODIUM 75 MCG
TABLET ORAL
Qty: 90 TABLET | Refills: 1 | Status: SHIPPED | OUTPATIENT
Start: 2021-08-03 | End: 2021-09-23

## 2021-08-03 RX ORDER — METOPROLOL SUCCINATE 25 MG/1
TABLET, EXTENDED RELEASE ORAL
Qty: 90 TABLET | Refills: 1 | Status: SHIPPED | OUTPATIENT
Start: 2021-08-03 | End: 2021-11-15

## 2021-08-06 ENCOUNTER — LAB (OUTPATIENT)
Dept: LAB | Facility: OTHER | Age: 81
End: 2021-08-06
Payer: MEDICARE

## 2021-08-06 DIAGNOSIS — N18.4 CKD (CHRONIC KIDNEY DISEASE) STAGE 4, GFR 15-29 ML/MIN (H): Primary | ICD-10-CM

## 2021-08-06 DIAGNOSIS — N18.4 CHRONIC KIDNEY DISEASE, STAGE 4 (SEVERE) (H): ICD-10-CM

## 2021-08-06 LAB
ALBUMIN SERPL-MCNC: 3.5 G/DL (ref 3.4–5)
ALBUMIN UR-MCNC: NEGATIVE MG/DL
ANION GAP SERPL CALCULATED.3IONS-SCNC: 8 MMOL/L (ref 3–14)
APPEARANCE UR: CLEAR
BASOPHILS # BLD AUTO: 0.1 10E3/UL (ref 0–0.2)
BASOPHILS NFR BLD AUTO: 1 %
BILIRUB UR QL STRIP: NEGATIVE
BUN SERPL-MCNC: 11 MG/DL (ref 7–30)
CALCIUM SERPL-MCNC: 8.6 MG/DL (ref 8.5–10.1)
CHLORIDE BLD-SCNC: 105 MMOL/L (ref 94–109)
CO2 SERPL-SCNC: 22 MMOL/L (ref 20–32)
COLOR UR AUTO: ABNORMAL
CREAT SERPL-MCNC: 1.24 MG/DL (ref 0.52–1.04)
EOSINOPHIL # BLD AUTO: 0.2 10E3/UL (ref 0–0.7)
EOSINOPHIL NFR BLD AUTO: 3 %
ERYTHROCYTE [DISTWIDTH] IN BLOOD BY AUTOMATED COUNT: 17.1 % (ref 10–15)
GFR SERPL CREATININE-BSD FRML MDRD: 41 ML/MIN/1.73M2
GLUCOSE BLD-MCNC: 100 MG/DL (ref 70–99)
GLUCOSE UR STRIP-MCNC: NEGATIVE MG/DL
HCT VFR BLD AUTO: 29.6 % (ref 35–47)
HGB BLD-MCNC: 9.3 G/DL (ref 11.7–15.7)
HGB UR QL STRIP: NEGATIVE
IMM GRANULOCYTES # BLD: 0 10E3/UL
IMM GRANULOCYTES NFR BLD: 0 %
KETONES UR STRIP-MCNC: NEGATIVE MG/DL
LEUKOCYTE ESTERASE UR QL STRIP: NEGATIVE
LYMPHOCYTES # BLD AUTO: 1.7 10E3/UL (ref 0.8–5.3)
LYMPHOCYTES NFR BLD AUTO: 20 %
MCH RBC QN AUTO: 24.9 PG (ref 26.5–33)
MCHC RBC AUTO-ENTMCNC: 31.4 G/DL (ref 31.5–36.5)
MCV RBC AUTO: 79 FL (ref 78–100)
MONOCYTES # BLD AUTO: 0.8 10E3/UL (ref 0–1.3)
MONOCYTES NFR BLD AUTO: 9 %
NEUTROPHILS # BLD AUTO: 5.8 10E3/UL (ref 1.6–8.3)
NEUTROPHILS NFR BLD AUTO: 67 %
NITRATE UR QL: NEGATIVE
NRBC # BLD AUTO: 0 10E3/UL
NRBC BLD AUTO-RTO: 0 /100
PH UR STRIP: 5.5 [PH] (ref 4.7–8)
PHOSPHATE SERPL-MCNC: 2.7 MG/DL (ref 2.5–4.5)
PLATELET # BLD AUTO: 381 10E3/UL (ref 150–450)
POTASSIUM BLD-SCNC: 3.8 MMOL/L (ref 3.4–5.3)
RBC # BLD AUTO: 3.74 10E6/UL (ref 3.8–5.2)
RBC URINE: 0 /HPF
SODIUM SERPL-SCNC: 135 MMOL/L (ref 133–144)
SP GR UR STRIP: 1 (ref 1–1.03)
UROBILINOGEN UR STRIP-MCNC: NORMAL MG/DL
WBC # BLD AUTO: 8.6 10E3/UL (ref 4–11)
WBC URINE: 0 /HPF

## 2021-08-06 PROCEDURE — 82040 ASSAY OF SERUM ALBUMIN: CPT | Mod: ZL

## 2021-08-06 PROCEDURE — 82306 VITAMIN D 25 HYDROXY: CPT | Mod: ZL

## 2021-08-06 PROCEDURE — 87086 URINE CULTURE/COLONY COUNT: CPT | Mod: ZL

## 2021-08-06 PROCEDURE — 85025 COMPLETE CBC W/AUTO DIFF WBC: CPT | Mod: ZL

## 2021-08-06 PROCEDURE — 83970 ASSAY OF PARATHORMONE: CPT | Mod: ZL

## 2021-08-06 PROCEDURE — 36415 COLL VENOUS BLD VENIPUNCTURE: CPT | Mod: ZL

## 2021-08-06 PROCEDURE — 81001 URINALYSIS AUTO W/SCOPE: CPT | Mod: ZL

## 2021-08-07 LAB — PTH-INTACT SERPL-MCNC: 28 PG/ML (ref 18–80)

## 2021-08-08 LAB — BACTERIA UR CULT: NO GROWTH

## 2021-08-09 LAB — DEPRECATED CALCIDIOL+CALCIFEROL SERPL-MC: 33 UG/L (ref 20–75)

## 2021-08-18 ENCOUNTER — TRANSFERRED RECORDS (OUTPATIENT)
Dept: HEALTH INFORMATION MANAGEMENT | Facility: CLINIC | Age: 81
End: 2021-08-18

## 2021-08-23 DIAGNOSIS — E87.6 HYPOKALEMIA: ICD-10-CM

## 2021-08-24 RX ORDER — POTASSIUM CHLORIDE 1500 MG/1
TABLET, EXTENDED RELEASE ORAL
Qty: 180 TABLET | Refills: 0 | Status: SHIPPED | OUTPATIENT
Start: 2021-08-24 | End: 2021-11-29

## 2021-08-24 NOTE — TELEPHONE ENCOUNTER
walter moore      Last Written Prescription Date:  Not on medication list  Last Fill Quantity: ,   # refills:   Last Office Visit: 7/27/21  Future Office visit:    Next 5 appointments (look out 90 days)    Sep 22, 2021  2:30 PM  (Arrive by 2:15 PM)  Office Visit with Ophelia Troncoso MD  [facility] [place] ([facility] [place] ) [address]  [place] [address]  [phone]

## 2021-09-12 ENCOUNTER — HEALTH MAINTENANCE LETTER (OUTPATIENT)
Age: 81
End: 2021-09-12

## 2021-09-22 ENCOUNTER — OFFICE VISIT (OUTPATIENT)
Dept: FAMILY MEDICINE | Facility: OTHER | Age: 81
End: 2021-09-22
Attending: FAMILY MEDICINE
Payer: MEDICARE

## 2021-09-22 VITALS
HEIGHT: 61 IN | SYSTOLIC BLOOD PRESSURE: 132 MMHG | OXYGEN SATURATION: 97 % | BODY MASS INDEX: 18.31 KG/M2 | DIASTOLIC BLOOD PRESSURE: 56 MMHG | WEIGHT: 97 LBS | TEMPERATURE: 97.3 F | HEART RATE: 73 BPM | RESPIRATION RATE: 16 BRPM

## 2021-09-22 DIAGNOSIS — M62.81 GENERALIZED MUSCLE WEAKNESS: ICD-10-CM

## 2021-09-22 DIAGNOSIS — M54.59 LUMBAR FACET JOINT PAIN: Primary | ICD-10-CM

## 2021-09-22 DIAGNOSIS — I10 BENIGN ESSENTIAL HYPERTENSION: ICD-10-CM

## 2021-09-22 DIAGNOSIS — M15.0 PRIMARY OSTEOARTHRITIS INVOLVING MULTIPLE JOINTS: ICD-10-CM

## 2021-09-22 DIAGNOSIS — N18.31 STAGE 3A CHRONIC KIDNEY DISEASE (H): ICD-10-CM

## 2021-09-22 DIAGNOSIS — J43.2 CENTRILOBULAR EMPHYSEMA (H): Chronic | ICD-10-CM

## 2021-09-22 DIAGNOSIS — E21.0 PRIMARY HYPERPARATHYROIDISM (H): ICD-10-CM

## 2021-09-22 DIAGNOSIS — E03.9 ACQUIRED HYPOTHYROIDISM: Chronic | ICD-10-CM

## 2021-09-22 DIAGNOSIS — I50.22 CHRONIC SYSTOLIC CONGESTIVE HEART FAILURE (H): Chronic | ICD-10-CM

## 2021-09-22 LAB
ANION GAP SERPL CALCULATED.3IONS-SCNC: 9 MMOL/L (ref 3–14)
BASOPHILS # BLD AUTO: 0.1 10E3/UL (ref 0–0.2)
BASOPHILS NFR BLD AUTO: 1 %
BUN SERPL-MCNC: 28 MG/DL (ref 7–30)
CALCIUM SERPL-MCNC: 9 MG/DL (ref 8.5–10.1)
CHLORIDE BLD-SCNC: 106 MMOL/L (ref 94–109)
CO2 SERPL-SCNC: 19 MMOL/L (ref 20–32)
CREAT SERPL-MCNC: 2.1 MG/DL (ref 0.52–1.04)
EOSINOPHIL # BLD AUTO: 0.3 10E3/UL (ref 0–0.7)
EOSINOPHIL NFR BLD AUTO: 3 %
ERYTHROCYTE [DISTWIDTH] IN BLOOD BY AUTOMATED COUNT: 15.6 % (ref 10–15)
GFR SERPL CREATININE-BSD FRML MDRD: 22 ML/MIN/1.73M2
GLUCOSE BLD-MCNC: 112 MG/DL (ref 70–99)
HCT VFR BLD AUTO: 32 % (ref 35–47)
HGB BLD-MCNC: 9.9 G/DL (ref 11.7–15.7)
IMM GRANULOCYTES # BLD: 0.1 10E3/UL
IMM GRANULOCYTES NFR BLD: 1 %
LYMPHOCYTES # BLD AUTO: 1.9 10E3/UL (ref 0.8–5.3)
LYMPHOCYTES NFR BLD AUTO: 20 %
MCH RBC QN AUTO: 24 PG (ref 26.5–33)
MCHC RBC AUTO-ENTMCNC: 30.9 G/DL (ref 31.5–36.5)
MCV RBC AUTO: 78 FL (ref 78–100)
MONOCYTES # BLD AUTO: 0.8 10E3/UL (ref 0–1.3)
MONOCYTES NFR BLD AUTO: 8 %
NEUTROPHILS # BLD AUTO: 6.6 10E3/UL (ref 1.6–8.3)
NEUTROPHILS NFR BLD AUTO: 67 %
NRBC # BLD AUTO: 0 10E3/UL
NRBC BLD AUTO-RTO: 0 /100
PLATELET # BLD AUTO: 469 10E3/UL (ref 150–450)
POTASSIUM BLD-SCNC: 4.4 MMOL/L (ref 3.4–5.3)
RBC # BLD AUTO: 4.13 10E6/UL (ref 3.8–5.2)
SODIUM SERPL-SCNC: 134 MMOL/L (ref 133–144)
T4 FREE SERPL-MCNC: 1.18 NG/DL (ref 0.76–1.46)
TSH SERPL DL<=0.005 MIU/L-ACNC: 0.22 MU/L (ref 0.4–4)
WBC # BLD AUTO: 9.8 10E3/UL (ref 4–11)

## 2021-09-22 PROCEDURE — 99214 OFFICE O/P EST MOD 30 MIN: CPT | Performed by: FAMILY MEDICINE

## 2021-09-22 PROCEDURE — 84443 ASSAY THYROID STIM HORMONE: CPT | Mod: ZL | Performed by: FAMILY MEDICINE

## 2021-09-22 PROCEDURE — G0463 HOSPITAL OUTPT CLINIC VISIT: HCPCS

## 2021-09-22 PROCEDURE — 84439 ASSAY OF FREE THYROXINE: CPT | Mod: ZL | Performed by: FAMILY MEDICINE

## 2021-09-22 PROCEDURE — 36415 COLL VENOUS BLD VENIPUNCTURE: CPT | Mod: ZL | Performed by: FAMILY MEDICINE

## 2021-09-22 PROCEDURE — 85004 AUTOMATED DIFF WBC COUNT: CPT | Mod: ZL | Performed by: FAMILY MEDICINE

## 2021-09-22 PROCEDURE — 80048 BASIC METABOLIC PNL TOTAL CA: CPT | Mod: ZL | Performed by: FAMILY MEDICINE

## 2021-09-22 ASSESSMENT — MIFFLIN-ST. JEOR: SCORE: 847.37

## 2021-09-22 ASSESSMENT — PAIN SCALES - GENERAL: PAINLEVEL: NO PAIN (0)

## 2021-09-22 NOTE — PROGRESS NOTES
Assessment & Plan     Lumbar facet joint pain  Reviewed XR from last fall. Patient continues to have significant low back pain. Manages with topicals and otc pain control for now. Discussed mri and injections and will consider.     Centrilobular emphysema (H)  Breathing is very stable.     Primary hyperparathyroidism (H)  Monitor, ca has been wnl. No new sx that are suspicious of worsening labs.     Acquired hypothyroidism  Decrease synthroid dose due to low TSH and low weight. Recheck in 2 mo.     Chronic systolic congestive heart failure (H)- Recovered  Appears euvolemic. Did not decrease lasix to 20mg daily as advised by cardiology, she may do that now.     Benign essential hypertension  Controlled  - Basic metabolic panel    Stage 3a chronic kidney disease  Bump in renal function noted. Decrease lasix as advised by cardiology. Recheck scheduled.     Primary osteoarthritis involving multiple joints  Trial of Voltaren for hands, low back.   - diclofenac (VOLTAREN) 1 % topical gel  Dispense: 350 g; Refill: 1    Generalized muscle weakness  Pt declines further PT, labs today. Weight stable.   - TSH with free T4 reflex  - CBC with platelets and differential    30 minutes spent on the date of the encounter doing chart review, review of outside records, interpretation of tests, patient visit and documentation     Return in about 4 months (around 1/22/2022) for BP Recheck, Cholesterol, COPD, Heart Failure, Cornary Artery Disease.    Ophelia Troncoso MD  [facility] [place]    Subjective     Lita Ocasio is a 80 year old female who presents to clinic today:     HPI     Hyperlipidemia Follow-Up      Are you regularly taking any medication or supplement to lower your cholesterol?   Yes- atorvastatin    Are you having muscle aches or other side effects that you think could be caused by your cholesterol lowering medication?  No    Hypertension Follow-up      Do you check your blood pressure regularly outside  "of the clinic? Yes     Are you following a low salt diet? No    Are your blood pressures ever more than 140 on the top number (systolic) OR more   than 90 on the bottom number (diastolic), for example 140/90? Yes    Vascular Disease Follow-up      How often do you take nitroglycerin? Never    Do you take an aspirin every day? Yes    Heart Failure Follow-up  Are you experiencing any shortness of breath? Yes, with activity only   How would you describe your shortness of breath?  Same as usual        Are you experiencing any swelling in your legs or feet?  No    Are you using more pillows than usual? No    Do you cough at night?  No    Do you check your weight daily?  No    Have you had a weight change recently?  Weight decrease    Are you having any of the following side effects from your medications? (Select all that apply)  The patient does not report symptoms of dizziness, fatigue, cough, swelling, or slow heart beat.    Since your last visit, how many times have you gone to the cardiologist, urgent care, emergency room, or hospital because of your heart failure?   None    Chronic Kidney Disease Follow-up      Do you take any over the counter pain medicine?: No    Hypothyroidism Follow-up      Since last visit, patient describes the following symptoms: Weight stable, no hair loss, no skin changes, no constipation, no loose stools      GERD (stomach ache still at night).       Review of Systems   Constitutional, HEENT, cardiovascular, pulmonary, gi and gu systems are negative, except as otherwise noted.      Objective    /56 (BP Location: Left arm, Patient Position: Sitting, Cuff Size: Adult Regular)   Pulse 73   Temp 97.3  F (36.3  C) (Tympanic)   Resp 16   Ht 1.549 m (5' 1\")   Wt 44 kg (97 lb)   SpO2 97%   BMI 18.33 kg/m    Body mass index is 18.33 kg/m .     94lbs    Physical Exam   GENERAL: alert, no distress, frail and elderly  NECK: no adenopathy, no asymmetry, masses, or scars and thyroid normal " to palpation  RESP: lungs clear to auscultation - no rales, rhonchi or wheezes  CV: regular rates and rhythm, normal S1 S2, no S3 or S4, no murmur, click or rub and no peripheral edema  ABDOMEN: soft, nontender, no hepatosplenomegaly, no masses and bowel sounds normal  MS: no gross musculoskeletal defects noted, no edema  SKIN: no suspicious lesions or rashes  NEURO: Normal strength and tone, sensory exam grossly normal, mentation intact and speech normal    No results found for any visits on 09/22/21.

## 2021-09-23 ENCOUNTER — TELEPHONE (OUTPATIENT)
Dept: FAMILY MEDICINE | Facility: OTHER | Age: 81
End: 2021-09-23

## 2021-09-23 DIAGNOSIS — E03.9 ACQUIRED HYPOTHYROIDISM: ICD-10-CM

## 2021-09-23 RX ORDER — LEVOTHYROXINE SODIUM 50 UG/1
50 TABLET ORAL DAILY
Qty: 90 TABLET | Refills: 0 | Status: SHIPPED | OUTPATIENT
Start: 2021-09-23 | End: 2021-12-14

## 2021-09-23 NOTE — TELEPHONE ENCOUNTER
Patient called for lab results. Nurse informed patient of results and recommendations. Patient verbalized understanding. Future labs pended and decreased dosage of synthroid pended.

## 2021-09-23 NOTE — TELEPHONE ENCOUNTER
Lita is due to see the nephrologist. I called to get her scheduled and she has declined appointment. Stated she doesn't want to see him no more.    Message sent to [facility] Nephrology department

## 2021-09-27 DIAGNOSIS — K92.2 GASTROINTESTINAL HEMORRHAGE, UNSPECIFIED GASTROINTESTINAL HEMORRHAGE TYPE: ICD-10-CM

## 2021-09-27 RX ORDER — PANTOPRAZOLE SODIUM 40 MG/1
TABLET, DELAYED RELEASE ORAL
Qty: 180 TABLET | Refills: 1 | Status: SHIPPED | OUTPATIENT
Start: 2021-09-27 | End: 2022-03-29

## 2021-09-27 NOTE — TELEPHONE ENCOUNTER
PANTOPRAZOLE TAB 40MG DR  Last Written Prescription Date:  9/16/2020  Last Fill Quantity: 180,   # refills: 1  Last Office Visit: 9/22/2021  Future Office visit:       Routing refill request to provider for review/approval because:

## 2021-10-06 ENCOUNTER — IMMUNIZATION (OUTPATIENT)
Dept: FAMILY MEDICINE | Facility: OTHER | Age: 81
End: 2021-10-06
Attending: FAMILY MEDICINE
Payer: MEDICARE

## 2021-10-06 PROCEDURE — 91300 PR COVID VAC PFIZER DIL RECON 30 MCG/0.3 ML IM: CPT

## 2021-10-11 DIAGNOSIS — I50.22 CHRONIC SYSTOLIC CONGESTIVE HEART FAILURE (H): Chronic | ICD-10-CM

## 2021-10-11 NOTE — TELEPHONE ENCOUNTER
Lisinopril      Last Written Prescription Date:  4/13/21  Last Fill Quantity: 90,   # refills: 0  Last Office Visit: 9/22/21  Future Office visit:       Routing refill request to provider for review/approval because:

## 2021-10-12 RX ORDER — LISINOPRIL 5 MG/1
TABLET ORAL
Qty: 90 TABLET | Refills: 0 | Status: SHIPPED | OUTPATIENT
Start: 2021-10-12 | End: 2022-01-19

## 2021-11-15 DIAGNOSIS — I50.22 CHRONIC SYSTOLIC CONGESTIVE HEART FAILURE (H): Chronic | ICD-10-CM

## 2021-11-15 RX ORDER — FUROSEMIDE 20 MG
20 TABLET ORAL EVERY MORNING
Qty: 90 TABLET | Refills: 0 | Status: SHIPPED | OUTPATIENT
Start: 2021-11-15 | End: 2022-02-15

## 2021-11-15 RX ORDER — METOPROLOL SUCCINATE 25 MG/1
25 TABLET, EXTENDED RELEASE ORAL DAILY
Qty: 10 TABLET | Refills: 0 | Status: SHIPPED | OUTPATIENT
Start: 2021-11-15 | End: 2022-02-15

## 2021-11-15 NOTE — TELEPHONE ENCOUNTER
Patient is needing short term supply of medication due to being out and her pharmacy is mail delivery. Will not receive for a week.

## 2021-11-29 ENCOUNTER — LAB (OUTPATIENT)
Dept: LAB | Facility: OTHER | Age: 81
End: 2021-11-29
Payer: MEDICARE

## 2021-11-29 DIAGNOSIS — E03.9 ACQUIRED HYPOTHYROIDISM: ICD-10-CM

## 2021-11-29 DIAGNOSIS — E87.6 HYPOKALEMIA: ICD-10-CM

## 2021-11-29 LAB
ANION GAP SERPL CALCULATED.3IONS-SCNC: 7 MMOL/L (ref 3–14)
BUN SERPL-MCNC: 12 MG/DL (ref 7–30)
CALCIUM SERPL-MCNC: 8.5 MG/DL (ref 8.5–10.1)
CHLORIDE BLD-SCNC: 105 MMOL/L (ref 94–109)
CO2 SERPL-SCNC: 28 MMOL/L (ref 20–32)
CREAT SERPL-MCNC: 1.04 MG/DL (ref 0.52–1.04)
GFR SERPL CREATININE-BSD FRML MDRD: 51 ML/MIN/1.73M2
GLUCOSE BLD-MCNC: 110 MG/DL (ref 70–99)
POTASSIUM BLD-SCNC: 2.6 MMOL/L (ref 3.4–5.3)
SODIUM SERPL-SCNC: 140 MMOL/L (ref 133–144)
T4 FREE SERPL-MCNC: 1.11 NG/DL (ref 0.76–1.46)
TSH SERPL DL<=0.005 MIU/L-ACNC: 2.13 MU/L (ref 0.4–4)

## 2021-11-29 PROCEDURE — 84443 ASSAY THYROID STIM HORMONE: CPT | Mod: ZL

## 2021-11-29 PROCEDURE — 80048 BASIC METABOLIC PNL TOTAL CA: CPT | Mod: ZL

## 2021-11-29 PROCEDURE — 84439 ASSAY OF FREE THYROXINE: CPT | Mod: ZL

## 2021-11-29 PROCEDURE — 36415 COLL VENOUS BLD VENIPUNCTURE: CPT | Mod: ZL

## 2021-11-29 RX ORDER — POTASSIUM CHLORIDE 1500 MG/1
20 TABLET, EXTENDED RELEASE ORAL 2 TIMES DAILY
Qty: 180 TABLET | Refills: 0 | Status: SHIPPED | OUTPATIENT
Start: 2021-11-29 | End: 2022-01-26

## 2021-12-01 ENCOUNTER — TRANSFERRED RECORDS (OUTPATIENT)
Dept: HEALTH INFORMATION MANAGEMENT | Facility: HOSPITAL | Age: 81
End: 2021-12-01
Payer: MEDICARE

## 2021-12-13 DIAGNOSIS — E03.9 ACQUIRED HYPOTHYROIDISM: ICD-10-CM

## 2021-12-13 DIAGNOSIS — K92.2 ACUTE UPPER GI BLEED: ICD-10-CM

## 2021-12-14 RX ORDER — LEVOTHYROXINE SODIUM 50 MCG
TABLET ORAL
Qty: 90 TABLET | Refills: 0 | Status: SHIPPED | OUTPATIENT
Start: 2021-12-14 | End: 2022-03-01

## 2021-12-14 RX ORDER — AMLODIPINE BESYLATE 10 MG/1
TABLET ORAL
Qty: 90 TABLET | Refills: 1 | Status: SHIPPED | OUTPATIENT
Start: 2021-12-14 | End: 2022-06-28

## 2021-12-14 NOTE — TELEPHONE ENCOUNTER
Norvasc      Last Written Prescription Date:  6/22/21  Last Fill Quantity: 90,   # refills: 1  Last Office Visit: 9/22/21  Future Office visit:    Next 5 appointments (look out 90 days)    Jan 25, 2022  2:15 PM  (Arrive by 2:00 PM)  Return Visit with COCO Mccartney CNP  [facility] [place] ([facility] [place] ) [address]  [place] [address]  [phone]   Jan 26, 2022  2:00 PM  (Arrive by 1:45 PM)  Office Visit with Ophelia Troncoso MD  [facility] [place] ([facility] [place] ) [address]  [place] [address]  [phone]           Routing refill request to provider for review/approval because:      Levothyroxine      Last Written Prescription Date:  9/23/21  Last Fill Quantity: 90,   # refills: 0  Last Office Visit: 9/22/21  Future Office visit:    Next 5 appointments (look out 90 days)    Jan 25, 2022  2:15 PM  (Arrive by 2:00 PM)  Return Visit with COCO Mccartney CNP  [facility] [place] ([facility] [place] ) [address]  [place] [address]  [phone]   Jan 26, 2022  2:00 PM  (Arrive by 1:45 PM)  Office Visit with Ophelia Troncoso MD  [facility] [place] ([facility] [place] ) [address]  [place] [address]  [phone]           Routing refill request to provider for review/approval because:

## 2021-12-15 ENCOUNTER — LAB (OUTPATIENT)
Dept: LAB | Facility: OTHER | Age: 81
End: 2021-12-15
Payer: MEDICARE

## 2021-12-15 DIAGNOSIS — E87.6 HYPOKALEMIA: ICD-10-CM

## 2021-12-15 LAB
ANION GAP SERPL CALCULATED.3IONS-SCNC: 9 MMOL/L (ref 3–14)
BUN SERPL-MCNC: 17 MG/DL (ref 7–30)
CALCIUM SERPL-MCNC: 9.1 MG/DL (ref 8.5–10.1)
CHLORIDE BLD-SCNC: 105 MMOL/L (ref 94–109)
CO2 SERPL-SCNC: 21 MMOL/L (ref 20–32)
CREAT SERPL-MCNC: 1.45 MG/DL (ref 0.52–1.04)
GFR SERPL CREATININE-BSD FRML MDRD: 34 ML/MIN/1.73M2
GLUCOSE BLD-MCNC: 117 MG/DL (ref 70–99)
POTASSIUM BLD-SCNC: 4.4 MMOL/L (ref 3.4–5.3)
SODIUM SERPL-SCNC: 135 MMOL/L (ref 133–144)

## 2021-12-15 PROCEDURE — 36415 COLL VENOUS BLD VENIPUNCTURE: CPT | Mod: ZL

## 2021-12-15 PROCEDURE — 80048 BASIC METABOLIC PNL TOTAL CA: CPT | Mod: ZL

## 2021-12-16 ENCOUNTER — TELEPHONE (OUTPATIENT)
Dept: FAMILY MEDICINE | Facility: OTHER | Age: 81
End: 2021-12-16
Payer: MEDICARE

## 2021-12-16 NOTE — TELEPHONE ENCOUNTER
"Son Zack calling and states his mother is dehydrated and dizzy. Advised she needs to go to ED for evaluation.He  could not give patients . Did pull chart up by phone number. He states she gets dehydrated at times. She is on lasix and he thinks maybe this needs to be worked on because she is dehydrated.Wants appt.Updated that PCP is out. Advised ED and he refuses and states,\"he is not going to kill his mother by bringing her there with the Covid, and that will kill her\". Updated that ED is using PPE and precaution.Updated him that she is dehydrated she needs to go to clinic. Updated supervisor and will call him back.He refuses to bring pt to the ED and wants to talk to a doctor.Updated that will call him back.He is not with the patient and is going by on what his mother and sister are telling him.    Zack Ocasio son [phone]      Barb Adkins RN    "

## 2021-12-16 NOTE — TELEPHONE ENCOUNTER
Spoke with Meche and if pt is dehydrated she needs to go to ED and if it is only a medication adjustment issue the pt can make appt with PCP. Called son back and updated on above.He states she is not dizzy now and only when she walks thru the grocery store.Dizzy yesterday and has been for about 3-4 days. He did state again that they suspect she is dehydrated.Again advised ED if she is dehydrated.He states they go thru this.She gets dehydrated they take to ED and she gets fluids and med adjustment every 3 months or so.    He states his mother said she is waiting on a doctor to call back and he is getting second hand information.    Again advised ED if dehydrated and MD out of office.Will send message to PCP.    Barb Adkins RN

## 2021-12-17 NOTE — TELEPHONE ENCOUNTER
Pt was notified, appt made for 12/22.  Authorization was signed to speak with adarsh Ocasio in 2019.

## 2021-12-17 NOTE — TELEPHONE ENCOUNTER
Spoke with son and LVM for patient.     Please ensure we have approval to speak with son. He also has proxy acess to [employer], but cannot recall the password, could some one assist with this.     For patient, would like for her to cut lasix in half, or take every other day. Call with increase in shortness of breath or swelling.     I would like to see her in office with Maritza later today or early next week sometime for BP evaluation. Please assist in setting up this appt

## 2021-12-22 ENCOUNTER — OFFICE VISIT (OUTPATIENT)
Dept: FAMILY MEDICINE | Facility: OTHER | Age: 81
End: 2021-12-22
Payer: MEDICARE

## 2021-12-22 DIAGNOSIS — I50.22 CHRONIC SYSTOLIC CONGESTIVE HEART FAILURE (H): ICD-10-CM

## 2021-12-22 DIAGNOSIS — R42 DIZZINESS: ICD-10-CM

## 2021-12-22 DIAGNOSIS — I10 BENIGN ESSENTIAL HYPERTENSION: ICD-10-CM

## 2021-12-22 DIAGNOSIS — D64.9 ANEMIA, UNSPECIFIED TYPE: ICD-10-CM

## 2021-12-22 DIAGNOSIS — R42 DIZZINESS: Primary | ICD-10-CM

## 2021-12-22 DIAGNOSIS — N18.31 STAGE 3A CHRONIC KIDNEY DISEASE (H): ICD-10-CM

## 2021-12-22 DIAGNOSIS — E44.1 MILD PROTEIN-CALORIE MALNUTRITION (H): ICD-10-CM

## 2021-12-22 LAB
ANION GAP SERPL CALCULATED.3IONS-SCNC: 5 MMOL/L (ref 3–14)
BASOPHILS # BLD AUTO: 0.1 10E3/UL (ref 0–0.2)
BASOPHILS NFR BLD AUTO: 1 %
BUN SERPL-MCNC: 15 MG/DL (ref 7–30)
CALCIUM SERPL-MCNC: 9 MG/DL (ref 8.5–10.1)
CHLORIDE BLD-SCNC: 107 MMOL/L (ref 94–109)
CO2 SERPL-SCNC: 22 MMOL/L (ref 20–32)
CREAT SERPL-MCNC: 1.38 MG/DL (ref 0.52–1.04)
EOSINOPHIL # BLD AUTO: 0.2 10E3/UL (ref 0–0.7)
EOSINOPHIL NFR BLD AUTO: 2 %
ERYTHROCYTE [DISTWIDTH] IN BLOOD BY AUTOMATED COUNT: 16.4 % (ref 10–15)
FERRITIN SERPL-MCNC: 14 NG/ML (ref 8–252)
GFR SERPL CREATININE-BSD FRML MDRD: 38 ML/MIN/1.73M2
GLUCOSE BLD-MCNC: 108 MG/DL (ref 70–99)
HCT VFR BLD AUTO: 29.5 % (ref 35–47)
HGB BLD-MCNC: 8.7 G/DL (ref 11.7–15.7)
IMM GRANULOCYTES # BLD: 0.1 10E3/UL
IMM GRANULOCYTES NFR BLD: 1 %
IRON SATN MFR SERPL: 5 % (ref 15–46)
IRON SERPL-MCNC: 23 UG/DL (ref 35–180)
LYMPHOCYTES # BLD AUTO: 2.2 10E3/UL (ref 0.8–5.3)
LYMPHOCYTES NFR BLD AUTO: 19 %
MCH RBC QN AUTO: 21.9 PG (ref 26.5–33)
MCHC RBC AUTO-ENTMCNC: 29.5 G/DL (ref 31.5–36.5)
MCV RBC AUTO: 74 FL (ref 78–100)
MONOCYTES # BLD AUTO: 0.8 10E3/UL (ref 0–1.3)
MONOCYTES NFR BLD AUTO: 7 %
NEUTROPHILS # BLD AUTO: 8.3 10E3/UL (ref 1.6–8.3)
NEUTROPHILS NFR BLD AUTO: 70 %
NRBC # BLD AUTO: 0 10E3/UL
NRBC BLD AUTO-RTO: 0 /100
PLATELET # BLD AUTO: 452 10E3/UL (ref 150–450)
POTASSIUM BLD-SCNC: 4.3 MMOL/L (ref 3.4–5.3)
RBC # BLD AUTO: 3.98 10E6/UL (ref 3.8–5.2)
SODIUM SERPL-SCNC: 134 MMOL/L (ref 133–144)
TIBC SERPL-MCNC: 421 UG/DL (ref 240–430)
WBC # BLD AUTO: 11.7 10E3/UL (ref 4–11)

## 2021-12-22 PROCEDURE — 82728 ASSAY OF FERRITIN: CPT | Mod: ZL

## 2021-12-22 PROCEDURE — 83550 IRON BINDING TEST: CPT | Mod: ZL

## 2021-12-22 PROCEDURE — G0463 HOSPITAL OUTPT CLINIC VISIT: HCPCS

## 2021-12-22 PROCEDURE — 85004 AUTOMATED DIFF WBC COUNT: CPT | Mod: ZL

## 2021-12-22 PROCEDURE — 80048 BASIC METABOLIC PNL TOTAL CA: CPT | Mod: ZL

## 2021-12-22 PROCEDURE — 99214 OFFICE O/P EST MOD 30 MIN: CPT

## 2021-12-22 PROCEDURE — 36415 COLL VENOUS BLD VENIPUNCTURE: CPT | Mod: ZL

## 2021-12-22 ASSESSMENT — PAIN SCALES - GENERAL: PAINLEVEL: NO PAIN (0)

## 2021-12-22 ASSESSMENT — MIFFLIN-ST. JEOR: SCORE: 837.83

## 2021-12-22 NOTE — NURSING NOTE
"Chief Complaint   Patient presents with     medication check       Initial /70 (BP Location: Left arm, Patient Position: Sitting, Cuff Size: Adult Regular)   Pulse 76   Temp 97.2  F (36.2  C) (Tympanic)   Ht 1.549 m (5' 1\")   Wt 43.5 kg (96 lb)   SpO2 92%   BMI 18.14 kg/m   Estimated body mass index is 18.14 kg/m  as calculated from the following:    Height as of this encounter: 1.549 m (5' 1\").    Weight as of this encounter: 43.5 kg (96 lb).  Medication Reconciliation: complete  Fely Carrasco LPN  "

## 2021-12-22 NOTE — PATIENT INSTRUCTIONS
If you experience the dizziness, check your blood pressure at home.     Please let us know if the top number is below 100 in your right arm.     Continue with lasix 10 mg and let us know with increased swelling or any breathing concerns    Call or be seen with any syncopal episodes, chest pain etc.     Follow up next month with  and cardiology.

## 2021-12-22 NOTE — PROGRESS NOTES
Assessment & Plan     Dizziness  Denies any current dizziness. No dyspnea on exertion, chest pain, or palpitations. Dehydration/malnutrition likely contributing. Labs as shown below. BP stable today. Discussed to call with worsening symptoms or any new symptoms.   - CBC with platelets and differential  - Basic metabolic panel    Mild protein-calorie malnutrition (H)  Weight stable. Family expressing concern regarding patient's appetite. Encouraging daily boost supplements. Continue to monitor.     Benign essential hypertension  BP results vary depending on arm due to known stenosis. No evidence of orthostatic hypotension today. Continue current regimen. She will monitor blood pressure daily at home. Encouraged to check blood pressure if she experiences any episodes of dizziness. Call with SBP <100 or any new symptoms. Follow-up in one month as scheduled.     Chronic systolic congestive heart failure  Lasix dose decreased to half tablet (10mg) on 12/16. No increase in shortness of breath or edema noted today. Breathing stable. Weight stable. Continue 10mg for now. Cardiology visit scheduled for 1/25/22.    Anemia, unspecified type  Chronic anemia. History of GI bleed in 2019 and 2020. Last hgb of 9.9. Recheck CBC today.   - CBC with platelets and differential    Stage 3a chronic kidney disease (H)   Creat of 1.04 on 11/29. Recheck BMP today.   - Basic metabolic panel    Return in 1 month for scheduled appointment with Dr. Troncoso.    Return in about 1 month (around 1/22/2022).    COCO Ta [facility] is a 81 year old who presents for the following health issues     HPI     Concern - Dizziness follow up  Onset: 2 weeks ago they were at [facility] and she got dizziness  Description: dizziness  Intensity: 0/10  Progression of Symptoms:  same  Accompanying Signs & Symptoms: has been dizzy only when she has been walking, no energy;;for breakfsat-eggs; 1/4  "sandwich-lunch; likes to eat cookies; likes to eat at [employer] had a big plate of drummies and mashed potates-only ate 1 drummy and a little bit of mashed potates.   Previous history of similar problem: yes  Precipitating factors:        Worsened by: none  Alleviating factors:        Improved by: none  Therapies tried and outcome: does not like ensure has it but does not like the taste.     HAYDER is a 81-year-old female who presents ambulatory along with daughter for evaluation of dizziness and ongoing fatigue. She tells me that 2 weeks ago she was walking in [facility] and had an episode of dizziness.  She sat down and this resolved after several minutes.  She notices intermittent mild dizziness when she is up walking around, however has not been present the past several days. Does not experience dizziness at rest. She has had no loss of consciousness or falls. She denies any other symptoms along with dizziness including shortness of breath, chest pain, palpitations, nausea or vomiting. States over the past few months she has become more tired and doesn't have energy to do the things she used to. She also contributes some of this to feeling lonely and being stuck in her home due to current pandemic. She also reports a poor appetite. She denies any pain today.     Review of Systems   Constitutional, HEENT, cardiovascular, pulmonary, gi and gu systems are negative, except as otherwise noted.      Objective    /70 (BP Location: Left arm, Patient Position: Sitting, Cuff Size: Adult Regular)   Pulse 76   Temp 97.2  F (36.2  C) (Tympanic)   Ht 1.549 m (5' 1\")   Wt 43.5 kg (96 lb)   SpO2 92%   BMI 18.14 kg/m    Body mass index is 18.14 kg/m .       Physical Exam  Constitutional:       General: She is not in acute distress.     Appearance: She is not ill-appearing.      Comments: Frail, elderly female   HENT:      Right Ear: Tympanic membrane normal.      Mouth/Throat:      Mouth: Mucous membranes are dry.   Eyes: "      Pupils: Pupils are equal, round, and reactive to light.   Cardiovascular:      Rate and Rhythm: Normal rate and regular rhythm.      Pulses: Normal pulses.   Pulmonary:      Effort: Pulmonary effort is normal.      Breath sounds: Normal breath sounds. No wheezing, rhonchi or rales.   Abdominal:      General: Abdomen is flat.      Palpations: Abdomen is soft.   Skin:     General: Skin is warm and dry.      Capillary Refill: Capillary refill takes less than 2 seconds.      Coloration: Skin is pale.   Neurological:      General: No focal deficit present.      Mental Status: She is alert and oriented to person, place, and time.   Psychiatric:         Mood and Affect: Mood normal.

## 2021-12-23 ENCOUNTER — TELEPHONE (OUTPATIENT)
Dept: FAMILY MEDICINE | Facility: OTHER | Age: 81
End: 2021-12-23
Payer: MEDICARE

## 2021-12-23 VITALS
DIASTOLIC BLOOD PRESSURE: 62 MMHG | HEIGHT: 61 IN | OXYGEN SATURATION: 92 % | BODY MASS INDEX: 18.12 KG/M2 | SYSTOLIC BLOOD PRESSURE: 110 MMHG | TEMPERATURE: 97.2 F | WEIGHT: 96 LBS

## 2021-12-23 DIAGNOSIS — D64.9 ANEMIA, UNSPECIFIED TYPE: Primary | ICD-10-CM

## 2021-12-23 RX ORDER — FERROUS SULFATE 325(65) MG
325 TABLET ORAL
Qty: 90 TABLET | Refills: 0 | Status: SHIPPED | OUTPATIENT
Start: 2021-12-23 | End: 2023-03-21

## 2021-12-23 NOTE — TELEPHONE ENCOUNTER
I spoke to patient regarding lab results from yesterday/anemia and discussed oral iron with her. She is willing to try this. She will have her daughter pick this up from the pharmacy. Follow-up scheduled with Dr. Troncoso next month. No s/s of bleeding at this time. Will notify us with any changes or new symptoms.     COCO Ta CNP on 12/23/2021 at 10:26 AM

## 2021-12-27 DIAGNOSIS — Z95.5 S/P DRUG ELUTING CORONARY STENT PLACEMENT: ICD-10-CM

## 2021-12-27 DIAGNOSIS — E78.5 HYPERLIPIDEMIA, UNSPECIFIED HYPERLIPIDEMIA TYPE: ICD-10-CM

## 2021-12-27 RX ORDER — ATORVASTATIN CALCIUM 40 MG/1
TABLET, FILM COATED ORAL
Qty: 90 TABLET | Refills: 1 | Status: SHIPPED | OUTPATIENT
Start: 2021-12-27 | End: 2022-06-28

## 2021-12-27 NOTE — TELEPHONE ENCOUNTER
Lipitor      Last Written Prescription Date:  6/22/21  Last Fill Quantity: 90,   # refills: 1  Last Office Visit: 12/22/21  Future Office visit:    Next 5 appointments (look out 90 days)    Jan 25, 2022  2:15 PM  (Arrive by 2:00 PM)  Return Visit with COCO Mccartney CNP  [facility] ([facility] [place] ) [address]  [place] [address]  [phone]   Jan 26, 2022  2:00 PM  (Arrive by 1:45 PM)  Office Visit with Ophelia Troncoso MD  [facility] ([facility] [place] ) [address]  [place] [address]  [phone]           Routing refill request to provider for review/approval because:

## 2022-01-04 ENCOUNTER — MYC MEDICAL ADVICE (OUTPATIENT)
Dept: FAMILY MEDICINE | Facility: OTHER | Age: 82
End: 2022-01-04
Payer: MEDICARE

## 2022-01-05 ENCOUNTER — TELEPHONE (OUTPATIENT)
Dept: INFUSION THERAPY | Facility: OTHER | Age: 82
End: 2022-01-05
Payer: MEDICARE

## 2022-01-05 DIAGNOSIS — D50.8 IRON DEFICIENCY ANEMIA SECONDARY TO INADEQUATE DIETARY IRON INTAKE: Primary | ICD-10-CM

## 2022-01-05 NOTE — TELEPHONE ENCOUNTER
She would not qualify for transfusion, but we can do Iron infusions.     If she is agreeable to this, I can set up at our infusion center.     Ophelia Troncoso MD

## 2022-01-05 NOTE — TELEPHONE ENCOUNTER
TC from patient, she states that Dr. Troncoso or her nurse ordered iron infusions for her.  This writer told patient that a message would be sent to the infusion nurses to check, as upon checking in the chart there is not a signed plan or orders for iron infusions.  Routed to Infusion pool for review.

## 2022-01-05 NOTE — NURSING NOTE
No orders placed for Iron infusions placed by provider yet.  Will reach out to patient once orders are received.

## 2022-01-07 PROBLEM — D50.8 IRON DEFICIENCY ANEMIA SECONDARY TO INADEQUATE DIETARY IRON INTAKE: Status: ACTIVE | Noted: 2022-01-07

## 2022-01-07 RX ORDER — HEPARIN SODIUM,PORCINE 10 UNIT/ML
5 VIAL (ML) INTRAVENOUS
Status: CANCELLED | OUTPATIENT
Start: 2022-01-07

## 2022-01-07 RX ORDER — ALBUTEROL SULFATE 90 UG/1
1-2 AEROSOL, METERED RESPIRATORY (INHALATION)
Status: CANCELLED
Start: 2022-01-07

## 2022-01-07 RX ORDER — DIPHENHYDRAMINE HYDROCHLORIDE 50 MG/ML
50 INJECTION INTRAMUSCULAR; INTRAVENOUS
Status: CANCELLED
Start: 2022-01-07

## 2022-01-07 RX ORDER — NALOXONE HYDROCHLORIDE 0.4 MG/ML
0.2 INJECTION, SOLUTION INTRAMUSCULAR; INTRAVENOUS; SUBCUTANEOUS
Status: CANCELLED | OUTPATIENT
Start: 2022-01-07

## 2022-01-07 RX ORDER — MEPERIDINE HYDROCHLORIDE 25 MG/ML
25 INJECTION INTRAMUSCULAR; INTRAVENOUS; SUBCUTANEOUS EVERY 30 MIN PRN
Status: CANCELLED | OUTPATIENT
Start: 2022-01-07

## 2022-01-07 RX ORDER — HEPARIN SODIUM (PORCINE) LOCK FLUSH IV SOLN 100 UNIT/ML 100 UNIT/ML
5 SOLUTION INTRAVENOUS
Status: CANCELLED | OUTPATIENT
Start: 2022-01-07

## 2022-01-07 RX ORDER — METHYLPREDNISOLONE SODIUM SUCCINATE 125 MG/2ML
125 INJECTION, POWDER, LYOPHILIZED, FOR SOLUTION INTRAMUSCULAR; INTRAVENOUS
Status: CANCELLED
Start: 2022-01-07

## 2022-01-07 RX ORDER — ALBUTEROL SULFATE 0.83 MG/ML
2.5 SOLUTION RESPIRATORY (INHALATION)
Status: CANCELLED | OUTPATIENT
Start: 2022-01-07

## 2022-01-07 RX ORDER — EPINEPHRINE 1 MG/ML
0.3 INJECTION, SOLUTION, CONCENTRATE INTRAVENOUS EVERY 5 MIN PRN
Status: CANCELLED | OUTPATIENT
Start: 2022-01-07

## 2022-01-07 NOTE — TELEPHONE ENCOUNTER
Orders are in just now. Please let pt and son know that I had not received a message back stating that they were in agreement with offer to start these.     Typically, I would discuss risk of IV iron prior to starting. Please make sure they are aware of risk of hypersensitivity reaction. Infusion staff will be able to respond to situations like this, but they can and do occur. I cannot know ahead of time whether or not this will be tolerated by patient.     Ophelia Troncoso MD

## 2022-01-07 NOTE — TELEPHONE ENCOUNTER
Pt son called and updated on below. Updated will need to call his mother and update her on this too.     He asked about death? For the risk.Unsure how to answer this.     He wants this today as his mother is weak and widdling away. States he has been dealing with this all week and now its the weekend.    Updated unsure if it can be today.    He was argumentative at times and this writer. Wondering about if this will upset her stomach? Multiple questions.He states she is weak.Advised if concern pt should go to ED and he states they have done that.Voised raised.    Did at one point update him that this writer was going to end the call.    He had asked for this writers name.    He wants a call from the MD.Phone number below.    This writer has not spoken to the patient on below.    Please advise.     Barb Adkins RN

## 2022-01-07 NOTE — TELEPHONE ENCOUNTER
Pt son is calling and has not heard anything about patients iron infusions.Per patient she is in agreement to do this.    He wants this figured out and he wanted to get this done yesterday and now the weekend is coming.    Please advise.    Call back [phone]    Barb Adkins RN

## 2022-01-07 NOTE — TELEPHONE ENCOUNTER
Pt son also called infusion to inquire if orders are in and able to schedule yet.  This writer told son that I would inquire with nurses to check on plan.

## 2022-01-10 ENCOUNTER — INFUSION THERAPY VISIT (OUTPATIENT)
Dept: INFUSION THERAPY | Facility: OTHER | Age: 82
End: 2022-01-10
Attending: FAMILY MEDICINE
Payer: MEDICARE

## 2022-01-10 VITALS
WEIGHT: 95.24 LBS | BODY MASS INDEX: 17.98 KG/M2 | SYSTOLIC BLOOD PRESSURE: 144 MMHG | HEART RATE: 91 BPM | HEIGHT: 61 IN | RESPIRATION RATE: 16 BRPM | DIASTOLIC BLOOD PRESSURE: 61 MMHG | TEMPERATURE: 98.4 F | OXYGEN SATURATION: 100 %

## 2022-01-10 DIAGNOSIS — D50.8 IRON DEFICIENCY ANEMIA SECONDARY TO INADEQUATE DIETARY IRON INTAKE: Primary | ICD-10-CM

## 2022-01-10 PROCEDURE — 250N000011 HC RX IP 250 OP 636: Performed by: FAMILY MEDICINE

## 2022-01-10 PROCEDURE — 96366 THER/PROPH/DIAG IV INF ADDON: CPT

## 2022-01-10 PROCEDURE — 96365 THER/PROPH/DIAG IV INF INIT: CPT

## 2022-01-10 PROCEDURE — 258N000003 HC RX IP 258 OP 636: Performed by: FAMILY MEDICINE

## 2022-01-10 RX ORDER — EPINEPHRINE 1 MG/ML
0.3 INJECTION, SOLUTION, CONCENTRATE INTRAVENOUS EVERY 5 MIN PRN
Status: CANCELLED | OUTPATIENT
Start: 2022-01-10

## 2022-01-10 RX ORDER — METHYLPREDNISOLONE SODIUM SUCCINATE 125 MG/2ML
125 INJECTION, POWDER, LYOPHILIZED, FOR SOLUTION INTRAMUSCULAR; INTRAVENOUS
Status: CANCELLED
Start: 2022-01-10

## 2022-01-10 RX ORDER — HEPARIN SODIUM (PORCINE) LOCK FLUSH IV SOLN 100 UNIT/ML 100 UNIT/ML
5 SOLUTION INTRAVENOUS
Status: CANCELLED | OUTPATIENT
Start: 2022-01-10

## 2022-01-10 RX ORDER — ALBUTEROL SULFATE 90 UG/1
1-2 AEROSOL, METERED RESPIRATORY (INHALATION)
Status: CANCELLED
Start: 2022-01-10

## 2022-01-10 RX ORDER — HEPARIN SODIUM,PORCINE 10 UNIT/ML
5 VIAL (ML) INTRAVENOUS
Status: CANCELLED | OUTPATIENT
Start: 2022-01-10

## 2022-01-10 RX ORDER — DIPHENHYDRAMINE HYDROCHLORIDE 50 MG/ML
50 INJECTION INTRAMUSCULAR; INTRAVENOUS
Status: CANCELLED
Start: 2022-01-10

## 2022-01-10 RX ORDER — NALOXONE HYDROCHLORIDE 0.4 MG/ML
0.2 INJECTION, SOLUTION INTRAMUSCULAR; INTRAVENOUS; SUBCUTANEOUS
Status: CANCELLED | OUTPATIENT
Start: 2022-01-10

## 2022-01-10 RX ORDER — MEPERIDINE HYDROCHLORIDE 25 MG/ML
25 INJECTION INTRAMUSCULAR; INTRAVENOUS; SUBCUTANEOUS EVERY 30 MIN PRN
Status: CANCELLED | OUTPATIENT
Start: 2022-01-10

## 2022-01-10 RX ORDER — ALBUTEROL SULFATE 0.83 MG/ML
2.5 SOLUTION RESPIRATORY (INHALATION)
Status: CANCELLED | OUTPATIENT
Start: 2022-01-10

## 2022-01-10 RX ADMIN — SODIUM CHLORIDE 975 MG: 9 INJECTION, SOLUTION INTRAVENOUS at 12:31

## 2022-01-10 RX ADMIN — SODIUM CHLORIDE 25 MG: 9 INJECTION, SOLUTION INTRAVENOUS at 10:41

## 2022-01-10 RX ADMIN — SODIUM CHLORIDE 250 ML: 9 INJECTION, SOLUTION INTRAVENOUS at 10:17

## 2022-01-10 ASSESSMENT — MIFFLIN-ST. JEOR: SCORE: 834.12

## 2022-01-10 ASSESSMENT — PAIN SCALES - GENERAL: PAINLEVEL: NO PAIN (0)

## 2022-01-10 NOTE — PROGRESS NOTES
Patient is 81 years old, here accompanied by self today for infusion of Infed per order of Dr Troncoso.  Patient identified with two identifiers, order verified, and verbal consent for today's infusion obtained from patient.      Patient meets order parameters for today's treatment.     24 gauge angio cath inserted into left lateral lower arm, after one failed attempt to right lateral lower arm. No other noted venous access sites on either arm, except antecubital, despite use of heat and infrared light. Immediate blood return noted.  IV secured with sterile, transparent dressing and tape.  Patient tolerated well, denies pain or discomfort at this time.  Flushes easily without resistance, no signs or symptoms of infiltration or infection.  Did explain to patient that medication she is getting is a vesicant and that it can cause significant harm to tissues of the arm if it gets outside the vein. Explained this is why we like to go in the forearm vs hand/antecubital space. Explained that she needs to promptly report any pain or discomfort at site this date, to prevent tissue damage. Patient denies questions or concerns regarding infusion and/or medication(s) being administered. She notes she wasn't made aware by son just how long she will be here and does not seem impressed but is willing to stay. Patient pleasant. Offered food/fluids but has declined to this point.     1044 IV pump verified with test dose, drug, and rate of administration.  Infusion administered per protocol.  Patient tolerated infusion well, no signs or symptoms of adverse reaction noted.  Patient denies pain nor discomfort.    1206 1 hour monitoring period complete. Patient tolerated well. BP has fluctuated up some but it is not much higher than admit BP and patient denies symptoms of any type.      1236 (actual start time) IV pump verified with dose, drug, and rate of administration.  Infusion administered per protocol.  Patient tolerated infusion  well, no signs or symptoms of adverse reaction noted.  Patient denies pain nor discomfort.    1420 -- This nurse leaving floor. Hand off to Keely RN re patient. Patient updated. Denies needs/concerns at this time.

## 2022-01-10 NOTE — PATIENT INSTRUCTIONS
Thank you for scheduling your appointment with us today. If you have any questions or concerns related to procedure/medication at today's visit, please contact your primary care provider, or the provider who ordered today's procedure/medication. If you have any questions related to scheduling with our unit, or if you are having trouble getting in contact with your ordering provider, we can be reached at [phone].       Patient Education     Iron Dextran Solution for injection  What is this medicine?  IRON DEXTRAN (AHY marques DEX vernon) is an iron complex. Iron is used to make healthy red blood cells, which carry oxygen and nutrients through the body. This medicine is used to treat people who cannot take iron by mouth and have low levels of iron in the blood.  This medicine may be used for other purposes; ask your health care provider or pharmacist if you have questions.  What should I tell my health care provider before I take this medicine?  They need to know if you have any of these conditions:    anemia not caused by low iron levels    heart disease    high levels of iron in the blood    kidney disease    liver disease    an unusual or allergic reaction to iron, other medicines, foods, dyes, or preservatives    pregnant or trying to get pregnant    breast-feeding  How should I use this medicine?  This medicine is for injection into a vein or a muscle. It is given by a health care professional in a hospital or clinic setting.  Talk to your pediatrician regarding the use of this medicine in children. While this drug may be prescribed for children as young as 4 months old for selected conditions, precautions do apply.  Overdosage: If you think you have taken too much of this medicine contact a poison control center or emergency room at once.  NOTE: This medicine is only for you. Do not share this medicine with others.  What if I miss a dose?  It is important not to miss your dose. Call your doctor or health care  professional if you are unable to keep an appointment.  What may interact with this medicine?  Do not take this medicine with any of the following medications:    deferoxamine    dimercaprol    other iron products  This medicine may also interact with the following medications:    chloramphenicol    deferasirox  This list may not describe all possible interactions. Give your health care provider a list of all the medicines, herbs, non-prescription drugs, or dietary supplements you use. Also tell them if you smoke, drink alcohol, or use illegal drugs. Some items may interact with your medicine.  What should I watch for while using this medicine?  Visit your doctor or health care professional regularly. Tell your doctor if your symptoms do not start to get better or if they get worse. You may need blood work done while you are taking this medicine.  You may need to follow a special diet. Talk to your doctor. Foods that contain iron include: whole grains/cereals, dried fruits, beans, or peas, leafy green vegetables, and organ meats (liver, kidney).  Long-term use of this medicine may increase your risk of some cancers. Talk to your doctor about how to limit your risk.  What side effects may I notice from receiving this medicine?  Side effects that you should report to your doctor or health care professional as soon as possible:    allergic reactions like skin rash, itching or hives, swelling of the face, lips, or tongue    blue lips, nails, or skin    breathing problems    changes in blood pressure    chest pain    confusion    fast, irregular heartbeat    feeling faint or lightheaded, falls    fever or chills    flushing, sweating, or hot feelings    joint or muscle aches or pains    pain, tingling, numbness in the hands or feet    seizures    unusually weak or tired  Side effects that usually do not require medical attention (report to your doctor or health care professional if they continue or are  bothersome):    change in taste (metallic taste)    diarrhea    headache    irritation at site where injected    nausea, vomiting    stomach upset  This list may not describe all possible side effects. Call your doctor for medical advice about side effects. You may report side effects to FDA at 9-286-GCZ-4483.  Where should I keep my medicine?  This drug is given in a hospital or clinic and will not be stored at home.  NOTE:This sheet is a summary. It may not cover all possible information. If you have questions about this medicine, talk to your doctor, pharmacist, or health care provider. Copyright  2016 Gold Standard

## 2022-01-10 NOTE — PROGRESS NOTES
Patient tolerated infusion well, no signs or symptoms of adverse reaction noted.  Patient denies pain nor discomfort.  Patient discharged to care of granddaughter

## 2022-01-17 DIAGNOSIS — I50.22 CHRONIC SYSTOLIC CONGESTIVE HEART FAILURE (H): Chronic | ICD-10-CM

## 2022-01-19 RX ORDER — LISINOPRIL 5 MG/1
TABLET ORAL
Qty: 90 TABLET | Refills: 0 | Status: SHIPPED | OUTPATIENT
Start: 2022-01-19 | End: 2022-03-14

## 2022-01-19 NOTE — TELEPHONE ENCOUNTER
Lisinopril      Last Written Prescription Date:  10.12.21  Last Fill Quantity: #90,   # refills: 0  Last Office Visit: 12.22.21 with Maritza Amaro  Future Office visit:    Next 5 appointments (look out 90 days)    Jan 25, 2022  2:15 PM  (Arrive by 2:00 PM)  Return Visit with COOC Mccartney CNP  [facility] ([facility] ) [address]  [place] [address]  [phone]   Jan 26, 2022  2:00 PM  (Arrive by 1:45 PM)  Office Visit with Ophelia Troncoso MD  [facility] ([facility] ) [address]  [place] [address]  [phone]           Routing refill request to provider for review/approval because:

## 2022-01-26 ENCOUNTER — OFFICE VISIT (OUTPATIENT)
Dept: FAMILY MEDICINE | Facility: OTHER | Age: 82
End: 2022-01-26
Attending: FAMILY MEDICINE
Payer: MEDICARE

## 2022-01-26 VITALS
HEART RATE: 84 BPM | SYSTOLIC BLOOD PRESSURE: 128 MMHG | DIASTOLIC BLOOD PRESSURE: 58 MMHG | BODY MASS INDEX: 18.88 KG/M2 | WEIGHT: 100 LBS | RESPIRATION RATE: 18 BRPM | HEIGHT: 61 IN | OXYGEN SATURATION: 96 % | TEMPERATURE: 98.2 F

## 2022-01-26 DIAGNOSIS — I10 BENIGN ESSENTIAL HYPERTENSION: ICD-10-CM

## 2022-01-26 DIAGNOSIS — I50.22 CHRONIC SYSTOLIC CONGESTIVE HEART FAILURE (H): Primary | ICD-10-CM

## 2022-01-26 DIAGNOSIS — I77.1 SUBCLAVIAN ARTERIAL STENOSIS (H): ICD-10-CM

## 2022-01-26 DIAGNOSIS — E03.9 ACQUIRED HYPOTHYROIDISM: ICD-10-CM

## 2022-01-26 DIAGNOSIS — J43.2 CENTRILOBULAR EMPHYSEMA (H): ICD-10-CM

## 2022-01-26 DIAGNOSIS — D50.9 IRON DEFICIENCY ANEMIA, UNSPECIFIED IRON DEFICIENCY ANEMIA TYPE: ICD-10-CM

## 2022-01-26 DIAGNOSIS — E44.1 MILD PROTEIN-CALORIE MALNUTRITION (H): ICD-10-CM

## 2022-01-26 DIAGNOSIS — E87.6 HYPOKALEMIA: ICD-10-CM

## 2022-01-26 DIAGNOSIS — N18.4 CKD (CHRONIC KIDNEY DISEASE) STAGE 4, GFR 15-29 ML/MIN (H): ICD-10-CM

## 2022-01-26 DIAGNOSIS — E21.0 PRIMARY HYPERPARATHYROIDISM (H): ICD-10-CM

## 2022-01-26 LAB
ANION GAP SERPL CALCULATED.3IONS-SCNC: 6 MMOL/L (ref 3–14)
BASOPHILS # BLD AUTO: 0.1 10E3/UL (ref 0–0.2)
BASOPHILS NFR BLD AUTO: 1 %
BUN SERPL-MCNC: 14 MG/DL (ref 7–30)
CALCIUM SERPL-MCNC: 9 MG/DL (ref 8.5–10.1)
CHLORIDE BLD-SCNC: 109 MMOL/L (ref 94–109)
CO2 SERPL-SCNC: 24 MMOL/L (ref 20–32)
CREAT SERPL-MCNC: 1.12 MG/DL (ref 0.52–1.04)
EOSINOPHIL # BLD AUTO: 0.2 10E3/UL (ref 0–0.7)
EOSINOPHIL NFR BLD AUTO: 2 %
ERYTHROCYTE [DISTWIDTH] IN BLOOD BY AUTOMATED COUNT: 24.9 % (ref 10–15)
FERRITIN SERPL-MCNC: 633 NG/ML (ref 8–252)
GFR SERPL CREATININE-BSD FRML MDRD: 49 ML/MIN/1.73M2
GLUCOSE BLD-MCNC: 95 MG/DL (ref 70–99)
HCT VFR BLD AUTO: 35.8 % (ref 35–47)
HGB BLD-MCNC: 10.6 G/DL (ref 11.7–15.7)
IMM GRANULOCYTES # BLD: 0 10E3/UL
IMM GRANULOCYTES NFR BLD: 0 %
IRON SATN MFR SERPL: 24 % (ref 15–46)
IRON SERPL-MCNC: 71 UG/DL (ref 35–180)
LYMPHOCYTES # BLD AUTO: 1.8 10E3/UL (ref 0.8–5.3)
LYMPHOCYTES NFR BLD AUTO: 23 %
MCH RBC QN AUTO: 24.1 PG (ref 26.5–33)
MCHC RBC AUTO-ENTMCNC: 29.6 G/DL (ref 31.5–36.5)
MCV RBC AUTO: 81 FL (ref 78–100)
MONOCYTES # BLD AUTO: 0.7 10E3/UL (ref 0–1.3)
MONOCYTES NFR BLD AUTO: 9 %
NEUTROPHILS # BLD AUTO: 5.2 10E3/UL (ref 1.6–8.3)
NEUTROPHILS NFR BLD AUTO: 65 %
NRBC # BLD AUTO: 0 10E3/UL
NRBC BLD AUTO-RTO: 0 /100
PLATELET # BLD AUTO: 405 10E3/UL (ref 150–450)
POTASSIUM BLD-SCNC: 4.4 MMOL/L (ref 3.4–5.3)
RBC # BLD AUTO: 4.4 10E6/UL (ref 3.8–5.2)
SODIUM SERPL-SCNC: 139 MMOL/L (ref 133–144)
TIBC SERPL-MCNC: 294 UG/DL (ref 240–430)
TSH SERPL DL<=0.005 MIU/L-ACNC: 1.84 MU/L (ref 0.4–4)
WBC # BLD AUTO: 8.1 10E3/UL (ref 4–11)

## 2022-01-26 PROCEDURE — 84443 ASSAY THYROID STIM HORMONE: CPT | Mod: ZL | Performed by: FAMILY MEDICINE

## 2022-01-26 PROCEDURE — G0463 HOSPITAL OUTPT CLINIC VISIT: HCPCS | Mod: 25

## 2022-01-26 PROCEDURE — 99214 OFFICE O/P EST MOD 30 MIN: CPT | Performed by: FAMILY MEDICINE

## 2022-01-26 PROCEDURE — 83550 IRON BINDING TEST: CPT | Mod: ZL | Performed by: FAMILY MEDICINE

## 2022-01-26 PROCEDURE — 82728 ASSAY OF FERRITIN: CPT | Mod: ZL | Performed by: FAMILY MEDICINE

## 2022-01-26 PROCEDURE — G0463 HOSPITAL OUTPT CLINIC VISIT: HCPCS

## 2022-01-26 PROCEDURE — 36415 COLL VENOUS BLD VENIPUNCTURE: CPT | Mod: ZL | Performed by: FAMILY MEDICINE

## 2022-01-26 PROCEDURE — 82310 ASSAY OF CALCIUM: CPT | Mod: ZL | Performed by: FAMILY MEDICINE

## 2022-01-26 PROCEDURE — 85025 COMPLETE CBC W/AUTO DIFF WBC: CPT | Mod: ZL | Performed by: FAMILY MEDICINE

## 2022-01-26 RX ORDER — POTASSIUM CHLORIDE 1500 MG/1
20 TABLET, EXTENDED RELEASE ORAL DAILY
Qty: 90 TABLET | Refills: 3 | COMMUNITY
Start: 2022-01-26 | End: 2022-02-22

## 2022-01-26 ASSESSMENT — ANXIETY QUESTIONNAIRES
1. FEELING NERVOUS, ANXIOUS, OR ON EDGE: NOT AT ALL
7. FEELING AFRAID AS IF SOMETHING AWFUL MIGHT HAPPEN: NOT AT ALL
3. WORRYING TOO MUCH ABOUT DIFFERENT THINGS: NOT AT ALL
4. TROUBLE RELAXING: NOT AT ALL
5. BEING SO RESTLESS THAT IT IS HARD TO SIT STILL: NOT AT ALL
2. NOT BEING ABLE TO STOP OR CONTROL WORRYING: NOT AT ALL
GAD7 TOTAL SCORE: 0
6. BECOMING EASILY ANNOYED OR IRRITABLE: NOT AT ALL

## 2022-01-26 ASSESSMENT — PATIENT HEALTH QUESTIONNAIRE - PHQ9: SUM OF ALL RESPONSES TO PHQ QUESTIONS 1-9: 0

## 2022-01-26 ASSESSMENT — MIFFLIN-ST. JEOR: SCORE: 855.66

## 2022-01-26 ASSESSMENT — PAIN SCALES - GENERAL: PAINLEVEL: NO PAIN (0)

## 2022-01-26 NOTE — PROGRESS NOTES
Assessment & Plan     Subclavian arterial stenosis (H) / Hypertension / CKD (chronic kidney disease) stage 4, GFR 15-29 ml/min (H)  BP today is very good. Continue current medications including statin.   - Basic metabolic panel    Mild protein-calorie malnutrition (H)  Weight is actually improved. She is forcing down ensure which she hates. Advised to trial pediatric brand nutritional shakes as they may taste better. Otherwise, mix with ice cream. Goal would be to increase foods she likes and not force certain foods. We have been on remeron, not helpful for weight. Discussed other weight promoting medications (marinol and megace) these were not recommended as her weight is stable.     Centrilobular emphysema (H)  Breathing is very stable.     Chronic systolic congestive heart failure (H)  Euvolemic today, no edema. Would like to take her lasix prn. Okay to make that change.     Primary hyperparathyroidism (H)  Recheck calcium, pt is taking TUMS currently.     Iron deficiency anemia, unspecified iron deficiency anemia type  S/p iron infusion with mild symptomatic improvement. Likely related to chronic GI losses. Hx of GI bleeding in 2019 and 2020. Endoscopy has been completed. Pt also not eating a lot of meat or high iron foods. Recheck iron and hgb  - Iron and iron binding capacity  - Ferritin  - CBC with platelets and differential    Hypokalemia  Pt plans to decrease her lasix to prn. In light of this, may decrease potassium to once daily. Recheck 3 mo  - potassium chloride ER (KLOR-CON) 20 MEQ CR tablet  Dispense: 90 tablet; Refill: 3    Acquired hypothyroidism  Pt concerned about low weight and if she needs dose reduction on medication, rechck.   - TSH with free T4 reflex    30 minutes spent on the date of the encounter doing chart review, review of test results, interpretation of tests and documentation     Return in about 3 months (around 4/26/2022) for anemia recheck, Weight check. .    Ophelia Troncoso,  MD [facility] [place]    Joann London is a 81 year old who presents for the following health issues;l     HPI     Depression and Anxiety Follow-Up    How are you doing with your depression since your last visit? None    How are you doing with your anxiety since your last visit?  None    Are you having other symptoms that might be associated with depression or anxiety? No    Have you had a significant life event? No     Do you have any concerns with your use of alcohol or other drugs? No    Social History     Tobacco Use     Smoking status: Former Smoker     Packs/day: 1.00     Years: 50.00     Pack years: 50.00     Types: Cigarettes     Quit date: 1/1/2019     Years since quitting: 3.0     Smokeless tobacco: Never Used     Tobacco comment: quit Jan 2019   Substance Use Topics     Alcohol use: Yes     Comment: social     Drug use: No     PHQ 12/13/2017 2/28/2020 1/26/2022   PHQ-9 Total Score 6 3 0   Q9: Thoughts of better off dead/self-harm past 2 weeks Not at all Not at all Not at all     ANTONELLA-7 SCORE 12/13/2017 2/28/2020 1/26/2022   Total Score 2 0 0     Last PHQ-9 1/26/2022   1.  Little interest or pleasure in doing things 0   2.  Feeling down, depressed, or hopeless 0   3.  Trouble falling or staying asleep, or sleeping too much 0   4.  Feeling tired or having little energy 0   5.  Poor appetite or overeating 0   6.  Feeling bad about yourself 0   7.  Trouble concentrating 0   8.  Moving slowly or restless 0   Q9: Thoughts of better off dead/self-harm past 2 weeks 0   PHQ-9 Total Score 0   Difficulty at work, home, or with people -     ANTONELLA-7  1/26/2022   1. Feeling nervous, anxious, or on edge 0   2. Not being able to stop or control worrying 0   3. Worrying too much about different things 0   4. Trouble relaxing 0   5. Being so restless that it is hard to sit still 0   6. Becoming easily annoyed or irritable 0   7. Feeling afraid, as if something awful might happen 0   ANTONELLA-7 Total Score 0  "  If you checked any problems, how difficult have they made it for you to do your work, take care of things at home, or get along with other people? -       Suicide Assessment Five-step Evaluation and Treatment (SAFE-T)          Hypertension Follow-up      Do you check your blood pressure regularly outside of the clinic? Yes     Are you following a low salt diet? No    Are your blood pressures ever more than 140 on the top number (systolic) OR more   than 90 on the bottom number (diastolic), for example 140/90? Yes    Heart Failure Follow-up    Are you experiencing any shortness of breath? No    Are you experiencing any swelling in your legs or feet?  No    Are you using more pillows than usual? Yes    Do you cough at night?  No    Do you check your weight daily?  No    Have you had a weight change recently?  No    Are you having any of the following side effects from your medications? (Select all that apply)  The patient does not report symptoms of dizziness, fatigue, cough, swelling, or slow heart beat.    Since your last visit, how many times have you gone to the cardiologist, urgent care, emergency room, or hospital because of your heart failure?   None      Review of Systems   Constitutional, HEENT, cardiovascular, pulmonary, gi and gu systems are negative, except as otherwise noted.      Objective    /58 (BP Location: Left arm, Patient Position: Sitting, Cuff Size: Adult Regular)   Pulse 84   Temp 98.2  F (36.8  C) (Tympanic)   Resp 18   Ht 1.549 m (5' 0.98\")   Wt 45.4 kg (100 lb)   SpO2 96%   BMI 18.91 kg/m    Body mass index is 18.91 kg/m .  Physical Exam   GENERAL: alert, no distress and frail  NECK: no adenopathy and thyroid normal to palpation  RESP: lungs clear to auscultation - no rales, rhonchi or wheezes  CV: regular rates and rhythm, normal S1 S2, no S3 or S4, no murmur, click or rub and no peripheral edema  MS: no gross musculoskeletal defects noted, no edema  SKIN: no suspicious " lesions or rashes  PSYCH: mentation appears normal, affect normal/bright    Results for orders placed or performed in visit on 01/26/22   CBC with platelets and differential     Status: None (In process)    Narrative    The following orders were created for panel order CBC with platelets and differential.  Procedure                               Abnormality         Status                     ---------                               -----------         ------                     CBC with platelets and d...[[id number]]                      In process                   Please view results for these tests on the individual orders.

## 2022-01-27 ASSESSMENT — ANXIETY QUESTIONNAIRES: GAD7 TOTAL SCORE: 0

## 2022-02-09 NOTE — PROGRESS NOTES
Chief Complaint   Patient presents with     Cerumen Impaction     Ear cleaning.       Patient presents to ENT for ear cleaning.   Patient was last seen in ENT on 1/7/21 for ear cleaning.   Lita has been doing well. Reports no concerns.       Hearing loss remains stable with no acute hearing chances. Denies fluctuating hearing loss.   No aural fullness.   Denies tinnitus/vertigo.  Denies otalgia/otorrhea and no facial paresthesias or dysphagia.   Remains not interested in any surgery to fix right TM perforation.   Denies COM or otologic surgeries.   Denies otalgia, otorrhea  Denies worrisome tinnitus  Denies fluctuating hearing loss or tinnitus.   Denies vertigo or facial paraesthesia.   Denies dysphagia.     Past Medical History:   Diagnosis Date     Acquired hypothyroidism 5/12/2019     Asthma      Benign essential hypertension 5/12/2019     Centrilobular emphysema (H) 5/12/2019     Chronic rhinitis 8/16/2013     Chronic systolic congestive heart failure (H) 5/12/2019     Constipation      Coronary artery disease      Hearing loss      Heart trouble      Hemorrhagic cerebrovascular accident (CVA) (H) 01/2019     Hyperlipidemia 5/12/2019     Hypertension      Nasal congestion      Non-rheumatic mitral regurgitation 5/12/2019     Osteopenia 5/12/2019     Osteoporosis      Parathyroid related hypercalcemia (H) 8/18/2013     Primary hyperparathyroidism (H) 9/5/2013     Ringing in ears      Sensorineural hearing loss, asymmetrical 8/16/2013     Sneezing      Snoring      Stented coronary artery      Thyroid disease      Weakness      Weight loss         Allergies   Allergen Reactions     Evista [Raloxifene] Other (See Comments)     hypercalcemia     Prednisone GI Disturbance     Combigan [Brimonidine Tartrate-Timolol] Other (See Comments)     Eye swelling and itchy     Latex Rash     Levaquin [Levofloxacin] Muscle Pain (Myalgia)     Penicillins Rash     Restasis      Pt reports using eye gtts and having red irritated  eyes      Current Outpatient Medications   Medication     amLODIPine (NORVASC) 10 MG tablet     atorvastatin (LIPITOR) 40 MG tablet     calcium carbonate (TUMS) 500 MG chewable tablet     clindamycin (CLEOCIN) 300 MG capsule     diclofenac (VOLTAREN) 1 % topical gel     ferrous sulfate (FEROSUL) 325 (65 Fe) MG tablet     fluticasone (FLONASE) 50 MCG/ACT spray     furosemide (LASIX) 20 MG tablet     lisinopril (ZESTRIL) 5 MG tablet     metoprolol succinate ER (TOPROL-XL) 25 MG 24 hr tablet     nitroGLYcerin (NITROSTAT) 0.4 MG sublingual tablet     pantoprazole (PROTONIX) 40 MG EC tablet     potassium chloride ER (KLOR-CON) 20 MEQ CR tablet     sucralfate (CARAFATE) 1 GM tablet     SYNTHROID 50 MCG tablet     Tafluprost (ZIOPTAN) 0.0015 % SOLN     ZIOPTAN 0.0015 % SOLN ophthalmic solution     No current facility-administered medications for this visit.     ROS- SEE HPI  BP (!) 140/40 (BP Location: Right arm, Patient Position: Chair, Cuff Size: Adult Regular)   Pulse 77   Temp 97.8  F (36.6  C) (Tympanic)   Ht 1.524 m (5')   Wt 43.5 kg (96 lb)   SpO2 98%   BMI 18.75 kg/m      General - The patient is well nourished and well developed, and appears to have good nutritional status.  Alert and oriented to person and place, interactive.  Head and Face - Normocephalic and atraumatic, with no gross asymmetry noted of the contour of the facial features.  The facial nerve is intact, with strong symmetric movements.  Neck- No palpable lymphadenopathy or thyroid mass.  Trachea is midline.  Eyes - Extraocular movements intact.   Ears- External ears normal. Ears examined under microscope. Left EAC with scant cerumen, removed with cerumen loop. NO FB noted.  TM intact without effusion/retraction. Right EAC with cerumen and debris, scant moisture along distal canal. TM with stable/dry anterior central <5% perforation with healthy middle ear space; no polyps/granulation tissue. Perforation appears improved since her last visit.    Nose - Nasal mucosa is pink and moist with no abnormal mucus.  The septum was grossly midline and non-obstructive, turbinates of normal size and position.  No polyps, masses, or purulence noted on examination.  Mouth - Examination of the oral cavity shows pink, healthy, moist mucosa. Dentition in good condition.  No lesions or ulceration noted. The tongue is mobile and midline.    Throat - The walls of the oropharynx were smooth, pink, moist, symmetric, and had no lesions or ulcerations.  The tonsillar pillars and soft palate were symmetric.  The uvula was midline on elevation.          ASSESSMENT/ PLAN:    ICD-10-CM    1. Perforation of tympanic membrane, right  H72.91    2. Sensorineural hearing loss (SNHL) of both ears  H90.3    3. Wears hearing aid  Z97.4          Right TM perforation appears improved since her last visit and has no chronic otorrhea or reported changes in hearing.  Continue to keep right ear dry.   She can follow-up on yearly basis for this, but recommend returning sooner as needed, especially if chronic otorrhea/hearing changes.     Continue with hearing aid use. Follow up at [facility] for audiogram and [facility].        Katelynn Winters PA-C  ENT  [facility], [place]

## 2022-02-10 ENCOUNTER — OFFICE VISIT (OUTPATIENT)
Dept: OTOLARYNGOLOGY | Facility: OTHER | Age: 82
End: 2022-02-10
Attending: PHYSICIAN ASSISTANT
Payer: MEDICARE

## 2022-02-10 VITALS
TEMPERATURE: 97.8 F | WEIGHT: 96 LBS | SYSTOLIC BLOOD PRESSURE: 140 MMHG | HEART RATE: 77 BPM | BODY MASS INDEX: 18.85 KG/M2 | DIASTOLIC BLOOD PRESSURE: 40 MMHG | OXYGEN SATURATION: 98 % | HEIGHT: 60 IN

## 2022-02-10 DIAGNOSIS — H90.3 SENSORINEURAL HEARING LOSS (SNHL) OF BOTH EARS: ICD-10-CM

## 2022-02-10 DIAGNOSIS — H72.91 PERFORATION OF TYMPANIC MEMBRANE, RIGHT: Primary | ICD-10-CM

## 2022-02-10 DIAGNOSIS — Z97.4 WEARS HEARING AID: ICD-10-CM

## 2022-02-10 PROCEDURE — 92504 EAR MICROSCOPY EXAMINATION: CPT | Performed by: PHYSICIAN ASSISTANT

## 2022-02-10 PROCEDURE — 99213 OFFICE O/P EST LOW 20 MIN: CPT | Mod: 25 | Performed by: PHYSICIAN ASSISTANT

## 2022-02-10 PROCEDURE — G0463 HOSPITAL OUTPT CLINIC VISIT: HCPCS

## 2022-02-10 ASSESSMENT — MIFFLIN-ST. JEOR: SCORE: 821.95

## 2022-02-10 ASSESSMENT — PAIN SCALES - GENERAL: PAINLEVEL: NO PAIN (0)

## 2022-02-10 NOTE — NURSING NOTE
Chief Complaint   Patient presents with     Cerumen Impaction     Ear cleaning.       Initial BP (!) 140/40 (BP Location: Right arm, Patient Position: Chair, Cuff Size: Adult Regular)   Pulse 77   Temp 97.8  F (36.6  C) (Tympanic)   Ht 1.524 m (5')   Wt 43.5 kg (96 lb)   SpO2 98%   BMI 18.75 kg/m   Estimated body mass index is 18.75 kg/m  as calculated from the following:    Height as of this encounter: 1.524 m (5').    Weight as of this encounter: 43.5 kg (96 lb).  Medication Reconciliation: complete  CORBIN JEWELL LPN

## 2022-02-10 NOTE — LETTER
2/10/2022         RE: Lita Ocasio  [address] [facility] [address]        Dear Colleague,    Thank you for referring your patient, Lita Ocasio, to the [facility]. Please see a copy of my visit note below.    Chief Complaint   Patient presents with     Cerumen Impaction     Ear cleaning.       Patient presents to ENT for ear cleaning.   Patient was last seen in ENT on 1/7/21 for ear cleaning.   Lita has been doing well. Reports no concerns.       Hearing loss remains stable with no acute hearing chances. Denies fluctuating hearing loss.   No aural fullness.   Denies tinnitus/vertigo.  Denies otalgia/otorrhea and no facial paresthesias or dysphagia.   Remains not interested in any surgery to fix right TM perforation.   Denies COM or otologic surgeries.   Denies otalgia, otorrhea  Denies worrisome tinnitus  Denies fluctuating hearing loss or tinnitus.   Denies vertigo or facial paraesthesia.   Denies dysphagia.     Past Medical History:   Diagnosis Date     Acquired hypothyroidism 5/12/2019     Asthma      Benign essential hypertension 5/12/2019     Centrilobular emphysema (H) 5/12/2019     Chronic rhinitis 8/16/2013     Chronic systolic congestive heart failure (H) 5/12/2019     Constipation      Coronary artery disease      Hearing loss      Heart trouble      Hemorrhagic cerebrovascular accident (CVA) (H) 01/2019     Hyperlipidemia 5/12/2019     Hypertension      Nasal congestion      Non-rheumatic mitral regurgitation 5/12/2019     Osteopenia 5/12/2019     Osteoporosis      Parathyroid related hypercalcemia (H) 8/18/2013     Primary hyperparathyroidism (H) 9/5/2013     Ringing in ears      Sensorineural hearing loss, asymmetrical 8/16/2013     Sneezing      Snoring      Stented coronary artery      Thyroid disease      Weakness      Weight loss         Allergies   Allergen Reactions     Evista [Raloxifene] Other (See Comments)     hypercalcemia     Prednisone GI Disturbance      Combigan [Brimonidine Tartrate-Timolol] Other (See Comments)     Eye swelling and itchy     Latex Rash     Levaquin [Levofloxacin] Muscle Pain (Myalgia)     Penicillins Rash     Restasis      Pt reports using eye gtts and having red irritated eyes      Current Outpatient Medications   Medication     amLODIPine (NORVASC) 10 MG tablet     atorvastatin (LIPITOR) 40 MG tablet     calcium carbonate (TUMS) 500 MG chewable tablet     clindamycin (CLEOCIN) 300 MG capsule     diclofenac (VOLTAREN) 1 % topical gel     ferrous sulfate (FEROSUL) 325 (65 Fe) MG tablet     fluticasone (FLONASE) 50 MCG/ACT spray     furosemide (LASIX) 20 MG tablet     lisinopril (ZESTRIL) 5 MG tablet     metoprolol succinate ER (TOPROL-XL) 25 MG 24 hr tablet     nitroGLYcerin (NITROSTAT) 0.4 MG sublingual tablet     pantoprazole (PROTONIX) 40 MG EC tablet     potassium chloride ER (KLOR-CON) 20 MEQ CR tablet     sucralfate (CARAFATE) 1 GM tablet     SYNTHROID 50 MCG tablet     Tafluprost (ZIOPTAN) 0.0015 % SOLN     ZIOPTAN 0.0015 % SOLN ophthalmic solution     No current facility-administered medications for this visit.     ROS- SEE HPI  BP (!) 140/40 (BP Location: Right arm, Patient Position: Chair, Cuff Size: Adult Regular)   Pulse 77   Temp 97.8  F (36.6  C) (Tympanic)   Ht 1.524 m (5')   Wt 43.5 kg (96 lb)   SpO2 98%   BMI 18.75 kg/m      General - The patient is well nourished and well developed, and appears to have good nutritional status.  Alert and oriented to person and place, interactive.  Head and Face - Normocephalic and atraumatic, with no gross asymmetry noted of the contour of the facial features.  The facial nerve is intact, with strong symmetric movements.  Neck- No palpable lymphadenopathy or thyroid mass.  Trachea is midline.  Eyes - Extraocular movements intact.   Ears- External ears normal. Ears examined under microscope. Left EAC with scant cerumen, removed with cerumen loop. NO FB noted.  TM intact without  effusion/retraction. Right EAC with cerumen and debris, scant moisture along distal canal. TM with stable/dry anterior central <5% perforation with healthy middle ear space; no polyps/granulation tissue. Perforation appears improved since her last visit.   Nose - Nasal mucosa is pink and moist with no abnormal mucus.  The septum was grossly midline and non-obstructive, turbinates of normal size and position.  No polyps, masses, or purulence noted on examination.  Mouth - Examination of the oral cavity shows pink, healthy, moist mucosa. Dentition in good condition.  No lesions or ulceration noted. The tongue is mobile and midline.    Throat - The walls of the oropharynx were smooth, pink, moist, symmetric, and had no lesions or ulcerations.  The tonsillar pillars and soft palate were symmetric.  The uvula was midline on elevation.          ASSESSMENT/ PLAN:    ICD-10-CM    1. Perforation of tympanic membrane, right  H72.91    2. Sensorineural hearing loss (SNHL) of both ears  H90.3    3. Wears hearing aid  Z97.4          Right TM perforation appears improved since her last visit and has no chronic otorrhea or reported changes in hearing.  Continue to keep right ear dry.   She can follow-up on yearly basis for this, but recommend returning sooner as needed, especially if chronic otorrhea/hearing changes.     Continue with hearing aid use. Follow [facility] at [facility] for audiogram and [facility].        Katelynn Winters PA-C  ENT  [facility] [place]                  Again, thank you for allowing me to participate in the care of your patient.        Sincerely,        Katelynn Winters PA-C     no fever and no chills.

## 2022-02-10 NOTE — PATIENT INSTRUCTIONS
Ears look well  Right TM perforation appears smaller.   Monitor right ear.   Continue w/ water precautions Right ear.   If any drainage, pain or hearing changes return to ENT sooner.   Recheck ears in 1 year.       Thank you for allowing Katelynn Winters PA-C and our ENT team to participate in your care.  If your medications are too expensive, please give the nurse a call.  We can possibly change this medication.  If you have a scheduling or an appointment question please contact our Health Unit Coordinator at [phone], Ext. [phone].    ALL nursing questions or concerns can be directed to your ENT nurse at: [phone] Rosalee

## 2022-02-11 NOTE — NURSING NOTE
"Chief Complaint   Patient presents with     RECHECK     Upper Biopsy        Initial BP 98/70 (BP Location: Left arm, Patient Position: Sitting, Cuff Size: Adult Regular)   Pulse 76   Temp 98.1  F (36.7  C) (Tympanic)   Resp 18   Ht 1.499 m (4' 11\")   Wt 46.6 kg (102 lb 12.8 oz)   SpO2 97%   BMI 20.76 kg/m   Estimated body mass index is 20.76 kg/m  as calculated from the following:    Height as of this encounter: 1.499 m (4' 11\").    Weight as of this encounter: 46.6 kg (102 lb 12.8 oz).  Medication Reconciliation: complete  Swathi Johnson LPN    " Ibuprofen : 600 mg every 6-8 hours while awake for 5 days    Rest this weekend    Stretch     Ice after game tonight.

## 2022-02-14 DIAGNOSIS — I50.22 CHRONIC SYSTOLIC CONGESTIVE HEART FAILURE (H): Chronic | ICD-10-CM

## 2022-02-15 RX ORDER — METOPROLOL SUCCINATE 25 MG/1
TABLET, EXTENDED RELEASE ORAL
Qty: 90 TABLET | Refills: 1 | Status: SHIPPED | OUTPATIENT
Start: 2022-02-15 | End: 2022-03-14

## 2022-02-15 RX ORDER — FUROSEMIDE 20 MG
TABLET ORAL
Qty: 90 TABLET | Refills: 0 | Status: SHIPPED | OUTPATIENT
Start: 2022-02-15 | End: 2022-06-14

## 2022-02-15 NOTE — TELEPHONE ENCOUNTER
metoprolol      Last Written Prescription Date:  11/15/21  Last Fill Quantity: 10,   # refills: 0  Last Office Visit: 1/26/22  Future Office visit:    Next 5 appointments (look out 90 days)    Apr 29, 2022  4:00 PM  (Arrive by 3:45 PM)  SHORT with Ophelia Troncoso MD  [facility] [place] ([facility] [place] ) [address]  [place] [address]  [phone]

## 2022-02-15 NOTE — TELEPHONE ENCOUNTER
furosemide (LASIX) 20 MG tablet      Last Written Prescription Date:  11-15-21  Last Fill Quantity: 90,   # refills: 0  Last Office Visit: 7-27-21  Future Office visit:    Next 5 appointments (look out 90 days)    Apr 29, 2022  4:00 PM  (Arrive by 3:45 PM)  SHORT with Ophelia Troncoso MD  [facility] ([facility] ) [address] [place] [address]  [phone]           Routing refill request to provider for review/approval because:   Blood pressure under 140/90 in past 12 months    Normal serum creatinine on file in past 12 months       BP Readings from Last 3 Encounters:   02/10/22 (!) 140/40   01/26/22 128/58   01/10/22 (!) 144/61       Creatinine   Date Value Ref Range Status   01/26/2022 1.12 (H) 0.52 - 1.04 mg/dL Final   07/09/2021 1.58 (H) 0.52 - 1.04 mg/dL Final

## 2022-02-21 DIAGNOSIS — E87.6 HYPOKALEMIA: ICD-10-CM

## 2022-02-22 RX ORDER — POTASSIUM CHLORIDE 1500 MG/1
TABLET, EXTENDED RELEASE ORAL
Qty: 180 TABLET | Refills: 0 | Status: SHIPPED | OUTPATIENT
Start: 2022-02-22 | End: 2022-06-27

## 2022-02-22 NOTE — TELEPHONE ENCOUNTER
potassium chloride ER (KLOR-CON) 20 MEQ CR tablet      Last Written Prescription Date:  1-26-22 (signed as historical)  Last Fill Quantity: 90,   # refills: 3  Last Office Visit: 1-26-22  Future Office visit:    Next 5 appointments (look out 90 days)    Apr 29, 2022  4:00 PM  (Arrive by 3:45 PM)  SHORT with Ophelia Troncoso MD  [facility] [place] ([facility] [place] ) [address] [place] [address] AVE  [place] [address]  [phone]           Routing refill request to provider for review/approval because:  Signed as historical- pharmacy did not recieve

## 2022-02-28 DIAGNOSIS — E03.9 ACQUIRED HYPOTHYROIDISM: ICD-10-CM

## 2022-03-01 ENCOUNTER — TELEPHONE (OUTPATIENT)
Dept: OTOLARYNGOLOGY | Facility: OTHER | Age: 82
End: 2022-03-01
Payer: MEDICARE

## 2022-03-01 RX ORDER — LEVOTHYROXINE SODIUM 50 MCG
TABLET ORAL
Qty: 90 TABLET | Refills: 0 | Status: SHIPPED | OUTPATIENT
Start: 2022-03-01 | End: 2022-06-14

## 2022-03-01 NOTE — TELEPHONE ENCOUNTER
Patient is wondering if she can get a RX for the medication that she gave her samples of. PLease call patient at [phone]

## 2022-03-01 NOTE — TELEPHONE ENCOUNTER
Synthroid      Last Written Prescription Date:  12/14/21  Last Fill Quantity: 90,   # refills: 0  Last Office Visit: 1/26/22  Future Office visit:    Next 5 appointments (look out 90 days)    Apr 29, 2022  4:00 PM  (Arrive by 3:45 PM)  SHORT with Ophelia Troncoso MD  [facility] [place] ([facility] [place] ) [address]  [place] [address]  [phone]

## 2022-03-01 NOTE — TELEPHONE ENCOUNTER
I spoke with Lita and she was wondering if the aquaphor is over the counter or if it was prescription.  Patient was notified that she can pick this up over the counter.

## 2022-03-14 ENCOUNTER — OFFICE VISIT (OUTPATIENT)
Dept: CARDIOLOGY | Facility: OTHER | Age: 82
End: 2022-03-14
Attending: INTERNAL MEDICINE
Payer: MEDICARE

## 2022-03-14 VITALS
TEMPERATURE: 99.4 F | BODY MASS INDEX: 20.03 KG/M2 | HEART RATE: 75 BPM | RESPIRATION RATE: 16 BRPM | HEIGHT: 60 IN | OXYGEN SATURATION: 93 % | SYSTOLIC BLOOD PRESSURE: 150 MMHG | DIASTOLIC BLOOD PRESSURE: 54 MMHG | WEIGHT: 102 LBS

## 2022-03-14 DIAGNOSIS — R94.39 ABNORMAL CARDIOVASCULAR STRESS TEST: ICD-10-CM

## 2022-03-14 DIAGNOSIS — Z92.89 HISTORY OF CARDIOVERSION: ICD-10-CM

## 2022-03-14 DIAGNOSIS — I34.0 NON-RHEUMATIC MITRAL REGURGITATION: ICD-10-CM

## 2022-03-14 DIAGNOSIS — N18.32 STAGE 3B CHRONIC KIDNEY DISEASE (H): ICD-10-CM

## 2022-03-14 DIAGNOSIS — I10 BENIGN ESSENTIAL HYPERTENSION: ICD-10-CM

## 2022-03-14 DIAGNOSIS — I50.32 CHRONIC DIASTOLIC CONGESTIVE HEART FAILURE (H): ICD-10-CM

## 2022-03-14 DIAGNOSIS — I73.9 PAD (PERIPHERAL ARTERY DISEASE) (H): ICD-10-CM

## 2022-03-14 DIAGNOSIS — Z71.6 TOBACCO ABUSE COUNSELING: ICD-10-CM

## 2022-03-14 DIAGNOSIS — I50.22 CHRONIC SYSTOLIC CONGESTIVE HEART FAILURE (H): Primary | ICD-10-CM

## 2022-03-14 DIAGNOSIS — J43.9 PULMONARY EMPHYSEMA, UNSPECIFIED EMPHYSEMA TYPE (H): ICD-10-CM

## 2022-03-14 DIAGNOSIS — E78.5 HYPERLIPIDEMIA: ICD-10-CM

## 2022-03-14 DIAGNOSIS — Z87.891 HISTORY OF TOBACCO ABUSE: ICD-10-CM

## 2022-03-14 DIAGNOSIS — E78.2 MIXED HYPERLIPIDEMIA: ICD-10-CM

## 2022-03-14 DIAGNOSIS — Z86.73 HISTORY OF CVA (CEREBROVASCULAR ACCIDENT): ICD-10-CM

## 2022-03-14 DIAGNOSIS — Z95.5 HISTORY OF CORONARY ARTERY STENT PLACEMENT: ICD-10-CM

## 2022-03-14 DIAGNOSIS — Z13.6 SCREENING FOR AAA (ABDOMINAL AORTIC ANEURYSM): ICD-10-CM

## 2022-03-14 DIAGNOSIS — I50.22 CHRONIC SYSTOLIC CONGESTIVE HEART FAILURE (H): ICD-10-CM

## 2022-03-14 DIAGNOSIS — I97.89 POSTOPERATIVE ATRIAL FIBRILLATION (H): ICD-10-CM

## 2022-03-14 DIAGNOSIS — I25.10 CORONARY ARTERY DISEASE INVOLVING NATIVE CORONARY ARTERY OF NATIVE HEART WITHOUT ANGINA PECTORIS: ICD-10-CM

## 2022-03-14 DIAGNOSIS — I48.91 POSTOPERATIVE ATRIAL FIBRILLATION (H): ICD-10-CM

## 2022-03-14 DIAGNOSIS — R06.09 DOE (DYSPNEA ON EXERTION): Primary | ICD-10-CM

## 2022-03-14 DIAGNOSIS — I65.23 BILATERAL CAROTID ARTERY STENOSIS: ICD-10-CM

## 2022-03-14 PROCEDURE — 99215 OFFICE O/P EST HI 40 MIN: CPT | Performed by: INTERNAL MEDICINE

## 2022-03-14 PROCEDURE — G0463 HOSPITAL OUTPT CLINIC VISIT: HCPCS

## 2022-03-14 RX ORDER — METOPROLOL SUCCINATE 50 MG/1
50 TABLET, EXTENDED RELEASE ORAL DAILY
Qty: 90 TABLET | Refills: 3 | Status: SHIPPED | OUTPATIENT
Start: 2022-03-14 | End: 2023-04-04

## 2022-03-14 RX ORDER — ASPIRIN 81 MG/1
81 TABLET, CHEWABLE ORAL DAILY
Qty: 90 TABLET | Refills: 3 | Status: SHIPPED | OUTPATIENT
Start: 2022-03-14 | End: 2023-09-06

## 2022-03-14 RX ORDER — LISINOPRIL 10 MG/1
10 TABLET ORAL DAILY
Qty: 90 TABLET | Refills: 3 | Status: SHIPPED | OUTPATIENT
Start: 2022-03-14 | End: 2023-03-17

## 2022-03-14 ASSESSMENT — PAIN SCALES - GENERAL: PAINLEVEL: NO PAIN (0)

## 2022-03-14 NOTE — PROGRESS NOTES
[facility] HEART CARE   CARDIOLOGY PROGRESS NOTE     Chief Complaint   Patient presents with     Consult     Heart Problem          Diagnosis:  1.  CHF-diastolic dysfunction grade 1 on 2019.  History of ischemic cardiomyopathy with recovered EF.  36% on 2019 stress test.  60-65% on 2019.  2.  MILLS.  3.  Abnormal stress test on 2019.  4.  Cardiac cath with stenting to O/M LCX on 2019 at [facility].  5.  H/O CVA on 2019.  6.  History of tobacco abuse, quitting on 2019.  7.  Hypertension-uncontrolled.  8.  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  9.  Hyperlipidemia-controlled.  10.  COPD-????.  11.  Postop A. fib on 19 at [facility].  12.  Cardioversion on 19 at [facility].  13.  PAD in 2020-moderate.  14.  Bilateral carotid disease with 50% stenosis on 2020.    Assessment/Plan:    1.  Patient is to take 81 mg chewable aspirin daily with history of coronary disease/stenting and PAD.  2.  We will increase lisinopril from 5 mg to 10 mg daily secondary to uncontrolled hypertension.  3.  Will increase metoprolol from 25 mg daily to 50 mg XL daily related to uncontrolled hypertension and history of A. Fib.  4.  We will do a Zio patch to determine if she is having ongoing issues of A. fib.  She had postop A. fib with cardioversion in 2019 at [facility].  She describes a previous history of a stroke.  She is on aspirin but no additional anticoagulation.  5.  Discussed doing PFT's with a history of COPD and clearly reduced lung function on physical exam.  PFT's were declined.  Patient states her   of COPD and she does not want to know whether or not she has it.  6.  We will plan for an ultrasound of carotids looking for carotid disease with history of 50% stenosis bilaterally on 2020 and ultrasound of the abdomen for AAA.  They are not done in [place].  Patient has grandchildren in [place].  [facility].  7.  Follow-up in 2 to 3 months.      Interval history:  Lita is being  seen in follow-up.  She was last seen by Hailee Cortez on 2021.  At that time, she was treated for heart failure with a preserved ejection fraction.  She has a history of ischemic cardiomyopathy with an EF as low as 36% on her stress test from 2019.  Her EF has since recovered to 60-65% on 2019.  She is having mild peripheral edema currently on Lasix 20 mg daily.  She denies significant dyspnea on exertion.  She is not having significant peripheral edema.  Her blood pressure remains uncontrolled which is playing a part in her diastolic dysfunction.  Changes were made to her medications as outlined above.  She also has a history of postop A. fib with cardioversion at [facility] on 2019.  She denies any palpitations or fluttering.  She has not been on an anticoagulant.  Currently on aspirin 81 mg daily.  Suggested a Zio patch to determine if she is having ongoing episodes of A. fib with a history of stroke in 2019.  Patient was agreeable.  She will have an ultrasound of her carotids with history of 50% bilateral stenosis in 2020.  I will also plan for an ultrasound of her abdomen for AAA.  This never been assessed.  I believe she has COPD after auscultation.  She has significantly reduced air movement.  PFT's declined.  She states she does not want to know the answer as her  previously had COPD and  from this.  Thankfully, she did quit smoking in 2019 after her stroke.  Patient will follow up after completion of testing.    HPI:    Ms. Ocasio has a history of CAD s/p coronary angiography on 19 at [facility]. She was recently identified to have newly identified systolic heart failure.  Additionally, she has a history of hypertension, hyperlipidemia, nonrheumatic mitral regurgitation, central lobular emphysema, hypothyroidism, osteopenia, hyperparathyroidism, history of tobacco abuse and chronic rhinitis.     At initial visit patient was accompanied by her son who was very helpful in  providing patients past medical history. He admitted approximately 20 years ago patient was being followed for MR which was noted to be improved and therefore, vlave surgery was never recommended. He admitted that she was previously told it was suspected she had small MI's without coronary intervention or identified ACS. Finally, recent history just before new years of 2020 acute hemorrhagic stroke. It was following this, that she was taken off her beta blocker and lisinopril for HTN and possible past MI, she reported increased shortness of breath following this which has progressively worsened and likely multifactorial with COPD and HFrEF.      6/4/2019-Lexiscan stress test with abnormal imaging with reversible ischemia laterally and LVEF 36%. Additionally, in review of past CT chest imaging from one year ago, she was noted to have advanced atherosclerotic disease with bulky coronary and aortic calcifications. Given these findings, current symptoms and new acute systolic failure, recommended coronary angiography.     Patient underwent coronary angiography on 6/26/19 at [facility]. She was identified to have diffuse coronary atherosclerosis with single vessel obstructive CAD, ostial and mid LCX. Successful PCI with synergy LISS x2, mid LCX 2.5x16mm, ostial LCX 3.0 x 16 mm. LVEDP 26-29 mmHg. During revascularization she was identified to have AF for which she received DCCV x1 with return to sinus and then reverted back to AF. She was treated with IV beta blocker for rate control. Patient reported feeling good following coronary intervention. Breathing had significantly improved and increased energy.       Patient presented to the ED on 7/13/21 with reports of chest pressure, dizziness and increased dyspnea. She was noted to be playing [place] ball that same day with symptoms. ECG revealing SR with SVE, no ST-T changes. Troponin negative x 2 and chest XR unremarkable. Lab evaluation with decline in renal function and  suspected dehydration.         Relevant testing:  KESHAV on 2/28/20:  Moderate arterial occlusive disease in both lower extremities.    US carotids on 2/28/20:  Heavy atherosclerotic plaquing in both carotid bifurcations with velocities consistent with less than 50% stenoses noted.    ECHO on 12/5/19:  No pericardial effusion is present.  Global and regional left ventricular function is normal with an EF of 60-65%.  Grade I or early diastolic dysfunction.  The right ventricle is normal size.  Global right ventricular function is normal.  Both atria appear normal.  Mild mitral insufficiency is present.  Aortic valve is normal in structure and function.  The aortic valve is tricuspid.  Trace tricuspid insufficiency is present.  Right ventricular systolic pressure is 3 mmHg above the right atrial pressure.  The pulmonic valve is normal.    Stress test on 6/14/19:   Reversible ischemia laterally. Abnormally low left ventricular ejection fraction                ICD-10-CM    1. MILLS (dyspnea on exertion)  R06.00    2. Chronic diastolic congestive heart failure (H)  I50.32 lisinopril (ZESTRIL) 10 MG tablet     metoprolol succinate ER (TOPROL-XL) 50 MG 24 hr tablet   3. Benign essential hypertension  I10 lisinopril (ZESTRIL) 10 MG tablet     metoprolol succinate ER (TOPROL-XL) 50 MG 24 hr tablet   4. Stage 3b chronic kidney disease (H)  N18.32 lisinopril (ZESTRIL) 10 MG tablet   5. History of coronary artery stent placement to the O/M LCX on 6/28/2019  Z95.5 aspirin (ASA) 81 MG chewable tablet   6. Coronary artery disease involving native coronary artery of native heart without angina pectoris  I25.10 aspirin (ASA) 81 MG chewable tablet   7. Abnormal cardiovascular stress test on 6/14/2019  R94.39 aspirin (ASA) 81 MG chewable tablet   8. History of CVA on 1/1/2019  Z86.73 aspirin (ASA) 81 MG chewable tablet   9. Mixed hyperlipidemia  E78.2    10. COPD  J43.9    11. Postoperative atrial fibrillation on 6/28/2019 (H)  I97.89  Leadless EKG Monitor 8 to 14 Days    I48.91 aspirin (ASA) 81 MG chewable tablet   12. History of cardioversion on 6/28/2019  Z98.890    13. PAD on 2/28/2020-moderate (H)  I73.9 aspirin (ASA) 81 MG chewable tablet   14. Bilateral carotid artery stenosis  I65.23 US Carotid Bilateral     aspirin (ASA) 81 MG chewable tablet   15. History of tobacco abuse quitting on 1/1/2019  Z87.891    16. Screening for AAA (abdominal aortic aneurysm)  Z13.6  Aorta Medicare AAA Screening   17. Tobacco abuse counseling  Z71.6        Past Medical History:   Diagnosis Date     Acquired hypothyroidism 5/12/2019     Asthma      Benign essential hypertension 5/12/2019     Centrilobular emphysema (H) 5/12/2019     Chronic rhinitis 8/16/2013     Chronic systolic congestive heart failure (H) 5/12/2019     Constipation      Coronary artery disease      Hearing loss      Heart trouble      Hemorrhagic cerebrovascular accident (CVA) (H) 01/2019     Hyperlipidemia 5/12/2019     Hypertension      Nasal congestion      Non-rheumatic mitral regurgitation 5/12/2019     Osteopenia 5/12/2019     Osteoporosis      Parathyroid related hypercalcemia (H) 8/18/2013     Primary hyperparathyroidism (H) 9/5/2013     Ringing in ears      Sensorineural hearing loss, asymmetrical 8/16/2013     Sneezing      Snoring      Stented coronary artery      Thyroid disease      Weakness      Weight loss        Past Surgical History:   Procedure Laterality Date     CATARACT IOL, RT/LT Bilateral      COLONOSCOPY       COLONOSCOPY - HIM SCAN  01/01/2009    Colonoscopy - [facility]/NL  2009     ENDOSCOPY UPPER, COLONOSCOPY, COMBINED N/A 10/17/2019    Procedure: UPPER ENDOSCOPY WITH BIOPSY  AND COLONOSCOPY;  Surgeon: Julio Canales MD;  Location: [facility] OR     ENT SURGERY  2011    para thyroid surgery     ENT SURGERY  09/2013    Parathyroid     ESOPHAGOSCOPY, GASTROSCOPY, DUODENOSCOPY (EGD), COMBINED N/A 9/21/2019    Procedure: ESOPHAGOGASTRODUODENOSCOPY (EGD);  Surgeon: Parker  Julio Ruth MD;  Location: [facility] OR     ESOPHAGOSCOPY, GASTROSCOPY, DUODENOSCOPY (EGD), COMBINED N/A 1/27/2020    Procedure: ESOPHAGOGASTRODUODENOSCOPY (EGD) WITH BIOPSY;  Surgeon: Isrrael Parish MD;  Location: [facility] OR     ESOPHAGOSCOPY, GASTROSCOPY, DUODENOSCOPY (EGD), COMBINED N/A 2/20/2020    Procedure: ESOPHAGOGASTRODUODENOSCOPY (EGD) WITH BIOPSY;  Surgeon: Pedro Luis Montoya DO;  Location: [facility] OR     PARATHYROIDECTOMY  9/10/2013    Procedure: PARATHYROIDECTOMY;  Reoperative Neck Exploration, Resection of right Parathyroid adenoma;  Surgeon: Thalia Muller MD;  Location: [facility] OR     TONSILLECTOMY       TUBAL LIGATION         Allergies   Allergen Reactions     Evista [Raloxifene] Other (See Comments)     hypercalcemia     Prednisone GI Disturbance     Combigan [Brimonidine Tartrate-Timolol] Other (See Comments)     Eye swelling and itchy     Latex Rash     Levaquin [Levofloxacin] Muscle Pain (Myalgia)     Penicillins Rash     Restasis      Pt reports using eye gtts and having red irritated eyes        Current Outpatient Medications   Medication Sig Dispense Refill     amLODIPine (NORVASC) 10 MG tablet TAKE 1 TABLET DAILY 90 tablet 1     aspirin (ASA) 81 MG chewable tablet Take 1 tablet (81 mg) by mouth daily 90 tablet 3     atorvastatin (LIPITOR) 40 MG tablet TAKE 1 TABLET AT BEDTIME 90 tablet 1     calcium carbonate (TUMS) 500 MG chewable tablet Take 1 chew tab by mouth daily       diclofenac (VOLTAREN) 1 % topical gel Apply 4 g topically 4 times daily 350 g 1     fluticasone (FLONASE) 50 MCG/ACT spray Spray 2 sprays into both nostrils daily 1 Bottle 11     furosemide (LASIX) 20 MG tablet TAKE 1 TABLET EVERY MORNING 90 tablet 0     KLOR-CON 20 MEQ CR tablet TAKE 1 TABLET TWICE A  tablet 0     lisinopril (ZESTRIL) 10 MG tablet Take 1 tablet (10 mg) by mouth daily 90 tablet 3     metoprolol succinate ER (TOPROL-XL) 50 MG 24 hr tablet Take 1 tablet (50 mg) by mouth daily 90 tablet 3     nitroGLYcerin  (NITROSTAT) 0.4 MG sublingual tablet Place 0.4 mg under the tongue every 5 minutes as needed for chest pain For chest pain place 1 tablet under the tongue every 5 minutes for 3 doses. If symptoms persist 5 minutes after 1st dose call 911.        pantoprazole (PROTONIX) 40 MG EC tablet TAKE 1 TABLET TWICE DAILY  BEFORE MEALS 180 tablet 1     sucralfate (CARAFATE) 1 GM tablet TAKE 1 TABLET BY MOUTH FOUR TIMES A DAY BEFORE MEALS AND AT NIGHT 360 tablet 1     SYNTHROID 50 MCG tablet TAKE 1 TABLET DAILY 90 tablet 0     Tafluprost (ZIOPTAN) 0.0015 % SOLN Place 1 drop into both eyes At Bedtime       clindamycin (CLEOCIN) 300 MG capsule Take 2 capsules (600 mg) by mouth once as needed (prior to dental procedure) (Patient not taking: Reported on 3/14/2022) 2 capsule 0     ferrous sulfate (FEROSUL) 325 (65 Fe) MG tablet Take 1 tablet (325 mg) by mouth daily (with breakfast) (Patient not taking: Reported on 3/14/2022) 90 tablet 0       Social History     Socioeconomic History     Marital status:      Spouse name: Not on file     Number of children: Not on file     Years of education: Not on file     Highest education level: Not on file   Occupational History     Not on file   Tobacco Use     Smoking status: Former Smoker     Packs/day: 1.00     Years: 50.00     Pack years: 50.00     Types: Cigarettes     Quit date: 1/1/2019     Years since quitting: 3.2     Smokeless tobacco: Never Used     Tobacco comment: quit Jan 2019   Substance and Sexual Activity     Alcohol use: Yes     Comment: social     Drug use: No     Sexual activity: Not Currently   Other Topics Concern     Parent/sibling w/ CABG, MI or angioplasty before 65F 55M? Yes   Social History Narrative     Not on file     Social Determinants of Health     Financial Resource Strain: Not on file   Food Insecurity: Not on file   Transportation Needs: Not on file   Physical Activity: Not on file   Stress: Not on file   Social Connections: Not on file   Intimate Partner  Violence: Not on file   Housing Stability: Not on file       LAB RESULTS:   Office Visit on 01/26/2022   Component Date Value Ref Range Status     TSH 01/26/2022 1.84  0.40 - 4.00 mU/L Final     Sodium 01/26/2022 139  133 - 144 mmol/L Final     Potassium 01/26/2022 4.4  3.4 - 5.3 mmol/L Final     Chloride 01/26/2022 109  94 - 109 mmol/L Final     Carbon Dioxide (CO2) 01/26/2022 24  20 - 32 mmol/L Final     Anion Gap 01/26/2022 6  3 - 14 mmol/L Final     Urea Nitrogen 01/26/2022 14  7 - 30 mg/dL Final     Creatinine 01/26/2022 1.12 (A) 0.52 - 1.04 mg/dL Final     Calcium 01/26/2022 9.0  8.5 - 10.1 mg/dL Final     Glucose 01/26/2022 95  70 - 99 mg/dL Final     GFR Estimate 01/26/2022 49 (A) >60 mL/min/1.73m2 Final     Iron 01/26/2022 71  35 - 180 ug/dL Final     Iron Binding Capacity 01/26/2022 294  240 - 430 ug/dL Final     Iron Sat Index 01/26/2022 24  15 - 46 % Final     Ferritin 01/26/2022 633 (A) 8 - 252 ng/mL Final     WBC Count 01/26/2022 8.1  4.0 - 11.0 10e3/uL Final     RBC Count 01/26/2022 4.40  3.80 - 5.20 10e6/uL Final     Hemoglobin 01/26/2022 10.6 (A) 11.7 - 15.7 g/dL Final     Hematocrit 01/26/2022 35.8  35.0 - 47.0 % Final     MCV 01/26/2022 81  78 - 100 fL Final     MCH 01/26/2022 24.1 (A) 26.5 - 33.0 pg Final     MCHC 01/26/2022 29.6 (A) 31.5 - 36.5 g/dL Final     RDW 01/26/2022 24.9 (A) 10.0 - 15.0 % Final     Platelet Count 01/26/2022 405  150 - 450 10e3/uL Final     % Neutrophils 01/26/2022 65  % Final     % Lymphocytes 01/26/2022 23  % Final     % Monocytes 01/26/2022 9  % Final     % Eosinophils 01/26/2022 2  % Final     % Basophils 01/26/2022 1  % Final     % Immature Granulocytes 01/26/2022 0  % Final     NRBCs per 100 WBC 01/26/2022 0  <1 /100 Final     Absolute Neutrophils 01/26/2022 5.2  1.6 - 8.3 10e3/uL Final     Absolute Lymphocytes 01/26/2022 1.8  0.8 - 5.3 10e3/uL Final     Absolute Monocytes 01/26/2022 0.7  0.0 - 1.3 10e3/uL Final     Absolute Eosinophils 01/26/2022 0.2  0.0 - 0.7  10e3/uL Final     Absolute Basophils 01/26/2022 0.1  0.0 - 0.2 10e3/uL Final     Absolute Immature Granulocytes 01/26/2022 0.0  <=0.4 10e3/uL Final     Absolute NRBCs 01/26/2022 0.0  10e3/uL Final        Review of systems: Negative except that which was noted in the HPI.    Physical examination:  BP (!) 150/54   Pulse 75   Temp 99.4  F (37.4  C) (Tympanic)   Resp 16   Ht 1.524 m (5')   Wt 46.3 kg (102 lb)   SpO2 93%   BMI 19.92 kg/m      GENERAL APPEARANCE: healthy, alert and no distress  HEENT: no icterus, no xanthelasmas, normal pupil size and reaction, no cyanosis.  NECK: no adenopathy, no asymmetry, masses.  CHEST: lungs clear to auscultation - no rales, rhonchi or wheezes, no use of accessory muscles, no retractions, respirations are unlabored, normal respiratory rate.  But with reduced air movement.  CARDIOVASCULAR: regular rhythm, normal S1 with physiologic split S2, no S3 or S4 and no murmur, click or rub  EXTREMITIES: no clubbing, cyanosis or edema  NEURO: alert and oriented normal speech, and affect  VASC: No vascular bruits heard.  SKIN: no ecchymoses, no rashes    Total time spent on day of visit, including review of tests, obtaining/reviewing separately obtained history, ordering medications/tests/procedures, communicating with PCP/consultants, and documenting in electronic medical record: 70 minutes.           Thank you for allowing me to participate in the care of your patient. Please do not hesitate to contact me if you have any questions.     Sal Graham, DO

## 2022-03-14 NOTE — NURSING NOTE
Chief Complaint   Patient presents with     Consult     Heart Problem       Initial BP (!) 150/54   Pulse 75   Temp 99.4  F (37.4  C) (Tympanic)   Resp 16   Ht 1.524 m (5')   Wt 46.3 kg (102 lb)   SpO2 93%   BMI 19.92 kg/m   Estimated body mass index is 19.92 kg/m  as calculated from the following:    Height as of this encounter: 1.524 m (5').    Weight as of this encounter: 46.3 kg (102 lb).  Medication Reconciliation: complete  Constanza Quiroga LPN

## 2022-03-14 NOTE — PATIENT INSTRUCTIONS
Thank you for allowing Dr. Graham and our  team to participate in your care. Please call our office at [phone] with scheduling questions or if you need to cancel or change your appointment. With any other questions or concerns you may call [facility] cardiology nurse at [phone].       If you experience chest pain, chest pressure, chest tightness, shortness of breath, fainting, lightheadedness, nausea, vomiting, or other concerning symptoms, please report to the Emergency Department or call 911. These symptoms may be emergent, and best treated in the Emergency Department.    Follow up in 3 months     Increase metoprolol 50 mg daily.     Increase Lisinopril 10 mg daily.     You will have an ultrasound of the carotids and abdomen. The hospital will call you to schedule this. Due to staff shortage you order was sent to [facility].     You will be scheduled for an appointment to have a Zio Patch placed on the skin.  This monitor will be worn for 14 days.  This is a device that will monitor your heart rate, and rhythm. The hospital scheduling department will contact you to schedule this appointment, and educate you on the use of this patch.

## 2022-03-16 ENCOUNTER — HOSPITAL ENCOUNTER (OUTPATIENT)
Dept: CARDIOLOGY | Facility: HOSPITAL | Age: 82
Discharge: HOME | End: 2022-03-16
Attending: INTERNAL MEDICINE | Admitting: INTERNAL MEDICINE
Payer: MEDICARE

## 2022-03-16 DIAGNOSIS — I97.89 POSTOPERATIVE ATRIAL FIBRILLATION (H): ICD-10-CM

## 2022-03-16 DIAGNOSIS — I48.91 POSTOPERATIVE ATRIAL FIBRILLATION (H): ICD-10-CM

## 2022-03-16 PROCEDURE — 93246 EXT ECG>7D<15D RECORDING: CPT

## 2022-03-16 PROCEDURE — 93248 EXT ECG>7D<15D REV&INTERPJ: CPT | Performed by: INTERNAL MEDICINE

## 2022-03-28 DIAGNOSIS — K92.2 GASTROINTESTINAL HEMORRHAGE, UNSPECIFIED GASTROINTESTINAL HEMORRHAGE TYPE: ICD-10-CM

## 2022-03-29 RX ORDER — PANTOPRAZOLE SODIUM 40 MG/1
TABLET, DELAYED RELEASE ORAL
Qty: 180 TABLET | Refills: 2 | Status: SHIPPED | OUTPATIENT
Start: 2022-03-29 | End: 2022-12-14

## 2022-04-04 DIAGNOSIS — K92.2 ACUTE UPPER GI BLEED: ICD-10-CM

## 2022-04-06 RX ORDER — SUCRALFATE 1 G/1
TABLET ORAL
Qty: 360 TABLET | Refills: 1 | Status: SHIPPED | OUTPATIENT
Start: 2022-04-06 | End: 2022-06-28

## 2022-04-11 ENCOUNTER — TELEPHONE (OUTPATIENT)
Dept: CARDIOLOGY | Facility: OTHER | Age: 82
End: 2022-04-11
Payer: MEDICARE

## 2022-04-11 NOTE — TELEPHONE ENCOUNTER
Patient states that she got a message last week regarding results, but that she didn't hear them and her voicemail cut out.  She is looking for her results from her two week heart monitor.  Thanks.

## 2022-04-11 NOTE — TELEPHONE ENCOUNTER
Patient called stating that she didn't get the message that was left and was calling back.  Nurse called and left a voicemail to try to help answer the patient's questions.  Will await patient's return call.  BRADY PONCE LPN

## 2022-04-19 ENCOUNTER — IMMUNIZATION (OUTPATIENT)
Dept: FAMILY MEDICINE | Facility: OTHER | Age: 82
End: 2022-04-19
Attending: FAMILY MEDICINE
Payer: MEDICARE

## 2022-04-19 PROCEDURE — 91305 COVID-19,PF,PFIZER (12+ YRS): CPT

## 2022-04-24 ENCOUNTER — HEALTH MAINTENANCE LETTER (OUTPATIENT)
Age: 82
End: 2022-04-24

## 2022-04-27 ENCOUNTER — TRANSFERRED RECORDS (OUTPATIENT)
Dept: HEALTH INFORMATION MANAGEMENT | Facility: OTHER | Age: 82
End: 2022-04-27
Payer: MEDICARE

## 2022-06-14 DIAGNOSIS — I50.22 CHRONIC SYSTOLIC CONGESTIVE HEART FAILURE (H): Chronic | ICD-10-CM

## 2022-06-14 DIAGNOSIS — E03.9 ACQUIRED HYPOTHYROIDISM: ICD-10-CM

## 2022-06-16 RX ORDER — LEVOTHYROXINE SODIUM 50 UG/1
50 TABLET ORAL DAILY
Qty: 90 TABLET | Refills: 0 | Status: SHIPPED | OUTPATIENT
Start: 2022-06-16 | End: 2022-09-14

## 2022-06-16 RX ORDER — FUROSEMIDE 20 MG
TABLET ORAL
Qty: 90 TABLET | Refills: 0 | Status: SHIPPED | OUTPATIENT
Start: 2022-06-16 | End: 2022-06-27

## 2022-06-20 ENCOUNTER — HOSPITAL ENCOUNTER (EMERGENCY)
Facility: HOSPITAL | Age: 82
Discharge: HOME | End: 2022-06-20
Attending: PHYSICIAN ASSISTANT | Admitting: PHYSICIAN ASSISTANT
Payer: MEDICARE

## 2022-06-20 ENCOUNTER — NURSE TRIAGE (OUTPATIENT)
Dept: FAMILY MEDICINE | Facility: OTHER | Age: 82
End: 2022-06-20
Payer: MEDICARE

## 2022-06-20 VITALS
OXYGEN SATURATION: 98 % | TEMPERATURE: 97.9 F | DIASTOLIC BLOOD PRESSURE: 49 MMHG | WEIGHT: 96 LBS | HEIGHT: 60 IN | SYSTOLIC BLOOD PRESSURE: 134 MMHG | BODY MASS INDEX: 18.85 KG/M2 | RESPIRATION RATE: 16 BRPM | HEART RATE: 65 BPM

## 2022-06-20 DIAGNOSIS — N17.9 ACUTE KIDNEY INJURY SUPERIMPOSED ON CHRONIC KIDNEY DISEASE (H): ICD-10-CM

## 2022-06-20 DIAGNOSIS — I10 BENIGN ESSENTIAL HYPERTENSION: ICD-10-CM

## 2022-06-20 DIAGNOSIS — N18.9 ACUTE KIDNEY INJURY SUPERIMPOSED ON CHRONIC KIDNEY DISEASE (H): ICD-10-CM

## 2022-06-20 DIAGNOSIS — E86.0 DEHYDRATION: ICD-10-CM

## 2022-06-20 LAB
ALBUMIN SERPL-MCNC: 3.4 G/DL (ref 3.4–5)
ALBUMIN UR-MCNC: NEGATIVE MG/DL
ALP SERPL-CCNC: 69 U/L (ref 40–150)
ALT SERPL W P-5'-P-CCNC: 15 U/L (ref 0–50)
ANION GAP SERPL CALCULATED.3IONS-SCNC: 11 MMOL/L (ref 3–14)
APPEARANCE UR: CLEAR
AST SERPL W P-5'-P-CCNC: 12 U/L (ref 0–45)
BASOPHILS # BLD AUTO: 0 10E3/UL (ref 0–0.2)
BASOPHILS NFR BLD AUTO: 0 %
BILIRUB SERPL-MCNC: 0.3 MG/DL (ref 0.2–1.3)
BILIRUB UR QL STRIP: NEGATIVE
BUN SERPL-MCNC: 40 MG/DL (ref 7–30)
CALCIUM SERPL-MCNC: 8.9 MG/DL (ref 8.5–10.1)
CHLORIDE BLD-SCNC: 111 MMOL/L (ref 94–109)
CO2 SERPL-SCNC: 17 MMOL/L (ref 20–32)
COLOR UR AUTO: ABNORMAL
CREAT SERPL-MCNC: 2.83 MG/DL (ref 0.52–1.04)
EOSINOPHIL # BLD AUTO: 0.2 10E3/UL (ref 0–0.7)
EOSINOPHIL NFR BLD AUTO: 2 %
ERYTHROCYTE [DISTWIDTH] IN BLOOD BY AUTOMATED COUNT: 15.4 % (ref 10–15)
GFR SERPL CREATININE-BSD FRML MDRD: 16 ML/MIN/1.73M2
GLUCOSE BLD-MCNC: 114 MG/DL (ref 70–99)
GLUCOSE UR STRIP-MCNC: NEGATIVE MG/DL
HCT VFR BLD AUTO: 32.5 % (ref 35–47)
HGB BLD-MCNC: 10.3 G/DL (ref 11.7–15.7)
HGB UR QL STRIP: NEGATIVE
HOLD SPECIMEN: NORMAL
IMM GRANULOCYTES # BLD: 0.1 10E3/UL
IMM GRANULOCYTES NFR BLD: 1 %
KETONES UR STRIP-MCNC: NEGATIVE MG/DL
LEUKOCYTE ESTERASE UR QL STRIP: NEGATIVE
LYMPHOCYTES # BLD AUTO: 1.7 10E3/UL (ref 0.8–5.3)
LYMPHOCYTES NFR BLD AUTO: 15 %
MCH RBC QN AUTO: 27.8 PG (ref 26.5–33)
MCHC RBC AUTO-ENTMCNC: 31.7 G/DL (ref 31.5–36.5)
MCV RBC AUTO: 88 FL (ref 78–100)
MONOCYTES # BLD AUTO: 0.6 10E3/UL (ref 0–1.3)
MONOCYTES NFR BLD AUTO: 6 %
MUCOUS THREADS #/AREA URNS LPF: PRESENT /LPF
NEUTROPHILS # BLD AUTO: 8.4 10E3/UL (ref 1.6–8.3)
NEUTROPHILS NFR BLD AUTO: 76 %
NITRATE UR QL: NEGATIVE
NRBC # BLD AUTO: 0 10E3/UL
NRBC BLD AUTO-RTO: 0 /100
PH UR STRIP: 5 [PH] (ref 4.7–8)
PLATELET # BLD AUTO: 280 10E3/UL (ref 150–450)
POTASSIUM BLD-SCNC: 4 MMOL/L (ref 3.4–5.3)
PROT SERPL-MCNC: 6.7 G/DL (ref 6.8–8.8)
RBC # BLD AUTO: 3.7 10E6/UL (ref 3.8–5.2)
RBC URINE: 0 /HPF
SODIUM SERPL-SCNC: 139 MMOL/L (ref 133–144)
SP GR UR STRIP: 1 (ref 1–1.03)
SQUAMOUS EPITHELIAL: 0 /HPF
TROPONIN I SERPL HS-MCNC: 15 NG/L
UROBILINOGEN UR STRIP-MCNC: NORMAL MG/DL
WBC # BLD AUTO: 11 10E3/UL (ref 4–11)
WBC URINE: 1 /HPF

## 2022-06-20 PROCEDURE — 36415 COLL VENOUS BLD VENIPUNCTURE: CPT | Performed by: EMERGENCY MEDICINE

## 2022-06-20 PROCEDURE — 85025 COMPLETE CBC W/AUTO DIFF WBC: CPT | Performed by: EMERGENCY MEDICINE

## 2022-06-20 PROCEDURE — 80053 COMPREHEN METABOLIC PANEL: CPT | Performed by: PHYSICIAN ASSISTANT

## 2022-06-20 PROCEDURE — 258N000003 HC RX IP 258 OP 636: Performed by: PHYSICIAN ASSISTANT

## 2022-06-20 PROCEDURE — 85025 COMPLETE CBC W/AUTO DIFF WBC: CPT | Performed by: PHYSICIAN ASSISTANT

## 2022-06-20 PROCEDURE — 99284 EMERGENCY DEPT VISIT MOD MDM: CPT | Mod: 25

## 2022-06-20 PROCEDURE — 84484 ASSAY OF TROPONIN QUANT: CPT | Performed by: PHYSICIAN ASSISTANT

## 2022-06-20 PROCEDURE — 96361 HYDRATE IV INFUSION ADD-ON: CPT

## 2022-06-20 PROCEDURE — 99284 EMERGENCY DEPT VISIT MOD MDM: CPT | Performed by: PHYSICIAN ASSISTANT

## 2022-06-20 PROCEDURE — 80053 COMPREHEN METABOLIC PANEL: CPT | Performed by: EMERGENCY MEDICINE

## 2022-06-20 PROCEDURE — 81001 URINALYSIS AUTO W/SCOPE: CPT | Performed by: PHYSICIAN ASSISTANT

## 2022-06-20 PROCEDURE — 96360 HYDRATION IV INFUSION INIT: CPT

## 2022-06-20 RX ADMIN — SODIUM CHLORIDE 1000 ML: 9 INJECTION, SOLUTION INTRAVENOUS at 13:23

## 2022-06-20 RX ADMIN — SODIUM CHLORIDE 1000 ML: 9 INJECTION, SOLUTION INTRAVENOUS at 14:24

## 2022-06-20 ASSESSMENT — ENCOUNTER SYMPTOMS
FACIAL ASYMMETRY: 0
CHEST TIGHTNESS: 0
DIZZINESS: 1
COUGH: 0
NAUSEA: 0
CHILLS: 1
NUMBNESS: 0
LIGHT-HEADEDNESS: 1
VOMITING: 0
SHORTNESS OF BREATH: 0
BRUISES/BLEEDS EASILY: 0
NECK PAIN: 0
FEVER: 0
ACTIVITY CHANGE: 1
HEADACHES: 0
FATIGUE: 1
PHOTOPHOBIA: 0
NECK STIFFNESS: 0
ABDOMINAL PAIN: 0
SPEECH DIFFICULTY: 0
WEAKNESS: 1
APPETITE CHANGE: 1
CONSTIPATION: 0
DIARRHEA: 0

## 2022-06-20 NOTE — DISCHARGE INSTRUCTIONS
Please follow-up in the clinic tomorrow at 9:00 am and see Karla Amaro NP for lab recheck.    Stay hydrated.     Advance diet.     Return here for any other concerns.

## 2022-06-20 NOTE — TELEPHONE ENCOUNTER
"Patient advised to ER per protodcol provider not available    Reason for Disposition    SEVERE dizziness (e.g., unable to stand, requires support to walk, feels like passing out now)    Answer Assessment - Initial Assessment Questions  1. DESCRIPTION: \"Describe your dizziness.\"      Dizzy when stands up and is able to walk  2. LIGHTHEADED: \"Do you feel lightheaded?\" (e.g., somewhat faint, woozy, weak upon standing)      weak  3. VERTIGO: \"Do you feel like either you or the room is spinning or tilting?\" (i.e. vertigo)      no  4. SEVERITY: \"How bad is it?\"  \"Do you feel like you are going to faint?\" \"Can you stand and walk?\"    - MILD - walking normally    - MODERATE - interferes with normal activities (e.g., work, school)     - SEVERE - unable to stand, requires support to walk, feels like passing out now.       no  5. ONSET:  \"When did the dizziness begin?\"      today  6. AGGRAVATING FACTORS: \"Does anything make it worse?\" (e.g., standing, change in head position)      no  7. HEART RATE: \"Can you tell me your heart rate?\" \"How many beats in 15 seconds?\"  (Note: not all patients can do this)        no  8. CAUSE: \"What do you think is causing the dizziness?\"      unknown  9. RECURRENT SYMPTOM: \"Have you had dizziness before?\" If so, ask: \"When was the last time?\" \"What happened that time?\"      Yes when she had iron deficiency  10. OTHER SYMPTOMS: \"Do you have any other symptoms?\" (e.g., fever, chest pain, vomiting, diarrhea, bleeding)        Diarrhea this morning  weak  11. PREGNANCY: \"Is there any chance you are pregnant?\" \"When was your last menstrual period?\"        no    Protocols used: DIZZINESS-A-OH      "

## 2022-06-20 NOTE — ED PROVIDER NOTES
"  History     Chief Complaint   Patient presents with     Dizziness     Fatigue     The history is provided by the patient.     Lita Ocasio is a 81 year old female who presented to the emergency department for evaluation of fatigue and dizziness.  The patient reports the symptoms have been ongoing for a few days.  She denies any headaches, visual concerns, chest discomfort, abdominal pain, hematochezia, melena, hematemesis.  She does report \"I do not eat very much.\"  She is close to take iron supplementation for anemia.  Denies any fevers or chills.  Patient reports \"I just do not feel very good.\"    Allergies:  Allergies   Allergen Reactions     Evista [Raloxifene] Other (See Comments)     hypercalcemia     Prednisone GI Disturbance     Combigan [Brimonidine Tartrate-Timolol] Other (See Comments)     Eye swelling and itchy     Latex Rash     Levaquin [Levofloxacin] Muscle Pain (Myalgia)     Penicillins Rash     Restasis      Pt reports using eye gtts and having red irritated eyes        Problem List:    Patient Active Problem List    Diagnosis Date Noted     Coronary artery disease involving native coronary artery of native heart without angina pectoris 03/14/2022     Priority: Medium     Abnormal cardiovascular stress test on 6/14/2019 03/14/2022     Priority: Medium     History of CVA on 1/1/2019 03/14/2022     Priority: Medium     COPD 03/14/2022     Priority: Medium     Postoperative atrial fibrillation on 6/28/2019 (H) 03/14/2022     Priority: Medium     History of cardioversion on 6/28/2019 03/14/2022     Priority: Medium     PAD on 2/28/2020-moderate (H) 03/14/2022     Priority: Medium     Bilateral carotid artery stenosis 03/14/2022     Priority: Medium     History of tobacco abuse quitting on 1/1/2019 03/14/2022     Priority: Medium     Screening for AAA (abdominal aortic aneurysm) 03/14/2022     Priority: Medium     Tobacco abuse counseling 03/14/2022     Priority: Medium     CKD (chronic kidney " disease) stage 4, GFR 15-29 ml/min (H) 01/26/2022     Priority: Medium     Subclavian arterial stenosis (H) 01/26/2022     Priority: Medium     Iron deficiency anemia secondary to inadequate dietary iron intake 01/07/2022     Priority: Medium     Weight loss 02/05/2020     Priority: Medium     Acute blood loss anemia 01/28/2020     Priority: Medium     Anemia due to blood loss, acute 09/20/2019     Priority: Medium     History of GI bleed 09/20/2019     Priority: Medium     Protein-calorie malnutrition (H) 09/04/2019     Priority: Medium     History of coronary artery stent placement to the O/M LCX on 6/28/2019 at [facility] 07/23/2019     Priority: Medium     CKD (chronic kidney disease) stage 3, GFR 30-59 ml/min (H) 06/26/2019     Priority: Medium     Abnormal stress test 06/18/2019     Priority: Medium     Added automatically from request for surgery [id number]       MILLS (dyspnea on exertion) 06/18/2019     Priority: Medium     Added automatically from request for surgery [id number]       ASCVD (arteriosclerotic cardiovascular disease) 06/18/2019     Priority: Medium     Added automatically from request for surgery [id number]       Mixed hyperlipidemia 06/18/2019     Priority: Medium     Added automatically from request for surgery [id number]       Centrilobular emphysema (H) 05/12/2019     Priority: Medium     Acquired hypothyroidism 05/12/2019     Priority: Medium     Chronic systolic congestive heart failure (H)- Recovered 05/12/2019     Priority: Medium     Benign essential hypertension 05/12/2019     Priority: Medium     Osteopenia 05/12/2019     Priority: Medium     Non-rheumatic mitral regurgitation 05/12/2019     Priority: Medium     Primary hyperparathyroidism (H) 09/05/2013     Priority: Medium     Chronic rhinitis 08/16/2013     Priority: Medium        Past Medical History:    Past Medical History:   Diagnosis Date     Acquired hypothyroidism 5/12/2019     Asthma      Benign essential hypertension 5/12/2019      Centrilobular emphysema (H) 5/12/2019     Chronic rhinitis 8/16/2013     Chronic systolic congestive heart failure (H) 5/12/2019     Constipation      Coronary artery disease      Hearing loss      Heart trouble      Hemorrhagic cerebrovascular accident (CVA) (H) 01/2019     Hyperlipidemia 5/12/2019     Hypertension      Nasal congestion      Non-rheumatic mitral regurgitation 5/12/2019     Osteopenia 5/12/2019     Osteoporosis      Parathyroid related hypercalcemia (H) 8/18/2013     Primary hyperparathyroidism (H) 9/5/2013     Ringing in ears      Sensorineural hearing loss, asymmetrical 8/16/2013     Sneezing      Snoring      Stented coronary artery      Thyroid disease      Weakness      Weight loss        Past Surgical History:    Past Surgical History:   Procedure Laterality Date     CATARACT IOL, RT/LT Bilateral      COLONOSCOPY       COLONOSCOPY - HIM SCAN  01/01/2009    Colonoscopy - [facility]/NL  2009     ENDOSCOPY UPPER, COLONOSCOPY, COMBINED N/A 10/17/2019    Procedure: UPPER ENDOSCOPY WITH BIOPSY  AND COLONOSCOPY;  Surgeon: Julio Canales MD;  Location: [facility] OR     ENT SURGERY  2011    para thyroid surgery     ENT SURGERY  09/2013    Parathyroid     ESOPHAGOSCOPY, GASTROSCOPY, DUODENOSCOPY (EGD), COMBINED N/A 9/21/2019    Procedure: ESOPHAGOGASTRODUODENOSCOPY (EGD);  Surgeon: Julio Canales MD;  Location: [facility] OR     ESOPHAGOSCOPY, GASTROSCOPY, DUODENOSCOPY (EGD), COMBINED N/A 1/27/2020    Procedure: ESOPHAGOGASTRODUODENOSCOPY (EGD) WITH BIOPSY;  Surgeon: Isrrael Parish MD;  Location: [facility] OR     ESOPHAGOSCOPY, GASTROSCOPY, DUODENOSCOPY (EGD), COMBINED N/A 2/20/2020    Procedure: ESOPHAGOGASTRODUODENOSCOPY (EGD) WITH BIOPSY;  Surgeon: Pedro Luis Montoya DO;  Location: [facility] OR     PARATHYROIDECTOMY  9/10/2013    Procedure: PARATHYROIDECTOMY;  Reoperative Neck Exploration, Resection of right Parathyroid adenoma;  Surgeon: Thalia Muller MD;  Location: [facility] OR     TONSILLECTOMY       TUBAL  LIGATION         Family History:    Family History   Problem Relation Age of Onset     Hearing Loss Mother      Heart Disease Mother      Myocardial Infarction Mother      Cerebrovascular Disease Father      Cancer Brother         throat     Cancer Sister      Myocardial Infarction Sister      Cancer Maternal Grandmother      Heart Disease Maternal Grandfather      Aortic aneurysm Son        Social History:  Marital Status:   [5]  Social History     Tobacco Use     Smoking status: Former Smoker     Packs/day: 1.00     Years: 50.00     Pack years: 50.00     Types: Cigarettes     Quit date: 1/1/2019     Years since quitting: 3.4     Smokeless tobacco: Never Used     Tobacco comment: quit Jan 2019   Substance Use Topics     Alcohol use: Yes     Comment: social     Drug use: No        Medications:    sucralfate (CARAFATE) 1 GM tablet  amLODIPine (NORVASC) 10 MG tablet  aspirin (ASA) 81 MG chewable tablet  atorvastatin (LIPITOR) 40 MG tablet  calcium carbonate (TUMS) 500 MG chewable tablet  clindamycin (CLEOCIN) 300 MG capsule  diclofenac (VOLTAREN) 1 % topical gel  ferrous sulfate (FEROSUL) 325 (65 Fe) MG tablet  fluticasone (FLONASE) 50 MCG/ACT spray  furosemide (LASIX) 20 MG tablet  KLOR-CON 20 MEQ CR tablet  levothyroxine (SYNTHROID) 50 MCG tablet  lisinopril (ZESTRIL) 10 MG tablet  metoprolol succinate ER (TOPROL-XL) 50 MG 24 hr tablet  nitroGLYcerin (NITROSTAT) 0.4 MG sublingual tablet  pantoprazole (PROTONIX) 40 MG EC tablet  Tafluprost (ZIOPTAN) 0.0015 % SOLN          Review of Systems   Constitutional: Positive for activity change, appetite change, chills and fatigue. Negative for fever.   HENT: Negative.    Eyes: Negative for photophobia and visual disturbance.   Respiratory: Negative for cough, chest tightness and shortness of breath.    Cardiovascular: Negative for chest pain.   Gastrointestinal: Negative for abdominal pain, constipation, diarrhea, nausea and vomiting.   Genitourinary: Negative.     Musculoskeletal: Negative for neck pain and neck stiffness.        Chronic back pain   Skin: Negative.    Allergic/Immunologic: Negative for immunocompromised state.   Neurological: Positive for dizziness, weakness and light-headedness. Negative for syncope, facial asymmetry, speech difficulty, numbness and headaches.   Hematological: Does not bruise/bleed easily.       Physical Exam   BP: 125/55  Pulse: 68  Temp: 97.9  F (36.6  C)  Resp: 16  Height: 152.4 cm (5')  Weight: 43.5 kg (96 lb)  SpO2: 95 %      Physical Exam  Vitals and nursing note reviewed.   Constitutional:       General: She is not in acute distress.     Appearance: Normal appearance. She is normal weight. She is not ill-appearing, toxic-appearing or diaphoretic.   Cardiovascular:      Rate and Rhythm: Normal rate and regular rhythm.   Pulmonary:      Effort: Pulmonary effort is normal.      Breath sounds: Normal breath sounds.   Abdominal:      Palpations: Abdomen is soft.   Skin:     General: Skin is warm and dry.      Capillary Refill: Capillary refill takes less than 2 seconds.   Neurological:      General: No focal deficit present.      Mental Status: She is alert and oriented to person, place, and time.   Psychiatric:         Mood and Affect: Mood normal.         Behavior: Behavior normal.         ED Course              ED Course as of 06/20/22 1523   Mon Jun 20, 2022   1448 Patient feeling significantly improved.     Procedures              Critical Care time:  none               Results for orders placed or performed during the hospital encounter of 06/20/22 (from the past 24 hour(s))   CBC with platelets differential    Narrative    The following orders were created for panel order CBC with platelets differential.  Procedure                               Abnormality         Status                     ---------                               -----------         ------                     CBC with platelets and d...[[id number]]  Abnormal             Final result                 Please view results for these tests on the individual orders.   Comprehensive metabolic panel   Result Value Ref Range    Sodium 139 133 - 144 mmol/L    Potassium 4.0 3.4 - 5.3 mmol/L    Chloride 111 (H) 94 - 109 mmol/L    Carbon Dioxide (CO2) 17 (L) 20 - 32 mmol/L    Anion Gap 11 3 - 14 mmol/L    Urea Nitrogen 40 (H) 7 - 30 mg/dL    Creatinine 2.83 (H) 0.52 - 1.04 mg/dL    Calcium 8.9 8.5 - 10.1 mg/dL    Glucose 114 (H) 70 - 99 mg/dL    Alkaline Phosphatase 69 40 - 150 U/L    AST 12 0 - 45 U/L    ALT 15 0 - 50 U/L    Protein Total 6.7 (L) 6.8 - 8.8 g/dL    Albumin 3.4 3.4 - 5.0 g/dL    Bilirubin Total 0.3 0.2 - 1.3 mg/dL    GFR Estimate 16 (L) >60 mL/min/1.73m2   [facility] Draw    Narrative    The following orders were created for panel order [facility] Draw.  Procedure                               Abnormality         Status                     ---------                               -----------         ------                     Extra Blue Top Tube[[id number]]                              Final result               Extra Green Top (Lithium...[[id number]]                      Final result               Extra Blood Bank Purple ...[[id number]]                      Final result                 Please view results for these tests on the individual orders.   CBC with platelets and differential   Result Value Ref Range    WBC Count 11.0 4.0 - 11.0 10e3/uL    RBC Count 3.70 (L) 3.80 - 5.20 10e6/uL    Hemoglobin 10.3 (L) 11.7 - 15.7 g/dL    Hematocrit 32.5 (L) 35.0 - 47.0 %    MCV 88 78 - 100 fL    MCH 27.8 26.5 - 33.0 pg    MCHC 31.7 31.5 - 36.5 g/dL    RDW 15.4 (H) 10.0 - 15.0 %    Platelet Count 280 150 - 450 10e3/uL    % Neutrophils 76 %    % Lymphocytes 15 %    % Monocytes 6 %    % Eosinophils 2 %    % Basophils 0 %    % Immature Granulocytes 1 %    NRBCs per 100 WBC 0 <1 /100    Absolute Neutrophils 8.4 (H) 1.6 - 8.3 10e3/uL    Absolute Lymphocytes 1.7 0.8 - 5.3 10e3/uL    Absolute Monocytes  0.6 0.0 - 1.3 10e3/uL    Absolute Eosinophils 0.2 0.0 - 0.7 10e3/uL    Absolute Basophils 0.0 0.0 - 0.2 10e3/uL    Absolute Immature Granulocytes 0.1 <=0.4 10e3/uL    Absolute NRBCs 0.0 10e3/uL   Extra Blue Top Tube   Result Value Ref Range    Hold Specimen JI    Extra Green Top (Lithium Heparin) Tube   Result Value Ref Range    Hold Specimen JI    Extra Blood Bank Purple Top Tube   Result Value Ref Range    Hold Specimen JIC    Troponin I   Result Value Ref Range    Troponin I High Sensitivity 15 <54 ng/L   UA with Microscopic reflex to Culture    Specimen: Urine, Clean Catch   Result Value Ref Range    Color Urine Straw Colorless, Straw, Light Yellow, Yellow    Appearance Urine Clear Clear    Glucose Urine Negative Negative mg/dL    Bilirubin Urine Negative Negative    Ketones Urine Negative Negative mg/dL    Specific Gravity Urine 1.005 1.003 - 1.035    Blood Urine Negative Negative    pH Urine 5.0 4.7 - 8.0    Protein Albumin Urine Negative Negative mg/dL    Urobilinogen Urine Normal Normal, 2.0 mg/dL    Nitrite Urine Negative Negative    Leukocyte Esterase Urine Negative Negative    Mucus Urine Present (A) None Seen /LPF    RBC Urine 0 <=2 /HPF    WBC Urine 1 <=5 /HPF    Squamous Epithelials Urine 0 <=1 /HPF    Narrative    Urine Culture not indicated       Medications   0.9% sodium chloride BOLUS (0 mLs Intravenous Stopped 6/20/22 1423)     Followed by   0.9% sodium chloride BOLUS (0 mLs Intravenous Stopped 6/20/22 1517)       Assessments & Plan (with Medical Decision Making)   Findings as above.  The patient has no fevers or tachycardia.  She has normal oxygen saturations on room air.  Laboratory evaluation shows unremarkable urinalysis.  Stable hemoglobin.  But she does have evidence of acute kidney injury.  This is superimposed on chronic kidney disease.  She was hydrated with 2 L of normal saline in the emergency department with improvement of her symptoms.  Certainly would fit any reasonable indication  for close follow-up with repeat labs.  She will be seen in the clinic tomorrow at 9 AM.  She does not appear to be experiencing an emergent or catastrophic event at this time.  The patient voiced complete understanding of the work-up and plan and was agreeable.    This document was prepared using a combination of typing and voice generated software.  While every attempt was made for accuracy, spelling and grammatical errors may exist.    I have reviewed the nursing notes.    I have reviewed the findings, diagnosis, plan and need for follow up with the patient.       New Prescriptions    No medications on file       Final diagnoses:   Dehydration   Acute kidney injury superimposed on chronic kidney disease (H)       6/20/2022   [facility]     Ladarius Snowden PA-C  06/20/22 [id number]

## 2022-06-20 NOTE — ED TRIAGE NOTES
"Patient presents with complaints of not feeling well since around 0900 this AM.  Patient states she feels dizzy and very fatigue.  States she has felt this way in the past when her iron level was low and she needed an infusion.  Patient notes that she tried to call the clinic and was on wait for 1.5 hours and they just told her to go to the ER.  Patient notes \"If I was having a medical emergency I would have called 911-I'm not stupid\"  Patient notes \"my ulcer is driving me crazy\"      "

## 2022-06-20 NOTE — ED NOTES
"Denies abdominal pain, SOB, chest pain, blood in stool or urine, etc. Reports Hx of low iron levels although unable to take iron supplement as it \"makes me sick\". Denies any recent illness or other symptoms.   "

## 2022-06-21 ENCOUNTER — TELEPHONE (OUTPATIENT)
Dept: FAMILY MEDICINE | Facility: OTHER | Age: 82
End: 2022-06-21

## 2022-06-21 NOTE — TELEPHONE ENCOUNTER
"Patient calls stating she was unable to make it to scheduled appt today, 6/21/22 with Maritza Amaro for ED Follow up.     Patient states, \"I just don't feel good right now, I had diarrhea all night\".    Patient is inquiring if she can make appointment for 6/22/22.    Patient can be reached at [phone].      "

## 2022-06-23 ENCOUNTER — LAB (OUTPATIENT)
Dept: LAB | Facility: OTHER | Age: 82
End: 2022-06-23
Payer: MEDICARE

## 2022-06-23 DIAGNOSIS — E78.2 MIXED HYPERLIPIDEMIA: ICD-10-CM

## 2022-06-23 LAB
ALBUMIN SERPL-MCNC: 3.4 G/DL (ref 3.4–5)
ALP SERPL-CCNC: 68 U/L (ref 40–150)
ALT SERPL W P-5'-P-CCNC: 19 U/L (ref 0–50)
ANION GAP SERPL CALCULATED.3IONS-SCNC: 8 MMOL/L (ref 3–14)
AST SERPL W P-5'-P-CCNC: 15 U/L (ref 0–45)
BILIRUB SERPL-MCNC: 0.3 MG/DL (ref 0.2–1.3)
BUN SERPL-MCNC: 27 MG/DL (ref 7–30)
CALCIUM SERPL-MCNC: 8.7 MG/DL (ref 8.5–10.1)
CHLORIDE BLD-SCNC: 111 MMOL/L (ref 94–109)
CO2 SERPL-SCNC: 19 MMOL/L (ref 20–32)
CREAT SERPL-MCNC: 2.14 MG/DL (ref 0.52–1.04)
GFR SERPL CREATININE-BSD FRML MDRD: 23 ML/MIN/1.73M2
GLUCOSE BLD-MCNC: 92 MG/DL (ref 70–99)
POTASSIUM BLD-SCNC: 4.3 MMOL/L (ref 3.4–5.3)
PROT SERPL-MCNC: 6.7 G/DL (ref 6.8–8.8)
SODIUM SERPL-SCNC: 138 MMOL/L (ref 133–144)

## 2022-06-23 PROCEDURE — 36415 COLL VENOUS BLD VENIPUNCTURE: CPT | Mod: ZL

## 2022-06-23 PROCEDURE — 80061 LIPID PANEL: CPT | Mod: ZL

## 2022-06-23 PROCEDURE — 80053 COMPREHEN METABOLIC PANEL: CPT | Mod: ZL

## 2022-06-27 ENCOUNTER — OFFICE VISIT (OUTPATIENT)
Dept: CARDIOLOGY | Facility: OTHER | Age: 82
End: 2022-06-27
Attending: INTERNAL MEDICINE
Payer: MEDICARE

## 2022-06-27 VITALS
TEMPERATURE: 98.5 F | OXYGEN SATURATION: 97 % | DIASTOLIC BLOOD PRESSURE: 49 MMHG | HEART RATE: 67 BPM | BODY MASS INDEX: 18.92 KG/M2 | SYSTOLIC BLOOD PRESSURE: 121 MMHG | WEIGHT: 100.2 LBS | HEIGHT: 61 IN

## 2022-06-27 DIAGNOSIS — R94.39 ABNORMAL STRESS TEST: ICD-10-CM

## 2022-06-27 DIAGNOSIS — I50.22 CHRONIC SYSTOLIC CONGESTIVE HEART FAILURE (H): Chronic | ICD-10-CM

## 2022-06-27 DIAGNOSIS — E78.5 HYPERLIPIDEMIA, UNSPECIFIED HYPERLIPIDEMIA TYPE: ICD-10-CM

## 2022-06-27 DIAGNOSIS — I77.1 SUBCLAVIAN ARTERIAL STENOSIS (H): ICD-10-CM

## 2022-06-27 DIAGNOSIS — I48.91 POSTOPERATIVE ATRIAL FIBRILLATION (H): ICD-10-CM

## 2022-06-27 DIAGNOSIS — I34.0 NON-RHEUMATIC MITRAL REGURGITATION: Chronic | ICD-10-CM

## 2022-06-27 DIAGNOSIS — Z87.19 HISTORY OF GI BLEED: ICD-10-CM

## 2022-06-27 DIAGNOSIS — I97.89 POSTOPERATIVE ATRIAL FIBRILLATION (H): ICD-10-CM

## 2022-06-27 DIAGNOSIS — N18.4 CKD (CHRONIC KIDNEY DISEASE) STAGE 4, GFR 15-29 ML/MIN (H): ICD-10-CM

## 2022-06-27 DIAGNOSIS — K92.2 ACUTE UPPER GI BLEED: ICD-10-CM

## 2022-06-27 DIAGNOSIS — R94.39 ABNORMAL CARDIOVASCULAR STRESS TEST: ICD-10-CM

## 2022-06-27 DIAGNOSIS — E78.2 MIXED HYPERLIPIDEMIA: ICD-10-CM

## 2022-06-27 DIAGNOSIS — Z71.6 TOBACCO ABUSE COUNSELING: ICD-10-CM

## 2022-06-27 DIAGNOSIS — I25.10 ASCVD (ARTERIOSCLEROTIC CARDIOVASCULAR DISEASE): ICD-10-CM

## 2022-06-27 DIAGNOSIS — E03.9 ACQUIRED HYPOTHYROIDISM: Chronic | ICD-10-CM

## 2022-06-27 DIAGNOSIS — Z95.5 S/P DRUG ELUTING CORONARY STENT PLACEMENT: ICD-10-CM

## 2022-06-27 DIAGNOSIS — Z87.891 HISTORY OF TOBACCO ABUSE: ICD-10-CM

## 2022-06-27 DIAGNOSIS — R06.09 DOE (DYSPNEA ON EXERTION): Primary | ICD-10-CM

## 2022-06-27 DIAGNOSIS — I10 BENIGN ESSENTIAL HYPERTENSION: ICD-10-CM

## 2022-06-27 DIAGNOSIS — Z92.89 HISTORY OF CARDIOVERSION: ICD-10-CM

## 2022-06-27 DIAGNOSIS — I25.10 CORONARY ARTERY DISEASE INVOLVING NATIVE CORONARY ARTERY OF NATIVE HEART WITHOUT ANGINA PECTORIS: ICD-10-CM

## 2022-06-27 DIAGNOSIS — I65.23 BILATERAL CAROTID ARTERY STENOSIS: ICD-10-CM

## 2022-06-27 DIAGNOSIS — Z95.5 HISTORY OF CORONARY ARTERY STENT PLACEMENT: ICD-10-CM

## 2022-06-27 DIAGNOSIS — I73.9 PAD (PERIPHERAL ARTERY DISEASE) (H): ICD-10-CM

## 2022-06-27 DIAGNOSIS — Z86.73 HISTORY OF CVA (CEREBROVASCULAR ACCIDENT): ICD-10-CM

## 2022-06-27 DIAGNOSIS — J43.9 PULMONARY EMPHYSEMA, UNSPECIFIED EMPHYSEMA TYPE (H): ICD-10-CM

## 2022-06-27 PROCEDURE — 99417 PROLNG OP E/M EACH 15 MIN: CPT | Performed by: INTERNAL MEDICINE

## 2022-06-27 PROCEDURE — 99215 OFFICE O/P EST HI 40 MIN: CPT | Performed by: INTERNAL MEDICINE

## 2022-06-27 ASSESSMENT — PAIN SCALES - GENERAL: PAINLEVEL: SEVERE PAIN (7)

## 2022-06-27 NOTE — PATIENT INSTRUCTIONS
Thank you for allowing Dr. Graham and our  team to participate in your care. Please call our office at [phone] with scheduling questions or if you need to cancel or change your appointment. With any other questions or concerns you may call [facility] cardiology nurse at [phone].       If you experience chest pain, chest pressure, chest tightness, shortness of breath, fainting, lightheadedness, nausea, vomiting, or other concerning symptoms, please report to the Emergency Department or call 911. These symptoms may be emergent, and best treated in the Emergency Department.    Follow up in 1 year follow up.     You will have an ultrasound of the Carotids. The hospital will call you to schedule this.     You will have Ankel/Brachial Index to evaluate for reduced blood flow to the legs. You will be contacted to schedule this appointment.      Stop lasix and Potassium.

## 2022-06-27 NOTE — PROGRESS NOTES
[facility] HEART CARE   CARDIOLOGY PROGRESS NOTE     Chief Complaint   Patient presents with     RECHECK     3 month follow up- feeling more weak and her legs bother her- in hospital last week for dehydration          Diagnosis:  1.  CHF-diastolic dysfunction grade 1 on 12/5/2019. H/O ischemic cardiomyopathy, recovered EF. 36% on 6/4/2019 stress test. 60-65% on 12/5/2019.  2.  MILLS.  3.  Abnormal stress test on 6/14/2019.  4.  Cardiac cath, stenting to o/mLCX on 6/20/2019 at [facility].  5.  CVA on 1/1/2019.  6.  History of tobacco abuse, quitting on 1/1/2019.  7.  Hypertension-controlled.  8.  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  9.  Hyperlipidemia-controlled.  10.  COPD-????.  11.  Postop A. fib on 6/29/19 at [facility].  12.  Cardioversion on 6/29/19 at [facility].  13.  PAD in 2/20/2020-moderate.  14.  Bilateral carotid disease.  50-69% on 4/27/2023 through [facility].  50% stenosis on 2/28/2020.    Assessment/Plan:    1.  CHF: Patient with minimal to no edema.  Frequently has dehydration on Lasix 20 mg daily.  Seen for dehydration in the ER on 6/20/2022.  Patient drinks at least 8 glasses of water a day.  Will discontinue Lasix 20 mg daily.  Patient also told that she needs to stop the potassium as if taken without diuretic, could potentially kill her with high potassium levels.  2.  CAD: History of stenting.  Has had no chest tightness or anginal symptoms.  No changes.  Continue aspirin 81 mg daily, Lipitor 40 mg daily, metoprolol 50 mg XL daily, sublingual nitro as needed for chest pain.  3.  Hypertension: Controlled.  Blood pressure today 121/49.  Continue amlodipine, lisinopril, and metoprolol.  4.  PAD: ABIs on 2/28/2020 with a right at 0.48 and left 0.68.  Currently on aspirin and Lipitor, will continue.  We will plan for repeat ABIs with lower extremity weakness.  5.  Bilateral carotid artery disease. 50 to 69% on 4/27/2022 through [facility].  We will plan for repeat ultrasound in 1 year.  6.  A. fib: Asymptomatic.  Kelvin  patch on 3/16/2022 deficient of A. fib.  On aspirin and metoprolol.  7.  Follow-up in 1 year or sooner with issues.  We will assess ABIs, ultrasound of carotids, and discontinue Lasix/potassium.    Interval history:  Lita is being seen in follow-up.  She was last seen by Hailee Cortez on 7/27/2021.  At that time, she was treated for heart failure with a preserved ejection fraction.  She has a history of ischemic cardiomyopathy with an EF as low as 36% on her stress test from 6/4/2019.  Her EF has since recovered to 60-65% on 12/5/2019.  She describes being dehydrated.  She drinks approximately 8 glasses of water a day.  She drinks 2 cups of coffee at 8 ounces apiece.  Family is concerned about dehydration.  She was seen in the ER on 6/28/2022 for dehydration.  Her kidney function has gotten worse.  She was given 2 L of normal saline with improvement of her symptoms.  We will stop Lasix 20 mg daily and potassium.  She understands that she does not discontinue the potassium with discontinuing Lasix, this could be life-threatening.  Also, she describing weakness to her legs.  I suspect this is related to PAD.  She did PAD on ABIs from 2/28/2022.  She also referred for ultrasound of carotids and abdomen for AAA.  She was found to to be deficient of AAA but had atherosclerosis.  She had approximately 50-69% blockage in bilateral carotids.  That study was performed in [place] as we did not have sufficient staff locally.  She has a follow-up in 1 year for repeat ultrasound of her carotid arteries.  Reviewed her Zio patch from 3/16/2022 which was deficient of A. fib. She has no additional concerns or complaints        She is having mild peripheral edema currently on Lasix 20 mg daily.  She denies significant dyspnea on exertion.  She is not having significant peripheral edema.  Her blood pressure remains uncontrolled which is playing a part in her diastolic dysfunction.  Changes were made to her medications as outlined  above.  She also has a history of postop A. fib with cardioversion at [facility] on 2019.  She denies any palpitations or fluttering.  She has not been on an anticoagulant.  Currently on aspirin 81 mg daily.  Suggested a Zio patch to determine if she is having ongoing episodes of A. fib with a history of stroke in 2019.  Patient was agreeable.  She will have an ultrasound of her carotids with history of 50% bilateral stenosis in 2020.  I will also plan for an ultrasound of her abdomen for AAA.  This never been assessed.  I believe she has COPD after auscultation.  She has significantly reduced air movement.  PFT's declined.  She states she does not want to know the answer as her  previously had COPD and  from this.  Thankfully, she did quit smoking in 2019 after her stroke.  Patient will follow up after completion of testing.    HPI:    Ms. Ocasio has a history of CAD s/p coronary angiography on 19 at [facility]. She was recently identified to have newly identified systolic heart failure.  Additionally, she has a history of hypertension, hyperlipidemia, nonrheumatic mitral regurgitation, central lobular emphysema, hypothyroidism, osteopenia, hyperparathyroidism, history of tobacco abuse and chronic rhinitis.     At initial visit patient was accompanied by her son who was very helpful in providing patients past medical history. He admitted approximately 20 years ago patient was being followed for MR which was noted to be improved and therefore, vlave surgery was never recommended. He admitted that she was previously told it was suspected she had small MI's without coronary intervention or identified ACS. Finally, recent history just before new years of 2020 acute hemorrhagic stroke. It was following this, that she was taken off her beta blocker and lisinopril for HTN and possible past MI, she reported increased shortness of breath following this which has progressively worsened and likely  multifactorial with COPD and HFrEF.      6/4/2019-Lexiscan stress test with abnormal imaging with reversible ischemia laterally and LVEF 36%. Additionally, in review of past CT chest imaging from one year ago, she was noted to have advanced atherosclerotic disease with bulky coronary and aortic calcifications. Given these findings, current symptoms and new acute systolic failure, recommended coronary angiography.     Patient underwent coronary angiography on 6/26/19 at [facility]. She was identified to have diffuse coronary atherosclerosis with single vessel obstructive CAD, ostial and mid LCX. Successful PCI with synergy LISS x2, mid LCX 2.5x16mm, ostial LCX 3.0 x 16 mm. LVEDP 26-29 mmHg. During revascularization she was identified to have AF for which she received DCCV x1 with return to sinus and then reverted back to AF. She was treated with IV beta blocker for rate control. Patient reported feeling good following coronary intervention. Breathing had significantly improved and increased energy.       Patient presented to the ED on 7/13/21 with reports of chest pressure, dizziness and increased dyspnea. She was noted to be playing [employer] that same day with symptoms. ECG revealing SR with SVE, no ST-T changes. Troponin negative x 2 and chest XR unremarkable. Lab evaluation with decline in renal function and suspected dehydration.         Relevant testing:  US carotids on 4/27/2022 through [facility]  1. Atherosclerotic changes with sonographic data supportive of 50-69% stenosis bilaterally.   2. Retrograde flow in the left vertebral artery.     US abdominal aorta on 4/27/2022:  Diffuse atherosclerotic disease in the intra-abdominal aorta, without sonographic evidence of intra-abdominal aortic aneurysm.     Zio patch on 3/16/22:  Worn for 13 days and 20 hr's.  After removing artifact, total time was 13 days and 16 hr's. Placed on 3/16/2022 at 1:28 PM and completed on 3/30/2022 at 9:15 AM.  Underlying rhythm  was sinus.  Hrt rate ranged from 41 bpm, maximum heart rate of 197 bmp, averaging 62 bmp.  No significant bradycardia, pauses, Mobitz type II or 3rd degree heart block.  No atrial fibrillation on this study.  x0 triggered events and x0 diary entries.  x0 runs of VT.    x7 runs of SVT lasting up to 8 beats with a maximum heart rate of 197 bmp.  Rare, <1% of PAC's, atrial couplets, atrial triplets, PVC's, ventricular couplets, and ventricular triplets.  + episodes of ventricular bigeminy lasting up to 4.4 sec's.  0 episodes of ventricular trigeminy lasting.    KESHAV on 2/28/20:  Moderate arterial occlusive disease in both lower extremities.    US carotids on 2/28/20:  Heavy atherosclerotic plaquing in both carotid bifurcations with velocities consistent with less than 50% stenoses noted.    ECHO on 12/5/19:  No pericardial effusion is present.  Global and regional left ventricular function is normal with an EF of 60-65%.  Grade I or early diastolic dysfunction.  The right ventricle is normal size.  Global right ventricular function is normal.  Both atria appear normal.  Mild mitral insufficiency is present.  Aortic valve is normal in structure and function.  The aortic valve is tricuspid.  Trace tricuspid insufficiency is present.  Right ventricular systolic pressure is 3 mmHg above the right atrial pressure.  The pulmonic valve is normal.    Stress test on 6/14/19:   Reversible ischemia laterally. Abnormally low left ventricular ejection fraction              No diagnosis found.    Past Medical History:   Diagnosis Date     Acquired hypothyroidism 5/12/2019     Asthma      Benign essential hypertension 5/12/2019     Centrilobular emphysema (H) 5/12/2019     Chronic rhinitis 8/16/2013     Chronic systolic congestive heart failure (H) 5/12/2019     Constipation      Coronary artery disease      Hearing loss      Heart trouble      Hemorrhagic cerebrovascular accident (CVA) (H) 01/2019     Hyperlipidemia 5/12/2019      Hypertension      Nasal congestion      Non-rheumatic mitral regurgitation 5/12/2019     Osteopenia 5/12/2019     Osteoporosis      Parathyroid related hypercalcemia (H) 8/18/2013     Primary hyperparathyroidism (H) 9/5/2013     Ringing in ears      Sensorineural hearing loss, asymmetrical 8/16/2013     Sneezing      Snoring      Stented coronary artery      Thyroid disease      Weakness      Weight loss        Past Surgical History:   Procedure Laterality Date     CATARACT IOL, RT/LT Bilateral      COLONOSCOPY       COLONOSCOPY - HIM SCAN  01/01/2009    Colonoscopy - [facility]/NL  2009     ENDOSCOPY UPPER, COLONOSCOPY, COMBINED N/A 10/17/2019    Procedure: UPPER ENDOSCOPY WITH BIOPSY  AND COLONOSCOPY;  Surgeon: Julio Canales MD;  Location: [facility] OR     ENT SURGERY  2011    para thyroid surgery     ENT SURGERY  09/2013    Parathyroid     ESOPHAGOSCOPY, GASTROSCOPY, DUODENOSCOPY (EGD), COMBINED N/A 9/21/2019    Procedure: ESOPHAGOGASTRODUODENOSCOPY (EGD);  Surgeon: Julio Canales MD;  Location: [facility] OR     ESOPHAGOSCOPY, GASTROSCOPY, DUODENOSCOPY (EGD), COMBINED N/A 1/27/2020    Procedure: ESOPHAGOGASTRODUODENOSCOPY (EGD) WITH BIOPSY;  Surgeon: Isrrael Parish MD;  Location: [facility] OR     ESOPHAGOSCOPY, GASTROSCOPY, DUODENOSCOPY (EGD), COMBINED N/A 2/20/2020    Procedure: ESOPHAGOGASTRODUODENOSCOPY (EGD) WITH BIOPSY;  Surgeon: Pedro Luis Montoya DO;  Location: [facility] OR     PARATHYROIDECTOMY  9/10/2013    Procedure: PARATHYROIDECTOMY;  Reoperative Neck Exploration, Resection of right Parathyroid adenoma;  Surgeon: Thalia Muller MD;  Location: [facility] OR     TONSILLECTOMY       TUBAL LIGATION         Allergies   Allergen Reactions     Evista [Raloxifene] Other (See Comments)     hypercalcemia     Prednisone GI Disturbance     Combigan [Brimonidine Tartrate-Timolol] Other (See Comments)     Eye swelling and itchy     Latex Rash     Levaquin [Levofloxacin] Muscle Pain (Myalgia)     Penicillins Rash     Restasis       Pt reports using eye gtts and having red irritated eyes        Current Outpatient Medications   Medication Sig Dispense Refill     amLODIPine (NORVASC) 10 MG tablet TAKE 1 TABLET DAILY 90 tablet 1     aspirin (ASA) 81 MG chewable tablet Take 1 tablet (81 mg) by mouth daily 90 tablet 3     atorvastatin (LIPITOR) 40 MG tablet TAKE 1 TABLET AT BEDTIME 90 tablet 1     calcium carbonate (TUMS) 500 MG chewable tablet Take 1 chew tab by mouth daily       clindamycin (CLEOCIN) 300 MG capsule Take 2 capsules (600 mg) by mouth once as needed (prior to dental procedure) 2 capsule 0     diclofenac (VOLTAREN) 1 % topical gel Apply 4 g topically 4 times daily 350 g 1     ferrous sulfate (FEROSUL) 325 (65 Fe) MG tablet Take 1 tablet (325 mg) by mouth daily (with breakfast) 90 tablet 0     fluticasone (FLONASE) 50 MCG/ACT spray Spray 2 sprays into both nostrils daily 1 Bottle 11     furosemide (LASIX) 20 MG tablet TAKE 1 TABLET EVERY MORNING 90 tablet 0     KLOR-CON 20 MEQ CR tablet TAKE 1 TABLET TWICE A  tablet 0     levothyroxine (SYNTHROID) 50 MCG tablet Take 1 tablet (50 mcg) by mouth daily 90 tablet 0     lisinopril (ZESTRIL) 10 MG tablet Take 1 tablet (10 mg) by mouth daily 90 tablet 3     metoprolol succinate ER (TOPROL-XL) 50 MG 24 hr tablet Take 1 tablet (50 mg) by mouth daily 90 tablet 3     nitroGLYcerin (NITROSTAT) 0.4 MG sublingual tablet Place 0.4 mg under the tongue every 5 minutes as needed for chest pain For chest pain place 1 tablet under the tongue every 5 minutes for 3 doses. If symptoms persist 5 minutes after 1st dose call 911.        pantoprazole (PROTONIX) 40 MG EC tablet TAKE 1 TABLET TWICE DAILY  BEFORE MEALS 180 tablet 2     sucralfate (CARAFATE) 1 GM tablet TAKE 1 TABLET 4 TIMES DAILYBEFORE MEALS AND AT NIGHT 360 tablet 1     Tafluprost 0.0015 % SOLN Place 1 drop into both eyes At Bedtime         Social History     Socioeconomic History     Marital status:      Spouse name: Not on file      Number of children: Not on file     Years of education: Not on file     Highest education level: Not on file   Occupational History     Not on file   Tobacco Use     Smoking status: Former Smoker     Packs/day: 1.00     Years: 50.00     Pack years: 50.00     Types: Cigarettes     Quit date: 1/1/2019     Years since quitting: 3.4     Smokeless tobacco: Never Used     Tobacco comment: quit Jan 2019   Substance and Sexual Activity     Alcohol use: Yes     Comment: social     Drug use: No     Sexual activity: Not Currently   Other Topics Concern     Parent/sibling w/ CABG, MI or angioplasty before 65F 55M? Yes   Social History Narrative     Not on file     Social Determinants of Health     Financial Resource Strain: Not on file   Food Insecurity: Not on file   Transportation Needs: Not on file   Physical Activity: Not on file   Stress: Not on file   Social Connections: Not on file   Intimate Partner Violence: Not on file   Housing Stability: Not on file       LAB RESULTS:   Office Visit on 01/26/2022   Component Date Value Ref Range Status     TSH 01/26/2022 1.84  0.40 - 4.00 mU/L Final     Sodium 01/26/2022 139  133 - 144 mmol/L Final     Potassium 01/26/2022 4.4  3.4 - 5.3 mmol/L Final     Chloride 01/26/2022 109  94 - 109 mmol/L Final     Carbon Dioxide (CO2) 01/26/2022 24  20 - 32 mmol/L Final     Anion Gap 01/26/2022 6  3 - 14 mmol/L Final     Urea Nitrogen 01/26/2022 14  7 - 30 mg/dL Final     Creatinine 01/26/2022 1.12 (A) 0.52 - 1.04 mg/dL Final     Calcium 01/26/2022 9.0  8.5 - 10.1 mg/dL Final     Glucose 01/26/2022 95  70 - 99 mg/dL Final     GFR Estimate 01/26/2022 49 (A) >60 mL/min/1.73m2 Final     Iron 01/26/2022 71  35 - 180 ug/dL Final     Iron Binding Capacity 01/26/2022 294  240 - 430 ug/dL Final     Iron Sat Index 01/26/2022 24  15 - 46 % Final     Ferritin 01/26/2022 633 (A) 8 - 252 ng/mL Final     WBC Count 01/26/2022 8.1  4.0 - 11.0 10e3/uL Final     RBC Count 01/26/2022 4.40  3.80 - 5.20 10e6/uL  "Final     Hemoglobin 01/26/2022 10.6 (A) 11.7 - 15.7 g/dL Final     Hematocrit 01/26/2022 35.8  35.0 - 47.0 % Final     MCV 01/26/2022 81  78 - 100 fL Final     MCH 01/26/2022 24.1 (A) 26.5 - 33.0 pg Final     MCHC 01/26/2022 29.6 (A) 31.5 - 36.5 g/dL Final     RDW 01/26/2022 24.9 (A) 10.0 - 15.0 % Final     Platelet Count 01/26/2022 405  150 - 450 10e3/uL Final     % Neutrophils 01/26/2022 65  % Final     % Lymphocytes 01/26/2022 23  % Final     % Monocytes 01/26/2022 9  % Final     % Eosinophils 01/26/2022 2  % Final     % Basophils 01/26/2022 1  % Final     % Immature Granulocytes 01/26/2022 0  % Final     NRBCs per 100 WBC 01/26/2022 0  <1 /100 Final     Absolute Neutrophils 01/26/2022 5.2  1.6 - 8.3 10e3/uL Final     Absolute Lymphocytes 01/26/2022 1.8  0.8 - 5.3 10e3/uL Final     Absolute Monocytes 01/26/2022 0.7  0.0 - 1.3 10e3/uL Final     Absolute Eosinophils 01/26/2022 0.2  0.0 - 0.7 10e3/uL Final     Absolute Basophils 01/26/2022 0.1  0.0 - 0.2 10e3/uL Final     Absolute Immature Granulocytes 01/26/2022 0.0  <=0.4 10e3/uL Final     Absolute NRBCs 01/26/2022 0.0  10e3/uL Final        Review of systems: Negative except that which was noted in the HPI.    Physical examination:  /49 (BP Location: Right arm, Patient Position: Chair, Cuff Size: Adult Small)   Pulse 67   Temp 98.5  F (36.9  C) (Tympanic)   Ht 1.549 m (5' 1\")   Wt 45.5 kg (100 lb 3.2 oz)   SpO2 97%   BMI 18.93 kg/m      GENERAL APPEARANCE: healthy, alert and no distress  HEENT: no icterus, no xanthelasmas, normal pupil size and reaction, no cyanosis.  NECK: no adenopathy, no asymmetry, masses.  CHEST: lungs clear to auscultation - no rales, rhonchi or wheezes, no use of accessory muscles, no retractions, respirations are unlabored, normal respiratory rate.  But with reduced air movement.  CARDIOVASCULAR: regular rhythm, normal S1 with physiologic split S2, no S3 or S4 and no murmur, click or rub  EXTREMITIES: no clubbing, cyanosis or " edema  NEURO: alert and oriented normal speech, and affect  VASC: No vascular bruits heard.  SKIN: no ecchymoses, no rashes    Total time spent on day of visit, including review of tests, obtaining/reviewing separately obtained history, ordering medications/tests/procedures, communicating with PCP/consultants, and documenting in electronic medical record: 70 minutes.           Thank you for allowing me to participate in the care of your patient. Please do not hesitate to contact me if you have any questions.     Sal Graham, DO

## 2022-06-27 NOTE — NURSING NOTE
"Chief Complaint   Patient presents with     RECHECK     3 month follow up- feeling more weak and her legs bother her- in hospital last week for dehydration       Initial /49 (BP Location: Right arm, Patient Position: Chair, Cuff Size: Adult Small)   Pulse 67   Temp 98.5  F (36.9  C) (Tympanic)   Ht 1.549 m (5' 1\")   Wt 45.5 kg (100 lb 3.2 oz)   SpO2 97%   BMI 18.93 kg/m   Estimated body mass index is 18.93 kg/m  as calculated from the following:    Height as of this encounter: 1.549 m (5' 1\").    Weight as of this encounter: 45.5 kg (100 lb 3.2 oz).  Medication Reconciliation: complete  BRADY PONCE LPN    "

## 2022-06-28 ENCOUNTER — OFFICE VISIT (OUTPATIENT)
Dept: FAMILY MEDICINE | Facility: OTHER | Age: 82
End: 2022-06-28
Attending: FAMILY MEDICINE
Payer: MEDICARE

## 2022-06-28 VITALS
WEIGHT: 100 LBS | HEIGHT: 61 IN | OXYGEN SATURATION: 98 % | DIASTOLIC BLOOD PRESSURE: 56 MMHG | RESPIRATION RATE: 16 BRPM | HEART RATE: 67 BPM | BODY MASS INDEX: 18.88 KG/M2 | SYSTOLIC BLOOD PRESSURE: 118 MMHG

## 2022-06-28 DIAGNOSIS — J43.2 CENTRILOBULAR EMPHYSEMA (H): Primary | ICD-10-CM

## 2022-06-28 DIAGNOSIS — I50.22 CHRONIC SYSTOLIC CONGESTIVE HEART FAILURE (H): ICD-10-CM

## 2022-06-28 DIAGNOSIS — I10 BENIGN ESSENTIAL HYPERTENSION: ICD-10-CM

## 2022-06-28 DIAGNOSIS — R63.4 WEIGHT LOSS: ICD-10-CM

## 2022-06-28 DIAGNOSIS — N18.4 CKD (CHRONIC KIDNEY DISEASE) STAGE 4, GFR 15-29 ML/MIN (H): ICD-10-CM

## 2022-06-28 DIAGNOSIS — E78.2 MIXED HYPERLIPIDEMIA: ICD-10-CM

## 2022-06-28 DIAGNOSIS — Z87.19 HISTORY OF GI BLEED: ICD-10-CM

## 2022-06-28 LAB
ANION GAP SERPL CALCULATED.3IONS-SCNC: 9 MMOL/L (ref 3–14)
BASOPHILS # BLD AUTO: 0.1 10E3/UL (ref 0–0.2)
BASOPHILS NFR BLD AUTO: 1 %
BUN SERPL-MCNC: 26 MG/DL (ref 7–30)
CALCIUM SERPL-MCNC: 8.8 MG/DL (ref 8.5–10.1)
CHLORIDE BLD-SCNC: 111 MMOL/L (ref 94–109)
CHOLEST SERPL-MCNC: 101 MG/DL
CO2 SERPL-SCNC: 18 MMOL/L (ref 20–32)
CREAT SERPL-MCNC: 2.21 MG/DL (ref 0.52–1.04)
EOSINOPHIL # BLD AUTO: 0.3 10E3/UL (ref 0–0.7)
EOSINOPHIL NFR BLD AUTO: 3 %
ERYTHROCYTE [DISTWIDTH] IN BLOOD BY AUTOMATED COUNT: 16 % (ref 10–15)
GFR SERPL CREATININE-BSD FRML MDRD: 22 ML/MIN/1.73M2
GLUCOSE BLD-MCNC: 103 MG/DL (ref 70–99)
HCT VFR BLD AUTO: 33.4 % (ref 35–47)
HDLC SERPL-MCNC: 37 MG/DL
HGB BLD-MCNC: 10.5 G/DL (ref 11.7–15.7)
IMM GRANULOCYTES # BLD: 0 10E3/UL
IMM GRANULOCYTES NFR BLD: 0 %
LDLC SERPL CALC-MCNC: 35 MG/DL
LYMPHOCYTES # BLD AUTO: 2 10E3/UL (ref 0.8–5.3)
LYMPHOCYTES NFR BLD AUTO: 20 %
MCH RBC QN AUTO: 27.9 PG (ref 26.5–33)
MCHC RBC AUTO-ENTMCNC: 31.4 G/DL (ref 31.5–36.5)
MCV RBC AUTO: 89 FL (ref 78–100)
MONOCYTES # BLD AUTO: 0.7 10E3/UL (ref 0–1.3)
MONOCYTES NFR BLD AUTO: 7 %
NEUTROPHILS # BLD AUTO: 6.8 10E3/UL (ref 1.6–8.3)
NEUTROPHILS NFR BLD AUTO: 69 %
NONHDLC SERPL-MCNC: 64 MG/DL
NRBC # BLD AUTO: 0 10E3/UL
NRBC BLD AUTO-RTO: 0 /100
PLATELET # BLD AUTO: 305 10E3/UL (ref 150–450)
POTASSIUM BLD-SCNC: 4 MMOL/L (ref 3.4–5.3)
RBC # BLD AUTO: 3.76 10E6/UL (ref 3.8–5.2)
SODIUM SERPL-SCNC: 138 MMOL/L (ref 133–144)
TRIGL SERPL-MCNC: 146 MG/DL
WBC # BLD AUTO: 10 10E3/UL (ref 4–11)

## 2022-06-28 PROCEDURE — 99214 OFFICE O/P EST MOD 30 MIN: CPT | Performed by: FAMILY MEDICINE

## 2022-06-28 PROCEDURE — G0463 HOSPITAL OUTPT CLINIC VISIT: HCPCS | Performed by: FAMILY MEDICINE

## 2022-06-28 PROCEDURE — 36415 COLL VENOUS BLD VENIPUNCTURE: CPT | Mod: ZL | Performed by: FAMILY MEDICINE

## 2022-06-28 PROCEDURE — 80048 BASIC METABOLIC PNL TOTAL CA: CPT | Mod: ZL | Performed by: FAMILY MEDICINE

## 2022-06-28 PROCEDURE — 85004 AUTOMATED DIFF WBC COUNT: CPT | Mod: ZL | Performed by: FAMILY MEDICINE

## 2022-06-28 RX ORDER — AMLODIPINE BESYLATE 10 MG/1
TABLET ORAL
Qty: 90 TABLET | Refills: 1 | Status: SHIPPED | OUTPATIENT
Start: 2022-06-28 | End: 2022-11-29

## 2022-06-28 RX ORDER — SUCRALFATE 1 G/1
1 TABLET ORAL 4 TIMES DAILY PRN
Qty: 360 TABLET | Refills: 1 | COMMUNITY
Start: 2022-06-28 | End: 2023-04-19

## 2022-06-28 RX ORDER — ATORVASTATIN CALCIUM 40 MG/1
40 TABLET, FILM COATED ORAL AT BEDTIME
Qty: 90 TABLET | Refills: 1 | Status: SHIPPED | OUTPATIENT
Start: 2022-06-28 | End: 2023-01-05

## 2022-06-28 ASSESSMENT — PAIN SCALES - GENERAL: PAINLEVEL: NO PAIN (0)

## 2022-06-28 NOTE — PROGRESS NOTES
Assessment & Plan     Centrilobular emphysema (H)  Breathing is stable, not needing controller inhalers, no recent exacerbations.     Chronic systolic congestive heart failure (H)- Recovered  Stable, minimal swelling, breathing stable.   - CBC with platelets and differential  - CBC with platelets and differential    Benign essential hypertension / CKD 4  Controlled BP. Recent YONATAN, improved slightly with discontinuation of the lasix. Recheck bmp in 3 mo  - Basic metabolic panel  - Basic metabolic panel    Mixed hyperlipidemia  Stable  - Lipid Profile (Chol, Trig, HDL, LDL calc)    History of GI bleed  Pt continues to take carafate tid scheduled, okay to use prn. Hgb has been stable about 10.5  - sucralfate (CARAFATE) 1 GM tablet  Dispense: 360 tablet; Refill: 1    Weight loss  Pt was down to 96 lbs from about 100-110, back to 100lbs. Labs reviewed and stable. Monitor     30 minutes spent on the date of the encounter doing chart review, review of test results, interpretation of tests, patient visit and documentation     Return in about 6 months (around 12/28/2022) for wellness visit.    Ophelia Troncoso MD  [facility] - [place] is a 81 year old, presenting for the following health issues:  Weight Check and Anemia      HPI     Concern - Weight check   Onset: Chronic  Description: weight loss  Progression of Symptoms:  improving  Accompanying Signs & Symptoms: weight loss  Previous history of similar problem: NA  Therapies tried and outcome: Eating better    Appetite remains poor, but eating more regularly. Dislikes the idea of gaining weight. She otherwise feels well, frustrated by our and family's concern.     Potentially component of dehydration, lasix was discontinued recently by Cardiology    Concern - anemia   Onset: chronic  Description: anemia  Progression of Symptoms:  NA  Accompanying Signs & Symptoms: NA  Previous history of similar problem: NA  Therapies tried and  outcome:  none     Down to 8, but back to up to 10.5. Last normal level was in 2019. Iron levels now normal    Heart Failure Follow-up  Are you experiencing any shortness of breath? Yes, with activity only   How would you describe your shortness of breath?  Same as usual        Are you experiencing any swelling in your legs or feet?  No    Are you using more pillows than usual? No    Do you cough at night?  No    Do you check your weight daily?  No    Have you had a weight change recently?  Weight decrease    Are you having any of the following side effects from your medications? (Select all that apply)  The patient does not report symptoms of dizziness, fatigue, cough, swelling, or slow heart beat.    Since your last visit, how many times have you gone to the cardiologist, urgent care, emergency room, or hospital because of your heart failure?   None    COPD Follow-Up    Overall, how are your COPD symptoms since your last clinic visit?  No change    How much fatigue or shortness of breath do you have when you are walking?  None    How much shortness of breath do you have when you are resting?  None    How often do you cough? Rarely    Have you noticed any change in your sputum/phlegm?  No    Have you experienced a recent fever? No    Have you had any Emergency Room Visits, Urgent Care Visits, or Hospital Admissions because of your COPD since your last office visit?  No    History   Smoking Status     Former Smoker     Packs/day: 1.00     Years: 50.00     Types: Cigarettes     Quit date: 1/1/2019   Smokeless Tobacco     Never Used     Comment: quit Jan 2019     No results found for: FEV1, RDU3MDI    Chronic Kidney Disease Follow-up      Do you take any over the counter pain medicine?: No      Review of Systems   Constitutional, HEENT, cardiovascular, pulmonary, gi and gu systems are negative, except as otherwise noted.      Objective    /56 (BP Location: Right arm, Patient Position: Sitting, Cuff Size: Adult  "Regular)   Pulse 67   Resp 16   Ht 1.549 m (5' 1\")   Wt 45.4 kg (100 lb)   SpO2 98%   BMI 18.89 kg/m    Body mass index is 18.89 kg/m .  Physical Exam   GENERAL: alert and no distress  EYES: Eyes grossly normal to inspection  NECK: no adenopathy, no asymmetry, masses, or scars and thyroid normal to palpation  RESP: lungs clear to auscultation - no rales, rhonchi or wheezes  CV: regular rate and rhythm, normal S1 S2, no S3 or S4, no murmur, click or rub, no peripheral edema and peripheral pulses strong  MS: no gross musculoskeletal defects noted, no edema  SKIN: no suspicious lesions or rashes  NEURO: mentation intact and speech normal  PSYCH: mentation appears normal, affect normal/bright    Results for orders placed or performed in visit on 06/28/22   Basic metabolic panel     Status: Abnormal   Result Value Ref Range    Sodium 138 133 - 144 mmol/L    Potassium 4.0 3.4 - 5.3 mmol/L    Chloride 111 (H) 94 - 109 mmol/L    Carbon Dioxide (CO2) 18 (L) 20 - 32 mmol/L    Anion Gap 9 3 - 14 mmol/L    Urea Nitrogen 26 7 - 30 mg/dL    Creatinine 2.21 (H) 0.52 - 1.04 mg/dL    Calcium 8.8 8.5 - 10.1 mg/dL    Glucose 103 (H) 70 - 99 mg/dL    GFR Estimate 22 (L) >60 mL/min/1.73m2   CBC with platelets and differential     Status: Abnormal   Result Value Ref Range    WBC Count 10.0 4.0 - 11.0 10e3/uL    RBC Count 3.76 (L) 3.80 - 5.20 10e6/uL    Hemoglobin 10.5 (L) 11.7 - 15.7 g/dL    Hematocrit 33.4 (L) 35.0 - 47.0 %    MCV 89 78 - 100 fL    MCH 27.9 26.5 - 33.0 pg    MCHC 31.4 (L) 31.5 - 36.5 g/dL    RDW 16.0 (H) 10.0 - 15.0 %    Platelet Count 305 150 - 450 10e3/uL    % Neutrophils 69 %    % Lymphocytes 20 %    % Monocytes 7 %    % Eosinophils 3 %    % Basophils 1 %    % Immature Granulocytes 0 %    NRBCs per 100 WBC 0 <1 /100    Absolute Neutrophils 6.8 1.6 - 8.3 10e3/uL    Absolute Lymphocytes 2.0 0.8 - 5.3 10e3/uL    Absolute Monocytes 0.7 0.0 - 1.3 10e3/uL    Absolute Eosinophils 0.3 0.0 - 0.7 10e3/uL    Absolute " Basophils 0.1 0.0 - 0.2 10e3/uL    Absolute Immature Granulocytes 0.0 <=0.4 10e3/uL    Absolute NRBCs 0.0 10e3/uL   CBC with platelets and differential     Status: Abnormal    Narrative    The following orders were created for panel order CBC with platelets and differential.  Procedure                               Abnormality         Status                     ---------                               -----------         ------                     CBC with platelets and d...[[id number]]  Abnormal            Final result                 Please view results for these tests on the individual orders.           .  ..

## 2022-06-28 NOTE — TELEPHONE ENCOUNTER
norvasc       Last Written Prescription Date:  12-14-21901  Last Fill Quantity: 90,   # refills: 1  Last Office Visit: 1-26-22  Future Office visit:    Next 5 appointments (look out 90 days)    Jun 28, 2022  4:30 PM  (Arrive by 4:15 PM)  SHORT with Ophelia Troncoso MD  [facility] [place] ([facility] [place] ) [address]  [place] [address]  [phone]           lipitor 12/27/21 #909 with 1

## 2022-06-28 NOTE — NURSING NOTE
"Chief Complaint   Patient presents with     Weight Check     Anemia       Initial /56 (BP Location: Right arm, Patient Position: Sitting, Cuff Size: Adult Regular)   Pulse 67   Resp 16   Ht 1.549 m (5' 1\")   Wt 45.4 kg (100 lb)   SpO2 98%   BMI 18.89 kg/m   Estimated body mass index is 18.89 kg/m  as calculated from the following:    Height as of this encounter: 1.549 m (5' 1\").    Weight as of this encounter: 45.4 kg (100 lb).  Medication Reconciliation: complete  Gauri Milligan LPN  "

## 2022-07-05 ENCOUNTER — TELEPHONE (OUTPATIENT)
Dept: FAMILY MEDICINE | Facility: OTHER | Age: 82
End: 2022-07-05

## 2022-07-06 ENCOUNTER — TELEPHONE (OUTPATIENT)
Dept: FAMILY MEDICINE | Facility: OTHER | Age: 82
End: 2022-07-06

## 2022-07-06 DIAGNOSIS — N18.4 CKD (CHRONIC KIDNEY DISEASE) STAGE 4, GFR 15-29 ML/MIN (H): Primary | ICD-10-CM

## 2022-07-07 ENCOUNTER — TELEPHONE (OUTPATIENT)
Dept: CARDIOLOGY | Facility: OTHER | Age: 82
End: 2022-07-07

## 2022-07-07 NOTE — TELEPHONE ENCOUNTER
Patient called regarding edema in her left foot.  She said that her left foot started to swell today. At her last appointment, she was directed to stop her potassium and her lasix.  She did do that.  Today, she took a Lasix due to the swelling, and is wondering if that was ok to do.  She took Lasix 20 mg.  Thanks

## 2022-07-18 ENCOUNTER — HOSPITAL ENCOUNTER (OUTPATIENT)
Dept: ULTRASOUND IMAGING | Facility: HOSPITAL | Age: 82
Discharge: HOME | End: 2022-07-18
Attending: INTERNAL MEDICINE | Admitting: INTERNAL MEDICINE
Payer: MEDICARE

## 2022-07-18 DIAGNOSIS — I73.9 PAD (PERIPHERAL ARTERY DISEASE) (H): ICD-10-CM

## 2022-07-18 PROCEDURE — 93922 UPR/L XTREMITY ART 2 LEVELS: CPT

## 2022-07-19 ENCOUNTER — TELEPHONE (OUTPATIENT)
Dept: CARDIOLOGY | Facility: OTHER | Age: 82
End: 2022-07-19

## 2022-07-19 NOTE — TELEPHONE ENCOUNTER
Sal Graham, DO  You 2 minutes ago (2:53 PM)     DB    Whether or not she wants to see vascular medicine is entirely up to her.  For now, she can continue on aspirin and the atorvastatin 40 mg she is taking.  If she change her mind, let us know.  Thanks!     Dr. Graham    Message text

## 2022-07-19 NOTE — TELEPHONE ENCOUNTER
To:  Dr Graham nurse  Please return phone call today (7/19/22), she did not want to leave details.  Phone is [phone].  Thank you

## 2022-07-19 NOTE — TELEPHONE ENCOUNTER
Spoke with patient. She is not sure she wants to see a vascular surgeon per Dr. Graham's recommendations below: she states she will call back.       Lita,  You have moderate to severe blockages in the arteries of both your left and right leg.  Treatment involves aspirin and the cholesterol medication you are taking.  I would like you to see a vascular surgeon if you are willing.  I did place referral a referral to 1 by the name of Dr. Hammonds.  You would be seen through something called telemedicine which is a computer on wheels.  Her physical body would be at [facility] but you would see her through computer in the clinic in [place].  Let me know if you have issues or concerns with this.  I will have my nurse contact you to set this up.     Dr. Graham   Written by Sal Graham DO on 7/18/2022  7:06 PM CDT  Seen by proxy Cris Whelan on 7/18/2022  8:05 PM

## 2022-07-20 NOTE — TELEPHONE ENCOUNTER
Patient returned call and is agreeable to consult.    Telemedicine appt made for 8-3-22 arriving at 945 for a 1000 with Dr. Hammonds for vascular surgery consult.

## 2022-08-03 ENCOUNTER — VIRTUAL VISIT (OUTPATIENT)
Dept: CARDIOLOGY | Facility: OTHER | Age: 82
End: 2022-08-03
Attending: FAMILY MEDICINE
Payer: MEDICARE

## 2022-08-03 ENCOUNTER — VIRTUAL VISIT (OUTPATIENT)
Dept: VASCULAR SURGERY | Facility: CLINIC | Age: 82
End: 2022-08-03
Payer: MEDICARE

## 2022-08-03 VITALS
TEMPERATURE: 97.9 F | DIASTOLIC BLOOD PRESSURE: 58 MMHG | BODY MASS INDEX: 18.31 KG/M2 | HEIGHT: 61 IN | WEIGHT: 97 LBS | SYSTOLIC BLOOD PRESSURE: 136 MMHG | HEART RATE: 68 BPM | OXYGEN SATURATION: 96 %

## 2022-08-03 DIAGNOSIS — I65.23 ASYMPTOMATIC BILATERAL CAROTID ARTERY STENOSIS: Primary | ICD-10-CM

## 2022-08-03 DIAGNOSIS — I73.9 PAD (PERIPHERAL ARTERY DISEASE) (H): Primary | ICD-10-CM

## 2022-08-03 DIAGNOSIS — I73.9 PERIPHERAL ARTERIAL DISEASE (H): ICD-10-CM

## 2022-08-03 PROCEDURE — 99204 OFFICE O/P NEW MOD 45 MIN: CPT | Mod: 95 | Performed by: SURGERY

## 2022-08-03 PROCEDURE — 99207 PR NO CHARGE NURSE ONLY: CPT

## 2022-08-03 ASSESSMENT — PAIN SCALES - GENERAL: PAINLEVEL: SEVERE PAIN (6)

## 2022-08-03 NOTE — LETTER
8/3/2022       RE: Lita Ocasio  [address] [place] [place] [address]     Dear Colleague,    Thank you for referring your patient, Lita Ocasio, to the [facility] [place] [facility]. Please see a copy of my visit note below.      Vascular Surgery Consultation Note     Patient:  Lita Ocasio   Date of birth 1940, Medical record number [id number]  Date of Visit:  08/03/2022  Consult Requester:No att. providers found            Assessment and Recommendations:   ASSESSMENT / RECOMMENDATION:  81-year-old female with asymptomatic peripheral arterial disease and carotid artery disease.  We will check a carotid duplex and resting ankle-brachial index next year at [place].  We discussed follow-up sooner should she develop any type of ulceration on either foot or signs or symptoms of stroke.      Many thanks for involving me in the care of this very pleasant patient. Should any questions or concerns arise, please don't hesitate to contact me.    Warm Regards,    Hannah Hammonds MD, DFSVS, RPVI  Director, [facility] Vascular Services  Professor and Chief, Vascular and Endovascular Surgery  [facility]  Pager: 1. Send message or 10 digit call back number Securely via EXPO with the EXPO Web Console (learn more here)              2. Outside of [facility]? Call [phone]                  HPI: Ms Ocasio is joined on video virtual visit today from [place] regarding peripheral arterial disease as well as carotid disease.  She lives alone and does all of her own activities of daily living.  She denies rest pain or tissue loss in either lower extremity.  She does have a significant history of congestive heart failure, COPD and coronary artery disease.  She states her daughter told her she had a stroke and was in [place] hospitalized just overnight back in 2019.  She does not think this actually was a stroke and denies any weakness of  either extremity or difficulty with talking.     She did have some weakness with her legs earlier this summer but states this is actually gotten better.  She denies any particular issues with either lower extremity.  Denies stroke, amaurosis or TIA.      Review of Systems   Constitutional, HEENT, cardiovascular, pulmonary, GI, , musculoskeletal, neuro, skin, endocrine and psych systems are negative, except as otherwise noted.    Physical Exam   GENERAL: Healthy, alert and no distress  EYES: Eyes grossly normal to inspection.  No discharge or erythema, or obvious scleral/conjunctival abnormalities.  RESP: No audible wheeze, cough, or visible cyanosis.  No visible retractions or increased work of breathing.    SKIN: Visible skin clear. No significant rash, abnormal pigmentation or lesions.  NEURO: Cranial nerves grossly intact.  Mentation and speech appropriate for age.  PSYCH: Mentation appears normal, affect normal/bright, judgement and insight intact, normal speech and appearance well-groomed.    Imaging: ABIs are 0.58 on the right and 0.49 on the left.  Her carotid arteries show approximately 50 to 60% stenosis bilaterally with heavy atherosclerotic and calcified plaque.    Video Start Time: 10:45 AM  Video End Time: 10:55 AM      Sincerely,  Hannah Hammonds MD

## 2022-08-03 NOTE — PROGRESS NOTES
Vascular Surgery Consultation Note     Patient:  Lita Ocasio   Date of birth 1940, Medical record number [id number]  Date of Visit:  08/03/2022  Consult Requester:No att. providers found            Assessment and Recommendations:   ASSESSMENT / RECOMMENDATION:  81-year-old female with asymptomatic peripheral arterial disease and carotid artery disease.  We will check a carotid duplex and resting ankle-brachial index next year at [facility].  We discussed follow-up sooner should she develop any type of ulceration on either foot or signs or symptoms of stroke.      Many thanks for involving me in the care of this very pleasant patient. Should any questions or concerns arise, please don't hesitate to contact me.    Warm Regards,    Hannah Hammonds MD, DFSVS, RPVI  Director, [facility] Vascular Services  Professor and Chief, Vascular and Endovascular Surgery  [facility]  Pager: 1. Send message or 10 digit call back number Securely via MoneyExpert with the Vocera Web Console (learn more here)              2. Outside of [facility]? Call [phone]                  HPI: Ms Ocasio is joined on video virtual visit today from [place] regarding peripheral arterial disease as well as carotid disease.  She lives alone and does all of her own activities of daily living.  She denies rest pain or tissue loss in either lower extremity.  She does have a significant history of congestive heart failure, COPD and coronary artery disease.  She states her daughter told her she had a stroke and was in [place] hospitalized just overnight back in 2019.  She does not think this actually was a stroke and denies any weakness of either extremity or difficulty with talking.     She did have some weakness with her legs earlier this summer but states this is actually gotten better.  She denies any particular issues with either lower extremity.  Denies stroke, amaurosis or TIA.      Review of Systems   Constitutional, HEENT,  cardiovascular, pulmonary, GI, , musculoskeletal, neuro, skin, endocrine and psych systems are negative, except as otherwise noted.    Physical Exam   GENERAL: Healthy, alert and no distress  EYES: Eyes grossly normal to inspection.  No discharge or erythema, or obvious scleral/conjunctival abnormalities.  RESP: No audible wheeze, cough, or visible cyanosis.  No visible retractions or increased work of breathing.    SKIN: Visible skin clear. No significant rash, abnormal pigmentation or lesions.  NEURO: Cranial nerves grossly intact.  Mentation and speech appropriate for age.  PSYCH: Mentation appears normal, affect normal/bright, judgement and insight intact, normal speech and appearance well-groomed.    Imaging: ABIs are 0.58 on the right and 0.49 on the left.  Her carotid arteries show approximately 50 to 60% stenosis bilaterally with heavy atherosclerotic and calcified plaque.    Video Start Time: 10:45 AM  Video End Time: 10:55 AM

## 2022-08-03 NOTE — NURSING NOTE
"Chief Complaint   Patient presents with     Consult     Dr. Hammonds        Initial /58 (BP Location: Right arm)   Pulse 68   Temp 97.9  F (36.6  C) (Tympanic)   Ht 1.549 m (5' 1\")   Wt 44 kg (97 lb)   SpO2 96%   BMI 18.33 kg/m   Estimated body mass index is 18.33 kg/m  as calculated from the following:    Height as of this encounter: 1.549 m (5' 1\").    Weight as of this encounter: 44 kg (97 lb).  Medication Reconciliation: complete  Swathi Johnson LPN    "

## 2022-08-03 NOTE — PATIENT INSTRUCTIONS
Thank you so much for choosing us for your care. It was a pleasure to see you at the vascular clinic today.     Follow-up recommendations: We will see you back in 1 year.    Additional testing/imaging ordered today: Repeat carotid ultrasound and ABIs in 1 year.      Our scheduling team will get in touch with you to set up any follow-up testing/imaging and/or appointments. Please be aware that any testing/imaging recommended today will need to completed prior to your next visit with the provider. If testing/imaging is not completed prior to your next visit, your visit may be rescheduled.        If you have any questions, please call Valery Knowles RN at [phone] or contact our clinic at [phone]. We also encourage the use of CloudRunner I/O to communicate with your HealthCare Provider.      If you have an urgent need after business hours (8:00 am to 4:30 pm) please call [phone], option 4, and ask for the vascular attending on call. For non-urgent after hours needs, please call the vascular nurse at [phone] and leave a detailed voicemail. For scheduling needs, please call our clinic directly at [phone].

## 2022-08-03 NOTE — PATIENT INSTRUCTIONS
You were seen by Dr. Hammonds via telemedicine, 08/03/22.     1. Monitor symptoms such as sores on your feet/legs.      2. Monitor for pain in your legs/feet.    3. No reason for surgical intervention at this time.     You will follow up with Dr. Hammonds as needed based on symptoms.       Please call the cardiology office with problems, questions, or concerns at [phone].    If you experience chest pain, chest pressure, chest tightness, shortness of breath, fainting, lightheadedness, nausea, vomiting, or other concerning symptoms, please report to the Emergency Department or call 911. These symptoms may be emergent, and best treated in the Emergency Department.       Riccardo TRISTAN RN  CHF Care Coordinator   [facility]  [phone]

## 2022-08-26 NOTE — PROGRESS NOTES
Assessment & Plan     Gastroesophageal reflux disease with esophagitis, unspecified whether hemorrhage  Continues to have some break through acid reflux with Protonix and Carafate use.   Discussed treatment plan.  Her Creatinine clearance is 17 so she can only have a low dose 10 mg daily.  Discussed ok to use only for breakthrough heartburn and only 1 per day   Follow up with Dr Troncoso if no improvement or worsening symptoms   - famotidine (PEPCID) 10 MG tablet; Take 1 tablet (10 mg) by mouth daily as needed (acid reflux)       See Patient Instructions    No follow-ups on file.    [facility] - [place] is a 81 year old, presenting for the following health issues:  Ulcer      HPI     GERD/Heartburn  Onset/Duration: long term.    Description: states her ulcer is acting up  Intensity: moderate  Progression of Symptoms: worsening and intermittent  Accompanying Signs & Symptoms:  Does it feel like food gets stuck or trouble swallowing: No  Nausea: No  Vomiting (bloody?): No  Abdominal Pain: YES  Black-Tarry stools: No  Bloody stools: No  History:  Previous similar episodes: YES  Previous ulcers: YES  Precipitating factors:   Caffeine use: YES  Alcohol use: No  NSAID/Aspirin use: YES.  81mg asa  Tobacco use: No  Worse with no particular food or drink.  Alleviating factors: None  Therapies tried and outcome:             Lifestyle changes: None            Medications: Protonix and Carafate and angeliquegian busby   She is taking the Protonix daily, Carafate 3-4 times a day   She states she has pain in her stomach         Review of Systems   CONSTITUTIONAL: NEGATIVE for fever, chills, change in weight  INTEGUMENTARY/SKIN: NEGATIVE for worrisome rashes, moles or lesions  RESP:chronic SOB history of heart failure   CV: hx heart failure  GI: upset stomach and heartburn or reflux  : denies dysuria       Objective    /50   Pulse 72   Temp 99.1  F (37.3  C)    Wt 45.4 kg (100 lb)   SpO2 97%   BMI 18.89 kg/m    Body mass index is 18.89 kg/m .  Physical Exam   GENERAL: alert, frail and elderly  RESP: lungs clear to auscultation - no rales, rhonchi or wheezes  CV: regular rate and rhythm, normal S1 S2, no S3 or S4, no murmur, click or rub, no peripheral edema and peripheral pulses strong  ABDOMEN: tenderness mild generalized and bowel sounds normal    Office Visit on 06/28/2022   Component Date Value Ref Range Status     Sodium 06/28/2022 138  133 - 144 mmol/L Final     Potassium 06/28/2022 4.0  3.4 - 5.3 mmol/L Final     Chloride 06/28/2022 111 (A) 94 - 109 mmol/L Final     Carbon Dioxide (CO2) 06/28/2022 18 (A) 20 - 32 mmol/L Final     Anion Gap 06/28/2022 9  3 - 14 mmol/L Final     Urea Nitrogen 06/28/2022 26  7 - 30 mg/dL Final     Creatinine 06/28/2022 2.21 (A) 0.52 - 1.04 mg/dL Final     Calcium 06/28/2022 8.8  8.5 - 10.1 mg/dL Final     Glucose 06/28/2022 103 (A) 70 - 99 mg/dL Final     GFR Estimate 06/28/2022 22 (A) >60 mL/min/1.73m2 Final    Effective December 21, 2021 eGFRcr in adults is calculated using the 2021 CKD-EPI creatinine equation which includes age and gender (Gab alex al., [place], DOI: 10.1056/JJROmh5897434)     WBC Count 06/28/2022 10.0  4.0 - 11.0 10e3/uL Final     RBC Count 06/28/2022 3.76 (A) 3.80 - 5.20 10e6/uL Final     Hemoglobin 06/28/2022 10.5 (A) 11.7 - 15.7 g/dL Final     Hematocrit 06/28/2022 33.4 (A) 35.0 - 47.0 % Final     MCV 06/28/2022 89  78 - 100 fL Final     MCH 06/28/2022 27.9  26.5 - 33.0 pg Final     MCHC 06/28/2022 31.4 (A) 31.5 - 36.5 g/dL Final     RDW 06/28/2022 16.0 (A) 10.0 - 15.0 % Final     Platelet Count 06/28/2022 305  150 - 450 10e3/uL Final     % Neutrophils 06/28/2022 69  % Final     % Lymphocytes 06/28/2022 20  % Final     % Monocytes 06/28/2022 7  % Final     % Eosinophils 06/28/2022 3  % Final     % Basophils 06/28/2022 1  % Final     % Immature Granulocytes 06/28/2022 0  % Final     NRBCs per 100 WBC 06/28/2022 0   <1 /100 Final     Absolute Neutrophils 06/28/2022 6.8  1.6 - 8.3 10e3/uL Final     Absolute Lymphocytes 06/28/2022 2.0  0.8 - 5.3 10e3/uL Final     Absolute Monocytes 06/28/2022 0.7  0.0 - 1.3 10e3/uL Final     Absolute Eosinophils 06/28/2022 0.3  0.0 - 0.7 10e3/uL Final     Absolute Basophils 06/28/2022 0.1  0.0 - 0.2 10e3/uL Final     Absolute Immature Granulocytes 06/28/2022 0.0  <=0.4 10e3/uL Final     Absolute NRBCs 06/28/2022 0.0  10e3/uL Final                   .  ..

## 2022-08-29 ENCOUNTER — OFFICE VISIT (OUTPATIENT)
Dept: FAMILY MEDICINE | Facility: OTHER | Age: 82
End: 2022-08-29
Attending: NURSE PRACTITIONER
Payer: MEDICARE

## 2022-08-29 VITALS
SYSTOLIC BLOOD PRESSURE: 120 MMHG | WEIGHT: 100 LBS | TEMPERATURE: 99.1 F | OXYGEN SATURATION: 97 % | HEART RATE: 72 BPM | DIASTOLIC BLOOD PRESSURE: 50 MMHG | BODY MASS INDEX: 18.89 KG/M2

## 2022-08-29 DIAGNOSIS — K21.00 GASTROESOPHAGEAL REFLUX DISEASE WITH ESOPHAGITIS, UNSPECIFIED WHETHER HEMORRHAGE: Primary | ICD-10-CM

## 2022-08-29 PROCEDURE — 99213 OFFICE O/P EST LOW 20 MIN: CPT | Performed by: NURSE PRACTITIONER

## 2022-08-29 PROCEDURE — G0463 HOSPITAL OUTPT CLINIC VISIT: HCPCS | Mod: 25

## 2022-08-29 PROCEDURE — G0463 HOSPITAL OUTPT CLINIC VISIT: HCPCS

## 2022-08-29 RX ORDER — FAMOTIDINE 10 MG
10 TABLET ORAL DAILY PRN
Qty: 30 TABLET | Refills: 0 | Status: ON HOLD | OUTPATIENT
Start: 2022-08-29 | End: 2023-11-27

## 2022-08-29 ASSESSMENT — PAIN SCALES - GENERAL: PAINLEVEL: NO PAIN (0)

## 2022-08-29 NOTE — PATIENT INSTRUCTIONS
Can try using pepsid 10 mg once a day as needed for break through heart burn   Follow up with Dr Troncoso if no improvement or any worsening symptoms

## 2022-08-29 NOTE — NURSING NOTE
"Chief Complaint   Patient presents with     Ulcer       Initial /50   Pulse 72   Temp 99.1  F (37.3  C)   Wt 45.4 kg (100 lb)   SpO2 97%   BMI 18.89 kg/m   Estimated body mass index is 18.89 kg/m  as calculated from the following:    Height as of 8/3/22: 1.549 m (5' 1\").    Weight as of this encounter: 45.4 kg (100 lb).  Medication Reconciliation: complete  Keerthi Norman LPN  "

## 2022-09-06 DIAGNOSIS — I10 BENIGN ESSENTIAL HYPERTENSION: Primary | ICD-10-CM

## 2022-09-06 RX ORDER — POTASSIUM CHLORIDE 1500 MG/1
20 TABLET, EXTENDED RELEASE ORAL 2 TIMES DAILY
Qty: 90 TABLET | Refills: 3 | Status: SHIPPED | OUTPATIENT
Start: 2022-09-06 | End: 2022-11-30

## 2022-09-06 NOTE — TELEPHONE ENCOUNTER
Patient called requesting med below.  Not on current med list.  Please let her know if this is approvided.    potassium chloride ER (KLOR-CON) 20 MEQ CR tablet (Discontinued)      Last Written Prescription Date:  1/26/22-2/22/22  Last Fill Quantity: 90,   # refills: 3  Last Office Visit: 6/28/22  Future Office visit:       Routing refill request to provider for review/approval because:  Drug not on the G, P or [facility] refill protocol or controlled substance

## 2022-09-08 ENCOUNTER — TELEPHONE (OUTPATIENT)
Dept: FAMILY MEDICINE | Facility: OTHER | Age: 82
End: 2022-09-08

## 2022-09-08 DIAGNOSIS — K21.00 GASTROESOPHAGEAL REFLUX DISEASE WITH ESOPHAGITIS, UNSPECIFIED WHETHER HEMORRHAGE: ICD-10-CM

## 2022-09-08 RX ORDER — FAMOTIDINE 10 MG
20 TABLET ORAL DAILY PRN
Qty: 180 TABLET | Refills: 0 | Status: CANCELLED | OUTPATIENT
Start: 2022-09-08

## 2022-09-08 NOTE — TELEPHONE ENCOUNTER
Pt states she has not received famotidine as ordered from visit 8/29/22 with Provider Elizabeth  Writer called [facility] to follow-up  [facility] states that 10 mg is an OTC dose & is not covered.  They will cover a 20mg dose     Relayed to patient. Recommended buying OTC 10mg generic brand at any local pharmacy.  Pt asked if request for 20 mg could be sent to Provider then billed to insurance    Writer pended amended script to Provider to review.

## 2022-09-09 NOTE — TELEPHONE ENCOUNTER
LVM for patient.  Encouraged patient to look into OTC 10mg options at local pharmacy.  Relayed Providers instructions below that 20mg is contraindicated.  Suggested patient make a f/u toribio't with Primary.                      Meenu Johnson APRN CNP  You 1 hour ago (7:23 AM)     NM    She has poor kidney function.  She will need to follow up with Dr Troncoso to have a higher dose     Meenu SEPULVEDA [facility]   Family Nurse Practitioner    Message text

## 2022-09-14 DIAGNOSIS — E03.9 ACQUIRED HYPOTHYROIDISM: ICD-10-CM

## 2022-09-14 RX ORDER — LEVOTHYROXINE SODIUM 50 MCG
TABLET ORAL
Qty: 90 TABLET | Refills: 0 | Status: SHIPPED | OUTPATIENT
Start: 2022-09-14 | End: 2022-12-14

## 2022-09-19 DIAGNOSIS — I50.22 CHRONIC SYSTOLIC CONGESTIVE HEART FAILURE (H): Chronic | ICD-10-CM

## 2022-09-19 RX ORDER — FUROSEMIDE 20 MG
TABLET ORAL
Qty: 90 TABLET | Refills: 1 | Status: SHIPPED | OUTPATIENT
Start: 2022-09-19 | End: 2022-11-30

## 2022-09-19 NOTE — TELEPHONE ENCOUNTER
Patient called for refill on furosemide 20 MG.     Furosemide    20 MG  Last Written Prescription Date:  Not on current medication list.  Medication last filled 06/16/22. Medication discontinued 06/27/22 with reason of therapy completed.  Last Fill Quantity: n/a,   # refills: n/a  Last Office Visit: 08/03/22  Future Office visit:       Routing refill request to provider for review/approval because:  Drug not active on patient's medication list

## 2022-09-21 ENCOUNTER — LAB (OUTPATIENT)
Dept: LAB | Facility: OTHER | Age: 82
End: 2022-09-21
Payer: MEDICARE

## 2022-09-21 DIAGNOSIS — N18.4 CKD (CHRONIC KIDNEY DISEASE) STAGE 4, GFR 15-29 ML/MIN (H): ICD-10-CM

## 2022-09-21 LAB
ANION GAP SERPL CALCULATED.3IONS-SCNC: 8 MMOL/L (ref 3–14)
BUN SERPL-MCNC: 21 MG/DL (ref 7–30)
CALCIUM SERPL-MCNC: 9.1 MG/DL (ref 8.5–10.1)
CHLORIDE BLD-SCNC: 108 MMOL/L (ref 94–109)
CO2 SERPL-SCNC: 22 MMOL/L (ref 20–32)
CREAT SERPL-MCNC: 1.58 MG/DL (ref 0.52–1.04)
GFR SERPL CREATININE-BSD FRML MDRD: 33 ML/MIN/1.73M2
GLUCOSE BLD-MCNC: 103 MG/DL (ref 70–99)
POTASSIUM BLD-SCNC: 4.1 MMOL/L (ref 3.4–5.3)
SODIUM SERPL-SCNC: 138 MMOL/L (ref 133–144)

## 2022-09-21 PROCEDURE — 82435 ASSAY OF BLOOD CHLORIDE: CPT | Mod: ZL

## 2022-09-21 PROCEDURE — 36415 COLL VENOUS BLD VENIPUNCTURE: CPT | Mod: ZL

## 2022-09-21 PROCEDURE — 82947 ASSAY GLUCOSE BLOOD QUANT: CPT | Mod: ZL

## 2022-10-08 ENCOUNTER — IMMUNIZATION (OUTPATIENT)
Dept: FAMILY MEDICINE | Facility: OTHER | Age: 82
End: 2022-10-08
Attending: FAMILY MEDICINE
Payer: MEDICARE

## 2022-10-08 PROCEDURE — G0008 ADMIN INFLUENZA VIRUS VAC: HCPCS

## 2022-10-31 ENCOUNTER — NURSE TRIAGE (OUTPATIENT)
Dept: FAMILY MEDICINE | Facility: OTHER | Age: 82
End: 2022-10-31

## 2022-10-31 NOTE — TELEPHONE ENCOUNTER
"Patient requesting to have blood work done.    Reason for Disposition    Patient wants to be seen    Answer Assessment - Initial Assessment Questions  1. DESCRIPTION: \"Describe how you are feeling.\"      Weak and tired  2. SEVERITY: \"How bad is it?\"  \"Can you stand and walk?\"    - MILD - Feels weak or tired, but does not interfere with work, school or normal activities    - MODERATE - Able to stand and walk; weakness interferes with work, school, or normal activities    - SEVERE - Unable to stand or walk      more  3. ONSET:  \"When did the weakness begin?\"      3 days ago  4. CAUSE: \"What do you think is causing the weakness?\"      Low iron maybe.  She want's some blood work  5. MEDICINES: \"Have you recently started a new medicine or had a change in the amount of a medicine?\"      no  6. OTHER SYMPTOMS: \"Do you have any other symptoms?\" (e.g., chest pain, fever, cough, SOB, vomiting, diarrhea, bleeding, other areas of pain)      Diarrhea occasionally   7. PREGNANCY: \"Is there any chance you are pregnant?\" \"When was your last menstrual period?\"      no    Protocols used: WEAKNESS (GENERALIZED) AND FATIGUE-A-OH      "

## 2022-11-01 NOTE — TELEPHONE ENCOUNTER
I can see the patient in the clinic this afternoon if she would like 1:30 or 3:30. May also offer appointment prior to nursing home rounds on Thursday or Friday morning at 9 am.     COCO Ta CNP on 11/1/2022 at 11:34 AM

## 2022-11-02 ENCOUNTER — TELEPHONE (OUTPATIENT)
Dept: FAMILY MEDICINE | Facility: OTHER | Age: 82
End: 2022-11-02

## 2022-11-03 ENCOUNTER — TELEPHONE (OUTPATIENT)
Dept: FAMILY MEDICINE | Facility: OTHER | Age: 82
End: 2022-11-03

## 2022-11-11 ENCOUNTER — NURSE TRIAGE (OUTPATIENT)
Dept: FAMILY MEDICINE | Facility: OTHER | Age: 82
End: 2022-11-11

## 2022-11-11 NOTE — TELEPHONE ENCOUNTER
Looks like Nafisa left a VM to get her in on the 4th, she also had an appt for the 9th.     Okay for afternoon on Monday with Maritza

## 2022-11-22 ENCOUNTER — OFFICE VISIT (OUTPATIENT)
Dept: FAMILY MEDICINE | Facility: OTHER | Age: 82
End: 2022-11-22
Payer: MEDICARE

## 2022-11-22 VITALS
TEMPERATURE: 97.2 F | BODY MASS INDEX: 18.45 KG/M2 | HEART RATE: 78 BPM | OXYGEN SATURATION: 96 % | RESPIRATION RATE: 16 BRPM | WEIGHT: 97.7 LBS | SYSTOLIC BLOOD PRESSURE: 114 MMHG | DIASTOLIC BLOOD PRESSURE: 62 MMHG | HEIGHT: 61 IN

## 2022-11-22 DIAGNOSIS — R53.83 OTHER FATIGUE: Primary | ICD-10-CM

## 2022-11-22 DIAGNOSIS — N17.9 ACUTE KIDNEY INJURY (H): ICD-10-CM

## 2022-11-22 DIAGNOSIS — D50.9 IRON DEFICIENCY ANEMIA, UNSPECIFIED IRON DEFICIENCY ANEMIA TYPE: ICD-10-CM

## 2022-11-22 DIAGNOSIS — N18.4 CKD (CHRONIC KIDNEY DISEASE) STAGE 4, GFR 15-29 ML/MIN (H): ICD-10-CM

## 2022-11-22 LAB
ALBUMIN SERPL BCG-MCNC: 3.9 G/DL (ref 3.5–5.2)
ALBUMIN UR-MCNC: NEGATIVE MG/DL
ALP SERPL-CCNC: 79 U/L (ref 35–104)
ALT SERPL W P-5'-P-CCNC: 15 U/L (ref 10–35)
ANION GAP SERPL CALCULATED.3IONS-SCNC: 14 MMOL/L (ref 7–15)
APPEARANCE UR: CLEAR
AST SERPL W P-5'-P-CCNC: 21 U/L (ref 10–35)
BACTERIA #/AREA URNS HPF: ABNORMAL /HPF
BASOPHILS # BLD AUTO: 0.1 10E3/UL (ref 0–0.2)
BASOPHILS NFR BLD AUTO: 1 %
BILIRUB SERPL-MCNC: 0.3 MG/DL
BILIRUB UR QL STRIP: NEGATIVE
BUN SERPL-MCNC: 41.9 MG/DL (ref 8–23)
CALCIUM SERPL-MCNC: 9 MG/DL (ref 8.8–10.2)
CHLORIDE SERPL-SCNC: 108 MMOL/L (ref 98–107)
COLOR UR AUTO: ABNORMAL
CREAT SERPL-MCNC: 2.95 MG/DL (ref 0.51–0.95)
CREAT UR-MCNC: 66.4 MG/DL
DEPRECATED HCO3 PLAS-SCNC: 15 MMOL/L (ref 22–29)
EOSINOPHIL # BLD AUTO: 0.1 10E3/UL (ref 0–0.7)
EOSINOPHIL NFR BLD AUTO: 1 %
ERYTHROCYTE [DISTWIDTH] IN BLOOD BY AUTOMATED COUNT: 14.8 % (ref 10–15)
FERRITIN SERPL-MCNC: 165 NG/ML (ref 11–328)
GFR SERPL CREATININE-BSD FRML MDRD: 15 ML/MIN/1.73M2
GLUCOSE SERPL-MCNC: 117 MG/DL (ref 70–99)
GLUCOSE UR STRIP-MCNC: NEGATIVE MG/DL
HCT VFR BLD AUTO: 36.8 % (ref 35–47)
HGB BLD-MCNC: 11.6 G/DL (ref 11.7–15.7)
HGB UR QL STRIP: NEGATIVE
HYALINE CASTS: 4 /LPF
IMM GRANULOCYTES # BLD: 0.1 10E3/UL
IMM GRANULOCYTES NFR BLD: 1 %
IRON BINDING CAPACITY (ROCHE): 192 UG/DL (ref 240–430)
IRON SATN MFR SERPL: 28 % (ref 15–46)
IRON SERPL-MCNC: 53 UG/DL (ref 37–145)
KETONES UR STRIP-MCNC: NEGATIVE MG/DL
LEUKOCYTE ESTERASE UR QL STRIP: ABNORMAL
LYMPHOCYTES # BLD AUTO: 1.9 10E3/UL (ref 0.8–5.3)
LYMPHOCYTES NFR BLD AUTO: 17 %
MCH RBC QN AUTO: 28.1 PG (ref 26.5–33)
MCHC RBC AUTO-ENTMCNC: 31.5 G/DL (ref 31.5–36.5)
MCV RBC AUTO: 89 FL (ref 78–100)
MICROALBUMIN UR-MCNC: <12 MG/L
MICROALBUMIN/CREAT UR: NORMAL MG/G{CREAT}
MONOCYTES # BLD AUTO: 0.7 10E3/UL (ref 0–1.3)
MONOCYTES NFR BLD AUTO: 6 %
MUCOUS THREADS #/AREA URNS LPF: PRESENT /LPF
NEUTROPHILS # BLD AUTO: 8.2 10E3/UL (ref 1.6–8.3)
NEUTROPHILS NFR BLD AUTO: 74 %
NITRATE UR QL: NEGATIVE
NRBC # BLD AUTO: 0 10E3/UL
NRBC BLD AUTO-RTO: 0 /100
PH UR STRIP: 5 [PH] (ref 4.7–8)
PLATELET # BLD AUTO: 325 10E3/UL (ref 150–450)
POTASSIUM SERPL-SCNC: 4.8 MMOL/L (ref 3.4–5.3)
PROT SERPL-MCNC: 6.4 G/DL (ref 6.4–8.3)
RBC # BLD AUTO: 4.13 10E6/UL (ref 3.8–5.2)
RBC URINE: 0 /HPF
SODIUM SERPL-SCNC: 137 MMOL/L (ref 136–145)
SP GR UR STRIP: 1.01 (ref 1–1.03)
SQUAMOUS EPITHELIAL: 0 /HPF
TSH SERPL DL<=0.005 MIU/L-ACNC: 2.92 UIU/ML (ref 0.3–4.2)
UROBILINOGEN UR STRIP-MCNC: NORMAL MG/DL
WBC # BLD AUTO: 11 10E3/UL (ref 4–11)
WBC URINE: 1 /HPF

## 2022-11-22 PROCEDURE — 36415 COLL VENOUS BLD VENIPUNCTURE: CPT | Mod: ZL

## 2022-11-22 PROCEDURE — 83550 IRON BINDING TEST: CPT | Mod: ZL

## 2022-11-22 PROCEDURE — G0463 HOSPITAL OUTPT CLINIC VISIT: HCPCS

## 2022-11-22 PROCEDURE — 99214 OFFICE O/P EST MOD 30 MIN: CPT

## 2022-11-22 PROCEDURE — 82043 UR ALBUMIN QUANTITATIVE: CPT | Mod: ZL

## 2022-11-22 PROCEDURE — 85025 COMPLETE CBC W/AUTO DIFF WBC: CPT | Mod: ZL

## 2022-11-22 PROCEDURE — 81001 URINALYSIS AUTO W/SCOPE: CPT | Mod: ZL

## 2022-11-22 PROCEDURE — 80053 COMPREHEN METABOLIC PANEL: CPT | Mod: ZL

## 2022-11-22 PROCEDURE — 84443 ASSAY THYROID STIM HORMONE: CPT | Mod: ZL

## 2022-11-22 PROCEDURE — 82728 ASSAY OF FERRITIN: CPT | Mod: ZL

## 2022-11-22 PROCEDURE — 87086 URINE CULTURE/COLONY COUNT: CPT | Mod: ZL

## 2022-11-22 ASSESSMENT — PAIN SCALES - GENERAL: PAINLEVEL: SEVERE PAIN (6)

## 2022-11-22 NOTE — PATIENT INSTRUCTIONS
Stop taking lasix (furosemide).    Continue drinking fluids.    Follow-up in the clinic tomorrow morning with Dr. Trivedi at 11:20.     We will recheck your kidney function.

## 2022-11-22 NOTE — PROGRESS NOTES
Assessment & Plan     Other fatigue/Acute kidney injury (H)/CKD (chronic kidney disease) stage 4, GFR 15-29 ml/min (H)  81-year-old female presenting with concerns of 1-2 months of worsening fatigue. Initial laboratory evaluation with evidence of acute on chronic renal failure. Creatinine of 2.98, GFR of 15. Suspect this is due to volume depletion. Patient is taking daily lasix which has been discontinued on multiple occasions in the past due to episodes of severe dehydration. There is no evidence of edema on exam. Vital signs are within normal limits today- no tachycardia or hypotension. Elected to give patient 1L bolus of normal saline today in the clinic. Patient reporting some symptom improvement following administration of fluids. Discussed with patient that she must stop furosemide and that she requires close follow-up in the clinic. Patient will return tomorrow for repeat BMP and for recheck with Dr. Trivedi as this writer will be out of the office. Patient verbalized understanding and is agreeable.  - TSH with free T4 reflex  - Comprehensive metabolic panel (BMP + Alb, Alk Phos, ALT, AST, Total. Bili, TP)  - CBC with platelets and differential  - UA reflex to Microscopic and Culture - [place]  - Urine Culture  - Albumin Random Urine Quantitative with Creat Ratio    Iron deficiency anemia, unspecified iron deficiency anemia type  - Iron and iron binding capacity  - Ferritin        40 minutes spent on the date of the encounter doing chart review, history and exam, documentation and further activities per the note         Return in about 1 day (around 11/23/2022).    [facility] [place]    Joann London is a 81 year old, presenting for the following health issues:  Fatigue      HPI     Concern - Weakness/fatigue  Onset: 1 month  Description: Feels fatigue and weak  Intensity: moderate  Progression of Symptoms:  same  Accompanying Signs & Symptoms: tired   Previous  history of similar problem: Yes  Precipitating factors:        Worsened by: NA  Alleviating factors:        Improved by: NA  Therapies tried and outcome: Patient does iron infusiomns    HAYDER is a 81-year-old female presenting with concerns of fatigue. Reporting symptoms for approximately the past 1-2 months. States she could sleep for 10-12 hours. Feels her sleep is restful and does not have frequent night-time awakenings. Reports low appetite which is not new for her. She is concerned her iron may be low. She denies any pain today. Denies any fevers, chills, nausea, vomiting, or bowel/bladder changes. Denies any dizziness or lightheadedness. Feels her mood is stable and denies any depression. Denies any recent weight loss. No difficulty breathing, chest pain, or palpitations. No increase in swelling- continues to take daily lasix as she states any swelling in her legs bothers her. States she eats 2 meals a day and 2-3 snacks. Reports she eats small portions. Reports she drinks 4-5 glasses of water per day.    Of note, patient's furosemide has been discontinued several times in the past due to concerns of volume depletion.         Review of Systems   CONSTITUTIONAL: POSITIVE for fatigue. NEGATIVE for fever, chills, change in weight  INTEGUMENTARY/SKIN: NEGATIVE for worrisome rashes, moles or lesions  EYES: NEGATIVE for vision changes or irritation  ENT/MOUTH: NEGATIVE for ear, mouth and throat problems  RESP: NEGATIVE for significant cough or SOB  CV: NEGATIVE for chest pain, palpitations or peripheral edema  GI: NEGATIVE for nausea, abdominal pain, heartburn, or change in bowel habits  : NEGATIVE for frequency, dysuria, or hematuria  MUSCULOSKELETAL: NEGATIVE for significant arthralgias or myalgia  NEURO: NEGATIVE for weakness, dizziness or paresthesias  ENDOCRINE: NEGATIVE for temperature intolerance, skin/hair changes  HEME: NEGATIVE for bleeding problems  PSYCHIATRIC: NEGATIVE for changes in mood or affect     "  Objective    /62 (BP Location: Right arm, Patient Position: Sitting, Cuff Size: Adult Small)   Pulse 78   Temp 97.2  F (36.2  C) (Tympanic)   Resp 16   Ht 1.549 m (5' 1\")   Wt 44.3 kg (97 lb 11.2 oz)   SpO2 96%   BMI 18.46 kg/m    Body mass index is 18.46 kg/m .     Physical Exam   GENERAL: frail, alert and no distress  EYES: Eyes grossly normal to inspection, PERRL and conjunctivae and sclerae normal  HENT: ear canals and TM's normal, nose and mouth without ulcers or lesions. Mucous membranes slightly dry.   NECK: no adenopathy, no asymmetry, masses, or scars and thyroid normal to palpation   RESP: lungs clear to auscultation - no rales, rhonchi or wheezes  CV: regular rate and rhythm, normal S1 S2, no S3 or S4, no murmur, click or rub, no peripheral edema and peripheral pulses strong  ABDOMEN: soft, nontender, no hepatosplenomegaly, no masses and bowel sounds normal  MS: no gross musculoskeletal defects noted, no edema  SKIN: Warm and dry. Cap refill <2 seconds.  no suspicious lesions or rashes  NEURO: Normal strength and tone, mentation intact and speech normal  BACK: no CVA tenderness, no paralumbar tenderness  PSYCH: mentation appears normal, affect normal/bright          "

## 2022-11-23 ENCOUNTER — OFFICE VISIT (OUTPATIENT)
Dept: FAMILY MEDICINE | Facility: OTHER | Age: 82
End: 2022-11-23
Attending: FAMILY MEDICINE
Payer: MEDICARE

## 2022-11-23 VITALS
TEMPERATURE: 98.1 F | HEART RATE: 69 BPM | OXYGEN SATURATION: 96 % | BODY MASS INDEX: 18.33 KG/M2 | WEIGHT: 97 LBS | DIASTOLIC BLOOD PRESSURE: 58 MMHG | RESPIRATION RATE: 16 BRPM | SYSTOLIC BLOOD PRESSURE: 128 MMHG

## 2022-11-23 DIAGNOSIS — N18.9 ACUTE KIDNEY INJURY SUPERIMPOSED ON CHRONIC KIDNEY DISEASE (H): Primary | ICD-10-CM

## 2022-11-23 DIAGNOSIS — N17.9 ACUTE KIDNEY INJURY SUPERIMPOSED ON CHRONIC KIDNEY DISEASE (H): Primary | ICD-10-CM

## 2022-11-23 DIAGNOSIS — I95.1 ORTHOSTATIC HYPOTENSION: ICD-10-CM

## 2022-11-23 LAB
ANION GAP SERPL CALCULATED.3IONS-SCNC: 12 MMOL/L (ref 7–15)
BUN SERPL-MCNC: 37.9 MG/DL (ref 8–23)
CALCIUM SERPL-MCNC: 9 MG/DL (ref 8.8–10.2)
CHLORIDE SERPL-SCNC: 111 MMOL/L (ref 98–107)
CREAT SERPL-MCNC: 2.6 MG/DL (ref 0.51–0.95)
DEPRECATED HCO3 PLAS-SCNC: 16 MMOL/L (ref 22–29)
GFR SERPL CREATININE-BSD FRML MDRD: 18 ML/MIN/1.73M2
GLUCOSE SERPL-MCNC: 85 MG/DL (ref 70–99)
MAGNESIUM SERPL-MCNC: 1.9 MG/DL (ref 1.7–2.3)
POTASSIUM SERPL-SCNC: 5 MMOL/L (ref 3.4–5.3)
SODIUM SERPL-SCNC: 139 MMOL/L (ref 136–145)

## 2022-11-23 PROCEDURE — 80048 BASIC METABOLIC PNL TOTAL CA: CPT | Mod: ZL | Performed by: FAMILY MEDICINE

## 2022-11-23 PROCEDURE — 83735 ASSAY OF MAGNESIUM: CPT | Mod: ZL | Performed by: FAMILY MEDICINE

## 2022-11-23 PROCEDURE — 99214 OFFICE O/P EST MOD 30 MIN: CPT | Performed by: FAMILY MEDICINE

## 2022-11-23 PROCEDURE — G0463 HOSPITAL OUTPT CLINIC VISIT: HCPCS | Mod: 25

## 2022-11-23 PROCEDURE — 36415 COLL VENOUS BLD VENIPUNCTURE: CPT | Mod: ZL | Performed by: FAMILY MEDICINE

## 2022-11-23 RX ADMIN — SODIUM CHLORIDE 1000 ML: 9 INJECTION, SOLUTION INTRAVENOUS at 13:28

## 2022-11-23 ASSESSMENT — PAIN SCALES - GENERAL: PAINLEVEL: NO PAIN (0)

## 2022-11-23 NOTE — PROGRESS NOTES
Assessment & Plan     Acute kidney injury superimposed on chronic kidney disease (H)  Orthostatic hypotension  - Basic metabolic panel  - Magnesium  - 0.9% sodium chloride BOLUS    BP low, positive orthostatics.  She is given 1 liter NS in office today.  Reports feeling noticeably better and more energy after the fluids.  BP up to 128/58 (initial 106/44).  She will continue staying off her Lasix.  Also stopping her amlodipine today.  I have asked her to follow-up on Monday or Tuesday next week for recheck.      DORENE ALEXIS, DO  [facility] [place]    Joann London is a 81 year old, presenting for the following health issues:  RECHECK (Lab Work d/t CKD)      HPI     82yo female, my first time seeing her, she was seen yesterday by Maritza Amaro for ongoing fatigue x1 month.  She was found to be in YONATAN on CKD, she was given 1 liter IV fluids here in office, and arranged to follow-up with me today as Maritza is out of office.  Per discussion with Maritza, the patient has history of not stopping her Lasix despite being instructed to do so, and she has subsequently gotten volume depleted requiring IV fluids at least once before in the past.    Today her blood pressure is on the lower end 106/44, yesterday she was at 114/62.  Her BP's in June and August were running in the 120-140 systolic range.  Her weight today is 97 lb, yesterday 97 lb 11 oz.  Her baseline weight appears to be approx 100 lbs.    She does have some ongoing fatigue and lightheadedness, she does feel better today than she did yesterday.  She denies any dysuria, frequency, odor, abdominal pain, back pain, or any infectious type symptoms.  She denies chest pain, dyspnea, palpitations, or headache.    BMP today showing creatinine 2.60, she was previously 2.95 yesterday.    States last dose of Lasix was yesterday.  Worried about LE swelling re accumulating off Lasix.      Review of Systems   Constitutional: Negative for fever.    Respiratory: Negative for cough and shortness of breath.             Objective    /58 (BP Location: Right arm, Patient Position: Sitting, Cuff Size: Adult Regular)   Pulse 69   Temp 98.1  F (36.7  C) (Tympanic)   Resp 16   Wt 44 kg (97 lb)   SpO2 96%   BMI 18.33 kg/m    Body mass index is 18.33 kg/m .     Initial /44  Supine 112/64, seated 121/67, standing 95/57 and symptomatic    Physical Exam  Constitutional:       Appearance: Normal appearance.   HENT:      Head: Normocephalic and atraumatic.   Cardiovascular:      Rate and Rhythm: Normal rate and regular rhythm.      Heart sounds: Normal heart sounds. No murmur heard.  Pulmonary:      Effort: Pulmonary effort is normal.      Breath sounds: Normal breath sounds. No wheezing.   Musculoskeletal:      Right lower leg: No edema.      Left lower leg: No edema.   Neurological:      Mental Status: She is alert and oriented to person, place, and time.

## 2022-11-24 LAB — BACTERIA UR CULT: NORMAL

## 2022-11-29 ASSESSMENT — ENCOUNTER SYMPTOMS
SHORTNESS OF BREATH: 0
COUGH: 0
FEVER: 0

## 2022-11-30 ENCOUNTER — OFFICE VISIT (OUTPATIENT)
Dept: FAMILY MEDICINE | Facility: OTHER | Age: 82
End: 2022-11-30
Attending: FAMILY MEDICINE
Payer: MEDICARE

## 2022-11-30 VITALS
BODY MASS INDEX: 19.08 KG/M2 | TEMPERATURE: 98.6 F | SYSTOLIC BLOOD PRESSURE: 150 MMHG | OXYGEN SATURATION: 96 % | WEIGHT: 101 LBS | DIASTOLIC BLOOD PRESSURE: 50 MMHG | HEART RATE: 52 BPM | RESPIRATION RATE: 18 BRPM

## 2022-11-30 DIAGNOSIS — N17.9 ACUTE KIDNEY INJURY SUPERIMPOSED ON CHRONIC KIDNEY DISEASE (H): Primary | ICD-10-CM

## 2022-11-30 DIAGNOSIS — I10 BENIGN ESSENTIAL HYPERTENSION: ICD-10-CM

## 2022-11-30 DIAGNOSIS — R60.9 EDEMA, UNSPECIFIED TYPE: ICD-10-CM

## 2022-11-30 DIAGNOSIS — N18.9 ACUTE KIDNEY INJURY SUPERIMPOSED ON CHRONIC KIDNEY DISEASE (H): Primary | ICD-10-CM

## 2022-11-30 LAB
ANION GAP SERPL CALCULATED.3IONS-SCNC: 10 MMOL/L (ref 7–15)
BUN SERPL-MCNC: 11.9 MG/DL (ref 8–23)
CALCIUM SERPL-MCNC: 8.6 MG/DL (ref 8.8–10.2)
CHLORIDE SERPL-SCNC: 110 MMOL/L (ref 98–107)
CREAT SERPL-MCNC: 1.36 MG/DL (ref 0.51–0.95)
DEPRECATED HCO3 PLAS-SCNC: 20 MMOL/L (ref 22–29)
GFR SERPL CREATININE-BSD FRML MDRD: 39 ML/MIN/1.73M2
GLUCOSE SERPL-MCNC: 76 MG/DL (ref 70–99)
POTASSIUM SERPL-SCNC: 4 MMOL/L (ref 3.4–5.3)
SODIUM SERPL-SCNC: 140 MMOL/L (ref 136–145)

## 2022-11-30 PROCEDURE — G0463 HOSPITAL OUTPT CLINIC VISIT: HCPCS | Mod: 25

## 2022-11-30 PROCEDURE — 36415 COLL VENOUS BLD VENIPUNCTURE: CPT | Mod: ZL | Performed by: FAMILY MEDICINE

## 2022-11-30 PROCEDURE — 80048 BASIC METABOLIC PNL TOTAL CA: CPT | Mod: ZL | Performed by: FAMILY MEDICINE

## 2022-11-30 PROCEDURE — G0463 HOSPITAL OUTPT CLINIC VISIT: HCPCS

## 2022-11-30 PROCEDURE — 0124A COVID-19 VACCINE BIVALENT BOOSTER 12+ (PFIZER): CPT

## 2022-11-30 PROCEDURE — 91312 COVID-19 VACCINE BIVALENT BOOSTER 12+ (PFIZER): CPT

## 2022-11-30 PROCEDURE — 99213 OFFICE O/P EST LOW 20 MIN: CPT | Performed by: FAMILY MEDICINE

## 2022-11-30 RX ORDER — FUROSEMIDE 20 MG
10 TABLET ORAL DAILY
Refills: 0 | COMMUNITY
Start: 2022-11-30 | End: 2023-02-15

## 2022-11-30 ASSESSMENT — PAIN SCALES - GENERAL: PAINLEVEL: NO PAIN (0)

## 2022-11-30 NOTE — PROGRESS NOTES
Assessment & Plan     Acute kidney injury superimposed on chronic kidney disease (H)  - Basic metabolic panel  Benign essential hypertension  Edema, unspecified type    Renal function back to baseline.  Unfortunately she is starting to develop some edema again.  She has a tendency to develop hypovolemic YONATAN, so will restart on only Lasix 10mg daily with close PCP follow-up in 1 week.  She does not need refills sent in at this time.        DORENE ALEXIS,   [facility] [place]    Joann London is a 81 year old, presenting for the following health issues:  RECHECK (CKD)      HPI     Follow-up from 11/23/22, please see that note.  Feeling good, more energy, overall doing good.  Concern is re-developing LE edema despite trying compression socks.  No dyspnea/orthopnea.    Review of Systems   Constitutional: Negative for fever.   Respiratory: Positive for cough. Negative for shortness of breath.    Cardiovascular: Positive for peripheral edema.          Objective    BP (!) 150/50   Pulse 52   Temp 98.6  F (37  C) (Tympanic)   Resp 18   Wt 45.8 kg (101 lb)   SpO2 96%   BMI 19.08 kg/m    Body mass index is 19.08 kg/m .  Physical Exam  Constitutional:       General: She is not in acute distress.     Appearance: Normal appearance.   Cardiovascular:      Rate and Rhythm: Normal rate and regular rhythm.      Heart sounds: Normal heart sounds.      Comments: 1+ BLE pitting edema  Pulmonary:      Effort: Pulmonary effort is normal.      Breath sounds: Normal breath sounds. No wheezing, rhonchi or rales.   Neurological:      Mental Status: She is alert.

## 2022-12-06 ASSESSMENT — ENCOUNTER SYMPTOMS
FEVER: 0
COUGH: 1
SHORTNESS OF BREATH: 0

## 2022-12-07 ENCOUNTER — OFFICE VISIT (OUTPATIENT)
Dept: FAMILY MEDICINE | Facility: OTHER | Age: 82
End: 2022-12-07
Attending: FAMILY MEDICINE
Payer: MEDICARE

## 2022-12-07 VITALS
TEMPERATURE: 99.9 F | DIASTOLIC BLOOD PRESSURE: 56 MMHG | HEART RATE: 63 BPM | HEIGHT: 61 IN | WEIGHT: 100 LBS | SYSTOLIC BLOOD PRESSURE: 138 MMHG | BODY MASS INDEX: 18.88 KG/M2 | OXYGEN SATURATION: 93 %

## 2022-12-07 DIAGNOSIS — J06.9 UPPER RESPIRATORY TRACT INFECTION, UNSPECIFIED TYPE: ICD-10-CM

## 2022-12-07 DIAGNOSIS — I50.22 CHRONIC SYSTOLIC CONGESTIVE HEART FAILURE (H): Primary | Chronic | ICD-10-CM

## 2022-12-07 LAB
ANION GAP SERPL CALCULATED.3IONS-SCNC: 11 MMOL/L (ref 7–15)
BUN SERPL-MCNC: 14.2 MG/DL (ref 8–23)
CALCIUM SERPL-MCNC: 8.3 MG/DL (ref 8.8–10.2)
CHLORIDE SERPL-SCNC: 107 MMOL/L (ref 98–107)
CREAT SERPL-MCNC: 1.36 MG/DL (ref 0.51–0.95)
DEPRECATED HCO3 PLAS-SCNC: 23 MMOL/L (ref 22–29)
GFR SERPL CREATININE-BSD FRML MDRD: 39 ML/MIN/1.73M2
GLUCOSE SERPL-MCNC: 98 MG/DL (ref 70–99)
POTASSIUM SERPL-SCNC: 3.8 MMOL/L (ref 3.4–5.3)
SODIUM SERPL-SCNC: 141 MMOL/L (ref 136–145)

## 2022-12-07 PROCEDURE — 36415 COLL VENOUS BLD VENIPUNCTURE: CPT | Mod: ZL | Performed by: FAMILY MEDICINE

## 2022-12-07 PROCEDURE — 99213 OFFICE O/P EST LOW 20 MIN: CPT | Performed by: FAMILY MEDICINE

## 2022-12-07 PROCEDURE — G0463 HOSPITAL OUTPT CLINIC VISIT: HCPCS | Performed by: FAMILY MEDICINE

## 2022-12-07 PROCEDURE — 80048 BASIC METABOLIC PNL TOTAL CA: CPT | Mod: ZL | Performed by: FAMILY MEDICINE

## 2022-12-07 RX ORDER — BENZONATATE 100 MG/1
100 CAPSULE ORAL 3 TIMES DAILY PRN
Qty: 90 CAPSULE | Refills: 3 | Status: SHIPPED | OUTPATIENT
Start: 2022-12-07 | End: 2023-03-21

## 2022-12-07 RX ORDER — BENZONATATE 100 MG/1
100 CAPSULE ORAL 3 TIMES DAILY PRN
Qty: 60 CAPSULE | Refills: 3 | Status: SHIPPED | OUTPATIENT
Start: 2022-12-07 | End: 2022-12-17

## 2022-12-07 ASSESSMENT — PAIN SCALES - GENERAL: PAINLEVEL: NO PAIN (0)

## 2022-12-07 NOTE — PROGRESS NOTES
Assessment & Plan     Chronic systolic congestive heart failure (H)- Recovered  Edema improved, breathing stable (save for cough, see below), Recheck renal function which has been very sensitive to diuretics.   - Basic metabolic panel  - Basic metabolic panel    Upper respiratory tract infection, unspecified type  Improving on its own over the last couple of days, pt would like something to help with cough. Advised to update me with new or worsening sx. Declines covid or flu testing given duration and out of treatment window for both.   - benzonatate (TESSALON) 100 MG capsule  Dispense: 90 capsule; Refill: 3    25 minutes spent on the date of the encounter doing chart review, review of test results, interpretation of tests, patient visit and documentation     No follow-ups on file.    Ophelia Troncoso MD  [facility] [place] London is a 81 year old, presenting for the following health issues:  Edema      HPI     Concern - Edema  Onset: 11/30/22  Description: Follow up  Intensity: moderate  Progression of Symptoms:  improving  Accompanying Signs & Symptoms: NA  Previous history of similar problem: Yes  Precipitating factors:        Worsened by: NA  Alleviating factors:        Improved by: Lasix  Therapies tried and outcome: Lasix 10 MG daily    Acute Illness  Acute illness concerns:  URI  Onset/Duration: 7 days  Symptoms:  Fever: No  Chills/Sweats: No  Headache (location?): No  Sinus Pressure: YES  Conjunctivitis:  No  Ear Pain: no  Rhinorrhea: YES  Congestion: YES  Sore Throat: YES  Cough: YES-non-productive, waxing and waning over time  Wheeze: No  Decreased Appetite: YES  Nausea: No  Vomiting: No  Diarrhea: No  Dysuria/Freq.: No  Dysuria or Hematuria: No  Fatigue/Achiness: YES  Sick/Strep Exposure: No  Therapies tried and outcome: None    Review of Systems   Constitutional, HEENT, cardiovascular, pulmonary, gi and gu systems are negative, except as otherwise noted.      Objective   "  /56 (BP Location: Right arm, Patient Position: Sitting, Cuff Size: Adult Regular)   Pulse 63   Temp 99.9  F (37.7  C)   Ht 1.549 m (5' 1\")   Wt 45.4 kg (100 lb)   SpO2 93%   BMI 18.89 kg/m    Body mass index is 18.89 kg/m .  Physical Exam   GENERAL: alert and no distress  EYES: Eyes grossly normal to inspection  HENT: normal cephalic/atraumatic, ear canals and TM's normal, nose and mouth without ulcers or lesions, oropharynx clear and oral mucous membranes moist  NECK: no adenopathy, no asymmetry, masses, or scars and thyroid normal to palpation  RESP: lungs clear to auscultation - no rales, rhonchi or wheezes  CV: regular rates and rhythm, normal S1 S2, no S3 or S4, no murmur, click or rub and no peripheral edema  MS: no gross musculoskeletal defects noted, no edema  SKIN: no suspicious lesions or rashes  NEURO: mentation intact and speech normal  PSYCH: mentation appears normal, affect normal/bright    Results for orders placed or performed in visit on 12/07/22   Basic metabolic panel     Status: Abnormal   Result Value Ref Range    Sodium 141 136 - 145 mmol/L    Potassium 3.8 3.4 - 5.3 mmol/L    Chloride 107 98 - 107 mmol/L    Carbon Dioxide (CO2) 23 22 - 29 mmol/L    Anion Gap 11 7 - 15 mmol/L    Urea Nitrogen 14.2 8.0 - 23.0 mg/dL    Creatinine 1.36 (H) 0.51 - 0.95 mg/dL    Calcium 8.3 (L) 8.8 - 10.2 mg/dL    Glucose 98 70 - 99 mg/dL    GFR Estimate 39 (L) >60 mL/min/1.73m2             "

## 2022-12-14 DIAGNOSIS — E03.9 ACQUIRED HYPOTHYROIDISM: ICD-10-CM

## 2022-12-14 DIAGNOSIS — K92.2 GASTROINTESTINAL HEMORRHAGE, UNSPECIFIED GASTROINTESTINAL HEMORRHAGE TYPE: ICD-10-CM

## 2022-12-14 RX ORDER — LEVOTHYROXINE SODIUM 50 UG/1
50 TABLET ORAL DAILY
Qty: 90 TABLET | Refills: 3 | Status: ON HOLD | OUTPATIENT
Start: 2022-12-14 | End: 2023-11-27

## 2022-12-14 RX ORDER — PANTOPRAZOLE SODIUM 40 MG/1
TABLET, DELAYED RELEASE ORAL
Qty: 180 TABLET | Refills: 2 | Status: SHIPPED | OUTPATIENT
Start: 2022-12-14 | End: 2023-09-26

## 2023-01-05 DIAGNOSIS — Z95.5 S/P DRUG ELUTING CORONARY STENT PLACEMENT: ICD-10-CM

## 2023-01-05 DIAGNOSIS — E78.5 HYPERLIPIDEMIA, UNSPECIFIED HYPERLIPIDEMIA TYPE: ICD-10-CM

## 2023-01-05 RX ORDER — ATORVASTATIN CALCIUM 40 MG/1
40 TABLET, FILM COATED ORAL AT BEDTIME
Qty: 90 TABLET | Refills: 1 | Status: SHIPPED | OUTPATIENT
Start: 2023-01-05 | End: 2023-06-27

## 2023-01-05 NOTE — TELEPHONE ENCOUNTER
lipitor      Last Written Prescription Date:  6/28/22  Last Fill Quantity: 90,   # refills: 1  Last Office Visit: 12/7/22  Future Office visit:    Next 5 appointments (look out 90 days)    Mar 21, 2023  4:00 PM  (Arrive by 3:45 PM)  Office Visit with Ophelia Troncoso MD  [facility] [place] ([facility] [place] ) [address]  [place] [address]  [phone]

## 2023-01-20 NOTE — ED NOTES
Pt unable to say at this time what meds she is on. Pt with congested cough.   Pt. is a 62 yo female with a left jaw mass.

## 2023-02-15 DIAGNOSIS — I50.22 CHRONIC SYSTOLIC CONGESTIVE HEART FAILURE (H): Primary | Chronic | ICD-10-CM

## 2023-02-15 RX ORDER — FUROSEMIDE 20 MG
10 TABLET ORAL DAILY
Qty: 90 TABLET | Refills: 0 | Status: SHIPPED | OUTPATIENT
Start: 2023-02-15 | End: 2023-04-19

## 2023-02-15 NOTE — TELEPHONE ENCOUNTER
Med is historical    furosemide (LASIX) 20 MG tablet      Last Written Prescription Date:    Last Fill Quantity: 0,   # refills: 11/30/22  Last Office Visit: 12/7/22  Future Office visit:    Next 5 appointments (look out 90 days)    Mar 21, 2023  4:00 PM  (Arrive by 3:45 PM)  Office Visit with Ophelia Troncoso MD  [facility] ([facility] ) [address]  [place] [address]  [phone]           Routing refill request to provider for review/approval because:  Drug not on the [facility], [facility] or [facility] refill protocol or controlled substance

## 2023-02-16 ENCOUNTER — TELEPHONE (OUTPATIENT)
Dept: FAMILY MEDICINE | Facility: OTHER | Age: 83
End: 2023-02-16

## 2023-02-16 ENCOUNTER — NURSE TRIAGE (OUTPATIENT)
Dept: FAMILY MEDICINE | Facility: OTHER | Age: 83
End: 2023-02-16

## 2023-02-16 DIAGNOSIS — Z95.5 HISTORY OF CORONARY ARTERY STENT PLACEMENT: ICD-10-CM

## 2023-02-16 DIAGNOSIS — R06.09 DOE (DYSPNEA ON EXERTION): Primary | ICD-10-CM

## 2023-02-16 DIAGNOSIS — R94.39 ABNORMAL STRESS TEST: ICD-10-CM

## 2023-02-16 DIAGNOSIS — R94.39 ABNORMAL CARDIOVASCULAR STRESS TEST: ICD-10-CM

## 2023-02-16 DIAGNOSIS — R07.9 CHEST PAIN, UNSPECIFIED TYPE: ICD-10-CM

## 2023-02-16 DIAGNOSIS — I25.10 ASCVD (ARTERIOSCLEROTIC CARDIOVASCULAR DISEASE): ICD-10-CM

## 2023-02-16 NOTE — TELEPHONE ENCOUNTER
Relayed Dr. Graham's recommendation below.  Patient states she took two home Covid tests.   Both had a faint but visible pink line - indicating positive.  States only symptom is slight to moderate SOB with exertion then relieved with rest.   States she feels like she did when taken to [place] for stents.  Patients daughter told her to take Paxlovid but patient is hesitant.   Writer stated that ultimately taking any medication is patients choice  Recommended ED/UC tonight with any rapid decline in symptoms  Asked patient to call back to clinic tomorrow 2/17 with health update  Patient asked if she could see Cardiology sooner than appt in June  Writer pended note to Provider & Nursing staff to review & advise

## 2023-02-16 NOTE — TELEPHONE ENCOUNTER
"Call placed to patient to discuss positive covid 19 results.   Underlying Medical Conditions: Age  Patient testing positive on 2/16   Test by:  At home covid test  Start of Symptoms: 2/15  Covid 19 Vaccination Status:  Pfizer initial and 3 boosters  Are you pregnant, breastfeeding or trying to conceive? no    COVID-19 Symptom Review      New or worsening difficulty breathing? yes    Cough? yes     Dry or Productive?  Both    Fever?  no     Highest temp past 24 hours?  NA    Chills?  No     Headache:  No     Sore throat: no    Chest pain: no     Diarrhea: no    Body aches?  no    Nasal congestion or drainage?  drainage     What treatments has patient tried?  nothing  Does patient live in a nursing home, group home, or shelter? no  Does patient have a way to get food/medications during quarantine? yes    Appointment Scheduled:  Patient declined appointment for antiviral.    Pharmacy: [facility]    Confirmed with Pharmacy: [facility]     Instructions below provided to patient. Patient verbalized understanding.     Instructions for Patients  Your COVID-19 test was positive. This means you have the virus. Please follow the \"How can I take care of myself\" and \"How do I self-isolate?\" guidelines in these instructions.    What treatments are available?  Over-the-counter medicines may help with your symptoms such as runny or stuffy nose, cough, chills, and fever. Talk to your care team about your options.     Some people are at high risk for severe illness (for example if you have a weak immune system, you're 65 or older, or you have certain medical problems). If your risk it high and your symptoms started in the last 5 to 7 days, we strongly recommend for you to get COVID treatment as soon as possible before you get really sick. Paxlovid, Molnupiravir and the monoclonal antibody treatments are proven safe and effective, make you feel better faster, and prevent hospitalization and death.       What are the symptoms of " COVID-19?  Symptoms can include fever, cough, shortness of breath, chills, headache, muscle pain sore throat, fatigue, runny or stuffy nose, and loss of taste and smell. Other less common symptoms include nausea, vomiting, or diarrhea (watery stools).    Know when to call 911. Emergency warning signs include:    Trouble breathing or shortness of breath    Pain or pressure in the chest that doesn't go away    Feeling confused like you haven't felt before, or not being able to wake up    Bluish-colored lips or face    How can I take care of myself?  1. Get lots of rest. Drink extra fluids (unless a doctor has told you not to).  2. Take Tylenol (acetaminophen) for fever or pain. If you have liver or kidney problems, ask your family doctor if it's okay to take Tylenol   Adults:   650 mg (two 325 mg pills or tablets) every 4 to 6 hours, or...   1,000 mg (two 500 mg pills or tablets) every 8 hours as needed.  Note: Don't take more than 3,000 mg in one day. Acetaminophen is found in many medicines (both prescribed and over the counter). Read all labels to be sure you don't take too much.  For children, check the Tylenol bottle for the right dose. The dose is based on the child's age or weight.  3. Take over the counter medicines for your symptoms as needed. Talk to your pharmacist.  4. If you have other health problems (like cancer, heart failure, an organ transplant, or severe kidney disease): Call your specialty clinic if you don't feel better in the next 2 days.    These guidelines are for isolating and quarantining before returning to work, school or .     For employers, schools and day cares: This is an official notice for this person's medical guidelines for returning in-person.     For health care sites: The CDC gives different isolation and quarantine guidelines for healthcare sites, please check with these sites before arriving.     How do I self-isolate?  You isolate when you have symptoms of COVID or a  test shows you have COVID, even if you don't have symptoms.     If you DO have symptoms:  o Stay home and away from others  - For at least 5 days after your symptoms started, AND   - You are fever free for 24 hours (with no medicine that reduces fever), AND  - Your other symptoms are better.  o Wear a mask for 10 full days any time you are around others.    If you DON'T have symptoms:  o Stay at home and away from others for at least 5 days after your positive test.  o Wear a mask for 10 full days any time you are around others.    How and when do I quarantine?  You quarantine when you may have been exposed to the virus and DON'T have symptoms.     Stay home and away from others.     You must quarantine for 5 days after your last contact with a person who has COVID.  o Note: If you are fully vaccinated, you don't need to quarantine. You should still follow the steps below.     Wear a mask for 10 full days any time you're around others.    Get tested at least 5 days after you were exposed, even if you don't have symptoms.     If you start to have symptoms, isolate right away and get tested.    Where can I get more information?    [facility] COVID-19 Resource Hub: [email or web address]     CDC Quarantine & Isolation: https://www.cdc.gov/coronavirus/2019-ncov/your-health/quarantine-isolation.html     [facility] - What to Do If You're Sick: https://www.cdc.gov/coronavirus/2019-ncov/if-you-are-sick/index.html    [facility] clinical trials (COVID-19 research studies): clinicalaffairs.[facility]/umn-clinical-trials    [place] Department of Health COVID-19 Public Hotline: 1-930.172.1625

## 2023-02-16 NOTE — TELEPHONE ENCOUNTER
This writer called patient and triaged for antiviral.     Patient reports having cough and SOB.  Patient declined covid antiviral.    This writer advised patient to be seen in ER for SOB.   Patient verbalized understanding and reports she will go to the ER if she needs to go.

## 2023-02-16 NOTE — TELEPHONE ENCOUNTER
Patient calling kim c/o of SOB   Asking to be seen by Dr. Graham  Took Covid test - result lines are faint  Pt states she does not feel she has Covid    Pended to Cardiology to review & advise

## 2023-02-16 NOTE — TELEPHONE ENCOUNTER
"    Answer Assessment - Initial Assessment Questions  1. RESPIRATORY STATUS: \"Describe your breathing?\" (e.g., wheezing, shortness of breath, unable to speak, severe coughing)       sob  2. ONSET: \"When did this breathing problem begin?\"       2 days ago  3. PATTERN \"Does the difficult breathing come and go, or has it been constant since it started?\"       Comes and goes  4. SEVERITY: \"How bad is your breathing?\" (e.g., mild, moderate, severe)     - MILD: No SOB at rest, mild SOB with walking, speaks normally in sentences, can lie down, no retractions, pulse < 100.     - MODERATE: SOB at rest, SOB with minimal exertion and prefers to sit, cannot lie down flat, speaks in phrases, mild retractions, audible wheezing, pulse 100-120.     - SEVERE: Very SOB at rest, speaks in single words, struggling to breathe, sitting hunched forward, retractions, pulse > 120       mild  5. RECURRENT SYMPTOM: \"Have you had difficulty breathing before?\" If Yes, ask: \"When was the last time?\" and \"What happened that time?\"       no  6. CARDIAC HISTORY: \"Do you have any history of heart disease?\" (e.g., heart attack, angina, bypass surgery, angioplasty)       stroke  7. LUNG HISTORY: \"Do you have any history of lung disease?\"  (e.g., pulmonary embolus, asthma, emphysema)      Copd?  8. CAUSE: \"What do you think is causing the breathing problem?\"       unknown  9. OTHER SYMPTOMS: \"Do you have any other symptoms? (e.g., dizziness, runny nose, cough, chest pain, fever)      Coughing a little  10. O2 SATURATION MONITOR:  \"Do you use an oxygen saturation monitor (pulse oximeter) at home?\" If Yes, \"What is your reading (oxygen level) today?\" \"What is your usual oxygen saturation reading?\" (e.g., 95%)        No  But 02 sats are 91  11. PREGNANCY: \"Is there any chance you are pregnant?\" \"When was your last menstrual period?\"        no  12. TRAVEL: \"Have you traveled out of the country in the last month?\" (e.g., travel history, exposures)        " "no    Answer Assessment - Initial Assessment Questions  1. COVID-19 DIAGNOSIS: \"Who made your COVID-19 diagnosis?\" \"Was it confirmed by a positive lab test or self-test?\" If not diagnosed by a doctor (or NP/PA), ask \"Are there lots of cases (community spread) where you live?\" Note: See public health department website, if unsure.      no  2. COVID-19 EXPOSURE: \"Was there any known exposure to COVID before the symptoms began?\" CDC Definition of close contact: within 6 feet (2 meters) for a total of 15 minutes or more over a 24-hour period.       no  3. ONSET: \"When did the COVID-19 symptoms start?\"       2 days ago  4. WORST SYMPTOM: \"What is your worst symptom?\" (e.g., cough, fever, shortness of breath, muscle aches)      Cough sob  5. COUGH: \"Do you have a cough?\" If Yes, ask: \"How bad is the cough?\"        cough  6. FEVER: \"Do you have a fever?\" If Yes, ask: \"What is your temperature, how was it measured, and when did it start?\"      no  7. RESPIRATORY STATUS: \"Describe your breathing?\" (e.g., shortness of breath, wheezing, unable to speak)       Sob no  8. BETTER-SAME-WORSE: \"Are you getting better, staying the same or getting worse compared to yesterday?\"  If getting worse, ask, \"In what way?\"      same  9. HIGH RISK DISEASE: \"Do you have any chronic medical problems?\" (e.g., asthma, heart or lung disease, weak immune system, obesity, etc.)      Stroke copd?  10. VACCINE: \"Have you had the COVID-19 vaccine?\" If Yes, ask: \"Which one, how many shots, when did you get it?\"        Yes pfizer  11. BOOSTER: \"Have you received your COVID-19 booster?\" If Yes, ask: \"Which one and when did you get it?\"        3 times  12. PREGNANCY: \"Is there any chance you are pregnant?\" \"When was your last menstrual period?\"        no  13. OTHER SYMPTOMS: \"Do you have any other symptoms?\"  (e.g., chills, fatigue, headache, loss of smell or taste, muscle pain, sore throat)        no  14. O2 SATURATION MONITOR:  \"Do you use an oxygen " "saturation monitor (pulse oximeter) at home?\" If Yes, ask \"What is your reading (oxygen level) today?\" \"What is your usual oxygen saturation reading?\" (e.g., 95%)        02 sats are 91    Protocols used: BREATHING DIFFICULTY-A-OH, CORONAVIRUS (COVID-19) DIAGNOSED OR [id number]    COVID 19 Nurse Triage Plan/Patient Instructions    Please be aware that novel coronavirus (COVID-19) may be circulating in the community. If you develop symptoms such as fever, cough, or SOB or if you have concerns about the presence of another infection including coronavirus (COVID-19), please contact your health care provider or visit [email or web address].     Disposition/Instructions    In-Person Visit with provider recommended. Reference Visit Selection Guide.    Thank you for taking steps to prevent the spread of this virus.  o Limit your contact with others.  o Wear a simple mask to cover your cough.  o Wash your hands well and often.    Resources    [facility]: About COVID-19: [email or web address]    CDC: What to Do If You're Sick: www.cdc.gov/coronavirus/2019-ncov/about/steps-when-sick.html    CDC: Ending Home Isolation: www.cdc.gov/coronavirus/2019-ncov/hcp/disposition-in-home-patients.html     CDC: Caring for Someone: www.cdc.gov/coronavirus/2019-ncov/if-you-are-sick/care-for-someone.html     [facility]: Interim Guidance for Hospital Discharge to Home: www.health.[facility].[place]/diseases/coronavirus/hcp/hospdischarge.pdf    [facility] clinical trials (COVID-19 research studies): clinicalaffairs.[facility]-clinical-trials     Below are the COVID-19 hotlines at the [facility] of Health ([facility]). Interpreters are available.   o For health questions: Call 185-497-4968 or 1-352.870.1216 (7 a.m. to 7 p.m.)  o For questions about schools and childcare: Call 595-022-3058 or 1-649.112.9613 (7 a.m. to 7 p.m.)                   "

## 2023-02-16 NOTE — TELEPHONE ENCOUNTER
Sal Graham, Ciera Browning, TRUNG; P  Cardiology  Caller: Unspecified (Today, 11:58 AM)  If she is potentially positive for COVID, it sounds like you have offered her an appropriate treatment.  It may be best to have her come in to be checked to see if she truly is positive for COVID-19.  If she is, she will have to monitor herself and if starts getting worse will need to be seen if she chooses not to go in sooner.  It is likely the COVID-19 is causing her shortness of breath.  If her shortness of breath does not end up being COVID-19 as a cause of her shortness of breath, have her let us know and we will investigate deeper by ordering testing.  I will see her in follow-up sooner than later.  Let me know if she is okay with this.     Dr. Graham

## 2023-02-17 NOTE — TELEPHONE ENCOUNTER
Spoke with patient and she is agreeable to stress test. We will call and get her on Dr. Graham's schedule after stress test is scheduled.

## 2023-02-17 NOTE — TELEPHONE ENCOUNTER
Sal Graham, DO  Ciera Andrade, RN 18 hours ago (5:18 PM)     DB  Yes, would be happy to see the patient sooner.  I will also order a stress test.  They will call her set that up.  We could see here after completion of the stress test.     Dr. Graham

## 2023-02-20 NOTE — TELEPHONE ENCOUNTER
Stress test not able to be done until 4-5-23 an patient would still like to see Dr. Graham sooner. Scheduled to see him on 3-1-23 per her request.

## 2023-02-28 NOTE — PATIENT INSTRUCTIONS
Thank you for allowing Dr. Graham and our  team to participate in your care. Please call our office at [phone] with scheduling questions or if you need to cancel or change your appointment. With any other questions or concerns you may call [facility] cardiology nurse at [phone].       If you experience chest pain, chest pressure, chest tightness, shortness of breath, fainting, lightheadedness, nausea, vomiting, or other concerning symptoms, please report to the Emergency Department or call 911. These symptoms may be emergent, and best treated in the Emergency Department.            Follow in 6 months    Scheduling will call to arrange for the Pulmonary function testing.    Decrease Salt and Fluids intake.

## 2023-03-01 ENCOUNTER — OFFICE VISIT (OUTPATIENT)
Dept: CARDIOLOGY | Facility: OTHER | Age: 83
End: 2023-03-01
Attending: INTERNAL MEDICINE
Payer: COMMERCIAL

## 2023-03-01 VITALS
TEMPERATURE: 98.4 F | SYSTOLIC BLOOD PRESSURE: 172 MMHG | BODY MASS INDEX: 19.54 KG/M2 | WEIGHT: 103.5 LBS | HEIGHT: 61 IN | DIASTOLIC BLOOD PRESSURE: 60 MMHG | HEART RATE: 63 BPM

## 2023-03-01 DIAGNOSIS — R06.09 DOE (DYSPNEA ON EXERTION): ICD-10-CM

## 2023-03-01 DIAGNOSIS — J44.9 CHRONIC OBSTRUCTIVE PULMONARY DISEASE, UNSPECIFIED COPD TYPE (H): Primary | ICD-10-CM

## 2023-03-01 DIAGNOSIS — J43.9 PULMONARY EMPHYSEMA, UNSPECIFIED EMPHYSEMA TYPE (H): ICD-10-CM

## 2023-03-01 PROCEDURE — G0463 HOSPITAL OUTPT CLINIC VISIT: HCPCS

## 2023-03-01 PROCEDURE — 99214 OFFICE O/P EST MOD 30 MIN: CPT | Performed by: INTERNAL MEDICINE

## 2023-03-01 ASSESSMENT — PAIN SCALES - GENERAL: PAINLEVEL: NO PAIN (0)

## 2023-03-01 NOTE — PROGRESS NOTES
[facility] HEART [facility]ARE   CARDIOLOGY PROGRESS NOTE     Chief Complaint   Patient presents with     Follow Up     MILLS  SOB          Diagnosis:  1.  CHF-diastolic dysfunction grade 1 on 12/5/2019. H/O ischemic cardiomyopathy, recovered EF. 36% on 6/4/2019 stress test. 60-65% on 12/5/2019.  2.  MILLS.  3.  Abnormal stress test on 6/14/2019.  4.  Cardiac cath, stenting to o/mLCX on 6/20/2019 at [facility].  5.  CVA on 1/1/2019.  6.  History of tobacco abuse, quitting on 1/1/2019.  7.  Hypertension-controlled.  8.  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  9.  Hyperlipidemia-controlled.  10.  COPD-????.  11.  Postop A. fib on 6/29/19 at [facility].  12.  Cardioversion on 6/29/19 at [facility].  13.  PAD in 2/20/2020-moderate.  14.  Bilateral carotid disease.  50-69% on 4/27/2023 through [facility].  50% stenosis on 2/28/2020.    Assessment/Plan:    1.  CHF: Likely diastolic with history of reduced EF now recovered.  Patient with minimal to no edema, wearing compression stockings.  Currently on Lasix 10 mg daily.  Over consumes on fluid it was cleared to cut back.  She drinks up to 8 glasses of water a day and 2 cups of coffee.  Told her to drink up to 4 glasses of water and 2 cups of coffee a day.  Continue on Lasix 10 mg daily.    2.  CAD: History of stenting as outlined above.  Denies anginal symptoms.  Continue medical management with aspirin 81 mg daily, Lipitor 40 mg daily, metoprolol 50 mg XL daily, and sublingual nitro as needed for chest pain.  3.  Hypertension: Continue Lasix 10 mg daily, lisinopril 10 mg daily, and metoprolol 50 mg daily.  4.  PAD: KESHAV's on 2/28/2020 with a right at 0.48 and left 0.68.  Currently on aspirin and Lipitor.  KESHAV's on 7/18/2022 showed moderate to severe disease bilaterally.  Seen by vascular medicine on 8/3/2022.  Dr. Hammonds plans to continue to follow both PAD and carotid artery stenosis.  5.  Bilateral carotid artery disease. 50 to 69% on 4/27/2022 through [facility].  Have ordered x3 ultrasound of carotid  arteries but never completed per patient.  6.  A. fib: Asymptomatic.  Zio patch on 3/16/2022 deficient of A. fib.  On aspirin and metoprolol.  7.  Concern for COPD: We will plan for PFT's.  7.  Follow-up in 6 months after PFT's.    Interval history:  Lita is being seen in follow-up.  She continues to be dyspneic on exertion.  She has long history of tobacco abuse quitting in 2019.  She believes her shortness of breath is related to COPD.  However, she has not been willing to be tested.  Today, she was tentatively agreeable to having PFT's.  PFT's ordered.  Also, she has heart failure which she is not noncompliant.  She over consumes on fluid.  She drinks about 8 glasses of water and 2 coffees.  She also over consumes on salt.  She was told to cut back on these.  She is currently on Lasix 10 mg daily.  We discussed increasing it but declined.  Her kidney function had reduced substantially with a creatinine of 2.95 on 11/22/2022.  Her medications were changed, her creatinine improved to 1.36.  Thankfully, with her history of heart disease, she has not any chest pain or chest tightness.  She did have stenting through [facility] in 2019.  We will continue medical management.  She also has a history of hypertension and hyperlipidemia.  They have been controlled.  There is a history of COPD.  She has declined work-up in the past.  Today, she is tentatively agreeable to having PFT's.  I am concerned with her carotid artery disease.  She did see Dr. Hammonds for PAD and carotid disease.  An ultrasound of carotids has been ordered 3 times but not completed.  At this point, we will see her in 6 months follow-up.  We discussed salt restriction, fluid restriction, and daily weights.        She is having mild peripheral edema currently on Lasix 20 mg daily.  She denies significant dyspnea on exertion.  She is not having significant peripheral edema.  Her blood pressure remains uncontrolled which is playing a part in her diastolic  dysfunction.  Changes were made to her medications as outlined above.  She also has a history of postop A. fib with cardioversion at [facility] on 2019.  She denies any palpitations or fluttering.  She has not been on an anticoagulant.  Currently on aspirin 81 mg daily.  Suggested a Zio patch to determine if she is having ongoing episodes of A. fib with a history of stroke in 2019.  Patient was agreeable.  She will have an ultrasound of her carotids with history of 50% bilateral stenosis in .  I will also plan for an ultrasound of her abdomen for AAA.  This never been assessed.  I believe she has COPD after auscultation.  She has significantly reduced air movement.  PFT's declined.  She states she does not want to know the answer as her  previously had COPD and  from this.  Thankfully, she did quit smoking in 2019 after her stroke.  Patient will follow up after completion of testing.    HPI:    Ms. Ocsaio has a history of CAD s/p coronary angiography on 19 at [facility]. She was recently identified to have newly identified systolic heart failure.  Additionally, she has a history of hypertension, hyperlipidemia, nonrheumatic mitral regurgitation, central lobular emphysema, hypothyroidism, osteopenia, hyperparathyroidism, history of tobacco abuse and chronic rhinitis.     At initial visit patient was accompanied by her son who was very helpful in providing patients past medical history. He admitted approximately 20 years ago patient was being followed for MR which was noted to be improved and therefore, vlave surgery was never recommended. He admitted that she was previously told it was suspected she had small MI's without coronary intervention or identified ACS. Finally, recent history just before new years of 2020 acute hemorrhagic stroke. It was following this, that she was taken off her beta blocker and lisinopril for HTN and possible past MI, she reported increased shortness of breath  following this which has progressively worsened and likely multifactorial with COPD and HFrEF.      6/4/2019-Lexiscan stress test with abnormal imaging with reversible ischemia laterally and LVEF 36%. Additionally, in review of past CT chest imaging from one year ago, she was noted to have advanced atherosclerotic disease with bulky coronary and aortic calcifications. Given these findings, current symptoms and new acute systolic failure, recommended coronary angiography.     Patient underwent coronary angiography on 6/26/19 at [facility]. She was identified to have diffuse coronary atherosclerosis with single vessel obstructive CAD, ostial and mid LCX. Successful PCI with synergy LISS x2, mid LCX 2.5x16mm, ostial LCX 3.0 x 16 mm. LVEDP 26-29 mmHg. During revascularization she was identified to have AF for which she received DCCV x1 with return to sinus and then reverted back to AF. She was treated with IV beta blocker for rate control. Patient reported feeling good following coronary intervention. Breathing had significantly improved and increased energy.       Patient presented to the ED on 7/13/21 with reports of chest pressure, dizziness and increased dyspnea. She was noted to be playing [employer] that same day with symptoms. ECG revealing SR with SVE, no ST-T changes. Troponin negative x 2 and chest XR unremarkable. Lab evaluation with decline in renal function and suspected dehydration.         Relevant testing:  KESHAV on 7/18/22:  Moderate to severe disease bilaterally.    US carotids on 4/27/2022 through [facility]:  1. Atherosclerotic changes with sonographic data supportive of 50-69% stenosis bilaterally.   2. Retrograde flow in the left vertebral artery.     US abdominal aorta on 4/27/2022:  Diffuse atherosclerotic disease in the intra-abdominal aorta, without sonographic evidence of intra-abdominal aortic aneurysm.     Zio patch on 3/16/22:  Worn for 13 days and 20 hr's.  After removing artifact, total  time was 13 days and 16 hr's. Placed on 3/16/2022 at 1:28 PM and completed on 3/30/2022 at 9:15 AM.  Underlying rhythm was sinus.  Hrt rate ranged from 41 bpm, maximum heart rate of 197 bmp, averaging 62 bmp.  No significant bradycardia, pauses, Mobitz type II or 3rd degree heart block.  No atrial fibrillation on this study.  x0 triggered events and x0 diary entries.  x0 runs of VT.    x7 runs of SVT lasting up to 8 beats with a maximum heart rate of 197 bmp.  Rare, <1% of PAC's, atrial couplets, atrial triplets, PVC's, ventricular couplets, and ventricular triplets.  + episodes of ventricular bigeminy lasting up to 4.4 sec's.  0 episodes of ventricular trigeminy lasting.    KESHAV on 2/28/20:  Moderate arterial occlusive disease in both lower extremities.    US carotids on 2/28/20:  Heavy atherosclerotic plaquing in both carotid bifurcations with velocities consistent with less than 50% stenoses noted.    ECHO on 12/5/19:  No pericardial effusion is present.  Global and regional left ventricular function is normal with an EF of 60-65%.  Grade I or early diastolic dysfunction.  The right ventricle is normal size.  Global right ventricular function is normal.  Both atria appear normal.  Mild mitral insufficiency is present.  Aortic valve is normal in structure and function.  The aortic valve is tricuspid.  Trace tricuspid insufficiency is present.  Right ventricular systolic pressure is 3 mmHg above the right atrial pressure.  The pulmonic valve is normal.    Stress test on 6/14/19:   Reversible ischemia laterally. Abnormally low left ventricular ejection fraction              No diagnosis found.    Past Medical History:   Diagnosis Date     Acquired hypothyroidism 5/12/2019     Asthma      Benign essential hypertension 5/12/2019     Centrilobular emphysema (H) 5/12/2019     Chronic rhinitis 8/16/2013     Chronic systolic congestive heart failure (H) 5/12/2019     Constipation      Coronary artery disease      Hearing  loss      Heart trouble      Hemorrhagic cerebrovascular accident (CVA) (H) 01/2019     Hyperlipidemia 5/12/2019     Hypertension      Nasal congestion      Non-rheumatic mitral regurgitation 5/12/2019     Osteopenia 5/12/2019     Osteoporosis      Parathyroid related hypercalcemia (H) 8/18/2013     Primary hyperparathyroidism (H) 9/5/2013     Ringing in ears      Sensorineural hearing loss, asymmetrical 8/16/2013     Sneezing      Snoring      Stented coronary artery      Thyroid disease      Weakness      Weight loss        Past Surgical History:   Procedure Laterality Date     CATARACT IOL, RT/LT Bilateral      COLONOSCOPY       COLONOSCOPY - HIM SCAN  01/01/2009    Colonoscopy - [facility]/NL  2009     ENDOSCOPY UPPER, COLONOSCOPY, COMBINED N/A 10/17/2019    Procedure: UPPER ENDOSCOPY WITH BIOPSY  AND COLONOSCOPY;  Surgeon: Julio Canales MD;  Location: [facility] OR     ENT SURGERY  2011    para thyroid surgery     ENT SURGERY  09/2013    Parathyroid     ESOPHAGOSCOPY, GASTROSCOPY, DUODENOSCOPY (EGD), COMBINED N/A 9/21/2019    Procedure: ESOPHAGOGASTRODUODENOSCOPY (EGD);  Surgeon: Julio Canales MD;  Location: [facility] OR     ESOPHAGOSCOPY, GASTROSCOPY, DUODENOSCOPY (EGD), COMBINED N/A 1/27/2020    Procedure: ESOPHAGOGASTRODUODENOSCOPY (EGD) WITH BIOPSY;  Surgeon: Isrrael Parish MD;  Location: [facility] OR     ESOPHAGOSCOPY, GASTROSCOPY, DUODENOSCOPY (EGD), COMBINED N/A 2/20/2020    Procedure: ESOPHAGOGASTRODUODENOSCOPY (EGD) WITH BIOPSY;  Surgeon: Pedro Luis Montoya DO;  Location: [facility] OR     PARATHYROIDECTOMY  9/10/2013    Procedure: PARATHYROIDECTOMY;  Reoperative Neck Exploration, Resection of right Parathyroid adenoma;  Surgeon: Thalia Muller MD;  Location: [facility] OR     TONSILLECTOMY       TUBAL LIGATION         Allergies   Allergen Reactions     Evista [Raloxifene] Other (See Comments)     hypercalcemia     Prednisone GI Disturbance     Combigan [Brimonidine Tartrate-Timolol] Other (See Comments)     Eye  swelling and itchy     Latex Rash     Levaquin [Levofloxacin] Muscle Pain (Myalgia)     Penicillins Rash     Restasis      Pt reports using eye gtts and having red irritated eyes        Current Outpatient Medications   Medication Sig Dispense Refill     aspirin (ASA) 81 MG chewable tablet Take 1 tablet (81 mg) by mouth daily 90 tablet 3     atorvastatin (LIPITOR) 40 MG tablet Take 1 tablet (40 mg) by mouth At Bedtime 90 tablet 1     benzonatate (TESSALON) 100 MG capsule Take 1 capsule (100 mg) by mouth 3 times daily as needed for cough 90 capsule 3     calcium carbonate (TUMS) 500 MG chewable tablet Take 1 chew tab by mouth daily       clindamycin (CLEOCIN) 300 MG capsule Take 2 capsules (600 mg) by mouth once as needed (prior to dental procedure) 2 capsule 0     diclofenac (VOLTAREN) 1 % topical gel Apply 4 g topically 4 times daily 350 g 1     famotidine (PEPCID) 10 MG tablet Take 1 tablet (10 mg) by mouth daily as needed (acid reflux) 30 tablet 0     ferrous sulfate (FEROSUL) 325 (65 Fe) MG tablet Take 1 tablet (325 mg) by mouth daily (with breakfast) 90 tablet 0     fluticasone (FLONASE) 50 MCG/ACT spray Spray 2 sprays into both nostrils daily 1 Bottle 11     furosemide (LASIX) 20 MG tablet Take 0.5 tablets (10 mg) by mouth daily 90 tablet 0     levothyroxine (SYNTHROID) 50 MCG tablet Take 1 tablet (50 mcg) by mouth daily 90 tablet 3     lisinopril (ZESTRIL) 10 MG tablet Take 1 tablet (10 mg) by mouth daily 90 tablet 3     metoprolol succinate ER (TOPROL-XL) 50 MG 24 hr tablet Take 1 tablet (50 mg) by mouth daily 90 tablet 3     nitroGLYcerin (NITROSTAT) 0.4 MG sublingual tablet Place 0.4 mg under the tongue every 5 minutes as needed for chest pain For chest pain place 1 tablet under the tongue every 5 minutes for 3 doses. If symptoms persist 5 minutes after 1st dose call 911.       pantoprazole (PROTONIX) 40 MG EC tablet TAKE 1 TABLET TWICE DAILY  BEFORE MEALS 180 tablet 2     sucralfate (CARAFATE) 1 GM tablet  Take 1 tablet (1 g) by mouth 4 times daily as needed TAKE 1 TABLET 4 TIMES DAILYBEFORE MEALS AND AT NIGHT 360 tablet 1     Tafluprost 0.0015 % SOLN Place 1 drop into both eyes At Bedtime         Social History     Socioeconomic History     Marital status:      Spouse name: Not on file     Number of children: Not on file     Years of education: Not on file     Highest education level: Not on file   Occupational History     Not on file   Tobacco Use     Smoking status: Former     Packs/day: 1.00     Years: 50.00     Pack years: 50.00     Types: Cigarettes     Quit date: 2019     Years since quittin.1     Smokeless tobacco: Never     Tobacco comments:     quit 2019   Substance and Sexual Activity     Alcohol use: Yes     Comment: social     Drug use: No     Sexual activity: Not Currently   Other Topics Concern     Parent/sibling w/ CABG, MI or angioplasty before 65F 55M? Yes   Social History Narrative     Not on file     Social Determinants of Health     Financial Resource Strain: Not on file   Food Insecurity: Not on file   Transportation Needs: Not on file   Physical Activity: Not on file   Stress: Not on file   Social Connections: Not on file   Intimate Partner Violence: Not on file   Housing Stability: Not on file       LAB RESULTS:   Office Visit on 2022   Component Date Value Ref Range Status     TSH 2022 1.84  0.40 - 4.00 mU/L Final     Sodium 2022 139  133 - 144 mmol/L Final     Potassium 2022 4.4  3.4 - 5.3 mmol/L Final     Chloride 2022 109  94 - 109 mmol/L Final     Carbon Dioxide (CO2) 2022 24  20 - 32 mmol/L Final     Anion Gap 2022 6  3 - 14 mmol/L Final     Urea Nitrogen 2022 14  7 - 30 mg/dL Final     Creatinine 2022 1.12 (A) 0.52 - 1.04 mg/dL Final     Calcium 2022 9.0  8.5 - 10.1 mg/dL Final     Glucose 2022 95  70 - 99 mg/dL Final     GFR Estimate 2022 49 (A) >60 mL/min/1.73m2 Final     Iron 2022 71  35 -  "180 ug/dL Final     Iron Binding Capacity 01/26/2022 294  240 - 430 ug/dL Final     Iron Sat Index 01/26/2022 24  15 - 46 % Final     Ferritin 01/26/2022 633 (A) 8 - 252 ng/mL Final     WBC Count 01/26/2022 8.1  4.0 - 11.0 10e3/uL Final     RBC Count 01/26/2022 4.40  3.80 - 5.20 10e6/uL Final     Hemoglobin 01/26/2022 10.6 (A) 11.7 - 15.7 g/dL Final     Hematocrit 01/26/2022 35.8  35.0 - 47.0 % Final     MCV 01/26/2022 81  78 - 100 fL Final     MCH 01/26/2022 24.1 (A) 26.5 - 33.0 pg Final     MCHC 01/26/2022 29.6 (A) 31.5 - 36.5 g/dL Final     RDW 01/26/2022 24.9 (A) 10.0 - 15.0 % Final     Platelet Count 01/26/2022 405  150 - 450 10e3/uL Final     % Neutrophils 01/26/2022 65  % Final     % Lymphocytes 01/26/2022 23  % Final     % Monocytes 01/26/2022 9  % Final     % Eosinophils 01/26/2022 2  % Final     % Basophils 01/26/2022 1  % Final     % Immature Granulocytes 01/26/2022 0  % Final     NRBCs per 100 WBC 01/26/2022 0  <1 /100 Final     Absolute Neutrophils 01/26/2022 5.2  1.6 - 8.3 10e3/uL Final     Absolute Lymphocytes 01/26/2022 1.8  0.8 - 5.3 10e3/uL Final     Absolute Monocytes 01/26/2022 0.7  0.0 - 1.3 10e3/uL Final     Absolute Eosinophils 01/26/2022 0.2  0.0 - 0.7 10e3/uL Final     Absolute Basophils 01/26/2022 0.1  0.0 - 0.2 10e3/uL Final     Absolute Immature Granulocytes 01/26/2022 0.0  <=0.4 10e3/uL Final     Absolute NRBCs 01/26/2022 0.0  10e3/uL Final        Review of systems: Negative except that which was noted in the HPI.    Physical examination:  BP (!) 172/60   Pulse 63   Temp 98.4  F (36.9  C) (Tympanic)   Ht 1.549 m (5' 1\")   Wt 46.9 kg (103 lb 8 oz)   BMI 19.56 kg/m      GENERAL APPEARANCE: healthy, alert and no distress  CHEST: lungs clear to auscultation - no rales, rhonchi or wheezes, no use of accessory muscles, no retractions, respirations are unlabored, normal respiratory rate.  But with reduced air movement.  CARDIOVASCULAR: regular rhythm, normal S1 with physiologic split S2, " no S3 or S4 and no murmur, click or rub  EXTREMITIES: no clubbing, cyanosis or edema      Total time spent on day of visit, including review of tests, obtaining/reviewing separately obtained history, ordering medications/tests/procedures, communicating with PCP/consultants, and documenting in electronic medical record: 30 minutes.           Thank you for allowing me to participate in the care of your patient. Please do not hesitate to contact me if you have any questions.     Sal Graham, DO

## 2023-03-08 ENCOUNTER — TRANSFERRED RECORDS (OUTPATIENT)
Dept: HEALTH INFORMATION MANAGEMENT | Facility: CLINIC | Age: 83
End: 2023-03-08

## 2023-03-17 DIAGNOSIS — I50.32 CHRONIC DIASTOLIC CONGESTIVE HEART FAILURE (H): ICD-10-CM

## 2023-03-17 DIAGNOSIS — I10 BENIGN ESSENTIAL HYPERTENSION: ICD-10-CM

## 2023-03-17 DIAGNOSIS — N18.32 STAGE 3B CHRONIC KIDNEY DISEASE (H): ICD-10-CM

## 2023-03-17 RX ORDER — LISINOPRIL 10 MG/1
10 TABLET ORAL DAILY
Qty: 90 TABLET | Refills: 3 | Status: SHIPPED | OUTPATIENT
Start: 2023-03-17 | End: 2023-03-21

## 2023-03-17 NOTE — TELEPHONE ENCOUNTER
Lisinopril (Zestril) 10 MG tablet    Last Written Prescription Date:  03/14/2022  Last Fill Quantity: 90,   # refills: 3  Last Office Visit: 12/07/2022

## 2023-03-21 ENCOUNTER — ANCILLARY PROCEDURE (OUTPATIENT)
Dept: GENERAL RADIOLOGY | Facility: OTHER | Age: 83
End: 2023-03-21
Attending: FAMILY MEDICINE
Payer: COMMERCIAL

## 2023-03-21 ENCOUNTER — OFFICE VISIT (OUTPATIENT)
Dept: FAMILY MEDICINE | Facility: OTHER | Age: 83
End: 2023-03-21
Attending: FAMILY MEDICINE
Payer: COMMERCIAL

## 2023-03-21 VITALS
HEART RATE: 67 BPM | RESPIRATION RATE: 24 BRPM | DIASTOLIC BLOOD PRESSURE: 74 MMHG | HEIGHT: 61 IN | TEMPERATURE: 98.5 F | BODY MASS INDEX: 20.47 KG/M2 | WEIGHT: 108.4 LBS | SYSTOLIC BLOOD PRESSURE: 142 MMHG | OXYGEN SATURATION: 92 %

## 2023-03-21 DIAGNOSIS — I50.32 CHRONIC DIASTOLIC CONGESTIVE HEART FAILURE (H): ICD-10-CM

## 2023-03-21 DIAGNOSIS — I73.9 PERIPHERAL ARTERIAL DISEASE (H): ICD-10-CM

## 2023-03-21 DIAGNOSIS — J43.2 CENTRILOBULAR EMPHYSEMA (H): Primary | Chronic | ICD-10-CM

## 2023-03-21 DIAGNOSIS — R06.09 DOE (DYSPNEA ON EXERTION): ICD-10-CM

## 2023-03-21 DIAGNOSIS — N18.32 STAGE 3B CHRONIC KIDNEY DISEASE (H): ICD-10-CM

## 2023-03-21 DIAGNOSIS — I25.10 CORONARY ARTERY DISEASE INVOLVING NATIVE CORONARY ARTERY OF NATIVE HEART WITHOUT ANGINA PECTORIS: ICD-10-CM

## 2023-03-21 DIAGNOSIS — I65.23 BILATERAL CAROTID ARTERY STENOSIS: ICD-10-CM

## 2023-03-21 DIAGNOSIS — D50.8 IRON DEFICIENCY ANEMIA SECONDARY TO INADEQUATE DIETARY IRON INTAKE: ICD-10-CM

## 2023-03-21 DIAGNOSIS — E44.1 MILD PROTEIN-CALORIE MALNUTRITION (H): ICD-10-CM

## 2023-03-21 DIAGNOSIS — I50.22 CHRONIC SYSTOLIC CONGESTIVE HEART FAILURE (H): Chronic | ICD-10-CM

## 2023-03-21 DIAGNOSIS — N18.4 CKD (CHRONIC KIDNEY DISEASE) STAGE 4, GFR 15-29 ML/MIN (H): ICD-10-CM

## 2023-03-21 DIAGNOSIS — E21.0 PRIMARY HYPERPARATHYROIDISM (H): ICD-10-CM

## 2023-03-21 DIAGNOSIS — I10 BENIGN ESSENTIAL HYPERTENSION: ICD-10-CM

## 2023-03-21 LAB
ANION GAP SERPL CALCULATED.3IONS-SCNC: 12 MMOL/L (ref 7–15)
BASOPHILS # BLD AUTO: 0.1 10E3/UL (ref 0–0.2)
BASOPHILS NFR BLD AUTO: 1 %
BUN SERPL-MCNC: 15.2 MG/DL (ref 8–23)
CALCIUM SERPL-MCNC: 9.2 MG/DL (ref 8.8–10.2)
CHLORIDE SERPL-SCNC: 109 MMOL/L (ref 98–107)
CREAT SERPL-MCNC: 1.34 MG/DL (ref 0.51–0.95)
DEPRECATED HCO3 PLAS-SCNC: 26 MMOL/L (ref 22–29)
EOSINOPHIL # BLD AUTO: 0.2 10E3/UL (ref 0–0.7)
EOSINOPHIL NFR BLD AUTO: 3 %
ERYTHROCYTE [DISTWIDTH] IN BLOOD BY AUTOMATED COUNT: 14.2 % (ref 10–15)
GFR SERPL CREATININE-BSD FRML MDRD: 39 ML/MIN/1.73M2
GLUCOSE SERPL-MCNC: 102 MG/DL (ref 70–99)
HCT VFR BLD AUTO: 35 % (ref 35–47)
HGB BLD-MCNC: 11.1 G/DL (ref 11.7–15.7)
IMM GRANULOCYTES # BLD: 0 10E3/UL
IMM GRANULOCYTES NFR BLD: 0 %
LYMPHOCYTES # BLD AUTO: 2 10E3/UL (ref 0.8–5.3)
LYMPHOCYTES NFR BLD AUTO: 28 %
MCH RBC QN AUTO: 27.5 PG (ref 26.5–33)
MCHC RBC AUTO-ENTMCNC: 31.7 G/DL (ref 31.5–36.5)
MCV RBC AUTO: 87 FL (ref 78–100)
MONOCYTES # BLD AUTO: 0.7 10E3/UL (ref 0–1.3)
MONOCYTES NFR BLD AUTO: 9 %
NEUTROPHILS # BLD AUTO: 4.2 10E3/UL (ref 1.6–8.3)
NEUTROPHILS NFR BLD AUTO: 59 %
NRBC # BLD AUTO: 0 10E3/UL
NRBC BLD AUTO-RTO: 0 /100
NT-PROBNP SERPL-MCNC: 4122 PG/ML (ref 0–1800)
PLATELET # BLD AUTO: 268 10E3/UL (ref 150–450)
POTASSIUM SERPL-SCNC: 3.5 MMOL/L (ref 3.4–5.3)
RBC # BLD AUTO: 4.03 10E6/UL (ref 3.8–5.2)
SODIUM SERPL-SCNC: 147 MMOL/L (ref 136–145)
WBC # BLD AUTO: 7.1 10E3/UL (ref 4–11)

## 2023-03-21 PROCEDURE — 80048 BASIC METABOLIC PNL TOTAL CA: CPT | Mod: ZL | Performed by: FAMILY MEDICINE

## 2023-03-21 PROCEDURE — 85025 COMPLETE CBC W/AUTO DIFF WBC: CPT | Mod: ZL | Performed by: FAMILY MEDICINE

## 2023-03-21 PROCEDURE — G0463 HOSPITAL OUTPT CLINIC VISIT: HCPCS | Mod: 25

## 2023-03-21 PROCEDURE — 99214 OFFICE O/P EST MOD 30 MIN: CPT | Performed by: FAMILY MEDICINE

## 2023-03-21 PROCEDURE — 83880 ASSAY OF NATRIURETIC PEPTIDE: CPT | Mod: ZL | Performed by: FAMILY MEDICINE

## 2023-03-21 PROCEDURE — 71046 X-RAY EXAM CHEST 2 VIEWS: CPT | Mod: TC

## 2023-03-21 PROCEDURE — 36415 COLL VENOUS BLD VENIPUNCTURE: CPT | Mod: ZL | Performed by: FAMILY MEDICINE

## 2023-03-21 RX ORDER — LISINOPRIL 10 MG/1
20 TABLET ORAL DAILY
Qty: 90 TABLET | Refills: 3 | COMMUNITY
Start: 2023-03-21 | End: 2023-04-19

## 2023-03-21 ASSESSMENT — PAIN SCALES - GENERAL: PAINLEVEL: NO PAIN (0)

## 2023-03-21 NOTE — PATIENT INSTRUCTIONS
Please increase your lisinopril to 20mg daily. You may just take 2 of the 10mg tabs you already have.     Please increase your lasix back to 20mg (full pill) daily.     I will see you in 1 month for BP recheck.     Ophelia Troncoso MD

## 2023-03-21 NOTE — PROGRESS NOTES
Assessment & Plan     Centrilobular emphysema (H)  No wheeze on exam, no other uri sx. Suspect MILLS today related to cardiac/fluid overload. Monitor closely.     MILLS (dyspnea on exertion) / Chronic systolic congestive heart failure (H)- Recovered / Coronary artery disease involving native coronary artery of native heart without angina pectoris / Chronic diastolic congestive heart failure (H)  Pleural effusions, le edema, weight gain, BNP up quite a bit. Increase lasix to full tab daily, call with worsening sx. Recheck renal function at short term follow up due lability with diuretics.   - XR Chest 2 Views  - BNP-N terminal pro  - CBC with platelets and differential  - CBC with platelets and differential  - BNP-N terminal pro    Benign essential hypertension  High today, coming down throughout appt. Suspect elevated due to MILLS on arrival.   - lisinopril (ZESTRIL) 10 MG tablet  Dispense: 90 tablet; Refill: 3    Bilateral carotid artery stenosis / Peripheral arterial disease (H)  Pt has not scheduled KESHAV or Carotid US, she has cardiology follow up coming. She is not sure she will follow through on this.     CKD (chronic kidney disease) stage 4, GFR 15-29 ml/min (H)  Renal function is stable, monitor closely with increase in diuretic.   - Basic metabolic panel  - Basic metabolic panel    Iron deficiency anemia secondary to inadequate dietary iron intake  Stable    Mild protein-calorie malnutrition (H)  Weight up, likely partially related to fluid although pt endorses better appetite.     Primary hyperparathyroidism (H)  Ca wnl, has declined dexa, treatment    30 minutes spent on the date of the encounter doing chart review, review of test results, interpretation of tests, patient visit and documentation     Return in about 1 month (around 4/21/2023).    Ophelia Troncoso MD  [facility] - [place] is a 82 year old, presenting for the following health issues:  RECHECK      HPI      Hypertension Follow-up       Do you check your blood pressure regularly outside of the clinic? Yes     Are you following a low salt diet? No    Are your blood pressures ever more than 140 on the top number (systolic) OR more   than 90 on the bottom number (diastolic), for example 140/90? Yes    COPD Follow-Up    Overall, how are your COPD symptoms since your last clinic visit?  No change    How much fatigue or shortness of breath do you have when you are walking?  More than usual    How much shortness of breath do you have when you are resting?  None    How often do you cough? Sometimes    Have you noticed any change in your sputum/phlegm?  No    Have you experienced a recent fever? No    Please describe how far you can walk without stopping to rest:  The length of 1-2 rooms    How many flights of stairs are you able to walk up without stopping?  1    Have you had any Emergency Room Visits, Urgent Care Visits, or Hospital Admissions because of your COPD since your last office visit?  No    History   Smoking Status     Former     Packs/day: 1.00     Years: 50.00     Types: Cigarettes     Quit date: 1/1/2019   Smokeless Tobacco     Never       Chronic Kidney Disease Follow-up    Do you take any over the counter pain medicine?: No    Hypothyroidism Follow-up      Since last visit, patient describes the following symptoms: dry skin    Heart Failure Follow-up  Are you experiencing any shortness of breath? Yes, with activity only   How would you describe your shortness of breath?  Slightly worse        Are you experiencing any swelling in your legs or feet?  Stable    Are you using more pillows than usual? No    Do you cough at night?  No    Do you check your weight daily?  No    Have you had a weight change recently?  Weight increase    Are you having any of the following side effects from your medications? (Select all that apply)  Fatigue    Since your last visit, how many times have you gone to the cardiologist,  "urgent care, emergency room, or hospital because of your heart failure?   None    Review of Systems   Constitutional, HEENT, cardiovascular, pulmonary, gi and gu systems are negative, except as otherwise noted.      Objective    BP (!) 194/68 (BP Location: Right arm, Patient Position: Sitting, Cuff Size: Adult Regular)   Pulse 67   Temp 98.5  F (36.9  C) (Temporal)   Resp 24   Ht 1.549 m (5' 1\")   Wt 49.2 kg (108 lb 6.4 oz)   SpO2 92%   BMI 20.48 kg/m    Body mass index is 20.48 kg/m .  Physical Exam   GENERAL: alert and no distress  NECK: no adenopathy, no asymmetry, masses, or scars and thyroid normal to palpation  RESP: lungs clear to auscultation - no rales, rhonchi or wheezes  CV: regular rates and rhythm, normal S1 S2, no S3 or S4, no murmur, click or rub and no peripheral edema  MS: no gross musculoskeletal defects noted, no edema  SKIN: no suspicious lesions or rashes  NEURO: Normal strength and tone, mentation intact and speech normal  PSYCH: mentation appears normal, affect normal/bright    Results for orders placed or performed in visit on 03/21/23   XR Chest 2 Views     Status: None    Narrative    PROCEDURE:  XR CHEST 2 VIEWS    HISTORY: MILLS (dyspnea on exertion), .    COMPARISON:  7/13/2021    FINDINGS:  The cardiomediastinal contours are unchanged. The trachea is midline.  There is calcific aortic atherosclerosis.  Trace pleural fluid is questioned. No pneumothorax.  Diaphragms are  flattened.  Osteopenia or osteoporosis. No subdiaphragmatic free air.      Impression    IMPRESSION:      Trace pleural fluid. Chronic emphysema.    LEXY MCINTYRE MD         SYSTEM ID:  [id number]   Results for orders placed or performed in visit on 03/21/23   Basic metabolic panel     Status: Abnormal   Result Value Ref Range    Sodium 147 (H) 136 - 145 mmol/L    Potassium 3.5 3.4 - 5.3 mmol/L    Chloride 109 (H) 98 - 107 mmol/L    Carbon Dioxide (CO2) 26 22 - 29 mmol/L    Anion Gap 12 7 - 15 mmol/L    Urea " Nitrogen 15.2 8.0 - 23.0 mg/dL    Creatinine 1.34 (H) 0.51 - 0.95 mg/dL    Calcium 9.2 8.8 - 10.2 mg/dL    Glucose 102 (H) 70 - 99 mg/dL    GFR Estimate 39 (L) >60 mL/min/1.73m2   CBC with platelets and differential     Status: Abnormal   Result Value Ref Range    WBC Count 7.1 4.0 - 11.0 10e3/uL    RBC Count 4.03 3.80 - 5.20 10e6/uL    Hemoglobin 11.1 (L) 11.7 - 15.7 g/dL    Hematocrit 35.0 35.0 - 47.0 %    MCV 87 78 - 100 fL    MCH 27.5 26.5 - 33.0 pg    MCHC 31.7 31.5 - 36.5 g/dL    RDW 14.2 10.0 - 15.0 %    Platelet Count 268 150 - 450 10e3/uL    % Neutrophils 59 %    % Lymphocytes 28 %    % Monocytes 9 %    % Eosinophils 3 %    % Basophils 1 %    % Immature Granulocytes 0 %    NRBCs per 100 WBC 0 <1 /100    Absolute Neutrophils 4.2 1.6 - 8.3 10e3/uL    Absolute Lymphocytes 2.0 0.8 - 5.3 10e3/uL    Absolute Monocytes 0.7 0.0 - 1.3 10e3/uL    Absolute Eosinophils 0.2 0.0 - 0.7 10e3/uL    Absolute Basophils 0.1 0.0 - 0.2 10e3/uL    Absolute Immature Granulocytes 0.0 <=0.4 10e3/uL    Absolute NRBCs 0.0 10e3/uL   CBC with platelets and differential     Status: Abnormal    Narrative    The following orders were created for panel order CBC with platelets and differential.  Procedure                               Abnormality         Status                     ---------                               -----------         ------                     CBC with platelets and d...[[id number]]  Abnormal            Final result                 Please view results for these tests on the individual orders.

## 2023-04-03 ENCOUNTER — TELEPHONE (OUTPATIENT)
Dept: FAMILY MEDICINE | Facility: OTHER | Age: 83
End: 2023-04-03

## 2023-04-03 NOTE — TELEPHONE ENCOUNTER
10:25 AM    Reason for Call: OVERBOOK    Patient is having the following symptoms: Patient needs to be seen  for follow up high blood pressure. Patient was seen on 03/21 and needs follow up around 04/21.  days.    The patient is requesting an appointment for Overbook with .    Was an appointment offered for this call? No  If yes : Appointment type              Date  07/05/23    Preferred method for responding to this message: Telephone Call  What is your phone number ?  [phone]    If we cannot reach you directly, may we leave a detailed response at the number you provided? Yes    Can this message wait until your PCP/provider returns, if unavailable today? YES, Provider is out today     Ashlee Munoz

## 2023-04-04 DIAGNOSIS — I10 BENIGN ESSENTIAL HYPERTENSION: ICD-10-CM

## 2023-04-04 DIAGNOSIS — I50.32 CHRONIC DIASTOLIC CONGESTIVE HEART FAILURE (H): ICD-10-CM

## 2023-04-04 RX ORDER — METOPROLOL SUCCINATE 50 MG/1
50 TABLET, EXTENDED RELEASE ORAL DAILY
Qty: 90 TABLET | Refills: 3 | Status: SHIPPED | OUTPATIENT
Start: 2023-04-04 | End: 2023-11-07 | Stop reason: DRUGHIGH

## 2023-04-04 NOTE — TELEPHONE ENCOUNTER
metoprolol succinate ER (TOPROL-XL) 50 MG 24 hr tablet 90 tablet 3 3/14/2022     lov 03/01/23    Needs sent asap thanks

## 2023-04-18 NOTE — PROGRESS NOTES
Assessment & Plan     Chronic systolic congestive heart failure (H)- Recovered / MILLS (dyspnea on exertion)  Weight back to baseline, breathing easier, BNP lower. Continue lasix   - furosemide (LASIX) 20 MG tablet  Dispense: 90 tablet; Refill: 3  - BNP-N terminal pro  - BNP-N terminal pro  - lisinopril (ZESTRIL) 20 MG tablet  Dispense: 90 tablet; Refill: 3    Benign essential hypertension / Stage 3b chronic kidney disease (H)  BP at baseline  (note, pt has drastic flux in BP based on what arm Bp is taken in - this is very chronic) Renal function up slightly on lasix, continue due to CHF exac at last appt off of the medications. Monitor closely.   - lisinopril (ZESTRIL) 20 MG tablet  Dispense: 90 tablet; Refill: 3  - Basic metabolic panel  - Basic metabolic panel    25 minutes spent by me on the date of the encounter doing chart review, review of test results, interpretation of tests, patient visit and documentation     Return in about 3 months (around 7/19/2023) for Annual Medicare Wellness.    Ophelia Troncoso MD  [facility] - [place] is a 82 year old, presenting for the following health issues:  Hypertension         View : No data to display.              HPI     Hypertension Follow-up      Do you check your blood pressure regularly outside of the clinic? Yes     Are you following a low salt diet? Yes    Are your blood pressures ever more than 140 on the top number (systolic) OR more   than 90 on the bottom number (diastolic), for example 140/90? No    Heart Failure Follow-up  Are you experiencing any shortness of breath? Yes, with activity only   How would you describe your shortness of breath?  Improved        Are you experiencing any swelling in your legs or feet?  No    Are you using more pillows than usual? No    Do you cough at night?  No    Do you check your weight daily?  No    Have you had a weight change recently?  Weight decrease    Are you having any of the  "following side effects from your medications? (Select all that apply)  The patient does not report symptoms of dizziness, fatigue, cough, swelling, or slow heart beat.    Since your last visit, how many times have you gone to the cardiologist, urgent care, emergency room, or hospital because of your heart failure?   None    Chronic Kidney Disease Follow-up      Do you take any over the counter pain medicine?: No    Review of Systems   Constitutional, HEENT, cardiovascular, pulmonary, gi and gu systems are negative, except as otherwise noted.      Objective    BP (!) 244/74 (BP Location: Right arm, Patient Position: Sitting, Cuff Size: Adult Regular)   Pulse 74   Temp 97.6  F (36.4  C) (Tympanic)   Resp 20   Ht 1.549 m (5' 1\")   Wt 45.8 kg (101 lb)   SpO2 91%   BMI 19.08 kg/m    Body mass index is 19.08 kg/m .  Physical Exam   GENERAL: alert and no distress  NECK: no adenopathy, no asymmetry, masses, or scars and thyroid normal to palpation  RESP: lungs clear to auscultation - no rales, rhonchi or wheezes  ABDOMEN: soft, nontender, no hepatosplenomegaly, no masses and bowel sounds normal  MS: no gross musculoskeletal defects noted, no edema  NEURO: Normal strength and tone, sensory exam grossly normal and mentation intact  PSYCH: mentation appears normal, affect normal/bright    Results for orders placed or performed in visit on 04/19/23   BNP-N terminal pro     Status: Abnormal   Result Value Ref Range    N Terminal Pro BNP Outpatient 2,382 (H) 0 - 1,800 pg/mL   Basic metabolic panel     Status: Abnormal   Result Value Ref Range    Sodium 146 (H) 136 - 145 mmol/L    Potassium 3.4 3.4 - 5.3 mmol/L    Chloride 109 (H) 98 - 107 mmol/L    Carbon Dioxide (CO2) 26 22 - 29 mmol/L    Anion Gap 11 7 - 15 mmol/L    Urea Nitrogen 19.3 8.0 - 23.0 mg/dL    Creatinine 1.45 (H) 0.51 - 0.95 mg/dL    Calcium 9.3 8.8 - 10.2 mg/dL    Glucose 108 (H) 70 - 99 mg/dL    GFR Estimate 36 (L) >60 mL/min/1.73m2             "

## 2023-04-19 ENCOUNTER — OFFICE VISIT (OUTPATIENT)
Dept: FAMILY MEDICINE | Facility: OTHER | Age: 83
End: 2023-04-19
Attending: FAMILY MEDICINE
Payer: COMMERCIAL

## 2023-04-19 VITALS
OXYGEN SATURATION: 91 % | BODY MASS INDEX: 19.07 KG/M2 | SYSTOLIC BLOOD PRESSURE: 244 MMHG | TEMPERATURE: 97.6 F | HEART RATE: 74 BPM | DIASTOLIC BLOOD PRESSURE: 74 MMHG | RESPIRATION RATE: 20 BRPM | HEIGHT: 61 IN | WEIGHT: 101 LBS

## 2023-04-19 DIAGNOSIS — I50.22 CHRONIC SYSTOLIC CONGESTIVE HEART FAILURE (H): Primary | Chronic | ICD-10-CM

## 2023-04-19 DIAGNOSIS — R06.09 DOE (DYSPNEA ON EXERTION): ICD-10-CM

## 2023-04-19 DIAGNOSIS — I10 BENIGN ESSENTIAL HYPERTENSION: ICD-10-CM

## 2023-04-19 DIAGNOSIS — N18.32 STAGE 3B CHRONIC KIDNEY DISEASE (H): ICD-10-CM

## 2023-04-19 DIAGNOSIS — I50.32 CHRONIC DIASTOLIC CONGESTIVE HEART FAILURE (H): ICD-10-CM

## 2023-04-19 LAB
ANION GAP SERPL CALCULATED.3IONS-SCNC: 11 MMOL/L (ref 7–15)
BUN SERPL-MCNC: 19.3 MG/DL (ref 8–23)
CALCIUM SERPL-MCNC: 9.3 MG/DL (ref 8.8–10.2)
CHLORIDE SERPL-SCNC: 109 MMOL/L (ref 98–107)
CREAT SERPL-MCNC: 1.45 MG/DL (ref 0.51–0.95)
DEPRECATED HCO3 PLAS-SCNC: 26 MMOL/L (ref 22–29)
GFR SERPL CREATININE-BSD FRML MDRD: 36 ML/MIN/1.73M2
GLUCOSE SERPL-MCNC: 108 MG/DL (ref 70–99)
NT-PROBNP SERPL-MCNC: 2382 PG/ML (ref 0–1800)
POTASSIUM SERPL-SCNC: 3.4 MMOL/L (ref 3.4–5.3)
SODIUM SERPL-SCNC: 146 MMOL/L (ref 136–145)

## 2023-04-19 PROCEDURE — 99213 OFFICE O/P EST LOW 20 MIN: CPT | Performed by: FAMILY MEDICINE

## 2023-04-19 PROCEDURE — 83880 ASSAY OF NATRIURETIC PEPTIDE: CPT | Mod: ZL | Performed by: FAMILY MEDICINE

## 2023-04-19 PROCEDURE — G0463 HOSPITAL OUTPT CLINIC VISIT: HCPCS

## 2023-04-19 PROCEDURE — 82310 ASSAY OF CALCIUM: CPT | Mod: ZL | Performed by: FAMILY MEDICINE

## 2023-04-19 PROCEDURE — 36415 COLL VENOUS BLD VENIPUNCTURE: CPT | Mod: ZL | Performed by: FAMILY MEDICINE

## 2023-04-19 RX ORDER — LISINOPRIL 20 MG/1
20 TABLET ORAL DAILY
Qty: 90 TABLET | Refills: 3 | Status: SHIPPED | OUTPATIENT
Start: 2023-04-19 | End: 2023-11-10 | Stop reason: DRUGHIGH

## 2023-04-19 RX ORDER — FUROSEMIDE 20 MG
20 TABLET ORAL DAILY
Qty: 90 TABLET | Refills: 3 | Status: ON HOLD | OUTPATIENT
Start: 2023-04-19 | End: 2023-11-27

## 2023-04-19 ASSESSMENT — PAIN SCALES - GENERAL: PAINLEVEL: NO PAIN (0)

## 2023-05-02 ENCOUNTER — HOSPITAL ENCOUNTER (OUTPATIENT)
Dept: RESPIRATORY THERAPY | Facility: HOSPITAL | Age: 83
Discharge: HOME | End: 2023-05-02
Attending: INTERNAL MEDICINE | Admitting: INTERNAL MEDICINE
Payer: COMMERCIAL

## 2023-05-02 DIAGNOSIS — J43.9 PULMONARY EMPHYSEMA, UNSPECIFIED EMPHYSEMA TYPE (H): Primary | ICD-10-CM

## 2023-05-02 LAB — HGB BLD-MCNC: 11.5 G/DL (ref 11.7–15.7)

## 2023-05-02 PROCEDURE — 250N000009 HC RX 250: Performed by: INTERNAL MEDICINE

## 2023-05-02 PROCEDURE — 94726 PLETHYSMOGRAPHY LUNG VOLUMES: CPT

## 2023-05-02 PROCEDURE — 94726 PLETHYSMOGRAPHY LUNG VOLUMES: CPT | Mod: 26 | Performed by: INTERNAL MEDICINE

## 2023-05-02 PROCEDURE — 94060 EVALUATION OF WHEEZING: CPT

## 2023-05-02 PROCEDURE — 94060 EVALUATION OF WHEEZING: CPT | Mod: 26 | Performed by: INTERNAL MEDICINE

## 2023-05-02 PROCEDURE — 94729 DIFFUSING CAPACITY: CPT | Mod: 26 | Performed by: INTERNAL MEDICINE

## 2023-05-02 PROCEDURE — 85018 HEMOGLOBIN: CPT | Performed by: INTERNAL MEDICINE

## 2023-05-02 PROCEDURE — 36415 COLL VENOUS BLD VENIPUNCTURE: CPT | Performed by: INTERNAL MEDICINE

## 2023-05-02 PROCEDURE — 94729 DIFFUSING CAPACITY: CPT

## 2023-05-02 RX ORDER — ALBUTEROL SULFATE 0.83 MG/ML
2.5 SOLUTION RESPIRATORY (INHALATION)
Status: COMPLETED | OUTPATIENT
Start: 2023-05-02 | End: 2023-05-02

## 2023-05-02 RX ADMIN — ALBUTEROL SULFATE 2.5 MG: 2.5 SOLUTION RESPIRATORY (INHALATION) at 14:32

## 2023-05-15 ENCOUNTER — TELEPHONE (OUTPATIENT)
Dept: CARDIOLOGY | Facility: OTHER | Age: 83
End: 2023-05-15

## 2023-05-15 NOTE — TELEPHONE ENCOUNTER
Message  Received: Today  Sal Graham DO sent to Constanza Quiroga LPN  Caller: Unspecified (Today, 11:15 AM)   Her PFT's show that she has severe COPD or emphysema.  I think part of the issue why these test results are now provided to the patient as these were ordered by her PCP/Dr. Troncoso.  As result, these results would have gone to her.  Because she has severe COPD, she should see pulmonary and her PCP in follow-up.  If she is willing to see pulmonary in [place], let me know and I will place referral.  Thanks!     Dr. Graham

## 2023-05-15 NOTE — TELEPHONE ENCOUNTER
Spoke with patient regarding test results. Declines referral to Pulmonology. Declined transfer to appt desk she will call back.  Constanza Gonzalez LPN

## 2023-06-01 ENCOUNTER — TELEPHONE (OUTPATIENT)
Dept: FAMILY MEDICINE | Facility: OTHER | Age: 83
End: 2023-06-01

## 2023-06-01 ENCOUNTER — HEALTH MAINTENANCE LETTER (OUTPATIENT)
Age: 83
End: 2023-06-01

## 2023-06-01 NOTE — TELEPHONE ENCOUNTER
11:53 AM    Reason for Call: Phone Call    Description: Very concerned about her COPD results and would like to talk with Dr. Troncoso or Maritza GRAY     Was an appointment offered for this call? No  If yes : Appointment type              Date    Preferred method for responding to this message: Telephone Call  What is your phone number ? [phone]    If we cannot reach you directly, may we leave a detailed response at the number you provided? Yes    Can this message wait until your PCP/provider returns, if available today? Not applicable    Cyndi Harp

## 2023-06-02 NOTE — TELEPHONE ENCOUNTER
Please set up virtual or in person visit to discuss early next week.    Please apologize for the delay in results. These were ordered by Dr Graham in March. He should have gotten the results, unclear why they were not sent to him. Regardless, I am happy to follow up and discuss next steps.     Ophelia Troncoso MD

## 2023-06-08 ENCOUNTER — APPOINTMENT (OUTPATIENT)
Dept: GENERAL RADIOLOGY | Facility: HOSPITAL | Age: 83
End: 2023-06-08
Attending: INTERNAL MEDICINE
Payer: COMMERCIAL

## 2023-06-08 ENCOUNTER — HOSPITAL ENCOUNTER (EMERGENCY)
Facility: HOSPITAL | Age: 83
Discharge: HOME | End: 2023-06-09
Attending: INTERNAL MEDICINE | Admitting: INTERNAL MEDICINE
Payer: COMMERCIAL

## 2023-06-08 DIAGNOSIS — J44.1 COPD EXACERBATION (H): ICD-10-CM

## 2023-06-08 LAB
ALBUMIN SERPL BCG-MCNC: 4.1 G/DL (ref 3.5–5.2)
ALP SERPL-CCNC: 99 U/L (ref 35–104)
ALT SERPL W P-5'-P-CCNC: 24 U/L (ref 10–35)
ANION GAP SERPL CALCULATED.3IONS-SCNC: 12 MMOL/L (ref 7–15)
AST SERPL W P-5'-P-CCNC: 25 U/L (ref 10–35)
BASOPHILS # BLD AUTO: 0.1 10E3/UL (ref 0–0.2)
BASOPHILS NFR BLD AUTO: 1 %
BILIRUB SERPL-MCNC: 0.2 MG/DL
BUN SERPL-MCNC: 18 MG/DL (ref 8–23)
CALCIUM SERPL-MCNC: 8.9 MG/DL (ref 8.8–10.2)
CHLORIDE SERPL-SCNC: 106 MMOL/L (ref 98–107)
CREAT SERPL-MCNC: 1.47 MG/DL (ref 0.51–0.95)
CRP SERPL-MCNC: 9.93 MG/L
DEPRECATED HCO3 PLAS-SCNC: 24 MMOL/L (ref 22–29)
EOSINOPHIL # BLD AUTO: 1.1 10E3/UL (ref 0–0.7)
EOSINOPHIL NFR BLD AUTO: 12 %
ERYTHROCYTE [DISTWIDTH] IN BLOOD BY AUTOMATED COUNT: 16 % (ref 10–15)
GFR SERPL CREATININE-BSD FRML MDRD: 35 ML/MIN/1.73M2
GLUCOSE SERPL-MCNC: 94 MG/DL (ref 70–99)
HCT VFR BLD AUTO: 37.6 % (ref 35–47)
HGB BLD-MCNC: 12.1 G/DL (ref 11.7–15.7)
IMM GRANULOCYTES # BLD: 0 10E3/UL
IMM GRANULOCYTES NFR BLD: 0 %
LYMPHOCYTES # BLD AUTO: 2.1 10E3/UL (ref 0.8–5.3)
LYMPHOCYTES NFR BLD AUTO: 22 %
MCH RBC QN AUTO: 26.7 PG (ref 26.5–33)
MCHC RBC AUTO-ENTMCNC: 32.2 G/DL (ref 31.5–36.5)
MCV RBC AUTO: 83 FL (ref 78–100)
MONOCYTES # BLD AUTO: 0.8 10E3/UL (ref 0–1.3)
MONOCYTES NFR BLD AUTO: 8 %
NEUTROPHILS # BLD AUTO: 5.3 10E3/UL (ref 1.6–8.3)
NEUTROPHILS NFR BLD AUTO: 57 %
NRBC # BLD AUTO: 0 10E3/UL
NRBC BLD AUTO-RTO: 0 /100
NT-PROBNP SERPL-MCNC: 1739 PG/ML (ref 0–1800)
PLATELET # BLD AUTO: 298 10E3/UL (ref 150–450)
POTASSIUM SERPL-SCNC: 3.6 MMOL/L (ref 3.4–5.3)
PROT SERPL-MCNC: 7 G/DL (ref 6.4–8.3)
RBC # BLD AUTO: 4.53 10E6/UL (ref 3.8–5.2)
SODIUM SERPL-SCNC: 142 MMOL/L (ref 136–145)
TROPONIN T SERPL HS-MCNC: 56 NG/L
WBC # BLD AUTO: 9.5 10E3/UL (ref 4–11)

## 2023-06-08 PROCEDURE — 96374 THER/PROPH/DIAG INJ IV PUSH: CPT

## 2023-06-08 PROCEDURE — 36415 COLL VENOUS BLD VENIPUNCTURE: CPT | Performed by: INTERNAL MEDICINE

## 2023-06-08 PROCEDURE — 99285 EMERGENCY DEPT VISIT HI MDM: CPT | Mod: 25

## 2023-06-08 PROCEDURE — 84484 ASSAY OF TROPONIN QUANT: CPT | Performed by: INTERNAL MEDICINE

## 2023-06-08 PROCEDURE — 86140 C-REACTIVE PROTEIN: CPT | Performed by: INTERNAL MEDICINE

## 2023-06-08 PROCEDURE — 93005 ELECTROCARDIOGRAM TRACING: CPT

## 2023-06-08 PROCEDURE — 99284 EMERGENCY DEPT VISIT MOD MDM: CPT | Performed by: INTERNAL MEDICINE

## 2023-06-08 PROCEDURE — 71045 X-RAY EXAM CHEST 1 VIEW: CPT

## 2023-06-08 PROCEDURE — 999N000157 HC STATISTIC RCP TIME EA 10 MIN

## 2023-06-08 PROCEDURE — 94640 AIRWAY INHALATION TREATMENT: CPT

## 2023-06-08 PROCEDURE — 83880 ASSAY OF NATRIURETIC PEPTIDE: CPT | Performed by: INTERNAL MEDICINE

## 2023-06-08 PROCEDURE — 80053 COMPREHEN METABOLIC PANEL: CPT | Performed by: INTERNAL MEDICINE

## 2023-06-08 PROCEDURE — 250N000009 HC RX 250: Performed by: INTERNAL MEDICINE

## 2023-06-08 PROCEDURE — 85025 COMPLETE CBC W/AUTO DIFF WBC: CPT | Performed by: INTERNAL MEDICINE

## 2023-06-08 PROCEDURE — 250N000011 HC RX IP 250 OP 636: Performed by: INTERNAL MEDICINE

## 2023-06-08 PROCEDURE — 93010 ELECTROCARDIOGRAM REPORT: CPT | Performed by: INTERNAL MEDICINE

## 2023-06-08 RX ORDER — IPRATROPIUM BROMIDE AND ALBUTEROL SULFATE 2.5; .5 MG/3ML; MG/3ML
3 SOLUTION RESPIRATORY (INHALATION) ONCE
Status: COMPLETED | OUTPATIENT
Start: 2023-06-08 | End: 2023-06-08

## 2023-06-08 RX ORDER — DEXAMETHASONE SODIUM PHOSPHATE 10 MG/ML
8 INJECTION, SOLUTION INTRAMUSCULAR; INTRAVENOUS ONCE
Status: COMPLETED | OUTPATIENT
Start: 2023-06-08 | End: 2023-06-08

## 2023-06-08 RX ORDER — DEXAMETHASONE SODIUM PHOSPHATE 10 MG/ML
8 INJECTION INTRAMUSCULAR; INTRAVENOUS ONCE
Status: DISCONTINUED | OUTPATIENT
Start: 2023-06-08 | End: 2023-06-08

## 2023-06-08 RX ORDER — METHYLPREDNISOLONE SODIUM SUCCINATE 125 MG/2ML
125 INJECTION, POWDER, LYOPHILIZED, FOR SOLUTION INTRAMUSCULAR; INTRAVENOUS ONCE
Status: DISCONTINUED | OUTPATIENT
Start: 2023-06-08 | End: 2023-06-08

## 2023-06-08 RX ADMIN — IPRATROPIUM BROMIDE AND ALBUTEROL SULFATE 3 ML: .5; 3 SOLUTION RESPIRATORY (INHALATION) at 23:07

## 2023-06-08 RX ADMIN — DEXAMETHASONE SODIUM PHOSPHATE 8 MG: 10 INJECTION, SOLUTION INTRAMUSCULAR; INTRAVENOUS at 23:45

## 2023-06-08 ASSESSMENT — ACTIVITIES OF DAILY LIVING (ADL): ADLS_ACUITY_SCORE: 37

## 2023-06-09 VITALS
TEMPERATURE: 98.1 F | OXYGEN SATURATION: 92 % | DIASTOLIC BLOOD PRESSURE: 68 MMHG | HEART RATE: 94 BPM | SYSTOLIC BLOOD PRESSURE: 144 MMHG | RESPIRATION RATE: 22 BRPM

## 2023-06-09 DIAGNOSIS — J43.9 PULMONARY EMPHYSEMA, UNSPECIFIED EMPHYSEMA TYPE (H): ICD-10-CM

## 2023-06-09 LAB
HOLD SPECIMEN: NORMAL
TROPONIN T SERPL HS-MCNC: 54 NG/L

## 2023-06-09 PROCEDURE — 94640 AIRWAY INHALATION TREATMENT: CPT | Mod: 76

## 2023-06-09 PROCEDURE — 999N000157 HC STATISTIC RCP TIME EA 10 MIN

## 2023-06-09 PROCEDURE — 36415 COLL VENOUS BLD VENIPUNCTURE: CPT | Performed by: INTERNAL MEDICINE

## 2023-06-09 PROCEDURE — 250N000009 HC RX 250: Performed by: INTERNAL MEDICINE

## 2023-06-09 PROCEDURE — 84484 ASSAY OF TROPONIN QUANT: CPT | Performed by: INTERNAL MEDICINE

## 2023-06-09 PROCEDURE — 250N000013 HC RX MED GY IP 250 OP 250 PS 637: Performed by: INTERNAL MEDICINE

## 2023-06-09 RX ORDER — IPRATROPIUM BROMIDE AND ALBUTEROL SULFATE 2.5; .5 MG/3ML; MG/3ML
3 SOLUTION RESPIRATORY (INHALATION) ONCE
Status: COMPLETED | OUTPATIENT
Start: 2023-06-09 | End: 2023-06-09

## 2023-06-09 RX ORDER — DEXAMETHASONE 4 MG/1
4 TABLET ORAL
Qty: 5 TABLET | Refills: 0 | Status: SHIPPED | OUTPATIENT
Start: 2023-06-09 | End: 2023-11-07

## 2023-06-09 RX ORDER — AZITHROMYCIN 250 MG/1
500 TABLET, FILM COATED ORAL ONCE
Status: COMPLETED | OUTPATIENT
Start: 2023-06-09 | End: 2023-06-09

## 2023-06-09 RX ORDER — AZITHROMYCIN 250 MG/1
250 TABLET, FILM COATED ORAL DAILY
Qty: 4 TABLET | Refills: 0 | Status: SHIPPED | OUTPATIENT
Start: 2023-06-10 | End: 2023-07-21

## 2023-06-09 RX ADMIN — IPRATROPIUM BROMIDE AND ALBUTEROL SULFATE 3 ML: .5; 3 SOLUTION RESPIRATORY (INHALATION) at 01:43

## 2023-06-09 RX ADMIN — AZITHROMYCIN MONOHYDRATE 500 MG: 250 TABLET ORAL at 02:02

## 2023-06-09 ASSESSMENT — ACTIVITIES OF DAILY LIVING (ADL): ADLS_ACUITY_SCORE: 37

## 2023-06-11 ASSESSMENT — ENCOUNTER SYMPTOMS
NAUSEA: 0
WOUND: 0
FEVER: 0
ABDOMINAL PAIN: 0
WHEEZING: 1
DIAPHORESIS: 0
ARTHRALGIAS: 0
COLOR CHANGE: 0
COUGH: 1
NECK STIFFNESS: 0
HEADACHES: 0
DYSURIA: 0
FLANK PAIN: 0
ANAL BLEEDING: 0
MYALGIAS: 0
HEMATURIA: 0
VOICE CHANGE: 0
PALPITATIONS: 0
DIZZINESS: 0
VOMITING: 0
CHILLS: 0
ABDOMINAL DISTENTION: 0
NECK PAIN: 0
SHORTNESS OF BREATH: 1
CONFUSION: 0
LIGHT-HEADEDNESS: 0
BLOOD IN STOOL: 0
CHEST TIGHTNESS: 0
BACK PAIN: 0
NUMBNESS: 0

## 2023-06-12 RX ORDER — FLUTICASONE FUROATE, UMECLIDINIUM BROMIDE AND VILANTEROL TRIFENATATE 200; 62.5; 25 UG/1; UG/1; UG/1
1 POWDER RESPIRATORY (INHALATION) DAILY
Qty: 60 EACH | Refills: 0 | Status: SHIPPED | OUTPATIENT
Start: 2023-06-12 | End: 2023-07-21

## 2023-06-12 NOTE — ED PROVIDER NOTES
"  History     Chief Complaint   Patient presents with     Shortness of Breath     X1 week, states \"I just got worse\"     The history is provided by the patient.   Shortness of Breath  Severity:  Moderate  Onset quality:  Gradual  Timing:  Constant  Progression:  Worsening  Chronicity:  Recurrent  Associated symptoms: cough and wheezing    Associated symptoms: no abdominal pain, no chest pain, no diaphoresis, no fever, no headaches, no neck pain, no rash and no vomiting          Allergies:  Allergies   Allergen Reactions     Evista [Raloxifene] Other (See Comments)     hypercalcemia     Prednisone GI Disturbance     Combigan [Brimonidine Tartrate-Timolol] Other (See Comments)     Eye swelling and itchy     Cyclosporine      Pt reports using eye gtts and having red irritated eyes      Latex Rash     Levaquin [Levofloxacin] Muscle Pain (Myalgia)     Penicillins Rash       Problem List:    Patient Active Problem List    Diagnosis Date Noted     Chest pain, unspecified type 02/16/2023     Priority: Medium     Coronary artery disease involving native coronary artery of native heart without angina pectoris 03/14/2022     Priority: Medium     Abnormal cardiovascular stress test on 6/14/2019 03/14/2022     Priority: Medium     History of CVA on 1/1/2019 03/14/2022     Priority: Medium     COPD 03/14/2022     Priority: Medium     Postoperative atrial fibrillation on 6/28/2019 (H) 03/14/2022     Priority: Medium     History of cardioversion on 6/28/2019 03/14/2022     Priority: Medium     PAD on 2/28/2020-moderate (H) 03/14/2022     Priority: Medium     Bilateral carotid artery stenosis 03/14/2022     Priority: Medium     History of tobacco abuse quitting on 1/1/2019 03/14/2022     Priority: Medium     Tobacco abuse counseling 03/14/2022     Priority: Medium     CKD (chronic kidney disease) stage 4, GFR 15-29 ml/min (H) 01/26/2022     Priority: Medium     Subclavian arterial stenosis (H) 01/26/2022     Priority: Medium     " Iron deficiency anemia secondary to inadequate dietary iron intake 01/07/2022     Priority: Medium     Weight loss 02/05/2020     Priority: Medium     Acute blood loss anemia 01/28/2020     Priority: Medium     Anemia due to blood loss, acute 09/20/2019     Priority: Medium     History of GI bleed 09/20/2019     Priority: Medium     Protein-calorie malnutrition (H) 09/04/2019     Priority: Medium     History of coronary artery stent placement to the O/M LCX on 6/28/2019 at [facility] 07/23/2019     Priority: Medium     Abnormal stress test 06/18/2019     Priority: Medium     Added automatically from request for surgery [id number]       MILLS (dyspnea on exertion) 06/18/2019     Priority: Medium     Added automatically from request for surgery [id number]       ASCVD (arteriosclerotic cardiovascular disease) 06/18/2019     Priority: Medium     Added automatically from request for surgery [id number]       Mixed hyperlipidemia 06/18/2019     Priority: Medium     Added automatically from request for surgery [id number]       Centrilobular emphysema (H) 05/12/2019     Priority: Medium     Acquired hypothyroidism 05/12/2019     Priority: Medium     Chronic systolic congestive heart failure (H)- Recovered 05/12/2019     Priority: Medium     Benign essential hypertension 05/12/2019     Priority: Medium     Osteopenia 05/12/2019     Priority: Medium     Non-rheumatic mitral regurgitation 05/12/2019     Priority: Medium     Primary hyperparathyroidism (H) 09/05/2013     Priority: Medium     Chronic rhinitis 08/16/2013     Priority: Medium        Past Medical History:    Past Medical History:   Diagnosis Date     Acquired hypothyroidism 5/12/2019     Asthma      Benign essential hypertension 5/12/2019     Centrilobular emphysema (H) 5/12/2019     Chronic rhinitis 8/16/2013     Chronic systolic congestive heart failure (H) 5/12/2019     Constipation      Coronary artery disease      Hearing loss      Heart trouble      Hemorrhagic  cerebrovascular accident (CVA) (H) 01/2019     Hyperlipidemia 5/12/2019     Hypertension      Nasal congestion      Non-rheumatic mitral regurgitation 5/12/2019     Osteopenia 5/12/2019     Osteoporosis      Parathyroid related hypercalcemia (H) 8/18/2013     Primary hyperparathyroidism (H) 9/5/2013     Ringing in ears      Sensorineural hearing loss, asymmetrical 8/16/2013     Sneezing      Snoring      Stented coronary artery      Thyroid disease      Weakness      Weight loss        Past Surgical History:    Past Surgical History:   Procedure Laterality Date     CATARACT IOL, RT/LT Bilateral      COLONOSCOPY       COLONOSCOPY - HIM SCAN  01/01/2009    Colonoscopy - [facility] [place]  2009     ENDOSCOPY UPPER, COLONOSCOPY, COMBINED N/A 10/17/2019    Procedure: UPPER ENDOSCOPY WITH BIOPSY  AND COLONOSCOPY;  Surgeon: Julio Canales MD;  Location: [facility] OR     ENT SURGERY  2011    para thyroid surgery     ENT SURGERY  09/2013    Parathyroid     ESOPHAGOSCOPY, GASTROSCOPY, DUODENOSCOPY (EGD), COMBINED N/A 9/21/2019    Procedure: ESOPHAGOGASTRODUODENOSCOPY (EGD);  Surgeon: Julio Canales MD;  Location: [facility] OR     ESOPHAGOSCOPY, GASTROSCOPY, DUODENOSCOPY (EGD), COMBINED N/A 1/27/2020    Procedure: ESOPHAGOGASTRODUODENOSCOPY (EGD) WITH BIOPSY;  Surgeon: Isrrael Parish MD;  Location: [facility] OR     ESOPHAGOSCOPY, GASTROSCOPY, DUODENOSCOPY (EGD), COMBINED N/A 2/20/2020    Procedure: ESOPHAGOGASTRODUODENOSCOPY (EGD) WITH BIOPSY;  Surgeon: Pedro Luis Montoya DO;  Location: [facility] OR     PARATHYROIDECTOMY  9/10/2013    Procedure: PARATHYROIDECTOMY;  Reoperative Neck Exploration, Resection of right Parathyroid adenoma;  Surgeon: Thalia Muller MD;  Location: [facility] OR     TONSILLECTOMY       TUBAL LIGATION         Family History:    Family History   Problem Relation Age of Onset     Hearing Loss Mother      Heart Disease Mother      Myocardial Infarction Mother      Cerebrovascular Disease Father      Cancer Brother          throat     Cancer Sister      Myocardial Infarction Sister      Cancer Maternal Grandmother      Heart Disease Maternal Grandfather      Aortic aneurysm Son        Social History:  Marital Status:   [5]  Social History     Tobacco Use     Smoking status: Former     Packs/day: 1.00     Years: 50.00     Pack years: 50.00     Types: Cigarettes     Quit date: 2019     Years since quittin.4     Smokeless tobacco: Never     Tobacco comments:     quit 2019   Vaping Use     Vaping status: Never Used   Substance Use Topics     Alcohol use: Yes     Comment: social     Drug use: No        Medications:    azithromycin (ZITHROMAX) 250 MG tablet  dexamethasone (DECADRON) 4 MG tablet  aspirin (ASA) 81 MG chewable tablet  atorvastatin (LIPITOR) 40 MG tablet  calcium carbonate (TUMS) 500 MG chewable tablet  clindamycin (CLEOCIN) 300 MG capsule  diclofenac (VOLTAREN) 1 % topical gel  famotidine (PEPCID) 10 MG tablet  fluticasone (FLONASE) 50 MCG/ACT spray  Fluticasone-Umeclidin-Vilanterol (TRELEGY ELLIPTA) 200-62.5-25 MCG/ACT oral inhaler  furosemide (LASIX) 20 MG tablet  ipratropium - albuterol 0.5 mg/2.5 mg/3 mL (DUONEB) 0.5-2.5 (3) MG/3ML neb solution  levothyroxine (SYNTHROID) 50 MCG tablet  lisinopril (ZESTRIL) 20 MG tablet  metoprolol succinate ER (TOPROL XL) 50 MG 24 hr tablet  nitroGLYcerin (NITROSTAT) 0.4 MG sublingual tablet  pantoprazole (PROTONIX) 40 MG EC tablet  Tafluprost 0.0015 % SOLN          Review of Systems   Constitutional: Negative for chills, diaphoresis and fever.   HENT: Negative for voice change.    Eyes: Negative for visual disturbance.   Respiratory: Positive for cough, shortness of breath and wheezing. Negative for chest tightness.    Cardiovascular: Negative for chest pain, palpitations and leg swelling.   Gastrointestinal: Negative for abdominal distention, abdominal pain, anal bleeding, blood in stool, nausea and vomiting.   Genitourinary: Negative for decreased urine volume,  dysuria, flank pain and hematuria.   Musculoskeletal: Negative for arthralgias, back pain, gait problem, myalgias, neck pain and neck stiffness.   Skin: Negative for color change, pallor, rash and wound.   Neurological: Negative for dizziness, syncope, light-headedness, numbness and headaches.   Psychiatric/Behavioral: Negative for confusion and suicidal ideas.       Physical Exam   BP: 122/79  Pulse: 76  Temp: 98.1  F (36.7  C)  Resp: 22  SpO2: 93 %      Physical Exam  Vitals and nursing note reviewed.   Constitutional:       Appearance: She is well-developed.   HENT:      Head: Normocephalic and atraumatic.      Mouth/Throat:      Pharynx: No oropharyngeal exudate.   Eyes:      Conjunctiva/sclera: Conjunctivae normal.      Pupils: Pupils are equal, round, and reactive to light.   Neck:      Thyroid: No thyromegaly.      Vascular: No JVD.      Trachea: No tracheal deviation.   Cardiovascular:      Rate and Rhythm: Normal rate and regular rhythm.      Heart sounds: Normal heart sounds. No murmur heard.     No friction rub. No gallop.   Pulmonary:      Effort: Tachypnea present. No respiratory distress.      Breath sounds: No stridor. Examination of the right-upper field reveals wheezing. Examination of the left-upper field reveals wheezing. Examination of the right-middle field reveals wheezing. Examination of the left-middle field reveals wheezing. Examination of the left-lower field reveals wheezing. Wheezing present. No rales.   Chest:      Chest wall: No tenderness.   Abdominal:      General: Bowel sounds are normal. There is no distension.      Palpations: Abdomen is soft. There is no mass.      Tenderness: There is no abdominal tenderness. There is no guarding or rebound.   Musculoskeletal:         General: No tenderness. Normal range of motion.      Cervical back: Normal range of motion and neck supple.   Lymphadenopathy:      Cervical: No cervical adenopathy.   Skin:     General: Skin is warm and dry.       Coloration: Skin is not pale.      Findings: No erythema or rash.   Neurological:      Mental Status: She is alert and oriented to person, place, and time.   Psychiatric:         Behavior: Behavior normal.         ED Course                 Procedures                No results found for this or any previous visit (from the past 24 hour(s)).    Medications   ipratropium - albuterol 0.5 mg/2.5 mg/3 mL (DUONEB) neb solution 3 mL (3 mLs Nebulization $Given 6/8/23 0140)   dexamethasone PF (DECADRON) injection 8 mg (8 mg Intravenous $Given 6/8/23 2345)   ipratropium - albuterol 0.5 mg/2.5 mg/3 mL (DUONEB) neb solution 3 mL (3 mLs Nebulization $Given 6/9/23 0143)   azithromycin (ZITHROMAX) tablet 500 mg (500 mg Oral $Given 6/9/23 0202)       Assessments & Plan (with Medical Decision Making)   COPD exacerbation    Labs reviewed  CXR no acute finding    Symptoms much improved after receiving neb treatment + IV steroid  zithro + dexamethasone prescribed  D C home, follow-up with PCP  I have reviewed the nursing notes.    I have reviewed the findings, diagnosis, plan and need for follow up with the patient.        Discharge Medication List as of 6/9/2023  1:41 AM      START taking these medications    Details   azithromycin (ZITHROMAX) 250 MG tablet Take 1 tablet (250 mg) by mouth daily, Disp-4 tablet, R-0, Local Print      dexamethasone (DECADRON) 4 MG tablet Take 1 tablet (4 mg) by mouth daily (with breakfast) for 5 days, Disp-5 tablet, R-0, Local Print             Final diagnoses:   COPD exacerbation (H)       6/8/2023   [facility]     Jeremías Klein MD  06/11/23 1954

## 2023-06-27 ENCOUNTER — NURSE TRIAGE (OUTPATIENT)
Dept: FAMILY MEDICINE | Facility: OTHER | Age: 83
End: 2023-06-27

## 2023-06-27 DIAGNOSIS — Z95.5 S/P DRUG ELUTING CORONARY STENT PLACEMENT: ICD-10-CM

## 2023-06-27 DIAGNOSIS — E78.5 HYPERLIPIDEMIA, UNSPECIFIED HYPERLIPIDEMIA TYPE: ICD-10-CM

## 2023-06-27 NOTE — TELEPHONE ENCOUNTER
Pended to PCP to advise on toribio't  Patient will see either PCP or Provider Prem  Patient would like an xray :)    Reason for Disposition    Age > 50 and no history of prior similar back pain    Additional Information    Negative: Passed out (i.e., fainted, collapsed and was not responding)    Negative: Shock suspected (e.g., cold/pale/clammy skin, too weak to stand, low BP, rapid pulse)    Negative: Sounds like a life-threatening emergency to the triager    Negative: Major injury to the back (e.g., MVA, fall > 10 feet or 3 meters, penetrating injury, etc.)    Negative: Pain in the upper back over the ribs (rib cage) that radiates (travels) into the chest    Negative: Pain in the upper back over the ribs (rib cage) and worsened by coughing (or clearly increases with breathing)    Negative: Back pain during pregnancy    Negative: SEVERE back pain of sudden onset and age > 60 years    Negative: SEVERE abdominal pain (e.g., excruciating)    Negative: Abdominal pain and age > 60 years    Negative: Unable to urinate (or only a few drops) and bladder feels very full    Negative: Loss of bladder or bowel control (urine or bowel incontinence; wetting self, leaking stool) of new-onset    Negative: Numbness (loss of sensation) in groin or rectal area    Negative: Pain radiates into groin, scrotum    Negative: Blood in urine (red, pink, or tea-colored)    Negative: Vomiting and pain over lower ribs of back (i.e., flank - kidney area)    Negative: Weakness of a leg or foot (e.g., unable to bear weight, dragging foot)    Negative: Patient sounds very sick or weak to the triager    Negative: Fever > 100.4 F (38.0 C) and flank pain    Negative: Pain or burning with passing urine (urination)    Negative: SEVERE back pain (e.g., excruciating, unable to do any normal activities) and not improved after pain medicine and CARE ADVICE    Negative: Numbness in an arm or hand (i.e., loss of sensation) and upper back pain    Negative:  Numbness in a leg or foot (i.e., loss of sensation)    Negative: High-risk adult (e.g., history of cancer, history of HIV, or history of IV Drug Use)    Negative: Soft tissue infection (e.g., abscess, cellulitis) or other serious infection (e.g., bacteremia) in last 2 weeks    Negative: Painful rash with multiple small blisters grouped together (i.e., dermatomal distribution or 'band' or 'stripe')    Negative: Pain radiates into the thigh or further down the leg, and in both legs    Answer Assessment - Initial Assessment Questions  1. ONSET:      Approx one week    2. LOCATION:      R side hip & lower back    3. SEVERITY:      Mild.  Not interrupting ADL's      Does not wakerher from sleep    4. PATTERN:       Slight pain at rest      Increases with activity    5. RADIATION:       Slight radiation, intermittently down R leg    6. CAUSE:       Working in the yard, moving the sprinkler        7. BACK OVERUSE:       None    8. MEDICATIONS:      Aleve / Tylenol      Ice & heat at start      Now just heat    9. NEUROLOGIC SYMPTOMS:       None    10. OTHER SYMPTOMS:        None    Protocols used: BACK PAIN-A-[place]

## 2023-06-28 RX ORDER — ATORVASTATIN CALCIUM 40 MG/1
40 TABLET, FILM COATED ORAL AT BEDTIME
Qty: 90 TABLET | Refills: 0 | Status: SHIPPED | OUTPATIENT
Start: 2023-06-28 | End: 2023-09-26

## 2023-06-28 NOTE — TELEPHONE ENCOUNTER
Lipitor      Last Written Prescription Date:  1.5.23  Last Fill Quantity: #90,   # refills: 1  Last Office Visit: 6.9.23  Future Office visit:    Next 5 appointments (look out 90 days)    Jun 29, 2023  8:30 AM  (Arrive by 8:15 AM)  SHORT with COCO Ta CNP  [facility] [place] ([facility] [place] ) [address]  [place] [address]  [phone]   Jul 21, 2023  2:30 PM  (Arrive by 2:15 PM)  SHORT with Ophelia Troncoso MD  [facility] [place] ([facility] [place] ) [address]  [place] [address]  [phone]   Sep 06, 2023  3:00 PM  (Arrive by 2:45 PM)  Return Visit with Sal Graham DO  [facility] [place] ([facility] [place] ) [address]  [place] [address]  [phone]           Routing refill request to provider for review/approval because:

## 2023-06-29 ENCOUNTER — OFFICE VISIT (OUTPATIENT)
Dept: FAMILY MEDICINE | Facility: OTHER | Age: 83
End: 2023-06-29
Payer: COMMERCIAL

## 2023-06-29 ENCOUNTER — ANCILLARY PROCEDURE (OUTPATIENT)
Dept: GENERAL RADIOLOGY | Facility: OTHER | Age: 83
End: 2023-06-29
Payer: COMMERCIAL

## 2023-06-29 VITALS
HEIGHT: 61 IN | RESPIRATION RATE: 18 BRPM | OXYGEN SATURATION: 93 % | WEIGHT: 103.5 LBS | DIASTOLIC BLOOD PRESSURE: 72 MMHG | TEMPERATURE: 97.9 F | SYSTOLIC BLOOD PRESSURE: 124 MMHG | HEART RATE: 89 BPM | BODY MASS INDEX: 19.54 KG/M2

## 2023-06-29 DIAGNOSIS — M25.551 HIP PAIN, RIGHT: Primary | ICD-10-CM

## 2023-06-29 DIAGNOSIS — M25.551 HIP PAIN, RIGHT: ICD-10-CM

## 2023-06-29 DIAGNOSIS — M15.0 PRIMARY OSTEOARTHRITIS INVOLVING MULTIPLE JOINTS: ICD-10-CM

## 2023-06-29 DIAGNOSIS — M54.50 RIGHT-SIDED LOW BACK PAIN WITHOUT SCIATICA, UNSPECIFIED CHRONICITY: ICD-10-CM

## 2023-06-29 PROCEDURE — 99213 OFFICE O/P EST LOW 20 MIN: CPT

## 2023-06-29 PROCEDURE — 72100 X-RAY EXAM L-S SPINE 2/3 VWS: CPT | Mod: TC

## 2023-06-29 PROCEDURE — G0463 HOSPITAL OUTPT CLINIC VISIT: HCPCS

## 2023-06-29 PROCEDURE — 73502 X-RAY EXAM HIP UNI 2-3 VIEWS: CPT | Mod: TC

## 2023-06-29 RX ORDER — LIDOCAINE 50 MG/G
1 PATCH TOPICAL EVERY 24 HOURS
Qty: 10 PATCH | Refills: 0 | Status: ON HOLD | OUTPATIENT
Start: 2023-06-29 | End: 2023-11-27 | Stop reason: ALTCHOICE

## 2023-06-29 ASSESSMENT — PAIN SCALES - GENERAL: PAINLEVEL: SEVERE PAIN (7)

## 2023-06-29 NOTE — PATIENT INSTRUCTIONS
Take tylenol routinely- you can take a max dose of 3000 mg daily (650 mg every 4 hours or 1000 mg three times daily)    Try using voltaren gel to the area of pain. We want to be careful taking Aleve with your kidney disease and CHF.     I have also sent in lidocaine patch for you to try (you will wear this for 12 hours and remove for 12 hours)     Consider physical therapy

## 2023-06-29 NOTE — PROGRESS NOTES
Assessment & Plan     Hip pain, right/Right-sided low back pain without sciatica, unspecified chronicity  Likely multifactorial- suspect right hip OA, as well as SI vs discogenic. Imaging obtained today due to osteoporosis. Right hip with moderate degenerative change, lumbar xray with new compression deformity- unclear age. Discussed obtaining MRI which patient declines. Area of pain is not consistent with this deformity. Discussed physical therapy which she declines. Increase tylenol to max dose of 3000 mg daily. Trial of lidocaine patches. Will set up phone visit next week to assess for improvement.   - XR Hip Right 2-3 Views (Clinic Performed)  - lidocaine (LIDODERM) 5 % patch  Dispense: 10 patch; Refill: 0  - diclofenac (VOLTAREN) 1 % topical gel  Dispense: 150 g; Refill: 1  - XR LUMBAR SPINE 2/3 VIEWS (Clinic Performed)      25 minutes spent by me on the date of the encounter doing chart review, history and exam, documentation and further activities per the note         No follow-ups on file.    COCO Ta [facility] [place]    Joann London is a 82 year old, presenting for the following health issues:  Musculoskeletal Problem (Back and Hip pain)        6/9/2023     1:20 PM   Additional Questions   Roomed by Kimberly Boecker, RN     HPI     Pain History:  When did you first notice your pain? About 2 weeks   Have you seen anyone else for your pain? No  How has your pain affected your ability to work? Not applicable  Where in your body do you have pain? Back Pain, Hip  Onset/Duration: 06/15/2023  Description:   Location of pain: low back right and hip right  Character of pain: dull ache, fullness and intermittent  Pain radiation: radiates into the right leg, sometimes but doesn't hurt all the time  New numbness or weakness in legs, not attributed to pain: no   Intensity: Currently 7/10, At its worst 10/10, severe  Progression of Symptoms: worsening and intermittent  History:  "  Specific cause: none  Pain interferes with job: N/A  History of back problems: Osteoporosis  Any previous MRI or X-rays: Yes- at [place].  Date 10/2020  Sees a specialist for back pain: No  Alleviating factors:   Improved by: none    Precipitating factors:  Worsened by: Standing and Walking  Therapies tried and outcome: none  Accompanying Signs & Symptoms:  Risk of Fracture: Osteoporosis (not treated) and Age >64  Risk of Cauda Equina: None  Risk of Infection: None  Risk of Cancer: None  Risk of Ankylosing Spondylitis: Onset at age <35, male, AND morning back stiffness No    Chronic/Recurring Back Pain Follow Up    Onset 2 weeks ago.    No falls or trauma. Notes she got out of bed and noticed discomfort to posterior right hip. More painful in the morning, somewhat better through out the day. Does improve when sitting and worsens with ambulation. Describes as constant ache. 7-8/10.    Aleve- 1 tablet, 1-2 times per day  Tylenol- 2 tablets, 3 times per day  Brings pain to 4/10. No medication today.     Intermittently has pains down right leg but generally does not radiate. No numbness, tingling or weakness of lower extremity.     Thinks she has been on medications for osteoporosis, however not currently being treated. fds      Review of Systems   Constitutional, HEENT, cardiovascular, pulmonary, GI, , musculoskeletal, neuro, skin, endocrine and psych systems are negative, except as otherwise noted.      Objective    /72   Pulse 89   Temp 97.9  F (36.6  C) (Tympanic)   Resp 18   Ht 1.549 m (5' 1\")   Wt 46.9 kg (103 lb 8 oz)   SpO2 93%   BMI 19.56 kg/m    Body mass index is 19.56 kg/m .     Physical Exam  Constitutional:       General: She is not in acute distress.     Appearance: She is not ill-appearing.   Cardiovascular:      Rate and Rhythm: Normal rate and regular rhythm.      Pulses: Normal pulses.   Pulmonary:      Effort: Pulmonary effort is normal. No respiratory distress.      Breath sounds: " No wheezing, rhonchi or rales.   Musculoskeletal:      Lumbar back: Tenderness present. No swelling, edema or bony tenderness. Negative right straight leg raise test and negative left straight leg raise test.      Right hip: Tenderness present. No bony tenderness. Decreased range of motion. Decreased strength.      Left hip: Normal.        Legs:       Comments: Posterior right hip tenderness, positive STEPHENIE with pain anterior and posterior hip. Internal rotation limited. Tenderness over right SI.    Skin:     General: Skin is warm and dry.   Neurological:      Mental Status: She is alert.

## 2023-07-06 ENCOUNTER — VIRTUAL VISIT (OUTPATIENT)
Dept: FAMILY MEDICINE | Facility: OTHER | Age: 83
End: 2023-07-06
Payer: COMMERCIAL

## 2023-07-06 DIAGNOSIS — M54.50 RIGHT-SIDED LOW BACK PAIN WITHOUT SCIATICA, UNSPECIFIED CHRONICITY: ICD-10-CM

## 2023-07-06 DIAGNOSIS — M25.551 HIP PAIN, RIGHT: Primary | ICD-10-CM

## 2023-07-06 DIAGNOSIS — S32.020D CLOSED COMPRESSION FRACTURE OF L2 LUMBAR VERTEBRA WITH ROUTINE HEALING, SUBSEQUENT ENCOUNTER: ICD-10-CM

## 2023-07-06 PROCEDURE — 99443 PR PHYSICIAN TELEPHONE EVALUATION 21-30 MIN: CPT | Mod: 93

## 2023-07-06 NOTE — PROGRESS NOTES
Lita is a 82 year old who is being evaluated via a billable telephone visit.      What phone number would you like to be contacted at? [phone]  How would you like to obtain your AVS? Mail a copy  Distant Location (provider location):  On-site    Assessment & Plan     Hip pain, right/Right-sided low back pain without sciatica, unspecified chronicity  Improving. No trauma or new symptoms. Managing pain with tylenol, voltaren and lidocaine patches. Continue for now. Discussed PT again which patient declines. Would like to continue with current regimen and will call if worsening or concerns. Has scheduled apt in 2 weeks in clinic, will follow-up at that time. Consider ortho referral.     Closed compression fracture of L2 lumbar vertebra with routine healing, subsequent encounter  Noted on xray on 6/29. Unclear age. Exam is not consistent with this finding. Discussed obtaining MRI however patient declines due to claustrophobia, and does not want medication. Did not tolerate osteoporosis treatment in the past. Discussed updating DEXA, consideration of prolia however patient declines at this time.      30 minutes spent by me on the date of the encounter doing chart review, history and exam, documentation and further activities per the note         No follow-ups on file.    COCO Ta [facility] [place]    Subjective   Lita is a 82 year old, presenting for the following health issues:  Hip Pain        6/29/2023     8:26 AM   Additional Questions   Roomed by Rohan Perea LPN   Accompanied by Self     HPI     Musculoskeletal problem/pain- Back/hip follow up      Duration: 3 weeks     Description  Location: back and hip    Intensity:  moderate    Accompanying signs and symptoms: none    History  Previous similar problem: no   Previous evaluation:  x-ray    Precipitating or alleviating factors:  Trauma or overuse: no   Aggravating factors include: standing, walking, climbing stairs and  "exercise    Therapies tried and outcome: heat, ice, and deep heating rub. Patient feels voterin gel is helping more than the lidocaine patches.      Seen in the clinic on 6/29 related to right hip and low back pain. Trial of lidocaine patches and discussed routine tylenol use.     Imaging revealing new compression deformity of lumbar spine, indeterminate age. Patient declined MRI.     Reports pain is improving, feels 50 % improved from last week. Less painful to ambulate. States \"I can actually get out of bed.\" Rates pain 5  out of 10 today. Ambulating without difficulty. No recent falls. No bowel/bladder changes.    Review of Systems   Constitutional, HEENT, cardiovascular, pulmonary, GI, , musculoskeletal, neuro, skin, endocrine and psych systems are negative, except as otherwise noted.      Objective           Vitals:  No vitals were obtained today due to virtual visit.    Physical Exam   healthy, alert and no distress  PSYCH: Alert and oriented times 3; coherent speech, normal   rate and volume, able to articulate logical thoughts, able   to abstract reason, no tangential thoughts, no hallucinations   or delusions  Her affect is normal  RESP: No cough, no audible wheezing, able to talk in full sentences  Remainder of exam unable to be completed due to telephone visits        Phone call duration: 8 minutes    "

## 2023-07-11 ENCOUNTER — TELEPHONE (OUTPATIENT)
Dept: FAMILY MEDICINE | Facility: OTHER | Age: 83
End: 2023-07-11

## 2023-07-11 DIAGNOSIS — J44.9 CHRONIC OBSTRUCTIVE PULMONARY DISEASE, UNSPECIFIED COPD TYPE (H): Primary | ICD-10-CM

## 2023-07-11 NOTE — TELEPHONE ENCOUNTER
7/11 Please change diagnosis code for Pulmonary Rehab from emphysema to COPD  for billing purposes.  Thank You.

## 2023-07-12 ENCOUNTER — TELEPHONE (OUTPATIENT)
Dept: CARDIAC REHAB | Facility: HOSPITAL | Age: 83
End: 2023-07-12

## 2023-07-12 NOTE — TELEPHONE ENCOUNTER
Pt declines starting pulmonary rehab at this time and was encouraged to contact rehab department when and if she changes her mind.

## 2023-07-13 ENCOUNTER — TELEPHONE (OUTPATIENT)
Dept: CARDIAC REHAB | Facility: HOSPITAL | Age: 83
End: 2023-07-13

## 2023-07-13 NOTE — TELEPHONE ENCOUNTER
7/13: Contacted patient to arrange Pulmonary Rehab.  Patient declined stating wants to be called in a few months.  Staff will contact patient in October.

## 2023-07-21 ENCOUNTER — OFFICE VISIT (OUTPATIENT)
Dept: FAMILY MEDICINE | Facility: OTHER | Age: 83
End: 2023-07-21
Attending: FAMILY MEDICINE
Payer: COMMERCIAL

## 2023-07-21 VITALS
DIASTOLIC BLOOD PRESSURE: 54 MMHG | HEIGHT: 61 IN | HEART RATE: 76 BPM | TEMPERATURE: 97.9 F | RESPIRATION RATE: 15 BRPM | SYSTOLIC BLOOD PRESSURE: 84 MMHG | WEIGHT: 100 LBS | BODY MASS INDEX: 18.88 KG/M2 | OXYGEN SATURATION: 94 %

## 2023-07-21 DIAGNOSIS — J43.2 CENTRILOBULAR EMPHYSEMA (H): Chronic | ICD-10-CM

## 2023-07-21 DIAGNOSIS — N18.4 CKD (CHRONIC KIDNEY DISEASE) STAGE 4, GFR 15-29 ML/MIN (H): ICD-10-CM

## 2023-07-21 DIAGNOSIS — I10 BENIGN ESSENTIAL HYPERTENSION: ICD-10-CM

## 2023-07-21 DIAGNOSIS — I25.10 ASCVD (ARTERIOSCLEROTIC CARDIOVASCULAR DISEASE): ICD-10-CM

## 2023-07-21 DIAGNOSIS — D50.8 IRON DEFICIENCY ANEMIA SECONDARY TO INADEQUATE DIETARY IRON INTAKE: ICD-10-CM

## 2023-07-21 DIAGNOSIS — E21.0 PRIMARY HYPERPARATHYROIDISM (H): ICD-10-CM

## 2023-07-21 DIAGNOSIS — Z00.00 ENCOUNTER FOR MEDICARE ANNUAL WELLNESS EXAM: Primary | ICD-10-CM

## 2023-07-21 DIAGNOSIS — E03.9 ACQUIRED HYPOTHYROIDISM: Chronic | ICD-10-CM

## 2023-07-21 DIAGNOSIS — I25.10 CORONARY ARTERY DISEASE INVOLVING NATIVE CORONARY ARTERY OF NATIVE HEART WITHOUT ANGINA PECTORIS: ICD-10-CM

## 2023-07-21 DIAGNOSIS — E44.1 MILD PROTEIN-CALORIE MALNUTRITION (H): ICD-10-CM

## 2023-07-21 PROCEDURE — G0439 PPPS, SUBSEQ VISIT: HCPCS | Performed by: FAMILY MEDICINE

## 2023-07-21 ASSESSMENT — ACTIVITIES OF DAILY LIVING (ADL): CURRENT_FUNCTION: HOUSEWORK REQUIRES ASSISTANCE

## 2023-07-21 ASSESSMENT — ENCOUNTER SYMPTOMS
CONSTIPATION: 0
DIARRHEA: 0
WEAKNESS: 0
SORE THROAT: 0
PALPITATIONS: 0
PARESTHESIAS: 0
HEADACHES: 0
BREAST MASS: 0
DIZZINESS: 0
MYALGIAS: 1
DYSURIA: 0
CHILLS: 0
SHORTNESS OF BREATH: 0
NERVOUS/ANXIOUS: 0
ARTHRALGIAS: 0
FEVER: 0
HEARTBURN: 0
JOINT SWELLING: 0
HEMATOCHEZIA: 0
ABDOMINAL PAIN: 1
EYE PAIN: 0
HEMATURIA: 0
NAUSEA: 0
COUGH: 0
FREQUENCY: 0

## 2023-07-21 ASSESSMENT — PAIN SCALES - GENERAL: PAINLEVEL: EXTREME PAIN (8)

## 2023-07-21 NOTE — PATIENT INSTRUCTIONS
Patient Education   Personalized Prevention Plan  You are due for the preventive services outlined below.  Your care team is available to assist you in scheduling these services.  If you have already completed any of these items, please share that information with your care team to update in your medical record.  Health Maintenance Due   Topic Date Due     Osteoporosis Screening  Never done     Diptheria Tetanus Pertussis (DTAP/TDAP/TD) Vaccine (1 - Tdap) Never done     Kidney Microalbumin Urine Test  02/22/2023     COVID-19 Vaccine (6 - Pfizer series) 03/30/2023     Cholesterol Lab  06/23/2023     Your Health Risk Assessment indicates you feel you are not in good health    A healthy lifestyle helps keep the body fit and the mind alert. It helps protect you from disease, helps you fight disease, and helps prevent chronic disease (disease that doesn't go away) from getting worse. This is important as you get older and begin to notice twinges in muscles and joints and a decline in the strength and stamina you once took for granted. A healthy lifestyle includes good healthcare, good nutrition, weight control, recreation, and regular exercise. Avoid harmful substances and do what you can to keep safe. Another part of a healthy lifestyle is stay mentally active and socially involved.    Good healthcare     Have a wellness visit every year.     If you have new symptoms, let us know right away. Don't wait until the next checkup.     Take medicines exactly as prescribed and keep your medicines in a safe place. Tell us if your medicine causes problems.   Healthy diet and weight control     Eat 3 or 4 small, nutritious, low-fat, high-fiber meals a day. Include a variety of fruits, vegetables, and whole-grain foods.     Make sure you get enough calcium in your diet. Calcium, vitamin D, and exercise help prevent osteoporosis (bone thinning).     If you live alone, try eating with others when you can. That way you get a good  meal and have company while you eat it.     Try to keep a healthy weight. If you eat more calories than your body uses for energy, it will be stored as fat and you will gain weight.     Recreation   Recreation is not limited to sports and team events. It includes any activity that provides relaxation, interest, enjoyment, and exercise. Recreation provides an outlet for physical, mental, and social energy. It can give a sense of worth and achievement. It can help you stay healthy.    Mental Exercise and Social Involvement  Mental and emotional health is as important as physical health. Keep in touch with friends and family. Stay as active as possible. Continue to learn and challenge yourself.   Things you can do to stay mentally active are:    Learn something new, like a foreign language or musical instrument.     Play SCRABBLE or do crossword puzzles. If you cannot find people to play these games with you at home, you can play them with others on your computer through the Internet.     Join a games club--anything from card games to chess or checkers or lawn bowling.     Start a new hobby.     Go back to school.     Volunteer.     Read.   Keep up with world events.    Exercise for a Healthier Heart  You may wonder how you can improve the health of your heart. If you re thinking about exercise, you re on the right track. You don t need to become an athlete. But you do need a certain amount of brisk exercise to help strengthen your heart. If you have been diagnosed with a heart condition, your healthcare provider may advise exercise to help your condition. To help make exercise a habit, choose safe, fun activities.      Exercise with a friend. When activity is fun, you're more likely to stick with it.     Before you start  Check with your healthcare provider before starting an exercise program. This is especially important if you haven't been active for a while. It's also important if you have a long-term (chronic)  health problem such as heart disease, diabetes, or obesity. Also check with your provider if you're at high risk for having these problems.   Why exercise?  Exercising regularly offers many healthy rewards. It can help you do all of these:     Improve your blood cholesterol level to help prevent further heart trouble.    Lower your blood pressure to help prevent a stroke or heart attack.    Control diabetes or reduce your risk of getting this disease.    Improve your heart and lung function.    Reach and stay at a healthy weight.    Make your muscles stronger so you can stay active.    Prevent falls and fractures by slowing the loss of bone mass (osteoporosis).    Manage stress better.    Improve your sense of self and your body image.  Exercise tips      Ease into your routine. Set small goals. Then build on them. Talk with your healthcare provider first before starting an exercise routine if you're not sure what your activity level should be.    Exercise on most days. Aim for a total of at least 150 minutes (2 hours and 30 minutes) or more of moderate-intensity aerobic activity each week. You could also do 75 minutes (1 hour and 15 minutes) or more of vigorous-intensity aerobic activity each week. Or try for a combination of both. Moderate activity means that you breathe heavier and your heart rate increases, but you can still talk. Think about doing at least 30 minutes of moderate exercise, 5 times a week. It's OK to work up to the 30-minute period over time. Examples of moderate-intensity activity are brisk walking, gardening, and water aerobics.    Step up your daily activity level.  Along with your exercise program, try being more active the whole day. Walk instead of drive. Or park further away so that you take more steps each day. Do more household tasks or yard work. You may not be able to meet the advised amount of physical activity. But doing some moderate- or vigorous-intensity aerobic activity can help  "reduce your risk for heart disease. Your healthcare provider can help you figure out what is best for you.    Choose 1 or more activities you enjoy.  Walking is one of the easiest things you can do. You can also try swimming, riding a bike, dancing, or taking an exercise class.    Call 911  Call 911 right away if any of these occur:     Chest pain that doesn't go away quickly with rest    New burning, tightness, pressure, or heaviness in your chest, neck, shoulders, back, or arms    Abnormal or severe shortness of breath    A very fast or irregular heartbeat (palpitations)    Fainting  When to call your healthcare provider  Call your healthcare provider if you have any of these:     Dizziness or lightheadedness    Mild shortness of breath or chest pain    Increased or new joint or muscle pain    [employer] last reviewed this educational content on 7/1/2022 2000-2023 [employer]. All rights reserved. This information is not intended as a substitute for professional medical care. Always follow your healthcare professional's instructions.        Learning About Being Physically Active  What is physical activity?     Being physically active means doing any kind of activity that gets your body moving.  The types of physical activity that can help you get fit and stay healthy include:    Aerobic or \"cardio\" activities. These make your heart beat faster and make you breathe harder, such as brisk walking, riding a bike, or running. They strengthen your heart and lungs and build up your endurance.    Strength training activities. These make your muscles work against, or \"resist,\" something. Examples include lifting weights or doing push-ups. These activities help tone and strengthen your muscles and bones.    Stretches. These let you move your joints and muscles through their full range of motion. Stretching helps you be more flexible.  Reaching a balance between these three types of physical activity is " "important because each one contributes to your overall fitness.  What are the benefits of being active?  Being active is one of the best things you can do for your health. It helps you to:    Feel stronger and have more energy to do all the things you like to do.    Focus better at school or work.    Feel, think, and sleep better.    Reach and stay at a healthy weight.    Lose fat and build lean muscle.    Lower your risk for serious health problems, including diabetes, heart attack, high blood pressure, and some cancers.    Keep your heart, lungs, bones, muscles, and joints strong and healthy.  How can you make being active part of your life?  Start slowly. Make it your long-term goal to get at least 30 minutes of exercise on most days of the week. Walking is a good choice. You also may want to do other activities, such as running, swimming, cycling, or playing tennis or team sports.  Pick activities that you like--ones that make your heart beat faster, your muscles stronger, and your muscles and joints more flexible. If you find more than one thing you like doing, do them all. You don't have to do the same thing every day.  Get your heart pumping every day. Any activity that makes your heart beat faster and keeps it at that rate for a while counts.  Here are some great ways to get your heart beating faster:    Go for a brisk walk, run, or bike ride.    Go for a hike or swim.    Go in-line skating.    Play a game of touch football, basketball, or soccer.    Ride a bike.    Play tennis or racquetball.    Climb stairs.  Even some household chores can be aerobic--just do them at a faster pace. Vacuuming, raking or mowing the lawn, sweeping the garage, and washing and waxing the car all can help get your heart rate up.  Strengthen your muscles during the week. You don't have to lift heavy weights or grow big, bulky muscles to get stronger. Doing a few simple activities that make your muscles work against, or \"resist,\" " "something can help you get stronger.  For example, you can:    Do push-ups or sit-ups, which use your own body weight as resistance.    Lift weights or dumbbells or use stretch bands at home or in a gym or community center.  Stretch your muscles often. Stretching will help you as you become more active. It can help you stay flexible, loosen tight muscles, and avoid injury. It can also help improve your balance and posture and can be a great way to relax.  Be sure to stretch the muscles you'll be using when you work out. It's best to warm your muscles slightly before you stretch them. Walk or do some other light aerobic activity for a few minutes, and then start stretching.  When you stretch your muscles:    Do it slowly. Stretching is not about going fast or making sudden movements.    Don't push or bounce during a stretch.    Hold each stretch for at least 15 to 30 seconds, if you can. You should feel a stretch in the muscle, but not pain.    Breathe out as you do the stretch. Then breathe in as you hold the stretch. Don't hold your breath.  If you're worried about how more activity might affect your health, have a checkup before you start. Follow any special advice your doctor gives you for getting a smart start.  Where can you learn more?  Go to https://www.[employer].net/patiented  Enter W332 in the search box to learn more about \"Learning About Being Physically Active.\"  Current as of: October 10, 2022               Content Version: 13.7    7485-0025 [employer].   Care instructions adapted under license by your healthcare professional. If you have questions about a medical condition or this instruction, always ask your healthcare professional. [employer] disclaims any warranty or liability for your use of this information.      Activities of Daily Living    Your Health Risk Assessment indicates you have difficulties with activities of daily living such as housework, bathing, " preparing meals, taking medication, etc. Please make a follow up appointment for us to address this issue in more detail.  Hearing Loss: Care Instructions  Overview     Hearing loss is a sudden or slow decrease in how well you hear. It can range from slight to profound. Permanent hearing loss can occur with aging. It also can happen when you are exposed long-term to loud noise. Examples include listening to loud music, riding motorcycles, or being around other loud machines.  Hearing loss can affect your work and home life. It can make you feel lonely or depressed. You may feel that you have lost your independence. But hearing aids and other devices can help you hear better and feel connected to others.  Follow-up care is a key part of your treatment and safety. Be sure to make and go to all appointments, and call your doctor if you are having problems. It's also a good idea to know your test results and keep a list of the medicines you take.  How can you care for yourself at home?    Avoid loud noises whenever possible. This helps keep your hearing from getting worse.    Always wear hearing protection around loud noises.    Wear a hearing aid as directed.  ? A professional can help you pick a hearing aid that will work best for you.  ? You can also get hearing aids over the counter for mild to moderate hearing loss.    Have hearing tests as your doctor suggests. They can show whether your hearing has changed. Your hearing aid may need to be adjusted.    Use other devices as needed. These may include:  ? Telephone amplifiers and hearing aids that can connect to a television, stereo, radio, or microphone.  ? Devices that use lights or vibrations. These alert you to the doorbell, a ringing telephone, or a baby monitor.  ? Television closed-captioning. This shows the words at the bottom of the screen. Most new TVs can do this.  ? TTY (text telephone). This lets you type messages back and forth on the telephone instead  "of talking or listening. These devices are also called TDD. When messages are typed on the keyboard, they are sent over the phone line to a receiving TTY. The message is shown on a monitor.    Use text messaging, social media, and email if it is hard for you to communicate by telephone.    Try to learn a listening technique called speechreading. It is not lipreading. You pay attention to people's gestures, expressions, posture, and tone of voice. These clues can help you understand what a person is saying. Face the person you are talking to, and have them face you. Make sure the lighting is good. You need to see the other person's face clearly.    Think about counseling if you need help to adjust to your hearing loss.  When should you call for help?  Watch closely for changes in your health, and be sure to contact your doctor if:      You think your hearing is getting worse.       You have new symptoms, such as dizziness or nausea.   Where can you learn more?  Go to https://www.[employer].net/patiented  Enter R798 in the search box to learn more about \"Hearing Loss: Care Instructions.\"  Current as of: March 1, 2023               Content Version: 13.7    6225-3933 [employer].   Care instructions adapted under license by your healthcare professional. If you have questions about a medical condition or this instruction, always ask your healthcare professional. [employer] disclaims any warranty or liability for your use of this information.        Signs of Hearing Loss  Hearing loss is a problem shared by many people. In fact, it's one of the most common health problems, particularly as people age. Most people aged 65 and older have some hearing loss. By age 80, almost everyone does. Hearing loss often occurs slowly over the years. So, you may not realize your hearing has gotten worse.   When sudden hearing loss occurs, it's important to contact your healthcare provider right away. Your " provider will do a medical exam and a hearing exam as soon as possible. This is to help find the cause and type of your sudden hearing loss. Based on your diagnosis, your healthcare provider will discuss possible treatments.      Hearing much better with one ear can be a sign of hearing loss.     Have your hearing checked  Call your healthcare provider if you:     Have to strain to hear normal conversation    Have to watch other people s faces very carefully to follow what they re saying    Need to ask people to repeat what they ve said    Often misunderstand what people are saying    Turn the volume of the television or radio up so high that others complain    Feel that people are mumbling when they re talking to you    Find that the effort to hear leaves you feeling tired and irritated    Notice, when using the phone, that you hear better with one ear than the other  [employer] last reviewed this educational content on 6/1/2022 2000-2023 [employer]. All rights reserved. This information is not intended as a substitute for professional medical care. Always follow your healthcare professional's instructions.        Your Health Risk Assessment indicates you feel you are not in good emotional health.    Recreation   Recreation is not limited to sports and team events. It includes any activity that provides relaxation, interest, enjoyment, and exercise. Recreation provides an outlet for physical, mental, and social energy. It can give a sense of worth and achievement. It can help you stay healthy.    Mental Exercise and Social Involvement  Mental and emotional health is as important as physical health. Keep in touch with friends and family. Stay as active as possible. Continue to learn and challenge yourself.   Things you can do to stay mentally active are:    Learn something new, like a foreign language or musical instrument.     Play SCRABBLE or do crossword puzzles. If you cannot find people to play  these games with you at home, you can play them with others on your computer through the Internet.     Join a games club--anything from card games to chess or checkers or lawn bowling.     Start a new hobby.     Go back to school.     Volunteer.     Read.   Keep up with world events.

## 2023-07-21 NOTE — PROGRESS NOTES
The patient was provided with suggestions to help her develop a healthy physical lifestyle.  She is at risk for lack of exercise and has been provided with information to increase physical activity for the benefit of her well-being.  The patient reports that she has difficulty with activities of daily living. I have asked that the patient make a follow up appointment in 12 weeks where this issue will be further evaluated and addressed.  The patient was provided with written information regarding signs of hearing loss.  The patient was provided with suggestions to help her develop a healthy emotional lifestyle.

## 2023-07-21 NOTE — PROGRESS NOTES
"   SUBJECTIVE:   CC: Lita is an 82 year old who presents for preventive health visit.       6/29/2023     8:26 AM   Additional Questions   Roomed by Rohan Perea LPN   Accompanied by Self     Healthy Habits:     In general, how would you rate your overall health?  Fair    Frequency of exercise:  None    Do you usually eat at least 4 servings of fruit and vegetables a day, include whole grains    & fiber and avoid regularly eating high fat or \"junk\" foods?  Yes    Taking medications regularly:  Yes    Medication side effects:  Not applicable    Ability to successfully perform activities of daily living:  Needs assistance    Home Safety:  No safety concerns identified    Hearing Impairment:  Difficulty following a conversation in a noisy restaurant or crowded room, feel that people are mumbling or not speaking clearly, difficulty following dialogue in the theater, difficult to understand a speaker at a public meeting or Gnosticism service, need to ask people to speak up or repeat themselves, find that men's voices are easier to understand than woman's, difficulty understanding soft or whispered speech and difficulty understanding speech on the telephone    In the past 6 months, have you been bothered by leaking of urine?  No    In general, how would you rate your overall mental or emotional health?  Fair    Additional concerns today:  No    Annual Wellness Visit    Patient has been advised of split billing requirements and indicates understanding: Yes     Are you in the first 12 months of your Medicare Part B coverage?  No    Physical Health:  In general, how would you rate your overall physical health? fair  Outside of work, how many days during the week do you exercise?none  Outside of work, approximately how many minutes a day do you exercise?not applicable  If you drink alcohol do you typically have >3 drinks per day or >7 drinks per week? No  Do you usually eat at least 4 servings of fruit and vegetables a day, " "include whole grains & fiber and avoid regularly eating high fat or \"junk\" foods? Yes  Do you have any problems taking medications regularly? No  Do you have any side effects from medications? none  Needs assistance for the following daily activities: housework  Which of the following safety concerns are present in your home?  none identified   Hearing impairment: Yes, Difficulty following a conversation in a noisy restaurant or crowded room.  Feel that people are mumbling or not speaking clearly.  Difficulty following dialogue in the theater.  Difficult to understand a speaker at a public meeting or Rastafari service.  Need to ask people to speak up or repeat themselves.  Find that men's voices are easier to understand than women's.  Difficulty understanding soft or whispered speech.  Difficulty understanding speech on the telephone.  In the past 6 months, have you been bothered by leaking of urine? no    Mental Health:  In general, how would you rate your overall mental or emotional health? fair  PHQ-2 Score:      Do you feel safe in your environment? Yes    Have you ever done Advance Care Planning? (For example, a Health Directive, POLST, or a discussion with a medical provider or your loved ones about your wishes)? No, advance care planning information given to patient to review.  Patient plans to discuss their wishes with loved ones or provider.      Fall risk:  Fallen 2 or more times in the past year?: No  Any fall with injury in the past year?: No  click delete button to remove this line now  Cognitive Screenin) Repeat 3 items (Leader, Season, Table)    2) Clock draw: NORMAL  3) 3 item recall: Recalls 2 objects   Results: NORMAL clock, 1-2 items recalled: COGNITIVE IMPAIRMENT LESS LIKELY    Mini-CogTM Copyright MICHELE Morin. Licensed by the author for use in [facility]; reprinted with permission (marci@.[email or web address]). All rights reserved.      Do you have sleep apnea, excessive snoring or daytime " drowsiness?: no    Social History     Tobacco Use     Smoking status: Former     Packs/day: 1.00     Years: 50.00     Pack years: 50.00     Types: Cigarettes     Quit date: 2019     Years since quittin.5     Passive exposure: Past     Smokeless tobacco: Never     Tobacco comments:     quit 2019   Substance Use Topics     Alcohol use: Yes     Comment: social           2023     2:30 PM   Alcohol Use   Prescreen: >3 drinks/day or >7 drinks/week? Not Applicable          No data to display              Do you have a current opioid prescription? No  Do you use any other controlled substances or medications that are not prescribed by a provider? None    Current providers sharing in care for this patient include:   Patient Care Team:  Ophelia Troncoso MD as PCP - General (Family Practice)  Ophelia Troncoso MD as Assigned PCP  Hailee Cortez APRN CNP as Nurse Practitioner (Nurse Practitioner - Family)  Christin Varner RN as Specialty Care Coordinator (Cardiology)  Katelynn Winters PA-C as Assigned Surgical Provider  Sal Graham DO as Assigned Heart and Vascular Provider    Patient has been advised of split billing requirements and indicates understanding: Yes  I have reviewed Opioid Use Disorder and Substance Use Disorder risk factors and made any needed referrals.     Have you ever done Advance Care Planning? (For example, a Health Directive, POLST, or a discussion with a medical provider or your loved ones about your wishes): Yes, advance care planning is on file.    Social History     Tobacco Use     Smoking status: Former     Packs/day: 1.00     Years: 50.00     Pack years: 50.00     Types: Cigarettes     Quit date: 2019     Years since quittin.5     Passive exposure: Past     Smokeless tobacco: Never     Tobacco comments:     quit 2019   Substance Use Topics     Alcohol use: Yes     Comment: social             2023     2:30 PM   Alcohol Use   Prescreen: >3 drinks/day or >7  "drinks/week? Not Applicable          No data to display              Reviewed orders with patient.  Reviewed health maintenance and updated orders accordingly - Yes  Labs reviewed in EPIC    Breast Cancer Screening:    Mammogram Screening - Mammography discussed and declined    Reviewed and updated as needed this visit by clinical staff   Tobacco  Allergies  Meds  Problems  Med Hx  Surg Hx  Fam Hx          Reviewed and updated as needed this visit by Provider   Tobacco  Allergies  Meds  Problems  Med Hx  Surg Hx  Fam Hx             Review of Systems   Constitutional:  Negative for chills and fever.   HENT:  Positive for hearing loss. Negative for congestion, ear pain and sore throat.    Eyes:  Negative for pain and visual disturbance.   Respiratory:  Negative for cough and shortness of breath.    Cardiovascular:  Negative for chest pain, palpitations and peripheral edema.   Gastrointestinal:  Positive for abdominal pain. Negative for constipation, diarrhea, heartburn, hematochezia and nausea.   Breasts:  Negative for tenderness, breast mass and discharge.   Genitourinary:  Negative for dysuria, frequency, genital sores, hematuria, pelvic pain, urgency, vaginal bleeding and vaginal discharge.   Musculoskeletal:  Positive for myalgias. Negative for arthralgias and joint swelling.   Skin:  Negative for rash.   Neurological:  Negative for dizziness, weakness, headaches and paresthesias.   Psychiatric/Behavioral:  Negative for mood changes. The patient is not nervous/anxious.         OBJECTIVE:   BP (!) 84/54 (BP Location: Left arm, Patient Position: Sitting, Cuff Size: Adult Small)   Pulse 76   Temp 97.9  F (36.6  C) (Tympanic)   Resp 15   Ht 1.549 m (5' 1\")   Wt 45.4 kg (100 lb)   SpO2 94%   BMI 18.89 kg/m    Physical Exam  GENERAL APPEARANCE: alert, no distress, elderly, and frail  EYES: Eyes grossly normal to inspection and conjunctivae and sclerae normal  HENT: nose and mouth without ulcers or " lesions and oral mucous membranes moist  NECK: no adenopathy, no asymmetry, masses, or scars and thyroid normal to palpation  RESP: lungs clear to auscultation - no rales, rhonchi or wheezes  BREAST: patient deferred  CV: regular rate and rhythm, normal S1 S2, no S3 or S4, no murmur, click or rub, no peripheral edema and peripheral pulses strong  ABDOMEN: soft, nontender, no hepatosplenomegaly, no masses and bowel sounds normal  MS: no musculoskeletal defects are noted, gait is age appropriate without ataxia, no assistive device used  SKIN: no suspicious lesions or rashes  NEURO: mentation intact, speech normal, and cranial nerves 2-12 intact  PSYCH: mentation appears normal, well groomed, judgement and insight intact, and mildly irritable which is baseline    Diagnostic Test Results:  Labs reviewed in Epic    ASSESSMENT/PLAN:   Lita was seen today for physical.    Diagnoses and all orders for this visit:    Encounter for Medicare annual wellness exam  -     No CPR- Pre-arrest intubation OK    Centrilobular emphysema (H)  Pt has stopped inhalers, she feels they do not provide benefit. Advised to at least keep these available to her in the event she does get a cold, etc and should restart them at that time.     Mild protein-calorie malnutrition (H)  Weight is stable, but low. Pt is frustrated by inability to gain weight. Remeron was not effective and eventually stopped. Continue to work on calorie intake.     Primary hyperparathyroidism (H) / Age related osteoporosis, without recentf racture  With normal calcium. Recent compression fracture. Pt has declined any medication for bones or eval further with dexa. She reportedly did not tolerate medications in the past for her bones.     Acquired hypothyroidism  TSH wnl 11/22    Benign essential hypertension  BP controlled and actually very low today. This came up throughout the appt with oral fluids. She felt well and without lightheadedness, dizziness, fatigue and was  not open to ED visit for IV fluids and declines labs today as she has no symptoms.     ASCVD (arteriosclerotic cardiovascular disease) / Coronary artery disease involving native coronary artery of native heart without angina pectoris  Followed by cardiology. Currently asymptomatic.     CKD (chronic kidney disease) stage 4, GFR 15-29 ml/min (H)  Renal function stable around 1.5 creat. Very sensitive to diuretics. Pt working on fluid intake.     Iron deficiency anemia secondary to inadequate dietary iron intake  Iron studies with normal iron in Nov 2022, Hgb also has remained wnl on most recent checks. Pt declines repeat labs today as she is asymptomatic.     Patient has been advised of split billing requirements and indicates understanding: Yes      COUNSELING:  Reviewed preventive health counseling, as reflected in patient instructions      She reports that she quit smoking about 4 years ago. Her smoking use included cigarettes. She has a 50.00 pack-year smoking history. She has been exposed to tobacco smoke. She has never used smokeless tobacco.    Ophelia Troncoso MD  [facility]

## 2023-07-24 ENCOUNTER — DOCUMENTATION ONLY (OUTPATIENT)
Dept: OTHER | Facility: CLINIC | Age: 83
End: 2023-07-24

## 2023-07-26 PROBLEM — D62 ANEMIA DUE TO BLOOD LOSS, ACUTE: Status: RESOLVED | Noted: 2019-09-20 | Resolved: 2023-07-26

## 2023-07-26 PROBLEM — D62 ACUTE BLOOD LOSS ANEMIA: Status: RESOLVED | Noted: 2020-01-28 | Resolved: 2023-07-26

## 2023-07-26 ASSESSMENT — ACTIVITIES OF DAILY LIVING (ADL): CURRENT_FUNCTION: NEEDS ASSISTANCE

## 2023-08-21 ENCOUNTER — TELEPHONE (OUTPATIENT)
Dept: FAMILY MEDICINE | Facility: OTHER | Age: 83
End: 2023-08-21

## 2023-08-22 NOTE — PROGRESS NOTES
Assessment & Plan     COPD  Continue trelegy, does not like the neb, okay to use combivent prn instead. Discussed rsv vaccine which was recommended in the fall.   - ipratropium-albuterol (COMBIVENT RESPIMAT)  MCG/ACT inhaler  Dispense: 4 g; Refill: 3    7 minutes spent by me on the date of the encounter doing chart review, review of test results, interpretation of tests, patient visit, and documentation     Ophelia Troncoso MD  [facility] - ABRAHAM London is a 82 year old, presenting for telephone visit for the following health issues:  No chief complaint on file.        7/21/2023     2:30 PM   Additional Questions   Roomed by hal myers   Accompanied by self       HPI   Sylvie is not working    COPD Follow-Up  Overall, how are your COPD symptoms since your last clinic visit?  No change  How much fatigue or shortness of breath do you have when you are walking?  Same as usual  How much shortness of breath do you have when you are resting?  Same as usual  How often do you cough? Often  Have you noticed any change in your sputum/phlegm?  No  Have you experienced a recent fever? No  Please describe how far you can walk without stopping to rest:  The length of 3-5 rooms  How many flights of stairs are you able to walk up without stopping?  None  Have you had any Emergency Room Visits, Urgent Care Visits, or Hospital Admissions because of your COPD since your last office visit?  No    History   Smoking Status    Former    Packs/day: 1.00    Years: 50.00    Types: Cigarettes    Quit date: 1/1/2019   Smokeless Tobacco    Never       Review of Systems   Constitutional, HEENT, cardiovascular, pulmonary, gi and gu systems are negative, except as otherwise noted.      Objective    There were no vitals taken for this visit.  There is no height or weight on file to calculate BMI.  Physical Exam   Virtual visit - pt speaking in full sentences, no increased work of breathing, affect bright,  alert.     No results found for any visits on 08/25/23.

## 2023-08-24 ENCOUNTER — TELEPHONE (OUTPATIENT)
Dept: FAMILY MEDICINE | Facility: OTHER | Age: 83
End: 2023-08-24

## 2023-08-24 NOTE — TELEPHONE ENCOUNTER
10:09 AM    Reason for Call: Phone Call    Description: Patient calling back regarding her reminder for her upcoming appointment with Dr Troncoso on 8/25/23 4:30pm for a in person visit which is scheduled this way and patient thought this was supposed to be a telephone visit and the doctor will be calling her?     Preferred method for responding to this message: Telephone Call  What is your phone number ? [phone]    If we cannot reach you directly, may we leave a detailed response at the number you provided? No    Can this message wait until your PCP/provider returns, if available today? YES, No

## 2023-08-24 NOTE — TELEPHONE ENCOUNTER
Patient calling back she is getting anxious and wants to have someone from Dr Troncoso care team to give her a call back. She wants to know today regarding her appointment for tomorrow.

## 2023-08-25 ENCOUNTER — VIRTUAL VISIT (OUTPATIENT)
Dept: FAMILY MEDICINE | Facility: OTHER | Age: 83
End: 2023-08-25
Attending: FAMILY MEDICINE
Payer: COMMERCIAL

## 2023-08-25 DIAGNOSIS — J43.9 PULMONARY EMPHYSEMA, UNSPECIFIED EMPHYSEMA TYPE (H): Primary | ICD-10-CM

## 2023-08-25 PROCEDURE — 99213 OFFICE O/P EST LOW 20 MIN: CPT | Mod: 93 | Performed by: FAMILY MEDICINE

## 2023-09-06 ENCOUNTER — OFFICE VISIT (OUTPATIENT)
Dept: CARDIOLOGY | Facility: OTHER | Age: 83
End: 2023-09-06
Attending: INTERNAL MEDICINE
Payer: COMMERCIAL

## 2023-09-06 VITALS
OXYGEN SATURATION: 93 % | DIASTOLIC BLOOD PRESSURE: 62 MMHG | HEART RATE: 75 BPM | TEMPERATURE: 98.6 F | SYSTOLIC BLOOD PRESSURE: 108 MMHG | WEIGHT: 98 LBS | BODY MASS INDEX: 19.24 KG/M2 | HEIGHT: 60 IN

## 2023-09-06 DIAGNOSIS — R94.39 ABNORMAL CARDIOVASCULAR STRESS TEST: ICD-10-CM

## 2023-09-06 DIAGNOSIS — I65.23 BILATERAL CAROTID ARTERY STENOSIS: ICD-10-CM

## 2023-09-06 DIAGNOSIS — I48.91 POSTOPERATIVE ATRIAL FIBRILLATION (H): ICD-10-CM

## 2023-09-06 DIAGNOSIS — Z87.891 HISTORY OF TOBACCO ABUSE: ICD-10-CM

## 2023-09-06 DIAGNOSIS — I97.89 POSTOPERATIVE ATRIAL FIBRILLATION (H): ICD-10-CM

## 2023-09-06 DIAGNOSIS — N18.4 CKD (CHRONIC KIDNEY DISEASE) STAGE 4, GFR 15-29 ML/MIN (H): ICD-10-CM

## 2023-09-06 DIAGNOSIS — Z71.6 TOBACCO ABUSE COUNSELING: ICD-10-CM

## 2023-09-06 DIAGNOSIS — E78.2 MIXED HYPERLIPIDEMIA: ICD-10-CM

## 2023-09-06 DIAGNOSIS — I10 BENIGN ESSENTIAL HYPERTENSION: ICD-10-CM

## 2023-09-06 DIAGNOSIS — J44.9 COPD, SEVERE (H): ICD-10-CM

## 2023-09-06 DIAGNOSIS — R06.09 DOE (DYSPNEA ON EXERTION): ICD-10-CM

## 2023-09-06 DIAGNOSIS — I77.1 SUBCLAVIAN ARTERIAL STENOSIS (H): ICD-10-CM

## 2023-09-06 DIAGNOSIS — Z92.89 HISTORY OF CARDIOVERSION: ICD-10-CM

## 2023-09-06 DIAGNOSIS — I34.0 NON-RHEUMATIC MITRAL REGURGITATION: Chronic | ICD-10-CM

## 2023-09-06 DIAGNOSIS — Z95.5 HISTORY OF CORONARY ARTERY STENT PLACEMENT: ICD-10-CM

## 2023-09-06 DIAGNOSIS — I25.10 CORONARY ARTERY DISEASE INVOLVING NATIVE CORONARY ARTERY OF NATIVE HEART WITHOUT ANGINA PECTORIS: ICD-10-CM

## 2023-09-06 DIAGNOSIS — I25.10 ASCVD (ARTERIOSCLEROTIC CARDIOVASCULAR DISEASE): ICD-10-CM

## 2023-09-06 DIAGNOSIS — I73.9 PAD (PERIPHERAL ARTERY DISEASE) (H): ICD-10-CM

## 2023-09-06 DIAGNOSIS — R07.9 CHEST PAIN, UNSPECIFIED TYPE: Primary | ICD-10-CM

## 2023-09-06 DIAGNOSIS — R94.39 ABNORMAL STRESS TEST: ICD-10-CM

## 2023-09-06 DIAGNOSIS — I50.22 CHRONIC SYSTOLIC CONGESTIVE HEART FAILURE (H): Chronic | ICD-10-CM

## 2023-09-06 DIAGNOSIS — Z86.73 HISTORY OF CVA (CEREBROVASCULAR ACCIDENT): ICD-10-CM

## 2023-09-06 PROCEDURE — G0463 HOSPITAL OUTPT CLINIC VISIT: HCPCS | Performed by: INTERNAL MEDICINE

## 2023-09-06 PROCEDURE — 99214 OFFICE O/P EST MOD 30 MIN: CPT | Performed by: INTERNAL MEDICINE

## 2023-09-06 RX ORDER — ASPIRIN 81 MG/1
81 TABLET, CHEWABLE ORAL DAILY
Qty: 90 TABLET | Refills: 3 | Status: ON HOLD | OUTPATIENT
Start: 2023-09-06 | End: 2023-11-28

## 2023-09-06 RX ORDER — NITROGLYCERIN 0.4 MG/1
0.4 TABLET SUBLINGUAL EVERY 5 MIN PRN
Qty: 25 TABLET | Refills: 3 | Status: SHIPPED | OUTPATIENT
Start: 2023-09-06 | End: 2024-09-23

## 2023-09-06 ASSESSMENT — PAIN SCALES - GENERAL: PAINLEVEL: NO PAIN (0)

## 2023-09-06 NOTE — PATIENT INSTRUCTIONS
Thank you for allowing Dr. Graham and our  team to participate in your care. Please call our office at [phone] with scheduling questions or if you need to cancel or change your appointment. With any other questions or concerns you may call Swathi cardiology nurse at [phone].       If you experience chest pain, chest pressure, chest tightness, shortness of breath, fainting, lightheadedness, nausea, vomiting, or other concerning symptoms, please report to the Emergency Department or call 911. These symptoms may be emergent, and best treated in the Emergency Department.    Follow up in 6    Medications were prescribed or filled at this visit.

## 2023-09-06 NOTE — PROGRESS NOTES
[facility] HE[facility]   CARDIOLOGY PROGRESS NOTE     Chief Complaint   Patient presents with    Heart Problem     Follow up/SOB           Diagnosis:  1.  CHF-diastolic grade 1 on 12/5/2019. H/O ischemic cardiomyopathy, recovered EF. 36% on 6/4/2019 stress test. 60-65% on 12/5/2019.  2.  MILLS.  3.  Abnormal stress test on 6/14/2019.  4.  Cardiac cath, stenting to o/mLCX on 6/20/2019 at [facility].  5.  CVA on 1/1/2019.  6.  History of tobacco abuse, quitting on 1/1/2019.  7.  Hypertension-controlled.  8.  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  9.  Hyperlipidemia-controlled.  10.  COPD-severe on 5/2/23.  11.  Postop A. fib on 6/29/19 at [facility].  12.  Cardioversion on 6/29/19 at [facility].  13.  PAD in 2/20/2020-moderate.  14.  Bilateral carotid disease.  50-69% on 4/27/2023 through [facility].  50% stenosis on 2/28/2020.    Assessment/Plan:    1.  MILLS: Likely from severe COPD noted on PFT's on 5/2/2023 and diastolic dysfunction.  Patient describes her symptoms stable. However, in the past, her anginal equivalent has been dyspnea on exertion. She was previously set up for stress test but never completed it. She states she is claustrophobic with the scanner in close proximity of herself.  She did not like the door in the room closed today.  Breathing treatments have not improved her symptoms.  She is a longtime smoker quitting in approximately 2019.  She is not having significant swelling or fluid overload.  We did discuss stress testing.  She does not believe that she would be able to complete it.  She did not complete her previous stress test as ordered.  She would consider an angiogram but her symptoms are stable.  She is not having any anginal symptoms.  Shortness of breath is not worse.  She would not be able to travel to the cities.  Hard to justify an angiogram at this point because of stability her symptoms and other clear reasons explain her short of breath.  For now, no changes.  2.  Follow-up in 6 months or sooner with  issues.      Interval history:  See above.    HPI:    Ms. Ocasio has a history of CAD s/p coronary angiography on 6/26/19 at [facility]. She was recently identified to have newly identified systolic heart failure.  Additionally, she has a history of hypertension, hyperlipidemia, nonrheumatic mitral regurgitation, central lobular emphysema, hypothyroidism, osteopenia, hyperparathyroidism, history of tobacco abuse and chronic rhinitis.     At initial visit patient was accompanied by her son who was very helpful in providing patients past medical history. He admitted approximately 20 years ago patient was being followed for MR which was noted to be improved and therefore, valve surgery was never recommended. He admitted that she was previously told it was suspected she had small MI's without coronary intervention or identified ACS. Finally, recent history just before new years of 2020 acute hemorrhagic stroke. It was following this, that she was taken off her beta blocker and lisinopril for HTN and possible past MI, she reported increased shortness of breath following this which has progressively worsened and likely multifactorial with COPD and HFrEF.      6/4/2019-Lexiscan stress test with abnormal imaging with reversible ischemia laterally and LVEF 36%. Additionally, in review of past CT chest imaging from one year ago, she was noted to have advanced atherosclerotic disease with bulky coronary and aortic calcifications. Given these findings, current symptoms and new acute systolic failure, recommended coronary angiography.     Patient underwent coronary angiography on 6/26/19 at [facility]. She was identified to have diffuse coronary atherosclerosis with single vessel obstructive CAD, ostial and mid LCX. Successful PCI with synergy LISS x2, mid LCX 2.5x16mm, ostial LCX 3.0 x 16 mm. LVEDP 26-29 mmHg. During revascularization she was identified to have AF for which she received DCCV x1 with return to sinus and then  reverted back to AF. She was treated with IV beta blocker for rate control. Patient reported feeling good following coronary intervention. Breathing had significantly improved and increased energy.       Patient presented to the ED on 7/13/21 with reports of chest pressure, dizziness and increased dyspnea. She was noted to be playing [employer] ball that same day with symptoms. ECG revealing SR with SVE, no ST-T changes. Troponin negative x 2 and chest XR unremarkable. Lab evaluation with decline in renal function and suspected dehydration.         Relevant testing:  KESHAV on 7/18/22:  Moderate to severe disease bilaterally.    US carotids on 4/27/2022 through [facility]:  1. Atherosclerotic changes with sonographic data supportive of 50-69% stenosis bilaterally.   2. Retrograde flow in the left vertebral artery.     US abdominal aorta on 4/27/2022:  Diffuse atherosclerotic disease in the intra-abdominal aorta, without sonographic evidence of intra-abdominal aortic aneurysm.     Zio patch on 3/16/22:  Worn for 13 days and 20 hr's.  After removing artifact, total time was 13 days and 16 hr's. Placed on 3/16/2022 at 1:28 PM and completed on 3/30/2022 at 9:15 AM.  Underlying rhythm was sinus.  Hrt rate ranged from 41 bpm, maximum heart rate of 197 bmp, averaging 62 bmp.  No significant bradycardia, pauses, Mobitz type II or 3rd degree heart block.  No atrial fibrillation on this study.  x0 triggered events and x0 diary entries.  x0 runs of VT.    x7 runs of SVT lasting up to 8 beats with a maximum heart rate of 197 bmp.  Rare, <1% of PAC's, atrial couplets, atrial triplets, PVC's, ventricular couplets, and ventricular triplets.  + episodes of ventricular bigeminy lasting up to 4.4 sec's.  0 episodes of ventricular trigeminy lasting.    KESHAV on 2/28/20:  Moderate arterial occlusive disease in both lower extremities.    US carotids on 2/28/20:  Heavy atherosclerotic plaquing in both carotid bifurcations with velocities  consistent with less than 50% stenoses noted.    ECHO on 12/5/19:  No pericardial effusion is present.  Global and regional left ventricular function is normal with an EF of 60-65%.  Grade I or early diastolic dysfunction.  The right ventricle is normal size.  Global right ventricular function is normal.  Both atria appear normal.  Mild mitral insufficiency is present.  Aortic valve is normal in structure and function.  The aortic valve is tricuspid.  Trace tricuspid insufficiency is present.  Right ventricular systolic pressure is 3 mmHg above the right atrial pressure.  The pulmonic valve is normal.    Stress test on 6/14/19:   Reversible ischemia laterally. Abnormally low left ventricular ejection fraction                ICD-10-CM    1. Chest pain, unspecified type  R07.9 aspirin (ASA) 81 MG chewable tablet     nitroGLYcerin (NITROSTAT) 0.4 MG sublingual tablet      2. MILLS (dyspnea on exertion)  R06.09       3. COPD, severe (H)  J44.9       4. Mixed hyperlipidemia  E78.2       5. Abnormal cardiovascular stress test on 6/14/2019  R94.39 aspirin (ASA) 81 MG chewable tablet     nitroGLYcerin (NITROSTAT) 0.4 MG sublingual tablet      6. Abnormal stress test  R94.39       7. ASCVD (arteriosclerotic cardiovascular disease)  I25.10       8. Benign essential hypertension  I10       9. Bilateral carotid artery stenosis  I65.23 aspirin (ASA) 81 MG chewable tablet      10. Chronic systolic congestive heart failure (H)- Recovered  I50.22       11. Non-rheumatic mitral regurgitation  I34.0       12. Coronary artery disease involving native coronary artery of native heart without angina pectoris  I25.10 aspirin (ASA) 81 MG chewable tablet     nitroGLYcerin (NITROSTAT) 0.4 MG sublingual tablet      13. PAD on 2/28/2020-moderate (H)  I73.9 aspirin (ASA) 81 MG chewable tablet      14. Postoperative atrial fibrillation on 6/28/2019 (H)  I97.89 aspirin (ASA) 81 MG chewable tablet    I48.91       15. Subclavian arterial stenosis (H)   I77.1       16. CKD (chronic kidney disease) stage 4, GFR 15-29 ml/min (H)  N18.4       17. History of tobacco abuse quitting on 1/1/2019  Z87.891       18. History of cardioversion on 6/28/2019  Z92.89 nitroGLYcerin (NITROSTAT) 0.4 MG sublingual tablet      19. History of coronary artery stent placement to the O/M LCX on 6/28/2019 at [facility]  Z95.5 nitroGLYcerin (NITROSTAT) 0.4 MG sublingual tablet      20. History of CVA on 1/1/2019  Z86.73 aspirin (ASA) 81 MG chewable tablet      21. Tobacco abuse counseling  Z71.6       22. History of coronary artery stent placement to the O/M LCX on 6/28/2019  Z95.5 aspirin (ASA) 81 MG chewable tablet          Past Medical History:   Diagnosis Date    Acquired hypothyroidism 5/12/2019    Asthma     Benign essential hypertension 5/12/2019    Centrilobular emphysema (H) 5/12/2019    Chronic rhinitis 8/16/2013    Chronic systolic congestive heart failure (H) 5/12/2019    Constipation     Coronary artery disease     Hearing loss     Heart trouble     Hemorrhagic cerebrovascular accident (CVA) (H) 01/2019    Hyperlipidemia 5/12/2019    Hypertension     Nasal congestion     Non-rheumatic mitral regurgitation 5/12/2019    Osteopenia 5/12/2019    Osteoporosis     Parathyroid related hypercalcemia (H) 8/18/2013    Primary hyperparathyroidism (H) 9/5/2013    Ringing in ears     Sensorineural hearing loss, asymmetrical 8/16/2013    Sneezing     Snoring     Stented coronary artery     Thyroid disease     Weakness     Weight loss        Past Surgical History:   Procedure Laterality Date    CATARACT IOL, RT/LT Bilateral     COLONOSCOPY      COLONOSCOPY - HIM SCAN  01/01/2009    Colonoscopy - [facility]  2009    ENDOSCOPY UPPER, COLONOSCOPY, COMBINED N/A 10/17/2019    Procedure: UPPER ENDOSCOPY WITH BIOPSY  AND COLONOSCOPY;  Surgeon: Julio Canales MD;  Location: [facility] OR    ENT SURGERY  2011    para thyroid surgery    ENT SURGERY  09/2013    Parathyroid    ESOPHAGOSCOPY, GASTROSCOPY,  DUODENOSCOPY (EGD), COMBINED N/A 9/21/2019    Procedure: ESOPHAGOGASTRODUODENOSCOPY (EGD);  Surgeon: Juloi Canales MD;  Location: [facility] OR    ESOPHAGOSCOPY, GASTROSCOPY, DUODENOSCOPY (EGD), COMBINED N/A 1/27/2020    Procedure: ESOPHAGOGASTRODUODENOSCOPY (EGD) WITH BIOPSY;  Surgeon: Isrrael Parish MD;  Location: [facility] OR    ESOPHAGOSCOPY, GASTROSCOPY, DUODENOSCOPY (EGD), COMBINED N/A 2/20/2020    Procedure: ESOPHAGOGASTRODUODENOSCOPY (EGD) WITH BIOPSY;  Surgeon: Pedro Luis Montoya DO;  Location: [facility] OR    PARATHYROIDECTOMY  9/10/2013    Procedure: PARATHYROIDECTOMY;  Reoperative Neck Exploration, Resection of right Parathyroid adenoma;  Surgeon: Thalia Muller MD;  Location: [facility] OR    TONSILLECTOMY      TUBAL LIGATION         Allergies   Allergen Reactions    Evista [Raloxifene] Other (See Comments)     hypercalcemia    Prednisone GI Disturbance    Combigan [Brimonidine Tartrate-Timolol] Other (See Comments)     Eye swelling and itchy    Cyclosporine      Pt reports using eye gtts and having red irritated eyes     Latex Rash    Levaquin [Levofloxacin] Muscle Pain (Myalgia)    Penicillins Rash       Current Outpatient Medications   Medication Sig Dispense Refill    aspirin (ASA) 81 MG chewable tablet Take 1 tablet (81 mg) by mouth daily 90 tablet 3    atorvastatin (LIPITOR) 40 MG tablet Take 1 tablet (40 mg) by mouth At Bedtime 90 tablet 0    calcium carbonate (TUMS) 500 MG chewable tablet Take 1 chew tab by mouth daily      clindamycin (CLEOCIN) 300 MG capsule Take 2 capsules (600 mg) by mouth once as needed (prior to dental procedure) 2 capsule 0    diclofenac (VOLTAREN) 1 % topical gel Apply 4 g topically 3 times daily as needed for moderate pain 150 g 1    famotidine (PEPCID) 10 MG tablet Take 1 tablet (10 mg) by mouth daily as needed (acid reflux) 30 tablet 0    fluticasone (FLONASE) 50 MCG/ACT spray Spray 2 sprays into both nostrils daily 1 Bottle 11    furosemide (LASIX) 20 MG tablet Take 1 tablet  (20 mg) by mouth daily 90 tablet 3    ipratropium - albuterol 0.5 mg/2.5 mg/3 mL (DUONEB) 0.5-2.5 (3) MG/3ML neb solution Take 1 vial (3 mLs) by nebulization every 6 hours as needed for shortness of breath, wheezing or cough 90 mL 3    ipratropium-albuterol (COMBIVENT RESPIMAT)  MCG/ACT inhaler Inhale 1 puff into the lungs 4 times daily 4 g 3    levothyroxine (SYNTHROID) 50 MCG tablet Take 1 tablet (50 mcg) by mouth daily 90 tablet 3    lidocaine (LIDODERM) 5 % patch Place 1 patch onto the skin every 24 hours To prevent lidocaine toxicity, patient should be patch free for 12 hrs daily 10 patch 0    lisinopril (ZESTRIL) 20 MG tablet Take 1 tablet (20 mg) by mouth daily 90 tablet 3    metoprolol succinate ER (TOPROL XL) 50 MG 24 hr tablet Take 1 tablet (50 mg) by mouth daily 90 tablet 3    nitroGLYcerin (NITROSTAT) 0.4 MG sublingual tablet Place 1 tablet (0.4 mg) under the tongue every 5 minutes as needed for chest pain For chest pain place 1 tablet under the tongue every 5 minutes for 3 doses. If symptoms persist 5 minutes after 1st dose call 911. 25 tablet 3    pantoprazole (PROTONIX) 40 MG EC tablet TAKE 1 TABLET TWICE DAILY  BEFORE MEALS 180 tablet 2    Tafluprost 0.0015 % SOLN Place 1 drop into both eyes At Bedtime      dexamethasone (DECADRON) 4 MG tablet Take 1 tablet (4 mg) by mouth daily (with breakfast) for 5 days 5 tablet 0       Social History     Socioeconomic History    Marital status:      Spouse name: Not on file    Number of children: Not on file    Years of education: Not on file    Highest education level: Not on file   Occupational History    Not on file   Tobacco Use    Smoking status: Former     Packs/day: 1.00     Years: 50.00     Pack years: 50.00     Types: Cigarettes     Quit date: 2019     Years since quittin.6     Passive exposure: Past    Smokeless tobacco: Never    Tobacco comments:     quit 2019   Vaping Use    Vaping Use: Never used   Substance and Sexual  Activity    Alcohol use: Yes     Comment: social    Drug use: No    Sexual activity: Not Currently   Other Topics Concern    Parent/sibling w/ CABG, MI or angioplasty before 65F 55M? Yes   Social History Narrative    Not on file     Social Determinants of Health     Financial Resource Strain: Not on file   Food Insecurity: Not on file   Transportation Needs: Not on file   Physical Activity: Not on file   Stress: Not on file   Social Connections: Not on file   Intimate Partner Violence: Not on file   Housing Stability: Not on file       LAB RESULTS:   Office Visit on 01/26/2022   Component Date Value Ref Range Status    TSH 01/26/2022 1.84  0.40 - 4.00 mU/L Final    Sodium 01/26/2022 139  133 - 144 mmol/L Final    Potassium 01/26/2022 4.4  3.4 - 5.3 mmol/L Final    Chloride 01/26/2022 109  94 - 109 mmol/L Final    Carbon Dioxide (CO2) 01/26/2022 24  20 - 32 mmol/L Final    Anion Gap 01/26/2022 6  3 - 14 mmol/L Final    Urea Nitrogen 01/26/2022 14  7 - 30 mg/dL Final    Creatinine 01/26/2022 1.12 (A) 0.52 - 1.04 mg/dL Final    Calcium 01/26/2022 9.0  8.5 - 10.1 mg/dL Final    Glucose 01/26/2022 95  70 - 99 mg/dL Final    GFR Estimate 01/26/2022 49 (A) >60 mL/min/1.73m2 Final    Iron 01/26/2022 71  35 - 180 ug/dL Final    Iron Binding Capacity 01/26/2022 294  240 - 430 ug/dL Final    Iron Sat Index 01/26/2022 24  15 - 46 % Final    Ferritin 01/26/2022 633 (A) 8 - 252 ng/mL Final    WBC Count 01/26/2022 8.1  4.0 - 11.0 10e3/uL Final    RBC Count 01/26/2022 4.40  3.80 - 5.20 10e6/uL Final    Hemoglobin 01/26/2022 10.6 (A) 11.7 - 15.7 g/dL Final    Hematocrit 01/26/2022 35.8  35.0 - 47.0 % Final    MCV 01/26/2022 81  78 - 100 fL Final    MCH 01/26/2022 24.1 (A) 26.5 - 33.0 pg Final    MCHC 01/26/2022 29.6 (A) 31.5 - 36.5 g/dL Final    RDW 01/26/2022 24.9 (A) 10.0 - 15.0 % Final    Platelet Count 01/26/2022 405  150 - 450 10e3/uL Final    % Neutrophils 01/26/2022 65  % Final    % Lymphocytes 01/26/2022 23  % Final    %  Monocytes 01/26/2022 9  % Final    % Eosinophils 01/26/2022 2  % Final    % Basophils 01/26/2022 1  % Final    % Immature Granulocytes 01/26/2022 0  % Final    NRBCs per 100 WBC 01/26/2022 0  <1 /100 Final    Absolute Neutrophils 01/26/2022 5.2  1.6 - 8.3 10e3/uL Final    Absolute Lymphocytes 01/26/2022 1.8  0.8 - 5.3 10e3/uL Final    Absolute Monocytes 01/26/2022 0.7  0.0 - 1.3 10e3/uL Final    Absolute Eosinophils 01/26/2022 0.2  0.0 - 0.7 10e3/uL Final    Absolute Basophils 01/26/2022 0.1  0.0 - 0.2 10e3/uL Final    Absolute Immature Granulocytes 01/26/2022 0.0  <=0.4 10e3/uL Final    Absolute NRBCs 01/26/2022 0.0  10e3/uL Final        Review of systems: Negative except that which was noted in the HPI.    Physical examination:  /62   Pulse 75   Temp 98.6  F (37  C) (Tympanic)   Ht 1.524 m (5')   Wt 44.5 kg (98 lb)   SpO2 93%   BMI 19.14 kg/m      GENERAL APPEARANCE: healthy, alert and no distress  CHEST: lungs clear to auscultation.  CARDIOVASCULAR: regular rhythm, normal S1 with physiologic split S2, no S3 or S4 and no murmur, click or rub  EXTREMITIES: no clubbing, cyanosis with minimal edema      Total time spent on day of visit, including review of tests, obtaining/reviewing separately obtained history, ordering medications/tests/procedures, communicating with PCP/consultants, and documenting in electronic medical record: 30 minutes.           Thank you for allowing me to participate in the care of your patient. Please do not hesitate to contact me if you have any questions.     Sal Graham, DO

## 2023-09-26 DIAGNOSIS — Z95.5 S/P DRUG ELUTING CORONARY STENT PLACEMENT: ICD-10-CM

## 2023-09-26 DIAGNOSIS — E78.5 HYPERLIPIDEMIA, UNSPECIFIED HYPERLIPIDEMIA TYPE: ICD-10-CM

## 2023-09-26 DIAGNOSIS — K92.2 GASTROINTESTINAL HEMORRHAGE, UNSPECIFIED GASTROINTESTINAL HEMORRHAGE TYPE: ICD-10-CM

## 2023-09-29 RX ORDER — PANTOPRAZOLE SODIUM 40 MG/1
TABLET, DELAYED RELEASE ORAL
Qty: 180 TABLET | Refills: 3 | Status: ON HOLD | OUTPATIENT
Start: 2023-09-29 | End: 2023-11-28

## 2023-09-29 RX ORDER — ATORVASTATIN CALCIUM 40 MG/1
40 TABLET, FILM COATED ORAL AT BEDTIME
Qty: 90 TABLET | Refills: 0 | Status: SHIPPED | OUTPATIENT
Start: 2023-09-29 | End: 2024-01-03

## 2023-09-29 NOTE — TELEPHONE ENCOUNTER
Lipitor      Last Written Prescription Date:  06/28/23  Last Fill Quantity: 90,   # refills: 0  Last Office Visit: 08/25/23  Future Office visit:         Protonix      Last Written Prescription Date:  12/14/22  Last Fill Quantity: 180,   # refills: 2  Last Office Visit: 08/25/23  Future Office visit:

## 2023-11-02 ENCOUNTER — NURSE TRIAGE (OUTPATIENT)
Dept: FAMILY MEDICINE | Facility: OTHER | Age: 83
End: 2023-11-02

## 2023-11-02 NOTE — TELEPHONE ENCOUNTER
"Advised to ER.  Patient states maybe, she just want's her meds decreased        Reason for Disposition   Systolic BP < 90 and feeling dizzy, lightheaded, or weak    Answer Assessment - Initial Assessment Questions  1. BLOOD PRESSURE: \"What is the blood pressure?\" \"Did you take at least two measurements 5 minutes apart?\"      80/70  75/70  2. ONSET: \"When did you take your blood pressure?\"      yesterday  3. HOW: \"How did you obtain the blood pressure?\" (e.g., visiting nurse, automatic home BP monitor)      Home monitor  4. HISTORY: \"Do you have a history of low blood pressure?\" \"What is your blood pressure normally?\"      Yes  usually at home it is 115/70's  5. MEDICATIONS: \"Are you taking any medications for blood pressure?\" If yes: \"Have they been changed recently?\"      Metoprolol and lisinopril  6. PULSE RATE: \"Do you know what your pulse rate is?\"       135  7. OTHER SYMPTOMS: \"Have you been sick recently?\" \"Have you had a recent injury?\"      no  8. PREGNANCY: \"Is there any chance you are pregnant?\" \"When was your last menstrual period?\"      no    Protocols used: Low Blood Pressure-A-[place]    "

## 2023-11-07 ENCOUNTER — ANCILLARY PROCEDURE (OUTPATIENT)
Dept: GENERAL RADIOLOGY | Facility: OTHER | Age: 83
End: 2023-11-07
Payer: COMMERCIAL

## 2023-11-07 ENCOUNTER — OFFICE VISIT (OUTPATIENT)
Dept: FAMILY MEDICINE | Facility: OTHER | Age: 83
End: 2023-11-07
Payer: COMMERCIAL

## 2023-11-07 VITALS
BODY MASS INDEX: 18.64 KG/M2 | DIASTOLIC BLOOD PRESSURE: 70 MMHG | WEIGHT: 95.44 LBS | SYSTOLIC BLOOD PRESSURE: 98 MMHG | OXYGEN SATURATION: 96 % | RESPIRATION RATE: 22 BRPM | HEART RATE: 87 BPM

## 2023-11-07 DIAGNOSIS — R06.02 SHORTNESS OF BREATH: ICD-10-CM

## 2023-11-07 DIAGNOSIS — S32.029A CLOSED FRACTURE OF SECOND LUMBAR VERTEBRA, UNSPECIFIED FRACTURE MORPHOLOGY, INITIAL ENCOUNTER (H): ICD-10-CM

## 2023-11-07 DIAGNOSIS — M62.81 GENERALIZED MUSCLE WEAKNESS: ICD-10-CM

## 2023-11-07 DIAGNOSIS — N18.4 CKD (CHRONIC KIDNEY DISEASE) STAGE 4, GFR 15-29 ML/MIN (H): ICD-10-CM

## 2023-11-07 DIAGNOSIS — I10 BENIGN ESSENTIAL HYPERTENSION: ICD-10-CM

## 2023-11-07 DIAGNOSIS — I95.1 ORTHOSTATIC HYPOTENSION: Primary | ICD-10-CM

## 2023-11-07 DIAGNOSIS — I50.32 CHRONIC DIASTOLIC CONGESTIVE HEART FAILURE (H): ICD-10-CM

## 2023-11-07 DIAGNOSIS — D64.9 ANEMIA, UNSPECIFIED TYPE: ICD-10-CM

## 2023-11-07 DIAGNOSIS — J43.9 PULMONARY EMPHYSEMA, UNSPECIFIED EMPHYSEMA TYPE (H): ICD-10-CM

## 2023-11-07 LAB
ALBUMIN SERPL BCG-MCNC: 4.5 G/DL (ref 3.5–5.2)
ALBUMIN UR-MCNC: NEGATIVE MG/DL
ALP SERPL-CCNC: 78 U/L (ref 35–104)
ALT SERPL W P-5'-P-CCNC: 20 U/L (ref 0–50)
ANION GAP SERPL CALCULATED.3IONS-SCNC: 14 MMOL/L (ref 7–15)
APPEARANCE UR: CLEAR
AST SERPL W P-5'-P-CCNC: 30 U/L (ref 0–45)
BASOPHILS # BLD AUTO: 0.1 10E3/UL (ref 0–0.2)
BASOPHILS NFR BLD AUTO: 1 %
BILIRUB SERPL-MCNC: 0.2 MG/DL
BILIRUB UR QL STRIP: NEGATIVE
BUN SERPL-MCNC: 37.9 MG/DL (ref 8–23)
CALCIUM SERPL-MCNC: 9 MG/DL (ref 8.8–10.2)
CHLORIDE SERPL-SCNC: 102 MMOL/L (ref 98–107)
COLOR UR AUTO: NORMAL
CREAT SERPL-MCNC: 1.79 MG/DL (ref 0.51–0.95)
CRP SERPL-MCNC: <3 MG/L
DEPRECATED HCO3 PLAS-SCNC: 18 MMOL/L (ref 22–29)
EGFRCR SERPLBLD CKD-EPI 2021: 28 ML/MIN/1.73M2
EOSINOPHIL # BLD AUTO: 0.4 10E3/UL (ref 0–0.7)
EOSINOPHIL NFR BLD AUTO: 4 %
ERYTHROCYTE [DISTWIDTH] IN BLOOD BY AUTOMATED COUNT: 17.2 % (ref 10–15)
GLUCOSE SERPL-MCNC: 95 MG/DL (ref 70–99)
GLUCOSE UR STRIP-MCNC: NEGATIVE MG/DL
HCT VFR BLD AUTO: 36.5 % (ref 35–47)
HGB BLD-MCNC: 11.5 G/DL (ref 11.7–15.7)
HGB UR QL STRIP: NEGATIVE
IMM GRANULOCYTES # BLD: 0.1 10E3/UL
IMM GRANULOCYTES NFR BLD: 1 %
KETONES UR STRIP-MCNC: NEGATIVE MG/DL
LEUKOCYTE ESTERASE UR QL STRIP: NEGATIVE
LYMPHOCYTES # BLD AUTO: 2.4 10E3/UL (ref 0.8–5.3)
LYMPHOCYTES NFR BLD AUTO: 28 %
MCH RBC QN AUTO: 26.7 PG (ref 26.5–33)
MCHC RBC AUTO-ENTMCNC: 31.5 G/DL (ref 31.5–36.5)
MCV RBC AUTO: 85 FL (ref 78–100)
MONOCYTES # BLD AUTO: 0.6 10E3/UL (ref 0–1.3)
MONOCYTES NFR BLD AUTO: 7 %
NEUTROPHILS # BLD AUTO: 5.2 10E3/UL (ref 1.6–8.3)
NEUTROPHILS NFR BLD AUTO: 59 %
NITRATE UR QL: NEGATIVE
NRBC # BLD AUTO: 0 10E3/UL
NRBC BLD AUTO-RTO: 0 /100
NT-PROBNP SERPL-MCNC: 688 PG/ML (ref 0–1800)
PH UR STRIP: 5.5 [PH] (ref 4.7–8)
PLATELET # BLD AUTO: 381 10E3/UL (ref 150–450)
POTASSIUM SERPL-SCNC: 3.9 MMOL/L (ref 3.4–5.3)
PROT SERPL-MCNC: 7.1 G/DL (ref 6.4–8.3)
RBC # BLD AUTO: 4.3 10E6/UL (ref 3.8–5.2)
RBC URINE: <1 /HPF
SODIUM SERPL-SCNC: 134 MMOL/L (ref 135–145)
SP GR UR STRIP: 1.01 (ref 1–1.03)
SQUAMOUS EPITHELIAL: 0 /HPF
UROBILINOGEN UR STRIP-MCNC: NORMAL MG/DL
WBC # BLD AUTO: 8.8 10E3/UL (ref 4–11)
WBC URINE: 1 /HPF

## 2023-11-07 PROCEDURE — G0463 HOSPITAL OUTPT CLINIC VISIT: HCPCS

## 2023-11-07 PROCEDURE — 83550 IRON BINDING TEST: CPT | Mod: ZL

## 2023-11-07 PROCEDURE — 85025 COMPLETE CBC W/AUTO DIFF WBC: CPT | Mod: ZL

## 2023-11-07 PROCEDURE — 36415 COLL VENOUS BLD VENIPUNCTURE: CPT | Mod: ZL

## 2023-11-07 PROCEDURE — 81001 URINALYSIS AUTO W/SCOPE: CPT | Mod: ZL

## 2023-11-07 PROCEDURE — 82728 ASSAY OF FERRITIN: CPT | Mod: ZL

## 2023-11-07 PROCEDURE — 83880 ASSAY OF NATRIURETIC PEPTIDE: CPT | Mod: ZL

## 2023-11-07 PROCEDURE — 99214 OFFICE O/P EST MOD 30 MIN: CPT

## 2023-11-07 PROCEDURE — 80053 COMPREHEN METABOLIC PANEL: CPT | Mod: ZL

## 2023-11-07 PROCEDURE — 71046 X-RAY EXAM CHEST 2 VIEWS: CPT | Mod: TC

## 2023-11-07 PROCEDURE — 86140 C-REACTIVE PROTEIN: CPT | Mod: ZL

## 2023-11-07 PROCEDURE — G0463 HOSPITAL OUTPT CLINIC VISIT: HCPCS | Mod: 25

## 2023-11-07 PROCEDURE — 258N000003 HC RX IP 258 OP 636

## 2023-11-07 RX ORDER — METOPROLOL SUCCINATE 25 MG/1
25 TABLET, EXTENDED RELEASE ORAL DAILY
Qty: 30 TABLET | Refills: 0 | Status: CANCELLED | OUTPATIENT
Start: 2023-11-07 | End: 2023-12-07

## 2023-11-07 RX ORDER — METOPROLOL SUCCINATE 25 MG/1
25 TABLET, EXTENDED RELEASE ORAL DAILY
Qty: 30 TABLET | Refills: 0 | Status: SHIPPED | OUTPATIENT
Start: 2023-11-07 | End: 2023-12-27

## 2023-11-07 RX ORDER — LISINOPRIL 5 MG/1
5 TABLET ORAL DAILY
Qty: 30 TABLET | Refills: 0 | Status: ON HOLD | OUTPATIENT
Start: 2023-11-07 | End: 2023-11-27

## 2023-11-07 RX ADMIN — SODIUM CHLORIDE 1000 ML: 9 INJECTION, SOLUTION INTRAVENOUS at 12:36

## 2023-11-07 NOTE — PROGRESS NOTES
Assessment & Plan     Orthostatic hypotension  Positive orthostatics, dizziness with position change. Patient declines ER evaluation. Given 1 L of NS in the office- patient feeling slightly improved. BP of 98/70 (100/58, 80/60 prior). See below related to BP medication changes. Hold lasix until follow-up.   - sodium chloride 0.9% BOLUS 1,000 mL    CKD (chronic kidney disease) stage 4, GFR 15-29 ml/min (H)  Creat up to 1.79, BUN 37. Baseline around 1.4. Given 1 L as above today. Holding lasix until follow-up. Will repeat BMP at follow-up. History of YONATAN realted to hypovolemia with diuretics.     COPD  Severe. Not currently using home inhalers. Occasional duoneb, No increased work of breathing. No wheezing, cough. Encouraged to restart combivent.    Generalized muscle weakness  - UA with Microscopic reflex to Culture - [place]    Benign essential hypertension  Low blood pressures at home. Patient stopped metoprolol on her own last week. Discussed indications for BB. Will decrease dose from 50 mg to 25 mg for now. Decrease lisinopril from 20 mg to 5 mg daily. Close follow-up in the clinic- will see back on Friday, sooner with any changes or worsening symptoms.  - metoprolol succinate ER (TOPROL XL) 25 MG 24 hr tablet  Dispense: 30 tablet; Refill: 0  - lisinopril (ZESTRIL) 5 MG tablet  Dispense: 30 tablet; Refill: 0    Shortness of breath  No shortness of breath at rest. Mainly MILLS, this is chronic with some slow worsening. Chest xray without pna, fluid overload. CRP negative. BNP wnl. Has declined further work-up with cardiology. Monitor.  - CBC with platelets and differential  - Comprehensive metabolic panel (BMP + Alb, Alk Phos, ALT, AST, Total. Bili, TP)  - BNP-N terminal pro  - CRP, inflammation  - XR CHEST 2 VW (Clinic Performed)    Chronic diastolic congestive heart failure (H)  Appears dry on exam. Discussed holding lasix until follow-up. 1 L of fluid given.   - metoprolol succinate ER (TOPROL XL) 25 MG 24 hr  tablet  Dispense: 30 tablet; Refill: 0  - lisinopril (ZESTRIL) 5 MG tablet  Dispense: 30 tablet; Refill: 0    Closed fracture of second lumbar vertebra  Noted on xray today. No recent worsening of back pain. No trauma. Declines MRI due to claustrophobia. Discussed increasing tylenol to 1000 mg TID, lidocaine patches as needed.       40 minutes spent by me on the date of the encounter doing chart review, history and exam, documentation and further activities per the note         No follow-ups on file.    [facility] - ABRAHAM London is a 82 year old, presenting for the following health issues:  Breathing Problem and Fatigue    HPI     Concern - Generalized weakness  Onset: 1.5 weeks ago  Description:dizziness; fatigue; shortness of breath on exertion; patient has a cold  Intensity: moderate  Progression of Symptoms:  improving  Accompanying Signs & Symptoms: having back pain  Previous history of similar problem: yes had an ER visit back in June for COPD   Precipitating factors:        Worsened by: movement; walking around, exertion   Alleviating factors:        Improved by: nothing  Therapies tried and outcome: None  BP cuff at home is too large-may need a smaller size cuff     - 1.5 weeks ago feeling more tired. Developed nasal congestion, cold symptoms.   - Low blood pressures at home with SBP 60-70's last week.  - Since Saturday has quit taking metoprolol on her own. Continues to take lisinopril 20 mg.  - Also had diarrhea Friday-Sunday. Daughter sick with same symptoms.  - Loose stools have resolved. Notes she is feeling a bit better then she was last week.  - Ongoing dizziness with position change, slightly better then last week.  - No fevers, chills.   - No chest pain, shortness of breath at rest, lightheadedness, diaphoresis.  - MILLS, chronic with no recent worsening.   - Occasional nebulizer use. Not using inhaler.   - Ongoing low back pain, taking tylenol  1000 mg once to twice daily.   - No falls or trauma    Review of Systems   Constitutional, HEENT, cardiovascular, pulmonary, GI, , musculoskeletal, neuro, skin, endocrine and psych systems are negative, except as otherwise noted.      Objective    /58 (BP Location: Left arm, Patient Position: Sitting, Cuff Size: Adult Regular)   Pulse 87   Resp 22   Wt 43.3 kg (95 lb 7 oz)   SpO2 96%   BMI 18.64 kg/m    Body mass index is 18.64 kg/m .    Physical Exam  Constitutional:       General: She is not in acute distress.     Appearance: She is not ill-appearing.   HENT:      Head: Normocephalic and atraumatic.      Right Ear: Tympanic membrane and ear canal normal.      Left Ear: Tympanic membrane and ear canal normal.      Nose: No congestion.      Mouth/Throat:      Mouth: Mucous membranes are dry.      Pharynx: Oropharynx is clear. No oropharyngeal exudate or posterior oropharyngeal erythema.   Eyes:      Extraocular Movements: Extraocular movements intact.      Pupils: Pupils are equal, round, and reactive to light.   Cardiovascular:      Rate and Rhythm: Normal rate and regular rhythm.      Pulses: Normal pulses.   Pulmonary:      Effort: Pulmonary effort is normal. No respiratory distress.      Breath sounds: No wheezing, rhonchi or rales.   Abdominal:      General: Abdomen is flat.      Palpations: Abdomen is soft.   Musculoskeletal:      Cervical back: Normal range of motion.   Skin:     General: Skin is warm.      Capillary Refill: Capillary refill takes less than 2 seconds.      Coloration: Skin is pale.   Neurological:      Mental Status: She is alert and oriented to person, place, and time. Mental status is at baseline.            Results for orders placed or performed in visit on 11/07/23   XR CHEST 2 VW (Clinic Performed)     Status: None    Narrative    PROCEDURE:  XR CHEST 2 VIEWS    HISTORY: Shortness of breath, .    COMPARISON:  6/8/23    FINDINGS:  The cardiomediastinal contours are stable.  The trachea is midline.  There is calcific aortic atherosclerosis.  No focal consolidation, effusion or pneumothorax. The lungs are  hyperexpanded.  Osteoporosis. Interval L2 fracture.      Impression    IMPRESSION:      Air trapping/COPD.    Moderate L2 vertebral height loss, new since 3/21/23. Correlate for  focal tenderness.    LEXY MCINTYRE MD         SYSTEM ID:  [id number]   Results for orders placed or performed in visit on 11/07/23   Comprehensive metabolic panel (BMP + Alb, Alk Phos, ALT, AST, Total. Bili, TP)     Status: Abnormal   Result Value Ref Range    Sodium 134 (L) 135 - 145 mmol/L    Potassium 3.9 3.4 - 5.3 mmol/L    Carbon Dioxide (CO2) 18 (L) 22 - 29 mmol/L    Anion Gap 14 7 - 15 mmol/L    Urea Nitrogen 37.9 (H) 8.0 - 23.0 mg/dL    Creatinine 1.79 (H) 0.51 - 0.95 mg/dL    GFR Estimate 28 (L) >60 mL/min/1.73m2    Calcium 9.0 8.8 - 10.2 mg/dL    Chloride 102 98 - 107 mmol/L    Glucose 95 70 - 99 mg/dL    Alkaline Phosphatase 78 35 - 104 U/L    AST 30 0 - 45 U/L    ALT 20 0 - 50 U/L    Protein Total 7.1 6.4 - 8.3 g/dL    Albumin 4.5 3.5 - 5.2 g/dL    Bilirubin Total 0.2 <=1.2 mg/dL   BNP-N terminal pro     Status: Normal   Result Value Ref Range    N Terminal Pro BNP Outpatient 688 0 - 1,800 pg/mL   CRP, inflammation     Status: Normal   Result Value Ref Range    CRP Inflammation <3.00 <5.00 mg/L   UA with Microscopic reflex to Culture - [place]     Status: Normal    Specimen: Urine, Clean Catch   Result Value Ref Range    Color Urine Straw Colorless, Straw, Light Yellow, Yellow    Appearance Urine Clear Clear    Glucose Urine Negative Negative mg/dL    Bilirubin Urine Negative Negative    Ketones Urine Negative Negative mg/dL    Specific Gravity Urine 1.007 1.003 - 1.035    Blood Urine Negative Negative    pH Urine 5.5 4.7 - 8.0    Protein Albumin Urine Negative Negative mg/dL    Urobilinogen Urine Normal Normal, 2.0 mg/dL    Nitrite Urine Negative Negative    Leukocyte Esterase Urine Negative  Negative    RBC Urine <1 <=2 /HPF    WBC Urine 1 <=5 /HPF    Squamous Epithelials Urine 0 <=1 /HPF    Narrative    Urine Culture not indicated   CBC with platelets and differential     Status: Abnormal   Result Value Ref Range    WBC Count 8.8 4.0 - 11.0 10e3/uL    RBC Count 4.30 3.80 - 5.20 10e6/uL    Hemoglobin 11.5 (L) 11.7 - 15.7 g/dL    Hematocrit 36.5 35.0 - 47.0 %    MCV 85 78 - 100 fL    MCH 26.7 26.5 - 33.0 pg    MCHC 31.5 31.5 - 36.5 g/dL    RDW 17.2 (H) 10.0 - 15.0 %    Platelet Count 381 150 - 450 10e3/uL    % Neutrophils 59 %    % Lymphocytes 28 %    % Monocytes 7 %    % Eosinophils 4 %    % Basophils 1 %    % Immature Granulocytes 1 %    NRBCs per 100 WBC 0 <1 /100    Absolute Neutrophils 5.2 1.6 - 8.3 10e3/uL    Absolute Lymphocytes 2.4 0.8 - 5.3 10e3/uL    Absolute Monocytes 0.6 0.0 - 1.3 10e3/uL    Absolute Eosinophils 0.4 0.0 - 0.7 10e3/uL    Absolute Basophils 0.1 0.0 - 0.2 10e3/uL    Absolute Immature Granulocytes 0.1 <=0.4 10e3/uL    Absolute NRBCs 0.0 10e3/uL   CBC with platelets and differential     Status: Abnormal    Narrative    The following orders were created for panel order CBC with platelets and differential.  Procedure                               Abnormality         Status                     ---------                               -----------         ------                     CBC with platelets and d...[[id number]]  Abnormal            Final result                 Please view results for these tests on the individual orders.

## 2023-11-07 NOTE — PATIENT INSTRUCTIONS
Hold lasix until our follow-up. Let us know however if developing edema, worsening shortness of breath.    Decrease metoprolol to 25 mg dose. I have sent these in.     Decrease lisinopril to 5 mg.    Slow position changes.    We will see you back on Monday, however with any changes or worsening this week would like to see you back on Friday before the weekend.

## 2023-11-08 LAB
FERRITIN SERPL-MCNC: 30 NG/ML (ref 11–328)
IRON BINDING CAPACITY (ROCHE): 361 UG/DL (ref 240–430)
IRON SATN MFR SERPL: 11 % (ref 15–46)
IRON SERPL-MCNC: 38 UG/DL (ref 37–145)

## 2023-11-10 ENCOUNTER — OFFICE VISIT (OUTPATIENT)
Dept: FAMILY MEDICINE | Facility: OTHER | Age: 83
End: 2023-11-10
Payer: COMMERCIAL

## 2023-11-10 VITALS
HEART RATE: 62 BPM | SYSTOLIC BLOOD PRESSURE: 92 MMHG | TEMPERATURE: 97.7 F | BODY MASS INDEX: 19.24 KG/M2 | OXYGEN SATURATION: 98 % | DIASTOLIC BLOOD PRESSURE: 60 MMHG | WEIGHT: 98.5 LBS

## 2023-11-10 DIAGNOSIS — N18.4 CKD (CHRONIC KIDNEY DISEASE) STAGE 4, GFR 15-29 ML/MIN (H): ICD-10-CM

## 2023-11-10 DIAGNOSIS — Z87.19 HISTORY OF GI BLEED: ICD-10-CM

## 2023-11-10 DIAGNOSIS — I50.32 CHRONIC DIASTOLIC CONGESTIVE HEART FAILURE (H): ICD-10-CM

## 2023-11-10 DIAGNOSIS — I95.1 ORTHOSTATIC HYPOTENSION: Primary | ICD-10-CM

## 2023-11-10 DIAGNOSIS — D64.9 ANEMIA, UNSPECIFIED TYPE: ICD-10-CM

## 2023-11-10 DIAGNOSIS — R42 DIZZINESS: ICD-10-CM

## 2023-11-10 LAB
ANION GAP SERPL CALCULATED.3IONS-SCNC: 10 MMOL/L (ref 7–15)
BUN SERPL-MCNC: 29.3 MG/DL (ref 8–23)
CALCIUM SERPL-MCNC: 8.8 MG/DL (ref 8.8–10.2)
CHLORIDE SERPL-SCNC: 107 MMOL/L (ref 98–107)
CREAT SERPL-MCNC: 1.51 MG/DL (ref 0.51–0.95)
DEPRECATED HCO3 PLAS-SCNC: 18 MMOL/L (ref 22–29)
EGFRCR SERPLBLD CKD-EPI 2021: 34 ML/MIN/1.73M2
GLUCOSE SERPL-MCNC: 95 MG/DL (ref 70–99)
POTASSIUM SERPL-SCNC: 4.3 MMOL/L (ref 3.4–5.3)
SODIUM SERPL-SCNC: 135 MMOL/L (ref 135–145)

## 2023-11-10 PROCEDURE — G0463 HOSPITAL OUTPT CLINIC VISIT: HCPCS

## 2023-11-10 PROCEDURE — 36415 COLL VENOUS BLD VENIPUNCTURE: CPT | Mod: ZL

## 2023-11-10 PROCEDURE — 80048 BASIC METABOLIC PNL TOTAL CA: CPT | Mod: ZL

## 2023-11-10 PROCEDURE — 99213 OFFICE O/P EST LOW 20 MIN: CPT

## 2023-11-10 RX ORDER — SUCRALFATE 1 G/1
1 TABLET ORAL 2 TIMES DAILY
COMMUNITY
End: 2023-11-10

## 2023-11-10 RX ORDER — SUCRALFATE 1 G/1
1 TABLET ORAL 2 TIMES DAILY
Qty: 60 TABLET | Refills: 0 | Status: SHIPPED | OUTPATIENT
Start: 2023-11-10 | End: 2023-11-10

## 2023-11-10 ASSESSMENT — PAIN SCALES - GENERAL: PAINLEVEL: MILD PAIN (3)

## 2023-11-10 NOTE — PATIENT INSTRUCTIONS
Aim for blood pressure with top number between 100-130. Bottom number 60-70's.     Stop taking lisinopril for now. Continue metoprolol

## 2023-11-10 NOTE — PROGRESS NOTES
"  Assessment & Plan     Orthostatic hypotension/Dizziness  Dizziness resolved since previous visit. Notes significant improvement following 1L of fluid in the clinic on Tuesday. BP remains low. Will hold lisinopril for now. Continue metoprolol 25 mg only.     CKD (chronic kidney disease) stage 4, GFR 15-29 ml/min (H)  Improved from previous. Creat down to 1.51 (from 1.79).  - Basic metabolic panel    Chronic diastolic congestive heart failure (H)  Lasix has been held since Tuesday. Euvolemic on exam. Continue to hold lasix until follow-up. Follow-up scheduled next week.    Anemia, unspecified  Mild and normocytic. CKD as above. Iron 38, iron sat 11, ferritin of 30. Has not tolerated oral iron in the past. May start multivitamin. Consider infusion based on trend.       20 minutes spent by me on the date of the encounter doing chart review, history and exam, documentation and further activities per the note         Return in about 1 week (around 11/17/2023) for Follow up.    [facility] - ABRAHAM London is a 82 year old, presenting for the following health issues:  Follow Up        11/10/2023     2:05 PM   Additional Questions   Roomed by Gallocong Santanaon   Accompanied by Daughter in law         11/10/2023     2:05 PM   Patient Reported Additional Medications   Patient reports taking the following new medications none       HPI       Concern - Follow up   Onset: Follow up from 11/7/2023  Description:   Here for a follow up from 11/72023. Here to get a recheck of her blood pressure. Patients medications were changed.  Patient states that she has been feeling much better.   Progression of Symptoms:  improving    - Notes feeling \"100% better\" since previous visit, after getting IV fluids.  - No further dizziness, lightheadedness  - No falls  - Has not been monitoring BP at home, did take once with SBP in the 80's.  - Has quit taking lasix. Now taking lisinopril 10 mg, " metoprolol 25 mg.  - Drinking approximately 3-4 glasses of water per day.      Review of Systems   Constitutional, HEENT, cardiovascular, pulmonary, GI, , musculoskeletal, neuro, skin, endocrine and psych systems are negative, except as otherwise noted.      Objective    BP 92/60 (BP Location: Left arm)   Pulse 62   Temp 97.7  F (36.5  C) (Tympanic)   Wt 44.7 kg (98 lb 8 oz)   SpO2 98%   BMI 19.24 kg/m    Body mass index is 19.24 kg/m .    Physical Exam   GENERAL: alert and no distress  HENT: ear canals and TM's normal, nose and mouth without ulcers or lesions  NECK: no adenopathy, no asymmetry, masses, or scars and thyroid normal to palpation  RESP: lungs clear to auscultation - no rales, rhonchi or wheezes  CV: regular rate and rhythm, normal S1 S2, no S3 or S4, no murmur, click or rub, no peripheral edema and peripheral pulses strong  MS: no gross musculoskeletal defects noted, no edema  NEURO: Normal strength and tone, mentation intact and speech normal  PSYCH: mentation appears normal, affect normal/bright

## 2023-11-16 ENCOUNTER — OFFICE VISIT (OUTPATIENT)
Dept: FAMILY MEDICINE | Facility: OTHER | Age: 83
End: 2023-11-16
Payer: COMMERCIAL

## 2023-11-16 VITALS
TEMPERATURE: 98.4 F | RESPIRATION RATE: 17 BRPM | SYSTOLIC BLOOD PRESSURE: 100 MMHG | WEIGHT: 100.7 LBS | DIASTOLIC BLOOD PRESSURE: 70 MMHG | HEART RATE: 75 BPM | OXYGEN SATURATION: 94 % | BODY MASS INDEX: 19.67 KG/M2

## 2023-11-16 DIAGNOSIS — D64.9 ANEMIA, UNSPECIFIED TYPE: ICD-10-CM

## 2023-11-16 DIAGNOSIS — I50.32 CHRONIC DIASTOLIC CONGESTIVE HEART FAILURE (H): ICD-10-CM

## 2023-11-16 DIAGNOSIS — N18.4 CKD (CHRONIC KIDNEY DISEASE) STAGE 4, GFR 15-29 ML/MIN (H): ICD-10-CM

## 2023-11-16 DIAGNOSIS — K21.00 GASTROESOPHAGEAL REFLUX DISEASE WITH ESOPHAGITIS WITHOUT HEMORRHAGE: ICD-10-CM

## 2023-11-16 DIAGNOSIS — R42 DIZZINESS: ICD-10-CM

## 2023-11-16 DIAGNOSIS — I95.1 ORTHOSTATIC HYPOTENSION: Primary | ICD-10-CM

## 2023-11-16 PROCEDURE — G0463 HOSPITAL OUTPT CLINIC VISIT: HCPCS | Mod: 25

## 2023-11-16 PROCEDURE — 99214 OFFICE O/P EST MOD 30 MIN: CPT

## 2023-11-16 PROCEDURE — G0463 HOSPITAL OUTPT CLINIC VISIT: HCPCS

## 2023-11-16 RX ORDER — SUCRALFATE 1 G/1
1 TABLET ORAL PRN
COMMUNITY
Start: 2023-11-10 | End: 2023-11-16

## 2023-11-16 RX ORDER — SUCRALFATE 1 G/1
1 TABLET ORAL 2 TIMES DAILY PRN
Qty: 60 TABLET | Refills: 0 | Status: ON HOLD | OUTPATIENT
Start: 2023-11-16 | End: 2023-11-28

## 2023-11-16 ASSESSMENT — PAIN SCALES - GENERAL: PAINLEVEL: MODERATE PAIN (4)

## 2023-11-16 NOTE — PATIENT INSTRUCTIONS
Restart lasix at 10 mg daily (1/2 tablet)    Continue to hold lisinopril for now    We will see you in 2 weeks    Let us know with any changes or low blood pressures with the top number under 90

## 2023-11-16 NOTE — PROGRESS NOTES
Assessment & Plan     Orthostatic hypotension/Dizziness  Resolved. Blood pressure readings have improved. Now on metoprolol 25 mg only. Lisinopril remains on hold. SBP 's. Will continue to hold for now and have patient scheduled again for follow-up in 2 weeks. Patient will call with increased dizziness, or low readings of SBP <90 at home.    Gastroesophageal reflux disease with esophagitis without hemorrhage  - sucralfate (CARAFATE) 1 GM tablet  Dispense: 60 tablet; Refill: 0    Chronic diastolic congestive heart failure (H)  Euvolemic. Lasix has been on hold. Ok to restart lasix at 10 mg (1/2 tablet) for now.     CKD (chronic kidney disease) stage 4  Creat back to 1.51 (near baseline) on 11/10. Will recheck at follow-up.    Anemia, unspecified type  Mild and normocytic. CKD as above. Iron 38, iron sat 11, ferritin of 30 on recent draw. Has not tolerated oral iron in the past. Patient started OTC supplement. Consider infusion based on trend.       25 minutes spent by me on the date of the encounter doing chart review, history and exam, documentation and further activities per the note           Return in about 2 weeks (around 11/30/2023).    [facility] - ABRAHAM London is a 82 year old, presenting for the following health issues:  No chief complaint on file.        11/16/2023     2:30 PM   Additional Questions   Roomed by Xiomara Giron   Accompanied by self       HPI     Hypertension Follow-up    Do you check your blood pressure regularly outside of the clinic? Yes   Are you following a low salt diet? Yes  Are your blood pressures ever more than 140 on the top number (systolic) OR more   than 90 on the bottom number (diastolic), for example 140/90? No    BP Readings from Last 3 Encounters:   11/16/23 100/70   11/10/23 92/60   11/07/23 98/70        -Now taking metoprolol 25 mg daily. Has held lasix and lisinopril.   - Has been taking BP at home with SBP  100's.   - Notes feeling significantly improved since previous visit  - Denies any further dizziness at rest or with position change  - No chest pain or shortness of breath      Review of Systems   Constitutional, HEENT, cardiovascular, pulmonary, GI, , musculoskeletal, neuro, skin, endocrine and psych systems are negative, except as otherwise noted.      Objective    /70   Pulse 75   Temp 98.4  F (36.9  C) (Tympanic)   Resp 17   Wt 45.7 kg (100 lb 11.2 oz)   SpO2 94%   BMI 19.67 kg/m    Body mass index is 19.67 kg/m .    Physical Exam   GENERAL:elderly, alert and no distress  RESP: lungs clear to auscultation - no rales, rhonchi or wheezes  CV: regular rate and rhythm, normal S1 S2, no S3 or S4, no murmur, click or rub, no peripheral edema and peripheral pulses strong  MS: no gross musculoskeletal defects noted, no edema  SKIN: no suspicious lesions or rashes  NEURO: Normal strength and tone, mentation intact and speech normal  PSYCH: mentation appears normal, affect normal/bright

## 2023-11-17 ENCOUNTER — TELEPHONE (OUTPATIENT)
Dept: FAMILY MEDICINE | Facility: OTHER | Age: 83
End: 2023-11-17

## 2023-11-17 NOTE — TELEPHONE ENCOUNTER
10:05 AM    Reason for Call: Phone Call    Description: Patient is calling to speak to Maritza Amaro regarding her Multiple vitamin if it has iron in it and it does not and Maritza Amaro she was going to prescription a light prescription for the iron. Lita wants to speak to the nurse regarding this.     Was an appointment offered for this call? No  If yes : Appointment type              Date    Preferred method for responding to this message: Telephone Call  What is your phone number ? [phone]    If we cannot reach you directly, may we leave a detailed response at the number you provided? Yes    Can this message wait until your PCP/provider returns, if available today? YES, No

## 2023-11-26 ENCOUNTER — APPOINTMENT (OUTPATIENT)
Dept: GENERAL RADIOLOGY | Facility: HOSPITAL | Age: 83
DRG: 291 | End: 2023-11-26
Attending: NURSE PRACTITIONER
Payer: COMMERCIAL

## 2023-11-26 ENCOUNTER — APPOINTMENT (OUTPATIENT)
Dept: ULTRASOUND IMAGING | Facility: HOSPITAL | Age: 83
DRG: 291 | End: 2023-11-26
Attending: NURSE PRACTITIONER
Payer: COMMERCIAL

## 2023-11-26 ENCOUNTER — HOSPITAL ENCOUNTER (INPATIENT)
Facility: HOSPITAL | Age: 83
LOS: 2 days | Discharge: HOME | DRG: 291 | End: 2023-11-28
Attending: NURSE PRACTITIONER | Admitting: HOSPITALIST
Payer: COMMERCIAL

## 2023-11-26 ENCOUNTER — APPOINTMENT (OUTPATIENT)
Dept: CT IMAGING | Facility: HOSPITAL | Age: 83
DRG: 291 | End: 2023-11-26
Attending: NURSE PRACTITIONER
Payer: COMMERCIAL

## 2023-11-26 DIAGNOSIS — I65.23 BILATERAL CAROTID ARTERY STENOSIS: ICD-10-CM

## 2023-11-26 DIAGNOSIS — R94.39 ABNORMAL CARDIOVASCULAR STRESS TEST: ICD-10-CM

## 2023-11-26 DIAGNOSIS — Z86.73 HISTORY OF CVA (CEREBROVASCULAR ACCIDENT): ICD-10-CM

## 2023-11-26 DIAGNOSIS — I25.10 CORONARY ARTERY DISEASE INVOLVING NATIVE CORONARY ARTERY OF NATIVE HEART WITHOUT ANGINA PECTORIS: ICD-10-CM

## 2023-11-26 DIAGNOSIS — Z95.5 HISTORY OF CORONARY ARTERY STENT PLACEMENT: ICD-10-CM

## 2023-11-26 DIAGNOSIS — R07.9 CHEST PAIN, UNSPECIFIED TYPE: ICD-10-CM

## 2023-11-26 DIAGNOSIS — I73.9 PAD (PERIPHERAL ARTERY DISEASE) (H): ICD-10-CM

## 2023-11-26 DIAGNOSIS — I97.89 POSTOPERATIVE ATRIAL FIBRILLATION (H): ICD-10-CM

## 2023-11-26 DIAGNOSIS — I48.91 POSTOPERATIVE ATRIAL FIBRILLATION (H): ICD-10-CM

## 2023-11-26 DIAGNOSIS — I50.31 ACUTE DIASTOLIC CONGESTIVE HEART FAILURE (H): ICD-10-CM

## 2023-11-26 DIAGNOSIS — D50.8 IRON DEFICIENCY ANEMIA SECONDARY TO INADEQUATE DIETARY IRON INTAKE: ICD-10-CM

## 2023-11-26 DIAGNOSIS — Z87.19 HISTORY OF GI BLEED: Primary | ICD-10-CM

## 2023-11-26 DIAGNOSIS — I50.9 CHF (CONGESTIVE HEART FAILURE) (H): ICD-10-CM

## 2023-11-26 DIAGNOSIS — J96.01 ACUTE RESPIRATORY FAILURE WITH HYPOXIA (H): ICD-10-CM

## 2023-11-26 DIAGNOSIS — D62 ANEMIA DUE TO BLOOD LOSS, ACUTE: ICD-10-CM

## 2023-11-26 DIAGNOSIS — E87.1 HYPONATREMIA: ICD-10-CM

## 2023-11-26 PROBLEM — D64.9 ANEMIA: Status: ACTIVE | Noted: 2023-11-26

## 2023-11-26 PROBLEM — R79.89 ELEVATED BRAIN NATRIURETIC PEPTIDE (BNP) LEVEL: Status: ACTIVE | Noted: 2023-11-26

## 2023-11-26 LAB
ABO/RH(D): NORMAL
ALBUMIN SERPL BCG-MCNC: 3.9 G/DL (ref 3.5–5.2)
ALLEN'S TEST: YES
ALP SERPL-CCNC: 70 U/L (ref 40–150)
ALT SERPL W P-5'-P-CCNC: 17 U/L (ref 0–50)
ANION GAP SERPL CALCULATED.3IONS-SCNC: 12 MMOL/L (ref 7–15)
ANTIBODY SCREEN: NEGATIVE
APTT PPP: 31 SECONDS (ref 22–38)
AST SERPL W P-5'-P-CCNC: 19 U/L (ref 0–45)
BASE EXCESS BLDA CALC-SCNC: -1.7 MMOL/L (ref -9–1.8)
BASOPHILS # BLD AUTO: 0 10E3/UL (ref 0–0.2)
BASOPHILS NFR BLD AUTO: 0 %
BILIRUB SERPL-MCNC: 0.4 MG/DL
BLD PROD TYP BPU: NORMAL
BLD PROD TYP BPU: NORMAL
BLOOD COMPONENT TYPE: NORMAL
BLOOD COMPONENT TYPE: NORMAL
BUN SERPL-MCNC: 15.4 MG/DL (ref 8–23)
CALCIUM SERPL-MCNC: 8.9 MG/DL (ref 8.8–10.2)
CHLORIDE SERPL-SCNC: 108 MMOL/L (ref 98–107)
CODING SYSTEM: NORMAL
CODING SYSTEM: NORMAL
CREAT SERPL-MCNC: 1.21 MG/DL (ref 0.51–0.95)
CROSSMATCH: NORMAL
CROSSMATCH: NORMAL
D DIMER PPP FEU-MCNC: 2.51 UG/ML FEU (ref 0–0.5)
DEPRECATED HCO3 PLAS-SCNC: 22 MMOL/L (ref 22–29)
EGFRCR SERPLBLD CKD-EPI 2021: 45 ML/MIN/1.73M2
EOSINOPHIL # BLD AUTO: 0.1 10E3/UL (ref 0–0.7)
EOSINOPHIL NFR BLD AUTO: 1 %
ERYTHROCYTE [DISTWIDTH] IN BLOOD BY AUTOMATED COUNT: 18 % (ref 10–15)
GLUCOSE BLDC GLUCOMTR-MCNC: 118 MG/DL (ref 70–99)
GLUCOSE BLDC GLUCOMTR-MCNC: 94 MG/DL (ref 70–99)
GLUCOSE SERPL-MCNC: 102 MG/DL (ref 70–99)
HCO3 BLD-SCNC: 22 MMOL/L (ref 21–28)
HCT VFR BLD AUTO: 23.2 % (ref 35–47)
HEMOCCULT SP1 STL QL: POSITIVE
HGB BLD-MCNC: 7.1 G/DL (ref 11.7–15.7)
HGB BLD-MCNC: 8.5 G/DL (ref 11.7–15.7)
HGB BLD-MCNC: 8.5 G/DL (ref 11.7–15.7)
HOLD SPECIMEN: NORMAL
IMM GRANULOCYTES # BLD: 0.1 10E3/UL
IMM GRANULOCYTES NFR BLD: 1 %
INR PPP: 1.02 (ref 0.85–1.15)
ISSUE DATE AND TIME: NORMAL
LYMPHOCYTES # BLD AUTO: 1 10E3/UL (ref 0.8–5.3)
LYMPHOCYTES NFR BLD AUTO: 10 %
MAGNESIUM SERPL-MCNC: 2 MG/DL (ref 1.7–2.3)
MCH RBC QN AUTO: 27.1 PG (ref 26.5–33)
MCHC RBC AUTO-ENTMCNC: 30.6 G/DL (ref 31.5–36.5)
MCV RBC AUTO: 89 FL (ref 78–100)
MONOCYTES # BLD AUTO: 1 10E3/UL (ref 0–1.3)
MONOCYTES NFR BLD AUTO: 10 %
NEUTROPHILS # BLD AUTO: 8 10E3/UL (ref 1.6–8.3)
NEUTROPHILS NFR BLD AUTO: 78 %
NRBC # BLD AUTO: 0 10E3/UL
NRBC BLD AUTO-RTO: 0 /100
NT-PROBNP SERPL-MCNC: ABNORMAL PG/ML (ref 0–1800)
O2/TOTAL GAS SETTING VFR VENT: 32 %
PCO2 BLD: 29 MM HG (ref 35–45)
PH BLD: 7.47 [PH] (ref 7.35–7.45)
PLATELET # BLD AUTO: 283 10E3/UL (ref 150–450)
PO2 BLD: 70 MM HG (ref 80–105)
POTASSIUM SERPL-SCNC: 4.2 MMOL/L (ref 3.4–5.3)
PROT SERPL-MCNC: 6.6 G/DL (ref 6.4–8.3)
RBC # BLD AUTO: 2.62 10E6/UL (ref 3.8–5.2)
SODIUM SERPL-SCNC: 142 MMOL/L (ref 135–145)
SPECIMEN EXPIRATION DATE: NORMAL
TROPONIN T SERPL HS-MCNC: 64 NG/L
TROPONIN T SERPL HS-MCNC: 66 NG/L
UNIT ABO/RH: NORMAL
UNIT ABO/RH: NORMAL
UNIT NUMBER: NORMAL
UNIT NUMBER: NORMAL
UNIT STATUS: NORMAL
UNIT STATUS: NORMAL
UNIT TYPE ISBT: 6200
UNIT TYPE ISBT: 6200
WBC # BLD AUTO: 10.2 10E3/UL (ref 4–11)

## 2023-11-26 PROCEDURE — 99222 1ST HOSP IP/OBS MODERATE 55: CPT | Mod: AI | Performed by: HOSPITALIST

## 2023-11-26 PROCEDURE — C9113 INJ PANTOPRAZOLE SODIUM, VIA: HCPCS | Mod: JZ | Performed by: HOSPITALIST

## 2023-11-26 PROCEDURE — 76604 US EXAM CHEST: CPT | Mod: TC

## 2023-11-26 PROCEDURE — 85379 FIBRIN DEGRADATION QUANT: CPT | Performed by: NURSE PRACTITIONER

## 2023-11-26 PROCEDURE — 36430 TRANSFUSION BLD/BLD COMPNT: CPT

## 2023-11-26 PROCEDURE — 83735 ASSAY OF MAGNESIUM: CPT | Performed by: NURSE PRACTITIONER

## 2023-11-26 PROCEDURE — 85730 THROMBOPLASTIN TIME PARTIAL: CPT | Performed by: NURSE PRACTITIONER

## 2023-11-26 PROCEDURE — 99285 EMERGENCY DEPT VISIT HI MDM: CPT | Mod: 25

## 2023-11-26 PROCEDURE — 85610 PROTHROMBIN TIME: CPT | Performed by: NURSE PRACTITIONER

## 2023-11-26 PROCEDURE — 80053 COMPREHEN METABOLIC PANEL: CPT | Performed by: NURSE PRACTITIONER

## 2023-11-26 PROCEDURE — 36415 COLL VENOUS BLD VENIPUNCTURE: CPT | Performed by: NURSE PRACTITIONER

## 2023-11-26 PROCEDURE — 76604 US EXAM CHEST: CPT | Mod: 26 | Performed by: NURSE PRACTITIONER

## 2023-11-26 PROCEDURE — 93308 TTE F-UP OR LMTD: CPT | Mod: 26 | Performed by: NURSE PRACTITIONER

## 2023-11-26 PROCEDURE — 86850 RBC ANTIBODY SCREEN: CPT | Performed by: NURSE PRACTITIONER

## 2023-11-26 PROCEDURE — 86923 COMPATIBILITY TEST ELECTRIC: CPT | Performed by: NURSE PRACTITIONER

## 2023-11-26 PROCEDURE — 30233N1 TRANSFUSION OF NONAUTOLOGOUS RED BLOOD CELLS INTO PERIPHERAL VEIN, PERCUTANEOUS APPROACH: ICD-10-PCS | Performed by: NURSE PRACTITIONER

## 2023-11-26 PROCEDURE — 85018 HEMOGLOBIN: CPT | Performed by: HOSPITALIST

## 2023-11-26 PROCEDURE — 86901 BLOOD TYPING SEROLOGIC RH(D): CPT | Performed by: NURSE PRACTITIONER

## 2023-11-26 PROCEDURE — 250N000011 HC RX IP 250 OP 636: Mod: JZ | Performed by: HOSPITALIST

## 2023-11-26 PROCEDURE — 93010 ELECTROCARDIOGRAM REPORT: CPT | Performed by: INTERNAL MEDICINE

## 2023-11-26 PROCEDURE — 36415 COLL VENOUS BLD VENIPUNCTURE: CPT | Performed by: HOSPITALIST

## 2023-11-26 PROCEDURE — 83880 ASSAY OF NATRIURETIC PEPTIDE: CPT | Performed by: NURSE PRACTITIONER

## 2023-11-26 PROCEDURE — 85025 COMPLETE CBC W/AUTO DIFF WBC: CPT | Performed by: NURSE PRACTITIONER

## 2023-11-26 PROCEDURE — 99285 EMERGENCY DEPT VISIT HI MDM: CPT | Mod: 25 | Performed by: NURSE PRACTITIONER

## 2023-11-26 PROCEDURE — 36600 WITHDRAWAL OF ARTERIAL BLOOD: CPT

## 2023-11-26 PROCEDURE — C9113 INJ PANTOPRAZOLE SODIUM, VIA: HCPCS | Mod: JZ | Performed by: NURSE PRACTITIONER

## 2023-11-26 PROCEDURE — 250N000013 HC RX MED GY IP 250 OP 250 PS 637: Performed by: HOSPITALIST

## 2023-11-26 PROCEDURE — 71045 X-RAY EXAM CHEST 1 VIEW: CPT

## 2023-11-26 PROCEDURE — P9016 RBC LEUKOCYTES REDUCED: HCPCS | Performed by: NURSE PRACTITIONER

## 2023-11-26 PROCEDURE — 96374 THER/PROPH/DIAG INJ IV PUSH: CPT

## 2023-11-26 PROCEDURE — 250N000011 HC RX IP 250 OP 636: Mod: JZ | Performed by: NURSE PRACTITIONER

## 2023-11-26 PROCEDURE — 200N000001 HC R&B ICU

## 2023-11-26 PROCEDURE — 99222 1ST HOSP IP/OBS MODERATE 55: CPT | Performed by: SURGERY

## 2023-11-26 PROCEDURE — 82803 BLOOD GASES ANY COMBINATION: CPT | Performed by: HOSPITALIST

## 2023-11-26 PROCEDURE — 93005 ELECTROCARDIOGRAM TRACING: CPT

## 2023-11-26 PROCEDURE — 93308 TTE F-UP OR LMTD: CPT | Mod: TC

## 2023-11-26 PROCEDURE — 82274 ASSAY TEST FOR BLOOD FECAL: CPT | Performed by: NURSE PRACTITIONER

## 2023-11-26 PROCEDURE — 84484 ASSAY OF TROPONIN QUANT: CPT | Performed by: NURSE PRACTITIONER

## 2023-11-26 PROCEDURE — 74177 CT ABD & PELVIS W/CONTRAST: CPT

## 2023-11-26 PROCEDURE — 250N000011 HC RX IP 250 OP 636: Performed by: NURSE PRACTITIONER

## 2023-11-26 RX ORDER — SUCRALFATE 1 G/1
1 TABLET ORAL 2 TIMES DAILY PRN
Status: CANCELLED | OUTPATIENT
Start: 2023-11-26

## 2023-11-26 RX ORDER — ATORVASTATIN CALCIUM 40 MG/1
40 TABLET, FILM COATED ORAL AT BEDTIME
Status: CANCELLED | OUTPATIENT
Start: 2023-11-26

## 2023-11-26 RX ORDER — AMOXICILLIN 250 MG
1 CAPSULE ORAL 2 TIMES DAILY PRN
Status: DISCONTINUED | OUTPATIENT
Start: 2023-11-26 | End: 2023-11-28 | Stop reason: HOSPADM

## 2023-11-26 RX ORDER — DEXTROSE MONOHYDRATE 25 G/50ML
25-50 INJECTION, SOLUTION INTRAVENOUS
Status: DISCONTINUED | OUTPATIENT
Start: 2023-11-26 | End: 2023-11-28 | Stop reason: HOSPADM

## 2023-11-26 RX ORDER — METOPROLOL SUCCINATE 25 MG/1
25 TABLET, EXTENDED RELEASE ORAL DAILY
Status: CANCELLED | OUTPATIENT
Start: 2023-11-26

## 2023-11-26 RX ORDER — FUROSEMIDE 10 MG/ML
40 INJECTION INTRAMUSCULAR; INTRAVENOUS ONCE
Status: COMPLETED | OUTPATIENT
Start: 2023-11-26 | End: 2023-11-26

## 2023-11-26 RX ORDER — AMOXICILLIN 250 MG
2 CAPSULE ORAL 2 TIMES DAILY PRN
Status: DISCONTINUED | OUTPATIENT
Start: 2023-11-26 | End: 2023-11-28 | Stop reason: HOSPADM

## 2023-11-26 RX ORDER — LEVOTHYROXINE SODIUM 50 UG/1
50 TABLET ORAL DAILY
Status: CANCELLED | OUTPATIENT
Start: 2023-11-26

## 2023-11-26 RX ORDER — CALCIUM CARBONATE 500 MG/1
1000 TABLET, CHEWABLE ORAL 4 TIMES DAILY PRN
Status: DISCONTINUED | OUTPATIENT
Start: 2023-11-26 | End: 2023-11-28 | Stop reason: HOSPADM

## 2023-11-26 RX ORDER — IOPAMIDOL 755 MG/ML
60 INJECTION, SOLUTION INTRAVASCULAR ONCE
Status: COMPLETED | OUTPATIENT
Start: 2023-11-26 | End: 2023-11-26

## 2023-11-26 RX ORDER — NICOTINE POLACRILEX 4 MG
15-30 LOZENGE BUCCAL
Status: DISCONTINUED | OUTPATIENT
Start: 2023-11-26 | End: 2023-11-28 | Stop reason: HOSPADM

## 2023-11-26 RX ORDER — ACETAMINOPHEN 325 MG/1
650 TABLET ORAL EVERY 4 HOURS PRN
Status: DISCONTINUED | OUTPATIENT
Start: 2023-11-26 | End: 2023-11-28 | Stop reason: HOSPADM

## 2023-11-26 RX ORDER — LIDOCAINE 40 MG/G
CREAM TOPICAL
Status: DISCONTINUED | OUTPATIENT
Start: 2023-11-26 | End: 2023-11-28 | Stop reason: HOSPADM

## 2023-11-26 RX ORDER — IPRATROPIUM BROMIDE AND ALBUTEROL SULFATE 2.5; .5 MG/3ML; MG/3ML
1 SOLUTION RESPIRATORY (INHALATION) EVERY 6 HOURS PRN
Status: CANCELLED | OUTPATIENT
Start: 2023-11-26

## 2023-11-26 RX ORDER — ONDANSETRON 2 MG/ML
4 INJECTION INTRAMUSCULAR; INTRAVENOUS EVERY 6 HOURS PRN
Status: DISCONTINUED | OUTPATIENT
Start: 2023-11-26 | End: 2023-11-28 | Stop reason: HOSPADM

## 2023-11-26 RX ORDER — FUROSEMIDE 10 MG/ML
40 INJECTION INTRAMUSCULAR; INTRAVENOUS EVERY 12 HOURS
Status: DISCONTINUED | OUTPATIENT
Start: 2023-11-26 | End: 2023-11-27

## 2023-11-26 RX ORDER — ONDANSETRON 4 MG/1
4 TABLET, ORALLY DISINTEGRATING ORAL EVERY 6 HOURS PRN
Status: DISCONTINUED | OUTPATIENT
Start: 2023-11-26 | End: 2023-11-28 | Stop reason: HOSPADM

## 2023-11-26 RX ADMIN — FUROSEMIDE 40 MG: 10 INJECTION, SOLUTION INTRAMUSCULAR; INTRAVENOUS at 22:05

## 2023-11-26 RX ADMIN — FUROSEMIDE 40 MG: 10 INJECTION, SOLUTION INTRAMUSCULAR; INTRAVENOUS at 14:06

## 2023-11-26 RX ADMIN — PANTOPRAZOLE SODIUM 40 MG: 40 INJECTION, POWDER, FOR SOLUTION INTRAVENOUS at 21:01

## 2023-11-26 RX ADMIN — IOPAMIDOL 60 ML: 755 INJECTION, SOLUTION INTRAVENOUS at 13:48

## 2023-11-26 RX ADMIN — ACETAMINOPHEN 650 MG: 325 TABLET, FILM COATED ORAL at 22:46

## 2023-11-26 RX ADMIN — PANTOPRAZOLE SODIUM 40 MG: 40 INJECTION, POWDER, FOR SOLUTION INTRAVENOUS at 15:30

## 2023-11-26 ASSESSMENT — ACTIVITIES OF DAILY LIVING (ADL)
ADLS_ACUITY_SCORE: 28
ADLS_ACUITY_SCORE: 33
ADLS_ACUITY_SCORE: 37
ADLS_ACUITY_SCORE: 32
ADLS_ACUITY_SCORE: 37
ADLS_ACUITY_SCORE: 26

## 2023-11-26 ASSESSMENT — ENCOUNTER SYMPTOMS
ENDOCRINE NEGATIVE: 1
SHORTNESS OF BREATH: 1
EYES NEGATIVE: 1
PSYCHIATRIC NEGATIVE: 1
HEMATOLOGIC/LYMPHATIC NEGATIVE: 1
ALLERGIC/IMMUNOLOGIC NEGATIVE: 1
NEUROLOGICAL NEGATIVE: 1
GASTROINTESTINAL NEGATIVE: 1
PALPITATIONS: 0
COUGH: 1
MUSCULOSKELETAL NEGATIVE: 1
CONSTITUTIONAL NEGATIVE: 1

## 2023-11-26 NOTE — ED PROVIDER NOTES
```````````````````````````````````````````````````````````````````````````````````````````````````````````````````````````````````````````````````````````````````````````````````````````  History     Chief Complaint   Patient presents with    Shortness of Breath     HPI  Lita Ocasio is a 82 year old individual with emphysema, hypothyroidism, systolic congestive heart failure, hypertension, hyperparathyroidism, atherosclerotic cardiovascular disease, stage IV chronic kidney disease, CVA, COPD, comes in for shortness of breath.  Patient states that she has underwent multiple medication changes including decreasing blood pressure medications due to hypotension.  Has also had decrease in her diuretics.  Patient states since 11/24/2023 has been having increased swelling in the lower extremities and shortness of breath.  For this reason patient placed herself back on her original furosemide dose.  Continues to have shortness of breath despite use of nebulizers so comes in.  No fever or chills reported.  No wheezes or stridor reported.  States has productive cough of clear sputum.  No nausea or vomiting reported.  States that her lower extremities are starting to reduce in size again.    Allergies:  Allergies   Allergen Reactions    Evista [Raloxifene] Other (See Comments)     hypercalcemia    Prednisone GI Disturbance    Combigan [Brimonidine Tartrate-Timolol] Other (See Comments)     Eye swelling and itchy    Cyclosporine      Pt reports using eye gtts and having red irritated eyes     Latex Rash    Levaquin [Levofloxacin] Muscle Pain (Myalgia)    Penicillins Rash       Problem List:    Patient Active Problem List    Diagnosis Date Noted    Anemia 11/26/2023     Priority: Medium    Elevated brain natriuretic peptide (BNP) level 11/26/2023     Priority: Medium    COPD, severe (H) 09/06/2023     Priority: Medium    Chest pain, unspecified type 02/16/2023     Priority: Medium    Coronary artery disease involving native  coronary artery of native heart without angina pectoris 03/14/2022     Priority: Medium    Abnormal cardiovascular stress test on 6/14/2019 03/14/2022     Priority: Medium    History of CVA on 1/1/2019 03/14/2022     Priority: Medium    Postoperative atrial fibrillation on 6/28/2019 (H) 03/14/2022     Priority: Medium    History of cardioversion on 6/28/2019 03/14/2022     Priority: Medium    PAD on 2/28/2020-moderate (H) 03/14/2022     Priority: Medium    Bilateral carotid artery stenosis 03/14/2022     Priority: Medium    History of tobacco abuse quitting on 1/1/2019 03/14/2022     Priority: Medium    Tobacco abuse counseling 03/14/2022     Priority: Medium    CKD (chronic kidney disease) stage 4, GFR 15-29 ml/min (H) 01/26/2022     Priority: Medium    Subclavian arterial stenosis (H24) 01/26/2022     Priority: Medium    Iron deficiency anemia secondary to inadequate dietary iron intake 01/07/2022     Priority: Medium    Weight loss 02/05/2020     Priority: Medium    History of GI bleed 09/20/2019     Priority: Medium    Protein-calorie malnutrition (H24) 09/04/2019     Priority: Medium    History of coronary artery stent placement to the O/M LCX on 6/28/2019 at [facility] 07/23/2019     Priority: Medium    Abnormal stress test 06/18/2019     Priority: Medium     Added automatically from request for surgery [id number]      MILLS (dyspnea on exertion) 06/18/2019     Priority: Medium     Added automatically from request for surgery [id number]      ASCVD (arteriosclerotic cardiovascular disease) 06/18/2019     Priority: Medium     Added automatically from request for surgery [id number]      Mixed hyperlipidemia 06/18/2019     Priority: Medium     Added automatically from request for surgery [id number]      Acute respiratory failure with hypoxia (H) 05/12/2019     Priority: Medium    Centrilobular emphysema (H) 05/12/2019     Priority: Medium    Acquired hypothyroidism 05/12/2019     Priority: Medium    Chronic systolic congestive  heart failure (H)- Recovered 05/12/2019     Priority: Medium    Benign essential hypertension 05/12/2019     Priority: Medium    Osteopenia 05/12/2019     Priority: Medium    Non-rheumatic mitral regurgitation 05/12/2019     Priority: Medium    Primary hyperparathyroidism (H24) 09/05/2013     Priority: Medium    Chronic rhinitis 08/16/2013     Priority: Medium        Past Medical History:    Past Medical History:   Diagnosis Date    Acquired hypothyroidism 5/12/2019    Asthma     Benign essential hypertension 5/12/2019    Centrilobular emphysema (H) 5/12/2019    Chronic rhinitis 8/16/2013    Chronic systolic congestive heart failure (H) 5/12/2019    Constipation     Coronary artery disease     Hearing loss     Heart trouble     Hemorrhagic cerebrovascular accident (CVA) (H) 01/2019    Hyperlipidemia 5/12/2019    Hypertension     Nasal congestion     Non-rheumatic mitral regurgitation 5/12/2019    Osteopenia 5/12/2019    Osteoporosis     Parathyroid related hypercalcemia (H24) 8/18/2013    Primary hyperparathyroidism (H24) 9/5/2013    Ringing in ears     Sensorineural hearing loss, asymmetrical 8/16/2013    Sneezing     Snoring     Stented coronary artery     Thyroid disease     Weakness     Weight loss        Past Surgical History:    Past Surgical History:   Procedure Laterality Date    CATARACT IOL, RT/LT Bilateral     COLONOSCOPY      COLONOSCOPY - HIM SCAN  01/01/2009    Colonoscopy - [facility] [place]  2009    ENDOSCOPY UPPER, COLONOSCOPY, COMBINED N/A 10/17/2019    Procedure: UPPER ENDOSCOPY WITH BIOPSY  AND COLONOSCOPY;  Surgeon: Julio Canales MD;  Location: [facility] OR    ENT SURGERY  2011    para thyroid surgery    ENT SURGERY  09/2013    Parathyroid    ESOPHAGOSCOPY, GASTROSCOPY, DUODENOSCOPY (EGD), COMBINED N/A 9/21/2019    Procedure: ESOPHAGOGASTRODUODENOSCOPY (EGD);  Surgeon: Julio Canales MD;  Location: [facility] OR    ESOPHAGOSCOPY, GASTROSCOPY, DUODENOSCOPY (EGD), COMBINED N/A 1/27/2020    Procedure:  ESOPHAGOGASTRODUODENOSCOPY (EGD) WITH BIOPSY;  Surgeon: Isrrael Parish MD;  Location: [facility] OR    ESOPHAGOSCOPY, GASTROSCOPY, DUODENOSCOPY (EGD), COMBINED N/A 2020    Procedure: ESOPHAGOGASTRODUODENOSCOPY (EGD) WITH BIOPSY;  Surgeon: Pedro Luis Montoya DO;  Location: [facility] OR    PARATHYROIDECTOMY  9/10/2013    Procedure: PARATHYROIDECTOMY;  Reoperative Neck Exploration, Resection of right Parathyroid adenoma;  Surgeon: Thalia Muller MD;  Location: [facility] OR    TONSILLECTOMY      TUBAL LIGATION         Family History:    Family History   Problem Relation Age of Onset    Hearing Loss Mother     Heart Disease Mother     Myocardial Infarction Mother     Cerebrovascular Disease Father     Cancer Brother         throat    Cancer Sister     Myocardial Infarction Sister     Cancer Maternal Grandmother     Heart Disease Maternal Grandfather     Aortic aneurysm Son        Social History:  Marital Status:   [5]  Social History     Tobacco Use    Smoking status: Former     Packs/day: 1.00     Years: 50.00     Additional pack years: 0.00     Total pack years: 50.00     Types: Cigarettes     Quit date: 2019     Years since quittin.9     Passive exposure: Past    Smokeless tobacco: Never    Tobacco comments:     quit 2019   Vaping Use    Vaping Use: Never used   Substance Use Topics    Alcohol use: Yes     Comment: social    Drug use: No        Medications:    aspirin (ASA) 81 MG chewable tablet  atorvastatin (LIPITOR) 40 MG tablet  levothyroxine (SYNTHROID) 50 MCG tablet  metoprolol succinate ER (TOPROL XL) 25 MG 24 hr tablet  pantoprazole (PROTONIX) 40 MG EC tablet  sucralfate (CARAFATE) 1 GM tablet  calcium carbonate (TUMS) 500 MG chewable tablet  diclofenac (VOLTAREN) 1 % topical gel  famotidine (PEPCID) 10 MG tablet  fluticasone (FLONASE) 50 MCG/ACT spray  furosemide (LASIX) 20 MG tablet  ipratropium - albuterol 0.5 mg/2.5 mg/3 mL (DUONEB) 0.5-2.5 (3) MG/3ML neb solution  ipratropium-albuterol  (COMBIVENT RESPIMAT)  MCG/ACT inhaler  lidocaine (LIDODERM) 5 % patch  lisinopril (ZESTRIL) 5 MG tablet  nitroGLYcerin (NITROSTAT) 0.4 MG sublingual tablet  Tafluprost 0.0015 % SOLN          Review of Systems   Constitutional: Negative.    HENT: Negative.     Eyes: Negative.    Respiratory:  Positive for cough and shortness of breath.    Cardiovascular:  Positive for leg swelling. Negative for chest pain and palpitations.   Gastrointestinal: Negative.    Endocrine: Negative.    Genitourinary: Negative.    Musculoskeletal: Negative.    Skin: Negative.    Allergic/Immunologic: Negative.    Neurological: Negative.    Hematological: Negative.    Psychiatric/Behavioral: Negative.         Physical Exam   BP: 180/70 (right arm)  Pulse: 90  Temp: 99.6  F (37.6  C)  Resp: 20  SpO2: 93 %      GENERAL APPEARANCE:  The patient is a 82 year old well-developed, well-nourished individual in no acute distress that appears as stated age.  NECK:  Supple.  Trachea is midline.  No carotid bruits present on auscultation.  CHEST:  Symmetric.  Non-tender to palpation.  No crepitus or deformity.  LUNGS: Breathing is mildly labored but speaking full sentences.  Breath sounds are equal bilaterally.  Faint rales in right lower lobe.  HEART:  Regular rate and rhythm with normal S1 and S2.  No murmurs, gallops, or rubs.  ABDOMEN:  No abdominal bruits or thrills present upon auscultation/palpation.  RECTAL: Examination conducted with chaperone Valery Carrizales RN present.  No external hemorrhoids, internal hemorrhoids, masses, tenderness noted on examination.  Sphincter tone normal.  Does have stool in rectal vault.  No melena or hematochezia noted on glove removal.  EXTREMITIES: 1+ pedal edema bilaterally.  Pedal and post-tibial pulses are 2+ bilaterally.   NEUROLOGIC:  No focal sensory or motor deficits are noted.    PSYCHIATRIC:  The patient is awake, alert, and oriented x4.  Recent and remote memory is intact.  Appropriate mood and affect.   Calm and cooperative with history and physical exam.  SKIN:  Warm and dry but skin is pale.  Good turgor.  No lesions, nodules, or rashes are noted.  No bruising noted.      Comment: Discrepancies between my note and notes on behalf of the nursing team or other care providers are secondary to my findings reflecting my physical examination and questioning of the patient.  Any conflicting information provided is not in line with my examination of the patient.       ED Course              ED Course as of 11/26/23 1511   Sun Nov 26, 2023   1205 In to see patient and history/physical completed.    1212 Labs, ECG, chest x-ray, POCUS echo and chest scan ordered.   1257 Hemoglobin(!): 7.1  Hemoglobin 7.1 which has worsened from 11.52 weeks prior.   1336 D-Dimer Quantitative(!): 2.51  D-dimer 2.51.  CT of chest, abdomen, pelvis ordered due to shortness of breath to rule out PE and new GI bleed with anemia present.   1342 Troponin T, High Sensitivity(!): 64  High-sensitivity troponin 64.  2-hour repeat ordered for 1445.   1350 Transfusion 2 units packed red blood cells ordered   1441 CT returned showing no PE but does have bilateral pleural effusions with small pericardial effusion.  Noted in the abdomen pelvis no other acute findings.   1503 Discussed case with Dr. Valdivia who accepted patient for admission to ICU.     POC US CHEST B-SCAN    Date/Time: 11/26/2023 12:51 PM    Performed by: Roshan Watson APRN CNP  Authorized by: Roshan Watson APRN CNP    Procedure details:     Indications: dyspnea and hypoxia      Assessment for:  Hemothorax, pleural effusion, pneumothorax and pneumonia    Left lung pleural:  Visualized    Right lung pleural:  Visualized  Findings:     A-lines noted throughout: not identified      B-lines noted throughout: not identified      Left lung sliding: identified      Right lung sliding: identified      Subpleural consolidation: identified      Lung consolidation: not identified    Impression:      Impression: interstitial syndrome    POC US ECHO LIMITED    Date/Time: 11/26/2023 12:50 PM    Performed by: Roshan Watson APRN CNP  Authorized by: Roshan Watson APRN CNP    Comments:      Limited Bedside Cardiac Ultrasound, performed and interpreted by me.   Indication: Shortness of Breath.  Parasternal long axis, parasternal short axis, apical 4 chamber, subcostal, and IVC views were acquired.   Image quality was satisfactory.    Findings:    Abnormal left atrial dilation is present.  Left ventricular function appears to be normal.  No pericardial fluid present.    IMPRESSION: Abnormal limited cardiac ultrasound showing left atrial dilation.  Left ventricular function intact.  No pericardial effusion.       ECG:    ECG competed at 1218 and personally reviewed at 1219 showing sinus rhythm with PACs.  Ventricular rate of 93 and QTc of 494.  Normal axis.  Possible septal infarct age indeterminant noted.  Abnormal ECG.  When compared to ECG from 6/8/2023, no significant changes noted.         Results for orders placed or performed during the hospital encounter of 11/26/23 (from the past 24 hour(s))   EKG 12-lead, tracing only   Result Value Ref Range    Systolic Blood Pressure  mmHg    Diastolic Blood Pressure  mmHg    Ventricular Rate 93 BPM    Atrial Rate 93 BPM    MI Interval 154 ms    QRS Duration 76 ms     ms    QTc 494 ms    P Axis 77 degrees    R AXIS 49 degrees    T Axis 65 degrees    Interpretation ECG       Sinus rhythm with Premature atrial complexes  Prolonged QT  Abnormal ECG  When compared with ECG of 31-AUG-2013 07:46,  Premature atrial complexes are now Present  QT has lengthened     CBC with platelets differential    Narrative    The following orders were created for panel order CBC with platelets differential.  Procedure                               Abnormality         Status                     ---------                               -----------         ------                     CBC  with platelets and d...[[id number]]  Abnormal            Final result                 Please view results for these tests on the individual orders.   Comprehensive metabolic panel   Result Value Ref Range    Sodium 142 135 - 145 mmol/L    Potassium 4.2 3.4 - 5.3 mmol/L    Carbon Dioxide (CO2) 22 22 - 29 mmol/L    Anion Gap 12 7 - 15 mmol/L    Urea Nitrogen 15.4 8.0 - 23.0 mg/dL    Creatinine 1.21 (H) 0.51 - 0.95 mg/dL    GFR Estimate 45 (L) >60 mL/min/1.73m2    Calcium 8.9 8.8 - 10.2 mg/dL    Chloride 108 (H) 98 - 107 mmol/L    Glucose 102 (H) 70 - 99 mg/dL    Alkaline Phosphatase 70 40 - 150 U/L    AST 19 0 - 45 U/L    ALT 17 0 - 50 U/L    Protein Total 6.6 6.4 - 8.3 g/dL    Albumin 3.9 3.5 - 5.2 g/dL    Bilirubin Total 0.4 <=1.2 mg/dL   Magnesium   Result Value Ref Range    Magnesium 2.0 1.7 - 2.3 mg/dL   Troponin T, High Sensitivity   Result Value Ref Range    Troponin T, High Sensitivity 64 (H) <=14 ng/L   Nt probnp inpatient (BNP)   Result Value Ref Range    N terminal Pro BNP Inpatient 13,527 (H) 0 - 1,800 pg/mL   CBC with platelets and differential   Result Value Ref Range    WBC Count 10.2 4.0 - 11.0 10e3/uL    RBC Count 2.62 (L) 3.80 - 5.20 10e6/uL    Hemoglobin 7.1 (L) 11.7 - 15.7 g/dL    Hematocrit 23.2 (L) 35.0 - 47.0 %    MCV 89 78 - 100 fL    MCH 27.1 26.5 - 33.0 pg    MCHC 30.6 (L) 31.5 - 36.5 g/dL    RDW 18.0 (H) 10.0 - 15.0 %    Platelet Count 283 150 - 450 10e3/uL    % Neutrophils 78 %    % Lymphocytes 10 %    % Monocytes 10 %    % Eosinophils 1 %    % Basophils 0 %    % Immature Granulocytes 1 %    NRBCs per 100 WBC 0 <1 /100    Absolute Neutrophils 8.0 1.6 - 8.3 10e3/uL    Absolute Lymphocytes 1.0 0.8 - 5.3 10e3/uL    Absolute Monocytes 1.0 0.0 - 1.3 10e3/uL    Absolute Eosinophils 0.1 0.0 - 0.7 10e3/uL    Absolute Basophils 0.0 0.0 - 0.2 10e3/uL    Absolute Immature Granulocytes 0.1 <=0.4 10e3/uL    Absolute NRBCs 0.0 10e3/uL   [place] Draw    Narrative    The following orders were created for  panel order [place] Draw.  Procedure                               Abnormality         Status                     ---------                               -----------         ------                     Extra Blue Top Tube[[id number]]                              Final result               Extra Red Top Tube[[id number]]                               Final result               Extra Green Top (Lithium...[[id number]]                      Final result                 Please view results for these tests on the individual orders.   Extra Blue Top Tube   Result Value Ref Range    Hold Specimen JIC    Extra Red Top Tube   Result Value Ref Range    Hold Specimen JIC    Extra Green Top (Lithium Heparin) Tube   Result Value Ref Range    Hold Specimen JIC    D dimer quantitative   Result Value Ref Range    D-Dimer Quantitative 2.51 (H) 0.00 - 0.50 ug/mL FEU    Narrative    This D-dimer assay is intended for use in conjunction with a clinical pretest probability assessment model to exclude pulmonary embolism (PE) and deep venous thrombosis (DVT) in outpatients suspected of PE or DVT. The cut-off value is 0.50 ug/mL FEU.    For patients 50 years of age or older, the application of age-adjusted cut-off values for D-Dimer may increase the specificity without significant effect on sensitivity. The literature suggested calculation age adjusted cut-off in ug/L = age in years x 10 ug/L. The results in this laboratory are reported as ug/mL rather than ug/L. The calculation for age adjusted cut off in ug/mL= age in years x 0.01 ug/mL. For example, the cut off for a 76 year old male is 76 x 0.01 ug/mL = 0.76 ug/mL (760 ug/L).    M Duncan et al. Age adjusted D-dimer cut-off levels to rule out pulmonary embolism: The ADJUST-PE Study. CARLOS 2014;311:9868-9054.; HJ Cherrie et al. Diagnostic accuracy of conventional or age adjusted D-dimer cutoff values in older patients with suspected venous thromboembolism. Systemic review and meta-analysis.  BMJ 2013:346:f2492.   INR   Result Value Ref Range    INR 1.02 0.85 - 1.15   Partial thromboplastin time   Result Value Ref Range    aPTT 31 22 - 38 Seconds   XR Chest Port 1 View    Narrative    PROCEDURE:  XR CHEST PORT 1 VIEW    HISTORY: Shortness of breath. .    COMPARISON:  11/7/2023    FINDINGS:    The cardiomediastinal contours are magnified by portable technique.  No focal consolidation or pneumothorax. Advanced emphysematous changes  are again suggested. Trace pleural fluid is questioned.      Impression    IMPRESSION:  Advanced emphysema. Trace pleural fluid.      LEXY MCINTYRE MD         SYSTEM ID:  [id number]   Immunos occult blood   Result Value Ref Range    Occult Blood Slide 1 Positive (A) Negative   ABO/Rh type and screen    Narrative    The following orders were created for panel order ABO/Rh type and screen.  Procedure                               Abnormality         Status                     ---------                               -----------         ------                     Adult Type and Screen[[id number]]                            Edited Result - FINAL        Please view results for these tests on the individual orders.   Adult Type and Screen   Result Value Ref Range    ABO/RH(D) A POS     Antibody Screen Negative Negative    SPECIMEN EXPIRATION DATE [id number]    CT Chest (PE) Abdomen Pelvis w Contrast    Narrative    PROCEDURE:  CT CHEST PE ABDOMEN PELVIS W CONTRAST.    HISTORY:  Evaluate for pulmonary embolism.  Shortness of breath, R/O  PE, GI bleed    TECHNIQUE:  Initial pre-contrast  and localizer images were  obtained.  Contrast enhanced helical CT pulmonary angiography was then  performed. Delayed CT images of the abdomen and pelvis were then  obtained. Routine transaxial and post-processed (multiplanar and/or  MIP) reformations were obtained. This CT exam was performed using one  or more the following dose reduction techniques: automated exposure  control,  adjustment of the mA and/or kV according to patient size,  and/or iterative reconstruction technique.    COMPARISON:  11/26/2023    MEDS/CONTRAST: isovue 370 60ml    PULMONARY CTA FINDINGS:  This is a diagnostic quality helical CT  pulmonary angiogram.  There is no acute pulmonary embolism to the  first subsegmental pulmonary artery level.    OTHER FINDINGS:      The neck base is grossly symmetric. The heart is enlarged and there  are atherosclerotic calcifications of the coronary arteries. There is  a small pericardial effusion. Calcified mediastinal nodes are chronic.    There are small bilateral pleural effusions. Moderate to severe  emphysematous changes are present. No suspicious osseous lesion is  identified.    No hepatic mass is identified. The gallbladder is partially distended.  There is atrophy of the left kidney. There is physiologic hypertrophy  of the right kidney. Nodularity of the left adrenal gland is chronic.  There is no hydronephrosis. The small bowel is normal in caliber.  Slightly prominent stool can be seen in constipation.    Moderate L2 vertebral body height loss is redemonstrated.       Impression    IMPRESSION:    No acute pulmonary emboli to the subsegmental level.    Cardiomegaly. Small pericardial and bilateral pleural effusions.  Emphysema.    Question constipation.    LEXY MCINTYRE MD         SYSTEM ID:  [id number]   Prepare red blood cells (unit)   Result Value Ref Range    Blood Component Type Red Blood Cells     Product Code R4596D51     Unit Status Ready for issue     Unit Number [id number]     CROSSMATCH Compatible     CODING SYSTEM [id number]    Prepare red blood cells (unit)   Result Value Ref Range    Blood Component Type Red Blood Cells     Product Code N1734K78     Unit Status Ready for issue     Unit Number [id number]     CROSSMATCH Compatible     CODING SYSTEM [id number]    Extra Tube    Narrative    The following orders were created for panel order Extra  Tube.  Procedure                               Abnormality         Status                     ---------                               -----------         ------                     Extra Heparinized Syringe[[id number]]                        In process                   Please view results for these tests on the individual orders.       Medications   pantoprazole (PROTONIX) IV push injection 40 mg (has no administration in time range)   iopamidol (ISOVUE-370) solution 60 mL (60 mLs Intravenous $Given 11/26/23 1348)   sodium chloride (PF) 0.9% PF flush 50 mL (50 mLs Intravenous $Given 11/26/23 1348)   furosemide (LASIX) injection 40 mg (40 mg Intravenous $Given 11/26/23 1406)       Assessments & Plan (with Medical Decision Making)     I have reviewed the nursing notes.    I have reviewed the findings, diagnosis, plan and need for follow up with the patient.      Summary:  Patient presents to the ER today for shortness of breath.  Potential diagnosis which have been considered and evaluated include MI/NSTEMI, CHF, pneumonia, COPD exacerbation, as well as others. Many of these have been excluded using the various modalities and assessment as noted on the chart. At the present time, the diagnosis given seems to be the most likely CHF and blood loss anemia causing acute on chronic respiratory failure with hypoxia.  Upon arrival, vitals signs show blood pressure 180/70 with a pulse of 90.  Temperature 37.6  C.  Respirations 20 with oxygenation 93% on 3 L O2 via NC.  The patient is alert and oriented and speaking full sentences.  Breathing is mildly labored.  Does have Rales in right lower lobe noted.  Trace-1+ pitting pedal edema bilaterally is noted.  ECG obtained showing no acute abnormalities.  High-sensitivity troponin is elevated at 64.  2-hour repeat 66.   POCUS echo was conducted showing no acute abnormalities other than left atrial dilation.  POCUS chest scan was conducted showing interstitial disease but no  pneumothorax present.  Lab work shows WBC of 10.2 with hemoglobin of 7.1.  This anemia is new compared to 2 weeks ago when hemoglobin 11.5.  Rectal examination showed no melena or hematochezia.  All occult did come back positive.  Electrolytes and hepatic functions normal.  BUN 15.4, creatinine 1.21 and GFR 45 which is worse from 2 weeks previous.  BNP elevated at 13,527 which is significantly elevated.  D-dimer was also elevated at 2.51.  Chest x-ray personally reviewed showing increased interstitial markings and vasculature in the right lobe with possible fluid.  Due to elevated D-dimer and occult positive stool with anemia, did do CT angio of the chest, abdomen, pelvis which showed no PE but does have bilateral pleural effusions with small pericardial effusion.  Severe emphysema is present.  Cardiomegaly present.  Does have constipation on CT abdomen pelvis but no other acute issues noted.  Patient has consistent variations between blood pressures between left and right arm.  Left arm has systolic in the 90s.  Right arm has systolic in the 180s.  Due to elevated blood pressure in the right arm, elevated BNP, pleural effusions, did initiate furosemide 40 mg IV.  With hemoglobin low and patient having hypoxia with shortness of breath, did order transfusion of 2 units packed red blood cells.  Discussed case with hospitalist Dr. Valdivia for admission due to respiratory failure with hypoxia, blood loss anemia, CHF.  Patient accepted for ICU admission.  Recommends keeping patient n.p.o. and giving pantoprazole down in the ER.          Critical Care Time: None    Impression and plan discussed with patient. Questions answered, concerns addressed, indications for urgent re-evaluation reviewed, and  given. Patient/Parent/Caregiver agree with treatment plan and have no further questions at this time.      This note was created by the Dragon Voice Dictation System. Inadvertent typographical errors, due to software  recognition problems, may still exist.             New Prescriptions    No medications on file       Final diagnoses:   CHF (congestive heart failure) (H)   Anemia due to blood loss, acute   Acute respiratory failure with hypoxia (H)       11/26/2023   [facility]       Roshan Watson APRN CNP  11/26/23 3832

## 2023-11-26 NOTE — ED NOTES
Patient was put on bed pan to void- with rolling back and forth, she became very dyspneic, wheezy, and her breathing became very labored.  Increased oxygen to 6L   Provider to the bedside.   Patient slowly recovering.  Oxygen back to 3L

## 2023-11-26 NOTE — ED TRIAGE NOTES
Patient arrived by EMS on 3L oxygen, talking and interacting.  Patient does not use oxygen at home. Denies pain.    Blood pressures from left to right arm are different.   Patient states that her lasix was recently lower, provider thought she was getting dehydrated. Did increase back to prior dose on Friday (11-24-23)  and feels it is improving.

## 2023-11-26 NOTE — CARE PLAN
[facility] Inpatient Admission Note:    Patient admitted to 3124/3124-1 at approximately 1630 via cart accompanied by nurse and daughter from emergency room . Report received from Valery in SBAR format at 1600 via telephone. Patient transferred to bed via slide board.. Patient is alert and oriented X 3, reports pain; rates at 0 on 0-10 scale.  Patient oriented to room, unit, hourly rounding, and plan of care. Explained admission packet and patient handbook with patient bill of rights brochure. Will continue to monitor and document as needed.     Inpatient Nursing criteria listed below was met:    Health care directives status obtained and documented: No    Patient identifies a surrogate decision maker: Yes If yes, [facility] Contact Information:[phone]     If initial lactic acid greater than 2.0, repeat lactic acid drawn within one hour of arrival to unit: NA.    Clergy visit ordered if patient requests: No    Skin issues/needs documented: Yes    Isolation Patient: no Education given, correct sign in place and documentation row added to PCS:  Yes    Fall Prevention Yes: Care plan updated, education given and documented, sticker and magnet in place: Yes    Care Plan initiated: Yes    Education Documented (including assessment): Yes    Patient has discharge needs : No

## 2023-11-27 ENCOUNTER — APPOINTMENT (OUTPATIENT)
Dept: CARDIOLOGY | Facility: HOSPITAL | Age: 83
DRG: 291 | End: 2023-11-27
Attending: HOSPITALIST
Payer: COMMERCIAL

## 2023-11-27 ENCOUNTER — APPOINTMENT (OUTPATIENT)
Dept: OCCUPATIONAL THERAPY | Facility: HOSPITAL | Age: 83
DRG: 291 | End: 2023-11-27
Attending: HOSPITALIST
Payer: COMMERCIAL

## 2023-11-27 ENCOUNTER — APPOINTMENT (OUTPATIENT)
Dept: PHYSICAL THERAPY | Facility: HOSPITAL | Age: 83
DRG: 291 | End: 2023-11-27
Attending: HOSPITALIST
Payer: COMMERCIAL

## 2023-11-27 LAB
ANION GAP SERPL CALCULATED.3IONS-SCNC: 13 MMOL/L (ref 7–15)
BASOPHILS # BLD AUTO: 0 10E3/UL (ref 0–0.2)
BASOPHILS NFR BLD AUTO: 0 %
BUN SERPL-MCNC: 17.8 MG/DL (ref 8–23)
CALCIUM SERPL-MCNC: 8.1 MG/DL (ref 8.8–10.2)
CHLORIDE SERPL-SCNC: 107 MMOL/L (ref 98–107)
CREAT SERPL-MCNC: 1.41 MG/DL (ref 0.51–0.95)
DEPRECATED HCO3 PLAS-SCNC: 24 MMOL/L (ref 22–29)
EGFRCR SERPLBLD CKD-EPI 2021: 37 ML/MIN/1.73M2
EOSINOPHIL # BLD AUTO: 0.1 10E3/UL (ref 0–0.7)
EOSINOPHIL NFR BLD AUTO: 1 %
ERYTHROCYTE [DISTWIDTH] IN BLOOD BY AUTOMATED COUNT: 17.1 % (ref 10–15)
GLUCOSE BLDC GLUCOMTR-MCNC: 99 MG/DL (ref 70–99)
GLUCOSE SERPL-MCNC: 95 MG/DL (ref 70–99)
HCT VFR BLD AUTO: 24.9 % (ref 35–47)
HGB BLD-MCNC: 8 G/DL (ref 11.7–15.7)
HGB BLD-MCNC: 8.7 G/DL (ref 11.7–15.7)
IMM GRANULOCYTES # BLD: 0 10E3/UL
IMM GRANULOCYTES NFR BLD: 0 %
LVEF ECHO: NORMAL
LYMPHOCYTES # BLD AUTO: 1.4 10E3/UL (ref 0.8–5.3)
LYMPHOCYTES NFR BLD AUTO: 18 %
MCH RBC QN AUTO: 26.8 PG (ref 26.5–33)
MCHC RBC AUTO-ENTMCNC: 32.1 G/DL (ref 31.5–36.5)
MCV RBC AUTO: 83 FL (ref 78–100)
MONOCYTES # BLD AUTO: 0.9 10E3/UL (ref 0–1.3)
MONOCYTES NFR BLD AUTO: 12 %
NEUTROPHILS # BLD AUTO: 5.3 10E3/UL (ref 1.6–8.3)
NEUTROPHILS NFR BLD AUTO: 69 %
NRBC # BLD AUTO: 0 10E3/UL
NRBC BLD AUTO-RTO: 0 /100
PLATELET # BLD AUTO: 230 10E3/UL (ref 150–450)
POTASSIUM SERPL-SCNC: 3.8 MMOL/L (ref 3.4–5.3)
RBC # BLD AUTO: 2.99 10E6/UL (ref 3.8–5.2)
SODIUM SERPL-SCNC: 144 MMOL/L (ref 135–145)
WBC # BLD AUTO: 7.7 10E3/UL (ref 4–11)

## 2023-11-27 PROCEDURE — 250N000013 HC RX MED GY IP 250 OP 250 PS 637: Performed by: HOSPITALIST

## 2023-11-27 PROCEDURE — 85025 COMPLETE CBC W/AUTO DIFF WBC: CPT | Performed by: HOSPITALIST

## 2023-11-27 PROCEDURE — 97165 OT EVAL LOW COMPLEX 30 MIN: CPT | Mod: GO

## 2023-11-27 PROCEDURE — 200N000001 HC R&B ICU

## 2023-11-27 PROCEDURE — 82947 ASSAY GLUCOSE BLOOD QUANT: CPT | Performed by: HOSPITALIST

## 2023-11-27 PROCEDURE — 250N000011 HC RX IP 250 OP 636: Mod: JZ | Performed by: HOSPITALIST

## 2023-11-27 PROCEDURE — 97530 THERAPEUTIC ACTIVITIES: CPT | Mod: GP | Performed by: PHYSICAL THERAPIST

## 2023-11-27 PROCEDURE — 94640 AIRWAY INHALATION TREATMENT: CPT

## 2023-11-27 PROCEDURE — 93306 TTE W/DOPPLER COMPLETE: CPT

## 2023-11-27 PROCEDURE — 258N000003 HC RX IP 258 OP 636: Performed by: HOSPITALIST

## 2023-11-27 PROCEDURE — C9113 INJ PANTOPRAZOLE SODIUM, VIA: HCPCS | Mod: JZ | Performed by: HOSPITALIST

## 2023-11-27 PROCEDURE — 36415 COLL VENOUS BLD VENIPUNCTURE: CPT | Performed by: HOSPITALIST

## 2023-11-27 PROCEDURE — 99232 SBSQ HOSP IP/OBS MODERATE 35: CPT | Performed by: HOSPITALIST

## 2023-11-27 PROCEDURE — 93306 TTE W/DOPPLER COMPLETE: CPT | Mod: 26 | Performed by: INTERNAL MEDICINE

## 2023-11-27 PROCEDURE — 94640 AIRWAY INHALATION TREATMENT: CPT | Mod: 76

## 2023-11-27 PROCEDURE — 999N000157 HC STATISTIC RCP TIME EA 10 MIN

## 2023-11-27 PROCEDURE — 85018 HEMOGLOBIN: CPT | Performed by: HOSPITALIST

## 2023-11-27 PROCEDURE — 250N000009 HC RX 250: Performed by: HOSPITALIST

## 2023-11-27 PROCEDURE — 97161 PT EVAL LOW COMPLEX 20 MIN: CPT | Mod: GP | Performed by: PHYSICAL THERAPIST

## 2023-11-27 RX ORDER — IPRATROPIUM BROMIDE AND ALBUTEROL SULFATE 2.5; .5 MG/3ML; MG/3ML
1 SOLUTION RESPIRATORY (INHALATION) EVERY 6 HOURS PRN
Status: DISCONTINUED | OUTPATIENT
Start: 2023-11-27 | End: 2023-11-28 | Stop reason: HOSPADM

## 2023-11-27 RX ORDER — METOPROLOL SUCCINATE 25 MG/1
25 TABLET, EXTENDED RELEASE ORAL DAILY
Status: DISCONTINUED | OUTPATIENT
Start: 2023-11-27 | End: 2023-11-28 | Stop reason: HOSPADM

## 2023-11-27 RX ORDER — SUCRALFATE 1 G/1
1 TABLET ORAL 2 TIMES DAILY PRN
Status: DISCONTINUED | OUTPATIENT
Start: 2023-11-27 | End: 2023-11-28 | Stop reason: HOSPADM

## 2023-11-27 RX ORDER — FUROSEMIDE 20 MG
10 TABLET ORAL DAILY
COMMUNITY
End: 2024-02-07

## 2023-11-27 RX ORDER — FUROSEMIDE 10 MG/ML
40 INJECTION INTRAMUSCULAR; INTRAVENOUS EVERY 24 HOURS
Status: DISCONTINUED | OUTPATIENT
Start: 2023-11-27 | End: 2023-11-28 | Stop reason: HOSPADM

## 2023-11-27 RX ORDER — LEVOTHYROXINE SODIUM 50 UG/1
50 TABLET ORAL
COMMUNITY

## 2023-11-27 RX ORDER — ATORVASTATIN CALCIUM 40 MG/1
40 TABLET, FILM COATED ORAL AT BEDTIME
Status: DISCONTINUED | OUTPATIENT
Start: 2023-11-27 | End: 2023-11-28 | Stop reason: HOSPADM

## 2023-11-27 RX ORDER — FERROUS SULFATE 325(65) MG
325 TABLET, DELAYED RELEASE (ENTERIC COATED) ORAL DAILY
Status: DISCONTINUED | OUTPATIENT
Start: 2023-11-27 | End: 2023-11-27

## 2023-11-27 RX ORDER — LEVOTHYROXINE SODIUM 25 UG/1
50 TABLET ORAL DAILY
Status: DISCONTINUED | OUTPATIENT
Start: 2023-11-27 | End: 2023-11-28 | Stop reason: HOSPADM

## 2023-11-27 RX ORDER — HYDRALAZINE HYDROCHLORIDE 20 MG/ML
10 INJECTION INTRAMUSCULAR; INTRAVENOUS EVERY 4 HOURS PRN
Status: DISCONTINUED | OUTPATIENT
Start: 2023-11-27 | End: 2023-11-28

## 2023-11-27 RX ORDER — TROLAMINE SALICYLATE 10 G/100G
CREAM TOPICAL DAILY PRN
COMMUNITY

## 2023-11-27 RX ADMIN — IPRATROPIUM BROMIDE AND ALBUTEROL 1 PUFF: 20; 100 SPRAY, METERED RESPIRATORY (INHALATION) at 12:31

## 2023-11-27 RX ADMIN — ATORVASTATIN CALCIUM 40 MG: 40 TABLET, FILM COATED ORAL at 20:37

## 2023-11-27 RX ADMIN — PANTOPRAZOLE SODIUM 40 MG: 40 INJECTION, POWDER, FOR SOLUTION INTRAVENOUS at 09:27

## 2023-11-27 RX ADMIN — SENNOSIDES AND DOCUSATE SODIUM 1 TABLET: 8.6; 5 TABLET ORAL at 15:53

## 2023-11-27 RX ADMIN — IPRATROPIUM BROMIDE AND ALBUTEROL 1 PUFF: 20; 100 SPRAY, METERED RESPIRATORY (INHALATION) at 08:31

## 2023-11-27 RX ADMIN — METOPROLOL SUCCINATE 25 MG: 25 TABLET, EXTENDED RELEASE ORAL at 09:26

## 2023-11-27 RX ADMIN — IPRATROPIUM BROMIDE AND ALBUTEROL 1 PUFF: 20; 100 SPRAY, METERED RESPIRATORY (INHALATION) at 16:28

## 2023-11-27 RX ADMIN — HYDRALAZINE HYDROCHLORIDE 10 MG: 20 INJECTION INTRAMUSCULAR; INTRAVENOUS at 21:42

## 2023-11-27 RX ADMIN — PANTOPRAZOLE SODIUM 40 MG: 40 INJECTION, POWDER, FOR SOLUTION INTRAVENOUS at 20:33

## 2023-11-27 RX ADMIN — LEVOTHYROXINE SODIUM 50 MCG: 0.03 TABLET ORAL at 09:26

## 2023-11-27 RX ADMIN — FUROSEMIDE 40 MG: 10 INJECTION, SOLUTION INTRAMUSCULAR; INTRAVENOUS at 20:33

## 2023-11-27 RX ADMIN — IRON SUCROSE 200 MG: 20 INJECTION, SOLUTION INTRAVENOUS at 13:40

## 2023-11-27 RX ADMIN — IPRATROPIUM BROMIDE AND ALBUTEROL 1 PUFF: 20; 100 SPRAY, METERED RESPIRATORY (INHALATION) at 20:39

## 2023-11-27 ASSESSMENT — ACTIVITIES OF DAILY LIVING (ADL)
PREVIOUS_RESPONSIBILITIES: MEAL PREP;HOUSEKEEPING;LAUNDRY;SHOPPING;MEDICATION MANAGEMENT;DRIVING
ADLS_ACUITY_SCORE: 33
ADLS_ACUITY_SCORE: 34
ADLS_ACUITY_SCORE: 33
ADLS_ACUITY_SCORE: 35
ADLS_ACUITY_SCORE: 34
ADLS_ACUITY_SCORE: 33
ADLS_ACUITY_SCORE: 35
ADLS_ACUITY_SCORE: 35
ADLS_ACUITY_SCORE: 33
ADLS_ACUITY_SCORE: 35
ADLS_ACUITY_SCORE: 35
ADLS_ACUITY_SCORE: 33

## 2023-11-27 NOTE — PLAN OF CARE
/55   Pulse 65   Temp 98.7  F (37.1  C) (Tympanic)   Resp 18   Ht 1.524 m (5')   Wt 45.3 kg (99 lb 13.9 oz)   SpO2 98%   BMI 19.50 kg/m      A&O, BP sys 160-180s, taken on R arm, telemetry SR with occ PVCs, originally on 3L NC did attempt titration to RA, pt does desat to 88-89% MILLS, pt now on 1/2L NC, lungs dim throughout, infreq dry nonproductive cough, 2 PIV to L SL, , 99, and 95, highest temp 100.8 did give PRN tylenol with,  clear output 1050cc, no BM passing flatus, 2nd unit of PRBC not given this shift per hospitalist, pt remains NPO except meds, free from falls, bed alarm on and audible, makes needs known, call light within reach.    Face to face report given with opportunity to observe patient.    Report given to TRUNG Allan RN   11/27/2023  7:01 AM

## 2023-11-27 NOTE — PROGRESS NOTES
Assessment completed by face to face with patient.    LOC: alert et oriented    Dx: Acute respiratory failure w/hypoxia  Chronic Disease Management: Congestive heart failure (CHF), Essential hypertension (HTN), chronic kidney disease (CKD)stage 4, COPD    Lives with: Alone  Living at:  Home in [place]  Transportation: YES , independently drives    Primary PCP: Ophelia Troncoso  Insurance:  [facility] out of state Medicare replacement    Support System:  Yes, family, friends  Homecare/PCA: No  /County Services:   No  [place]: NO      How was the [facility] notification completed: N/A    Health Care Directive: No, declined offer from [facility]  Guardian: No  POA: No    Pharmacy: [facility] or [facility] for short term  Meds management: Independent    Adequate Resources for needs (housing, utilities, food/med): YES  Household chores: cleaning lady every 6 weeks  Work/community/social activity: YES, As desired    ADLs: Independent  Ambulation:Independent  Falls: No  Nutrition: No concerns voiced  Sleep: Regular bed w/adjustable frame    Equipment used: No      Oxygen supplier: N/A      Does the supplier have valid oxygen orders: N/A    Mental health: No concerns voiced  Substance abuse: No  Exposure to violence/abuse: No concerns voiced      Able to Return to Prior Living Arrangements: YES    Choice of Vendor: N/A    Barriers: None anticipated    LAWANDA: Low    Plan: Return to home via family to provide personal vehicle transport.

## 2023-11-27 NOTE — PLAN OF CARE
Goal Outcome Evaluation:         A&O. Currently on 3L per NC, MILLS or when laying flat. LS dim. BP remains in 180s on R arm, gets another dose of lasix tonight. First unit of PRBCs infusing, will get hgb check after finished. 2nd IV placed. Frequently on bedpan, refusing clement. NPO.     Face to face report given with opportunity to observe patient.    Report given to TRUNG Jose RN   11/26/2023  7:04 PM

## 2023-11-27 NOTE — PLAN OF CARE
This writer called daughter Cris at this time to update on pt hgb 8.5 after one unit given and plan.    Rebeca Knowles RN on 11/26/2023 at 8:07 PM

## 2023-11-27 NOTE — PROGRESS NOTES
11/27/23 0900   Appointment Info   Signing Clinician's Name / Credentials (OT) Brisa Parker, OTR/L   Living Environment   People in Home alone   Current Living Arrangements house   Home Accessibility stairs to enter home   Number of Stairs, Main Entrance 4   Transportation Anticipated car, drives self   Living Environment Comments Pt reports she does not go to the basement. Her bathroom has a tub/shower combo and she takes a bathtub. There is a bar in there. No bars near the toilet.   Self-Care   Usual Activity Tolerance good   Current Activity Tolerance moderate   Equipment Currently Used at Home grab bar, tub/shower   Fall history within last six months no   Activity/Exercise/Self-Care Comment Pt reports she is independent in ADLs at home. She does not use an AD for mobility.   Instrumental Activities of Daily Living (IADL)   Previous Responsibilities meal prep;housekeeping;laundry;shopping;medication management;driving   IADL Comments Pt has a  who comes every 6 weeks, but she otherwise cleans the bathroom in between. Her laundry is on the main floor. Pt does go shopping but has troubles with transporting them inside, sometimes her daughter's children assist with this. If pt has larger grocery orders, her daughter will do her shopping. Pt's neighbors/friends completes the yard work.   General Information   Onset of Illness/Injury or Date of Surgery 11/26/23   Referring Physician Dr. Salomon   Patient/Family Therapy Goal Statement (OT) Return home when ready   Additional Occupational Profile Info/Pertinent History of Current Problem Pt admitted with acute respiratory failure with hypoxia, severe COPD, CHF, anemia, and elevated BNP. She has a h/o GI bleed, coronary artery stent, CVA, PAD, post op afib, and emphysema.   Existing Precautions/Restrictions oxygen therapy device and L/min;other (see comments)  (does not wear oxygen at home)   Cognitive Status Examination   Orientation Status orientation to  person, place and time   Affect/Mental Status (Cognitive) WNL   Follows Commands WNL   Visual Perception   Visual Impairment/Limitations WFL   Pain Assessment   Patient Currently in Pain No   Range of Motion Comprehensive   Comment, General Range of Motion WFL   Strength Comprehensive (MMT)   Comment, General Manual Muscle Testing (MMT) Assessment WFL   Coordination   Upper Extremity Coordination No deficits were identified   Bed Mobility   Comment (Bed Mobility) NT, pt already up in the chair upon OT arrival   Transfers   Transfers sit-stand transfer   Sit-Stand Transfer   Sit-Stand [place] (Transfers) supervision   Assistive Device (Sit-Stand Transfers) walker, front-wheeled   Balance   Balance Comments no LOB - pt able to march in place x5 with SBA   Activities of Daily Living   BADL Assessment/Intervention lower body dressing;feeding;grooming   Lower Body Dressing Assessment/Training   Position (Lower Body Dressing) unsupported sitting   [place] Level (Lower Body Dressing) doff;don;shoes/slippers;supervision   Grooming Assessment/Training   Position (Grooming) unsupported sitting   [place] Level (Grooming) grooming skills;set up   Comment, (Grooming) unable to assess standing at the sink today due to O2 cord, no portable O2 tank   Eating/Self Feeding   Position (Feeding) unsupported sitting   [place] Level (Feeding) feeding skills;set up   Clinical Impression   Criteria for Skilled Therapeutic Interventions Met (OT) Yes, treatment indicated   OT Diagnosis decreased activity tolerance   Influenced by the following impairments decreased endurance, O2   Assessment of Occupational Performance 1-3 Performance Deficits   Identified Performance Deficits decreased tolerance for ADLs and IADLs   Planned Therapy Interventions (OT) ADL retraining;IADL retraining;risk factor education;progressive activity/exercise;home program guidelines;strengthening   Clinical Decision Making Complexity (OT) problem  focused assessment/low complexity   Risk & Benefits of therapy have been explained evaluation/treatment results reviewed;care plan/treatment goals reviewed;risks/benefits reviewed;current/potential barriers reviewed;participants voiced agreement with care plan;participants included;patient   Clinical Impression Comments OT evaluation completed. Prior to admission, pt lived alone and was independent in ADLs and numerous IADLs. She has assistance for yard work, housekeeping, and sometimes shopping. During evaluation today, pt was able to complete ADLs with SBA to set up in sitting, somewhat limited with pt being hooked up to the wall oxygen (no portable O2 tank present). Pt however did ambulate in the halls with PT - see their note for more details. Pt appears appropriate to return home upon discharge when medically ready. Pt would benefit from ongoing skilled OT services during her acute care stay to progress her activity tolerance. Anticipate no further skilled OT services will be needed, unless pt declines during her acute care stay. Will reassess daily and continue to assist with discharge planning.   OT Total Evaluation Time   OT Eval, Low Complexity Minutes (53257) 15   OT Goals   Therapy Frequency (OT) 5 times/week   OT Predicted Duration/Target Date for Goal Attainment 12/04/23   OT Goals Hygiene/Grooming;Lower Body Dressing;Toilet Transfer/Toileting   OT: Hygiene/Grooming modified independent   OT: Lower Body Dressing Modified independent   OT: Toilet Transfer/Toileting Modified independent   OT Discharge Planning   OT Plan Progress activity tolerance and independence in ADLs   OT Discharge Recommendation (DC Rec) home;home with assist  (previous assistance family/neighbors provided)   OT Rationale for DC Rec OT evaluation completed. Prior to admission, pt lived alone and was independent in ADLs and numerous IADLs. She has assistance for yard work, housekeeping, and sometimes shopping. During evaluation  today, pt was able to complete ADLs with SBA to set up in sitting, somewhat limited with pt being hooked up to the wall oxygen (no portable O2 tank present). Pt however did ambulate in the halls with PT - see their note for more details. Pt appears appropriate to return home upon discharge when medically ready. Pt would benefit from ongoing skilled OT services during her acute care stay to progress her activity tolerance. Anticipate no further skilled OT services will be needed, unless pt declines during her acute care stay. Will reassess daily and continue to assist with discharge planning.   OT Brief overview of current status SBA to set up for ADLs, limited in OT today with pt on wall oxygen but she did good with the activities she did complete. O2 with minimal activity (even talking, blowing her nose) did decrease anywhere down to 83-88% on .5 to 1L. Pt aware of need to complete purseld lip breathing and O2 typically would increase back to low to mid-90s. Pt states she is a mouth breather/drooler   Total Session Time   Total Session Time (sum of timed and untimed services) 15

## 2023-11-27 NOTE — CONSULTS
[facility] General Surgery Consult    Lita Ocasio MRN# [id number]   Age: 82 year old YOB: 1940     Date of Admission:  11/26/2023    Reason for consult: GI bleed       Requesting physician: Dr. Mervin Salomon       Level of consult: Consult, follow and place orders     Chief Complaint:     SOB      HPI:  This is a 82 year old female with multiple co-morbidities (systolic CHF, acquired hypothyroidism, benign essential hypertension, atherosclerotic cardiovascular disease, mixed hyperlipidemia, history of coronary artery stent placement to the OM left circumflex in June 2019 at [facility], history of GI bleed, CKD stage IV, subclavian artery stenosis with asymmetric blood pressures in both arms, history of CVA in 2019, peripheral artery disease, brought lateral carotid artery stenosis, severe COPD recent pulmonary function test this year, postoperative atrial fibrillation with cardioversion in 2019 and anemia) admitted today for acute respiratory failure with hypoxia and CHF exacerbation.  During her her work up she was noted to have a significantly decreased Hgb (11.5 two weeks ago vs 7.1 today).  She was heme occult positive in ER.  She has a history of erosive gastritis/old peptic ulcer disease and has had multiple endoscopies (upper/lower in the past).  She takes a baby ASA. But no blood thinners.  She was prescribed PPI and carafate but has not taken them in 5 months  Per nursing staff, she has not had rectal bleeding since being admitted to ICU.  She has received 1 unit PRBC and repeat Hgb 8.5. She denies nausea, vomiting, hematemesis, blood per rectum or abdominal pain.  She admits to constipation and shortness of breath.    Past Medical History:  Past Medical History:   Diagnosis Date    Acquired hypothyroidism 5/12/2019    Asthma     Benign essential hypertension 5/12/2019    Centrilobular emphysema (H) 5/12/2019    Chronic rhinitis 8/16/2013    Chronic systolic congestive heart  failure (H) 5/12/2019    Constipation     Coronary artery disease     Hearing loss     Heart trouble     Hemorrhagic cerebrovascular accident (CVA) (H) 01/2019    Hyperlipidemia 5/12/2019    Hypertension     Nasal congestion     Non-rheumatic mitral regurgitation 5/12/2019    Osteopenia 5/12/2019    Osteoporosis     Parathyroid related hypercalcemia (H24) 8/18/2013    Primary hyperparathyroidism (H24) 9/5/2013    Ringing in ears     Sensorineural hearing loss, asymmetrical 8/16/2013    Sneezing     Snoring     Stented coronary artery     Thyroid disease     Weakness     Weight loss        Past Surgical History:  Past Surgical History:   Procedure Laterality Date    CATARACT IOL, RT/LT Bilateral     COLONOSCOPY      COLONOSCOPY - HIM SCAN  01/01/2009    Colonoscopy - [facility]/NL  2009    ENDOSCOPY UPPER, COLONOSCOPY, COMBINED N/A 10/17/2019    Procedure: UPPER ENDOSCOPY WITH BIOPSY  AND COLONOSCOPY;  Surgeon: Julio Canales MD;  Location: [facility] OR    ENT SURGERY  2011    para thyroid surgery    ENT SURGERY  09/2013    Parathyroid    ESOPHAGOSCOPY, GASTROSCOPY, DUODENOSCOPY (EGD), COMBINED N/A 9/21/2019    Procedure: ESOPHAGOGASTRODUODENOSCOPY (EGD);  Surgeon: Julio Canales MD;  Location: [facility] OR    ESOPHAGOSCOPY, GASTROSCOPY, DUODENOSCOPY (EGD), COMBINED N/A 1/27/2020    Procedure: ESOPHAGOGASTRODUODENOSCOPY (EGD) WITH BIOPSY;  Surgeon: Isrrael Parish MD;  Location: [facility] OR    ESOPHAGOSCOPY, GASTROSCOPY, DUODENOSCOPY (EGD), COMBINED N/A 2/20/2020    Procedure: ESOPHAGOGASTRODUODENOSCOPY (EGD) WITH BIOPSY;  Surgeon: Pedro Luis Montoya DO;  Location: [facility] OR    PARATHYROIDECTOMY  9/10/2013    Procedure: PARATHYROIDECTOMY;  Reoperative Neck Exploration, Resection of right Parathyroid adenoma;  Surgeon: Thalia Muller MD;  Location: [facility] OR    TONSILLECTOMY      TUBAL LIGATION         Family History History:  Family History   Problem Relation Age of Onset    Hearing Loss Mother     Heart Disease Mother      Myocardial Infarction Mother     Cerebrovascular Disease Father     Cancer Brother         throat    Cancer Sister     Myocardial Infarction Sister     Cancer Maternal Grandmother     Heart Disease Maternal Grandfather     Aortic aneurysm Son        Social History:   I have reviewed this patient's social history    Current Medications:  Medications Prior to Admission   Medication Sig Dispense Refill Last Dose    aspirin (ASA) 81 MG chewable tablet Take 1 tablet (81 mg) by mouth daily 90 tablet 3 11/26/2023    atorvastatin (LIPITOR) 40 MG tablet Take 1 tablet (40 mg) by mouth At Bedtime 90 tablet 0 11/25/2023    calcium carbonate (TUMS) 500 MG chewable tablet Take 1 chew tab by mouth daily   11/25/2023    diclofenac (VOLTAREN) 1 % topical gel Apply 4 g topically 3 times daily as needed for moderate pain 150 g 1 Past Week    fluticasone (FLONASE) 50 MCG/ACT spray Spray 2 sprays into both nostrils daily 1 Bottle 11 Past Week    ipratropium - albuterol 0.5 mg/2.5 mg/3 mL (DUONEB) 0.5-2.5 (3) MG/3ML neb solution Take 1 vial (3 mLs) by nebulization every 6 hours as needed for shortness of breath, wheezing or cough 90 mL 3 Past Week    ipratropium-albuterol (COMBIVENT RESPIMAT)  MCG/ACT inhaler Inhale 1 puff into the lungs 4 times daily 4 g 3 Unknown    levothyroxine (SYNTHROID) 50 MCG tablet Take 1 tablet (50 mcg) by mouth daily 90 tablet 3 11/26/2023    lidocaine (LIDODERM) 5 % patch Place 1 patch onto the skin every 24 hours To prevent lidocaine toxicity, patient should be patch free for 12 hrs daily 10 patch 0 11/25/2023    metoprolol succinate ER (TOPROL XL) 25 MG 24 hr tablet Take 1 tablet (25 mg) by mouth daily 30 tablet 0 11/26/2023    nitroGLYcerin (NITROSTAT) 0.4 MG sublingual tablet Place 1 tablet (0.4 mg) under the tongue every 5 minutes as needed for chest pain For chest pain place 1 tablet under the tongue every 5 minutes for 3 doses. If symptoms persist 5 minutes after 1st dose call 911. 25 tablet 3  Unknown    pantoprazole (PROTONIX) 40 MG EC tablet TAKE 1 TABLET TWICE DAILY  BEFORE MEALS 180 tablet 3 11/26/2023    sucralfate (CARAFATE) 1 GM tablet Take 1 tablet (1 g) by mouth 2 times daily as needed for nausea 60 tablet 0 Unknown    Tafluprost 0.0015 % SOLN Place 1 drop into both eyes At Bedtime   11/25/2023    famotidine (PEPCID) 10 MG tablet Take 1 tablet (10 mg) by mouth daily as needed (acid reflux) (Patient not taking: Reported on 11/16/2023) 30 tablet 0     furosemide (LASIX) 20 MG tablet Take 1 tablet (20 mg) by mouth daily (Patient not taking: Reported on 11/16/2023) 90 tablet 3     lisinopril (ZESTRIL) 5 MG tablet Take 1 tablet (5 mg) by mouth daily (Patient not taking: Reported on 11/16/2023) 30 tablet 0        Allergies:  Allergies   Allergen Reactions    Evista [Raloxifene] Other (See Comments)     hypercalcemia    Prednisone GI Disturbance    Combigan [Brimonidine Tartrate-Timolol] Other (See Comments)     Eye swelling and itchy    Cyclosporine      Pt reports using eye gtts and having red irritated eyes     Latex Rash    Levaquin [Levofloxacin] Muscle Pain (Myalgia)    Penicillins Rash       ROS:  Pertinent items are noted in HPI.  All other systems are negative.    PHYSICAL EXAM:     Vital signs: BP (!) 185/70   Pulse 95   Temp 100.4  F (38  C) (Temporal)   Resp (!) 27   Ht 1.524 m (5')   Wt 45.3 kg (99 lb 13.9 oz)   SpO2 93%   BMI 19.50 kg/m     BMI: Body mass index is 19.5 kg/m .   General: in no acute distress, frail   Skin: no ecchymoses   HEENT: EOMI and Sclera clear, anicteric   Neck: supple   Lungs: clear to auscultation   CV: Regular rate and rhythm without murmer   Abdominal: +BS, soft, mildly distended, non-tender to palpation   Rectum: no gross blood noted   Extremities: no edema, redness or tenderness in the calves or thighs   Psych:  alert and oriented    DATA:    Results for orders placed or performed during the hospital encounter of 11/26/23   XR Chest Port 1 View      Status: None    Narrative    PROCEDURE:  XR CHEST PORT 1 VIEW    HISTORY: Shortness of breath. .    COMPARISON:  11/7/2023    FINDINGS:    The cardiomediastinal contours are magnified by portable technique.  No focal consolidation or pneumothorax. Advanced emphysematous changes  are again suggested. Trace pleural fluid is questioned.      Impression    IMPRESSION:  Advanced emphysema. Trace pleural fluid.      LEXY MCINTYRE MD         SYSTEM ID:  [id number]   POC US ECHO LIMITED     Status: None    Narrative    Roshan Watson APRN CNP     11/26/2023  3:32 PM  POC US ECHO LIMITED    Date/Time: 11/26/2023 12:50 PM    Performed by: Roshan Watson APRN CNP  Authorized by: Roshan Watson APRN CNP    Comments:      Limited Bedside Cardiac Ultrasound, performed and interpreted by me.   Indication: Shortness of Breath.  Parasternal long axis, parasternal short axis, apical 4 chamber,   subcostal, and IVC views were acquired.   Image quality was satisfactory.    Findings:    Abnormal left atrial dilation is present.  Left ventricular function   appears to be normal.  No pericardial fluid present.    IMPRESSION: Abnormal limited cardiac ultrasound showing left atrial   dilation.  Left ventricular function intact.  No pericardial effusion.      POC US CHEST B-SCAN     Status: None    Narrative    Roshan Watson APRN CNP     11/26/2023  3:32 PM  POC US CHEST B-SCAN    Date/Time: 11/26/2023 12:51 PM    Performed by: Roshan Watson APRN CNP  Authorized by: Roshan Watson APRN CNP    Procedure details:     Indications: dyspnea and hypoxia      Assessment for:  Hemothorax, pleural effusion, pneumothorax and   pneumonia    Left lung pleural:  Visualized    Right lung pleural:  Visualized  Findings:     A-lines noted throughout: not identified      B-lines noted throughout: not identified      Left lung sliding: identified      Right lung sliding: identified      Subpleural consolidation: identified      Lung  consolidation: not identified    Impression:     Impression: interstitial syndrome     CT Chest (PE) Abdomen Pelvis w Contrast     Status: None    Narrative    PROCEDURE:  CT CHEST PE ABDOMEN PELVIS W CONTRAST.    HISTORY:  Evaluate for pulmonary embolism.  Shortness of breath, R/O  PE, GI bleed    TECHNIQUE:  Initial pre-contrast  and localizer images were  obtained.  Contrast enhanced helical CT pulmonary angiography was then  performed. Delayed CT images of the abdomen and pelvis were then  obtained. Routine transaxial and post-processed (multiplanar and/or  MIP) reformations were obtained. This CT exam was performed using one  or more the following dose reduction techniques: automated exposure  control, adjustment of the mA and/or kV according to patient size,  and/or iterative reconstruction technique.    COMPARISON:  11/26/2023    MEDS/CONTRAST: isovue 370 60ml    PULMONARY CTA FINDINGS:  This is a diagnostic quality helical CT  pulmonary angiogram.  There is no acute pulmonary embolism to the  first subsegmental pulmonary artery level.    OTHER FINDINGS:      The neck base is grossly symmetric. The heart is enlarged and there  are atherosclerotic calcifications of the coronary arteries. There is  a small pericardial effusion. Calcified mediastinal nodes are chronic.    There are small bilateral pleural effusions. Moderate to severe  emphysematous changes are present. No suspicious osseous lesion is  identified.    No hepatic mass is identified. The gallbladder is partially distended.  There is atrophy of the left kidney. There is physiologic hypertrophy  of the right kidney. Nodularity of the left adrenal gland is chronic.  There is no hydronephrosis. The small bowel is normal in caliber.  Slightly prominent stool can be seen in constipation.    Moderate L2 vertebral body height loss is redemonstrated.       Impression    IMPRESSION:    No acute pulmonary emboli to the subsegmental  level.    Cardiomegaly. Small pericardial and bilateral pleural effusions.  Emphysema.    Question constipation.    LEXY MCINTYRE MD         SYSTEM ID:  [id number]   Comprehensive metabolic panel     Status: Abnormal   Result Value Ref Range    Sodium 142 135 - 145 mmol/L    Potassium 4.2 3.4 - 5.3 mmol/L    Carbon Dioxide (CO2) 22 22 - 29 mmol/L    Anion Gap 12 7 - 15 mmol/L    Urea Nitrogen 15.4 8.0 - 23.0 mg/dL    Creatinine 1.21 (H) 0.51 - 0.95 mg/dL    GFR Estimate 45 (L) >60 mL/min/1.73m2    Calcium 8.9 8.8 - 10.2 mg/dL    Chloride 108 (H) 98 - 107 mmol/L    Glucose 102 (H) 70 - 99 mg/dL    Alkaline Phosphatase 70 40 - 150 U/L    AST 19 0 - 45 U/L    ALT 17 0 - 50 U/L    Protein Total 6.6 6.4 - 8.3 g/dL    Albumin 3.9 3.5 - 5.2 g/dL    Bilirubin Total 0.4 <=1.2 mg/dL   Magnesium     Status: Normal   Result Value Ref Range    Magnesium 2.0 1.7 - 2.3 mg/dL   Troponin T, High Sensitivity     Status: Abnormal   Result Value Ref Range    Troponin T, High Sensitivity 64 (H) <=14 ng/L   Nt probnp inpatient (BNP)     Status: Abnormal   Result Value Ref Range    N terminal Pro BNP Inpatient 13,527 (H) 0 - 1,800 pg/mL   CBC with platelets and differential     Status: Abnormal   Result Value Ref Range    WBC Count 10.2 4.0 - 11.0 10e3/uL    RBC Count 2.62 (L) 3.80 - 5.20 10e6/uL    Hemoglobin 7.1 (L) 11.7 - 15.7 g/dL    Hematocrit 23.2 (L) 35.0 - 47.0 %    MCV 89 78 - 100 fL    MCH 27.1 26.5 - 33.0 pg    MCHC 30.6 (L) 31.5 - 36.5 g/dL    RDW 18.0 (H) 10.0 - 15.0 %    Platelet Count 283 150 - 450 10e3/uL    % Neutrophils 78 %    % Lymphocytes 10 %    % Monocytes 10 %    % Eosinophils 1 %    % Basophils 0 %    % Immature Granulocytes 1 %    NRBCs per 100 WBC 0 <1 /100    Absolute Neutrophils 8.0 1.6 - 8.3 10e3/uL    Absolute Lymphocytes 1.0 0.8 - 5.3 10e3/uL    Absolute Monocytes 1.0 0.0 - 1.3 10e3/uL    Absolute Eosinophils 0.1 0.0 - 0.7 10e3/uL    Absolute Basophils 0.0 0.0 - 0.2 10e3/uL    Absolute Immature  Granulocytes 0.1 <=0.4 10e3/uL    Absolute NRBCs 0.0 10e3/uL   [place] Draw     Status: None    Narrative    The following orders were created for panel order [place] Draw.  Procedure                               Abnormality         Status                     ---------                               -----------         ------                     Extra Blue Top Tube[[id number]]                              Final result               Extra Red Top Tube[[id number]]                               Final result               Extra Green Top (Lithium...[[id number]]                      Final result                 Please view results for these tests on the individual orders.   Extra Blue Top Tube     Status: None   Result Value Ref Range    Hold Specimen JIC    Extra Red Top Tube     Status: None   Result Value Ref Range    Hold Specimen JIC    Extra Green Top (Lithium Heparin) Tube     Status: None   Result Value Ref Range    Hold Specimen JIC    D dimer quantitative     Status: Abnormal   Result Value Ref Range    D-Dimer Quantitative 2.51 (H) 0.00 - 0.50 ug/mL FEU    Narrative    This D-dimer assay is intended for use in conjunction with a clinical pretest probability assessment model to exclude pulmonary embolism (PE) and deep venous thrombosis (DVT) in outpatients suspected of PE or DVT. The cut-off value is 0.50 ug/mL FEU.    For patients 50 years of age or older, the application of age-adjusted cut-off values for D-Dimer may increase the specificity without significant effect on sensitivity. The literature suggested calculation age adjusted cut-off in ug/L = age in years x 10 ug/L. The results in this laboratory are reported as ug/mL rather than ug/L. The calculation for age adjusted cut off in ug/mL= age in years x 0.01 ug/mL. For example, the cut off for a 76 year old male is 76 x 0.01 ug/mL = 0.76 ug/mL (760 ug/L).    JAS Song et al. Age adjusted D-dimer cut-off levels to rule out pulmonary embolism: The ADJUST-PE  Study. CARLOS 2014;311:8609-8725.; HJ Cherrie et al. Diagnostic accuracy of conventional or age adjusted D-dimer cutoff values in older patients with suspected venous thromboembolism. Systemic review and meta-analysis. BMJ 2013:346:f2492.   Immunos occult blood     Status: Abnormal   Result Value Ref Range    Occult Blood Slide 1 Positive (A) Negative   INR     Status: Normal   Result Value Ref Range    INR 1.02 0.85 - 1.15   Partial thromboplastin time     Status: Normal   Result Value Ref Range    aPTT 31 22 - 38 Seconds   Troponin T, High Sensitivity     Status: Abnormal   Result Value Ref Range    Troponin T, High Sensitivity 66 (H) <=14 ng/L   Extra Tube     Status: None    Narrative    The following orders were created for panel order Extra Tube.  Procedure                               Abnormality         Status                     ---------                               -----------         ------                     Extra Heparinized Syringe[[id number]]                        Final result                 Please view results for these tests on the individual orders.   Extra Heparinized Syringe     Status: None   Result Value Ref Range    Hold Specimen [facility]    Blood gas arterial     Status: Abnormal   Result Value Ref Range    pH Arterial 7.47 (H) 7.35 - 7.45    pCO2 Arterial 29 (L) 35 - 45 mm Hg    pO2 Arterial 70 (L) 80 - 105 mm Hg    FIO2 32     Bicarbonate Arterial 22 21 - 28 mmol/L    Base Excess/Deficit -1.7 -9.0 - 1.8 mmol/L    Vaibhav's Test Yes    Glucose by meter     Status: Normal   Result Value Ref Range    GLUCOSE BY METER POCT 94 70 - 99 mg/dL   EKG 12-lead, tracing only     Status: None (Preliminary result)   Result Value Ref Range    Systolic Blood Pressure  mmHg    Diastolic Blood Pressure  mmHg    Ventricular Rate 93 BPM    Atrial Rate 93 BPM    TN Interval 154 ms    QRS Duration 76 ms     ms    QTc 494 ms    P Axis 77 degrees    R AXIS 49 degrees    T Axis 65 degrees    Interpretation ECG        Sinus rhythm with Premature atrial complexes  Prolonged QT  Abnormal ECG  When compared with ECG of 31-AUG-2013 07:46,  Premature atrial complexes are now Present  QT has lengthened     Adult Type and Screen     Status: None   Result Value Ref Range    ABO/RH(D) A POS     Antibody Screen Negative Negative    SPECIMEN EXPIRATION DATE [id number]    Prepare red blood cells (unit)     Status: None (Preliminary result)   Result Value Ref Range    Blood Component Type Red Blood Cells     Product Code Q9930V61     Unit Status Ready for issue     Unit Number [id number]     CROSSMATCH Compatible     CODING SYSTEM [id number]    Prepare red blood cells (unit)     Status: None   Result Value Ref Range    Blood Component Type Red Blood Cells     Product Code A0496U27     Unit Status Transfused     Unit Number [id number]     CROSSMATCH Compatible     CODING SYSTEM [id number]     ISSUE DATE AND TIME [id number]     UNIT ABO/RH A+     UNIT TYPE ISBT 6200    CBC with platelets differential     Status: Abnormal    Narrative    The following orders were created for panel order CBC with platelets differential.  Procedure                               Abnormality         Status                     ---------                               -----------         ------                     CBC with platelets and d...[[id number]]  Abnormal            Final result                 Please view results for these tests on the individual orders.   ABO/Rh type and screen     Status: None    Narrative    The following orders were created for panel order ABO/Rh type and screen.  Procedure                               Abnormality         Status                     ---------                               -----------         ------                     Adult Type and Screen[[id number]]                            Edited Result - FINAL        Please view results for these tests on the individual orders.                ASSESSMENT: 82 year old female  admitted for hypoxia secondary to CHF exacerbation with suspected upper GI bleed and history of erosive gastritis/old peptic ulcer disease on previous EGDs    PLAN:   Agree with NPO, careful IVF resuscitation due to history of CHF; clement for hemodynamic monitoring--patient refusing at this point  2.   Agree with transfusion  3.   Agree with holding blood thinners (DVT prophylaxis and ASA)  4.   IV PPI for GI bleed  5.   Will hold off on EGD at this point due to her delicate clinical status with the CHF exacerbation etc and due supportive care at this point      Attestation:  Total time: 60 minutes    Jenny Dillon MD

## 2023-11-27 NOTE — PROGRESS NOTES
11/27/23 0918   Appointment Info   Signing Clinician's Name / Credentials (PT) Flori Barajas, DPT   Living Environment   People in Home alone   Current Living Arrangements house   Home Accessibility stairs to enter home   Number of Stairs, Main Entrance 4   Stair Railings, Main Entrance railings on both sides of stairs   Transportation Anticipated car, drives self   Living Environment Comments Pt reports she manages on the main level of her home   Self-Care   Usual Activity Tolerance good   Current Activity Tolerance moderate   Equipment Currently Used at Home grab bar, tub/shower   Fall history within last six months no   Activity/Exercise/Self-Care Comment Pt is independent with ADLs, drives, and ambulates without AD at baseline   General Information   Onset of Illness/Injury or Date of Surgery 11/26/23   Referring Physician Dr. Salomon   Patient/Family Therapy Goals Statement (PT) to go home today   Pertinent History of Current Problem (include personal factors and/or comorbidities that impact the POC) Pt admitted with worsening SOB and weakness.  Found to have COPD exacerbation also had low Hgb and CHF exacerbation   Existing Precautions/Restrictions oxygen therapy device and L/min   General Observations Pt on 1L O2 at rest, O2 sats 94%   Cognition   Affect/Mental Status (Cognition) WFL   Orientation Status (Cognition) oriented x 3   Follows Commands (Cognition) WFL   Pain Assessment   Patient Currently in Pain No  (Did intially state yes - hunger pains)   Integumentary/Edema   Integumentary/Edema no deficits were identifed   Posture    Posture Forward head position;Protracted shoulders   Range of Motion (ROM)   Range of Motion ROM is WFL   Strength (Manual Muscle Testing)   Strength (Manual Muscle Testing) Deficits observed during functional mobility   Bed Mobility   Bed Mobility no deficits identified   Transfers   Transfers sit-stand transfer   Sit-Stand Transfer   Sit-Stand [place] (Transfers)  supervision   Assistive Device (Sit-Stand Transfers) walker, front-wheeled   Comment, (Sit-Stand Transfer) pt wanted to use walker stating she was so weak from not having had anything to eat since Saturday   Gait/Stairs (Locomotion)   [place] Level (Gait) supervision   Assistive Device (Gait) walker, front-wheeled   Distance in Feet (Gait) 90'   Pattern (Gait) step-through   Comment, (Gait/Stairs) Ambulated on 1L O2 with sats 88% after ambulation, mild dyspnea.  O2 sats recovered back to 90% within 30 seconds of seated rest break.   Balance   Balance Comments good with support from walker   Clinical Impression   Criteria for Skilled Therapeutic Intervention Yes, treatment indicated   PT Diagnosis (PT) gait disturbance   Influenced by the following impairments weakness, decreased activity tolerance   Functional limitations due to impairments decreased tolerance for functional mobility and ADLs   Clinical Presentation (PT Evaluation Complexity) stable   Clinical Presentation Rationale clinical judgement   Clinical Decision Making (Complexity) low complexity   Planned Therapy Interventions (PT) gait training;patient/family education;strengthening;transfer training;progressive activity/exercise;risk factor education   Risk & Benefits of therapy have been explained evaluation/treatment results reviewed;care plan/treatment goals reviewed;risks/benefits reviewed;participants included;patient   Clinical Impression Comments PT evaluation completed.  Pt lives at home alone and is independent with all ADLs and mobility without AD at baseline.  Pt currently demonstrates decreased activity tolerance from baseline requiring supplemental O2 and use of FWW.  Anticipate pt will make continued improvement with medical management and will be safe to return home at discharge with no additional PT needs.  Will see during acute care stay to progress activity.   PT Total Evaluation Time   PT Eval, Low Complexity Minutes (96294) 12    Physical Therapy Goals   PT Frequency 6x/week   PT Predicted Duration/Target Date for Goal Attainment 12/08/23   PT Goals Transfers;Gait;Stairs   PT: Transfers Modified independent;Sit to/from stand;Bed to/from chair   PT: Gait Supervision/stand-by assist;150 feet   PT: Stairs Supervision/stand-by assist;4 stairs;Rail on both sides   Interventions   Interventions Quick Adds Therapeutic Activity   Therapeutic Activity   Therapeutic Activities: dynamic activities to improve functional performance Minutes (60604) 9   Symptoms Noted During/After Treatment Fatigue;Shortness of breath   Treatment Detail/Skilled Intervention After seated rest break, pt transferred sit>stand SBA up to FWW, ambulated additional 90' with FWW SBA, slow pace but steady on feet, on 1L O2.  Upon returning to room, pt pushed walker to side and walked short distance in room without AD SBA, no significant change in stability noted.  Pt's O2 sats 89% after second bout of ambulation with quick recovery back to 90% with rest break.  Encouraged ambulation with nursing staff during hospital stay.  Also discussed discharge and pt feels she will have no difficulty returning home, in fact states she is ready to go today.  Pt left sitting in chair with clip alarm on and call light in reach.   PT Discharge Planning   PT Plan Progress activity tolerance and mobility, trial stairs   PT Discharge Recommendation (DC Rec) home   PT Rationale for DC Rec PT evaluation completed. Pt lives at home alone and is independent with all ADLs and mobility without AD at baseline. Pt currently demonstrates decreased activity tolerance from baseline requiring supplemental O2 and use of FWW. Anticipate pt will make continued improvement with medical management and will be safe to return home at discharge with no additional PT needs. Will see during acute care stay to progress activity.   PT Brief overview of current status sup>sit mod I, sit<>stand SBA, ambulated 90'x2 with  FWW SBA   PT Equipment Needed at Discharge   (TBD - may need FWW)   Total Session Time   Timed Code Treatment Minutes 9   Total Session Time (sum of timed and untimed services) 21

## 2023-11-27 NOTE — MEDICATION SCRIBE - ADMISSION MEDICATION HISTORY
Medication Scribe Admission Medication History    Admission medication history is complete. The information provided in this note is only as accurate as the sources available at the time of the update.    Information Source(s): Patient, Family member, and CareEverywhere/SureScripts via in-person    Pertinent Information:   Patient manages her own medications and is a good historian with mild confusion. Patient has had multiple medication changes in the past month.   11/7/23- metoprolol decreased from 50 mg to 25 mg. Lisinopril decreased from 20 mg to 5 mg.   11/10/23-lasix held. Lisinopril held.   11/16/23- lasix restarted at 10 mg daily (pt cutting 20 mg tab in half). Lisinopril continues to be held (removed from med list)  Pt stopped using voltaren gel and lidocaine patches and started using aspercreme for lower back pain relief.     Changes made to PTA medication list:  Added: None  Deleted: voltaren, lidocaine, lisinopril  Changed: levothyroxine- pt takes first thing in AM before all other meds.     Medication Affordability:  Not including over the counter (OTC) medications, was there a time in the past 3 months when you did not take your medications as prescribed because of cost?: No    Allergies reviewed with patient and updates made in EHR: no    Medication History Completed By: Arabella Alamo 11/27/2023 1:34 PM    PTA Med List   Medication Sig Last Dose    aspirin (ASA) 81 MG chewable tablet Take 1 tablet (81 mg) by mouth daily Past Week    atorvastatin (LIPITOR) 40 MG tablet Take 1 tablet (40 mg) by mouth At Bedtime Past Week    calcium carbonate (TUMS) 500 MG chewable tablet Take 1 chew tab by mouth at bedtime 11/25/2023    fluticasone (FLONASE) 50 MCG/ACT spray Spray 2 sprays into both nostrils daily (Patient taking differently: Spray 2 sprays into both nostrils daily as needed) Past Week    furosemide (LASIX) 20 MG tablet Take 10 mg by mouth daily     ipratropium - albuterol 0.5 mg/2.5 mg/3 mL (DUONEB)  0.5-2.5 (3) MG/3ML neb solution Take 1 vial (3 mLs) by nebulization every 6 hours as needed for shortness of breath, wheezing or cough Past Week    ipratropium-albuterol (COMBIVENT RESPIMAT)  MCG/ACT inhaler Inhale 1 puff into the lungs 4 times daily (Patient taking differently: Inhale 1 puff into the lungs 4 times daily (Up to 4 times daily)) Unknown    levothyroxine (SYNTHROID/LEVOTHROID) 50 MCG tablet Take 50 mcg by mouth every morning (before breakfast) 11/26/2023 at AM    metoprolol succinate ER (TOPROL XL) 25 MG 24 hr tablet Take 1 tablet (25 mg) by mouth daily 11/26/2023    nitroGLYcerin (NITROSTAT) 0.4 MG sublingual tablet Place 1 tablet (0.4 mg) under the tongue every 5 minutes as needed for chest pain For chest pain place 1 tablet under the tongue every 5 minutes for 3 doses. If symptoms persist 5 minutes after 1st dose call 911. Unknown    pantoprazole (PROTONIX) 40 MG EC tablet TAKE 1 TABLET TWICE DAILY  BEFORE MEALS 11/26/2023    sucralfate (CARAFATE) 1 GM tablet Take 1 tablet (1 g) by mouth 2 times daily as needed for nausea Unknown    Tafluprost 0.0015 % SOLN Place 1 drop into both eyes at bedtime -when remembered. 11/25/2023    trolamine salicylate (ASPERCREME) 10 % external cream Apply topically daily as needed (lower back pain) Past Week

## 2023-11-27 NOTE — PLAN OF CARE
Pt A&O x 4. Pleasant and cooperative. Son and daughter in law here for awhile today to visit. VSS, pt off of 02 for awhile today but needed to go back on to maintain sats and remains at 1L NC. Highest temp of 100.4. No tylenol given. Pt denies pain all shift. HS regular, BP taken in the R arm, LS clear/dimished, pt IND coughing in room, inhaler given by RT, BS active. Patient had 2 BM's today and was given 1 senna per pt request. Voiding spontaneously w/o difficulty. Patient SBA to the BR. 2 IV's in L arm SL. Iron infusion given today. Echo completed. Trace edema has improved to BLE, only visible in the left ankle and foot now. Pt encouraged to elevate legs. Pt's diet advanced to low sodium diet, tolerating it well and good appetite. Refused bed bath, but would like to do it tomorrow. Remains up in chair most of the shift. PT and OT worked with patient today. Able to make needs known, call light in reach, alarms activated and audible.     Face to face report given with opportunity to observe patient.    Report given to Argenis Mariee RN   11/27/2023  7:20 PM

## 2023-11-27 NOTE — PROGRESS NOTES
[facility]    Medicine Progress Note - Hospitalist Service    Date of Admission:  11/26/2023    Assessment & Plan    Lita Ocasio is a 82 year old female admitted on 11/26/2023. She    has established diagnosis of systolic CHF, acquired hypothyroidism, benign essential hypertension, atherosclerotic cardiovascular disease, mixed hyperlipidemia, history of coronary artery stent placement to the OM left circumflex in June 2019 at [facility], history of GI bleed, CKD stage IV, subclavian artery stenosis with asymmetric blood pressures in both arms, history of CVA in 2019, peripheral artery disease, brought lateral carotid artery stenosis, severe COPD recent pulmonary function test this year, postoperative atrial fibrillation with cardioversion in 2019 and anemia. She is full code      Active Problems:    Acute respiratory failure with hypoxia (H) (5/12/2019)   COPD, 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)  Suspect 2nd from CHF  and anemia  ' treat underlying cause  Abg ordered  No PE on CTA  Wean off oxygen        History of GI bleed (9/20/2019)    Anemia (11/26/2023)  FOB positive   Component      Latest Ref Rng 11/26/2023  7:33 PM 11/26/2023  11:01 PM 11/27/2023  4:41 AM   Hemoglobin      11.7 - 15.7 g/dL 8.5 (L)  8.5 (L)  8.0 (L)       Ok for clears  Will hold off on any procedures after discussing with patient .   Monitor  hgb q 6 hrs  Ppi Protonix bid  General surgery consult        Chronic systolic congestive heart failure (H)- Recovered (5/12/2019    Elevated brain natriuretic peptide (BNP) level (11/26/2023)  Echo pending   Tele  Lasix 40mg iv daily for now        Acquired hypothyroidism (5/12/2019)  -home synthroid        Benign essential hypertension (5/12/2019  -home metoprolol       ASCVD (arteriosclerotic cardiovascular disease) (6/18/2019)    Mixed hyperlipidemia (6/18/2019)  -home statin          CKD (chronic kidney disease) stage 4, GFR 15-29 ml/min (H) (1/26/2022)  Daily labs  Avoid nephrotic meds        History of coronary artery stent placement to the O/M LCX on 6/28/2019 at [facility] (7/23/2019)    Subclavian arterial stenosis (H24) (1/26/2022)    History of CVA on 1/1/2019 (3/14/2022)    Postoperative atrial fibrillation on 6/28/2019 (H) (3/14/2022)    PAD on 2/28/2020-moderate (H) (3/14/2022)    Bilateral carotid artery stenosis (3/14/2022)  On aspirin at home- hold                      Diet: NPO for Medical/Clinical Reasons Except for: Meds    DVT Prophylaxis: Pneumatic Compression Devices  Fitzgerald Catheter: Not present  Lines: None     Cardiac Monitoring: ACTIVE order. Indication: Acute decompensated heart failure (48 hours)  Code Status: Full Code      Clinically Significant Risk Factors Present on Admission          # Hypocalcemia: Lowest Ca = 8.1 mg/dL in last 2 days, will monitor and replace as appropriate       # Drug Induced Platelet Defect: home medication list includes an antiplatelet medication   # Hypertension: Noted on problem list                 Disposition Plan      Expected Discharge Date: 11/30/2023                    Lisandro Salomon DO  Hospitalist Service  [facility]  Securely message with [employer] (more info)  Text page via [facility] Paging/Directory   ______________________________________________________________________    Interval History   Patient was seen this morning for medical rounds. She has some shortness of breath but is feeling better. No bleeding    we did discuss regarding EGD vs conservative management, she would like to try clear liquids today and no procedure at this time as risk of anesthesia. She did state she has history of stomach ulcer.     Physical Exam   Vital Signs: Temp: 99  F (37.2  C) Temp src: Tympanic BP: 170/58 Pulse: 66   Resp: 22 SpO2: 92 % O2 Device: Nasal cannula Oxygen Delivery: 1/2 LPM  Weight: 99 lbs 13.89 oz    Physical Exam  Constitutional:       Comments: Frail appearing stated age female    HENT:      Right Ear: External ear normal.       Left Ear: External ear normal.      Nose: Nose normal.      Mouth/Throat:      Pharynx: Oropharynx is clear.   Cardiovascular:      Rate and Rhythm: Normal rate.   Pulmonary:      Effort: Pulmonary effort is normal.      Comments: Decreased breath sounds at bases   Abdominal:      General: Abdomen is flat.   Musculoskeletal:         General: No swelling.   Skin:     Coloration: Skin is not jaundiced.   Neurological:      Mental Status: Mental status is at baseline.         Medical Decision Making       49 MINUTES SPENT BY ME on the date of service doing chart review, history, exam, documentation & further activities per the note.      Data     I have personally reviewed the following data over the past 24 hrs:    7.7  \   8.0 (L)   / 230     144 107 17.8 /  95   3.8 24 1.41 (H) \     ALT: 17 AST: 19 AP: 70 TBILI: 0.4   ALB: 3.9 TOT PROTEIN: 6.6 LIPASE: N/A     Trop: 66 (H) BNP: 13,527 (H)     INR:  1.02 PTT:  31   D-dimer:  2.51 (H) Fibrinogen:  N/A       Imaging results reviewed over the past 24 hrs:   Recent Results (from the past 24 hour(s))   POC US ECHO LIMITED    Narrative    Roshan Watson APRN CNP     11/26/2023  3:32 PM  POC US ECHO LIMITED    Date/Time: 11/26/2023 12:50 PM    Performed by: Roshan Watson APRN CNP  Authorized by: Roshan Watson APRN CNP    Comments:      Limited Bedside Cardiac Ultrasound, performed and interpreted by me.   Indication: Shortness of Breath.  Parasternal long axis, parasternal short axis, apical 4 chamber,   subcostal, and IVC views were acquired.   Image quality was satisfactory.    Findings:    Abnormal left atrial dilation is present.  Left ventricular function   appears to be normal.  No pericardial fluid present.    IMPRESSION: Abnormal limited cardiac ultrasound showing left atrial   dilation.  Left ventricular function intact.  No pericardial effusion.      POC US CHEST B-SCAN    Narrative    Roshan Watson APRN CNP     11/26/2023  3:32 PM  POC US CHEST  B-SCAN    Date/Time: 11/26/2023 12:51 PM    Performed by: Roshan Watson APRN CNP  Authorized by: Roshan Watson APRN CNP    Procedure details:     Indications: dyspnea and hypoxia      Assessment for:  Hemothorax, pleural effusion, pneumothorax and   pneumonia    Left lung pleural:  Visualized    Right lung pleural:  Visualized  Findings:     A-lines noted throughout: not identified      B-lines noted throughout: not identified      Left lung sliding: identified      Right lung sliding: identified      Subpleural consolidation: identified      Lung consolidation: not identified    Impression:     Impression: interstitial syndrome     XR Chest Port 1 View    Narrative    PROCEDURE:  XR CHEST PORT 1 VIEW    HISTORY: Shortness of breath. .    COMPARISON:  11/7/2023    FINDINGS:    The cardiomediastinal contours are magnified by portable technique.  No focal consolidation or pneumothorax. Advanced emphysematous changes  are again suggested. Trace pleural fluid is questioned.      Impression    IMPRESSION:  Advanced emphysema. Trace pleural fluid.      LEXY MCINTYRE MD         SYSTEM ID:  [id number]   CT Chest (PE) Abdomen Pelvis w Contrast    Narrative    PROCEDURE:  CT CHEST PE ABDOMEN PELVIS W CONTRAST.    HISTORY:  Evaluate for pulmonary embolism.  Shortness of breath, R/O  PE, GI bleed    TECHNIQUE:  Initial pre-contrast  and localizer images were  obtained.  Contrast enhanced helical CT pulmonary angiography was then  performed. Delayed CT images of the abdomen and pelvis were then  obtained. Routine transaxial and post-processed (multiplanar and/or  MIP) reformations were obtained. This CT exam was performed using one  or more the following dose reduction techniques: automated exposure  control, adjustment of the mA and/or kV according to patient size,  and/or iterative reconstruction technique.    COMPARISON:  11/26/2023    MEDS/CONTRAST: isovue 370 60ml    PULMONARY CTA FINDINGS:  This is a  diagnostic quality helical CT  pulmonary angiogram.  There is no acute pulmonary embolism to the  first subsegmental pulmonary artery level.    OTHER FINDINGS:      The neck base is grossly symmetric. The heart is enlarged and there  are atherosclerotic calcifications of the coronary arteries. There is  a small pericardial effusion. Calcified mediastinal nodes are chronic.    There are small bilateral pleural effusions. Moderate to severe  emphysematous changes are present. No suspicious osseous lesion is  identified.    No hepatic mass is identified. The gallbladder is partially distended.  There is atrophy of the left kidney. There is physiologic hypertrophy  of the right kidney. Nodularity of the left adrenal gland is chronic.  There is no hydronephrosis. The small bowel is normal in caliber.  Slightly prominent stool can be seen in constipation.    Moderate L2 vertebral body height loss is redemonstrated.       Impression    IMPRESSION:    No acute pulmonary emboli to the subsegmental level.    Cardiomegaly. Small pericardial and bilateral pleural effusions.  Emphysema.    Question constipation.    LEXY MCINTYRE MD         SYSTEM ID:  [id number]

## 2023-11-28 ENCOUNTER — TELEPHONE (OUTPATIENT)
Dept: FAMILY MEDICINE | Facility: OTHER | Age: 83
End: 2023-11-28

## 2023-11-28 ENCOUNTER — APPOINTMENT (OUTPATIENT)
Dept: PHYSICAL THERAPY | Facility: HOSPITAL | Age: 83
DRG: 291 | End: 2023-11-28
Payer: COMMERCIAL

## 2023-11-28 ENCOUNTER — TELEPHONE (OUTPATIENT)
Dept: ONCOLOGY | Facility: OTHER | Age: 83
End: 2023-11-28

## 2023-11-28 VITALS
BODY MASS INDEX: 19.13 KG/M2 | TEMPERATURE: 98.9 F | WEIGHT: 97.44 LBS | OXYGEN SATURATION: 92 % | HEART RATE: 81 BPM | DIASTOLIC BLOOD PRESSURE: 57 MMHG | HEIGHT: 60 IN | SYSTOLIC BLOOD PRESSURE: 136 MMHG | RESPIRATION RATE: 20 BRPM

## 2023-11-28 LAB
ANION GAP SERPL CALCULATED.3IONS-SCNC: 13 MMOL/L (ref 7–15)
BASOPHILS # BLD AUTO: 0 10E3/UL (ref 0–0.2)
BASOPHILS NFR BLD AUTO: 0 %
BUN SERPL-MCNC: 21 MG/DL (ref 8–23)
CALCIUM SERPL-MCNC: 8.2 MG/DL (ref 8.8–10.2)
CHLORIDE SERPL-SCNC: 105 MMOL/L (ref 98–107)
CREAT SERPL-MCNC: 1.23 MG/DL (ref 0.51–0.95)
DEPRECATED HCO3 PLAS-SCNC: 25 MMOL/L (ref 22–29)
EGFRCR SERPLBLD CKD-EPI 2021: 44 ML/MIN/1.73M2
EOSINOPHIL # BLD AUTO: 0.3 10E3/UL (ref 0–0.7)
EOSINOPHIL NFR BLD AUTO: 3 %
ERYTHROCYTE [DISTWIDTH] IN BLOOD BY AUTOMATED COUNT: 17 % (ref 10–15)
GLUCOSE SERPL-MCNC: 116 MG/DL (ref 70–99)
HCT VFR BLD AUTO: 26.5 % (ref 35–47)
HGB BLD-MCNC: 8.7 G/DL (ref 11.7–15.7)
HGB BLD-MCNC: 8.9 G/DL (ref 11.7–15.7)
IMM GRANULOCYTES # BLD: 0 10E3/UL
IMM GRANULOCYTES NFR BLD: 0 %
LYMPHOCYTES # BLD AUTO: 1.2 10E3/UL (ref 0.8–5.3)
LYMPHOCYTES NFR BLD AUTO: 13 %
MCH RBC QN AUTO: 27.9 PG (ref 26.5–33)
MCHC RBC AUTO-ENTMCNC: 32.8 G/DL (ref 31.5–36.5)
MCV RBC AUTO: 85 FL (ref 78–100)
MONOCYTES # BLD AUTO: 1.1 10E3/UL (ref 0–1.3)
MONOCYTES NFR BLD AUTO: 11 %
NEUTROPHILS # BLD AUTO: 6.9 10E3/UL (ref 1.6–8.3)
NEUTROPHILS NFR BLD AUTO: 73 %
NRBC # BLD AUTO: 0 10E3/UL
NRBC BLD AUTO-RTO: 0 /100
PLATELET # BLD AUTO: 254 10E3/UL (ref 150–450)
POTASSIUM SERPL-SCNC: 3.6 MMOL/L (ref 3.4–5.3)
RBC # BLD AUTO: 3.12 10E6/UL (ref 3.8–5.2)
SODIUM SERPL-SCNC: 143 MMOL/L (ref 135–145)
WBC # BLD AUTO: 9.4 10E3/UL (ref 4–11)

## 2023-11-28 PROCEDURE — 99239 HOSP IP/OBS DSCHRG MGMT >30: CPT | Performed by: HOSPITALIST

## 2023-11-28 PROCEDURE — 36415 COLL VENOUS BLD VENIPUNCTURE: CPT | Performed by: HOSPITALIST

## 2023-11-28 PROCEDURE — 258N000003 HC RX IP 258 OP 636: Performed by: HOSPITALIST

## 2023-11-28 PROCEDURE — 85018 HEMOGLOBIN: CPT | Performed by: HOSPITALIST

## 2023-11-28 PROCEDURE — 97530 THERAPEUTIC ACTIVITIES: CPT | Mod: GP | Performed by: PHYSICAL THERAPIST

## 2023-11-28 PROCEDURE — 250N000013 HC RX MED GY IP 250 OP 250 PS 637: Performed by: HOSPITALIST

## 2023-11-28 PROCEDURE — 250N000011 HC RX IP 250 OP 636: Mod: JZ | Performed by: HOSPITALIST

## 2023-11-28 PROCEDURE — 80048 BASIC METABOLIC PNL TOTAL CA: CPT | Performed by: HOSPITALIST

## 2023-11-28 PROCEDURE — C9113 INJ PANTOPRAZOLE SODIUM, VIA: HCPCS | Mod: JZ | Performed by: HOSPITALIST

## 2023-11-28 RX ORDER — HEPARIN SODIUM (PORCINE) LOCK FLUSH IV SOLN 100 UNIT/ML 100 UNIT/ML
5 SOLUTION INTRAVENOUS
Status: CANCELLED | OUTPATIENT
Start: 2023-11-30

## 2023-11-28 RX ORDER — MEPERIDINE HYDROCHLORIDE 25 MG/ML
25 INJECTION INTRAMUSCULAR; INTRAVENOUS; SUBCUTANEOUS EVERY 30 MIN PRN
Status: CANCELLED | OUTPATIENT
Start: 2023-11-30

## 2023-11-28 RX ORDER — PANTOPRAZOLE SODIUM 40 MG/1
40 TABLET, DELAYED RELEASE ORAL 2 TIMES DAILY
Qty: 60 TABLET | Refills: 1 | Status: SHIPPED | OUTPATIENT
Start: 2023-11-28 | End: 2024-01-27

## 2023-11-28 RX ORDER — ALBUTEROL SULFATE 90 UG/1
1-2 AEROSOL, METERED RESPIRATORY (INHALATION)
Status: CANCELLED
Start: 2023-11-30

## 2023-11-28 RX ORDER — SUCRALFATE ORAL 1 G/10ML
1 SUSPENSION ORAL 4 TIMES DAILY
Qty: 1200 ML | Refills: 0 | Status: SHIPPED | OUTPATIENT
Start: 2023-11-28 | End: 2023-12-28

## 2023-11-28 RX ORDER — HEPARIN SODIUM,PORCINE 10 UNIT/ML
5-20 VIAL (ML) INTRAVENOUS DAILY PRN
Status: CANCELLED | OUTPATIENT
Start: 2023-11-30

## 2023-11-28 RX ORDER — LISINOPRIL 5 MG/1
5 TABLET ORAL DAILY
Qty: 30 TABLET | Refills: 0 | Status: SHIPPED | OUTPATIENT
Start: 2023-11-29 | End: 2024-01-03

## 2023-11-28 RX ORDER — DIPHENHYDRAMINE HYDROCHLORIDE 50 MG/ML
50 INJECTION INTRAMUSCULAR; INTRAVENOUS
Status: CANCELLED
Start: 2023-11-30

## 2023-11-28 RX ORDER — ALBUTEROL SULFATE 0.83 MG/ML
2.5 SOLUTION RESPIRATORY (INHALATION)
Status: CANCELLED | OUTPATIENT
Start: 2023-11-30

## 2023-11-28 RX ORDER — LISINOPRIL 5 MG/1
5 TABLET ORAL DAILY
Status: DISCONTINUED | OUTPATIENT
Start: 2023-11-28 | End: 2023-11-28 | Stop reason: HOSPADM

## 2023-11-28 RX ORDER — EPINEPHRINE 1 MG/ML
0.3 INJECTION, SOLUTION INTRAMUSCULAR; SUBCUTANEOUS EVERY 5 MIN PRN
Status: CANCELLED | OUTPATIENT
Start: 2023-11-30

## 2023-11-28 RX ORDER — METHYLPREDNISOLONE SODIUM SUCCINATE 125 MG/2ML
125 INJECTION, POWDER, LYOPHILIZED, FOR SOLUTION INTRAMUSCULAR; INTRAVENOUS
Status: CANCELLED
Start: 2023-11-30

## 2023-11-28 RX ADMIN — IPRATROPIUM BROMIDE AND ALBUTEROL 1 PUFF: 20; 100 SPRAY, METERED RESPIRATORY (INHALATION) at 12:40

## 2023-11-28 RX ADMIN — PANTOPRAZOLE SODIUM 40 MG: 40 INJECTION, POWDER, FOR SOLUTION INTRAVENOUS at 08:08

## 2023-11-28 RX ADMIN — LEVOTHYROXINE SODIUM 50 MCG: 0.03 TABLET ORAL at 08:08

## 2023-11-28 RX ADMIN — LISINOPRIL 5 MG: 5 TABLET ORAL at 08:08

## 2023-11-28 RX ADMIN — METOPROLOL SUCCINATE 25 MG: 25 TABLET, EXTENDED RELEASE ORAL at 08:08

## 2023-11-28 RX ADMIN — IPRATROPIUM BROMIDE AND ALBUTEROL 1 PUFF: 20; 100 SPRAY, METERED RESPIRATORY (INHALATION) at 09:59

## 2023-11-28 RX ADMIN — IRON SUCROSE 200 MG: 20 INJECTION, SOLUTION INTRAVENOUS at 11:46

## 2023-11-28 ASSESSMENT — ACTIVITIES OF DAILY LIVING (ADL)
ADLS_ACUITY_SCORE: 28

## 2023-11-28 NOTE — PROVIDER NOTIFICATION
MD notified of patient running hypertensive with SBP's 170-180s.    PRN hydralazine ordered by MD.

## 2023-11-28 NOTE — TELEPHONE ENCOUNTER
Clinic Care Coordination Contact  TC from [facility] wanting to get patient scheduled for IV venofer, had her first infusion in patient and will need it every other day. Patient scheduled Thursday at 1130. Pharmacy updated.

## 2023-11-28 NOTE — PLAN OF CARE
Writer called pt's son Zack to give an update regarding the plan of care for his mom today as he asked writer to do that this am. Dr. JACKSON was at bedside with patient and writer and updated patient and told her that he plans on discharging her later this afternoon. Zack and his wife plan to be here around 1200 and will be patient's transportation back to her house in [place].

## 2023-11-28 NOTE — TELEPHONE ENCOUNTER
Please call pt to schedule hosp follow up with [facility].  Gauri Milligan LPN    Ok to place on my schedule early next week.      COCO Ta CNP on 11/28/2023 at 12:05 PM

## 2023-11-28 NOTE — PLAN OF CARE
Goal Outcome Evaluation:       A&O, makes needs known. PRN hydralazine given x1 for SBP >165. Low-grade temp 100.4. Denied pain. Remains on 1L O2, O2 sats low 90's. LS diminished. MILLS. HR regular to irregular. Patient going in and out of Afib throughout the shift, but rate controlled 70-90s. BS active. Patient had one BM this shift. Trace edema to left ankle and foot. IV SL. IV lasix given this shift. Adequate output of light/pale urine. Call light within reach. SBA to bathroom.    Face to face report given with opportunity to observe patient.    Report given to TRUNG Allan RN   11/28/2023  7:13 AM

## 2023-11-28 NOTE — PROGRESS NOTES
Reason for Consult: Heart Failure - Dietitian to instruct patient on 2 gram sodium diet    Pt declined low sodium diet education/handout. Patient reports she has been educated on low sodium diet in the past. Questionable adherence to low sodium diet recommendations at home. Encouraged pt to connect with PCP if she has questions/ would like a nutrition referral.

## 2023-11-28 NOTE — PROGRESS NOTES
11/28/23 0903   Appointment Info   Signing Clinician's Name / Credentials (PT) Flori Barajas DPT   Interventions   Interventions Quick Adds Therapeutic Activity   Therapeutic Activity   Therapeutic Activities: dynamic activities to improve functional performance Minutes (72832) 17   Symptoms Noted During/After Treatment Shortness of breath   Treatment Detail/Skilled Intervention Pt up in chair upon PT arrival, agreeable to PT, reports feeling better.  Pt now on RA, sats 93% at rest prior to activity.  Pt transferred sit>stand SBA.  Pt ambulaed 50' without AD SBA, steady on feet, good pace, no LOB.  Pt managed 4 steps bilateral rails SBA with step-through pattern, then requested a rest break.  Pt with mild dyspnea, O2 sats 90% on RA.  Pt transferred sit>stand SBA, able to make 180 degree turn and manage door without difficulty, ambulated additional 50' without AD SBA.  Pt ready to discharge home and has no additional concerns.  Pt left sitting in chair waiting for breakfast.   PT Discharge Planning   PT Plan DC, goals met   PT Discharge Recommendation (DC Rec) home   PT Rationale for DC Rec Pt tolerating mobility without AD, safe to DC to home, no further skilled PT needs   PT Brief overview of current status sit<>stand SBA, ambulated 50'x2 without AD SBA, steady on feet, managed 4 steps bilateral rails sBA   Total Session Time   Timed Code Treatment Minutes 17   Total Session Time (sum of timed and untimed services) 17

## 2023-11-28 NOTE — DISCHARGE INSTRUCTIONS
[facility] will be contacting you to schedule your hospital follow up. Please call [phone] if you do not hear from them by Thursday morning.     Your first iron infusion is Thursday 11/30 at 11:30am. Please arrive at hospital admitting 15 minutes prior to your appointment time.

## 2023-11-28 NOTE — TELEPHONE ENCOUNTER
11:50 AM    Reason for Call: OVERBOOK    Patient is getting discharged from the [facility] hospital today and she will need a 7 day hospital follow up    The patient is requesting an appointment for hospital follow up with Dr Troncoso or Maritza Amaro.    Was an appointment offered for this call? No  If yes : Appointment type              Date    Preferred method for responding to this message: Telephone Call  What is your phone number ? [phone]    If we cannot reach you directly, may we leave a detailed response at the number you provided? Yes    Can this message wait until your PCP/provider returns, if unavailable today? No,

## 2023-11-28 NOTE — PLAN OF CARE
Physical Therapy Discharge Summary    Reason for therapy discharge:    Discharged to home.    Progress towards therapy goal(s). See goals on Care Plan in [facility] electronic health record for goal details.  Goals met    Therapy recommendation(s):    No further therapy is recommended.    Goal Outcome Evaluation:

## 2023-11-28 NOTE — DISCHARGE SUMMARY
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)  Suspect 2nd from CHF  and anemia     History of GI bleed (9/20/2019)    Anemia (11/26/2023)  FOB positive     Chronic systolic congestive heart failure (H)- Recovered (5/12/2019    Elevated brain natriuretic peptide (BNP) level (11/26/2023)    Acquired hypothyroidism (5/12/2019)    Benign essential hypertension (5/12/2019    ASCVD (arteriosclerotic cardiovascular disease) (6/18/2019)    Mixed hyperlipidemia (6/18/2019)    CKD (chronic kidney disease) stage 4, GFR 15-29 ml/min (H) (1/26/2022)    History of coronary artery stent placement to the O/M LCX on 6/28/2019 at [facility] (7/23/2019)    Subclavian arterial stenosis (H24) (1/26/2022)    History of CVA on 1/1/2019 (3/14/2022)    Postoperative atrial fibrillation on 6/28/2019 (H) (3/14/2022)    PAD on 2/28/2020-moderate (H) (3/14/2022)    Bilateral carotid artery stenosis (3/14/2022)      Clinically Significant Risk Factors          Follow-ups Needed After Discharge   Follow-up Appointments     Follow-up and recommended labs and tests       Follow up with primary care provider, Ophelia Troncoso, within 7 days to   evaluate medication change.  The following labs/tests are recommended: bmp   cbc hgb.             Unresulted Labs Ordered in the Past 30 Days of this Admission       Date and Time Order Name Status Description    11/26/2023  2:15 PM Prepare red blood cells (unit) Preliminary         These results will be followed up by Ophelia Troncoso MD      Discharge Disposition   Discharged to home  Condition at discharge: Stable    Hospital Course   Patient is gian Ocasio is a 82 year old female admitted on 11/26/2023. She has  established diagnosis of systolic CHF, acquired hypothyroidism, benign essential hypertension, atherosclerotic cardiovascular disease, mixed hyperlipidemia, history of coronary artery stent placement to the OM left circumflex in June 2019 at [facility], history of GI bleed, CKD stage IV, subclavian artery stenosis with asymmetric blood pressures in both arms, history of CVA in 2019, peripheral artery disease, bilateral  lateral carotid artery stenosis, severe COPD recent pulmonary function test this year, postoperative atrial fibrillation with cardioversion in 2019 and anemia.  Aquiles admitted with Acute (on Chronic) Diastolic Heart Failure , anemia requring 2 units of prbc's. Patient declinied EDG in hospital and was startedo n PPI and carafate, she was advanced with diet and hgb was stable no melena or hematochezia reported, but FOB was positive on admission. She was seen by general surgery with recs for conservative management for now. She was also restarted on lisinopril, lasix 40mg daily and aspirin was held for now as suspected to have upper GI bleed. Aquiles was dischagred with close follow up, cbc bmp pending . She did receive 2 infusion of venofer in hosptial and can have 3 more in the next 13 days.    Consultations This Hospital Stay   PHYSICAL THERAPY ADULT IP CONSULT  OCCUPATIONAL THERAPY ADULT IP CONSULT  SURGERY GENERAL IP CONSULT  OCCUPATIONAL THERAPY ADULT IP CONSULT  NUTRITION SERVICES ADULT IP CONSULT  CARE MANAGEMENT / SOCIAL WORK IP CONSULT    Code Status   Full Code    Time Spent on this Encounter   ILisandro DO, personally saw the patient today and spent greater than 30 minutes discharging this patient.       Lisandro Salomon DO  [address] [address]  [address] [place] [address]  Phone: [phone]  Fax: [phone]  ______________________________________________________________________    Physical Exam   Vital Signs: Temp: 98.9  F (37.2  C) Temp src:  Tympanic BP: 136/57 Pulse: 81   Resp: 20 SpO2: 92 % O2 Device: None (Room air) Oxygen Delivery: 1 LPM  Weight: 97 lbs 7.09 oz  Constitutional:       Comments: Frail appearing stated age female    HENT:      Right Ear: External ear normal.      Left Ear: External ear normal.      Nose: Nose normal.      Mouth/Throat:      Pharynx: Oropharynx is clear.   Cardiovascular:      Rate and Rhythm: Normal rate.   Pulmonary:      Effort: Pulmonary effort is normal.      Comments: Decreased breath sounds at bases   Abdominal:      General: Abdomen is flat.   Musculoskeletal:         General: No swelling.   Skin:     Coloration: Skin is not jaundiced.   Neurological:      Mental Status: Mental status is at baseline.      Primary Care Physician   Ophelia Troncoso    Discharge Orders      CBC with platelets     Basic metabolic panel  (Ca, Cl, CO2, Creat, Gluc, K, Na, BUN)     Reason for your hospital stay    Patient is gian Ocasio is a 82 year old female admitted on 11/26/2023. She has established diagnosis of systolic CHF, acquired hypothyroidism, benign essential hypertension, atherosclerotic cardiovascular disease, mixed hyperlipidemia, history of coronary artery stent placement to the OM left circumflex in June 2019 at [facility], history of GI bleed, CKD stage IV, subclavian artery stenosis with asymmetric blood pressures in both arms, history of CVA in 2019, peripheral artery disease, bilateral  lateral carotid artery stenosis, severe COPD recent pulmonary function test this year, postoperative atrial fibrillation with cardioversion in 2019 and anemia.  Patinet admitted with Diastolic CHF exacerbation, anemia requring 2 units of prbc's. Patient declinied EDG in hospital and was startedo n PPI and carafate, she was advanced with diet and hgb was stable no melena or hematochezia reported, but FOB was positive on admission. She was seen by general surgery with recs for conservative management for now. She was also  restarted on lisinopril, lasix 40mg daily and aspirin was held for now as suspected to have upper GI bleed. Aquiles was dischagred with close follow up, cbc bmp pending . She did receive 2 infusion of venofer in hosptial and can have 3 more in the next 13 days.     Follow-up and recommended labs and tests     Follow up with primary care provider, Ophelia Troncoso, within 7 days to evaluate medication change.  The following labs/tests are recommended: bmp cbc hgb.     Activity    Your activity upon discharge: activity as tolerated     Diet    Follow this diet upon discharge: Orders Placed This Encounter      Regular Diet Adult       Significant Results and Procedures   Most Recent 3 CBC's:  Recent Labs   Lab Test 11/28/23  0440 11/28/23  0012 11/27/23  1205 11/27/23  0441 11/26/23  1933 11/26/23  1243   WBC 9.4  --   --  7.7  --  10.2   HGB 8.7* 8.9* 8.7* 8.0*   < > 7.1*   MCV 85  --   --  83  --  89     --   --  230  --  283    < > = values in this interval not displayed.     Most Recent 3 BMP's:  Recent Labs   Lab Test 11/28/23  0440 11/27/23  0441 11/27/23  0209 11/26/23  1743 11/26/23  1243    144  --   --  142   POTASSIUM 3.6 3.8  --   --  4.2   CHLORIDE 105 107  --   --  108*   CO2 25 24  --   --  22   BUN 21.0 17.8  --   --  15.4   CR 1.23* 1.41*  --   --  1.21*   ANIONGAP 13 13  --   --  12   BECKY 8.2* 8.1*  --   --  8.9   * 95 99   < > 102*    < > = values in this interval not displayed.   ,   Results for orders placed or performed during the hospital encounter of 11/26/23   XR Chest Port 1 View    Narrative    PROCEDURE:  XR CHEST PORT 1 VIEW    HISTORY: Shortness of breath. .    COMPARISON:  11/7/2023    FINDINGS:    The cardiomediastinal contours are magnified by portable technique.  No focal consolidation or pneumothorax. Advanced emphysematous changes  are again suggested. Trace pleural fluid is questioned.      Impression    IMPRESSION:  Advanced emphysema. Trace pleural fluid.       LEXY MCINTYRE MD         SYSTEM ID:  [id number]   POC US ECHO LIMITED    Narrative    Roshan Watson APRN CNP     11/26/2023  3:32 PM  POC US ECHO LIMITED    Date/Time: 11/26/2023 12:50 PM    Performed by: Roshan Watson APRN CNP  Authorized by: Roshan Watson APRN CNP    Comments:      Limited Bedside Cardiac Ultrasound, performed and interpreted by me.   Indication: Shortness of Breath.  Parasternal long axis, parasternal short axis, apical 4 chamber,   subcostal, and IVC views were acquired.   Image quality was satisfactory.    Findings:    Abnormal left atrial dilation is present.  Left ventricular function   appears to be normal.  No pericardial fluid present.    IMPRESSION: Abnormal limited cardiac ultrasound showing left atrial   dilation.  Left ventricular function intact.  No pericardial effusion.      POC US CHEST B-SCAN    Narrative    Roshan Watson APRN CNP     11/26/2023  3:32 PM  POC US CHEST B-SCAN    Date/Time: 11/26/2023 12:51 PM    Performed by: Roshan Watson APRN CNP  Authorized by: Roshan Watson APRN CNP    Procedure details:     Indications: dyspnea and hypoxia      Assessment for:  Hemothorax, pleural effusion, pneumothorax and   pneumonia    Left lung pleural:  Visualized    Right lung pleural:  Visualized  Findings:     A-lines noted throughout: not identified      B-lines noted throughout: not identified      Left lung sliding: identified      Right lung sliding: identified      Subpleural consolidation: identified      Lung consolidation: not identified    Impression:     Impression: interstitial syndrome     CT Chest (PE) Abdomen Pelvis w Contrast    Narrative    PROCEDURE:  CT CHEST PE ABDOMEN PELVIS W CONTRAST.    HISTORY:  Evaluate for pulmonary embolism.  Shortness of breath, R/O  PE, GI bleed    TECHNIQUE:  Initial pre-contrast  and localizer images were  obtained.  Contrast enhanced helical CT pulmonary angiography was then  performed. Delayed CT images  of the abdomen and pelvis were then  obtained. Routine transaxial and post-processed (multiplanar and/or  MIP) reformations were obtained. This CT exam was performed using one  or more the following dose reduction techniques: automated exposure  control, adjustment of the mA and/or kV according to patient size,  and/or iterative reconstruction technique.    COMPARISON:  2023    MEDS/CONTRAST: isovue 370 60ml    PULMONARY CTA FINDINGS:  This is a diagnostic quality helical CT  pulmonary angiogram.  There is no acute pulmonary embolism to the  first subsegmental pulmonary artery level.    OTHER FINDINGS:      The neck base is grossly symmetric. The heart is enlarged and there  are atherosclerotic calcifications of the coronary arteries. There is  a small pericardial effusion. Calcified mediastinal nodes are chronic.    There are small bilateral pleural effusions. Moderate to severe  emphysematous changes are present. No suspicious osseous lesion is  identified.    No hepatic mass is identified. The gallbladder is partially distended.  There is atrophy of the left kidney. There is physiologic hypertrophy  of the right kidney. Nodularity of the left adrenal gland is chronic.  There is no hydronephrosis. The small bowel is normal in caliber.  Slightly prominent stool can be seen in constipation.    Moderate L2 vertebral body height loss is redemonstrated.       Impression    IMPRESSION:    No acute pulmonary emboli to the subsegmental level.    Cardiomegaly. Small pericardial and bilateral pleural effusions.  Emphysema.    Question constipation.    LEXY MCINTYRE MD         SYSTEM ID:  [id number]   Echocardiogram Complete     Value    LVEF  55-60%    Narrative    [id number]  [id number]  [id number]  [id number]^DIMMEDINAIRENETTA^YEVGENIVICH     [facility]  Echocardiography Laboratory  [address] [place] [id number]     Name: KOURTNEY ADAMES  MRN: [id number]  : 1940  Study Date:  11/27/2023 01:35 PM  Age: 82 yrs  Gender: Female  Patient Location: [facility]  Reason For Study: CHF  History: CHF  Ordering Physician: ESTELITA ESPOSITO  Performed By: Tamica Steven RDCS     BSA: 1.4 m2  Height: 60 in  Weight: 99 lb  ______________________________________________________________________________  Procedure  Complete Portable Echo Adult.  ______________________________________________________________________________  Interpretation Summary  Global and regional left ventricular function is normal with an EF of 55-60%.  Mild concentric wall thickening consistent with left ventricular hypertrophy  is present.  Global right ventricular function is normal.  Mild right ventricular dilation is present.  Moderate left atrial enlargement is present.  Mild right atrial enlargement is present.  Mild mitral annular calcification is present.  Mild mitral insufficiency is present.  No aortic stenosis is present.  Mild tricuspid insufficiency is present.  The right ventricular systolic pressure is 55mmHg above the right atrial  pressure.  ______________________________________________________________________________  Left Ventricle  Global and regional left ventricular function is normal with an EF of 55-60%.  Mild concentric wall thickening consistent with left ventricular hypertrophy  is present. Left ventricular diastolic function is indeterminate.     Right Ventricle  Global right ventricular function is normal. Mild right ventricular dilation  is present.     Atria  Moderate left atrial enlargement is present. Mild right atrial enlargement is  present.     Mitral Valve  Mild mitral annular calcification is present. Mild mitral insufficiency is  present.     Aortic Valve  The mean AoV pressure gradient is 2.4 mmHg. The peak AoV pressure gradient is  4.5 mmHg. No aortic stenosis is present.     Tricuspid Valve  Mild tricuspid insufficiency is present. The right ventricular systolic  pressure is 55mmHg  above the right atrial pressure.     Pulmonic Valve  Trace pulmonic insufficiency is present.     Vessels  Ascending aorta 2.61 cm.     Pericardium  No pericardial effusion is present.     ______________________________________________________________________________  MMode/2D Measurements & Calculations  IVSd: 1.2 cm  LVIDd: 3.8 cm  LVIDs: 2.6 cm  LVPWd: 1.2 cm  FS: 32.4 %  LV mass(C)d: 147.5 grams  LV mass(C)dI: 106.6 grams/m2  Ao root diam: 2.6 cm     asc Aorta Diam: 2.6 cm  LVOT diam: 1.9 cm  LVOT area: 2.8 cm2  Ao root diam index Ht(cm/m): 1.7  Ao root diam index BSA (cm/m2): 1.9  Asc Ao diam index BSA (cm/m2): 1.9  Asc Ao diam index Ht(cm/m): 1.7  RWT: 0.61  TAPSE: 1.8 cm     Doppler Measurements & Calculations  MV E max valente: 117.0 cm/sec  MV A max valente: 129.0 cm/sec  MV E/A: 0.91  MV dec slope: 576.0 cm/sec2  MV dec time: 0.20 sec  Ao V2 max: 106.0 cm/sec  Ao max P.5 mmHg  Ao V2 mean: 72.0 cm/sec  Ao mean P.4 mmHg  Ao V2 VTI: 24.6 cm  MENDOZA(I,D): 2.7 cm2  MENDOZA(V,D): 2.8 cm2  LV V1 max P.5 mmHg  LV V1 max: 106.0 cm/sec  LV V1 VTI: 23.9 cm  SV(LVOT): 66.5 ml  SI(LVOT): 48.0 ml/m2  TR max valente: 370.0 cm/sec  TR max P.8 mmHg     AV Valente Ratio (DI): 1.0  MENDOZA Index (cm2/m2): 2.0  E/E' av.4  Lateral E/e': 19.9  [facility] E/e': 26.9     ______________________________________________________________________________  Report approved by: Ruma Giordano 2023 08:14 AM             Discharge Medications   Discharge Medication List as of 2023  1:17 PM        START taking these medications    Details   lisinopril (ZESTRIL) 5 MG tablet Take 1 tablet (5 mg) by mouth daily for 30 days, Disp-30 tablet, R-0, E-Prescribe      sucralfate (CARAFATE) 1 GM/10ML suspension Take 10 mLs (1 g) by mouth 4 times daily for 30 days, Disp-1200 mL, R-0, E-Prescribe           CONTINUE these medications which have CHANGED    Details   pantoprazole (PROTONIX) 40 MG EC tablet Take 1 tablet (40 mg) by mouth 2 times  daily for 60 days, Disp-60 tablet, R-1, E-Prescribe           CONTINUE these medications which have NOT CHANGED    Details   atorvastatin (LIPITOR) 40 MG tablet Take 1 tablet (40 mg) by mouth At Bedtime, Disp-90 tablet, R-0, E-Prescribe      calcium carbonate (TUMS) 500 MG chewable tablet Take 1 chew tab by mouth at bedtime, Historical      fluticasone (FLONASE) 50 MCG/ACT spray Spray 2 sprays into both nostrils daily, Disp-1 Bottle, R-11, Local Print      furosemide (LASIX) 20 MG tablet Take 10 mg by mouth daily, Historical      ipratropium - albuterol 0.5 mg/2.5 mg/3 mL (DUONEB) 0.5-2.5 (3) MG/3ML neb solution Take 1 vial (3 mLs) by nebulization every 6 hours as needed for shortness of breath, wheezing or cough, Disp-90 mL, R-3, E-Prescribe      ipratropium-albuterol (COMBIVENT RESPIMAT)  MCG/ACT inhaler Inhale 1 puff into the lungs 4 times daily, Disp-4 g, R-3, E-Prescribe      levothyroxine (SYNTHROID/LEVOTHROID) 50 MCG tablet Take 50 mcg by mouth every morning (before breakfast), Historical      metoprolol succinate ER (TOPROL XL) 25 MG 24 hr tablet Take 1 tablet (25 mg) by mouth daily, Disp-30 tablet, R-0, E-Prescribe      nitroGLYcerin (NITROSTAT) 0.4 MG sublingual tablet Place 1 tablet (0.4 mg) under the tongue every 5 minutes as needed for chest pain For chest pain place 1 tablet under the tongue every 5 minutes for 3 doses. If symptoms persist 5 minutes after 1st dose call 911., Disp-25 tablet, R-3, E-Prescribe      Tafluprost 0.0015 % SOLN Place 1 drop into both eyes at bedtime -when remembered., Historical      trolamine salicylate (ASPERCREME) 10 % external cream Apply topically daily as needed (lower back pain)Historical           STOP taking these medications       aspirin (ASA) 81 MG chewable tablet Comments:   Reason for Stopping:         sucralfate (CARAFATE) 1 GM tablet Comments:   Reason for Stopping:             Allergies   Allergies   Allergen Reactions    Evista [Raloxifene] Other (See  Comments)     hypercalcemia    Prednisone GI Disturbance    Combigan [Brimonidine Tartrate-Timolol] Other (See Comments)     Eye swelling and itchy    Cyclosporine      Pt reports using eye gtts and having red irritated eyes     Latex Rash    Levaquin [Levofloxacin] Muscle Pain (Myalgia)    Penicillins Rash

## 2023-11-28 NOTE — PROGRESS NOTES
Name: Lita Ocasio    MRN#: [id number]    Reason for Hospitalization: CHF (congestive heart failure) (H) [I50.9]  Anemia due to blood loss, acute [D62]  Acute respiratory failure with hypoxia (H) [J96.01]    LAWANDA: Low    Discharge Date: November 28, 2023    Medicare IMM Discharge letter reviewed with patient.    Patient / Family response to discharge plan: Pt was involved in discharge planning.    Follow-Up Appt:   Future Appointments   Date Time Provider Department Center   11/28/2023  3:00 PM Flori Barajas, PT [facility]   3/6/2024  2:00 PM [facility]       Other Providers (Care Coordinator, County Services, PCA services etc): No    Discharge Disposition: home via family to provide personal vehicle transport.    Pt or health care agent verbalized understanding.         Morena Hanna CM RN

## 2023-11-28 NOTE — PLAN OF CARE
Patient discharged at 12:53 PM via wheel chair accompanied by son, daughter in law, and staff. Prescriptions sent to patients preferred pharmacy. All belongings sent with patient.     Discharge instructions reviewed with patient and family. Listed belongings gathered and returned to patient.     Patient discharged to Home.    Surgical Patient   Surgical Procedures during stay: pt was seen by surgery but required no surgical intervention     MISC  Follow up appointment made:  Yes  Home medications returned to patient: Yes  Patient reports pain was well managed at discharge: Yes    Pt A&O x 4. Denies pain. VSS, writer placed pt on RA this morning and has maintained sats > 90% even with activity. Pt is SBA to the BR. Voiding spontaneously without difficulty. BS active, no BM this shift. HS regular. LS clear/diminished, pt IND coughing. Walked with PT today. Pt started back on PO Lisinopril today. BP stable, see flowsheets. Pt's IV's x 2 removed. An infusion of Iron was given, pt tolerated it well. Regular diet, good appetite. No trace edema present in BLE anymore. See assessment and vitals as charted. Remains free of injury all shift. Up in chair all day. Alarms activated and audible.

## 2023-11-28 NOTE — PLAN OF CARE
Occupational Therapy Discharge Summary    Reason for therapy discharge:    Discharged to home.    Progress towards therapy goal(s). See goals on Care Plan in [facility] electronic health record for goal details.  Goals partially met.  Barriers to achieving goals:   discharge from facility.    Therapy recommendation(s):    No further therapy is recommended.    Goal Outcome Evaluation:

## 2023-11-29 ENCOUNTER — PATIENT OUTREACH (OUTPATIENT)
Dept: CARE COORDINATION | Facility: OTHER | Age: 83
End: 2023-11-29

## 2023-11-29 NOTE — PROGRESS NOTES
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)  Suspect 2nd from CHF  and anemia     History of GI bleed (9/20/2019)    Anemia (11/26/2023)  FOB positive     Chronic systolic congestive heart failure (H)- Recovered (5/12/2019    Elevated brain natriuretic peptide (BNP) level (11/26/2023)    Acquired hypothyroidism (5/12/2019)    Benign essential hypertension (5/12/2019    ASCVD (arteriosclerotic cardiovascular disease) (6/18/2019)    Mixed hyperlipidemia (6/18/2019)    CKD (chronic kidney disease) stage 4, GFR 15-29 ml/min (H) (1/26/2022)    History of coronary artery stent placement to the O/M LCX on 6/28/2019 at [facility] (7/23/2019)    Subclavian arterial stenosis (H24) (1/26/2022)    History of CVA on 1/1/2019 (3/14/2022)    Postoperative atrial fibrillation on 6/28/2019 (H) (3/14/2022)    PAD on 2/28/2020-moderate (H) (3/14/2022)    Bilateral carotid artery stenosis (3/14/2022)  Discharge:   Discharge Date: 11/28/23  Discharge Diagnosis: Acute respiratory failure with hypoxia (H) (5/12/2019)   COPD, 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)  Suspect 2nd from CHF  and anemia     History of GI bleed (9/20/2019)    Anemia (11/26/2023)  FOB positive     Chronic systolic congestive heart failure (H)- Recovered (5/12/2019    Elevated brain natriuretic peptide (BNP) level (11/26/2023)    Acquired hypothyroidism (5/12/2019)    Benign essential hypertension (5/12/2019    ASCVD (arteriosclerotic cardiovascular disease) (6/18/2019)    Mixed hyperlipidemia (6/18/2019)    CKD (chronic kidney disease) stage 4, GFR 15-29 ml/min (H) (1/26/2022)    History of coronary artery stent placement to the O/M LCX on 6/28/2019 at [facility] (7/23/2019)    Subclavian arterial stenosis (H24) (1/26/2022)    History of CVA on  1/1/2019 (3/14/2022)    Postoperative atrial fibrillation on 6/28/2019 (H) (3/14/2022)    PAD on 2/28/2020-moderate (H) (3/14/2022)    Bilateral carotid artery stenosis (3/14/2022)    BACKGROUND                                                      Per hospital discharge summary and inpatient provider notes:  Patient is gian Ocasio is a 82 year old female admitted on 11/26/2023. She has established diagnosis of systolic CHF, acquired hypothyroidism, benign essential hypertension, atherosclerotic cardiovascular disease, mixed hyperlipidemia, history of coronary artery stent placement to the OM left circumflex in June 2019 at [facility], history of GI bleed, CKD stage IV, subclavian artery stenosis with asymmetric blood pressures in both arms, history of CVA in 2019, peripheral artery disease, bilateral  lateral carotid artery stenosis, severe COPD recent pulmonary function test this year, postoperative atrial fibrillation with cardioversion in 2019 and anemia.  Aquiles admitted with Diastolic CHF exacerbation, anemia requring 2 units of prbc's. Patient declinied EDG in hospital and was startedo n PPI and carafate, she was advanced with diet and hgb was stable no melena or hematochezia reported, but FOB was positive on admission. She was seen by general surgery with recs for conservative management for now. She was also restarted on lisinopril, lasix 40mg daily and aspirin was held for now as suspected to have upper GI bleed. Aquiles was dischagred with close follow up, cbc bmp pending . She did receive 2 infusion of venofer in hosptial and can have 3 more in the next 13 days.     ASSESSMENT           Discharge Assessment  How are you doing now that you are home?: Patient stated she is feeling and that she is up and doing laundry.  How are your symptoms? (Red Flag symptoms escalate to triage hotline per guidelines): Improved  Do you feel your condition is stable enough to be safe at home until your provider visit?:  Yes  Does the patient have their discharge instructions? : Yes  Does the patient have questions regarding their discharge instructions? : No  Were you started on any new medications or were there changes to any of your previous medications? : Yes  Does the patient have all of their medications?: Yes  Do you have questions regarding any of your medications? : No  Discharge follow-up appointment scheduled within 14 calendar days? : Yes  Discharge Follow Up Appointment Date: 12/04/23  Discharge Follow Up Appointment Scheduled with?: Primary Care Provider         Post-op (Clinicians Only)  Did the patient have surgery or a procedure: No  Fever: No  Chills: No  Eating & Drinking: eating and drinking without complaints/concerns  Bowel Function: normal  Date of last BM: 11/28/23  Urinary Status: voiding without complaint/concerns        PLAN                                                      Outpatient Plan:  TCM complete. No further outreach by this RN.     Future Appointments   Date Time Provider Department Center   11/30/2023 11:30 AM HC IN[id number]   12/4/2023 11:00 AM Maritza Amaro APRN CNP [facility]   3/6/2024  2:00 PM Sal Graham, DO [facility]         For any urgent concerns, please contact our 24 hour nurse triage line: [phone] ([phone])         Kimberly J Boecker, RN

## 2023-11-30 ENCOUNTER — INFUSION THERAPY VISIT (OUTPATIENT)
Dept: INFUSION THERAPY | Facility: OTHER | Age: 83
End: 2023-11-30
Attending: FAMILY MEDICINE
Payer: COMMERCIAL

## 2023-11-30 VITALS
OXYGEN SATURATION: 94 % | SYSTOLIC BLOOD PRESSURE: 118 MMHG | RESPIRATION RATE: 18 BRPM | HEART RATE: 84 BPM | DIASTOLIC BLOOD PRESSURE: 61 MMHG | TEMPERATURE: 98 F

## 2023-11-30 DIAGNOSIS — D50.8 IRON DEFICIENCY ANEMIA SECONDARY TO INADEQUATE DIETARY IRON INTAKE: Primary | ICD-10-CM

## 2023-11-30 DIAGNOSIS — N18.4 CKD (CHRONIC KIDNEY DISEASE) STAGE 4, GFR 15-29 ML/MIN (H): ICD-10-CM

## 2023-11-30 LAB
ATRIAL RATE - MUSE: 93 BPM
DIASTOLIC BLOOD PRESSURE - MUSE: NORMAL MMHG
INTERPRETATION ECG - MUSE: NORMAL
P AXIS - MUSE: 77 DEGREES
PR INTERVAL - MUSE: 154 MS
QRS DURATION - MUSE: 76 MS
QT - MUSE: 398 MS
QTC - MUSE: 494 MS
R AXIS - MUSE: 49 DEGREES
SYSTOLIC BLOOD PRESSURE - MUSE: NORMAL MMHG
T AXIS - MUSE: 65 DEGREES
VENTRICULAR RATE- MUSE: 93 BPM

## 2023-11-30 PROCEDURE — 250N000011 HC RX IP 250 OP 636: Performed by: HOSPITALIST

## 2023-11-30 PROCEDURE — 96374 THER/PROPH/DIAG INJ IV PUSH: CPT

## 2023-11-30 RX ORDER — ALBUTEROL SULFATE 90 UG/1
1-2 AEROSOL, METERED RESPIRATORY (INHALATION)
Status: CANCELLED
Start: 2023-12-01

## 2023-11-30 RX ORDER — HEPARIN SODIUM (PORCINE) LOCK FLUSH IV SOLN 100 UNIT/ML 100 UNIT/ML
5 SOLUTION INTRAVENOUS
Status: CANCELLED | OUTPATIENT
Start: 2023-12-01

## 2023-11-30 RX ORDER — HEPARIN SODIUM,PORCINE 10 UNIT/ML
5-20 VIAL (ML) INTRAVENOUS DAILY PRN
Status: CANCELLED | OUTPATIENT
Start: 2023-12-01

## 2023-11-30 RX ORDER — MEPERIDINE HYDROCHLORIDE 25 MG/ML
25 INJECTION INTRAMUSCULAR; INTRAVENOUS; SUBCUTANEOUS EVERY 30 MIN PRN
Status: CANCELLED | OUTPATIENT
Start: 2023-12-01

## 2023-11-30 RX ORDER — ALBUTEROL SULFATE 0.83 MG/ML
2.5 SOLUTION RESPIRATORY (INHALATION)
Status: CANCELLED | OUTPATIENT
Start: 2023-12-01

## 2023-11-30 RX ORDER — DIPHENHYDRAMINE HYDROCHLORIDE 50 MG/ML
50 INJECTION INTRAMUSCULAR; INTRAVENOUS
Status: CANCELLED
Start: 2023-12-01

## 2023-11-30 RX ORDER — EPINEPHRINE 1 MG/ML
0.3 INJECTION, SOLUTION, CONCENTRATE INTRAVENOUS EVERY 5 MIN PRN
Status: CANCELLED | OUTPATIENT
Start: 2023-12-01

## 2023-11-30 RX ORDER — METHYLPREDNISOLONE SODIUM SUCCINATE 125 MG/2ML
125 INJECTION, POWDER, LYOPHILIZED, FOR SOLUTION INTRAMUSCULAR; INTRAVENOUS
Status: CANCELLED
Start: 2023-12-01

## 2023-11-30 RX ADMIN — IRON SUCROSE 200 MG: 20 INJECTION, SOLUTION INTRAVENOUS at 11:58

## 2023-11-30 NOTE — PROGRESS NOTES
Infusion Nursing Note:  Lita Ocasio presents today for venefor.    Patient seen by provider today: No   present during visit today: Not Applicable.    Note: N/A.      Intravenous Access:  Peripheral IV placed.    Treatment Conditions:  Not Applicable.      Post Infusion Assessment:  Patient tolerated infusion without incident.  Patient observed for 15 minutes post infusion per protocol.  Blood return noted pre and post infusion.  Site patent and intact, free from redness, edema or discomfort.  No evidence of extravasations.  Access discontinued per protocol.       Discharge Plan:   Discharge instructions reviewed with: Patient.      TRUNG PENA

## 2023-12-04 ENCOUNTER — OFFICE VISIT (OUTPATIENT)
Dept: FAMILY MEDICINE | Facility: OTHER | Age: 83
End: 2023-12-04
Payer: COMMERCIAL

## 2023-12-04 VITALS
DIASTOLIC BLOOD PRESSURE: 72 MMHG | WEIGHT: 104 LBS | TEMPERATURE: 98.2 F | OXYGEN SATURATION: 94 % | HEART RATE: 80 BPM | BODY MASS INDEX: 20.31 KG/M2 | SYSTOLIC BLOOD PRESSURE: 116 MMHG

## 2023-12-04 DIAGNOSIS — I10 ESSENTIAL HYPERTENSION: ICD-10-CM

## 2023-12-04 DIAGNOSIS — Z87.19 HISTORY OF GI BLEED: ICD-10-CM

## 2023-12-04 DIAGNOSIS — I50.32 CHRONIC DIASTOLIC CONGESTIVE HEART FAILURE (H): ICD-10-CM

## 2023-12-04 DIAGNOSIS — I77.1 SUBCLAVIAN ARTERIAL STENOSIS (H): ICD-10-CM

## 2023-12-04 DIAGNOSIS — I65.23 BILATERAL CAROTID ARTERY STENOSIS: ICD-10-CM

## 2023-12-04 DIAGNOSIS — L03.114 CELLULITIS OF LEFT UPPER EXTREMITY: ICD-10-CM

## 2023-12-04 DIAGNOSIS — N18.4 CKD (CHRONIC KIDNEY DISEASE) STAGE 4, GFR 15-29 ML/MIN (H): ICD-10-CM

## 2023-12-04 DIAGNOSIS — Z09 HOSPITAL DISCHARGE FOLLOW-UP: Primary | ICD-10-CM

## 2023-12-04 LAB
ANION GAP SERPL CALCULATED.3IONS-SCNC: 11 MMOL/L (ref 7–15)
BASOPHILS # BLD AUTO: 0.1 10E3/UL (ref 0–0.2)
BASOPHILS NFR BLD AUTO: 1 %
BUN SERPL-MCNC: 11.2 MG/DL (ref 8–23)
CALCIUM SERPL-MCNC: 9 MG/DL (ref 8.8–10.2)
CHLORIDE SERPL-SCNC: 111 MMOL/L (ref 98–107)
CREAT SERPL-MCNC: 1.06 MG/DL (ref 0.51–0.95)
DEPRECATED HCO3 PLAS-SCNC: 23 MMOL/L (ref 22–29)
EGFRCR SERPLBLD CKD-EPI 2021: 52 ML/MIN/1.73M2
EOSINOPHIL # BLD AUTO: 0.3 10E3/UL (ref 0–0.7)
EOSINOPHIL NFR BLD AUTO: 4 %
ERYTHROCYTE [DISTWIDTH] IN BLOOD BY AUTOMATED COUNT: 19.1 % (ref 10–15)
FERRITIN SERPL-MCNC: 609 NG/ML (ref 11–328)
GLUCOSE SERPL-MCNC: 100 MG/DL (ref 70–99)
HCT VFR BLD AUTO: 31.3 % (ref 35–47)
HGB BLD-MCNC: 9.9 G/DL (ref 11.7–15.7)
IMM GRANULOCYTES # BLD: 0 10E3/UL
IMM GRANULOCYTES NFR BLD: 1 %
IRON BINDING CAPACITY (ROCHE): 273 UG/DL (ref 240–430)
IRON SATN MFR SERPL: 16 % (ref 15–46)
IRON SERPL-MCNC: 45 UG/DL (ref 37–145)
LYMPHOCYTES # BLD AUTO: 1.3 10E3/UL (ref 0.8–5.3)
LYMPHOCYTES NFR BLD AUTO: 15 %
MCH RBC QN AUTO: 28.6 PG (ref 26.5–33)
MCHC RBC AUTO-ENTMCNC: 31.6 G/DL (ref 31.5–36.5)
MCV RBC AUTO: 91 FL (ref 78–100)
MONOCYTES # BLD AUTO: 0.8 10E3/UL (ref 0–1.3)
MONOCYTES NFR BLD AUTO: 9 %
NEUTROPHILS # BLD AUTO: 6.3 10E3/UL (ref 1.6–8.3)
NEUTROPHILS NFR BLD AUTO: 70 %
NRBC # BLD AUTO: 0 10E3/UL
NRBC BLD AUTO-RTO: 0 /100
PLATELET # BLD AUTO: 327 10E3/UL (ref 150–450)
POTASSIUM SERPL-SCNC: 3.6 MMOL/L (ref 3.4–5.3)
RBC # BLD AUTO: 3.46 10E6/UL (ref 3.8–5.2)
SODIUM SERPL-SCNC: 145 MMOL/L (ref 135–145)
WBC # BLD AUTO: 8.8 10E3/UL (ref 4–11)

## 2023-12-04 PROCEDURE — 99495 TRANSJ CARE MGMT MOD F2F 14D: CPT

## 2023-12-04 PROCEDURE — 86140 C-REACTIVE PROTEIN: CPT | Mod: ZL

## 2023-12-04 PROCEDURE — 80048 BASIC METABOLIC PNL TOTAL CA: CPT | Mod: ZL

## 2023-12-04 PROCEDURE — G0463 HOSPITAL OUTPT CLINIC VISIT: HCPCS

## 2023-12-04 PROCEDURE — 83550 IRON BINDING TEST: CPT | Mod: ZL

## 2023-12-04 PROCEDURE — 36415 COLL VENOUS BLD VENIPUNCTURE: CPT | Mod: ZL

## 2023-12-04 PROCEDURE — 85025 COMPLETE CBC W/AUTO DIFF WBC: CPT | Mod: ZL

## 2023-12-04 PROCEDURE — 82728 ASSAY OF FERRITIN: CPT | Mod: ZL

## 2023-12-04 RX ORDER — CEPHALEXIN 500 MG/1
500 CAPSULE ORAL 2 TIMES DAILY
Qty: 14 CAPSULE | Refills: 0 | Status: SHIPPED | OUTPATIENT
Start: 2023-12-04 | End: 2023-12-11

## 2023-12-04 NOTE — PROGRESS NOTES
Assessment & Plan     Hospital discharge follow-up/Chronic diastolic congestive heart failure (H)  Breathing stable. Euvolemic. Discharged on lasix 10 mg (previously was on 20 mg which has resulted in dehydration, YONATAN multiple times over the past year, had been decreased to 10 earlier this month due to this reason). Discussed at length with patient today- with stable kidney function, we will trial alternating 10mg/20mg every other day. Follow-up in 1 week as below.    History of GI bleed  No bleeding. Repeat hemoglobin today- improving at 9.9. Iron 45, iron sat 16. Patient has received total of 3 iron infusions. Will have infusion on Wednesday completed. Will plan to repeat hemoglobin  again next week at follow-up. We discussed endoscopy which patient declines at this time, we will continue with conservative management. Continue PPI, carafate. Continue to hold aspirin.  - CBC with platelets and differential  - Iron and iron binding capacity  - Ferritin    Essential Hypertension  Improved. Continue current medications.    CKD (chronic kidney disease) stage 4, GFR 15-29 ml/min (H)  Improved kidney function. Creat of 1.06, BUN 11.2. Will plan to repeat again in 2 weeks.  - Basic metabolic panel    Bilateral carotid artery stenosis  Due for 1 year follow-up US.  - US Carotid Bilateral    Cellulitis of left upper extremity  See image in chart. Per patient erythema is actually improving. Possible phlebitis. There is warmth, tenderness present. Start cephalexin. Discussed her allergy to penicillin, she will call if unable to tolerate cephalosporin or if developing rash. Follow-up 1 week.  - cephALEXin (KEFLEX) 500 MG capsule  Dispense: 14 capsule; Refill: 0      30 minutes spent by me on the date of the encounter doing chart review, history and exam, documentation and further activities per the note     MED REC REQUIRED  Post Medication Reconciliation Status:  Discharge medications reconciled and changed, see  notes/orders    No follow-ups on file.    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)  Suspect 2nd from CHF  and anemia     History of GI bleed (9/20/2019)    Anemia (11/26/2023)  FOB positive     Chronic systolic congestive heart failure (H)- Recovered (5/12/2019    Elevated brain natriuretic peptide (BNP) level (11/26/2023)    Acquired hypothyroidism (5/12/2019)    Benign essential hypertension (5/12/2019    ASCVD (arteriosclerotic cardiovascular disease) (6/18/2019)    Mixed hyperlipidemia (6/18/2019)    CKD (chronic kidney disease) stage 4, GFR 15-29 ml/min (H) (1/26/2022)    History of coronary artery stent placement to the O/M LCX on 6/28/2019 at [facility] (7/23/2019)    Subclavian arterial stenosis (H24) (1/26/2022)    History of CVA on 1/1/2019 (3/14/2022)    Postoperative atrial fibrillation on 6/28/2019 (H) (3/14/2022)    PAD on 2/28/2020-moderate (H) (3/14/2022)    Bilateral carotid artery stenosis (3/14/2022)   Discharge Date 11/28/2023   Discharge Diagnosis Acute respiratory failure with hypoxia (H) (5/12/2019)   COPD, 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 (7/23/2019)    Subclavian arterial stenosis (H24) (1/26/2022)    History of CVA on 1/1/2019 (3/14/2022)    Postoperative atrial fibrillation on 6/28/2019 (H) (3/14/2022)    PAD on 2/28/2020-moderate (H) (3/14/2022)    Bilateral carotid artery stenosis (3/14/2022)   How are you doing now that you are home? Patient stated she is feeling and that she is up and doing laundry.   How are your symptoms? (Red Flag symptoms escalate to triage hotline per guidelines) Improved   Do you feel your condition is stable enough to be safe at home until your provider visit? Yes   Does the patient have their discharge instructions?  Yes   Does the patient have questions regarding their discharge instructions?  No   Were you started on any new medications or were there changes to any of your previous medications?  Yes   Does the patient have all of their medications? Yes   Do you have questions regarding any of your medications?  No   Discharge follow-up appointment scheduled within 14 calendar days?  Yes   Discharge Follow Up Appointment Date 12/4/2023   Discharge Follow Up Appointment Scheduled with? Primary Care Provider     Hospital Follow-up Visit:    Hospital/Nursing Home/IP Rehab Facility: [facility]  Date of Admission: 11/26  Date of Discharge: 11/28  Reason(s) for Admission: GI Bleed, CHF    Was your hospitalization related to COVID-19? No   Problems taking medications regularly:  None  Medication changes since discharge: None  Problems adhering to non-medication therapy:  N/A    Summary of hospitalization:  [facility] discharge summary reviewed  Diagnostic Tests/Treatments reviewed.  Follow up needed: CBC, BMP  Other Healthcare Providers Involved in Patient s Care:         None  Update since discharge: improved.     Plan of care communicated with patient and family     Presented to the ER on 11/26 related to shortness of breath and swelling. Elevated BNP, low hemoglobin at  7.1 noted. Given lasix, 2 units PRBC. Ct chest/abdomen/pelvis obtained- negative for PE, bilateral pleural effusions with small pericardial effusion. She was admitted for management. She declined EGD. She was started on PPI and carafate. Hemoglobin stable. General surgery recommending conservative management. Received 2 infusions of venofer in the hospital and may have 3 more in next 2 weeks. Hemoglobin of 8.7 on discharge.     She was restarted on lasix 10 mg, lisinopril 5 mg.     Since discharge notes she is doing well. Feeling at baseline. Denies any abdominal pain, epigastric pain or rectal bleeding. No nausea or vomiting. She is eating well. Breathing is stable. No increase in edema. Dizziness is improved. Does note some redness on her left forearm where her IV was.     Review of Systems   Constitutional, HEENT, cardiovascular, pulmonary, GI, , musculoskeletal, neuro, skin, endocrine and psych systems are negative, except as otherwise noted.      Objective    /72 (BP Location: Left arm, Patient Position: Sitting, Cuff Size: Adult Regular)   Pulse 80   Temp 98.2  F (36.8  C) (Tympanic)   Wt 47.2 kg (104 lb)   SpO2 94%   BMI 20.31 kg/m    Body mass index is 20.31 kg/m .    Physical Exam   GENERAL: elderly, alert and no distress  NECK: no adenopathy, no asymmetry, masses, or scars and thyroid normal to palpation  RESP: lungs clear to auscultation - no rales, rhonchi or wheezes  CV: regular rate and rhythm, normal S1 S2, no S3 or S4, no murmur, click or rub, no peripheral edema and peripheral pulses strong  ABDOMEN: soft, nontender, no hepatosplenomegaly, no masses and bowel sounds normal  MS: no gross musculoskeletal defects noted, no edema  SKIN: Left forearm with area of erythema, warmth and tenderness. No fluctuance. See image below.     NEURO: Normal strength and tone, mentation intact and speech normal  PSYCH: mentation appears normal, affect normal/bright

## 2023-12-05 LAB — CRP SERPL-MCNC: 12.02 MG/L

## 2023-12-06 ENCOUNTER — INFUSION THERAPY VISIT (OUTPATIENT)
Dept: INFUSION THERAPY | Facility: OTHER | Age: 83
End: 2023-12-06
Attending: FAMILY MEDICINE
Payer: COMMERCIAL

## 2023-12-06 VITALS — DIASTOLIC BLOOD PRESSURE: 63 MMHG | SYSTOLIC BLOOD PRESSURE: 122 MMHG

## 2023-12-06 DIAGNOSIS — N18.4 CKD (CHRONIC KIDNEY DISEASE) STAGE 4, GFR 15-29 ML/MIN (H): ICD-10-CM

## 2023-12-06 DIAGNOSIS — D50.8 IRON DEFICIENCY ANEMIA SECONDARY TO INADEQUATE DIETARY IRON INTAKE: Primary | ICD-10-CM

## 2023-12-06 PROCEDURE — 96374 THER/PROPH/DIAG INJ IV PUSH: CPT

## 2023-12-06 PROCEDURE — 250N000011 HC RX IP 250 OP 636: Performed by: HOSPITALIST

## 2023-12-06 RX ORDER — MEPERIDINE HYDROCHLORIDE 25 MG/ML
25 INJECTION INTRAMUSCULAR; INTRAVENOUS; SUBCUTANEOUS EVERY 30 MIN PRN
Status: CANCELLED | OUTPATIENT
Start: 2023-12-07

## 2023-12-06 RX ORDER — HEPARIN SODIUM (PORCINE) LOCK FLUSH IV SOLN 100 UNIT/ML 100 UNIT/ML
5 SOLUTION INTRAVENOUS
Status: CANCELLED | OUTPATIENT
Start: 2023-12-07

## 2023-12-06 RX ORDER — METHYLPREDNISOLONE SODIUM SUCCINATE 125 MG/2ML
125 INJECTION, POWDER, LYOPHILIZED, FOR SOLUTION INTRAMUSCULAR; INTRAVENOUS
Status: CANCELLED
Start: 2023-12-07

## 2023-12-06 RX ORDER — DIPHENHYDRAMINE HYDROCHLORIDE 50 MG/ML
50 INJECTION INTRAMUSCULAR; INTRAVENOUS
Status: CANCELLED
Start: 2023-12-07

## 2023-12-06 RX ORDER — ALBUTEROL SULFATE 90 UG/1
1-2 AEROSOL, METERED RESPIRATORY (INHALATION)
Status: CANCELLED
Start: 2023-12-07

## 2023-12-06 RX ORDER — ALBUTEROL SULFATE 0.83 MG/ML
2.5 SOLUTION RESPIRATORY (INHALATION)
Status: CANCELLED | OUTPATIENT
Start: 2023-12-07

## 2023-12-06 RX ORDER — HEPARIN SODIUM,PORCINE 10 UNIT/ML
5-20 VIAL (ML) INTRAVENOUS DAILY PRN
Status: CANCELLED | OUTPATIENT
Start: 2023-12-07

## 2023-12-06 RX ORDER — EPINEPHRINE 1 MG/ML
0.3 INJECTION, SOLUTION, CONCENTRATE INTRAVENOUS EVERY 5 MIN PRN
Status: CANCELLED | OUTPATIENT
Start: 2023-12-07

## 2023-12-06 RX ADMIN — IRON SUCROSE 200 MG: 20 INJECTION, SOLUTION INTRAVENOUS at 12:55

## 2023-12-06 RX ADMIN — Medication 250 ML: at 12:54

## 2023-12-06 NOTE — PROGRESS NOTES
Attempt # 1  Outcome: Left Message   Comment: LVM for pt to schedule appt with Maritza Amaro on Tuesday 12/12, can use 1:30 spot, per Maritza.

## 2023-12-06 NOTE — PROGRESS NOTES
Infusion Nursing Note:  Lita Ocasio presents today for venofer.    Patient seen by provider today: No   present during visit today: Not Applicable.    Note: Patient reports she received two doses in the hospital she will only need one additional dose administered after today.  Next dose scheduled per patient request.        Intravenous Access:  Peripheral IV placed.    Treatment Conditions:  Lab Results   Component Value Date    HGB 9.9 (L) 12/04/2023    WBC 8.8 12/04/2023    ANEU 5.4 06/15/2021    ANEUTAUTO 6.3 12/04/2023     12/04/2023        Lab Results   Component Value Date     12/04/2023    POTASSIUM 3.6 12/04/2023    MAG 2.0 11/26/2023    CR 1.06 (H) 12/04/2023    BECKY 9.0 12/04/2023    BILITOTAL 0.4 11/26/2023    ALBUMIN 3.9 11/26/2023    ALT 17 11/26/2023    AST 19 11/26/2023       Results reviewed, labs MET treatment parameters, ok to proceed with treatment.      Post Infusion Assessment:  Patient tolerated infusion without incident.  Site patent and intact, free from redness, edema or discomfort.  No evidence of extravasations.  Access discontinued per protocol.       Discharge Plan:   Patient and/or family verbalized understanding of discharge instructions and all questions answered.  Copy of AVS reviewed with patient and/or family.  Patient will return December 19th at 1230 for next appointment.  Departure Mode: Wheelchair.      Yolette Urias RN

## 2023-12-08 ENCOUNTER — HOSPITAL ENCOUNTER (OUTPATIENT)
Dept: ULTRASOUND IMAGING | Facility: HOSPITAL | Age: 83
Discharge: HOME | End: 2023-12-08
Payer: COMMERCIAL

## 2023-12-08 DIAGNOSIS — I65.23 BILATERAL CAROTID ARTERY STENOSIS: ICD-10-CM

## 2023-12-08 PROCEDURE — 93880 EXTRACRANIAL BILAT STUDY: CPT

## 2023-12-11 NOTE — PROGRESS NOTES
Assessment & Plan     Cellulitis of left upper extremity  Resolved.     History of GI bleed  Repeat hgb today improving at 11.5. Will complete last infusion on 12/9. Continue conservative management with carafate, PPI. Restart aspirin. Patient will call if onset of any bleeding or concerns.   - CBC with platelets and differential    Chronic diastolic congestive heart failure (H)  Lungs clear, trace edema on exam. Weight is actually down today. BNP however elevated at 5,035 (normal 1 month ago, however elevated at 13,527 during hospitalization on 11/26). Will increase lasix to 20 mg daily x 1 week then return to alternating daily dosing of 10 mg and 20 mg. Follow-up 2 weeks. Encouraged to call and be seen with worsening edema, breathing etc.   - BNP-N terminal pro    CKD (chronic kidney disease) stage 4, GFR 15-29 ml/min (H)  Kidney function stable.   - Basic metabolic panel    Hypokalemia  2.9 today. Discussed started potassium supplement. Follow-up scheduled.    30 minutes spent by me on the date of the encounter doing chart review, history and exam, documentation and further activities per the note     MED REC REQUIRED  Post Medication Reconciliation Status:  Medication reconciliation previously completed during another office visit    No follow-ups on file.    [facility]O [facility] [place]    Joann London is a 83 year old, presenting for the following health issues:  Recheck Medication and RECHECK (cellulitis)      HPI     Medication Followup of lasix  Taking Medication as prescribed: yes  Side Effects:  states swelling of feet  Medication Helping Symptoms:  yes     Dose increased to alternating 20 mg and 10 mg daily. Tolerating well. No concerns today.      Concern - Cellulitis, left upper extremity- follow up  Onset: 11/28  Description: states is still itching  Intensity: mild  Progression of Symptoms:  improving  Accompanying Signs & Symptoms: itching  Previous history of  similar problem: none  Precipitating factors:        Worsened by: none  Alleviating factors:        Improved by: keflex  Therapies tried and outcome: keflex    Noted at office visit on 12/4 at site of IV removal. Started on cephalexin.     Completed antibiotics without issue. Redness, warmth has resolved. No fevers, chills.      Review of Systems   Constitutional, HEENT, cardiovascular, pulmonary, GI, , musculoskeletal, neuro, skin, endocrine and psych systems are negative, except as otherwise noted.      Objective    /77 (BP Location: Left arm, Patient Position: Sitting, Cuff Size: Adult Regular)   Pulse 82   Temp 98.5  F (36.9  C) (Tympanic)   Wt 46.6 kg (102 lb 11.2 oz)   SpO2 96%   BMI 20.06 kg/m    Body mass index is 20.06 kg/m .    Physical Exam   GENERAL: alert and no distress  NECK: no adenopathy, no asymmetry, masses, or scars and thyroid normal to palpation  RESP: lungs clear to auscultation - no rales, rhonchi or wheezes  CV: regular rate and rhythm, normal S1 S2, no S3 or S4, no murmur, click or rub, no peripheral edema and peripheral pulses strong  MS: no gross musculoskeletal defects noted, trace edema at ankles  NEURO: Normal strength and tone, mentation intact and speech normal  PSYCH: mentation appears normal, affect normal/bright

## 2023-12-12 ENCOUNTER — OFFICE VISIT (OUTPATIENT)
Dept: FAMILY MEDICINE | Facility: OTHER | Age: 83
End: 2023-12-12
Payer: COMMERCIAL

## 2023-12-12 VITALS
OXYGEN SATURATION: 96 % | DIASTOLIC BLOOD PRESSURE: 77 MMHG | BODY MASS INDEX: 20.06 KG/M2 | WEIGHT: 102.7 LBS | TEMPERATURE: 98.5 F | HEART RATE: 82 BPM | SYSTOLIC BLOOD PRESSURE: 134 MMHG

## 2023-12-12 DIAGNOSIS — L03.114 CELLULITIS OF LEFT UPPER EXTREMITY: Primary | ICD-10-CM

## 2023-12-12 DIAGNOSIS — Z87.19 HISTORY OF GI BLEED: ICD-10-CM

## 2023-12-12 DIAGNOSIS — I50.32 CHRONIC DIASTOLIC CONGESTIVE HEART FAILURE (H): ICD-10-CM

## 2023-12-12 DIAGNOSIS — N18.4 CKD (CHRONIC KIDNEY DISEASE) STAGE 4, GFR 15-29 ML/MIN (H): ICD-10-CM

## 2023-12-12 DIAGNOSIS — E87.6 HYPOKALEMIA: ICD-10-CM

## 2023-12-12 LAB
ANION GAP SERPL CALCULATED.3IONS-SCNC: 15 MMOL/L (ref 7–15)
BASOPHILS # BLD AUTO: 0.1 10E3/UL (ref 0–0.2)
BASOPHILS NFR BLD AUTO: 1 %
BUN SERPL-MCNC: 11.7 MG/DL (ref 8–23)
CALCIUM SERPL-MCNC: 9 MG/DL (ref 8.8–10.2)
CHLORIDE SERPL-SCNC: 107 MMOL/L (ref 98–107)
CREAT SERPL-MCNC: 1.03 MG/DL (ref 0.51–0.95)
DEPRECATED HCO3 PLAS-SCNC: 25 MMOL/L (ref 22–29)
EGFRCR SERPLBLD CKD-EPI 2021: 54 ML/MIN/1.73M2
EOSINOPHIL # BLD AUTO: 0.5 10E3/UL (ref 0–0.7)
EOSINOPHIL NFR BLD AUTO: 7 %
ERYTHROCYTE [DISTWIDTH] IN BLOOD BY AUTOMATED COUNT: 19.9 % (ref 10–15)
GLUCOSE SERPL-MCNC: 81 MG/DL (ref 70–99)
HCT VFR BLD AUTO: 37 % (ref 35–47)
HGB BLD-MCNC: 11.5 G/DL (ref 11.7–15.7)
IMM GRANULOCYTES # BLD: 0 10E3/UL
IMM GRANULOCYTES NFR BLD: 0 %
LYMPHOCYTES # BLD AUTO: 1.5 10E3/UL (ref 0.8–5.3)
LYMPHOCYTES NFR BLD AUTO: 23 %
MCH RBC QN AUTO: 28.8 PG (ref 26.5–33)
MCHC RBC AUTO-ENTMCNC: 31.1 G/DL (ref 31.5–36.5)
MCV RBC AUTO: 93 FL (ref 78–100)
MONOCYTES # BLD AUTO: 0.5 10E3/UL (ref 0–1.3)
MONOCYTES NFR BLD AUTO: 7 %
NEUTROPHILS # BLD AUTO: 4 10E3/UL (ref 1.6–8.3)
NEUTROPHILS NFR BLD AUTO: 62 %
NRBC # BLD AUTO: 0 10E3/UL
NRBC BLD AUTO-RTO: 0 /100
NT-PROBNP SERPL-MCNC: 5035 PG/ML (ref 0–1800)
PLATELET # BLD AUTO: 370 10E3/UL (ref 150–450)
POTASSIUM SERPL-SCNC: 2.9 MMOL/L (ref 3.4–5.3)
RBC # BLD AUTO: 4 10E6/UL (ref 3.8–5.2)
SODIUM SERPL-SCNC: 147 MMOL/L (ref 135–145)
WBC # BLD AUTO: 6.5 10E3/UL (ref 4–11)

## 2023-12-12 PROCEDURE — G0463 HOSPITAL OUTPT CLINIC VISIT: HCPCS

## 2023-12-12 PROCEDURE — 85025 COMPLETE CBC W/AUTO DIFF WBC: CPT | Mod: ZL

## 2023-12-12 PROCEDURE — 80048 BASIC METABOLIC PNL TOTAL CA: CPT | Mod: ZL

## 2023-12-12 PROCEDURE — 83880 ASSAY OF NATRIURETIC PEPTIDE: CPT | Mod: ZL

## 2023-12-12 PROCEDURE — 99214 OFFICE O/P EST MOD 30 MIN: CPT

## 2023-12-12 PROCEDURE — 36415 COLL VENOUS BLD VENIPUNCTURE: CPT | Mod: ZL

## 2023-12-12 RX ORDER — POTASSIUM CHLORIDE 1500 MG/1
20 TABLET, EXTENDED RELEASE ORAL DAILY
Qty: 30 TABLET | Refills: 0 | Status: SHIPPED | OUTPATIENT
Start: 2023-12-12 | End: 2023-12-27

## 2023-12-12 NOTE — LETTER
December 12, 2023      Lita Ocasio  [address] [address]        Dear ,    We are writing to inform you of your test results.    Maritza Amaro APRN CNP  P  Family Care Team 3  Cc: [facility]  Please call. Her BNP has increased to 5000. Please have her increase lasix to full tablet 20 mg for the next week. Then return to alternating daily with 10 mg and 20 mg.  Her potassium is also low. Please have her start potassium supplement- I will send to pharmacy. I would like to see her in clinic in 2 weeks instead of the lab only apt we scheduled. If worsening shortness of breath, edema then will need to be seen earlier. Please call to schedule.    COCO Ta CNP on 12/12/2023 at 3:16 PM    Resulted Orders   Basic metabolic panel   Result Value Ref [facility]    Sodium 147 (H) 135 - 145 mmol/L      Comment:      Reference intervals for this test were updated on 09/26/2023 to more accurately reflect our healthy population. There may be differences in the flagging of prior results with similar values performed with this method. Interpretation of those prior results can be made in the context of the updated reference intervals.     Potassium 2.9 (L) 3.4 - 5.3 mmol/L    Chloride 107 98 - 107 mmol/L    Carbon Dioxide (CO2) 25 22 - 29 mmol/L    Anion Gap 15 7 - 15 mmol/L    Urea Nitrogen 11.7 8.0 - 23.0 mg/dL    Creatinine 1.03 (H) 0.51 - 0.95 mg/dL    GFR Estimate 54 (L) >60 mL/min/1.73m2    Calcium 9.0 8.8 - 10.2 mg/dL    Glucose 81 70 - 99 mg/dL   CBC with platelets and differential   Result Value Ref [facility]    WBC Count 6.5 4.0 - 11.0 10e3/uL    RBC Count 4.00 3.80 - 5.20 10e6/uL    Hemoglobin 11.5 (L) 11.7 - 15.7 g/dL    Hematocrit 37.0 35.0 - 47.0 %    MCV 93 78 - 100 fL    MCH 28.8 26.5 - 33.0 pg    MCHC 31.1 (L) 31.5 - 36.5 g/dL    RDW 19.9 (H) 10.0 - 15.0 %    Platelet Count 370 150 - 450 10e3/uL    % Neutrophils 62 %    % Lymphocytes 23 %    % Monocytes 7 %    % Eosinophils 7 %     % Basophils 1 %    % Immature Granulocytes 0 %    NRBCs per 100 WBC 0 <1 /100    Absolute Neutrophils 4.0 1.6 - 8.3 10e3/uL    Absolute Lymphocytes 1.5 0.8 - 5.3 10e3/uL    Absolute Monocytes 0.5 0.0 - 1.3 10e3/uL    Absolute Eosinophils 0.5 0.0 - 0.7 10e3/uL    Absolute Basophils 0.1 0.0 - 0.2 10e3/uL    Absolute Immature Granulocytes 0.0 <=0.4 10e3/uL    Absolute NRBCs 0.0 10e3/uL   BNP-N terminal pro   Result Value Ref [facility]    N Terminal Pro BNP Outpatient 5,035 (H) 0 - 1,800 pg/mL      Comment:      Reference range shown and results flagged as abnormal are for the outpatient, non acute settings. Establishing a baseline value for each individual patient is useful for follow-up.    Suggested inpatient cut points for confirming diagnosis of CHF in an acute setting are:  >450 pg/mL (age 18 to less than 50)  >900 pg/mL (age 50 to less than 75)  >1800 pg/mL (75 yrs and older)    An inpatient or emergency department NT-proPBNP <300 pg/mL effectively rules out acute CHF, with 99% negative predictive value.           If you have any questions or concerns, please call the clinic at the number listed above.       Sincerely,      COCO Ta CNP

## 2023-12-13 ENCOUNTER — TELEPHONE (OUTPATIENT)
Dept: FAMILY MEDICINE | Facility: OTHER | Age: 83
End: 2023-12-13

## 2023-12-13 DIAGNOSIS — E87.6 HYPOKALEMIA: Primary | ICD-10-CM

## 2023-12-13 DIAGNOSIS — E03.9 ACQUIRED HYPOTHYROIDISM: ICD-10-CM

## 2023-12-13 NOTE — TELEPHONE ENCOUNTER
Synthroid      Last Written Prescription Date:  12/14/22  Last Fill Quantity: 90,   # refills: 3  Last Office Visit: 12/12/23  Future Office visit:    Next 5 appointments (look out 90 days)      Dec 27, 2023 11:30 AM  (Arrive by 11:15 AM)  SHORT with Ophelia Troncoso MD  [facility] [place] ([facility] [place] ) [address]  [place] [address]  [phone]     Mar 06, 2024  2:00 PM  (Arrive by 1:45 PM)  Return Visit with Sal Graham DO  [facility] [place] ([facility] [place] ) [address]  [place] [address]  [phone]

## 2023-12-13 NOTE — TELEPHONE ENCOUNTER
9:55 AM    Reason for Call: Phone Call    Description: Patient wouldn't say why she is calling it is confidential and wants to speak to the nurse     Was an appointment offered for this call? No  If yes : Appointment type              Date    Preferred method for responding to this message: Telephone Call  What is your phone number ? [phone]    If we cannot reach you directly, may we leave a detailed response at the number you provided? Yes    Can this message wait until your PCP/provider returns, if available today? YES, No

## 2023-12-14 ENCOUNTER — TELEPHONE (OUTPATIENT)
Dept: FAMILY MEDICINE | Facility: OTHER | Age: 83
End: 2023-12-14

## 2023-12-14 DIAGNOSIS — J43.9 PULMONARY EMPHYSEMA, UNSPECIFIED EMPHYSEMA TYPE (H): ICD-10-CM

## 2023-12-14 RX ORDER — IPRATROPIUM BROMIDE AND ALBUTEROL SULFATE 2.5; .5 MG/3ML; MG/3ML
1 SOLUTION RESPIRATORY (INHALATION) EVERY 6 HOURS PRN
Qty: 90 ML | Refills: 3 | Status: SHIPPED | OUTPATIENT
Start: 2023-12-14 | End: 2023-12-27

## 2023-12-14 RX ORDER — POTASSIUM CHLORIDE 1500 MG/1
20 TABLET, EXTENDED RELEASE ORAL DAILY
Qty: 10 TABLET | Refills: 0 | Status: SHIPPED | OUTPATIENT
Start: 2023-12-14 | End: 2023-12-27

## 2023-12-14 RX ORDER — LEVOTHYROXINE SODIUM 50 MCG
50 TABLET ORAL DAILY
Qty: 90 TABLET | Refills: 0 | Status: SHIPPED | OUTPATIENT
Start: 2023-12-14 | End: 2024-03-12

## 2023-12-14 NOTE — TELEPHONE ENCOUNTER
Patient was seen by Prem on 12/12/23.    Patient called wanting to know the name of the med sent in on 12/12/23, she could not remember.  Advised patient that potassium chloride was sent in on 12/12/23    Writer sent in a script for her neb solution.  Routed Synthroid for provider to sign as it is historical.

## 2023-12-14 NOTE — TELEPHONE ENCOUNTER
I have sent in 10 tables to [facility] if she is able to pick them up today and get started.     COCO Ta CNP on 12/14/2023 at 1:09 PM

## 2023-12-14 NOTE — TELEPHONE ENCOUNTER
Writer updated pt that 10 potassium have been called in to [facility], pt states she has no way to  medication. Writer called [facility] they will deliver medication to patient today, pt made aware that [facility] will deliver medication.  Gauri Milligan LPN

## 2023-12-14 NOTE — TELEPHONE ENCOUNTER
Calling to speak with Dr Troncoso nurse regarding she is thinking it is the synthroid     Phone [phone]

## 2023-12-14 NOTE — TELEPHONE ENCOUNTER
Writer spoke with patient, went over PCP instructions again for lasix,patient repeated back to writer correctly. Pt has not received potassium yet as it is coming in the mail should be here Friday or Sat, states lasix is helping, she is eating potassium rich foods ex. Bananas. Please advise on potassium if need be.  Gauri Milligan LPN

## 2023-12-19 ENCOUNTER — INFUSION THERAPY VISIT (OUTPATIENT)
Dept: INFUSION THERAPY | Facility: OTHER | Age: 83
End: 2023-12-19
Attending: HOSPITALIST
Payer: COMMERCIAL

## 2023-12-19 VITALS
SYSTOLIC BLOOD PRESSURE: 123 MMHG | TEMPERATURE: 97.6 F | DIASTOLIC BLOOD PRESSURE: 71 MMHG | WEIGHT: 100.2 LBS | HEIGHT: 60 IN | OXYGEN SATURATION: 92 % | HEART RATE: 81 BPM | BODY MASS INDEX: 19.67 KG/M2

## 2023-12-19 DIAGNOSIS — N18.4 CKD (CHRONIC KIDNEY DISEASE) STAGE 4, GFR 15-29 ML/MIN (H): Primary | ICD-10-CM

## 2023-12-19 DIAGNOSIS — D50.8 IRON DEFICIENCY ANEMIA SECONDARY TO INADEQUATE DIETARY IRON INTAKE: ICD-10-CM

## 2023-12-19 PROCEDURE — 250N000011 HC RX IP 250 OP 636: Performed by: HOSPITALIST

## 2023-12-19 PROCEDURE — 96374 THER/PROPH/DIAG INJ IV PUSH: CPT

## 2023-12-19 RX ORDER — DIPHENHYDRAMINE HYDROCHLORIDE 50 MG/ML
50 INJECTION INTRAMUSCULAR; INTRAVENOUS
Status: CANCELLED
Start: 2023-12-20

## 2023-12-19 RX ORDER — EPINEPHRINE 1 MG/ML
0.3 INJECTION, SOLUTION, CONCENTRATE INTRAVENOUS EVERY 5 MIN PRN
Status: CANCELLED | OUTPATIENT
Start: 2023-12-20

## 2023-12-19 RX ORDER — ALBUTEROL SULFATE 0.83 MG/ML
2.5 SOLUTION RESPIRATORY (INHALATION)
Status: CANCELLED | OUTPATIENT
Start: 2023-12-20

## 2023-12-19 RX ORDER — ALBUTEROL SULFATE 90 UG/1
1-2 AEROSOL, METERED RESPIRATORY (INHALATION)
Status: CANCELLED
Start: 2023-12-20

## 2023-12-19 RX ORDER — HEPARIN SODIUM (PORCINE) LOCK FLUSH IV SOLN 100 UNIT/ML 100 UNIT/ML
5 SOLUTION INTRAVENOUS
Status: CANCELLED | OUTPATIENT
Start: 2023-12-20

## 2023-12-19 RX ORDER — METHYLPREDNISOLONE SODIUM SUCCINATE 125 MG/2ML
125 INJECTION, POWDER, LYOPHILIZED, FOR SOLUTION INTRAMUSCULAR; INTRAVENOUS
Status: CANCELLED
Start: 2023-12-20

## 2023-12-19 RX ORDER — MEPERIDINE HYDROCHLORIDE 25 MG/ML
25 INJECTION INTRAMUSCULAR; INTRAVENOUS; SUBCUTANEOUS EVERY 30 MIN PRN
Status: CANCELLED | OUTPATIENT
Start: 2023-12-20

## 2023-12-19 RX ORDER — HEPARIN SODIUM,PORCINE 10 UNIT/ML
5-20 VIAL (ML) INTRAVENOUS DAILY PRN
Status: CANCELLED | OUTPATIENT
Start: 2023-12-20

## 2023-12-19 RX ADMIN — IRON SUCROSE 200 MG: 20 INJECTION, SOLUTION INTRAVENOUS at 13:09

## 2023-12-19 RX ADMIN — Medication 250 ML: at 13:08

## 2023-12-19 ASSESSMENT — PAIN SCALES - GENERAL: PAINLEVEL: NO PAIN (0)

## 2023-12-19 NOTE — PROGRESS NOTES
Infusion Nursing Note:  [facility]    Patient seen by provider today: No   present during visit today: Not Applicable.    Note: Patient last dose today she notes no concerns.      Intravenous Access:  Peripheral IV placed.    Treatment Conditions:  Lab Results   Component Value Date    HGB 11.5 (L) 12/12/2023    WBC 6.5 12/12/2023    ANEU 5.4 06/15/2021    ANEUTAUTO 4.0 12/12/2023     12/12/2023        Lab Results   Component Value Date     (H) 12/12/2023    POTASSIUM 2.9 (L) 12/12/2023    MAG 2.0 11/26/2023    CR 1.03 (H) 12/12/2023    BECKY 9.0 12/12/2023    BILITOTAL 0.4 11/26/2023    ALBUMIN 3.9 11/26/2023    ALT 17 11/26/2023    AST 19 11/26/2023       Results reviewed, labs MET treatment parameters, ok to proceed with treatment.      Post Infusion Assessment:  Patient tolerated infusion without incident.  Blood return noted pre and post infusion.  Site patent and intact, free from redness, edema or discomfort.  No evidence of extravasations.  Access discontinued per protocol.       Discharge Plan:   Discharge instructions reviewed with: Patient.  Copy of AVS reviewed with patient and/or family.  Patient will not return.  Patient discharged in stable condition accompanied by: friend.      Yolette Urias RN

## 2023-12-27 ENCOUNTER — LAB (OUTPATIENT)
Dept: LAB | Facility: OTHER | Age: 83
End: 2023-12-27
Payer: COMMERCIAL

## 2023-12-27 ENCOUNTER — OFFICE VISIT (OUTPATIENT)
Dept: FAMILY MEDICINE | Facility: OTHER | Age: 83
End: 2023-12-27
Attending: FAMILY MEDICINE
Payer: COMMERCIAL

## 2023-12-27 VITALS
DIASTOLIC BLOOD PRESSURE: 72 MMHG | OXYGEN SATURATION: 95 % | SYSTOLIC BLOOD PRESSURE: 106 MMHG | BODY MASS INDEX: 19.18 KG/M2 | WEIGHT: 98.2 LBS | HEART RATE: 86 BPM | TEMPERATURE: 98 F

## 2023-12-27 DIAGNOSIS — N18.4 CKD (CHRONIC KIDNEY DISEASE) STAGE 4, GFR 15-29 ML/MIN (H): ICD-10-CM

## 2023-12-27 DIAGNOSIS — D64.9 ANEMIA, UNSPECIFIED TYPE: ICD-10-CM

## 2023-12-27 DIAGNOSIS — E87.6 HYPOKALEMIA: ICD-10-CM

## 2023-12-27 DIAGNOSIS — I50.32 CHRONIC DIASTOLIC CONGESTIVE HEART FAILURE (H): ICD-10-CM

## 2023-12-27 DIAGNOSIS — I10 ESSENTIAL HYPERTENSION: ICD-10-CM

## 2023-12-27 DIAGNOSIS — M62.81 GENERALIZED MUSCLE WEAKNESS: ICD-10-CM

## 2023-12-27 DIAGNOSIS — J43.9 PULMONARY EMPHYSEMA, UNSPECIFIED EMPHYSEMA TYPE (H): ICD-10-CM

## 2023-12-27 DIAGNOSIS — I10 BENIGN ESSENTIAL HYPERTENSION: ICD-10-CM

## 2023-12-27 DIAGNOSIS — E87.6 HYPOKALEMIA: Primary | ICD-10-CM

## 2023-12-27 DIAGNOSIS — R06.09 DOE (DYSPNEA ON EXERTION): ICD-10-CM

## 2023-12-27 LAB
ANION GAP SERPL CALCULATED.3IONS-SCNC: 13 MMOL/L (ref 7–15)
BUN SERPL-MCNC: 25.8 MG/DL (ref 8–23)
CALCIUM SERPL-MCNC: 9.3 MG/DL (ref 8.8–10.2)
CHLORIDE SERPL-SCNC: 102 MMOL/L (ref 98–107)
CREAT SERPL-MCNC: 1.39 MG/DL (ref 0.51–0.95)
DEPRECATED HCO3 PLAS-SCNC: 23 MMOL/L (ref 22–29)
EGFRCR SERPLBLD CKD-EPI 2021: 37 ML/MIN/1.73M2
GLUCOSE SERPL-MCNC: 108 MG/DL (ref 70–99)
HGB BLD-MCNC: 12.3 G/DL (ref 11.7–15.7)
NT-PROBNP SERPL-MCNC: 717 PG/ML (ref 0–1800)
POTASSIUM SERPL-SCNC: 4.5 MMOL/L (ref 3.4–5.3)
SODIUM SERPL-SCNC: 138 MMOL/L (ref 135–145)
T4 FREE SERPL-MCNC: 1.37 NG/DL (ref 0.9–1.7)
TSH SERPL DL<=0.005 MIU/L-ACNC: 4.31 UIU/ML (ref 0.3–4.2)

## 2023-12-27 PROCEDURE — 85018 HEMOGLOBIN: CPT | Mod: ZL

## 2023-12-27 PROCEDURE — 83880 ASSAY OF NATRIURETIC PEPTIDE: CPT | Mod: ZL

## 2023-12-27 PROCEDURE — 84439 ASSAY OF FREE THYROXINE: CPT | Mod: ZL

## 2023-12-27 PROCEDURE — 84443 ASSAY THYROID STIM HORMONE: CPT | Mod: ZL

## 2023-12-27 PROCEDURE — 36415 COLL VENOUS BLD VENIPUNCTURE: CPT | Mod: ZL

## 2023-12-27 PROCEDURE — G0463 HOSPITAL OUTPT CLINIC VISIT: HCPCS

## 2023-12-27 PROCEDURE — 99214 OFFICE O/P EST MOD 30 MIN: CPT | Performed by: FAMILY MEDICINE

## 2023-12-27 PROCEDURE — 80048 BASIC METABOLIC PNL TOTAL CA: CPT | Mod: ZL

## 2023-12-27 RX ORDER — METOPROLOL SUCCINATE 25 MG/1
25 TABLET, EXTENDED RELEASE ORAL DAILY
Qty: 90 TABLET | Refills: 3 | Status: SHIPPED | OUTPATIENT
Start: 2023-12-27 | End: 2024-09-23 | Stop reason: ALTCHOICE

## 2023-12-27 RX ORDER — POTASSIUM CHLORIDE 1500 MG/1
20 TABLET, EXTENDED RELEASE ORAL EVERY OTHER DAY
Qty: 30 TABLET | Refills: 0 | Status: SHIPPED | OUTPATIENT
Start: 2023-12-27 | End: 2024-02-07

## 2023-12-27 RX ORDER — IPRATROPIUM BROMIDE AND ALBUTEROL SULFATE 2.5; .5 MG/3ML; MG/3ML
1 SOLUTION RESPIRATORY (INHALATION) EVERY 6 HOURS PRN
Qty: 360 ML | Refills: 3 | Status: SHIPPED | OUTPATIENT
Start: 2023-12-27 | End: 2024-02-07

## 2023-12-27 NOTE — PROGRESS NOTES
Assessment & Plan     Hypokalemia  Significantly improved. Back to baseline dosing of lasix alternating 10mg and 20mg. Okay to decrease oral potassium to every other day as well. Recheck planned for 1 mo, goal would be to get off of this medication as pt struggles with swallowing it.   - Basic metabolic panel  - potassium chloride ER (K-TAB) 20 MEQ CR tablet  Dispense: 30 tablet; Refill: 0    CKD (chronic kidney disease) stage 4, GFR 15-29 ml/min (H)  Stable  - Basic metabolic panel    Essential hypertension  Controlled, on low side today. Monitor.     Anemia, unspecified type  Back to normal after IV iron. Pt plans to start MV with iron. Has not restarted ASA 81. Okay to hold off until recheck, if stable, restart.   - Hemoglobin    MILLS (dyspnea on exertion) / Chronic diastolic congestive heart failure (H)  Breathing and BNP improved, continue current lasix dosing.   - metoprolol succinate ER (TOPROL XL) 25 MG 24 hr tablet  Dispense: 90 tablet; Refill: 3  - BNP-N terminal pro    Generalized muscle weakness  - TSH with free T4 reflex    COPD  Refilled inhalers and nebs.   - ipratropium-albuterol (COMBIVENT RESPIMAT)  MCG/ACT inhaler  Dispense: 8 g; Refill: 3  - ipratropium - albuterol 0.5 mg/2.5 mg/3 mL (DUONEB) 0.5-2.5 (3) MG/3ML neb solution  Dispense: 360 mL; Refill: 3    30 minutes spent by me on the date of the encounter doing chart review, review of test results, interpretation of tests, patient visit, and documentation     Return in about 1 month (around 1/27/2024).    Ophelia Troncoso MD  [facility] - ABRAHAM London is a 83 year old, presenting for the following health issues:  Heart Failure and Kidney Problem        12/27/2023    11:19 AM   Additional Questions   Roomed by Priyanka Perez   Accompanied by none         12/27/2023    11:19 AM   Patient Reported Additional Medications   Patient reports taking the following new medications none       HPI     Chronic Kidney  Disease Follow-up    Do you take any over the counter pain medicine?: No    Heart Failure Follow-up   Are you experiencing any shortness of breath? Yes, with activity only     How would you describe your shortness of breath?  Same as usual  Are you experiencing any swelling in your legs or feet?  No  Are you using more pillows than usual? No  Do you cough at night?  Yes  Do you check your weight daily?  Yes  Have you had a weight change recently?  No  Are you having any of the following side effects from your medications? (Select all that apply)  Dizziness with neb  Since your last visit, how many times have you gone to the cardiologist, urgent care, emergency room, or hospital because of your heart failure?   None    Last Echo: Echo result w/o MOPS: Interpretation SummaryGlobal and regional left ventricular function is normal with an EF of 55-60%.Mild concentric wall thickening consistent with left ventricular hypertrophyis present.Global right ventricular function is normal.Mild right ventricular dilation is present.Moderate left atrial enlargement is present.Mild right atrial enlargement is present.Mild mitral annular calcification is present.Mild mitral insufficiency is present.No aortic stenosis is present.Mild tricuspid insufficiency is present.The right ventricular systolic pressure is 55mmHg above the right atrialpressure.    Concern - GI bleeding  Onset: over a month ago  Description: anemia  Intensity: moderate  Progression of Symptoms:  improving  Accompanying Signs & Symptoms: none  Previous history of similar problem: no  Precipitating factors:        Worsened by: ASA  Alleviating factors:        Improved by: iron  Therapies tried and outcome: iron infusion    Review of Systems   Constitutional, HEENT, cardiovascular, pulmonary, gi and gu systems are negative, except as otherwise noted.      Objective    /72 (BP Location: Left arm, Patient Position: Sitting, Cuff Size: Adult Regular)   Pulse 86    Temp 98  F (36.7  C) (Tympanic)   Wt 44.5 kg (98 lb 3.2 oz)   SpO2 95%   BMI 19.18 kg/m    Body mass index is 19.18 kg/m .  Physical Exam   GENERAL: alert and no distress  RESP: lungs clear to auscultation - no rales, rhonchi or wheezes  CV: regular rates and rhythm, normal S1 S2, no S3 or S4, no murmur, click or rub, and no peripheral edema  MS: no gross musculoskeletal defects noted, no edema  SKIN: no suspicious lesions or rashes  PSYCH: mentation appears normal, affect normal/bright    Results for orders placed or performed in visit on 12/27/23   Basic metabolic panel     Status: Abnormal   Result Value Ref [facility]    Sodium 138 135 - 145 mmol/L    Potassium 4.5 3.4 - 5.3 mmol/L    Chloride 102 98 - 107 mmol/L    Carbon Dioxide (CO2) 23 22 - 29 mmol/L    Anion Gap 13 7 - 15 mmol/L    Urea Nitrogen 25.8 (H) 8.0 - 23.0 mg/dL    Creatinine 1.39 (H) 0.51 - 0.95 mg/dL    GFR Estimate 37 (L) >60 mL/min/1.73m2    Calcium 9.3 8.8 - 10.2 mg/dL    Glucose 108 (H) 70 - 99 mg/dL   BNP-N terminal pro     Status: Normal   Result Value Ref [facility]    N Terminal Pro BNP Outpatient 717 0 - 1,800 pg/mL   Hemoglobin     Status: Normal   Result Value Ref [facility]    Hemoglobin 12.3 11.7 - 15.7 g/dL   TSH with free T4 reflex     Status: Abnormal   Result Value Ref [facility]    TSH 4.31 (H) 0.30 - 4.20 uIU/mL

## 2024-01-03 DIAGNOSIS — I50.31 ACUTE DIASTOLIC CONGESTIVE HEART FAILURE (H): ICD-10-CM

## 2024-01-03 DIAGNOSIS — E78.5 HYPERLIPIDEMIA, UNSPECIFIED HYPERLIPIDEMIA TYPE: ICD-10-CM

## 2024-01-03 DIAGNOSIS — Z95.5 S/P DRUG ELUTING CORONARY STENT PLACEMENT: ICD-10-CM

## 2024-01-04 RX ORDER — LISINOPRIL 5 MG/1
5 TABLET ORAL DAILY
Qty: 90 TABLET | Refills: 0 | Status: SHIPPED | OUTPATIENT
Start: 2024-01-04 | End: 2024-04-22

## 2024-01-04 RX ORDER — ATORVASTATIN CALCIUM 40 MG/1
40 TABLET, FILM COATED ORAL AT BEDTIME
Qty: 90 TABLET | Refills: 0 | Status: SHIPPED | OUTPATIENT
Start: 2024-01-04 | End: 2024-04-22

## 2024-01-04 NOTE — TELEPHONE ENCOUNTER
Lisinopril, Lipitor      Last Written Prescription Date:  11.29.23, 9.29.23  Last Fill Quantity: #90, #90,   # refills: 0  Last Office Visit: 12.27.23  Future Office visit:    Next 5 appointments (look out 90 days)      Feb 07, 2024  1:00 PM  (Arrive by 12:45 PM)  Office Visit with Ophelia Troncoso MD  [facility] ([facility] [address] [place] [address]  [phone]     Mar 06, 2024  2:00 PM  (Arrive by 1:45 PM)  Return Visit with Sal Graham DO  [facility] ([facility] ) [address]  [place] [address]  [phone]             Routing refill request to provider for review/approval because:

## 2024-01-30 DIAGNOSIS — D64.9 ANEMIA, UNSPECIFIED TYPE: Primary | ICD-10-CM

## 2024-01-30 DIAGNOSIS — J43.9 PULMONARY EMPHYSEMA, UNSPECIFIED EMPHYSEMA TYPE (H): ICD-10-CM

## 2024-01-30 RX ORDER — PANTOPRAZOLE SODIUM 40 MG/1
40 TABLET, DELAYED RELEASE ORAL DAILY
Qty: 90 TABLET | Refills: 3 | Status: SHIPPED | OUTPATIENT
Start: 2024-01-30 | End: 2024-03-26

## 2024-01-30 RX ORDER — SUCRALFATE ORAL 1 G/10ML
1 SUSPENSION ORAL 4 TIMES DAILY
Qty: 90 ML | Refills: 3 | Status: SHIPPED | OUTPATIENT
Start: 2024-01-30 | End: 2024-02-07

## 2024-01-30 RX ORDER — FLUTICASONE FUROATE, UMECLIDINIUM BROMIDE AND VILANTEROL TRIFENATATE 200; 62.5; 25 UG/1; UG/1; UG/1
1 POWDER RESPIRATORY (INHALATION) DAILY
Qty: 60 EACH | Refills: 3 | Status: SHIPPED | OUTPATIENT
Start: 2024-01-30 | End: 2024-02-07

## 2024-01-30 NOTE — TELEPHONE ENCOUNTER
Not on current med list      TRELEGY ELLIPTA 200-62.5-25 MCG/ACT oral inhaler (Discontinued)       Last Written Prescription Date:  6/12/23-7/21/23  Last Fill Quantity: 60,   # refills: 0  Last Office Visit: 12/27/23  Future Office visit:    Next 5 appointments (look out 90 days)      Feb 07, 2024  1:00 PM  (Arrive by 12:45 PM)  Office Visit with Ophelia Troncoso MD  [facility] ([facility] ) [address]  [place] [address]  [phone]     Mar 06, 2024  2:00 PM  (Arrive by 1:45 PM)  Return Visit with Sal Graham DO  [facility] ([facility] ) [address]  [place] [address]  [phone]             Routing refill request to provider for review/approval because:    Not on current med list  sucralfate (CARAFATE) 1 GM/10ML suspension       Last Written Prescription Date:  11/28/23-12/28/23  Last Fill Quantity: 1200 ml,   # refills: 0  Last Office Visit: 12/27/23  Future Office visit:    Next 5 appointments (look out 90 days)      Feb 07, 2024  1:00 PM  (Arrive by 12:45 PM)  Office Visit with Ophelia Troncoso MD  [facility] ([facility] ) [address]  [place] [address]  [phone]     Mar 06, 2024  2:00 PM  (Arrive by 1:45 PM)  Return Visit with Sal Graham DO  [facility] ([facility] ) [address]  [place] [address]  [phone]             Routing refill request to provider for review/approval because:      Not on current med list  pantoprazole (PROTONIX) 40 MG EC table       Last Written Prescription Date:  11/28/23-1/27/24  Last Fill Quantity: 60,   # refills: 1  Last Office Visit: 12/27/23  Future Office visit:    Next 5 appointments (look out 90 days)      Feb 07, 2024  1:00 PM  (Arrive by 12:45 PM)  Office Visit with Ophelia Troncoso MD  [facility] [place] ([facility] [place] )  [address] [place] [address]  [phone]     Mar 06, 2024  2:00 PM  (Arrive by 1:45 PM)  Return Visit with Sal Graham DO  [facility] [place] ([facility] [place] ) [address]  [place] [address]  [phone]             Routing refill request to provider for review/approval because:

## 2024-01-30 NOTE — TELEPHONE ENCOUNTER
Reason for call:  Medication      Have you contacted your pharmacy? Yes   If patient has contacted Pharmacy and it has been over 72hrs, continue to #2  Medication sucralfate (CARAFATE), pantoprazole (PROTONIX), ipratropium - albuterol, TRELEGY ELLIPTA  Patient requesting 90 day supply because she has to pay a $10 co pay each time she gets refills   What Pharmacy do you use? [facility]      (Please note that the turn-around-time for prescriptions is 72 business hours; I am sending your request at this time. SEND TO  [facility]  )

## 2024-02-07 ENCOUNTER — OFFICE VISIT (OUTPATIENT)
Dept: FAMILY MEDICINE | Facility: OTHER | Age: 84
End: 2024-02-07
Attending: FAMILY MEDICINE
Payer: COMMERCIAL

## 2024-02-07 VITALS
WEIGHT: 100.13 LBS | TEMPERATURE: 98.9 F | SYSTOLIC BLOOD PRESSURE: 96 MMHG | BODY MASS INDEX: 19.55 KG/M2 | HEART RATE: 75 BPM | DIASTOLIC BLOOD PRESSURE: 54 MMHG | OXYGEN SATURATION: 97 %

## 2024-02-07 DIAGNOSIS — I10 ESSENTIAL HYPERTENSION: ICD-10-CM

## 2024-02-07 DIAGNOSIS — J43.2 CENTRILOBULAR EMPHYSEMA (H): ICD-10-CM

## 2024-02-07 DIAGNOSIS — Z87.19 HISTORY OF GI BLEED: ICD-10-CM

## 2024-02-07 DIAGNOSIS — N18.4 CKD (CHRONIC KIDNEY DISEASE) STAGE 4, GFR 15-29 ML/MIN (H): Primary | ICD-10-CM

## 2024-02-07 DIAGNOSIS — J43.9 PULMONARY EMPHYSEMA, UNSPECIFIED EMPHYSEMA TYPE (H): ICD-10-CM

## 2024-02-07 DIAGNOSIS — I50.32 CHRONIC DIASTOLIC CONGESTIVE HEART FAILURE (H): ICD-10-CM

## 2024-02-07 DIAGNOSIS — E87.6 HYPOKALEMIA: ICD-10-CM

## 2024-02-07 LAB
ANION GAP SERPL CALCULATED.3IONS-SCNC: 12 MMOL/L (ref 7–15)
BUN SERPL-MCNC: 25.7 MG/DL (ref 8–23)
CALCIUM SERPL-MCNC: 9 MG/DL (ref 8.8–10.2)
CHLORIDE SERPL-SCNC: 105 MMOL/L (ref 98–107)
CREAT SERPL-MCNC: 1.24 MG/DL (ref 0.51–0.95)
DEPRECATED HCO3 PLAS-SCNC: 23 MMOL/L (ref 22–29)
EGFRCR SERPLBLD CKD-EPI 2021: 43 ML/MIN/1.73M2
GLUCOSE SERPL-MCNC: 90 MG/DL (ref 70–99)
POTASSIUM SERPL-SCNC: 3.7 MMOL/L (ref 3.4–5.3)
SODIUM SERPL-SCNC: 140 MMOL/L (ref 135–145)

## 2024-02-07 PROCEDURE — G0463 HOSPITAL OUTPT CLINIC VISIT: HCPCS

## 2024-02-07 PROCEDURE — 99214 OFFICE O/P EST MOD 30 MIN: CPT | Performed by: FAMILY MEDICINE

## 2024-02-07 PROCEDURE — 80048 BASIC METABOLIC PNL TOTAL CA: CPT | Mod: ZL | Performed by: FAMILY MEDICINE

## 2024-02-07 PROCEDURE — 36415 COLL VENOUS BLD VENIPUNCTURE: CPT | Mod: ZL | Performed by: FAMILY MEDICINE

## 2024-02-07 RX ORDER — IPRATROPIUM BROMIDE AND ALBUTEROL SULFATE 2.5; .5 MG/3ML; MG/3ML
1 SOLUTION RESPIRATORY (INHALATION) EVERY 6 HOURS PRN
Qty: 1350 ML | Refills: 3 | Status: SHIPPED | OUTPATIENT
Start: 2024-02-07 | End: 2024-02-22

## 2024-02-07 RX ORDER — FLUTICASONE FUROATE, UMECLIDINIUM BROMIDE AND VILANTEROL TRIFENATATE 200; 62.5; 25 UG/1; UG/1; UG/1
1 POWDER RESPIRATORY (INHALATION) DAILY
Qty: 180 EACH | Refills: 3 | Status: SHIPPED | OUTPATIENT
Start: 2024-02-07 | End: 2024-02-22

## 2024-02-07 RX ORDER — POTASSIUM CHLORIDE 1500 MG/1
20 TABLET, EXTENDED RELEASE ORAL
Qty: 84 TABLET | Refills: 3 | Status: SHIPPED | OUTPATIENT
Start: 2024-02-07

## 2024-02-07 RX ORDER — SUCRALFATE 1 G/1
1 TABLET ORAL 4 TIMES DAILY PRN
Qty: 120 TABLET | Refills: 3 | Status: SHIPPED | OUTPATIENT
Start: 2024-02-07 | End: 2024-04-22

## 2024-02-07 RX ORDER — POTASSIUM CHLORIDE 1500 MG/1
20 TABLET, EXTENDED RELEASE ORAL
Qty: 30 TABLET | Refills: 0 | Status: SHIPPED | OUTPATIENT
Start: 2024-02-07 | End: 2024-02-07

## 2024-02-07 RX ORDER — FUROSEMIDE 20 MG
20 TABLET ORAL DAILY
Qty: 90 TABLET | Refills: 3 | Status: SHIPPED | OUTPATIENT
Start: 2024-02-07 | End: 2024-02-07

## 2024-02-07 RX ORDER — FUROSEMIDE 20 MG
10 TABLET ORAL DAILY
Qty: 90 TABLET | Refills: 3 | Status: SHIPPED | OUTPATIENT
Start: 2024-02-07 | End: 2024-02-22

## 2024-02-07 ASSESSMENT — PAIN SCALES - GENERAL: PAINLEVEL: NO PAIN (0)

## 2024-02-07 NOTE — PROGRESS NOTES
Assessment & Plan     COPD / Centrilobular emphysema (H)  Breathing has been very stable. Continue current regimen  - Fluticasone-Umeclidin-Vilanterol (TRELEGY ELLIPTA) 200-62.5-25 MCG/ACT oral inhaler  Dispense: 180 each; Refill: 3  - ipratropium - albuterol 0.5 mg/2.5 mg/3 mL (DUONEB) 0.5-2.5 (3) MG/3ML neb solution  Dispense: 1350 mL; Refill: 3    CKD (chronic kidney disease) stage 4, GFR 15-29 ml/min (H)  Stable, very diuretic sensitive.     Essential hypertension  Controlled, on the low end today, however, asymptomatic. Monitor.     Hypokalemia  Pt very motivated to STOP the potassium. Discussed lasix dosing, she is taking half tab. Recheck potassium today. May decrease to M/W/F, would not stop at this time.   - Basic metabolic panel  - Basic metabolic panel  - potassium chloride ER (K-TAB) 20 MEQ CR tablet  Dispense: 84 tablet; Refill: 3    Chronic diastolic congestive heart failure (H)  Current dose of diuretics is 10mg, edema mild, breathing stable. Remain here.   - furosemide (LASIX) 20 MG tablet  Dispense: 90 tablet; Refill: 3    History of GI bleed  Refilled, pt does continue to use prn.   - sucralfate (CARAFATE) 1 GM tablet  Dispense: 120 tablet; Refill: 3    30 minutes spent by me on the date of the encounter doing chart review, review of test results, interpretation of tests, patient visit, and documentation         No follow-ups on file.    Joann London is a 83 year old, presenting for the following health issues:  COPD, Hypertension, Heart Failure, and Kidney Problem        2/7/2024    12:41 PM   Additional Questions   Roomed by Anita MARTINEZ   Accompanied by self     HPI     Hypertension Follow-up    Do you check your blood pressure regularly outside of the clinic? Yes   Are you following a low salt diet? No  Are your blood pressures ever more than 140 on the top number (systolic) OR more   than 90 on the bottom number (diastolic), for example 140/90? No    COPD Follow-Up  Overall, how are your  "COPD symptoms since your last clinic visit?  Better, since trelegy  How much fatigue or shortness of breath do you have when you are walking?  Less than usual  How much shortness of breath do you have when you are resting?  Less than usual  How often do you cough? Rarely  Have you noticed any change in your sputum/phlegm?  No  Have you experienced a recent fever? No  Please describe how far you can walk without stopping to rest:  Less than 1 block  How many flights of stairs are you able to walk up without stopping?  1  Have you had any Emergency Room Visits, Urgent Care Visits, or Hospital Admissions because of your COPD since your last office visit?  No    History   Smoking Status    Former    Packs/day: 1.00    Years: 50.00    Types: Cigarettes    Quit date: 1/1/2019   Smokeless Tobacco    Never     No results found for: \"FEV1\", \"KJC5OYH\"      Chronic Kidney Disease Follow-up    Do you take any over the counter pain medicine?: Yes  What over the counter medicine are you taking for your pain?:  rarely takes a tylenol for back pain    How often do you take this medicine?:  A few times a month      Heart Failure Follow-up   Are you experiencing any shortness of breath? No  Are you experiencing any swelling in your legs or feet?  No  Are you using more pillows than usual? No, has bed that elevates head  Do you cough at night?  No  Do you check your weight daily?  Yes  Have you had a weight change recently?  No  Are you having any of the following side effects from your medications? (Select all that apply)  The patient does not report symptoms of dizziness, fatigue, cough, swelling, or slow heart beat.  Since your last visit, how many times have you gone to the cardiologist, urgent care, emergency room, or hospital because of your heart failure?   None  Last Echo: Echo result w/o MOPS: Interpretation SummaryGlobal and regional left ventricular function is normal with an EF of 55-60%.Mild concentric wall thickening " consistent with left ventricular hypertrophyis present.Global right ventricular function is normal.Mild right ventricular dilation is present.Moderate left atrial enlargement is present.Mild right atrial enlargement is present.Mild mitral annular calcification is present.Mild mitral insufficiency is present.No aortic stenosis is present.Mild tricuspid insufficiency is present.The right ventricular systolic pressure is 55mmHg above the right atrialpressure.      Review of Systems  Constitutional, HEENT, cardiovascular, pulmonary, gi and gu systems are negative, except as otherwise noted.      Objective    BP 96/54   Pulse 75   Temp 98.9  F (37.2  C)   Wt 45.4 kg (100 lb 2 oz)   SpO2 97%   BMI 19.55 kg/m    Body mass index is 19.55 kg/m .  Physical Exam   GENERAL: alert and no distress  RESP: lungs clear to auscultation - no rales, rhonchi or wheezes  CV: regular rate and rhythm, normal S1 S2, no S3 or S4, no murmur, click or rub, no peripheral edema  MS: no gross musculoskeletal defects noted, no edema  PSYCH: mentation appears normal, affect normal/bright    Results for orders placed or performed in visit on 02/07/24   Basic metabolic panel     Status: Abnormal   Result Value Ref [facility]    Sodium 140 135 - 145 mmol/L    Potassium 3.7 3.4 - 5.3 mmol/L    Chloride 105 98 - 107 mmol/L    Carbon Dioxide (CO2) 23 22 - 29 mmol/L    Anion Gap 12 7 - 15 mmol/L    Urea Nitrogen 25.7 (H) 8.0 - 23.0 mg/dL    Creatinine 1.24 (H) 0.51 - 0.95 mg/dL    GFR Estimate 43 (L) >60 mL/min/1.73m2    Calcium 9.0 8.8 - 10.2 mg/dL    Glucose 90 70 - 99 mg/dL           Signed Electronically by: Ophelia Troncoso MD

## 2024-02-09 ENCOUNTER — TELEPHONE (OUTPATIENT)
Dept: FAMILY MEDICINE | Facility: OTHER | Age: 84
End: 2024-02-09

## 2024-02-09 DIAGNOSIS — J43.9 PULMONARY EMPHYSEMA, UNSPECIFIED EMPHYSEMA TYPE (H): Primary | ICD-10-CM

## 2024-02-09 RX ORDER — IPRATROPIUM BROMIDE AND ALBUTEROL SULFATE 2.5; .5 MG/3ML; MG/3ML
1 SOLUTION RESPIRATORY (INHALATION) EVERY 6 HOURS PRN
Qty: 1350 ML | Refills: 3 | OUTPATIENT
Start: 2024-02-09

## 2024-02-09 RX ORDER — IPRATROPIUM BROMIDE AND ALBUTEROL SULFATE 2.5; .5 MG/3ML; MG/3ML
1 SOLUTION RESPIRATORY (INHALATION) EVERY 6 HOURS PRN
Qty: 90 ML | Refills: 0 | Status: SHIPPED | OUTPATIENT
Start: 2024-02-09 | End: 2024-02-19

## 2024-02-09 NOTE — TELEPHONE ENCOUNTER
Pt states she is requesting a refill be sent to a local pharmacy until she gets prescription in the mail. Pt requesting call back at [phone].    Thanks,    Cecilia Ni

## 2024-02-09 NOTE — TELEPHONE ENCOUNTER
pratropium - albuterol 0.5 mg/2.5 mg/3 mL (DUONEB) 0.5-2.5 (3) MG/3ML neb solution       Last Written Prescription Date:  2/7/24  Last Fill Quantity: 1350 ml,   # refills: 3  Last Office Visit: 2/7/24  Future Office visit:    Next 5 appointments (look out 90 days)      Mar 06, 2024  2:00 PM  (Arrive by 1:45 PM)  Return Visit with Sal Graham DO  [facility] [place] [facility] [address] [facility] [address]  [phone]     May 07, 2024  4:30 PM  (Arrive by 4:15 PM)  SHORT with Ophelia Troncoso MD  [facility] [address]  [facility] [address]  [phone]             Routing refill request to provider for review/approval because:

## 2024-02-09 NOTE — TELEPHONE ENCOUNTER
Results requested: Lab result     Best number to reach patient at: [phone]     Best time to call patient: anytime    Send to care team in basket.

## 2024-02-09 NOTE — TELEPHONE ENCOUNTER
Reason for call:  Medication    Patient out  Have you contacted your pharmacy? Yes   If patient has contacted Pharmacy and it has been over 72hrs, continue to #2  Medication     ipratropium-albuterol (COMBIVENT RESPIMAT)  MCG/ACT inhaler     What Pharmacy do you use? [facility]      (Please note that the turn-around-time for prescriptions is 72 business hours; I am sending your request at this time. SEND TO  [facility]  )

## 2024-02-19 ENCOUNTER — TELEPHONE (OUTPATIENT)
Dept: FAMILY MEDICINE | Facility: OTHER | Age: 84
End: 2024-02-19

## 2024-02-19 DIAGNOSIS — J43.9 PULMONARY EMPHYSEMA, UNSPECIFIED EMPHYSEMA TYPE (H): ICD-10-CM

## 2024-02-19 RX ORDER — IPRATROPIUM BROMIDE AND ALBUTEROL SULFATE 2.5; .5 MG/3ML; MG/3ML
1 SOLUTION RESPIRATORY (INHALATION) EVERY 6 HOURS PRN
Qty: 90 ML | Refills: 3 | Status: SHIPPED | OUTPATIENT
Start: 2024-02-19 | End: 2024-03-27

## 2024-02-19 NOTE — TELEPHONE ENCOUNTER
9:35 AM    Reason for Call: Phone Call    Description: Patient is wanting to speak to the nurse, patient is having trouble getting a RX. Patient not happy with writer.    Was an appointment offered for this call? No  If yes : Appointment type              Date    Preferred method for responding to this message: Telephone Call  What is your phone number ?  [phone]    If we cannot reach you directly, may we leave a detailed response at the number you provided? Yes    Can this message wait until your PCP/provider returns, if available today? YES, Provider is out    Ashlee Munoz

## 2024-02-19 NOTE — TELEPHONE ENCOUNTER
Patient requesting medication, is out and has not received from her pharmacy yet. Pended if wish.  Gauri Milligan LPN

## 2024-02-22 ENCOUNTER — TELEPHONE (OUTPATIENT)
Dept: FAMILY MEDICINE | Facility: OTHER | Age: 84
End: 2024-02-22

## 2024-02-22 DIAGNOSIS — I50.32 CHRONIC DIASTOLIC CONGESTIVE HEART FAILURE (H): ICD-10-CM

## 2024-02-22 DIAGNOSIS — J43.9 PULMONARY EMPHYSEMA, UNSPECIFIED EMPHYSEMA TYPE (H): ICD-10-CM

## 2024-02-22 RX ORDER — IPRATROPIUM BROMIDE AND ALBUTEROL SULFATE 2.5; .5 MG/3ML; MG/3ML
1 SOLUTION RESPIRATORY (INHALATION) EVERY 6 HOURS PRN
Qty: 1350 ML | Refills: 3 | Status: SHIPPED | OUTPATIENT
Start: 2024-02-22 | End: 2024-04-30

## 2024-02-22 RX ORDER — FLUTICASONE FUROATE, UMECLIDINIUM BROMIDE AND VILANTEROL TRIFENATATE 200; 62.5; 25 UG/1; UG/1; UG/1
1 POWDER RESPIRATORY (INHALATION) DAILY
Qty: 180 EACH | Refills: 3 | Status: SHIPPED | OUTPATIENT
Start: 2024-02-22 | End: 2024-03-26

## 2024-02-22 RX ORDER — FUROSEMIDE 20 MG
10 TABLET ORAL DAILY
Qty: 90 TABLET | Refills: 3 | Status: SHIPPED | OUTPATIENT
Start: 2024-02-22 | End: 2024-04-29

## 2024-02-22 NOTE — TELEPHONE ENCOUNTER
Patient requesting refill of medications, can not get them on time from mail pharmacy. Pended if wish.  Gauri Milligan LPN

## 2024-02-22 NOTE — TELEPHONE ENCOUNTER
9:54 AM    Reason for Call: Phone Call    Description: discuss medications having issues with several medication wants some to call her asap.    Was an appointment offered for this call? No  If yes : Appointment type              Date    Preferred method for responding to this message: Telephone Call  What is your phone number ? [phone]     If we cannot reach you directly, may we leave a detailed response at the number you provided? Yes    Can this message wait until your PCP/provider returns, if available today? Gale Haddad

## 2024-02-24 NOTE — TELEPHONE ENCOUNTER
"Pt calling and was yelling at writer as no one has called her back for appt.She apologized to writer.    C/O of just not feeling good and weakness.Mild weakness per pt.Walking Ok and drinking. She is able to get activities done around home.This started about two weeks ago.She wants appt with blood work.     Will send for OVERBOOK?She is asking for appt with Sudeep.  Per care advice should be seen in 3 days.    Please advise.    Advised UC/ED if s/s worsens.She states she will not go there.    Call pt back [phone]    Barb Adkins RN      Reason for Disposition    [1] MILD weakness (i.e., does not interfere with ability to work, go to school, normal activities) AND [2] persists > 1 week    Additional Information    Negative: SEVERE difficulty breathing (e.g., struggling for each breath, speaks in single words)    Negative: Shock suspected (e.g., cold/pale/clammy skin, too weak to stand, low BP, rapid pulse)    Negative: Difficult to awaken or acting confused (e.g., disoriented, slurred speech)    Negative: [1] Fainted > 15 minutes ago AND [2] still feels too weak or dizzy to stand    Negative: [1] SEVERE weakness (i.e., unable to walk or barely able to walk, requires support) AND [2] new-onset or worsening    Negative: Sounds like a life-threatening emergency to the triager    Negative: Weakness of the face, arm or leg on one side of the body    Negative: [1] Diabetes mellitus AND [2] weakness from low blood sugar (i.e., < 60 mg/dl or 3.5 mmol/l)    Negative: Heat exhaustion suspected (i.e., dehydration from heat exposure)    Negative: Chest pain    Negative: Vomiting is main symptom    Negative: Diarrhea is main symptom    Negative: Difficulty breathing    Negative: Heart beating < 50 beats per minute OR > 140 beats per minute    Negative: Extra heart beats OR irregular heart beating  (i.e., \"palpitations\")    Negative: Follows bleeding (e.g., from vomiting, rectum, vagina; Exception: small brief weakness from " "sight of a small amount blood)    Negative: Black or tarry bowel movements    Negative: [1] Drinking very little AND [2] dehydration suspected (e.g., no urine > 12 hours, very dry mouth, very lightheaded)    Negative: Patient sounds very sick or weak to the triager    Negative: [1] MODERATE weakness (i.e., interferes with work, school, normal activities) AND [2] cause unknown  (Exceptions: weakness with acute minor illness, or weakness from poor fluid intake)    Negative: [1] MODERATE weakness AND [2] from poor fluid intake AND [3] no improvement after 2 hours of rest and fluids    Negative: [1] Fever > 103 F (39.4 C) AND [2] not able to get the fever down using Fever Care Advice    Negative: [1] Fever > 101 F (38.3 C) AND [2] age > 60 years    Negative: [1] Fever > 100.0 F (37.8 C) AND [2] bedridden (e.g., nursing home patient, CVA, chronic illness, recovering from surgery)    Negative: [1] Fever > 100.0 F (37.8 C) AND [2] diabetes mellitus or weak immune system (e.g., HIV positive, cancer chemo, splenectomy, organ transplant, chronic steroids)    Negative: Pale skin (pallor)    Negative: [1] MODERATE weakness (i.e., interferes with work, school, normal activities) AND [2] persists > 3 days    Negative: Taking a medicine that could cause weakness (e.g., blood pressure medications, diuretics)    Answer Assessment - Initial Assessment Questions  1. DESCRIPTION: \"Describe how you are feeling.\"      Doesn't feel good and feels weak  2. SEVERITY: \"How bad is it?\"  \"Can you stand and walk?\"    - MILD - Feels weak or tired, but does not interfere with work, school or normal activities    - MODERATE - Able to stand and walk; weakness interferes with work, school, or normal activities    - SEVERE - Unable to stand or walk      Can walk normally  3. ONSET:  \"When did the weakness begin?\"      Two weeks  4. CAUSE: \"What do you think is causing the weakness?\"      dont know, no appetite ,drinking  5. MEDICINES: \"Have you " "recently started a new medicine or had a change in the amount of a medicine?\"      no  6. OTHER SYMPTOMS: \"Do you have any other symptoms?\" (e.g., chest pain, fever, cough, SOB, vomiting, diarrhea, bleeding, other areas of pain)      Diarrhea on Monday and Tuesday, no dizziness,urinating,bad ulcer-painful  7. PREGNANCY: \"Is there any chance you are pregnant?\" \"When was your last menstrual period?\"     na    Protocols used: WEAKNESS (GENERALIZED) AND FATIGUE-A-AH      " 157.48

## 2024-03-11 ENCOUNTER — OFFICE VISIT (OUTPATIENT)
Dept: CARDIOLOGY | Facility: OTHER | Age: 84
End: 2024-03-11
Attending: INTERNAL MEDICINE
Payer: COMMERCIAL

## 2024-03-11 VITALS
SYSTOLIC BLOOD PRESSURE: 140 MMHG | HEIGHT: 60 IN | OXYGEN SATURATION: 92 % | HEART RATE: 89 BPM | DIASTOLIC BLOOD PRESSURE: 80 MMHG | WEIGHT: 104.3 LBS | BODY MASS INDEX: 20.48 KG/M2 | RESPIRATION RATE: 20 BRPM

## 2024-03-11 DIAGNOSIS — N18.4 CKD (CHRONIC KIDNEY DISEASE) STAGE 4, GFR 15-29 ML/MIN (H): ICD-10-CM

## 2024-03-11 DIAGNOSIS — R06.09 DOE (DYSPNEA ON EXERTION): ICD-10-CM

## 2024-03-11 DIAGNOSIS — Z92.89 HISTORY OF CARDIOVERSION: ICD-10-CM

## 2024-03-11 DIAGNOSIS — I34.0 NON-RHEUMATIC MITRAL REGURGITATION: Chronic | ICD-10-CM

## 2024-03-11 DIAGNOSIS — Z86.73 HISTORY OF CVA (CEREBROVASCULAR ACCIDENT): ICD-10-CM

## 2024-03-11 DIAGNOSIS — R79.89 ELEVATED BRAIN NATRIURETIC PEPTIDE (BNP) LEVEL: ICD-10-CM

## 2024-03-11 DIAGNOSIS — E78.2 MIXED HYPERLIPIDEMIA: ICD-10-CM

## 2024-03-11 DIAGNOSIS — I25.10 CORONARY ARTERY DISEASE INVOLVING NATIVE CORONARY ARTERY OF NATIVE HEART WITHOUT ANGINA PECTORIS: ICD-10-CM

## 2024-03-11 DIAGNOSIS — R07.9 CHEST PAIN, UNSPECIFIED TYPE: ICD-10-CM

## 2024-03-11 DIAGNOSIS — I77.1 SUBCLAVIAN ARTERIAL STENOSIS (H): ICD-10-CM

## 2024-03-11 DIAGNOSIS — I48.91 POSTOPERATIVE ATRIAL FIBRILLATION (H): Primary | ICD-10-CM

## 2024-03-11 DIAGNOSIS — I65.23 BILATERAL CAROTID ARTERY STENOSIS: ICD-10-CM

## 2024-03-11 DIAGNOSIS — I25.10 ASCVD (ARTERIOSCLEROTIC CARDIOVASCULAR DISEASE): ICD-10-CM

## 2024-03-11 DIAGNOSIS — Z95.5 HISTORY OF CORONARY ARTERY STENT PLACEMENT: ICD-10-CM

## 2024-03-11 DIAGNOSIS — I10 BENIGN ESSENTIAL HYPERTENSION: ICD-10-CM

## 2024-03-11 DIAGNOSIS — J44.9 COPD, SEVERE (H): ICD-10-CM

## 2024-03-11 DIAGNOSIS — Z71.6 TOBACCO ABUSE COUNSELING: ICD-10-CM

## 2024-03-11 DIAGNOSIS — I97.89 POSTOPERATIVE ATRIAL FIBRILLATION (H): Primary | ICD-10-CM

## 2024-03-11 DIAGNOSIS — I50.22 CHRONIC SYSTOLIC CONGESTIVE HEART FAILURE (H): Chronic | ICD-10-CM

## 2024-03-11 DIAGNOSIS — R94.39 ABNORMAL STRESS TEST: ICD-10-CM

## 2024-03-11 DIAGNOSIS — R94.39 ABNORMAL CARDIOVASCULAR STRESS TEST: ICD-10-CM

## 2024-03-11 PROCEDURE — 99214 OFFICE O/P EST MOD 30 MIN: CPT | Performed by: INTERNAL MEDICINE

## 2024-03-11 PROCEDURE — 93010 ELECTROCARDIOGRAM REPORT: CPT | Mod: 77 | Performed by: INTERNAL MEDICINE

## 2024-03-11 PROCEDURE — G0463 HOSPITAL OUTPT CLINIC VISIT: HCPCS

## 2024-03-11 PROCEDURE — 93005 ELECTROCARDIOGRAM TRACING: CPT | Performed by: INTERNAL MEDICINE

## 2024-03-11 ASSESSMENT — PAIN SCALES - GENERAL: PAINLEVEL: NO PAIN (0)

## 2024-03-11 NOTE — PROGRESS NOTES
[facility] HEART CARE   CARDIOLOGY PROGRESS NOTE     Chief Complaint   Patient presents with    Follow Up     6 month          Diagnosis:  1.  CHF-diastolic.    -55-60%/indeterminate on 11/26/23.  -Grade 1 on 12/5/2019. H/O ischemic cardiomyopathy, recovered EF.   - 60-65% on 12/5/2019.  -36% on 6/4/2019 stress test.  2.  MILLS.  3.  Abnormal stress test on 6/14/2019.  4.  Cardiac cath, 6/20/2019 at [facility]..  -Stenting to o/mLCX.  5.  CVA on 1/1/2019.  6.  Tobacco abuse.  -Quit on 1/1/2019.   1/2-3/4/pack a day. Started at age 16.   -Exposed to 2 hand smoke from .   7.  Hypertension-controlled.  8.  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  9.  Hyperlipidemia-controlled.  10.  COPD-severe on 5/2/23.  11.  Postop A. fib on 6/29/19 at [facility].  12.  Cardioversion on 6/29/19 at [facility].  13.  PAD in 2/20/2020-moderate.  14.  Bilateral carotid disease.    -50-69% on 4/27/2023 through [facility].    -50% stenosis on 2/28/2020.    Assessment/Plan:    1.  MILLS: Likely from severe COPD noted on PFT's on 5/2/2023.  But also partially from diastolic dysfunction.  Patient describes her symptoms stable and improved with inhalers. However, in the past, her anginal equivalent has been dyspnea on exertion. She was previously set up for stress test but never completed it. She states she is claustrophobic with the scanner in close proximity of herself.  She did not like the door in the room closed today. She is a longtime smoker quitting in approximately 2019.  She started age 15 or 16.  She smoked 1/2-3/4 of a pack a day but her  was a heavy smoker smoking 2 packs a day in the house.  She has not had significant concerns of swelling or fluid overload.  Has declined stress testing in the past.  She does not believe that she would be able to complete it.  She did not complete her previous stress test as ordered.  She would consider an angiogram but her symptoms are stable.  She is not having any anginal symptoms. She would not be able to  travel to the [facility].  Hard to justify an angiogram at this point because of stability her symptoms and other clear reasons explain her short of breath.  For now, no changes.    2.  Follow-up in 6 months or sooner with issues.  Family would like her checked out every 6 months.      Interval history:  See above.    HPI:    Ms. Ocasio has a history of CAD s/p coronary angiography on 6/26/19 at [facility]. She was recently identified to have newly identified systolic heart failure.  Additionally, she has a history of hypertension, hyperlipidemia, nonrheumatic mitral regurgitation, central lobular emphysema, hypothyroidism, osteopenia, hyperparathyroidism, history of tobacco abuse and chronic rhinitis.     At initial visit patient was accompanied by her son who was very helpful in providing patients past medical history. He admitted approximately 20 years ago patient was being followed for MR which was noted to be improved and therefore, valve surgery was never recommended. He admitted that she was previously told it was suspected she had small MI's without coronary intervention or identified ACS. Finally, recent history just before new years of 2020 acute hemorrhagic stroke. It was following this, that she was taken off her beta blocker and lisinopril for HTN and possible past MI, she reported increased shortness of breath following this which has progressively worsened and likely multifactorial with COPD and HFrEF.      6/4/2019-Lexiscan stress test with abnormal imaging with reversible ischemia laterally and LVEF 36%. Additionally, in review of past CT chest imaging from one year ago, she was noted to have advanced atherosclerotic disease with bulky coronary and aortic calcifications. Given these findings, current symptoms and new acute systolic failure, recommended coronary angiography.     Patient underwent coronary angiography on 6/26/19 at [facility]. She was identified to have diffuse coronary atherosclerosis  with single vessel obstructive CAD, ostial and mid LCX. Successful PCI with synergy LISS x2, mid LCX 2.5x16mm, ostial LCX 3.0 x 16 mm. LVEDP 26-29 mmHg. During revascularization she was identified to have AF for which she received DCCV x1 with return to sinus and then reverted back to AF. She was treated with IV beta blocker for rate control. Patient reported feeling good following coronary intervention. Breathing had significantly improved and increased energy.       Patient presented to the ED on 7/13/21 with reports of chest pressure, dizziness and increased dyspnea. She was noted to be playing [employer]ome[employer] that same day with symptoms. ECG revealing SR with SVE, no ST-T changes. Troponin negative x 2 and chest XR unremarkable. Lab evaluation with decline in renal function and suspected dehydration.         Relevant testing:  US carotids on 12/8/23:  1. Right side: Less than 50 % diameter stenosis by grayscale imaging  and sonographic velocity criteria.  2. Left side: 50 to 69% diameter stenosis overall by grayscale imaging  and sonographic velocity criteria. Increased since comparison.  3. Retrograde flow in the left vertebral artery, correlate for  clinical symptoms of subclavian steal.    ECHO on 11/26/23:  Global and regional left ventricular function is normal with an EF of 55-60%.  Left ventricular diastolic function is indeterminate.   Mild concentric wall thickening consistent with left ventricular hypertrophy is present.  Global right ventricular function is normal.  Mild right ventricular dilation is present.  Moderate left atrial enlargement is present.  Mild right atrial enlargement is present.  Mild mitral annular calcification is present.  Mild mitral insufficiency is present.  No aortic stenosis is present.  Mild tricuspid insufficiency is present.  The right ventricular systolic pressure is 55mmHg above the right atrial pressure.      KESHAV on 7/18/22:  Moderate to severe disease bilaterally.    US  carotids on 4/27/2022 through [facility]:  1. Atherosclerotic changes with sonographic data supportive of 50-69% stenosis bilaterally.   2. Retrograde flow in the left vertebral artery.     US abdominal aorta on 4/27/2022:  Diffuse atherosclerotic disease in the intra-abdominal aorta, without sonographic evidence of intra-abdominal aortic aneurysm.     Zio patch on 3/16/22:  Worn for 13 days and 20 hr's.  After removing artifact, total time was 13 days and 16 hr's. Placed on 3/16/2022 at 1:28 PM and completed on 3/30/2022 at 9:15 AM.  Underlying rhythm was sinus.  Hrt rate ranged from 41 bpm, maximum heart rate of 197 bmp, averaging 62 bmp.  No significant bradycardia, pauses, Mobitz type II or 3rd degree heart block.  No atrial fibrillation on this study.  x0 triggered events and x0 diary entries.  x0 runs of VT.    x7 runs of SVT lasting up to 8 beats with a maximum heart rate of 197 bmp.  Rare, <1% of PAC's, atrial couplets, atrial triplets, PVC's, ventricular couplets, and ventricular triplets.  + episodes of ventricular bigeminy lasting up to 4.4 sec's.  0 episodes of ventricular trigeminy lasting.    KESHAV on 2/28/20:  Moderate arterial occlusive disease in both lower extremities.    US carotids on 2/28/20:  Heavy atherosclerotic plaquing in both carotid bifurcations with velocities consistent with less than 50% stenoses noted.    ECHO on 12/5/19:  No pericardial effusion is present.  Global and regional left ventricular function is normal with an EF of 60-65%.  Grade I or early diastolic dysfunction.  The right ventricle is normal size.  Global right ventricular function is normal.  Both atria appear normal.  Mild mitral insufficiency is present.  Aortic valve is normal in structure and function.  The aortic valve is tricuspid.  Trace tricuspid insufficiency is present.  Right ventricular systolic pressure is 3 mmHg above the right atrial pressure.  The pulmonic valve is normal.    Stress test on  6/14/19:   Reversible ischemia laterally. Abnormally low left ventricular ejection fraction                ICD-10-CM    1. Postoperative atrial fibrillation (H)  I97.89 EKG 12-lead complete w/read - (Clinic Performed)    I48.91       2. Chest pain, unspecified type  R07.9       3. COPD, severe (H)  J44.9       4. MILLS (dyspnea on exertion)  R06.09       5. Mixed hyperlipidemia  E78.2       6. Abnormal stress test  R94.39       7. Abnormal cardiovascular stress test on 6/14/2019  R94.39       8. Benign essential hypertension  I10       9. ASCVD (arteriosclerotic cardiovascular disease)  I25.10       10. Bilateral carotid artery stenosis  I65.23       11. Chronic systolic congestive heart failure (H)- Recovered  I50.22       12. Coronary artery disease involving native coronary artery of native heart without angina pectoris  I25.10       13. Non-rheumatic mitral regurgitation  I34.0       14. Subclavian arterial stenosis (H24)  I77.1       15. CKD (chronic kidney disease) stage 4, GFR 15-29 ml/min (H)  N18.4       16. Elevated brain natriuretic peptide (BNP) level  R79.89       17. History of coronary artery stent placement to the O/M LCX on 6/28/2019 at [facility]  Z95.5       18. History of cardioversion on 6/28/2019  Z92.89       19. History of CVA on 1/1/2019  Z86.73       20. Tobacco abuse counseling  Z71.6             Past Medical History:   Diagnosis Date    Acquired hypothyroidism 5/12/2019    Asthma     Benign essential hypertension 5/12/2019    Centrilobular emphysema (H) 5/12/2019    Chronic rhinitis 8/16/2013    Chronic systolic congestive heart failure (H) 5/12/2019    Constipation     Coronary artery disease     Hearing loss     Heart trouble     Hemorrhagic cerebrovascular accident (CVA) (H) 01/2019    Hyperlipidemia 5/12/2019    Hypertension     Nasal congestion     Non-rheumatic mitral regurgitation 5/12/2019    Osteopenia 5/12/2019    Osteoporosis     Parathyroid related hypercalcemia (H24) 8/18/2013     Primary hyperparathyroidism (H24) 9/5/2013    Ringing in ears     Sensorineural hearing loss, asymmetrical 8/16/2013    Sneezing     Snoring     Stented coronary artery     Thyroid disease     Weakness     Weight loss        Past Surgical History:   Procedure Laterality Date    CATARACT IOL, RT/LT Bilateral     COLONOSCOPY      COLONOSCOPY - HIM SCAN  01/01/2009    Colonoscopy - [facility]/NL  2009    ENDOSCOPY UPPER, COLONOSCOPY, COMBINED N/A 10/17/2019    Procedure: UPPER ENDOSCOPY WITH BIOPSY  AND COLONOSCOPY;  Surgeon: Julio Canales MD;  Location: [facility] OR    ENT SURGERY  2011    para thyroid surgery    ENT SURGERY  09/2013    Parathyroid    ESOPHAGOSCOPY, GASTROSCOPY, DUODENOSCOPY (EGD), COMBINED N/A 9/21/2019    Procedure: ESOPHAGOGASTRODUODENOSCOPY (EGD);  Surgeon: Julio Canales MD;  Location: [facility] OR    ESOPHAGOSCOPY, GASTROSCOPY, DUODENOSCOPY (EGD), COMBINED N/A 1/27/2020    Procedure: ESOPHAGOGASTRODUODENOSCOPY (EGD) WITH BIOPSY;  Surgeon: Isrrael Parish MD;  Location: [facility] OR    ESOPHAGOSCOPY, GASTROSCOPY, DUODENOSCOPY (EGD), COMBINED N/A 2/20/2020    Procedure: ESOPHAGOGASTRODUODENOSCOPY (EGD) WITH BIOPSY;  Surgeon: Pedro Luis Montoya DO;  Location: [facility] OR    PARATHYROIDECTOMY  9/10/2013    Procedure: PARATHYROIDECTOMY;  Reoperative Neck Exploration, Resection of right Parathyroid adenoma;  Surgeon: Thalia Muller MD;  Location: [facility] OR    TONSILLECTOMY      TUBAL LIGATION         Allergies   Allergen Reactions    Evista [Raloxifene] Other (See Comments)     hypercalcemia    Prednisone GI Disturbance    Combigan [Brimonidine Tartrate-Timolol] Other (See Comments)     Eye swelling and itchy    Cyclosporine      Pt reports using eye gtts and having red irritated eyes     Latex Rash    Levaquin [Levofloxacin] Muscle Pain (Myalgia)    Penicillins Rash       Current Outpatient Medications   Medication Sig Dispense Refill    atorvastatin (LIPITOR) 40 MG tablet Take 1 tablet (40 mg) by mouth at  bedtime 90 tablet 0    calcium carbonate (TUMS) 500 MG chewable tablet Take 1 chew tab by mouth at bedtime      fluticasone (FLONASE) 50 MCG/ACT spray Spray 2 sprays into both nostrils daily 1 Bottle 11    Fluticasone-Umeclidin-Vilanterol (TRELEGY ELLIPTA) 200-62.5-25 MCG/ACT oral inhaler Inhale 1 puff into the lungs daily 180 each 3    furosemide (LASIX) 20 MG tablet Take 0.5 tablets (10 mg) by mouth daily 90 tablet 3    ipratropium - albuterol 0.5 mg/2.5 mg/3 mL (DUONEB) 0.5-2.5 (3) MG/3ML neb solution Take 1 vial (3 mLs) by nebulization every 6 hours as needed for shortness of breath, wheezing or cough 1350 mL 3    ipratropium - albuterol 0.5 mg/2.5 mg/3 mL (DUONEB) 0.5-2.5 (3) MG/3ML neb solution Take 1 vial (3 mLs) by nebulization every 6 hours as needed for shortness of breath, wheezing or cough 90 mL 3    ipratropium-albuterol (COMBIVENT RESPIMAT)  MCG/ACT inhaler Inhale 1 puff into the lungs 4 times daily 8 g 3    levothyroxine (SYNTHROID/LEVOTHROID) 50 MCG tablet Take 50 mcg by mouth every morning (before breakfast)      lisinopril (ZESTRIL) 5 MG tablet Take 1 tablet (5 mg) by mouth daily 90 tablet 0    metoprolol succinate ER (TOPROL XL) 25 MG 24 hr tablet Take 1 tablet (25 mg) by mouth daily 90 tablet 3    nitroGLYcerin (NITROSTAT) 0.4 MG sublingual tablet Place 1 tablet (0.4 mg) under the tongue every 5 minutes as needed for chest pain For chest pain place 1 tablet under the tongue every 5 minutes for 3 doses. If symptoms persist 5 minutes after 1st dose call 911. 25 tablet 3    pantoprazole (PROTONIX) 40 MG EC tablet Take 1 tablet (40 mg) by mouth daily 90 tablet 3    potassium chloride ER (K-TAB) 20 MEQ CR tablet Take 1 tablet (20 mEq) by mouth three times a week 84 tablet 3    sucralfate (CARAFATE) 1 GM tablet Take 1 tablet (1 g) by mouth 4 times daily as needed for nausea 120 tablet 3    SYNTHROID 50 MCG tablet Take 1 tablet (50 mcg) by mouth daily 90 tablet 0    Tafluprost 0.0015 % SOLN Place  1 drop into both eyes at bedtime -when remembered.      trolamine salicylate (ASPERCREME) 10 % external cream Apply topically daily as needed (lower back pain)         Social History     Socioeconomic History    Marital status:      Spouse name: Not on file    Number of children: Not on file    Years of education: Not on file    Highest education level: Not on file   Occupational History    Not on file   Tobacco Use    Smoking status: Former     Packs/day: 1.00     Years: 50.00     Additional pack years: 0.00     Total pack years: 50.00     Types: Cigarettes     Quit date: 2019     Years since quittin.1     Passive exposure: Past    Smokeless tobacco: Never    Tobacco comments:     quit 2019   Vaping Use    Vaping Use: Never used   Substance and Sexual Activity    Alcohol use: Yes     Comment: social    Drug use: No    Sexual activity: Not Currently   Other Topics Concern    Parent/sibling w/ CABG, MI or angioplasty before 65F 55M? Yes   Social History Narrative    Not on file     Social Determinants of Health     Financial Resource Strain: Low Risk  (2023)    Financial Resource Strain     Within the past 12 months, have you or your family members you live with been unable to get utilities (heat, electricity) when it was really needed?: No   Food Insecurity: Low Risk  (2023)    Food Insecurity     Within the past 12 months, did you worry that your food would run out before you got money to buy more?: No     Within the past 12 months, did the food you bought just not last and you didn t have money to get more?: No   Transportation Needs: Low Risk  (2023)    Transportation Needs     Within the past 12 months, has lack of transportation kept you from medical appointments, getting your medicines, non-medical meetings or appointments, work, or from getting things that you need?: No   Physical Activity: Not on file   Stress: Not on file   Social Connections: Not on file   Interpersonal  Safety: Low Risk  (12/27/2023)    Interpersonal Safety     Do you feel physically and emotionally safe where you currently live?: Yes     Within the past 12 months, have you been hit, slapped, kicked or otherwise physically hurt by someone?: No     Within the past 12 months, have you been humiliated or emotionally abused in other ways by your partner or ex-partner?: No   Housing Stability: Low Risk  (12/12/2023)    Housing Stability     Do you have housing? : Yes     Are you worried about losing your housing?: No       LAB RESULTS:   Office Visit on 01/26/2022   Component Date Value Ref Range Status    TSH 01/26/2022 1.84  0.40 - 4.00 mU/L Final    Sodium 01/26/2022 139  133 - 144 mmol/L Final    Potassium 01/26/2022 4.4  3.4 - 5.3 mmol/L Final    Chloride 01/26/2022 109  94 - 109 mmol/L Final    Carbon Dioxide (CO2) 01/26/2022 24  20 - 32 mmol/L Final    Anion Gap 01/26/2022 6  3 - 14 mmol/L Final    Urea Nitrogen 01/26/2022 14  7 - 30 mg/dL Final    Creatinine 01/26/2022 1.12 (A) 0.52 - 1.04 mg/dL Final    Calcium 01/26/2022 9.0  8.5 - 10.1 mg/dL Final    Glucose 01/26/2022 95  70 - 99 mg/dL Final    GFR Estimate 01/26/2022 49 (A) >60 mL/min/1.73m2 Final    Iron 01/26/2022 71  35 - 180 ug/dL Final    Iron Binding Capacity 01/26/2022 294  240 - 430 ug/dL Final    Iron Sat Index 01/26/2022 24  15 - 46 % Final    Ferritin 01/26/2022 633 (A) 8 - 252 ng/mL Final    WBC Count 01/26/2022 8.1  4.0 - 11.0 10e3/uL Final    RBC Count 01/26/2022 4.40  3.80 - 5.20 10e6/uL Final    Hemoglobin 01/26/2022 10.6 (A) 11.7 - 15.7 g/dL Final    Hematocrit 01/26/2022 35.8  35.0 - 47.0 % Final    MCV 01/26/2022 81  78 - 100 fL Final    MCH 01/26/2022 24.1 (A) 26.5 - 33.0 pg Final    MCHC 01/26/2022 29.6 (A) 31.5 - 36.5 g/dL Final    RDW 01/26/2022 24.9 (A) 10.0 - 15.0 % Final    Platelet Count 01/26/2022 405  150 - 450 10e3/uL Final    % Neutrophils 01/26/2022 65  % Final    % Lymphocytes 01/26/2022 23  % Final    % Monocytes 01/26/2022  9  % Final    % Eosinophils 01/26/2022 2  % Final    % Basophils 01/26/2022 1  % Final    % Immature Granulocytes 01/26/2022 0  % Final    NRBCs per 100 WBC 01/26/2022 0  <1 /100 Final    Absolute Neutrophils 01/26/2022 5.2  1.6 - 8.3 10e3/uL Final    Absolute Lymphocytes 01/26/2022 1.8  0.8 - 5.3 10e3/uL Final    Absolute Monocytes 01/26/2022 0.7  0.0 - 1.3 10e3/uL Final    Absolute Eosinophils 01/26/2022 0.2  0.0 - 0.7 10e3/uL Final    Absolute Basophils 01/26/2022 0.1  0.0 - 0.2 10e3/uL Final    Absolute Immature Granulocytes 01/26/2022 0.0  <=0.4 10e3/uL Final    Absolute NRBCs 01/26/2022 0.0  10e3/uL Final        Review of systems: Negative except that which was noted in the HPI.    Physical examination:  BP (!) 140/80   Pulse 89   Resp 20   Ht 1.524 m (5')   Wt 47.3 kg (104 lb 4.8 oz)   SpO2 92%   BMI 20.37 kg/m      GENERAL APPEARANCE: healthy, alert and no distress  CHEST: lungs clear to auscultation.  CARDIOVASCULAR: regular rhythm, normal S1 with physiologic split S2, no S3 or S4 and no murmur, click or rub  EXTREMITIES: no clubbing, cyanosis with minimal edema      Total time spent on day of visit, including review of tests, obtaining/reviewing separately obtained history, ordering medications/tests/procedures, communicating with PCP/consultants, and documenting in electronic medical record: 34 minutes.           Thank you for allowing me to participate in the care of your patient. Please do not hesitate to contact me if you have any questions.     Sal Graham, DO

## 2024-03-11 NOTE — PATIENT INSTRUCTIONS
Thank you for allowing Dr JUAN Graham and our  team to participate in your care. Please call our office at [phone] with scheduling questions or if you need to cancel or change your appointment. With any other questions or concerns you may call cardiology nurse at  [phone].       If you experience chest pain, chest pressure, chest tightness, shortness of breath, fainting, lightheadedness, nausea, vomiting, or other concerning symptoms, please report to the Emergency Department or call 911. These symptoms may be emergent, and best treated in the Emergency Department.

## 2024-03-12 ENCOUNTER — TELEPHONE (OUTPATIENT)
Dept: FAMILY MEDICINE | Facility: OTHER | Age: 84
End: 2024-03-12

## 2024-03-12 DIAGNOSIS — E03.9 ACQUIRED HYPOTHYROIDISM: ICD-10-CM

## 2024-03-12 LAB
ATRIAL RATE - MUSE: 86 BPM
DIASTOLIC BLOOD PRESSURE - MUSE: NORMAL MMHG
INTERPRETATION ECG - MUSE: NORMAL
P AXIS - MUSE: 73 DEGREES
PR INTERVAL - MUSE: 180 MS
QRS DURATION - MUSE: 78 MS
QT - MUSE: 402 MS
QTC - MUSE: 481 MS
R AXIS - MUSE: 7 DEGREES
SYSTOLIC BLOOD PRESSURE - MUSE: NORMAL MMHG
T AXIS - MUSE: 63 DEGREES
VENTRICULAR RATE- MUSE: 86 BPM

## 2024-03-12 RX ORDER — LEVOTHYROXINE SODIUM 50 MCG
50 TABLET ORAL DAILY
Qty: 90 TABLET | Refills: 1 | Status: SHIPPED | OUTPATIENT
Start: 2024-03-12 | End: 2024-09-04

## 2024-03-12 NOTE — TELEPHONE ENCOUNTER
Reason for call:  Medication    Pt is out  Have you contacted your pharmacy? Yes   If patient has contacted Pharmacy and it has been over 72hrs, continue to #2  Medication SYNTHROID 50 MCG tablet   What Pharmacy do you use? [email or web address]      (Please note that the turn-around-time for prescriptions is 72 business hours; I am sending your request at this time. SEND TO  [facility]  )

## 2024-03-22 DIAGNOSIS — D64.9 ANEMIA, UNSPECIFIED TYPE: ICD-10-CM

## 2024-03-22 DIAGNOSIS — J43.9 PULMONARY EMPHYSEMA, UNSPECIFIED EMPHYSEMA TYPE (H): ICD-10-CM

## 2024-03-22 NOTE — TELEPHONE ENCOUNTER
Reason for call:  Medication      Have you contacted your pharmacy? Yes   If patient has contacted Pharmacy and it has been over 72hrs, continue to #2  Medication Pantoprazole [facility], albuterol sulfate, trelegy  What Pharmacy do you use? [facility] and the last two to [facility]      (Please note that the turn-around-time for prescriptions is 72 business hours; I am sending your request at this time. SEND TO  [facility]  )

## 2024-03-26 RX ORDER — LATANOPROST 50 UG/ML
SOLUTION/ DROPS OPHTHALMIC
COMMUNITY
Start: 2024-03-13

## 2024-03-26 RX ORDER — PANTOPRAZOLE SODIUM 40 MG/1
40 TABLET, DELAYED RELEASE ORAL DAILY
Qty: 90 TABLET | Refills: 3 | Status: SHIPPED | OUTPATIENT
Start: 2024-03-26 | End: 2024-04-29

## 2024-03-26 RX ORDER — FLUTICASONE FUROATE, UMECLIDINIUM BROMIDE AND VILANTEROL TRIFENATATE 200; 62.5; 25 UG/1; UG/1; UG/1
1 POWDER RESPIRATORY (INHALATION) DAILY
Qty: 180 EACH | Refills: 3 | Status: SHIPPED | OUTPATIENT
Start: 2024-03-26

## 2024-03-27 NOTE — TELEPHONE ENCOUNTER
Spoke with patent regarding medication. She stated that she needs a small box of Duo Nebs sent to [facility] until she receives them from [facility]

## 2024-03-28 ENCOUNTER — APPOINTMENT (OUTPATIENT)
Dept: GENERAL RADIOLOGY | Facility: HOSPITAL | Age: 84
End: 2024-03-28
Attending: STUDENT IN AN ORGANIZED HEALTH CARE EDUCATION/TRAINING PROGRAM
Payer: COMMERCIAL

## 2024-03-28 ENCOUNTER — HOSPITAL ENCOUNTER (EMERGENCY)
Facility: HOSPITAL | Age: 84
Discharge: HOME | End: 2024-03-28
Attending: STUDENT IN AN ORGANIZED HEALTH CARE EDUCATION/TRAINING PROGRAM | Admitting: STUDENT IN AN ORGANIZED HEALTH CARE EDUCATION/TRAINING PROGRAM
Payer: COMMERCIAL

## 2024-03-28 VITALS
DIASTOLIC BLOOD PRESSURE: 70 MMHG | TEMPERATURE: 98 F | HEART RATE: 83 BPM | SYSTOLIC BLOOD PRESSURE: 118 MMHG | RESPIRATION RATE: 18 BRPM | OXYGEN SATURATION: 93 %

## 2024-03-28 DIAGNOSIS — M54.42 ACUTE LEFT-SIDED LOW BACK PAIN WITH LEFT-SIDED SCIATICA: ICD-10-CM

## 2024-03-28 DIAGNOSIS — S32.050A COMPRESSION FRACTURE OF L5 VERTEBRA, INITIAL ENCOUNTER (H): ICD-10-CM

## 2024-03-28 PROCEDURE — 250N000011 HC RX IP 250 OP 636: Performed by: STUDENT IN AN ORGANIZED HEALTH CARE EDUCATION/TRAINING PROGRAM

## 2024-03-28 PROCEDURE — 99284 EMERGENCY DEPT VISIT MOD MDM: CPT | Mod: 25

## 2024-03-28 PROCEDURE — 96372 THER/PROPH/DIAG INJ SC/IM: CPT | Performed by: STUDENT IN AN ORGANIZED HEALTH CARE EDUCATION/TRAINING PROGRAM

## 2024-03-28 PROCEDURE — 250N000013 HC RX MED GY IP 250 OP 250 PS 637: Performed by: STUDENT IN AN ORGANIZED HEALTH CARE EDUCATION/TRAINING PROGRAM

## 2024-03-28 PROCEDURE — 99284 EMERGENCY DEPT VISIT MOD MDM: CPT | Performed by: STUDENT IN AN ORGANIZED HEALTH CARE EDUCATION/TRAINING PROGRAM

## 2024-03-28 PROCEDURE — 72100 X-RAY EXAM L-S SPINE 2/3 VWS: CPT

## 2024-03-28 PROCEDURE — 94640 AIRWAY INHALATION TREATMENT: CPT

## 2024-03-28 PROCEDURE — 250N000009 HC RX 250: Performed by: STUDENT IN AN ORGANIZED HEALTH CARE EDUCATION/TRAINING PROGRAM

## 2024-03-28 RX ORDER — KETOROLAC TROMETHAMINE 15 MG/ML
10 INJECTION, SOLUTION INTRAMUSCULAR; INTRAVENOUS ONCE
Status: COMPLETED | OUTPATIENT
Start: 2024-03-28 | End: 2024-03-28

## 2024-03-28 RX ORDER — IPRATROPIUM BROMIDE AND ALBUTEROL SULFATE 2.5; .5 MG/3ML; MG/3ML
1 SOLUTION RESPIRATORY (INHALATION) EVERY 6 HOURS PRN
Qty: 90 ML | Refills: 3 | Status: SHIPPED | OUTPATIENT
Start: 2024-03-28 | End: 2024-04-17

## 2024-03-28 RX ORDER — GABAPENTIN 300 MG/1
300 CAPSULE ORAL 3 TIMES DAILY
Qty: 90 CAPSULE | Refills: 0 | Status: SHIPPED | OUTPATIENT
Start: 2024-03-28 | End: 2024-05-13

## 2024-03-28 RX ORDER — OXYCODONE HYDROCHLORIDE 5 MG/1
5 TABLET ORAL ONCE
Status: COMPLETED | OUTPATIENT
Start: 2024-03-28 | End: 2024-03-28

## 2024-03-28 RX ORDER — LIDOCAINE 4 G/G
1 PATCH TOPICAL ONCE
Status: DISCONTINUED | OUTPATIENT
Start: 2024-03-28 | End: 2024-03-28 | Stop reason: HOSPADM

## 2024-03-28 RX ORDER — METHOCARBAMOL 500 MG/1
500 TABLET, FILM COATED ORAL 4 TIMES DAILY PRN
Qty: 20 TABLET | Refills: 0 | Status: SHIPPED | OUTPATIENT
Start: 2024-03-28 | End: 2024-04-08

## 2024-03-28 RX ORDER — ACETAMINOPHEN 325 MG/1
975 TABLET ORAL ONCE
Status: COMPLETED | OUTPATIENT
Start: 2024-03-28 | End: 2024-03-28

## 2024-03-28 RX ORDER — ALBUTEROL SULFATE 5 MG/ML
2.5 SOLUTION RESPIRATORY (INHALATION) ONCE
Status: COMPLETED | OUTPATIENT
Start: 2024-03-28 | End: 2024-03-28

## 2024-03-28 RX ORDER — METHOCARBAMOL 750 MG/1
750 TABLET, FILM COATED ORAL ONCE
Status: COMPLETED | OUTPATIENT
Start: 2024-03-28 | End: 2024-03-28

## 2024-03-28 RX ADMIN — METHOCARBAMOL 750 MG: 750 TABLET ORAL at 11:44

## 2024-03-28 RX ADMIN — LIDOCAINE 1 PATCH: 4 PATCH TOPICAL at 10:35

## 2024-03-28 RX ADMIN — ALBUTEROL SULFATE 2.5 MG: 2.5 SOLUTION RESPIRATORY (INHALATION) at 11:59

## 2024-03-28 RX ADMIN — KETOROLAC TROMETHAMINE 10 MG: 15 INJECTION, SOLUTION INTRAMUSCULAR; INTRAVENOUS at 10:36

## 2024-03-28 RX ADMIN — OXYCODONE HYDROCHLORIDE 5 MG: 5 TABLET ORAL at 10:34

## 2024-03-28 ASSESSMENT — ACTIVITIES OF DAILY LIVING (ADL)
ADLS_ACUITY_SCORE: 40

## 2024-03-28 ASSESSMENT — COLUMBIA-SUICIDE SEVERITY RATING SCALE - C-SSRS
6. HAVE YOU EVER DONE ANYTHING, STARTED TO DO ANYTHING, OR PREPARED TO DO ANYTHING TO END YOUR LIFE?: NO
1. IN THE PAST MONTH, HAVE YOU WISHED YOU WERE DEAD OR WISHED YOU COULD GO TO SLEEP AND NOT WAKE UP?: NO
2. HAVE YOU ACTUALLY HAD ANY THOUGHTS OF KILLING YOURSELF IN THE PAST MONTH?: NO

## 2024-03-28 NOTE — DISCHARGE INSTRUCTIONS
Return the emergency room if worsening symptoms or concerning symptoms.  Follow-up with Medicare provider within the next week.  Call schedule appointment.  Use Tylenol, lidocaine, topical menthol to help with your symptoms.  Activity as tolerated.

## 2024-03-28 NOTE — ED PROVIDER NOTES
[facility]  ED Provider Note    Chief Complaint   Patient presents with    Back Pain    Leg Pain     History:  Lita Ocasio is a 83 year old female with history of peripheral artery disease, stroke, A-fib subclavian stenosis and chronic intermittent back pain with sciatica presents to the emergency department today with a return of her sciatica down her left leg.  No saddle anesthesia no weakness no loss of sensation no bowel or urinary incontinence.  Pain is similar to previous events.  No traumatic event.  No fevers    Review of Systems   Performed; see HPI for pertinent positives and negatives.     Medical history, surgical history, and social history was reviewed.  Nursing documentation, triage note, and vitals were reviewed.    Vitals:  BP: 136/80  Pulse: 90  Temp: 98  F (36.7  C)  Resp: 16  SpO2: 94 %    Physical Exam:  Constitutional: Alert and conversant. NAD, pleasant to me  HENT: NCAT   Eyes: Normal pupils   Neck: supple   CV: No pallor  Pulmonary/Chest: Non-labored respirations  Abdominal: non-distended   MSK: GOFF.  Symmetric 5 out of 5 strength in upper and lower extremities   Neuro: Alert and appropriate sensation intact bilateral lower extremities  Skin: Warm and dry. No diaphoresis. No rashes on exposed skin    Psych: Appropriate mood and affect       MDM:      ED Course as of 03/28/24 1521   Thu Mar 28, 2024   1252 Lita Ocasio is a 83 year old female presenting with back pain. Differential includes muscular strain or spasm, degenerative joint and disc disease, acute herniated disc, sciatica, fracture, epidural abscess, discitis, vertebral osteomyelitis, nephrolithiasis, pyelonephritis, malignancy. The character and location of the patient's pain with a benign neurologic exam suggest a low likelihood of malignancy, infectious process, or fracture, and this is most likely to be a soft tissue musculoskeletal process. It is difficult to differentiate between muscular strain or  spasm, degenerative disease, and acute herniated disc. However, emergency department treatment of these symptoms with a benign neurologic exam is the same and does not require emergent diagnostic imaging. The patient was treated with lidocaine Toradol oxycodone Robaxin in the emergency department with improvement in symptoms and is stable for outpatient evaluation and management. Will prescribe gabapentin and robaxin as needed for pain control and recommend close follow up with a primary care provider. Patient given instructions on follow-up and warning signs for which to return to ED. All questions were answered and the patient is comfortable with plan for discharge. The patient was discharged in stable condition.        Procedures:  Procedures    Impression:  Final diagnoses:   Acute left-sided low back pain with left-sided sciatica   Compression fracture of L5 vertebra, initial encounter (H)           Juan C Trevino MD  03/28/24 2364

## 2024-03-28 NOTE — ED NOTES
Patient discharged to Home at this time. Patient given written and verbal discharge instructions regarding home care, follow-up, and medications. Patient verbalized understanding of all discharge instructions. Rest and hydration encouraged. Patient encouraged to return to the ED if they experience new, worsening, or concerning symptoms.     Patients RX for Robaxin and Gabapentin sent to [facility] pharmacy.    Patient to follow-up with PCP as needed.

## 2024-04-01 ENCOUNTER — TELEPHONE (OUTPATIENT)
Dept: CARE COORDINATION | Facility: OTHER | Age: 84
End: 2024-04-01

## 2024-04-01 ENCOUNTER — TELEPHONE (OUTPATIENT)
Dept: FAMILY MEDICINE | Facility: OTHER | Age: 84
End: 2024-04-01

## 2024-04-01 DIAGNOSIS — J43.9 PULMONARY EMPHYSEMA, UNSPECIFIED EMPHYSEMA TYPE (H): ICD-10-CM

## 2024-04-01 NOTE — TELEPHONE ENCOUNTER
Reason for call:  Medication    Pt requesting this filled ASAP. Stated they only have 3 doses left. Pt is also requesting a call when this medication is sent over to pharmacy.   Have you contacted your pharmacy? Yes   If patient has contacted Pharmacy and it has been over 72hrs, continue to #2  Medication Fluticasone-Umeclidin-Vilanterol (TRELEGY ELLIPTA) 200-62.5-25 MCG/ACT oral inhaler   What Pharmacy do you use? [facility] [place]       (Please note that the turn-around-time for prescriptions is 72 business hours; I am sending your request at this time. SEND TO  [facility]  )

## 2024-04-01 NOTE — TELEPHONE ENCOUNTER
RN CC received message transferred from specialty and patient states her name and to call her.  RN CC reviewed chart and patient into ER recently.  RN CC called patient x 3 with busy signal.  RN CC will route to [facility] to please attempt to call patient to schedule ER follow up for return of sciatica pain.  If needing anything else can be routed back to [facility].  Gely Khoury, RN  Care Coordination

## 2024-04-01 NOTE — TELEPHONE ENCOUNTER
Promise Parker      Last Written Prescription Date:  03/26/24  Last Fill Quantity: 180,   # refills: 3  Last Office Visit: 02/07/24  Future Office visit:    Next 5 appointments (look out 90 days)      Apr 08, 2024  4:00 PM  (Arrive by 3:45 PM)  SHORT with COCO Ta CNP  [facility] [place] ([facility] [place] ) [address]  [place] [address]  [phone]     May 07, 2024  4:00 PM  (Arrive by 3:45 PM)  SHORT with COCO Ta CNP  [facility] [place] ([facility] [place] ) [address]  [place] [address]  [phone]

## 2024-04-05 ENCOUNTER — TELEPHONE (OUTPATIENT)
Dept: FAMILY MEDICINE | Facility: OTHER | Age: 84
End: 2024-04-05

## 2024-04-05 DIAGNOSIS — J43.9 PULMONARY EMPHYSEMA, UNSPECIFIED EMPHYSEMA TYPE (H): ICD-10-CM

## 2024-04-05 RX ORDER — IPRATROPIUM BROMIDE AND ALBUTEROL SULFATE 2.5; .5 MG/3ML; MG/3ML
1 SOLUTION RESPIRATORY (INHALATION) EVERY 6 HOURS PRN
Qty: 90 ML | Refills: 3 | OUTPATIENT
Start: 2024-04-05

## 2024-04-05 NOTE — TELEPHONE ENCOUNTER
ipratropium - albuterol 0.5 mg/2.5 mg/3 mL (DUONEB) 0.5-2.5 (3) MG/3ML neb     Last Written Prescription Date:  3-28-24  Last Fill Quantity: 90,   # refills: 3  Last Office Visit: 2-7-24  Future Office visit:    Next 5 appointments (look out 90 days)      Apr 08, 2024  4:00 PM  (Arrive by 3:45 PM)  SHORT with COCO Ta CNP  [facility] ([facility] [place] ) [address]  [place] [address]  [phone]     May 07, 2024  4:00 PM  (Arrive by 3:45 PM)  SHORT with COCO Ta CNP  [facility] ([facility] [place] ) [address]  [place] [address]  [phone]             Routing refill request to provider for review/approval because:  Just filled today any refills will go into file

## 2024-04-05 NOTE — TELEPHONE ENCOUNTER
Reason for call:  Medication      Have you contacted your pharmacy? Yes   If patient has contacted Pharmacy and it has been over 72hrs, continue to #2  Medication Albuterol Sulfate  What Pharmacy do you use? [facility]      (Please note that the turn-around-time for prescriptions is 72 business hours; I am sending your request at this time. SEND TO  [facility]  )

## 2024-04-05 NOTE — TELEPHONE ENCOUNTER
Contacted patient to clarify what medication was needed, since albuterol os not on current medication list, she stated she needs the medication for her machine, I asked her to read me the medication label and she is in need of duo neb for her machine, reviewing chart she has refills of this mediations at the local pharmacy, contacted them to have this refilled for her and to contact her when it is filled    Task complete  Hoa Zepeda LPN

## 2024-04-08 ENCOUNTER — OFFICE VISIT (OUTPATIENT)
Dept: FAMILY MEDICINE | Facility: OTHER | Age: 84
End: 2024-04-08
Payer: COMMERCIAL

## 2024-04-08 VITALS
BODY MASS INDEX: 19.44 KG/M2 | SYSTOLIC BLOOD PRESSURE: 126 MMHG | WEIGHT: 99 LBS | TEMPERATURE: 98.3 F | HEIGHT: 60 IN | HEART RATE: 73 BPM | DIASTOLIC BLOOD PRESSURE: 74 MMHG | OXYGEN SATURATION: 97 % | RESPIRATION RATE: 17 BRPM

## 2024-04-08 DIAGNOSIS — M54.42 ACUTE BILATERAL LOW BACK PAIN WITH LEFT-SIDED SCIATICA: Primary | ICD-10-CM

## 2024-04-08 DIAGNOSIS — S32.050S CLOSED COMPRESSION FRACTURE OF L5 LUMBAR VERTEBRA, SEQUELA: ICD-10-CM

## 2024-04-08 DIAGNOSIS — E87.5 HYPERKALEMIA: ICD-10-CM

## 2024-04-08 DIAGNOSIS — R42 DIZZINESS: ICD-10-CM

## 2024-04-08 LAB
ANION GAP SERPL CALCULATED.3IONS-SCNC: 12 MMOL/L (ref 7–15)
BASOPHILS # BLD AUTO: 0.1 10E3/UL (ref 0–0.2)
BASOPHILS NFR BLD AUTO: 1 %
BUN SERPL-MCNC: 36.5 MG/DL (ref 8–23)
CALCIUM SERPL-MCNC: 9.5 MG/DL (ref 8.8–10.2)
CHLORIDE SERPL-SCNC: 100 MMOL/L (ref 98–107)
CREAT SERPL-MCNC: 1.32 MG/DL (ref 0.51–0.95)
DEPRECATED HCO3 PLAS-SCNC: 22 MMOL/L (ref 22–29)
EGFRCR SERPLBLD CKD-EPI 2021: 40 ML/MIN/1.73M2
EOSINOPHIL # BLD AUTO: 0.3 10E3/UL (ref 0–0.7)
EOSINOPHIL NFR BLD AUTO: 4 %
ERYTHROCYTE [DISTWIDTH] IN BLOOD BY AUTOMATED COUNT: 15.1 % (ref 10–15)
GLUCOSE SERPL-MCNC: 85 MG/DL (ref 70–99)
HCT VFR BLD AUTO: 39.8 % (ref 35–47)
HGB BLD-MCNC: 13.2 G/DL (ref 11.7–15.7)
IMM GRANULOCYTES # BLD: 0 10E3/UL
IMM GRANULOCYTES NFR BLD: 1 %
LYMPHOCYTES # BLD AUTO: 1.9 10E3/UL (ref 0.8–5.3)
LYMPHOCYTES NFR BLD AUTO: 22 %
MCH RBC QN AUTO: 29.1 PG (ref 26.5–33)
MCHC RBC AUTO-ENTMCNC: 33.2 G/DL (ref 31.5–36.5)
MCV RBC AUTO: 88 FL (ref 78–100)
MONOCYTES # BLD AUTO: 0.8 10E3/UL (ref 0–1.3)
MONOCYTES NFR BLD AUTO: 10 %
NEUTROPHILS # BLD AUTO: 5.4 10E3/UL (ref 1.6–8.3)
NEUTROPHILS NFR BLD AUTO: 63 %
NRBC # BLD AUTO: 0 10E3/UL
NRBC BLD AUTO-RTO: 0 /100
PLATELET # BLD AUTO: 316 10E3/UL (ref 150–450)
POTASSIUM SERPL-SCNC: 5.9 MMOL/L (ref 3.4–5.3)
RBC # BLD AUTO: 4.54 10E6/UL (ref 3.8–5.2)
SODIUM SERPL-SCNC: 134 MMOL/L (ref 135–145)
WBC # BLD AUTO: 8.6 10E3/UL (ref 4–11)

## 2024-04-08 PROCEDURE — G0463 HOSPITAL OUTPT CLINIC VISIT: HCPCS

## 2024-04-08 PROCEDURE — 36415 COLL VENOUS BLD VENIPUNCTURE: CPT | Mod: ZL

## 2024-04-08 PROCEDURE — 85025 COMPLETE CBC W/AUTO DIFF WBC: CPT | Mod: ZL

## 2024-04-08 PROCEDURE — 99214 OFFICE O/P EST MOD 30 MIN: CPT

## 2024-04-08 PROCEDURE — 80048 BASIC METABOLIC PNL TOTAL CA: CPT | Mod: ZL

## 2024-04-08 RX ORDER — METHOCARBAMOL 500 MG/1
500 TABLET, FILM COATED ORAL 4 TIMES DAILY PRN
Qty: 20 TABLET | Refills: 0 | Status: SHIPPED | OUTPATIENT
Start: 2024-04-08 | End: 2024-04-12

## 2024-04-08 RX ORDER — CALCITONIN SALMON 200 [IU]/.09ML
1 SPRAY, METERED NASAL DAILY
Qty: 3.7 ML | Refills: 0 | Status: SHIPPED | OUTPATIENT
Start: 2024-04-08

## 2024-04-08 ASSESSMENT — PAIN SCALES - GENERAL: PAINLEVEL: EXTREME PAIN (8)

## 2024-04-08 NOTE — PROGRESS NOTES
Assessment & Plan     Acute bilateral low back pain with left-sided sciatica/Closed compression fracture of L5 lumbar vertebra, sequela  With increased low back pain radiating down LLE over the past 10 days. Xray on 3/28 with new compression fracture of L5. Low suspicion this is the cause of patient's current pain. We discussed obtaining MRI which patient adamantly declines due to claustrophobia (declines open MRI as well). We also discussed CT for consideration of injection as current pain may by radicular in nature. She declines CT as well. Her main concern at this time is to start PT. She is unable to drive and mobility has decreased with recent flare of her pain. I feel she would qualify for home PT, order placed today. Continue gabapentin, robaxin for now (although reporting dizziness since starting). Will trial calcitonin spray for new compression fracture, however unclear how acute this may be. Short-term follow-up scheduled. Patient will call with worsening or new symptoms.   - Home Care Referral  - methocarbamol (ROBAXIN) 500 MG tablet  Dispense: 20 tablet; Refill: 0  - Home Care Referral  - calcitonin, salmon, (MIACALCIN) 200 UNIT/ACT nasal spray  Dispense: 3.7 mL; Refill: 0    Dizziness  Noting since starting gabapentin. Mild, intermittent and with position change. Not open to decreasing medication or changing medication as she feels it had been effective. Continue at current dose for now. Labs as below today to rule out other cause. Patient will call if worsening or new symptoms.  - CBC with platelets and differential  - Basic metabolic panel      25 minutes spent by me on the date of the encounter doing chart review, history and exam, documentation and further activities per the note    MED REC REQUIRED  Post Medication Reconciliation Status:  Discharge medications reconciled and changed, see notes/orders      Return in about 1 month (around 5/8/2024) for Follow up.    Joann London is a 83 year  old, presenting for the following health issues:  ER F/U        4/8/2024     3:55 PM   Additional Questions   Roomed by Gauri Milligan     History of Present Illness       Reason for visit:  ED follow up    She eats 0-1 servings of fruits and vegetables daily.She consumes 0 sweetened beverage(s) daily.She exercises with enough effort to increase her heart rate 9 or less minutes per day.  She exercises with enough effort to increase her heart rate 3 or less days per week.   She is taking medications regularly.       ED/UC Followup:    Facility:  [facility]  Date of visit: 03/28/2024  Reason for visit: Back Pain and Leg Pain  Current Status: Same    - Seen in the ER on 3/28 related to back pain. Xray with new compression fracture of L5. Given gabapentin, robaxin for pain. Discharged in stable condition.  - Came on after mailing a letter a few weeks ago. She was sitting in her car and  wasn't sure she could go to stand up due to the pain.   - No known trauma.   - Pain is somewhat better then ER evaluation. Now ambulating better however difficulty going from sitting to standing position.   - Tylenol, 2 tablets every 6-8 hours, robaxin helpful. Also taking gabapentin 300 mg TID.  - No fevers, chills, bowel/bladder changes or saddle anesthesia.   - pain current 4 out of 10. Gets up to 9 out of 10 at times.  - Pain across low back and radiating down left leg. No numbness or tingling.  - Noting mild, intermittent dizziness since starting new medications particularly gabapentin. Notices with position change and with ambulating. No chest pain, dyspnea, headaches, N/V, syncope.     Review of Systems  Constitutional, neuro, ENT, endocrine, pulmonary, cardiac, gastrointestinal, genitourinary, musculoskeletal, integument and psychiatric systems are negative, except as otherwise noted.      Objective    /74 (BP Location: Left arm, Patient Position: Sitting, Cuff Size: Adult Small)   Pulse 73   Temp 98.3  F (36.8  C) (Tympanic)    Resp 17   Ht 1.524 m (5')   Wt 44.9 kg (99 lb)   SpO2 97%   BMI 19.33 kg/m    Body mass index is 19.33 kg/m .    Physical Exam   GENERAL: alert and no distress  RESP: lungs clear to auscultation - no rales, rhonchi or wheezes  CV: regular rate and rhythm, normal S1 S2, no S3 or S4, no murmur, click or rub, no peripheral edema  ABDOMEN: soft, nontender, no hepatosplenomegaly, no masses and bowel sounds normal  MS: no gross musculoskeletal defects noted, no edema  SKIN: no suspicious lesions or rashes  NEURO: Normal strength and tone, mentation intact and speech normal  Comprehensive back pain exam:  Tenderness of left SI region, Pain limits the following motions: Flexion, extension, Lower extremity strength functional and equal on both sides, Lower extremity reflexes within normal limits bilaterally, Lower extremity sensation normal and equal on both sides, and Straight leg raise negative bilaterally. No tenderness over lumbar spine, L5.               Documentation of Face-to-Face and Certification for Home Health Services     Patient: Lita Ocasio   YOB: 1940  MR Number: [id number]  Today's Date: 4/9/2024    I certify that patient: Lita Ocasio is under my care and that I, or a nurse practitioner or physician's assistant working with me, had a face-to-face encounter that meets the physician face-to-face encounter requirements with this patient on: 4/8/2024.    This encounter with the patient was in whole, or in part, for the following medical condition, which is the primary reason for home health care: Acute on chronic lumbar back pain, compression fracture L5.    I certify that, based on my findings, the following services are medically necessary home health services: Physical Therapy.    My clinical findings support the need for the above services because: Physical Therapy Services are needed to assess and treat the following functional impairments: Mobility, lumbar back pain, left leg  pain.    Further, I certify that my clinical findings support that this patient is homebound (i.e. absences from home require considerable and taxing effort and are for medical reasons or [employer] services or infrequently or of short duration when for other reasons) because: Requires assistance of another person or specialized equipment to access medical services because patient: Has prohibitive pain during ambulation...    Based on the above findings. I certify that this patient is confined to the home and needs intermittent skilled nursing care, physical therapy and/or speech therapy.  The patient is under my care, and I have initiated the establishment of the plan of care.  This patient will be followed by a physician who will periodically review the plan of care.  Physician/Provider to provide follow up care: Ophelia Troncoso    Responsible Medicare certified PECOS Physician: Maritza Amaro CNP  Physician Signature: See electronic signature associated with these discharge orders.  Date: 4/9/2024   Signed Electronically by: COCO Ta CNP

## 2024-04-09 ENCOUNTER — TELEPHONE (OUTPATIENT)
Dept: FAMILY MEDICINE | Facility: OTHER | Age: 84
End: 2024-04-09

## 2024-04-09 NOTE — RESULT ENCOUNTER NOTE
Patient notified of results. Patient is unable to come in on Thursday, a Friday labs appointment was scheduled  Xiomara Cantu LPN

## 2024-04-09 NOTE — TELEPHONE ENCOUNTER
Results requested: Patient returning the nurse phone call the message that was left by the nurse was all static she couldn't hear anything that was said in the message.       Best number to reach patient at: [phone]     Best time to call patient: Anytime     Send to care team in basket.

## 2024-04-09 NOTE — TELEPHONE ENCOUNTER
Pt is also calling she is needing a prescription sent to [facility] since she is out,   Pantoprazole Sodium PROTONIX

## 2024-04-10 ENCOUNTER — TRANSFERRED RECORDS (OUTPATIENT)
Dept: HEALTH INFORMATION MANAGEMENT | Facility: CLINIC | Age: 84
End: 2024-04-10

## 2024-04-10 ENCOUNTER — LAB (OUTPATIENT)
Dept: LAB | Facility: OTHER | Age: 84
End: 2024-04-10
Payer: COMMERCIAL

## 2024-04-10 DIAGNOSIS — E87.5 HYPERKALEMIA: ICD-10-CM

## 2024-04-10 LAB
ANION GAP SERPL CALCULATED.3IONS-SCNC: 13 MMOL/L (ref 7–15)
BUN SERPL-MCNC: 38.9 MG/DL (ref 8–23)
CALCIUM SERPL-MCNC: 9.1 MG/DL (ref 8.8–10.2)
CHLORIDE SERPL-SCNC: 99 MMOL/L (ref 98–107)
CREAT SERPL-MCNC: 1.34 MG/DL (ref 0.51–0.95)
DEPRECATED HCO3 PLAS-SCNC: 19 MMOL/L (ref 22–29)
EGFRCR SERPLBLD CKD-EPI 2021: 39 ML/MIN/1.73M2
GLUCOSE SERPL-MCNC: 97 MG/DL (ref 70–99)
POTASSIUM SERPL-SCNC: 5.3 MMOL/L (ref 3.4–5.3)
SODIUM SERPL-SCNC: 131 MMOL/L (ref 135–145)

## 2024-04-10 PROCEDURE — 36415 COLL VENOUS BLD VENIPUNCTURE: CPT | Mod: ZL

## 2024-04-10 PROCEDURE — 80048 BASIC METABOLIC PNL TOTAL CA: CPT | Mod: ZL

## 2024-04-12 DIAGNOSIS — S32.050S CLOSED COMPRESSION FRACTURE OF L5 LUMBAR VERTEBRA, SEQUELA: ICD-10-CM

## 2024-04-12 RX ORDER — METHOCARBAMOL 500 MG/1
500 TABLET, FILM COATED ORAL 4 TIMES DAILY PRN
Qty: 20 TABLET | Refills: 0 | Status: SHIPPED | OUTPATIENT
Start: 2024-04-12 | End: 2024-04-22

## 2024-04-12 RX ORDER — FLUTICASONE FUROATE, UMECLIDINIUM BROMIDE AND VILANTEROL TRIFENATATE 200; 62.5; 25 UG/1; UG/1; UG/1
1 POWDER RESPIRATORY (INHALATION) DAILY
Qty: 180 EACH | Refills: 3 | Status: CANCELLED | OUTPATIENT
Start: 2024-04-12

## 2024-04-12 NOTE — TELEPHONE ENCOUNTER
methocarbamol (ROBAXIN) 500 MG tablet    Last Written Prescription Date:  4-8-24  Last Fill Quantity: 12,   # refills: 0  Last Office Visit: 4-8-24  Future Office visit:    Next 5 appointments (look out 90 days)      May 07, 2024  4:00 PM  (Arrive by 3:45 PM)  Provider Visit with COCO Ta CNP  [facility] [address] [address]  [phone]     May 22, 2024  1:00 PM  (Arrive by 12:45 PM)  Provider Visit with Ophelia Troncoso MD  [facility] ([facility] ) [address] [facility] [address]  [phone]             Routing refill request to provider for review/approval because:  Drug not on the [facility], [facility] or [facility] refill protocol or controlled substance

## 2024-04-17 DIAGNOSIS — J43.9 PULMONARY EMPHYSEMA, UNSPECIFIED EMPHYSEMA TYPE (H): ICD-10-CM

## 2024-04-17 RX ORDER — IPRATROPIUM BROMIDE AND ALBUTEROL SULFATE 2.5; .5 MG/3ML; MG/3ML
1 SOLUTION RESPIRATORY (INHALATION) EVERY 6 HOURS PRN
Qty: 90 ML | Refills: 3 | Status: SHIPPED | OUTPATIENT
Start: 2024-04-17

## 2024-04-17 NOTE — TELEPHONE ENCOUNTER
8:37 AM    Reason for Call: Phone Call    Description: Patient would like prescription Ibuprophen bromade and ambul sulfate inhalent solution transferred to [facility]    Was an appointment offered for this call? No  If yes : Appointment type              Date    Preferred method for responding to this message: Telephone Call  What is your phone number ? [phone]    If we cannot reach you directly, may we leave a detailed response at the number you provided? Yes    Can this message wait until your PCP/provider returns, if available today? Please call patient to advise    Diane Russell

## 2024-04-22 ENCOUNTER — TELEPHONE (OUTPATIENT)
Dept: CARE COORDINATION | Facility: OTHER | Age: 84
End: 2024-04-22

## 2024-04-22 ENCOUNTER — TELEPHONE (OUTPATIENT)
Dept: FAMILY MEDICINE | Facility: OTHER | Age: 84
End: 2024-04-22

## 2024-04-22 DIAGNOSIS — E78.5 HYPERLIPIDEMIA, UNSPECIFIED HYPERLIPIDEMIA TYPE: ICD-10-CM

## 2024-04-22 DIAGNOSIS — S32.050S CLOSED COMPRESSION FRACTURE OF L5 LUMBAR VERTEBRA, SEQUELA: ICD-10-CM

## 2024-04-22 DIAGNOSIS — I50.31 ACUTE DIASTOLIC CONGESTIVE HEART FAILURE (H): ICD-10-CM

## 2024-04-22 DIAGNOSIS — N18.4 CKD (CHRONIC KIDNEY DISEASE) STAGE 4, GFR 15-29 ML/MIN (H): Primary | ICD-10-CM

## 2024-04-22 DIAGNOSIS — Z87.19 HISTORY OF GI BLEED: ICD-10-CM

## 2024-04-22 DIAGNOSIS — Z95.5 S/P DRUG ELUTING CORONARY STENT PLACEMENT: ICD-10-CM

## 2024-04-22 RX ORDER — METHOCARBAMOL 500 MG/1
500 TABLET, FILM COATED ORAL 4 TIMES DAILY PRN
Qty: 20 TABLET | Refills: 0 | Status: SHIPPED | OUTPATIENT
Start: 2024-04-22

## 2024-04-22 RX ORDER — SUCRALFATE 1 G/1
1 TABLET ORAL 4 TIMES DAILY PRN
Qty: 120 TABLET | Refills: 1 | Status: SHIPPED | OUTPATIENT
Start: 2024-04-22 | End: 2024-04-29

## 2024-04-22 RX ORDER — ATORVASTATIN CALCIUM 40 MG/1
40 TABLET, FILM COATED ORAL AT BEDTIME
Qty: 90 TABLET | Refills: 0 | Status: SHIPPED | OUTPATIENT
Start: 2024-04-22 | End: 2024-04-29

## 2024-04-22 RX ORDER — LISINOPRIL 5 MG/1
5 TABLET ORAL DAILY
Qty: 90 TABLET | Refills: 0 | Status: SHIPPED | OUTPATIENT
Start: 2024-04-22 | End: 2024-04-29

## 2024-04-22 NOTE — TELEPHONE ENCOUNTER
Reason for call:  Medication    Pt states they are out of these medications and are needing them refilled ASAP.  Have you contacted your pharmacy? Yes   If patient has contacted Pharmacy and it has been over 72hrs, continue to #2  Medication lisinopril (ZESTRIL) 5 MG tablet   sucralfate (CARAFATE) 1 GM tablet   atorvastatin (LIPITOR) 40 MG tablet   lisinopril (ZESTRIL) 5 MG tablet   What Pharmacy do you use? [facility] [place]      (Please note that the turn-around-time for prescriptions is 72 business hours; I am sending your request at this time. SEND TO  [facility]  )

## 2024-04-22 NOTE — TELEPHONE ENCOUNTER
Duplicate refill request, see other phone call logged today. Rx request has been sent to provider and waiting response.

## 2024-04-22 NOTE — TELEPHONE ENCOUNTER
Looks like patient is scheduled tomorrow for lab.  I added BMP to appointment note.  Thank you  Gely Khoury RN CC

## 2024-04-22 NOTE — TELEPHONE ENCOUNTER
Refills signed.     Patient is overdue for lab draw- BMP. Please have her come in this week.     COCO Ta CNP on 4/22/2024 at 5:03 PM

## 2024-04-22 NOTE — TELEPHONE ENCOUNTER
Patient transferred from specialty and [facility] needing refills of medications.  States was going through [facility] but gives up as having trouble with them and requests refills of below medications be sent to [facility].    Atorvastatin   Last signed 1/4/24 #90, 0 R    Last Lipid 2022    Methocarbamol  Last signed [facility] 4/12/24 #20, 0 R      Lisinopril  Last signed 1/4/24 #90, 0 R    Sucralfate  Last signed 2/7/24 #120, 3 R    Last office visit on 4/8/24      Gely Khoury, RN  Care Coordination

## 2024-04-23 ENCOUNTER — LAB (OUTPATIENT)
Dept: LAB | Facility: OTHER | Age: 84
End: 2024-04-23
Payer: COMMERCIAL

## 2024-04-23 DIAGNOSIS — N18.4 CKD (CHRONIC KIDNEY DISEASE) STAGE 4, GFR 15-29 ML/MIN (H): ICD-10-CM

## 2024-04-23 LAB
ANION GAP SERPL CALCULATED.3IONS-SCNC: 11 MMOL/L (ref 7–15)
BUN SERPL-MCNC: 25.4 MG/DL (ref 8–23)
CALCIUM SERPL-MCNC: 9.1 MG/DL (ref 8.8–10.2)
CHLORIDE SERPL-SCNC: 107 MMOL/L (ref 98–107)
CREAT SERPL-MCNC: 1.17 MG/DL (ref 0.51–0.95)
DEPRECATED HCO3 PLAS-SCNC: 20 MMOL/L (ref 22–29)
EGFRCR SERPLBLD CKD-EPI 2021: 46 ML/MIN/1.73M2
GLUCOSE SERPL-MCNC: 90 MG/DL (ref 70–99)
POTASSIUM SERPL-SCNC: 4.4 MMOL/L (ref 3.4–5.3)
SODIUM SERPL-SCNC: 138 MMOL/L (ref 135–145)

## 2024-04-23 PROCEDURE — 80048 BASIC METABOLIC PNL TOTAL CA: CPT | Mod: ZL

## 2024-04-23 PROCEDURE — 36415 COLL VENOUS BLD VENIPUNCTURE: CPT | Mod: ZL

## 2024-04-24 DIAGNOSIS — Z53.9 PERSONS ENCOUNTERING HEALTH SERVICES FOR SPECIFIC PROCEDURES, NOT CARRIED OUT: Primary | ICD-10-CM

## 2024-04-24 PROCEDURE — G0180 MD CERTIFICATION HHA PATIENT: HCPCS

## 2024-04-29 ENCOUNTER — TELEPHONE (OUTPATIENT)
Dept: FAMILY MEDICINE | Facility: OTHER | Age: 84
End: 2024-04-29

## 2024-04-29 ENCOUNTER — TELEPHONE (OUTPATIENT)
Dept: CARE COORDINATION | Facility: OTHER | Age: 84
End: 2024-04-29

## 2024-04-29 DIAGNOSIS — I50.32 CHRONIC DIASTOLIC CONGESTIVE HEART FAILURE (H): ICD-10-CM

## 2024-04-29 DIAGNOSIS — E78.5 HYPERLIPIDEMIA, UNSPECIFIED HYPERLIPIDEMIA TYPE: ICD-10-CM

## 2024-04-29 DIAGNOSIS — Z95.5 S/P DRUG ELUTING CORONARY STENT PLACEMENT: ICD-10-CM

## 2024-04-29 DIAGNOSIS — Z87.19 HISTORY OF GI BLEED: ICD-10-CM

## 2024-04-29 DIAGNOSIS — D64.9 ANEMIA, UNSPECIFIED TYPE: ICD-10-CM

## 2024-04-29 DIAGNOSIS — J43.9 PULMONARY EMPHYSEMA, UNSPECIFIED EMPHYSEMA TYPE (H): ICD-10-CM

## 2024-04-29 DIAGNOSIS — I50.31 ACUTE DIASTOLIC CONGESTIVE HEART FAILURE (H): ICD-10-CM

## 2024-04-29 RX ORDER — SUCRALFATE 1 G/1
1 TABLET ORAL 4 TIMES DAILY PRN
Qty: 120 TABLET | Refills: 3 | Status: SHIPPED | OUTPATIENT
Start: 2024-04-29 | End: 2024-07-16

## 2024-04-29 RX ORDER — ATORVASTATIN CALCIUM 40 MG/1
40 TABLET, FILM COATED ORAL AT BEDTIME
Qty: 90 TABLET | Refills: 3 | Status: SHIPPED | OUTPATIENT
Start: 2024-04-29

## 2024-04-29 RX ORDER — FUROSEMIDE 20 MG
10 TABLET ORAL DAILY
Qty: 90 TABLET | Refills: 3 | Status: SHIPPED | OUTPATIENT
Start: 2024-04-29 | End: 2024-06-12

## 2024-04-29 RX ORDER — PANTOPRAZOLE SODIUM 40 MG/1
40 TABLET, DELAYED RELEASE ORAL DAILY
Qty: 90 TABLET | Refills: 3 | Status: SHIPPED | OUTPATIENT
Start: 2024-04-29 | End: 2024-07-02

## 2024-04-29 RX ORDER — LISINOPRIL 5 MG/1
5 TABLET ORAL DAILY
Qty: 90 TABLET | Refills: 3 | Status: SHIPPED | OUTPATIENT
Start: 2024-04-29 | End: 2024-07-02

## 2024-04-29 NOTE — TELEPHONE ENCOUNTER
LARRY Nick from [facility] calling for VO for PT 2XW for 2W then 1XW for 6W for therapeutic exercise, therapeutic activity and ain modalities

## 2024-04-29 NOTE — TELEPHONE ENCOUNTER
8:04 AM    Reason for Call: Phone Call    Description: pt is calling she would like to talk to a nurse about getting her   Ipratropium-Albuterol DUONEB   She wants to know if she can get a 30 day supply of this prescription instead of a 10 day supply, pt states that every time this prescription gets filled it it wrong, she stated she gets inhalers instead of her nebulizer     Was an appointment offered for this call? No  If yes : Appointment type              Date    Preferred method for responding to this message: Telephone Call  What is your phone number ? [phone]    If we cannot reach you directly, may we leave a detailed response at the number you provided? Yes    Can this message wait until your PCP/provider returns, if available today? Not applicable    Shellie Pinto

## 2024-04-29 NOTE — TELEPHONE ENCOUNTER
Reason for call:  Medication      Have you contacted your pharmacy? No   If patient has contacted Pharmacy and it has been over 72hrs, continue to #2  Medication Lasix 20,lisinopril 5mg,Protonix 40 mg,lipitor 40 mg Carfate 1gm  What Pharmacy do you use? [facility]      (Please note that the turn-around-time for prescriptions is 72 business hours; I am sending your request at this time. SEND TO appropriate Care Team Pool )

## 2024-04-30 RX ORDER — IPRATROPIUM BROMIDE AND ALBUTEROL SULFATE 2.5; .5 MG/3ML; MG/3ML
1 SOLUTION RESPIRATORY (INHALATION) EVERY 6 HOURS PRN
Qty: 1350 ML | Refills: 3 | Status: SHIPPED | OUTPATIENT
Start: 2024-04-30 | End: 2024-05-10

## 2024-04-30 NOTE — TELEPHONE ENCOUNTER
Patient is calling back about medication. She would like a call from the nurse. She shouldn't have to keep waiting like this. [phone]

## 2024-04-30 NOTE — TELEPHONE ENCOUNTER
Patient requesting medication, uses 3-4 daily and has been running out. Pended if wish.  Gauri Milligan LPN

## 2024-05-01 NOTE — PATIENT INSTRUCTIONS
CHIEF COMPLAINT:  Routine OB  SUBJECTIVE:  Patient denies leakage of fluid, vaginal bleeding, or contractions.  Reports good fetal movement.  OBJECTIVE:Visit Vitals  /70   Ht 5' 5\" (1.651 m)   Wt 70.8 kg (156 lb)   LMP 11/04/2023 (Exact Date)   BMI 25.96 kg/m²      General:  Alert & oriented times 3   Extremities:  No edema bilaterally, nontender  ASSESSMENT &PLAN: Patient is here for a routine OB check.  No new significant problems or changes are noted. 1 hr gtt next appointment.   For now, keep your anxiety medications the same (buspar and ativan). Will consider transitioning off the buspar to zoloft after your procedures.     Good luck with the procedures, will see you after.

## 2024-05-10 ENCOUNTER — TELEPHONE (OUTPATIENT)
Dept: FAMILY MEDICINE | Facility: OTHER | Age: 84
End: 2024-05-10

## 2024-05-10 DIAGNOSIS — J43.9 PULMONARY EMPHYSEMA, UNSPECIFIED EMPHYSEMA TYPE (H): ICD-10-CM

## 2024-05-10 RX ORDER — IPRATROPIUM BROMIDE AND ALBUTEROL SULFATE 2.5; .5 MG/3ML; MG/3ML
1 SOLUTION RESPIRATORY (INHALATION) EVERY 6 HOURS PRN
Qty: 1350 ML | Refills: 3 | Status: SHIPPED | OUTPATIENT
Start: 2024-05-10 | End: 2024-09-23

## 2024-05-10 NOTE — TELEPHONE ENCOUNTER
Requesting refill [facility] not sending enough to last, will try [facility]. Pended if wish.  Gauri Milligan LPN

## 2024-05-10 NOTE — TELEPHONE ENCOUNTER
"12:56 PM    Reason for Call: Phone Call    Description: Patient called requesting to speak with nurse in Dr. Troncoso's office. She refused to give any information to scheduling - only that it was regarding a medication. She states \"I better get a call back, its very important\"    Was an appointment offered for this call? No    Preferred method for responding to this message: Telephone Call  What is your phone number ?[phone]     If we cannot reach you directly, may we leave a detailed response at the number you provided? Yes    Can this message wait until your PCP/provider returns, if available today? Not applicable     Viky Carr  "

## 2024-05-13 ENCOUNTER — OFFICE VISIT (OUTPATIENT)
Dept: FAMILY MEDICINE | Facility: OTHER | Age: 84
End: 2024-05-13
Payer: COMMERCIAL

## 2024-05-13 VITALS
TEMPERATURE: 98.4 F | RESPIRATION RATE: 18 BRPM | OXYGEN SATURATION: 95 % | SYSTOLIC BLOOD PRESSURE: 104 MMHG | BODY MASS INDEX: 18.94 KG/M2 | HEART RATE: 73 BPM | DIASTOLIC BLOOD PRESSURE: 68 MMHG | HEIGHT: 60 IN | WEIGHT: 96.5 LBS

## 2024-05-13 DIAGNOSIS — I10 ESSENTIAL HYPERTENSION: ICD-10-CM

## 2024-05-13 DIAGNOSIS — E03.9 ACQUIRED HYPOTHYROIDISM: ICD-10-CM

## 2024-05-13 DIAGNOSIS — G89.29 CHRONIC MIDLINE LOW BACK PAIN WITH BILATERAL SCIATICA: ICD-10-CM

## 2024-05-13 DIAGNOSIS — N18.4 CKD (CHRONIC KIDNEY DISEASE) STAGE 4, GFR 15-29 ML/MIN (H): ICD-10-CM

## 2024-05-13 DIAGNOSIS — I50.32 CHRONIC DIASTOLIC CONGESTIVE HEART FAILURE (H): Primary | ICD-10-CM

## 2024-05-13 DIAGNOSIS — R63.4 WEIGHT LOSS: ICD-10-CM

## 2024-05-13 DIAGNOSIS — M54.42 CHRONIC MIDLINE LOW BACK PAIN WITH BILATERAL SCIATICA: ICD-10-CM

## 2024-05-13 DIAGNOSIS — E44.1 MILD PROTEIN-CALORIE MALNUTRITION (H): ICD-10-CM

## 2024-05-13 DIAGNOSIS — E55.9 VITAMIN D DEFICIENCY: ICD-10-CM

## 2024-05-13 DIAGNOSIS — M54.41 CHRONIC MIDLINE LOW BACK PAIN WITH BILATERAL SCIATICA: ICD-10-CM

## 2024-05-13 DIAGNOSIS — E78.5 HYPERLIPIDEMIA, UNSPECIFIED HYPERLIPIDEMIA TYPE: ICD-10-CM

## 2024-05-13 LAB
ANION GAP SERPL CALCULATED.3IONS-SCNC: 13 MMOL/L (ref 7–15)
BASOPHILS # BLD AUTO: 0 10E3/UL (ref 0–0.2)
BASOPHILS NFR BLD AUTO: 1 %
BUN SERPL-MCNC: 21.5 MG/DL (ref 8–23)
CALCIUM SERPL-MCNC: 9.7 MG/DL (ref 8.8–10.2)
CHLORIDE SERPL-SCNC: 109 MMOL/L (ref 98–107)
CHOLEST SERPL-MCNC: 133 MG/DL
CREAT SERPL-MCNC: 1.1 MG/DL (ref 0.51–0.95)
CREAT UR-MCNC: 28.2 MG/DL
DEPRECATED HCO3 PLAS-SCNC: 22 MMOL/L (ref 22–29)
EGFRCR SERPLBLD CKD-EPI 2021: 50 ML/MIN/1.73M2
EOSINOPHIL # BLD AUTO: 0.2 10E3/UL (ref 0–0.7)
EOSINOPHIL NFR BLD AUTO: 2 %
ERYTHROCYTE [DISTWIDTH] IN BLOOD BY AUTOMATED COUNT: 17.8 % (ref 10–15)
FASTING STATUS PATIENT QL REPORTED: NO
FASTING STATUS PATIENT QL REPORTED: NO
GLUCOSE SERPL-MCNC: 90 MG/DL (ref 70–99)
HCT VFR BLD AUTO: 38.1 % (ref 35–47)
HDLC SERPL-MCNC: 53 MG/DL
HGB BLD-MCNC: 12.3 G/DL (ref 11.7–15.7)
IMM GRANULOCYTES # BLD: 0 10E3/UL
IMM GRANULOCYTES NFR BLD: 0 %
LDLC SERPL CALC-MCNC: 64 MG/DL
LYMPHOCYTES # BLD AUTO: 1.6 10E3/UL (ref 0.8–5.3)
LYMPHOCYTES NFR BLD AUTO: 24 %
MCH RBC QN AUTO: 29.8 PG (ref 26.5–33)
MCHC RBC AUTO-ENTMCNC: 32.3 G/DL (ref 31.5–36.5)
MCV RBC AUTO: 92 FL (ref 78–100)
MICROALBUMIN UR-MCNC: <12 MG/L
MICROALBUMIN/CREAT UR: NORMAL MG/G{CREAT}
MONOCYTES # BLD AUTO: 0.5 10E3/UL (ref 0–1.3)
MONOCYTES NFR BLD AUTO: 7 %
NEUTROPHILS # BLD AUTO: 4.6 10E3/UL (ref 1.6–8.3)
NEUTROPHILS NFR BLD AUTO: 66 %
NONHDLC SERPL-MCNC: 80 MG/DL
NRBC # BLD AUTO: 0 10E3/UL
NRBC BLD AUTO-RTO: 0 /100
PLATELET # BLD AUTO: 227 10E3/UL (ref 150–450)
POTASSIUM SERPL-SCNC: 4.7 MMOL/L (ref 3.4–5.3)
PREALB SERPL-MCNC: 29.9 MG/DL (ref 20–40)
RBC # BLD AUTO: 4.13 10E6/UL (ref 3.8–5.2)
SODIUM SERPL-SCNC: 144 MMOL/L (ref 135–145)
T4 FREE SERPL-MCNC: 1.06 NG/DL (ref 0.9–1.7)
TRIGL SERPL-MCNC: 80 MG/DL
TSH SERPL DL<=0.005 MIU/L-ACNC: 5.14 UIU/ML (ref 0.3–4.2)
WBC # BLD AUTO: 6.9 10E3/UL (ref 4–11)

## 2024-05-13 PROCEDURE — G0463 HOSPITAL OUTPT CLINIC VISIT: HCPCS

## 2024-05-13 PROCEDURE — 84439 ASSAY OF FREE THYROXINE: CPT | Mod: ZL

## 2024-05-13 PROCEDURE — 84443 ASSAY THYROID STIM HORMONE: CPT | Mod: ZL

## 2024-05-13 PROCEDURE — 80061 LIPID PANEL: CPT | Mod: ZL

## 2024-05-13 PROCEDURE — 80048 BASIC METABOLIC PNL TOTAL CA: CPT | Mod: ZL

## 2024-05-13 PROCEDURE — 84134 ASSAY OF PREALBUMIN: CPT | Mod: ZL

## 2024-05-13 PROCEDURE — 85025 COMPLETE CBC W/AUTO DIFF WBC: CPT | Mod: ZL

## 2024-05-13 PROCEDURE — 99214 OFFICE O/P EST MOD 30 MIN: CPT

## 2024-05-13 PROCEDURE — 82043 UR ALBUMIN QUANTITATIVE: CPT | Mod: ZL

## 2024-05-13 PROCEDURE — 82306 VITAMIN D 25 HYDROXY: CPT | Mod: ZL

## 2024-05-13 PROCEDURE — 36415 COLL VENOUS BLD VENIPUNCTURE: CPT | Mod: ZL

## 2024-05-13 RX ORDER — GABAPENTIN 300 MG/1
300 CAPSULE ORAL AT BEDTIME
Qty: 90 CAPSULE | Refills: 0 | Status: SHIPPED | OUTPATIENT
Start: 2024-05-13 | End: 2024-05-14

## 2024-05-13 RX ORDER — MIRTAZAPINE 7.5 MG/1
7.5 TABLET, FILM COATED ORAL AT BEDTIME
Qty: 30 TABLET | Refills: 0 | Status: SHIPPED | OUTPATIENT
Start: 2024-05-13 | End: 2024-05-14

## 2024-05-13 NOTE — PATIENT INSTRUCTIONS
You can start remeron at night.     I have sent in a refill of the gabapentin. Watch for increased dizziness with this.

## 2024-05-13 NOTE — PROGRESS NOTES
Assessment & Plan     Chronic diastolic congestive heart failure (H)  Euvolemic. Now taking lasix 10 mg every other day. Repeat labs pending. Very sensitive to diuretic dosing.  - Basic metabolic panel  - CBC with platelets and differential    Hyperlipidemia, unspecified hyperlipidemia type  Continue statin. Due for repeat lipid profile.  - Lipid Profile (Chol, Trig, HDL, LDL calc)    Essential hypertension  Controlled.   - Albumin Random Urine Quantitative with Creat Ratio    Weight loss/Mild protein-calorie malnutrition (H24)  Weights reviewed over the past year. Down 4-5 lbs over the course of the year, edema has also fluctuated. No edema on exam today. Patient inquiring about starting an appetite stimulant as it has been effective for her friend. She desires to weight 110 lbs (has been  here).  Labs today. Trial of remeron, discussed side effects. May also be beneficial for mood. Follow-up 1 month.  - mirtazapine (REMERON) 7.5 MG tablet  Dispense: 30 tablet; Refill: 0  - Prealbumin    Acquired hypothyroidism  - TSH with free T4 reflex    Vitamin D deficiency  - Vitamin D Deficiency    CKD (chronic kidney disease) stage 4, GFR 15-29 ml/min (H)  Recheck pending.    Chronic midline low back pain with bilateral sciatica  See previous notes on 4/8. Home PT now initiated, coming twice weekly. Using tens unit, tylenol. Overall effective. Some worsening since discontinuing gabapentin. Patient would like to trial once daily dosing. No recent falls, trauma. Unable to tolerate MRI and has declined CT for consideration of injection. Monitor closely for increasing dizziness.   - gabapentin (NEURONTIN) 300 MG capsule  Dispense: 90 capsule; Refill: 0    30 minutes spent by me on the date of the encounter doing chart review, history and exam, documentation and further activities per the note        Return in about 1 month (around 6/13/2024) for Follow up, Back pain, appetite.    Subjective   Lita is a 83 year old,  presenting for the following health issues:  COPD, Heart Failure, Back Pain, and Kidney Problem        5/13/2024     3:21 PM   Additional Questions   Roomed by Priyanka henderson   Accompanied by none         5/13/2024     3:21 PM   Patient Reported Additional Medications   Patient reports taking the following new medications none     HPI     Heart Failure Follow-up   Are you experiencing any shortness of breath? Yes, with activity only     How would you describe your shortness of breath?  Same as usual  Are you experiencing any swelling in your legs or feet?  No  Are you using more pillows than usual? No  Do you cough at night?  No  Do you check your weight daily?  Yes  Have you had a weight change recently?  No  Are you having any of the following side effects from your medications? (Select all that apply)  The patient does not report symptoms of dizziness, fatigue, cough, swelling, or slow heart beat.  Since your last visit, how many times have you gone to the cardiologist, urgent care, emergency room, or hospital because of your heart failure?   None  Last Echo:   Echo result w/o MOPS: Interpretation SummaryGlobal and regional left ventricular function is normal with an EF of 55-60%.Mild concentric wall thickening consistent with left ventricular hypertrophyis present.Global right ventricular function is normal.Mild right ventricular dilation is present.Moderate left atrial enlargement is present.Mild right atrial enlargement is present.Mild mitral annular calcification is present.Mild mitral insufficiency is present.No aortic stenosis is present.Mild tricuspid insufficiency is present.The right ventricular systolic pressure is 55mmHg above the right atrialpressure.    - 1/2 tablet every other day   - No swelling    COPD Follow-Up  Overall, how are your COPD symptoms since your last clinic visit?  No change  How much fatigue or shortness of breath do you have when you are walking?  Same as usual  How much shortness of  "breath do you have when you are resting?  None  How often do you cough? Rarely  Have you noticed any change in your sputum/phlegm?  No  Have you experienced a recent fever? No  Please describe how far you can walk without stopping to rest:  The length of 3-5 rooms  How many flights of stairs are you able to walk up without stopping?  1  Have you had any Emergency Room Visits, Urgent Care Visits, or Hospital Admissions because of your COPD since your last office visit?  No    History   Smoking Status    Former    Packs/day: 1.00    Years: 50.00    Types: Cigarettes    Quit date: 1/1/2019   Smokeless Tobacco    Never     No results found for: \"FEV1\", \"VXP9JJF\"    Chronic Kidney Disease Follow-up    Do you take any over the counter pain medicine?: No    Chronic/Recurring Back Pain Follow Up    Where is your back pain located? (Select all that apply) low back bilateral  How would you describe your back pain?  dull ache  Where does your back pain spread? the right and left buttock, the right and left  thigh, and the right and left  knee  Since your last clinic visit for back pain, how has your pain changed? gradually improving  Does your back pain interfere with your job? Not applicable  Since your last visit, have you tried any new treatment? Yes -  TENS unit    - Home care PT involved- usually M/W.   - Using tens unit, does help  - Ambulating with cane. No recent falls.   - Tylenol occasionally for pain, 500 to 1000 mg BID to TID.  - no numbness, tingling, bowel/bladder changes.    Review of Systems  Constitutional, neuro, ENT, endocrine, pulmonary, cardiac, gastrointestinal, genitourinary, musculoskeletal, integument and psychiatric systems are negative, except as otherwise noted.      Objective    /68 (BP Location: Left arm, Patient Position: Sitting, Cuff Size: Adult Regular)   Pulse 73   Temp 98.4  F (36.9  C) (Tympanic)   Resp 18   Ht 1.524 m (5')   Wt 43.8 kg (96 lb 8 oz)   SpO2 95%   BMI 18.85 kg/m  "   Body mass index is 18.85 kg/m .    Physical Exam   GENERAL: alert and no distress  RESP: lungs clear to auscultation - no rales, rhonchi or wheezes  CV: regular rate and rhythm, normal S1 S2, no S3 or S4, no murmur, click or rub, no peripheral edema  ABDOMEN: soft, nontender, no hepatosplenomegaly, no masses and bowel sounds normal  MS: no gross musculoskeletal defects noted, no edema  NEURO: Normal strength and tone, mentation intact and speech normal  Comprehensive back pain exam:  No tenderness, Flexion limited by pain, Lower extremity reflexes within normal limits bilaterally, and Lower extremity sensation normal and equal on both sides  PSYCH: mentation appears normal, affect flat          Signed Electronically by: COCO Ta CNP

## 2024-05-14 ENCOUNTER — TELEPHONE (OUTPATIENT)
Dept: FAMILY MEDICINE | Facility: OTHER | Age: 84
End: 2024-05-14

## 2024-05-14 DIAGNOSIS — M54.41 CHRONIC MIDLINE LOW BACK PAIN WITH BILATERAL SCIATICA: ICD-10-CM

## 2024-05-14 DIAGNOSIS — E44.1 MILD PROTEIN-CALORIE MALNUTRITION (H): ICD-10-CM

## 2024-05-14 DIAGNOSIS — G89.29 CHRONIC MIDLINE LOW BACK PAIN WITH BILATERAL SCIATICA: ICD-10-CM

## 2024-05-14 DIAGNOSIS — M54.42 CHRONIC MIDLINE LOW BACK PAIN WITH BILATERAL SCIATICA: ICD-10-CM

## 2024-05-14 DIAGNOSIS — R63.4 WEIGHT LOSS: ICD-10-CM

## 2024-05-14 RX ORDER — GABAPENTIN 100 MG/1
100 CAPSULE ORAL AT BEDTIME
Qty: 30 CAPSULE | Refills: 0 | Status: SHIPPED | OUTPATIENT
Start: 2024-05-14 | End: 2024-06-04

## 2024-05-14 RX ORDER — MIRTAZAPINE 7.5 MG/1
7.5 TABLET, FILM COATED ORAL AT BEDTIME
Qty: 30 TABLET | Refills: 0 | Status: SHIPPED | OUTPATIENT
Start: 2024-05-14 | End: 2024-06-11

## 2024-05-14 NOTE — TELEPHONE ENCOUNTER
10:37 AM    Reason for Call: Phone Call    Description: pt called about medication and has questions, possible might have sent to wrong pharmacy. [facility]. Please call pt    Was an appointment offered for this call? No  If yes : Appointment type              Date    Preferred method for responding to this message: Telephone Call  What is your phone number ? [phone]     If we cannot reach you directly, may we leave a detailed response at the number you provided? Yes    Can this message wait until your PCP/provider returns, if available today? Gale Haddad

## 2024-05-15 LAB — VIT D+METAB SERPL-MCNC: 46 NG/ML (ref 20–50)

## 2024-06-04 DIAGNOSIS — M54.41 CHRONIC MIDLINE LOW BACK PAIN WITH BILATERAL SCIATICA: ICD-10-CM

## 2024-06-04 DIAGNOSIS — G89.29 CHRONIC MIDLINE LOW BACK PAIN WITH BILATERAL SCIATICA: ICD-10-CM

## 2024-06-04 DIAGNOSIS — M54.42 CHRONIC MIDLINE LOW BACK PAIN WITH BILATERAL SCIATICA: ICD-10-CM

## 2024-06-04 RX ORDER — GABAPENTIN 100 MG/1
100 CAPSULE ORAL AT BEDTIME
Qty: 30 CAPSULE | Refills: 0 | Status: SHIPPED | OUTPATIENT
Start: 2024-06-04 | End: 2024-06-11

## 2024-06-04 NOTE — TELEPHONE ENCOUNTER
gabapentin (NEURONTIN) 100 MG capsule       Last Written Prescription Date:  5-14-24  Last Fill Quantity: 30,   # refills: 0  Last Office Visit: 5-13-24  Future Office visit:    Next 5 appointments (look out 90 days)      Jun 11, 2024  1:00 PM  (Arrive by 12:45 PM)  Provider Visit with Ophelia Troncoso MD  [facility] ([facility] ) [address]  [place] [address]  [phone]             Routing refill request to provider for review/approval because:  Drug not on the [facility], P or [facility] refill protocol or controlled substance

## 2024-06-11 ENCOUNTER — OFFICE VISIT (OUTPATIENT)
Dept: FAMILY MEDICINE | Facility: OTHER | Age: 84
End: 2024-06-11
Attending: FAMILY MEDICINE
Payer: COMMERCIAL

## 2024-06-11 VITALS
TEMPERATURE: 98.9 F | RESPIRATION RATE: 18 BRPM | DIASTOLIC BLOOD PRESSURE: 68 MMHG | WEIGHT: 101.6 LBS | BODY MASS INDEX: 19.94 KG/M2 | OXYGEN SATURATION: 94 % | SYSTOLIC BLOOD PRESSURE: 108 MMHG | HEIGHT: 60 IN | HEART RATE: 77 BPM

## 2024-06-11 DIAGNOSIS — M54.41 CHRONIC MIDLINE LOW BACK PAIN WITH BILATERAL SCIATICA: ICD-10-CM

## 2024-06-11 DIAGNOSIS — R63.4 WEIGHT LOSS: ICD-10-CM

## 2024-06-11 DIAGNOSIS — M54.42 CHRONIC MIDLINE LOW BACK PAIN WITH BILATERAL SCIATICA: ICD-10-CM

## 2024-06-11 DIAGNOSIS — I50.32 CHRONIC DIASTOLIC CONGESTIVE HEART FAILURE (H): ICD-10-CM

## 2024-06-11 DIAGNOSIS — G89.29 CHRONIC MIDLINE LOW BACK PAIN WITH BILATERAL SCIATICA: ICD-10-CM

## 2024-06-11 DIAGNOSIS — E44.1 MILD PROTEIN-CALORIE MALNUTRITION (H): ICD-10-CM

## 2024-06-11 DIAGNOSIS — B37.31 YEAST VAGINITIS: Primary | ICD-10-CM

## 2024-06-11 PROCEDURE — 99214 OFFICE O/P EST MOD 30 MIN: CPT | Performed by: FAMILY MEDICINE

## 2024-06-11 PROCEDURE — G0463 HOSPITAL OUTPT CLINIC VISIT: HCPCS

## 2024-06-11 RX ORDER — GABAPENTIN 100 MG/1
100 CAPSULE ORAL 3 TIMES DAILY
Qty: 180 CAPSULE | Refills: 3 | Status: SHIPPED | OUTPATIENT
Start: 2024-06-11

## 2024-06-11 RX ORDER — CLOTRIMAZOLE AND BETAMETHASONE DIPROPIONATE 10; .64 MG/G; MG/G
CREAM TOPICAL 2 TIMES DAILY
Qty: 90 G | Refills: 3 | Status: SHIPPED | OUTPATIENT
Start: 2024-06-11

## 2024-06-11 RX ORDER — MIRTAZAPINE 15 MG/1
15 TABLET, FILM COATED ORAL AT BEDTIME
Qty: 90 TABLET | Refills: 3 | Status: SHIPPED | OUTPATIENT
Start: 2024-06-11

## 2024-06-11 ASSESSMENT — ENCOUNTER SYMPTOMS: BACK PAIN: 1

## 2024-06-11 ASSESSMENT — PAIN SCALES - GENERAL: PAINLEVEL: SEVERE PAIN (7)

## 2024-06-11 NOTE — PROGRESS NOTES
Assessment & Plan     Chronic midline low back pain with bilateral sciatica  Increase gabapentin to 100mg tid as this has been helpful. No dizziness to date with current dosing. Pt with daughter today in the office and she is open to CT scan, ordered. Pt will not do MR  - gabapentin (NEURONTIN) 100 MG capsule  Dispense: 180 capsule; Refill: 3  - CT Lumbar Spine w/o Contrast    Weight loss / Mild protein-calorie malnutrition (H24)  Weight is going up, okay to increase to 15mg. Discussed paradoxical effect with weight and dosing and remeron. Would not push dose higher than 15 for weight gain primarily   - mirtazapine (REMERON) 15 MG tablet  Dispense: 90 tablet; Refill: 3    Yeast vaginitis  Mild erythema, excoriation externally. Lotrisone prescribed. Previously estrogen cream given, pt stopped this due to potential side effects. Can trial lotrisone, but if discomfort continues, recommend re trial of estrogen  - clotrimazole-betamethasone (LOTRISONE) 1-0.05 % external cream  Dispense: 90 g; Refill: 3    No follow-ups on file.    Joann London is a 83 year old, presenting for the following health issues:  Back Pain (Follow up /)        6/11/2024    12:50 PM   Additional Questions   Roomed by Flori Hill   Accompanied by self         6/11/2024    12:50 PM   Patient Reported Additional Medications   Patient reports taking the following new medications none     History of Present Illness       Back Pain:  She presents for follow up of back pain. Patient's back pain is a chronic problem.  Location of back pain:  Right lower back, left lower back and right middle of back  Description of back pain: sharp  Back pain spreads: right buttocks, left buttocks, right thigh and left thigh    Since patient first noticed back pain, pain is: always present, but gets better and worse  Does back pain interfere with her job:  Not applicable       She eats 0-1 servings of fruits and vegetables daily.She exercises with enough effort  to increase her heart rate 9 or less minutes per day.  She exercises with enough effort to increase her heart rate 7 days per week.   She is taking medications regularly.       Review of Systems  Constitutional, HEENT, cardiovascular, pulmonary, gi and gu systems are negative, except as otherwise noted.      Objective    /68 (BP Location: Left arm, Patient Position: Sitting, Cuff Size: Adult Small)   Pulse 77   Temp 98.9  F (37.2  C) (Tympanic)   Resp 18   Ht 1.524 m (5')   Wt 46.1 kg (101 lb 9.6 oz)   SpO2 94%   BMI 19.84 kg/m    Body mass index is 19.84 kg/m .    Physical Exam   GENERAL: alert and no distress  NECK: no adenopathy, no asymmetry, masses, or scars  RESP: lungs clear to auscultation - no rales, rhonchi or wheezes  CV: regular rates and rhythm, normal S1 S2, no S3 or S4, no murmur, click or rub, and no peripheral edema  ABDOMEN: soft, nontender, no hepatosplenomegaly, no masses and bowel sounds normal   (female): normal female external genitalia, normal urethral meatus , and vaginal mucosal atrophy  MS: no gross musculoskeletal defects noted, no edema  SKIN: no suspicious lesions or rashes  PSYCH: mentation appears normal, affect normal/bright    No results found for any visits on 06/11/24.      Signed Electronically by: Ophelia Troncoso MD

## 2024-06-12 RX ORDER — FUROSEMIDE 20 MG
10 TABLET ORAL EVERY OTHER DAY
Qty: 90 TABLET | Refills: 0 | Status: SHIPPED | OUTPATIENT
Start: 2024-06-12

## 2024-06-12 NOTE — TELEPHONE ENCOUNTER
FUROSEMIDE 20MG TABS       Last Written Prescription Date:  04/29/2024  Last Fill Quantity: 90,   # refills: 3  Last Office Visit: 06/11/2024  Future Office visit:    Next 5 appointments (look out 90 days)      Jul 16, 2024  1:00 PM  (Arrive by 12:45 PM)  Provider Visit with Ophelia Troncoso MD  [facility] ([facility] ) [address] [place] [address] [facility] [address]  [phone]             Routing refill request to provider for review/approval because:  Pharmacy comment: NEED CLARIFICATION 0.5 TAB DAILY OR EVERY OTHER DAY.   Diuretics (Including Combos) Protocol [id number]/11/2024 06:30 PM   Protocol Details Has GFR on file in past 12 months and most recent value is normal     Kimberly Boecker, RN

## 2024-06-24 ENCOUNTER — HOSPITAL ENCOUNTER (OUTPATIENT)
Dept: CT IMAGING | Facility: HOSPITAL | Age: 84
Discharge: HOME | End: 2024-06-24
Attending: FAMILY MEDICINE | Admitting: FAMILY MEDICINE
Payer: COMMERCIAL

## 2024-06-24 DIAGNOSIS — M54.41 CHRONIC MIDLINE LOW BACK PAIN WITH BILATERAL SCIATICA: ICD-10-CM

## 2024-06-24 DIAGNOSIS — G89.29 CHRONIC MIDLINE LOW BACK PAIN WITH BILATERAL SCIATICA: ICD-10-CM

## 2024-06-24 DIAGNOSIS — M54.42 CHRONIC MIDLINE LOW BACK PAIN WITH BILATERAL SCIATICA: ICD-10-CM

## 2024-06-24 PROCEDURE — 72131 CT LUMBAR SPINE W/O DYE: CPT

## 2024-06-27 ENCOUNTER — TELEPHONE (OUTPATIENT)
Dept: FAMILY MEDICINE | Facility: OTHER | Age: 84
End: 2024-06-27

## 2024-06-27 NOTE — TELEPHONE ENCOUNTER
Patient is getting upset that the nurse hasn't called her back today she sent a message over this morning and she had a CT Lumbar Spine and she wants to know the results.     Phone for Lita [phone]

## 2024-06-27 NOTE — TELEPHONE ENCOUNTER
Results requested: Patient returning call from yesterday. Please call back.     Best number to reach patient at: [phone]      Best time to call patient: anytime    Send to care team in basket.

## 2024-07-02 ENCOUNTER — TELEPHONE (OUTPATIENT)
Dept: FAMILY MEDICINE | Facility: OTHER | Age: 84
End: 2024-07-02

## 2024-07-02 DIAGNOSIS — D64.9 ANEMIA, UNSPECIFIED TYPE: ICD-10-CM

## 2024-07-02 DIAGNOSIS — I50.31 ACUTE DIASTOLIC CONGESTIVE HEART FAILURE (H): ICD-10-CM

## 2024-07-02 RX ORDER — LISINOPRIL 5 MG/1
5 TABLET ORAL DAILY
Qty: 90 TABLET | Refills: 3 | Status: SHIPPED | OUTPATIENT
Start: 2024-07-02 | End: 2024-07-16

## 2024-07-02 RX ORDER — PANTOPRAZOLE SODIUM 40 MG/1
40 TABLET, DELAYED RELEASE ORAL DAILY
Qty: 90 TABLET | Refills: 3 | Status: SHIPPED | OUTPATIENT
Start: 2024-07-02

## 2024-07-02 NOTE — TELEPHONE ENCOUNTER
10:20 AM    Reason for Call: Phone Call    Description: Patient is calling back stating she has to get medication today.  She does not give a lot of detail as she is frustrated. PLease call patient back , she states she was talking to someone and they hung up on her    Was an appointment offered for this call? No  If yes : Appointment type              Date    Preferred method for responding to this message: Telephone Call  What is your phone number ?  [phone]    If we cannot reach you directly, may we leave a detailed response at the number you provided? Yes    Can this message wait until your PCP/provider returns, if available today? YES, No    Ashlee Munoz

## 2024-07-16 ENCOUNTER — OFFICE VISIT (OUTPATIENT)
Dept: FAMILY MEDICINE | Facility: OTHER | Age: 84
End: 2024-07-16
Attending: FAMILY MEDICINE
Payer: COMMERCIAL

## 2024-07-16 VITALS
SYSTOLIC BLOOD PRESSURE: 90 MMHG | DIASTOLIC BLOOD PRESSURE: 60 MMHG | TEMPERATURE: 98 F | RESPIRATION RATE: 16 BRPM | HEART RATE: 65 BPM | BODY MASS INDEX: 18.85 KG/M2 | WEIGHT: 96.5 LBS | OXYGEN SATURATION: 96 %

## 2024-07-16 DIAGNOSIS — M54.50 CHRONIC BILATERAL LOW BACK PAIN WITHOUT SCIATICA: Primary | ICD-10-CM

## 2024-07-16 DIAGNOSIS — G89.29 CHRONIC BILATERAL LOW BACK PAIN WITHOUT SCIATICA: Primary | ICD-10-CM

## 2024-07-16 DIAGNOSIS — E44.1 MILD PROTEIN-CALORIE MALNUTRITION (H): ICD-10-CM

## 2024-07-16 DIAGNOSIS — I10 BENIGN ESSENTIAL HYPERTENSION: ICD-10-CM

## 2024-07-16 PROCEDURE — 99214 OFFICE O/P EST MOD 30 MIN: CPT | Performed by: FAMILY MEDICINE

## 2024-07-16 PROCEDURE — G0463 HOSPITAL OUTPT CLINIC VISIT: HCPCS

## 2024-07-16 ASSESSMENT — PAIN SCALES - GENERAL: PAINLEVEL: NO PAIN (0)

## 2024-07-16 NOTE — PROGRESS NOTES
Assessment & Plan     Chronic bilateral low back pain without sciatica  Gabapentin has been very helpful for pain. Dizziness as below, not very changed from previous reports.     Mild protein-calorie malnutrition (H24)  Weight unfortunately back to 96 lbs. If no weight gain in 2 mo, stop remeron. Consider marinol    Benign essential hypertension  Intermittent dizziness which is very chronic. BP over controlled. Stop lisinopril. Follow up for bmp scheduled        Return in about 2 months (around 9/16/2024).    Joann London is a 83 year old, presenting for the following health issues:  Follow Up        7/16/2024    12:57 PM   Additional Questions   Roomed by Uvaldo Chambers lpn         7/16/2024    12:57 PM   Patient Reported Additional Medications   Patient reports taking the following new medications none     History of Present Illness       Back Pain:  She presents for follow up of back pain. Patient's back pain is a chronic problem.  Location of back pain:  Right middle of back  Description of back pain: sharp  Back pain spreads: right thigh and right knee    Since patient first noticed back pain, pain is: gradually improving  Does back pain interfere with her job:  Not applicable       She eats 2-3 servings of fruits and vegetables daily.She consumes 0 sweetened beverage(s) daily.She exercises with enough effort to increase her heart rate 10 to 19 minutes per day.  She exercises with enough effort to increase her heart rate 7 days per week.   She is taking medications regularly.       Medication Followup of gabapentin 100 MG TID  Taking Medication as prescribed: yes  Side Effects:  dizzy once and a while and will only take twice if dizziness comes on  Medication Helping Symptoms:  yes      Review of Systems  Constitutional, HEENT, cardiovascular, pulmonary, gi and gu systems are negative, except as otherwise noted.      Objective    BP 90/60 (BP Location: Left arm, Patient Position: Sitting, Cuff Size:  Adult Regular)   Pulse 65   Temp 98  F (36.7  C) (Tympanic)   Resp 16   Wt 43.8 kg (96 lb 8 oz)   SpO2 96%   BMI 18.85 kg/m    Body mass index is 18.85 kg/m .    Physical Exam   GENERAL: alert and no distress  NECK: no adenopathy and thyroid normal to palpation  RESP: lungs clear to auscultation - no rales, rhonchi or wheezes  CV: regular rates and rhythm, normal S1 S2, no S3 or S4, no murmur, click or rub, and no peripheral edema  ABDOMEN: soft, nontender, no hepatosplenomegaly, no masses and bowel sounds normal  MS: no gross musculoskeletal defects noted, no edema  PSYCH: mentation appears normal, affect normal/bright    No results found for any visits on 07/16/24.      Signed Electronically by: Ophelia Troncoso MD

## 2024-09-03 DIAGNOSIS — E03.9 ACQUIRED HYPOTHYROIDISM: ICD-10-CM

## 2024-09-04 RX ORDER — LEVOTHYROXINE SODIUM 50 MCG
50 TABLET ORAL DAILY
Qty: 90 TABLET | Refills: 1 | Status: SHIPPED | OUTPATIENT
Start: 2024-09-04

## 2024-09-04 NOTE — TELEPHONE ENCOUNTER
SYNTHROID 50MCG TABS       Last Written Prescription Date:  0  Last Fill Quantity: 0,   # refills: 0  Last Office Visit: 07/16/2024  Future Office visit:    Next 5 appointments (look out 90 days)      Sep 25, 2024 2:30 PM  (Arrive by 2:15 PM)  Provider Visit with Ophelia Troncoso MD  [facility] ([facility] ) [address] [place] [id number]  [phone]             Routing refill request to provider for review/approval because:  Thyroid Protocol [id number]/03/2024 08:11 PM   Protocol Details Normal TSH on file in past 12 months     Kimberly Boecker, RN

## 2024-09-23 ENCOUNTER — OFFICE VISIT (OUTPATIENT)
Dept: CARDIOLOGY | Facility: OTHER | Age: 84
End: 2024-09-23
Attending: INTERNAL MEDICINE
Payer: COMMERCIAL

## 2024-09-23 VITALS
WEIGHT: 100.5 LBS | HEIGHT: 60 IN | TEMPERATURE: 98.7 F | SYSTOLIC BLOOD PRESSURE: 126 MMHG | DIASTOLIC BLOOD PRESSURE: 69 MMHG | HEART RATE: 72 BPM | BODY MASS INDEX: 19.73 KG/M2 | RESPIRATION RATE: 20 BRPM | OXYGEN SATURATION: 96 %

## 2024-09-23 DIAGNOSIS — G25.0 ESSENTIAL TREMOR: ICD-10-CM

## 2024-09-23 DIAGNOSIS — R06.09 DOE (DYSPNEA ON EXERTION): Primary | ICD-10-CM

## 2024-09-23 DIAGNOSIS — Z87.891 HISTORY OF TOBACCO ABUSE: ICD-10-CM

## 2024-09-23 DIAGNOSIS — Z71.6 TOBACCO ABUSE COUNSELING: ICD-10-CM

## 2024-09-23 DIAGNOSIS — Z86.73 HISTORY OF CVA (CEREBROVASCULAR ACCIDENT): ICD-10-CM

## 2024-09-23 DIAGNOSIS — I97.89 POSTOPERATIVE ATRIAL FIBRILLATION (H): ICD-10-CM

## 2024-09-23 DIAGNOSIS — Z87.19 HISTORY OF GI BLEED: ICD-10-CM

## 2024-09-23 DIAGNOSIS — N18.4 CKD (CHRONIC KIDNEY DISEASE) STAGE 4, GFR 15-29 ML/MIN (H): ICD-10-CM

## 2024-09-23 DIAGNOSIS — R94.39 ABNORMAL CARDIOVASCULAR STRESS TEST: ICD-10-CM

## 2024-09-23 DIAGNOSIS — I25.10 ASCVD (ARTERIOSCLEROTIC CARDIOVASCULAR DISEASE): ICD-10-CM

## 2024-09-23 DIAGNOSIS — J44.9 COPD, SEVERE (H): ICD-10-CM

## 2024-09-23 DIAGNOSIS — Z95.5 HISTORY OF CORONARY ARTERY STENT PLACEMENT: ICD-10-CM

## 2024-09-23 DIAGNOSIS — I50.22 CHRONIC SYSTOLIC CONGESTIVE HEART FAILURE (H): Chronic | ICD-10-CM

## 2024-09-23 DIAGNOSIS — R07.9 CHEST PAIN, UNSPECIFIED TYPE: ICD-10-CM

## 2024-09-23 DIAGNOSIS — E78.2 MIXED HYPERLIPIDEMIA: ICD-10-CM

## 2024-09-23 DIAGNOSIS — I65.23 BILATERAL CAROTID ARTERY STENOSIS: ICD-10-CM

## 2024-09-23 DIAGNOSIS — R79.89 ELEVATED BRAIN NATRIURETIC PEPTIDE (BNP) LEVEL: ICD-10-CM

## 2024-09-23 DIAGNOSIS — I48.91 POSTOPERATIVE ATRIAL FIBRILLATION (H): ICD-10-CM

## 2024-09-23 DIAGNOSIS — R94.39 ABNORMAL STRESS TEST: ICD-10-CM

## 2024-09-23 DIAGNOSIS — Z92.89 HISTORY OF CARDIOVERSION: ICD-10-CM

## 2024-09-23 DIAGNOSIS — I77.1 SUBCLAVIAN ARTERIAL STENOSIS (H): ICD-10-CM

## 2024-09-23 DIAGNOSIS — I25.10 CORONARY ARTERY DISEASE INVOLVING NATIVE CORONARY ARTERY OF NATIVE HEART WITHOUT ANGINA PECTORIS: ICD-10-CM

## 2024-09-23 PROCEDURE — G0463 HOSPITAL OUTPT CLINIC VISIT: HCPCS

## 2024-09-23 PROCEDURE — 99214 OFFICE O/P EST MOD 30 MIN: CPT | Performed by: INTERNAL MEDICINE

## 2024-09-23 RX ORDER — PROPRANOLOL HYDROCHLORIDE 40 MG/1
40 TABLET ORAL 2 TIMES DAILY
Qty: 180 TABLET | Refills: 3 | Status: SHIPPED | OUTPATIENT
Start: 2024-09-23

## 2024-09-23 RX ORDER — NITROGLYCERIN 0.4 MG/1
0.4 TABLET SUBLINGUAL EVERY 5 MIN PRN
Qty: 25 TABLET | Refills: 3 | Status: SHIPPED | OUTPATIENT
Start: 2024-09-23

## 2024-09-23 ASSESSMENT — PAIN SCALES - GENERAL: PAINLEVEL: NO PAIN (0)

## 2024-09-23 NOTE — PROGRESS NOTES
[facility] HEART CARE   CARDIOLOGY PROGRESS NOTE     Chief Complaint   Patient presents with    Follow Up          Diagnosis:  1.  CHF-diastolic.    -55-60%/indeterminate on 11/26/23.  -Grade 1 on 12/5/2019. H/O ischemic cardiomyopathy, recovered EF.   - 60-65% on 12/5/2019.  -36% on 6/4/2019 stress test.  2.  MILLS.  3.  Abnormal stress test on 6/14/2019.  4.  Cardiac cath, 6/20/2019 at [facility].  -Stenting to o/mLCX.  5.  CVA on 1/1/2019.  6.  Tobacco abuse.  -Quit on 1/1/2019.   1/2-3/4/pack a day. Started at age 16.   -Exposed to 2 hand smoke from .   7.  Hypertension-controlled.  8.  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  9.  Hyperlipidemia-controlled.  10.  COPD-severe on 5/2/23.  11.  Postop A. fib on 6/29/19 at [facility].  12.  Cardioversion on 6/29/19 at [facility].  13.  PAD in 2/20/2020-moderate.  14.  Bilateral carotid disease.    -50-69% on 4/27/2023 through [facility].    -50% stenosis on 2/28/2020.    Assessment/Plan:   1.  Refill SL nitro as needed for chest pain.  2.  Essential tremor: Stop metoprolol 25 mg XL daily and start on propranolol 40 mg twice a day.  3.  MILLS: Patient is concerned that her symptoms are her anginal equivalent.  Not having any chest pain or anginal symptoms.  Plan for a nuclear stress test.  Her MILLS is complicated from severe COPD noted on PFT's on 5/2/2023 and diastolic dysfunction.  Previously, she describes her symptoms as being stable and improved with inhalers.  Presently, the same inhalers have not improved her symptoms.  She believes it is related to her heart.  In the past, she was set up for stress test but never completed it.  She was claustrophobic because of the scanner. She is a longtime smoker quitting in approximately 2019.  She started age 15 or 16.  She smoked 1/2-3/4 of a pack a day but her  was a heavy smoker smoking 2 packs a day in the house.  She has not had significant concerns of swelling or fluid overload.  She will follow-up after the stress test.  If stress  test is abnormal, we will discuss doing an angiogram.    4.  CAD: History of stenting in 2019 at [facility].  MILLS was her anginal equivalent.  Now having MILLS not responsive to inhalers.  Plan for a nuclear stress test.  5.  COPD: History of severe COPD.  Has not smoked since 2019.  Patient congratulated.  6.  Carotid artery stenosis: Less than 50% right and 50-69% left on 12/8/2023.  Plan for repeat ultrasound of carotid arteries.  7.  Follow-up after completion of stress test.  Refilled SL nitro as needed for chest pain and given a prescription for propranolol 40 mg twice a day at her request for benign essential tremor.  Stop metoprolol 25 mg XL daily.    Interval history:  See above.    HPI:    Ms. Ocasio has a history of CAD s/p coronary angiography on 6/26/19 at [facility]. She was recently identified to have newly identified systolic heart failure.  Additionally, she has a history of hypertension, hyperlipidemia, nonrheumatic mitral regurgitation, central lobular emphysema, hypothyroidism, osteopenia, hyperparathyroidism, history of tobacco abuse and chronic rhinitis.     At initial visit patient was accompanied by her son who was very helpful in providing patients past medical history. He admitted approximately 20 years ago patient was being followed for MR which was noted to be improved and therefore, valve surgery was never recommended. He admitted that she was previously told it was suspected she had small MI's without coronary intervention or identified ACS. Finally, recent history just before new years of 2020 acute hemorrhagic stroke. It was following this, that she was taken off her beta blocker and lisinopril for HTN and possible past MI, she reported increased shortness of breath following this which has progressively worsened and likely multifactorial with COPD and HFrEF.      6/4/2019-Lexiscan stress test with abnormal imaging with reversible ischemia laterally and LVEF 36%. Additionally, in review  of past CT chest imaging from one year ago, she was noted to have advanced atherosclerotic disease with bulky coronary and aortic calcifications. Given these findings, current symptoms and new acute systolic failure, recommended coronary angiography.     Patient underwent coronary angiography on 6/26/19 at [facility]. She was identified to have diffuse coronary atherosclerosis with single vessel obstructive CAD, ostial and mid LCX. Successful PCI with synergy LISS x2, mid LCX 2.5x16mm, ostial LCX 3.0 x 16 mm. LVEDP 26-29 mmHg. During revascularization she was identified to have AF for which she received DCCV x1 with return to sinus and then reverted back to AF. She was treated with IV beta blocker for rate control. Patient reported feeling good following coronary intervention. Breathing had significantly improved and increased energy.       Patient presented to the ED on 7/13/21 with reports of chest pressure, dizziness and increased dyspnea. She was noted to be playing [employer] ball that same day with symptoms. ECG revealing SR with SVE, no ST-T changes. Troponin negative x 2 and chest XR unremarkable. Lab evaluation with decline in renal function and suspected dehydration.         Relevant testing:  US carotids on 12/8/23:  1. Right side: Less than 50 % diameter stenosis by grayscale imaging  and sonographic velocity criteria.  2. Left side: 50 to 69% diameter stenosis overall by grayscale imaging  and sonographic velocity criteria. Increased since comparison.  3. Retrograde flow in the left vertebral artery, correlate for  clinical symptoms of subclavian steal.    ECHO on 11/26/23:  Global and regional left ventricular function is normal with an EF of 55-60%.  Left ventricular diastolic function is indeterminate.   Mild concentric wall thickening consistent with left ventricular hypertrophy is present.  Global right ventricular function is normal.  Mild right ventricular dilation is present.  Moderate left atrial  enlargement is present.  Mild right atrial enlargement is present.  Mild mitral annular calcification is present.  Mild mitral insufficiency is present.  No aortic stenosis is present.  Mild tricuspid insufficiency is present.  The right ventricular systolic pressure is 55mmHg above the right atrial pressure.      KESHAV on 7/18/22:  Moderate to severe disease bilaterally.    US carotids on 4/27/2022 through [facility]:  1. Atherosclerotic changes with sonographic data supportive of 50-69% stenosis bilaterally.   2. Retrograde flow in the left vertebral artery.     US abdominal aorta on 4/27/2022:  Diffuse atherosclerotic disease in the intra-abdominal aorta, without sonographic evidence of intra-abdominal aortic aneurysm.     Zio patch on 3/16/22:  Worn for 13 days and 20 hr's.  After removing artifact, total time was 13 days and 16 hr's. Placed on 3/16/2022 at 1:28 PM and completed on 3/30/2022 at 9:15 AM.  Underlying rhythm was sinus.  Hrt rate ranged from 41 bpm, maximum heart rate of 197 bmp, averaging 62 bmp.  No significant bradycardia, pauses, Mobitz type II or 3rd degree heart block.  No atrial fibrillation on this study.  x0 triggered events and x0 diary entries.  x0 runs of VT.    x7 runs of SVT lasting up to 8 beats with a maximum heart rate of 197 bmp.  Rare, <1% of PAC's, atrial couplets, atrial triplets, PVC's, ventricular couplets, and ventricular triplets.  + episodes of ventricular bigeminy lasting up to 4.4 sec's.  0 episodes of ventricular trigeminy lasting.    KESHAV on 2/28/20:  Moderate arterial occlusive disease in both lower extremities.    US carotids on 2/28/20:  Heavy atherosclerotic plaquing in both carotid bifurcations with velocities consistent with less than 50% stenoses noted.    ECHO on 12/5/19:  No pericardial effusion is present.  Global and regional left ventricular function is normal with an EF of 60-65%.  Grade I or early diastolic dysfunction.  The right ventricle is normal  size.  Global right ventricular function is normal.  Both atria appear normal.  Mild mitral insufficiency is present.  Aortic valve is normal in structure and function.  The aortic valve is tricuspid.  Trace tricuspid insufficiency is present.  Right ventricular systolic pressure is 3 mmHg above the right atrial pressure.  The pulmonic valve is normal.    Stress test on 6/14/19:   Reversible ischemia laterally. Abnormally low left ventricular ejection fraction                ICD-10-CM    1. MILLS (dyspnea on exertion)  R06.09 NM MPI with Lexiscan      2. COPD, severe (H)  J44.9       3. Chronic systolic congestive heart failure (H)- Recovered  I50.22 propranolol (INDERAL) 40 MG tablet     NM MPI with Lexiscan      4. History of coronary artery stent placement to the O/M LCX on 6/28/2019 at [facility]  Z95.5 propranolol (INDERAL) 40 MG tablet     NM MPI with Lexiscan     nitroGLYcerin (NITROSTAT) 0.4 MG sublingual tablet      5. History of cardioversion on 6/28/2019  Z92.89 NM MPI with Lexiscan     nitroGLYcerin (NITROSTAT) 0.4 MG sublingual tablet      6. Elevated brain natriuretic peptide (BNP) level  R79.89 NM MPI with Lexiscan      7. History of CVA on 1/1/2019  Z86.73 NM MPI with Lexiscan     US Carotid Bilateral      8. Tobacco abuse counseling  Z71.6       9. Essential tremor  G25.0 propranolol (INDERAL) 40 MG tablet      10. History of GI bleed  Z87.19       11. History of tobacco abuse quitting on 1/1/2019  Z87.891 US Carotid Bilateral      12. CKD (chronic kidney disease) stage 4, GFR 15-29 ml/min (H)  N18.4       13. Mixed hyperlipidemia  E78.2       14. Subclavian arterial stenosis (H24)  I77.1 US Carotid Bilateral      15. Postoperative atrial fibrillation on 6/28/2019 (H)  I97.89     I48.91       16. Coronary artery disease involving native coronary artery of native heart without angina pectoris  I25.10 NM MPI with Lexiscan     nitroGLYcerin (NITROSTAT) 0.4 MG sublingual tablet      17. Bilateral carotid  artery stenosis  I65.23 US Carotid Bilateral      18. ASCVD (arteriosclerotic cardiovascular disease)  I25.10 NM MPI with Lexiscan      19. Abnormal stress test  R94.39 NM MPI with Lexiscan      20. Chest pain, unspecified type  R07.9 nitroGLYcerin (NITROSTAT) 0.4 MG sublingual tablet      21. Abnormal cardiovascular stress test on 6/14/2019  R94.39 nitroGLYcerin (NITROSTAT) 0.4 MG sublingual tablet      22. History of coronary artery stent placement to the O/M LCX on 6/28/2019  Z95.5 nitroGLYcerin (NITROSTAT) 0.4 MG sublingual tablet              Past Medical History:   Diagnosis Date    Acquired hypothyroidism 5/12/2019    Asthma     Benign essential hypertension 5/12/2019    Centrilobular emphysema (H) 5/12/2019    Chronic rhinitis 8/16/2013    Chronic systolic congestive heart failure (H) 5/12/2019    Constipation     Coronary artery disease     Hearing loss     Heart trouble     Hemorrhagic cerebrovascular accident (CVA) (H) 01/2019    Hyperlipidemia 5/12/2019    Hypertension     Nasal congestion     Non-rheumatic mitral regurgitation 5/12/2019    Osteopenia 5/12/2019    Osteoporosis     Parathyroid related hypercalcemia (H24) 8/18/2013    Primary hyperparathyroidism (H24) 9/5/2013    Ringing in ears     Sensorineural hearing loss, asymmetrical 8/16/2013    Sneezing     Snoring     Stented coronary artery     Thyroid disease     Weakness     Weight loss        Past Surgical History:   Procedure Laterality Date    CATARACT IOL, RT/LT Bilateral     COLONOSCOPY      COLONOSCOPY - HIM SCAN  01/01/2009    Colonoscopy - [facility]/NL  2009    ENDOSCOPY UPPER, COLONOSCOPY, COMBINED N/A 10/17/2019    Procedure: UPPER ENDOSCOPY WITH BIOPSY  AND COLONOSCOPY;  Surgeon: Julio Canales MD;  Location: [facility] OR    ENT SURGERY  2011    para thyroid surgery    ENT SURGERY  09/2013    Parathyroid    ESOPHAGOSCOPY, GASTROSCOPY, DUODENOSCOPY (EGD), COMBINED N/A 9/21/2019    Procedure: ESOPHAGOGASTRODUODENOSCOPY (EGD);  Surgeon:  Julio Canales MD;  Location: [facility] OR    ESOPHAGOSCOPY, GASTROSCOPY, DUODENOSCOPY (EGD), COMBINED N/A 1/27/2020    Procedure: ESOPHAGOGASTRODUODENOSCOPY (EGD) WITH BIOPSY;  Surgeon: Isrrael Parish MD;  Location: [facility] OR    ESOPHAGOSCOPY, GASTROSCOPY, DUODENOSCOPY (EGD), COMBINED N/A 2/20/2020    Procedure: ESOPHAGOGASTRODUODENOSCOPY (EGD) WITH BIOPSY;  Surgeon: Pedro Luis Montoya DO;  Location: [facility] OR    PARATHYROIDECTOMY  9/10/2013    Procedure: PARATHYROIDECTOMY;  Reoperative Neck Exploration, Resection of right Parathyroid adenoma;  Surgeon: Thalia Muller MD;  Location: [facility] OR    TONSILLECTOMY      TUBAL LIGATION         Allergies   Allergen Reactions    Evista [Raloxifene] Other (See Comments)     hypercalcemia    Prednisone GI Disturbance    Combigan [Brimonidine Tartrate-Timolol] Other (See Comments)     Eye swelling and itchy    Cyclosporine      Pt reports using eye gtts and having red irritated eyes     Latex Rash    Levaquin [Levofloxacin] Muscle Pain (Myalgia)    Penicillins Rash       Current Outpatient Medications   Medication Sig Dispense Refill    atorvastatin (LIPITOR) 40 MG tablet Take 1 tablet (40 mg) by mouth at bedtime 90 tablet 3    calcitonin, salmon, (MIACALCIN) 200 UNIT/ACT nasal spray Spray 1 spray into one nostril alternating nostrils daily Alternate nostril each day. 3.7 mL 0    calcium carbonate (TUMS) 500 MG chewable tablet Take 1 chew tab by mouth at bedtime      clotrimazole-betamethasone (LOTRISONE) 1-0.05 % external cream Apply topically 2 times daily 90 g 3    fluticasone (FLONASE) 50 MCG/ACT spray Spray 2 sprays into both nostrils daily 1 Bottle 11    Fluticasone-Umeclidin-Vilanterol (TRELEGY ELLIPTA) 200-62.5-25 MCG/ACT oral inhaler Inhale 1 puff into the lungs daily 180 each 3    furosemide (LASIX) 20 MG tablet Take 0.5 tablets (10 mg) by mouth every other day 90 tablet 0    gabapentin (NEURONTIN) 100 MG capsule Take 1 capsule (100 mg) by mouth 3 times daily 180  capsule 3    ipratropium - albuterol 0.5 mg/2.5 mg/3 mL (DUONEB) 0.5-2.5 (3) MG/3ML neb solution Take 1 vial (3 mLs) by nebulization every 6 hours as needed for shortness of breath, wheezing or cough 90 mL 3    ipratropium-albuterol (COMBIVENT RESPIMAT)  MCG/ACT inhaler Inhale 1 puff into the lungs 4 times daily 8 g 3    latanoprost (XALATAN) 0.005 % ophthalmic solution       levothyroxine (SYNTHROID/LEVOTHROID) 50 MCG tablet Take 50 mcg by mouth every morning (before breakfast)      methocarbamol (ROBAXIN) 500 MG tablet Take 1 tablet (500 mg) by mouth 4 times daily as needed for muscle spasms 20 tablet 0    mirtazapine (REMERON) 15 MG tablet Take 1 tablet (15 mg) by mouth at bedtime 90 tablet 3    nitroGLYcerin (NITROSTAT) 0.4 MG sublingual tablet Place 1 tablet (0.4 mg) under the tongue every 5 minutes as needed for chest pain. For chest pain place 1 tablet under the tongue every 5 minutes for 3 doses. If symptoms persist 5 minutes after 1st dose call 911. 25 tablet 3    pantoprazole (PROTONIX) 40 MG EC tablet Take 1 tablet (40 mg) by mouth daily 90 tablet 3    potassium chloride ER (K-TAB) 20 MEQ CR tablet Take 1 tablet (20 mEq) by mouth three times a week 84 tablet 3    propranolol (INDERAL) 40 MG tablet Take 1 tablet (40 mg) by mouth 2 times daily. 180 tablet 3    SYNTHROID 50 MCG tablet TAKE ONE TABLET BY MOUTH EVERY DAY 90 tablet 1    Tafluprost 0.0015 % SOLN Place 1 drop into both eyes at bedtime -when remembered.      trolamine salicylate (ASPERCREME) 10 % external cream Apply topically daily as needed (lower back pain)         Social History     Socioeconomic History    Marital status:      Spouse name: Not on file    Number of children: Not on file    Years of education: Not on file    Highest education level: Not on file   Occupational History    Not on file   Tobacco Use    Smoking status: Former     Current packs/day: 0.00     Average packs/day: 1 pack/day for 50.0 years (50.0 ttl pk-yrs)      Types: Cigarettes     Start date: 1969     Quit date: 2019     Years since quittin.7     Passive exposure: Past    Smokeless tobacco: Never    Tobacco comments:     quit 2019   Vaping Use    Vaping status: Never Used   Substance and Sexual Activity    Alcohol use: Yes     Comment: social    Drug use: No    Sexual activity: Not Currently   Other Topics Concern    Parent/sibling w/ CABG, MI or angioplasty before 65F 55M? Yes   Social History Narrative    Not on file     Social Determinants of Health     Financial Resource Strain: Low Risk  (2023)    Financial Resource Strain     Within the past 12 months, have you or your family members you live with been unable to get utilities (heat, electricity) when it was really needed?: No   Food Insecurity: Low Risk  (2023)    Food Insecurity     Within the past 12 months, did you worry that your food would run out before you got money to buy more?: No     Within the past 12 months, did the food you bought just not last and you didn t have money to get more?: No   Transportation Needs: Low Risk  (2023)    Transportation Needs     Within the past 12 months, has lack of transportation kept you from medical appointments, getting your medicines, non-medical meetings or appointments, work, or from getting things that you need?: No   Physical Activity: Not on file   Stress: Not on file   Social Connections: Not on file   Interpersonal Safety: Low Risk  (2024)    Interpersonal Safety     Do you feel physically and emotionally safe where you currently live?: Yes     Within the past 12 months, have you been hit, slapped, kicked or otherwise physically hurt by someone?: No     Within the past 12 months, have you been humiliated or emotionally abused in other ways by your partner or ex-partner?: No   Housing Stability: Low Risk  (2023)    Housing Stability     Do you have housing? : Yes     Are you worried about losing your housing?: No        LAB RESULTS:   Office Visit on 01/26/2022   Component Date Value Ref Range Status    TSH 01/26/2022 1.84  0.40 - 4.00 mU/L Final    Sodium 01/26/2022 139  133 - 144 mmol/L Final    Potassium 01/26/2022 4.4  3.4 - 5.3 mmol/L Final    Chloride 01/26/2022 109  94 - 109 mmol/L Final    Carbon Dioxide (CO2) 01/26/2022 24  20 - 32 mmol/L Final    Anion Gap 01/26/2022 6  3 - 14 mmol/L Final    Urea Nitrogen 01/26/2022 14  7 - 30 mg/dL Final    Creatinine 01/26/2022 1.12 (A) 0.52 - 1.04 mg/dL Final    Calcium 01/26/2022 9.0  8.5 - 10.1 mg/dL Final    Glucose 01/26/2022 95  70 - 99 mg/dL Final    GFR Estimate 01/26/2022 49 (A) >60 mL/min/1.73m2 Final    Iron 01/26/2022 71  35 - 180 ug/dL Final    Iron Binding Capacity 01/26/2022 294  240 - 430 ug/dL Final    Iron Sat Index 01/26/2022 24  15 - 46 % Final    Ferritin 01/26/2022 633 (A) 8 - 252 ng/mL Final    WBC Count 01/26/2022 8.1  4.0 - 11.0 10e3/uL Final    RBC Count 01/26/2022 4.40  3.80 - 5.20 10e6/uL Final    Hemoglobin 01/26/2022 10.6 (A) 11.7 - 15.7 g/dL Final    Hematocrit 01/26/2022 35.8  35.0 - 47.0 % Final    MCV 01/26/2022 81  78 - 100 fL Final    MCH 01/26/2022 24.1 (A) 26.5 - 33.0 pg Final    MCHC 01/26/2022 29.6 (A) 31.5 - 36.5 g/dL Final    RDW 01/26/2022 24.9 (A) 10.0 - 15.0 % Final    Platelet Count 01/26/2022 405  150 - 450 10e3/uL Final    % Neutrophils 01/26/2022 65  % Final    % Lymphocytes 01/26/2022 23  % Final    % Monocytes 01/26/2022 9  % Final    % Eosinophils 01/26/2022 2  % Final    % Basophils 01/26/2022 1  % Final    % Immature Granulocytes 01/26/2022 0  % Final    NRBCs per 100 WBC 01/26/2022 0  <1 /100 Final    Absolute Neutrophils 01/26/2022 5.2  1.6 - 8.3 10e3/uL Final    Absolute Lymphocytes 01/26/2022 1.8  0.8 - 5.3 10e3/uL Final    Absolute Monocytes 01/26/2022 0.7  0.0 - 1.3 10e3/uL Final    Absolute Eosinophils 01/26/2022 0.2  0.0 - 0.7 10e3/uL Final    Absolute Basophils 01/26/2022 0.1  0.0 - 0.2 10e3/uL Final    Absolute  Immature Granulocytes 01/26/2022 0.0  <=0.4 10e3/uL Final    Absolute NRBCs 01/26/2022 0.0  10e3/uL Final        Review of systems: Negative except that which was noted in the HPI.    Physical examination:  /69   Pulse 72   Temp 98.7  F (37.1  C) (Tympanic)   Resp 20   Ht 1.524 m (5')   Wt 45.6 kg (100 lb 8 oz)   SpO2 96%   BMI 19.63 kg/m      GENERAL APPEARANCE: healthy, alert and no distress  CHEST: lungs clear to auscultation.  CARDIOVASCULAR: regular rhythm, normal S1 with physiologic split S2, no S3 or S4 and no murmur, click or rub  EXTREMITIES: no clubbing, cyanosis with minimal edema      Total time spent on day of visit, including review of tests, obtaining/reviewing separately obtained history, ordering medications/tests/procedures, communicating with PCP/consultants, and documenting in electronic medical record: 20 minutes.           Thank you for allowing me to participate in the care of your patient. Please do not hesitate to contact me if you have any questions.     Sal Graham, DO

## 2024-09-23 NOTE — PATIENT INSTRUCTIONS
Thank you for allowing Gauri Manriquez CNP and our  team to participate in your care. Please call our office at [phone] with scheduling questions or if you need to cancel or change your appointment. With any other questions or concerns you may call cardiology nurse at  [phone].       If you experience chest pain, chest pressure, chest tightness, shortness of breath, fainting, lightheadedness, nausea, vomiting, or other concerning symptoms, please report to the Emergency Department or call 911. These symptoms may be emergent, and best treated in the Emergency Department.

## 2024-09-25 ENCOUNTER — OFFICE VISIT (OUTPATIENT)
Dept: FAMILY MEDICINE | Facility: OTHER | Age: 84
End: 2024-09-25
Attending: FAMILY MEDICINE
Payer: COMMERCIAL

## 2024-09-25 VITALS
TEMPERATURE: 98.7 F | BODY MASS INDEX: 20.03 KG/M2 | RESPIRATION RATE: 19 BRPM | SYSTOLIC BLOOD PRESSURE: 132 MMHG | HEIGHT: 60 IN | WEIGHT: 102 LBS | OXYGEN SATURATION: 92 % | DIASTOLIC BLOOD PRESSURE: 72 MMHG | HEART RATE: 67 BPM

## 2024-09-25 DIAGNOSIS — E21.0 PRIMARY HYPERPARATHYROIDISM (H): ICD-10-CM

## 2024-09-25 DIAGNOSIS — I50.32 CHRONIC DIASTOLIC CONGESTIVE HEART FAILURE (H): ICD-10-CM

## 2024-09-25 DIAGNOSIS — E44.1 MILD PROTEIN-CALORIE MALNUTRITION (H): Primary | ICD-10-CM

## 2024-09-25 DIAGNOSIS — E03.9 ACQUIRED HYPOTHYROIDISM: ICD-10-CM

## 2024-09-25 PROBLEM — J96.01 ACUTE RESPIRATORY FAILURE WITH HYPOXIA (H): Status: RESOLVED | Noted: 2019-05-12 | Resolved: 2024-09-25

## 2024-09-25 LAB
ANION GAP SERPL CALCULATED.3IONS-SCNC: 12 MMOL/L (ref 7–15)
BUN SERPL-MCNC: 23.1 MG/DL (ref 8–23)
CALCIUM SERPL-MCNC: 9.2 MG/DL (ref 8.8–10.4)
CHLORIDE SERPL-SCNC: 108 MMOL/L (ref 98–107)
CREAT SERPL-MCNC: 1.14 MG/DL (ref 0.51–0.95)
EGFRCR SERPLBLD CKD-EPI 2021: 48 ML/MIN/1.73M2
GLUCOSE SERPL-MCNC: 102 MG/DL (ref 70–99)
HCO3 SERPL-SCNC: 22 MMOL/L (ref 22–29)
POTASSIUM SERPL-SCNC: 4.5 MMOL/L (ref 3.4–5.3)
SODIUM SERPL-SCNC: 142 MMOL/L (ref 135–145)
TSH SERPL DL<=0.005 MIU/L-ACNC: 4.01 UIU/ML (ref 0.3–4.2)

## 2024-09-25 PROCEDURE — 84443 ASSAY THYROID STIM HORMONE: CPT | Mod: ZL | Performed by: FAMILY MEDICINE

## 2024-09-25 PROCEDURE — 80048 BASIC METABOLIC PNL TOTAL CA: CPT | Mod: ZL | Performed by: FAMILY MEDICINE

## 2024-09-25 PROCEDURE — G2211 COMPLEX E/M VISIT ADD ON: HCPCS | Performed by: FAMILY MEDICINE

## 2024-09-25 PROCEDURE — 99214 OFFICE O/P EST MOD 30 MIN: CPT | Performed by: FAMILY MEDICINE

## 2024-09-25 PROCEDURE — G0463 HOSPITAL OUTPT CLINIC VISIT: HCPCS

## 2024-09-25 PROCEDURE — G0463 HOSPITAL OUTPT CLINIC VISIT: HCPCS | Mod: 25

## 2024-09-25 PROCEDURE — 36415 COLL VENOUS BLD VENIPUNCTURE: CPT | Mod: ZL | Performed by: FAMILY MEDICINE

## 2024-09-25 ASSESSMENT — PAIN SCALES - GENERAL: PAINLEVEL: NO PAIN (0)

## 2024-09-25 NOTE — PROGRESS NOTES
Assessment & Plan     Mild protein-calorie malnutrition (H)  Weight is up, does not appear to be related to fluid retention. Pt is encouraged by this. Continue current medications.     Acquired hypothyroidism  TSH wnl today, TSH had been a bit on the high side recently. Monitor.   - TSH with free T4 reflex  - TSH with free T4 reflex    Chronic diastolic congestive heart failure (H) / [place]  Cardiology notes reviewed. Recently switched from metoprolol to propranolol for possible tremor benefit. Unclear if helpful to date, however, pt tolerating well. BP okay today. Monitor. Pt does have upcoming stress testing due to MILLS. Will await those results, discussed how COPD may also contribute. She is on adequate inhalers.   - Basic metabolic panel  - Basic metabolic panel    Primary hyperparathyroidism (H)  Calcium remains wnl, declines dexa.       Return in about 6 months (around 3/25/2025) for annual medicare wellness.    The longitudinal plan of care for the diagnosis(es)/condition(s) as documented were addressed during this visit. Due to the added complexity in care, I will continue to support Lita in the subsequent management and with ongoing continuity of care.    Subjective     Lita is a 83 year old, presenting for the following health issues:  Back Pain and Hypertension        9/25/2024     2:15 PM   Additional Questions   Roomed by Xiomara Giron   Accompanied by self     HPI     Hypertension Follow-up    Do you check your blood pressure regularly outside of the clinic? No   Are you following a low salt diet? Yes  Are your blood pressures ever more than 140 on the top number (systolic) OR more   than 90 on the bottom number (diastolic), for example 140/90? No    BP Readings from Last 3 Encounters:   09/25/24 132/72   09/23/24 126/69   07/16/24 90/60     Chronic/Recurring Back Pain Follow Up    Where is your back pain located? (Select all that apply) low back bilateral  How would you describe your back pain?   dull ache  Where does your back pain spread? Right leg  Since your last clinic visit for back pain, how has your pain changed? always present, but gets better and worse  Does your back pain interfere with your job? Not applicable  Since your last visit, have you tried any new treatment? No        Review of Systems  Constitutional, neuro, ENT, endocrine, pulmonary, cardiac, gastrointestinal, genitourinary, musculoskeletal, integument and psychiatric systems are negative, except as otherwise noted.      Objective    /72   Pulse 67   Temp 98.7  F (37.1  C) (Tympanic)   Resp 19   Ht 1.524 m (5')   Wt 46.3 kg (102 lb)   SpO2 92%   BMI 19.92 kg/m    Body mass index is 19.92 kg/m .    Physical Exam   GENERAL: alert and no distress  HENT: ear canals and TM's normal, nose and mouth without ulcers or lesions  NECK: no adenopathy, no asymmetry, masses, or scars  RESP: lungs clear to auscultation - no rales, rhonchi or wheezes  CV: regular rates and rhythm, normal S1 S2, no S3 or S4, no murmur, click or rub, and no peripheral edema  ABDOMEN: soft, nontender, no hepatosplenomegaly, no masses and bowel sounds normal  MS: no gross musculoskeletal defects noted, no edema  SKIN: no suspicious lesions or rashes  NEURO: Normal strength and tone, mentation intact, and speech normal  PSYCH: mentation appears normal, affect normal/bright    Results for orders placed or performed in visit on 09/25/24   Basic metabolic panel     Status: Abnormal   Result Value Ref Range    Sodium 142 135 - 145 mmol/L    Potassium 4.5 3.4 - 5.3 mmol/L    Chloride 108 (H) 98 - 107 mmol/L    Carbon Dioxide (CO2) 22 22 - 29 mmol/L    Anion Gap 12 7 - 15 mmol/L    Urea Nitrogen 23.1 (H) 8.0 - 23.0 mg/dL    Creatinine 1.14 (H) 0.51 - 0.95 mg/dL    GFR Estimate 48 (L) >60 mL/min/1.73m2    Calcium 9.2 8.8 - 10.4 mg/dL    Glucose 102 (H) 70 - 99 mg/dL   TSH with free T4 reflex     Status: Normal   Result Value Ref Range    TSH 4.01 0.30 - 4.20 uIU/mL          Signed Electronically by: Ophelia Troncoso MD

## 2024-10-14 ENCOUNTER — HOSPITAL ENCOUNTER (OUTPATIENT)
Dept: NUCLEAR MEDICINE | Facility: HOSPITAL | Age: 84
Setting detail: NUCLEAR MEDICINE
Discharge: HOME | End: 2024-10-14
Attending: INTERNAL MEDICINE
Payer: COMMERCIAL

## 2024-10-14 ENCOUNTER — HOSPITAL ENCOUNTER (OUTPATIENT)
Dept: CARDIOLOGY | Facility: HOSPITAL | Age: 84
Setting detail: NUCLEAR MEDICINE
Discharge: HOME | End: 2024-10-14
Attending: INTERNAL MEDICINE
Payer: COMMERCIAL

## 2024-10-14 DIAGNOSIS — I25.10 ASCVD (ARTERIOSCLEROTIC CARDIOVASCULAR DISEASE): ICD-10-CM

## 2024-10-14 DIAGNOSIS — R94.39 ABNORMAL STRESS TEST: ICD-10-CM

## 2024-10-14 DIAGNOSIS — R79.89 ELEVATED BRAIN NATRIURETIC PEPTIDE (BNP) LEVEL: ICD-10-CM

## 2024-10-14 DIAGNOSIS — Z92.89 HISTORY OF CARDIOVERSION: ICD-10-CM

## 2024-10-14 DIAGNOSIS — I50.22 CHRONIC SYSTOLIC CONGESTIVE HEART FAILURE (H): Chronic | ICD-10-CM

## 2024-10-14 DIAGNOSIS — R06.09 DOE (DYSPNEA ON EXERTION): ICD-10-CM

## 2024-10-14 DIAGNOSIS — I25.10 CORONARY ARTERY DISEASE INVOLVING NATIVE CORONARY ARTERY OF NATIVE HEART WITHOUT ANGINA PECTORIS: ICD-10-CM

## 2024-10-14 DIAGNOSIS — Z86.73 HISTORY OF CVA (CEREBROVASCULAR ACCIDENT): ICD-10-CM

## 2024-10-14 DIAGNOSIS — Z95.5 HISTORY OF CORONARY ARTERY STENT PLACEMENT: ICD-10-CM

## 2024-10-14 PROCEDURE — 343N000001 HC RX 343 MED OP 636: Performed by: RADIOLOGY

## 2024-10-14 PROCEDURE — 93017 CV STRESS TEST TRACING ONLY: CPT

## 2024-10-14 PROCEDURE — 93018 CV STRESS TEST I&R ONLY: CPT | Performed by: INTERNAL MEDICINE

## 2024-10-14 PROCEDURE — 93016 CV STRESS TEST SUPVJ ONLY: CPT | Performed by: INTERNAL MEDICINE

## 2024-10-14 PROCEDURE — A9500 TC99M SESTAMIBI: HCPCS | Performed by: RADIOLOGY

## 2024-10-14 PROCEDURE — 78452 HT MUSCLE IMAGE SPECT MULT: CPT

## 2024-10-14 PROCEDURE — 250N000011 HC RX IP 250 OP 636: Performed by: INTERNAL MEDICINE

## 2024-10-14 RX ORDER — REGADENOSON 0.08 MG/ML
0.4 INJECTION, SOLUTION INTRAVENOUS ONCE
Status: COMPLETED | OUTPATIENT
Start: 2024-10-14 | End: 2024-10-14

## 2024-10-14 RX ADMIN — REGADENOSON 0.4 MG: 0.08 INJECTION, SOLUTION INTRAVENOUS at 10:08

## 2024-10-14 RX ADMIN — Medication 30.5 MILLICURIE: at 10:08

## 2024-10-14 RX ADMIN — Medication 9.8 MILLICURIE: at 07:49

## 2024-10-15 ENCOUNTER — HOSPITAL ENCOUNTER (OUTPATIENT)
Dept: ULTRASOUND IMAGING | Facility: HOSPITAL | Age: 84
Discharge: HOME | End: 2024-10-15
Attending: INTERNAL MEDICINE | Admitting: INTERNAL MEDICINE
Payer: COMMERCIAL

## 2024-10-15 DIAGNOSIS — I48.91 POSTOPERATIVE ATRIAL FIBRILLATION (H): ICD-10-CM

## 2024-10-15 DIAGNOSIS — Z86.73 HISTORY OF CVA (CEREBROVASCULAR ACCIDENT): ICD-10-CM

## 2024-10-15 DIAGNOSIS — I25.10 CORONARY ARTERY DISEASE INVOLVING NATIVE CORONARY ARTERY OF NATIVE HEART WITHOUT ANGINA PECTORIS: ICD-10-CM

## 2024-10-15 DIAGNOSIS — I97.89 POSTOPERATIVE ATRIAL FIBRILLATION (H): ICD-10-CM

## 2024-10-15 DIAGNOSIS — I65.23 BILATERAL CAROTID ARTERY STENOSIS: ICD-10-CM

## 2024-10-15 DIAGNOSIS — R06.09 DOE (DYSPNEA ON EXERTION): ICD-10-CM

## 2024-10-15 DIAGNOSIS — R07.9 CHEST PAIN, UNSPECIFIED TYPE: Primary | ICD-10-CM

## 2024-10-15 DIAGNOSIS — I77.1 SUBCLAVIAN ARTERIAL STENOSIS (H): ICD-10-CM

## 2024-10-15 DIAGNOSIS — I25.10 ASCVD (ARTERIOSCLEROTIC CARDIOVASCULAR DISEASE): ICD-10-CM

## 2024-10-15 DIAGNOSIS — Z87.891 HISTORY OF TOBACCO ABUSE: ICD-10-CM

## 2024-10-15 DIAGNOSIS — R94.39 ABNORMAL CARDIOVASCULAR STRESS TEST: ICD-10-CM

## 2024-10-15 LAB
CV BLOOD PRESSURE: 52 MMHG
CV STRESS MAX HR HE: 87
NUC STRESS EJECTION FRACTION: 52 %
RATE PRESSURE PRODUCT: NORMAL
STRESS ECHO BASELINE DIASTOLIC HE: 82
STRESS ECHO BASELINE HR: 65 BPM
STRESS ECHO BASELINE SYSTOLIC BP: 160
STRESS ECHO CALCULATED PERCENT HR: 64 %
STRESS ECHO LAST STRESS DIASTOLIC BP: 84
STRESS ECHO LAST STRESS SYSTOLIC BP: 162
STRESS ECHO TARGET HR: 137

## 2024-10-15 PROCEDURE — 93880 EXTRACRANIAL BILAT STUDY: CPT

## 2024-10-15 RX ORDER — ASPIRIN 81 MG/1
81 TABLET, CHEWABLE ORAL DAILY
Qty: 90 TABLET | Refills: 3 | Status: SHIPPED | OUTPATIENT
Start: 2024-10-15

## 2024-10-18 ENCOUNTER — TELEPHONE (OUTPATIENT)
Dept: CARDIOLOGY | Facility: OTHER | Age: 84
End: 2024-10-18

## 2024-10-18 NOTE — TELEPHONE ENCOUNTER
11:53 AM    Reason for Call: Phone Call    Description: Patient called because she hasn't received a call for a referral that she was suppose to go to [facility] for a surgery consult. Please call her back to advise on status of referral.     Was an appointment offered for this call? No  If yes : Appointment type              Date    Preferred method for responding to this message: Telephone Call  What is your phone number ? [phone]    If we cannot reach you directly, may we leave a detailed response at the number you provided? Yes    Can this message wait until your PCP/provider returns, if available today? Not applicable    Diane Russell

## 2024-10-18 NOTE — TELEPHONE ENCOUNTER
Spoke with patient, informed her everything was faxed to [facility] on 10/16 and they will call her to schedule. She was happy with that.

## 2024-10-22 DIAGNOSIS — R94.39 ABNORMAL FINDING ON CARDIOVASCULAR STRESS TEST: Primary | ICD-10-CM

## 2024-10-28 ENCOUNTER — TELEPHONE (OUTPATIENT)
Dept: FAMILY MEDICINE | Facility: OTHER | Age: 84
End: 2024-10-28

## 2024-10-28 ENCOUNTER — LAB (OUTPATIENT)
Dept: LAB | Facility: OTHER | Age: 84
End: 2024-10-28
Payer: COMMERCIAL

## 2024-10-28 DIAGNOSIS — R94.39 ABNORMAL FINDING ON CARDIOVASCULAR STRESS TEST: ICD-10-CM

## 2024-10-28 LAB
ANION GAP SERPL CALCULATED.3IONS-SCNC: 13 MMOL/L (ref 7–15)
BASOPHILS # BLD AUTO: 0.1 10E3/UL (ref 0–0.2)
BASOPHILS NFR BLD AUTO: 1 %
BUN SERPL-MCNC: 26.2 MG/DL (ref 8–23)
CALCIUM SERPL-MCNC: 9.8 MG/DL (ref 8.8–10.4)
CHLORIDE SERPL-SCNC: 107 MMOL/L (ref 98–107)
CREAT SERPL-MCNC: 1.33 MG/DL (ref 0.51–0.95)
EGFRCR SERPLBLD CKD-EPI 2021: 40 ML/MIN/1.73M2
EOSINOPHIL # BLD AUTO: 0.8 10E3/UL (ref 0–0.7)
EOSINOPHIL NFR BLD AUTO: 8 %
ERYTHROCYTE [DISTWIDTH] IN BLOOD BY AUTOMATED COUNT: 13.4 % (ref 10–15)
GLUCOSE SERPL-MCNC: 94 MG/DL (ref 70–99)
HCO3 SERPL-SCNC: 22 MMOL/L (ref 22–29)
HCT VFR BLD AUTO: 43.8 % (ref 35–47)
HGB BLD-MCNC: 14 G/DL (ref 11.7–15.7)
IMM GRANULOCYTES # BLD: 0 10E3/UL
IMM GRANULOCYTES NFR BLD: 0 %
LYMPHOCYTES # BLD AUTO: 2.7 10E3/UL (ref 0.8–5.3)
LYMPHOCYTES NFR BLD AUTO: 28 %
MCH RBC QN AUTO: 29.8 PG (ref 26.5–33)
MCHC RBC AUTO-ENTMCNC: 32 G/DL (ref 31.5–36.5)
MCV RBC AUTO: 93 FL (ref 78–100)
MONOCYTES # BLD AUTO: 0.8 10E3/UL (ref 0–1.3)
MONOCYTES NFR BLD AUTO: 8 %
NEUTROPHILS # BLD AUTO: 5.3 10E3/UL (ref 1.6–8.3)
NEUTROPHILS NFR BLD AUTO: 55 %
NRBC # BLD AUTO: 0 10E3/UL
NRBC BLD AUTO-RTO: 0 /100
PLATELET # BLD AUTO: 256 10E3/UL (ref 150–450)
POTASSIUM SERPL-SCNC: 4.3 MMOL/L (ref 3.4–5.3)
RBC # BLD AUTO: 4.7 10E6/UL (ref 3.8–5.2)
SODIUM SERPL-SCNC: 142 MMOL/L (ref 135–145)
WBC # BLD AUTO: 9.6 10E3/UL (ref 4–11)

## 2024-10-28 PROCEDURE — 82947 ASSAY GLUCOSE BLOOD QUANT: CPT | Mod: ZL

## 2024-10-28 PROCEDURE — 36415 COLL VENOUS BLD VENIPUNCTURE: CPT | Mod: ZL

## 2024-10-28 PROCEDURE — 85004 AUTOMATED DIFF WBC COUNT: CPT | Mod: ZL

## 2024-10-28 PROCEDURE — 80048 BASIC METABOLIC PNL TOTAL CA: CPT | Mod: ZL

## 2024-10-30 ENCOUNTER — TRANSFERRED RECORDS (OUTPATIENT)
Dept: HEALTH INFORMATION MANAGEMENT | Facility: CLINIC | Age: 84
End: 2024-10-30

## 2024-11-05 NOTE — PROGRESS NOTES
[facility] HEART CARE   CARDIOLOGY PROGRESS NOTE     Chief Complaint   Patient presents with    Follow Up          Diagnosis:  1.  CHF-diastolic.    -55-60%/indeterminate on 11/26/23.  -Grade 1 on 12/5/2019. H/O ischemic cardiomyopathy, recovered EF.   - 60-65% on 12/5/2019.  -36% on 6/4/2019 stress test.  2.  MILLS.  3.  CAD.   -Normal stress test on 10/14/2024  -Abnormal stress test on 6/14/2019.  -Cardiac cath, 6/20/2019 at [facility].  -Stenting to o/mLCX.  4.  Carotid disease.     -Extensive plaque but no high-grade stenosis on 10/15/2024.  -B/L on 50-69% on 4/27/2023, [facility].    -50% stenosis on 2/28/2020.  5.  CVA on 1/1/2019.  6.  Tobacco abuse.  -Quit on 1/1/2019.   1/2-3/4/pack a day. Started at age 16.   -Exposed to 2nd hand smoke from .   7.  Hypertension-uncontrolled.  8.  CKD-3.  9.  Hyperlipidemia-controlled.  10.  COPD.  -Severe on 5/2/23.  11.  Postop A. fib on 6/29/19 at [facility].  12.  Cardioversion on 6/29/19 at [facility].  13.  PAD.     -Moderate on 7/18/2022.  -2/20/2020-moderate.    Assessment/Plan:   1.  Hypertension: Blood pressure is severely elevated in her right arm which is the correct arm.  Blood pressure is frequently in the 200s.  Today, blood pressure was 226/69.  At home, she had a blood pressure of 210/65.  Frequently, they are checking the right arm which is much lower but she does have subclavian artery stenosis making her blood pressure appear lower.  Discontinue metoprolol 50 mg XL daily.  Start on losartan 50 mg daily and increase propranolol from 40 mg twice a day to 80 mg twice a day.  Because her blood pressure remains elevated at 225/70, will be sent to the ER for elevated blood pressures.  2.  Normal stress test on 10/14/2024.  3.  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  Continue aspirin and atorvastatin.   4.  CAD: History of stenting in 2019 at [facility].  MILLS was her anginal equivalent.  Had been having MILLS  not responsive to inhalers.  Had a normal stress test on 10/14/2024.  Continue medical management.  No plans for additional testing.  5.  COPD: History of severe COPD.  Has not smoked since 2019.  Patient congratulated.  6.  Carotid artery stenosis: Less than 50% right and 50-69% left on 12/8/2023.  Repeat ultrasound of carotids on 10/15/2024.  Concern for subclavian steal on the left side.  Has previously seen vascular surgery.  7.  Will be sent to the ER for elevated blood pressures today.  Discontinue metoprolol 50 mg XL daily.  Increase propranolol from 40 mg twice a day 80 mg twice a day and started on losartan 50 mg daily.  Follow-up in 1 month to reevaluate blood pressures.  Blood pressure today in the 200s.    Interval history:  See above.    HPI:    Ms. Ocasio has a history of CAD s/p coronary angiography on 6/26/19 at [facility]. She was recently identified to have newly identified systolic heart failure.  Additionally, she has a history of hypertension, hyperlipidemia, nonrheumatic mitral regurgitation, central lobular emphysema, hypothyroidism, osteopenia, hyperparathyroidism, history of tobacco abuse and chronic rhinitis.     At initial visit patient was accompanied by her son who was very helpful in providing patients past medical history. He admitted approximately 20 years ago patient was being followed for MR which was noted to be improved and therefore, valve surgery was never recommended. He admitted that she was previously told it was suspected she had small MI's without coronary intervention or identified ACS. Finally, recent history just before new years of 2020 acute hemorrhagic stroke. It was following this, that she was taken off her beta blocker and lisinopril for HTN and possible past MI, she reported increased shortness of breath following this which has progressively worsened and likely multifactorial with COPD and HFrEF.      6/4/2019-Lexiscan stress test with abnormal imaging with  reversible ischemia laterally and LVEF 36%. Additionally, in review of past CT chest imaging from one year ago, she was noted to have advanced atherosclerotic disease with bulky coronary and aortic calcifications. Given these findings, current symptoms and new acute systolic failure, recommended coronary angiography.     Patient underwent coronary angiography on 6/26/19 at [facility]. She was identified to have diffuse coronary atherosclerosis with single vessel obstructive CAD, ostial and mid LCX. Successful PCI with synergy LISS x2, mid LCX 2.5x16mm, ostial LCX 3.0 x 16 mm. LVEDP 26-29 mmHg. During revascularization she was identified to have AF for which she received DCCV x1 with return to sinus and then reverted back to AF. She was treated with IV beta blocker for rate control. Patient reported feeling good following coronary intervention. Breathing had significantly improved and increased energy.       Patient presented to the ED on 7/13/21 with reports of chest pressure, dizziness and increased dyspnea. She was noted to be playing [employer] ball that same day with symptoms. ECG revealing SR with SVE, no ST-T changes. Troponin negative x 2 and chest XR unremarkable. Lab evaluation with decline in renal function and suspected dehydration.         Relevant testing:  US carotids on 10/15/2024:  Extensive atherosclerotic plaque again seen. Currently, velocity  measurements do not suggest high-grade stenosis.  Unchanged appearance of heavily calcified plaques throughout the  common, internal and external carotid arteries.   Retrograde flow again seen within the left vertebral artery suggesting subclavian steal.    Stress test on 10/14/2024:    The nuclear stress test is negative for inducible myocardial ischemia.    There is a quesitonable small area of infarction in the distal inferolateral segment(s) of the left ventricle.    Left ventricular function is low normal.    The left ventricular ejection fraction at rest is  52%.  The left ventricular ejection fraction at stress is 52%.    A prior study was conducted on 6/14/2019.  This study has changes noted when compared with the prior study. Inferolateral hypoperfusion is improved, no longer reversible.    US carotids on 12/8/23:  1. Right side: Less than 50 % diameter stenosis by grayscale imaging  and sonographic velocity criteria.  2. Left side: 50 to 69% diameter stenosis overall by grayscale imaging  and sonographic velocity criteria. Increased since comparison.  3. Retrograde flow in the left vertebral artery, correlate for  clinical symptoms of subclavian steal.    ECHO on 11/26/23:  Global and regional left ventricular function is normal with an EF of 55-60%.  Left ventricular diastolic function is indeterminate.   Mild concentric wall thickening consistent with left ventricular hypertrophy is present.  Global right ventricular function is normal.  Mild right ventricular dilation is present.  Moderate left atrial enlargement is present.  Mild right atrial enlargement is present.  Mild mitral annular calcification is present.  Mild mitral insufficiency is present.  No aortic stenosis is present.  Mild tricuspid insufficiency is present.  The right ventricular systolic pressure is 55mmHg above the right atrial pressure.      KESHAV on 7/18/22:  Moderate to severe disease bilaterally.    US carotids on 4/27/2022 through [facility]:  1. Atherosclerotic changes with sonographic data supportive of 50-69% stenosis bilaterally.   2. Retrograde flow in the left vertebral artery.     US abdominal aorta on 4/27/2022:  Diffuse atherosclerotic disease in the intra-abdominal aorta, without sonographic evidence of intra-abdominal aortic aneurysm.     Zio patch on 3/16/22:  Worn for 13 days and 20 hr's.  After removing artifact, total time was 13 days and 16 hr's. Placed on 3/16/2022 at 1:28 PM and completed on 3/30/2022 at 9:15 AM.  Underlying rhythm was sinus.  Hrt rate ranged from 41  bpm, maximum heart rate of 197 bmp, averaging 62 bmp.  No significant bradycardia, pauses, Mobitz type II or 3rd degree heart block.  No atrial fibrillation on this study.  x0 triggered events and x0 diary entries.  x0 runs of VT.    x7 runs of SVT lasting up to 8 beats with a maximum heart rate of 197 bmp.  Rare, <1% of PAC's, atrial couplets, atrial triplets, PVC's, ventricular couplets, and ventricular triplets.  + episodes of ventricular bigeminy lasting up to 4.4 sec's.  0 episodes of ventricular trigeminy lasting.    KESHAV on 2/28/20:  Moderate arterial occlusive disease in both lower extremities.    US carotids on 2/28/20:  Heavy atherosclerotic plaquing in both carotid bifurcations with velocities consistent with less than 50% stenoses noted.    ECHO on 12/5/19:  No pericardial effusion is present.  Global and regional left ventricular function is normal with an EF of 60-65%.  Grade I or early diastolic dysfunction.  The right ventricle is normal size.  Global right ventricular function is normal.  Both atria appear normal.  Mild mitral insufficiency is present.  Aortic valve is normal in structure and function.  The aortic valve is tricuspid.  Trace tricuspid insufficiency is present.  Right ventricular systolic pressure is 3 mmHg above the right atrial pressure.  The pulmonic valve is normal.    Stress test on 6/14/19:   Reversible ischemia laterally. Abnormally low left ventricular ejection fraction                ICD-10-CM    1. Abnormal stress test  R94.39       2. Chronic systolic congestive heart failure (H)- Recovered  I50.22 propranolol (INDERAL) 80 MG tablet     losartan (COZAAR) 50 MG tablet      3. History of coronary artery stent placement to the O/M LCX on 6/28/2019 at [facility]  Z95.5 propranolol (INDERAL) 80 MG tablet      4. Essential tremor  G25.0 propranolol (INDERAL) 80 MG tablet      5. ASCVD (arteriosclerotic cardiovascular disease)  I25.10       6. Coronary artery disease involving  native coronary artery of native heart without angina pectoris  I25.10       7. PAD on 2/28/2020-moderate (H)  I73.9       8. Postoperative atrial fibrillation on 6/28/2019 (H)  I97.89     I48.91       9. Subclavian arterial stenosis (H)  I77.1       10. Bilateral carotid artery stenosis  I65.23       11. Benign essential hypertension  I10 propranolol (INDERAL) 80 MG tablet     losartan (COZAAR) 50 MG tablet      12. Abnormal cardiovascular stress test on 6/14/2019  R94.39       13. COPD, severe (H)  J44.9       14. MILLS (dyspnea on exertion)  R06.09 losartan (COZAAR) 50 MG tablet      15. Chest pain, unspecified type  R07.9       16. CKD (chronic kidney disease) stage 4, GFR 15-29 ml/min (H)  N18.4 propranolol (INDERAL) 80 MG tablet     losartan (COZAAR) 50 MG tablet      17. History of tobacco abuse quitting on 1/1/2019  Z87.891       18. Tobacco abuse counseling  Z71.6       19. History of CVA on 1/1/2019  Z86.73       20. History of cardioversion on 6/28/2019  Z92.89                 Past Medical History:   Diagnosis Date    Acquired hypothyroidism 5/12/2019    Asthma     Benign essential hypertension 5/12/2019    Centrilobular emphysema (H) 5/12/2019    Chronic rhinitis 8/16/2013    Chronic systolic congestive heart failure (H) 5/12/2019    Constipation     Coronary artery disease     Hearing loss     Heart trouble     Hemorrhagic cerebrovascular accident (CVA) (H) 01/2019    Hyperlipidemia 5/12/2019    Hypertension     Nasal congestion     Non-rheumatic mitral regurgitation 5/12/2019    Osteopenia 5/12/2019    Osteoporosis     Parathyroid related hypercalcemia (H) 8/18/2013    Primary hyperparathyroidism (H) 9/5/2013    Ringing in ears     Sensorineural hearing loss, asymmetrical 8/16/2013    Sneezing     Snoring     Stented coronary artery     Thyroid disease     Weakness     Weight loss        Past Surgical History:   Procedure Laterality Date    CATARACT IOL, RT/LT Bilateral     COLONOSCOPY      COLONOSCOPY -  HIM SCAN  01/01/2009    Colonoscopy - [facility]/NL  2009    ENDOSCOPY UPPER, COLONOSCOPY, COMBINED N/A 10/17/2019    Procedure: UPPER ENDOSCOPY WITH BIOPSY  AND COLONOSCOPY;  Surgeon: Julio Canales MD;  Location: [facility] OR    ENT SURGERY  2011    para thyroid surgery    ENT SURGERY  09/2013    Parathyroid    ESOPHAGOSCOPY, GASTROSCOPY, DUODENOSCOPY (EGD), COMBINED N/A 9/21/2019    Procedure: ESOPHAGOGASTRODUODENOSCOPY (EGD);  Surgeon: Julio Canales MD;  Location: [facility] OR    ESOPHAGOSCOPY, GASTROSCOPY, DUODENOSCOPY (EGD), COMBINED N/A 1/27/2020    Procedure: ESOPHAGOGASTRODUODENOSCOPY (EGD) WITH BIOPSY;  Surgeon: Isrrael Parish MD;  Location: [facility] OR    ESOPHAGOSCOPY, GASTROSCOPY, DUODENOSCOPY (EGD), COMBINED N/A 2/20/2020    Procedure: ESOPHAGOGASTRODUODENOSCOPY (EGD) WITH BIOPSY;  Surgeon: Pedro Luis Montoya DO;  Location: [facility] OR    PARATHYROIDECTOMY  9/10/2013    Procedure: PARATHYROIDECTOMY;  Reoperative Neck Exploration, Resection of right Parathyroid adenoma;  Surgeon: Thalia Muller MD;  Location: [facility] OR    TONSILLECTOMY      TUBAL LIGATION         Allergies   Allergen Reactions    Evista [Raloxifene] Other (See Comments)     hypercalcemia    Prednisone GI Disturbance    Combigan [Brimonidine Tartrate-Timolol] Other (See Comments)     Eye swelling and itchy    Cyclosporine      Pt reports using eye gtts and having red irritated eyes     Latex Rash    Levaquin [Levofloxacin] Muscle Pain (Myalgia)    Penicillins Rash       Current Outpatient Medications   Medication Sig Dispense Refill    aspirin (ASA) 81 MG chewable tablet Take 1 tablet (81 mg) by mouth daily. 90 tablet 3    atorvastatin (LIPITOR) 40 MG tablet Take 1 tablet (40 mg) by mouth at bedtime 90 tablet 3    calcitonin, salmon, (MIACALCIN) 200 UNIT/ACT nasal spray Spray 1 spray into one nostril alternating nostrils daily Alternate nostril each day. 3.7 mL 0    calcium carbonate (TUMS) 500 MG chewable tablet Take 1 chew tab by mouth at  bedtime      clotrimazole-betamethasone (LOTRISONE) 1-0.05 % external cream Apply topically 2 times daily 90 g 3    fluticasone (FLONASE) 50 MCG/ACT spray Spray 2 sprays into both nostrils daily 1 Bottle 11    Fluticasone-Umeclidin-Vilanterol (TRELEGY ELLIPTA) 200-62.5-25 MCG/ACT oral inhaler Inhale 1 puff into the lungs daily 180 each 3    furosemide (LASIX) 20 MG tablet Take 0.5 tablets (10 mg) by mouth every other day 90 tablet 0    gabapentin (NEURONTIN) 100 MG capsule Take 1 capsule (100 mg) by mouth 3 times daily 180 capsule 3    ipratropium - albuterol 0.5 mg/2.5 mg/3 mL (DUONEB) 0.5-2.5 (3) MG/3ML neb solution Take 1 vial (3 mLs) by nebulization every 6 hours as needed for shortness of breath, wheezing or cough 90 mL 3    ipratropium-albuterol (COMBIVENT RESPIMAT)  MCG/ACT inhaler Inhale 1 puff into the lungs 4 times daily 8 g 3    latanoprost (XALATAN) 0.005 % ophthalmic solution       levothyroxine (SYNTHROID/LEVOTHROID) 50 MCG tablet Take 50 mcg by mouth every morning (before breakfast)      losartan (COZAAR) 50 MG tablet Take 1 tablet (50 mg) by mouth daily. 90 tablet 3    methocarbamol (ROBAXIN) 500 MG tablet Take 1 tablet (500 mg) by mouth 4 times daily as needed for muscle spasms 20 tablet 0    mirtazapine (REMERON) 15 MG tablet Take 1 tablet (15 mg) by mouth at bedtime 90 tablet 3    nitroGLYcerin (NITROSTAT) 0.4 MG sublingual tablet Place 1 tablet (0.4 mg) under the tongue every 5 minutes as needed for chest pain. For chest pain place 1 tablet under the tongue every 5 minutes for 3 doses. If symptoms persist 5 minutes after 1st dose call 911. 25 tablet 3    pantoprazole (PROTONIX) 40 MG EC tablet Take 1 tablet (40 mg) by mouth daily 90 tablet 3    propranolol (INDERAL) 80 MG tablet Take 1 tablet (80 mg) by mouth 2 times daily. 180 tablet 3    SYNTHROID 50 MCG tablet TAKE ONE TABLET BY MOUTH EVERY DAY 90 tablet 1    Tafluprost 0.0015 % SOLN Place 1 drop into both eyes at bedtime -when  remembered.      trolamine salicylate (ASPERCREME) 10 % external cream Apply topically daily as needed (lower back pain)         Social History     Socioeconomic History    Marital status:      Spouse name: Not on file    Number of children: Not on file    Years of education: Not on file    Highest education level: Not on file   Occupational History    Not on file   Tobacco Use    Smoking status: Former     Current packs/day: 0.00     Average packs/day: 1 pack/day for 50.0 years (50.0 ttl pk-yrs)     Types: Cigarettes     Start date: 1969     Quit date: 2019     Years since quittin.8     Passive exposure: Past    Smokeless tobacco: Never    Tobacco comments:     quit 2019   Vaping Use    Vaping status: Never Used   Substance and Sexual Activity    Alcohol use: Yes     Comment: social    Drug use: No    Sexual activity: Not Currently   Other Topics Concern    Parent/sibling w/ CABG, MI or angioplasty before 65F 55M? Yes   Social History Narrative    Not on file     Social Drivers of Health     Financial Resource Strain: Low Risk  (2023)    Financial Resource Strain     Within the past 12 months, have you or your family members you live with been unable to get utilities (heat, electricity) when it was really needed?: No   Food Insecurity: Low Risk  (2023)    Food Insecurity     Within the past 12 months, did you worry that your food would run out before you got money to buy more?: No     Within the past 12 months, did the food you bought just not last and you didn t have money to get more?: No   Transportation Needs: Low Risk  (2023)    Transportation Needs     Within the past 12 months, has lack of transportation kept you from medical appointments, getting your medicines, non-medical meetings or appointments, work, or from getting things that you need?: No   Physical Activity: Not on file   Stress: Not on file   Social Connections: Not on file   Interpersonal Safety: Low Risk   (5/13/2024)    Interpersonal Safety     Do you feel physically and emotionally safe where you currently live?: Yes     Within the past 12 months, have you been hit, slapped, kicked or otherwise physically hurt by someone?: No     Within the past 12 months, have you been humiliated or emotionally abused in other ways by your partner or ex-partner?: No   Housing Stability: Low Risk  (12/12/2023)    Housing Stability     Do you have housing? : Yes     Are you worried about losing your housing?: No       LAB RESULTS:   Office Visit on 01/26/2022   Component Date Value Ref Range Status    TSH 01/26/2022 1.84  0.40 - 4.00 mU/L Final    Sodium 01/26/2022 139  133 - 144 mmol/L Final    Potassium 01/26/2022 4.4  3.4 - 5.3 mmol/L Final    Chloride 01/26/2022 109  94 - 109 mmol/L Final    Carbon Dioxide (CO2) 01/26/2022 24  20 - 32 mmol/L Final    Anion Gap 01/26/2022 6  3 - 14 mmol/L Final    Urea Nitrogen 01/26/2022 14  7 - 30 mg/dL Final    Creatinine 01/26/2022 1.12 (A) 0.52 - 1.04 mg/dL Final    Calcium 01/26/2022 9.0  8.5 - 10.1 mg/dL Final    Glucose 01/26/2022 95  70 - 99 mg/dL Final    GFR Estimate 01/26/2022 49 (A) >60 mL/min/1.73m2 Final    Iron 01/26/2022 71  35 - 180 ug/dL Final    Iron Binding Capacity 01/26/2022 294  240 - 430 ug/dL Final    Iron Sat Index 01/26/2022 24  15 - 46 % Final    Ferritin 01/26/2022 633 (A) 8 - 252 ng/mL Final    WBC Count 01/26/2022 8.1  4.0 - 11.0 10e3/uL Final    RBC Count 01/26/2022 4.40  3.80 - 5.20 10e6/uL Final    Hemoglobin 01/26/2022 10.6 (A) 11.7 - 15.7 g/dL Final    Hematocrit 01/26/2022 35.8  35.0 - 47.0 % Final    MCV 01/26/2022 81  78 - 100 fL Final    MCH 01/26/2022 24.1 (A) 26.5 - 33.0 pg Final    MCHC 01/26/2022 29.6 (A) 31.5 - 36.5 g/dL Final    RDW 01/26/2022 24.9 (A) 10.0 - 15.0 % Final    Platelet Count 01/26/2022 405  150 - 450 10e3/uL Final    % Neutrophils 01/26/2022 65  % Final    % Lymphocytes 01/26/2022 23  % Final    % Monocytes 01/26/2022 9  % Final    %  Eosinophils 01/26/2022 2  % Final    % Basophils 01/26/2022 1  % Final    % Immature Granulocytes 01/26/2022 0  % Final    NRBCs per 100 WBC 01/26/2022 0  <1 /100 Final    Absolute Neutrophils 01/26/2022 5.2  1.6 - 8.3 10e3/uL Final    Absolute Lymphocytes 01/26/2022 1.8  0.8 - 5.3 10e3/uL Final    Absolute Monocytes 01/26/2022 0.7  0.0 - 1.3 10e3/uL Final    Absolute Eosinophils 01/26/2022 0.2  0.0 - 0.7 10e3/uL Final    Absolute Basophils 01/26/2022 0.1  0.0 - 0.2 10e3/uL Final    Absolute Immature Granulocytes 01/26/2022 0.0  <=0.4 10e3/uL Final    Absolute NRBCs 01/26/2022 0.0  10e3/uL Final        Review of systems: Negative except that which was noted in the HPI.    Physical examination:  BP (!) 226/69   Pulse 60   Resp 24   Ht 1.524 m (5')   Wt 45.4 kg (100 lb)   SpO2 93%   BMI 19.53 kg/m      GENERAL APPEARANCE: healthy, alert and no distress  CHEST: lungs clear to auscultation.  CARDIOVASCULAR: regular rhythm, normal S1 with physiologic split S2, no S3 or S4 and no murmur, click or rub  EXTREMITIES: no clubbing, cyanosis with minimal edema      Total time spent on day of visit, including review of tests, obtaining/reviewing separately obtained history, ordering medications/tests/procedures, communicating with PCP/consultants, and documenting in electronic medical record: 20 minutes.           Thank you for allowing me to participate in the care of your patient. Please do not hesitate to contact me if you have any questions.     Sal Graham, DO

## 2024-11-06 ENCOUNTER — HOSPITAL ENCOUNTER (EMERGENCY)
Facility: HOSPITAL | Age: 84
Discharge: HOME | End: 2024-11-06
Attending: PHYSICIAN ASSISTANT
Payer: COMMERCIAL

## 2024-11-06 ENCOUNTER — OFFICE VISIT (OUTPATIENT)
Dept: CARDIOLOGY | Facility: OTHER | Age: 84
End: 2024-11-06
Attending: INTERNAL MEDICINE
Payer: COMMERCIAL

## 2024-11-06 VITALS
HEART RATE: 54 BPM | DIASTOLIC BLOOD PRESSURE: 55 MMHG | OXYGEN SATURATION: 94 % | TEMPERATURE: 98.1 F | SYSTOLIC BLOOD PRESSURE: 165 MMHG | RESPIRATION RATE: 18 BRPM

## 2024-11-06 VITALS
WEIGHT: 100 LBS | RESPIRATION RATE: 24 BRPM | OXYGEN SATURATION: 93 % | BODY MASS INDEX: 19.63 KG/M2 | SYSTOLIC BLOOD PRESSURE: 225 MMHG | HEART RATE: 60 BPM | HEIGHT: 60 IN | DIASTOLIC BLOOD PRESSURE: 70 MMHG

## 2024-11-06 DIAGNOSIS — I73.9 PAD (PERIPHERAL ARTERY DISEASE) (H): ICD-10-CM

## 2024-11-06 DIAGNOSIS — J44.9 COPD, SEVERE (H): ICD-10-CM

## 2024-11-06 DIAGNOSIS — Z86.73 HISTORY OF CVA (CEREBROVASCULAR ACCIDENT): ICD-10-CM

## 2024-11-06 DIAGNOSIS — I50.22 CHRONIC SYSTOLIC CONGESTIVE HEART FAILURE (H): Chronic | ICD-10-CM

## 2024-11-06 DIAGNOSIS — Z92.89 HISTORY OF CARDIOVERSION: ICD-10-CM

## 2024-11-06 DIAGNOSIS — N18.4 CKD (CHRONIC KIDNEY DISEASE) STAGE 4, GFR 15-29 ML/MIN (H): ICD-10-CM

## 2024-11-06 DIAGNOSIS — I48.91 POSTOPERATIVE ATRIAL FIBRILLATION (H): ICD-10-CM

## 2024-11-06 DIAGNOSIS — Z71.6 TOBACCO ABUSE COUNSELING: ICD-10-CM

## 2024-11-06 DIAGNOSIS — R94.39 ABNORMAL CARDIOVASCULAR STRESS TEST: ICD-10-CM

## 2024-11-06 DIAGNOSIS — G25.0 ESSENTIAL TREMOR: ICD-10-CM

## 2024-11-06 DIAGNOSIS — Z87.891 HISTORY OF TOBACCO ABUSE: ICD-10-CM

## 2024-11-06 DIAGNOSIS — Z95.5 HISTORY OF CORONARY ARTERY STENT PLACEMENT: ICD-10-CM

## 2024-11-06 DIAGNOSIS — I25.10 CORONARY ARTERY DISEASE INVOLVING NATIVE CORONARY ARTERY OF NATIVE HEART WITHOUT ANGINA PECTORIS: ICD-10-CM

## 2024-11-06 DIAGNOSIS — I10 UNCONTROLLED HYPERTENSION: ICD-10-CM

## 2024-11-06 DIAGNOSIS — R94.39 ABNORMAL STRESS TEST: Primary | ICD-10-CM

## 2024-11-06 DIAGNOSIS — R07.9 CHEST PAIN, UNSPECIFIED TYPE: ICD-10-CM

## 2024-11-06 DIAGNOSIS — I77.1 SUBCLAVIAN ARTERIAL STENOSIS (H): ICD-10-CM

## 2024-11-06 DIAGNOSIS — I10 BENIGN ESSENTIAL HYPERTENSION: ICD-10-CM

## 2024-11-06 DIAGNOSIS — I97.89 POSTOPERATIVE ATRIAL FIBRILLATION (H): ICD-10-CM

## 2024-11-06 DIAGNOSIS — I65.23 BILATERAL CAROTID ARTERY STENOSIS: ICD-10-CM

## 2024-11-06 DIAGNOSIS — R06.09 DOE (DYSPNEA ON EXERTION): ICD-10-CM

## 2024-11-06 DIAGNOSIS — I25.10 ASCVD (ARTERIOSCLEROTIC CARDIOVASCULAR DISEASE): ICD-10-CM

## 2024-11-06 PROCEDURE — 99283 EMERGENCY DEPT VISIT LOW MDM: CPT

## 2024-11-06 PROCEDURE — G0463 HOSPITAL OUTPT CLINIC VISIT: HCPCS

## 2024-11-06 PROCEDURE — 99282 EMERGENCY DEPT VISIT SF MDM: CPT | Performed by: PHYSICIAN ASSISTANT

## 2024-11-06 PROCEDURE — 99213 OFFICE O/P EST LOW 20 MIN: CPT | Performed by: INTERNAL MEDICINE

## 2024-11-06 PROCEDURE — 250N000013 HC RX MED GY IP 250 OP 250 PS 637: Performed by: PHYSICIAN ASSISTANT

## 2024-11-06 RX ORDER — LOSARTAN POTASSIUM 50 MG/1
50 TABLET ORAL ONCE
Status: COMPLETED | OUTPATIENT
Start: 2024-11-06 | End: 2024-11-06

## 2024-11-06 RX ORDER — CLONIDINE HYDROCHLORIDE 0.1 MG/1
0.1 TABLET ORAL ONCE
Status: COMPLETED | OUTPATIENT
Start: 2024-11-06 | End: 2024-11-06

## 2024-11-06 RX ORDER — METOPROLOL SUCCINATE 50 MG/1
1 TABLET, EXTENDED RELEASE ORAL
COMMUNITY
Start: 2024-10-30 | End: 2024-11-06 | Stop reason: ALTCHOICE

## 2024-11-06 RX ORDER — LOSARTAN POTASSIUM 50 MG/1
50 TABLET ORAL DAILY
Qty: 90 TABLET | Refills: 3 | Status: SHIPPED | OUTPATIENT
Start: 2024-11-06

## 2024-11-06 RX ORDER — PROPRANOLOL HYDROCHLORIDE 80 MG/1
80 TABLET ORAL 2 TIMES DAILY
Qty: 180 TABLET | Refills: 3 | Status: SHIPPED | OUTPATIENT
Start: 2024-11-06

## 2024-11-06 RX ADMIN — LOSARTAN POTASSIUM 50 MG: 50 TABLET, FILM COATED ORAL at 14:52

## 2024-11-06 RX ADMIN — CLONIDINE HYDROCHLORIDE 0.1 MG: 0.1 TABLET ORAL at 14:52

## 2024-11-06 ASSESSMENT — ENCOUNTER SYMPTOMS
ABDOMINAL PAIN: 0
WEAKNESS: 0
SHORTNESS OF BREATH: 0
HEADACHES: 0
DIZZINESS: 0
FATIGUE: 0
PALPITATIONS: 0
COUGH: 0
BACK PAIN: 0
FEVER: 0

## 2024-11-06 ASSESSMENT — ACTIVITIES OF DAILY LIVING (ADL)
ADLS_ACUITY_SCORE: 0
ADLS_ACUITY_SCORE: 0

## 2024-11-06 NOTE — ED TRIAGE NOTES
"Patient presents with c/o HTN. Patient was seen by Dr. Graham who sent her to the ED d/t systolic BP in the 200s. Patient reports dizziness \"for weeks.\" Reports that she was recently hospitalized and BP was elevated. Dr. Graham changed medication dose per AVS.         "

## 2024-11-06 NOTE — PATIENT INSTRUCTIONS
Thank you for allowing Dr ANIRUDH Graham and our  team to participate in your care. Please call our office at [phone] with scheduling questions or if you need to cancel or change your appointment. With any other questions or concerns you may call cardiology nurse at  [phone].       If you experience chest pain, chest pressure, chest tightness, shortness of breath, fainting, lightheadedness, nausea, vomiting, or other concerning symptoms, please report to the Emergency Department or call 911. These symptoms may be emergent, and best treated in the Emergency Department.

## 2024-11-06 NOTE — DISCHARGE INSTRUCTIONS
Please follow-up in the clinic for further discussion of your blood pressure.  Continue the regiment as prescribed by Dr. Graham.  Rest and stay hydrated.  Please return here for any other questions or concerns.

## 2024-11-06 NOTE — ED PROVIDER NOTES
History     Chief Complaint   Patient presents with    Hypertension     The history is provided by the patient.     Lita Ocasio is a 83 year old female who presented to the emergency department along with family member for evaluation of hypertension.  Seen today in the cardiology clinic and apparently advised to come to the emergency department for evaluation of her hypertension.  Ms. Ocasio reports that she is not concerned whatsoever for hypertension.  She reports that she has had hypertension such as this for more than 1 year.  She very recently underwent cardiac catheterization showing no significant coronary artery disease.  Blood pressures reported during the procedure were more than 240 systolic.  The patient is otherwise entirely asymptomatic and not terribly pleased to be in the emergency department.  She has no headache, chest pain, shortness of breath, or abdominal pain.  She reports that her blood pressures are always normal in the left because she has been diagnosed with likely subclavian steal syndrome.    Allergies:  Allergies   Allergen Reactions    Evista [Raloxifene] Other (See Comments)     hypercalcemia    Prednisone GI Disturbance    Combigan [Brimonidine Tartrate-Timolol] Other (See Comments)     Eye swelling and itchy    Cyclosporine      Pt reports using eye gtts and having red irritated eyes     Latex Rash    Levaquin [Levofloxacin] Muscle Pain (Myalgia)    Penicillins Rash       Problem List:    Patient Active Problem List    Diagnosis Date Noted    Anemia 11/26/2023     Priority: Medium    Elevated brain natriuretic peptide (BNP) level 11/26/2023     Priority: Medium    COPD, severe (H) 09/06/2023     Priority: Medium    Chest pain, unspecified type 02/16/2023     Priority: Medium    Coronary artery disease involving native coronary artery of native heart without angina pectoris 03/14/2022     Priority: Medium    Abnormal cardiovascular stress test on 6/14/2019 03/14/2022     Priority:  Medium    History of CVA on 1/1/2019 03/14/2022     Priority: Medium    Postoperative atrial fibrillation on 6/28/2019 (H) 03/14/2022     Priority: Medium    History of cardioversion on 6/28/2019 03/14/2022     Priority: Medium    PAD on 2/28/2020-moderate (H) 03/14/2022     Priority: Medium    Bilateral carotid artery stenosis 03/14/2022     Priority: Medium    History of tobacco abuse quitting on 1/1/2019 03/14/2022     Priority: Medium    Tobacco abuse counseling 03/14/2022     Priority: Medium    CKD (chronic kidney disease) stage 4, GFR 15-29 ml/min (H) 01/26/2022     Priority: Medium    Subclavian arterial stenosis (H) 01/26/2022     Priority: Medium    Iron deficiency anemia secondary to inadequate dietary iron intake 01/07/2022     Priority: Medium    Weight loss 02/05/2020     Priority: Medium    History of GI bleed 09/20/2019     Priority: Medium    Protein-calorie malnutrition (H) 09/04/2019     Priority: Medium    History of coronary artery stent placement to the O/M LCX on 6/28/2019 at [facility] 07/23/2019     Priority: Medium    Abnormal stress test 06/18/2019     Priority: Medium     Added automatically from request for surgery [id number]      MILLS (dyspnea on exertion) 06/18/2019     Priority: Medium     Added automatically from request for surgery [id number]      ASCVD (arteriosclerotic cardiovascular disease) 06/18/2019     Priority: Medium     Added automatically from request for surgery [id number]      Mixed hyperlipidemia 06/18/2019     Priority: Medium     Added automatically from request for surgery [id number]      Centrilobular emphysema (H) 05/12/2019     Priority: Medium    Acquired hypothyroidism 05/12/2019     Priority: Medium    Chronic systolic congestive heart failure (H)- Recovered 05/12/2019     Priority: Medium    Benign essential hypertension 05/12/2019     Priority: Medium    Osteopenia 05/12/2019     Priority: Medium    Non-rheumatic mitral regurgitation 05/12/2019     Priority: Medium     Primary hyperparathyroidism (H) 09/05/2013     Priority: Medium    Chronic rhinitis 08/16/2013     Priority: Medium        Past Medical History:    Past Medical History:   Diagnosis Date    Acquired hypothyroidism 5/12/2019    Asthma     Benign essential hypertension 5/12/2019    Centrilobular emphysema (H) 5/12/2019    Chronic rhinitis 8/16/2013    Chronic systolic congestive heart failure (H) 5/12/2019    Constipation     Coronary artery disease     Hearing loss     Heart trouble     Hemorrhagic cerebrovascular accident (CVA) (H) 01/2019    Hyperlipidemia 5/12/2019    Hypertension     Nasal congestion     Non-rheumatic mitral regurgitation 5/12/2019    Osteopenia 5/12/2019    Osteoporosis     Parathyroid related hypercalcemia (H) 8/18/2013    Primary hyperparathyroidism (H) 9/5/2013    Ringing in ears     Sensorineural hearing loss, asymmetrical 8/16/2013    Sneezing     Snoring     Stented coronary artery     Thyroid disease     Weakness     Weight loss        Past Surgical History:    Past Surgical History:   Procedure Laterality Date    CATARACT IOL, RT/LT Bilateral     COLONOSCOPY      COLONOSCOPY - HIM SCAN  01/01/2009    Colonoscopy - [facility]  2009    ENDOSCOPY UPPER, COLONOSCOPY, COMBINED N/A 10/17/2019    Procedure: UPPER ENDOSCOPY WITH BIOPSY  AND COLONOSCOPY;  Surgeon: Julio Canales MD;  Location: [facility] OR    ENT SURGERY  2011    para thyroid surgery    ENT SURGERY  09/2013    Parathyroid    ESOPHAGOSCOPY, GASTROSCOPY, DUODENOSCOPY (EGD), COMBINED N/A 9/21/2019    Procedure: ESOPHAGOGASTRODUODENOSCOPY (EGD);  Surgeon: Julio Canales MD;  Location: [facility] OR    ESOPHAGOSCOPY, GASTROSCOPY, DUODENOSCOPY (EGD), COMBINED N/A 1/27/2020    Procedure: ESOPHAGOGASTRODUODENOSCOPY (EGD) WITH BIOPSY;  Surgeon: Isrrael Parish MD;  Location: [facility] OR    ESOPHAGOSCOPY, GASTROSCOPY, DUODENOSCOPY (EGD), COMBINED N/A 2/20/2020    Procedure: ESOPHAGOGASTRODUODENOSCOPY (EGD) WITH BIOPSY;  Surgeon: Pedro Luis Montoya  D, DO;  Location: [facility] OR    PARATHYROIDECTOMY  9/10/2013    Procedure: PARATHYROIDECTOMY;  Reoperative Neck Exploration, Resection of right Parathyroid adenoma;  Surgeon: Thalia Muller MD;  Location: [facility] OR    TONSILLECTOMY      TUBAL LIGATION         Family History:    Family History   Problem Relation Age of Onset    Hearing Loss Mother     Heart Disease Mother     Myocardial Infarction Mother     Cerebrovascular Disease Father     Cancer Brother         throat    Cancer Sister     Myocardial Infarction Sister     Cancer Maternal Grandmother     Heart Disease Maternal Grandfather     Aortic aneurysm Son        Social History:  Marital Status:   [5]  Social History     Tobacco Use    Smoking status: Former     Current packs/day: 0.00     Average packs/day: 1 pack/day for 50.0 years (50.0 ttl pk-yrs)     Types: Cigarettes     Start date: 1969     Quit date: 2019     Years since quittin.8     Passive exposure: Past    Smokeless tobacco: Never    Tobacco comments:     quit 2019   Vaping Use    Vaping status: Never Used   Substance Use Topics    Alcohol use: Yes     Comment: social    Drug use: No        Medications:    aspirin (ASA) 81 MG chewable tablet  atorvastatin (LIPITOR) 40 MG tablet  calcitonin, salmon, (MIACALCIN) 200 UNIT/ACT nasal spray  calcium carbonate (TUMS) 500 MG chewable tablet  clotrimazole-betamethasone (LOTRISONE) 1-0.05 % external cream  fluticasone (FLONASE) 50 MCG/ACT spray  Fluticasone-Umeclidin-Vilanterol (TRELEGY ELLIPTA) 200-62.5-25 MCG/ACT oral inhaler  furosemide (LASIX) 20 MG tablet  gabapentin (NEURONTIN) 100 MG capsule  ipratropium - albuterol 0.5 mg/2.5 mg/3 mL (DUONEB) 0.5-2.5 (3) MG/3ML neb solution  ipratropium-albuterol (COMBIVENT RESPIMAT)  MCG/ACT inhaler  latanoprost (XALATAN) 0.005 % ophthalmic solution  levothyroxine (SYNTHROID/LEVOTHROID) 50 MCG tablet  losartan (COZAAR) 50 MG tablet  methocarbamol (ROBAXIN) 500 MG tablet  mirtazapine  (REMERON) 15 MG tablet  nitroGLYcerin (NITROSTAT) 0.4 MG sublingual tablet  pantoprazole (PROTONIX) 40 MG EC tablet  propranolol (INDERAL) 80 MG tablet  SYNTHROID 50 MCG tablet  Tafluprost 0.0015 % SOLN  trolamine salicylate (ASPERCREME) 10 % external cream          Review of Systems   Constitutional:  Negative for fatigue and fever.   Eyes:  Negative for visual disturbance.   Respiratory:  Negative for cough and shortness of breath.    Cardiovascular:  Negative for chest pain and palpitations.   Gastrointestinal:  Negative for abdominal pain.   Musculoskeletal:  Negative for back pain.   Skin: Negative.    Neurological:  Negative for dizziness, weakness and headaches.       Physical Exam   BP: (!) 243/84  Pulse: 51  Temp: 97.7  F (36.5  C)  Resp: 18  SpO2: 94 %      Physical Exam  Vitals and nursing note reviewed.   Constitutional:       General: She is not in acute distress.     Appearance: Normal appearance. She is normal weight. She is not ill-appearing, toxic-appearing or diaphoretic.      Comments: This is a smiling and talkative 83-year-old female found semireclined in no distress   HENT:      Head: Normocephalic and atraumatic.   Eyes:      Conjunctiva/sclera: Conjunctivae normal.   Cardiovascular:      Rate and Rhythm: Regular rhythm. Bradycardia present.   Pulmonary:      Effort: Pulmonary effort is normal.   Skin:     General: Skin is warm and dry.      Capillary Refill: Capillary refill takes less than 2 seconds.   Neurological:      General: No focal deficit present.      Mental Status: She is alert and oriented to person, place, and time.   Psychiatric:         Mood and Affect: Mood normal.         Behavior: Behavior normal.         ED Course        Procedures              Critical Care time:  none              No results found for this or any previous visit (from the past 24 hours).    Medications   losartan (COZAAR) tablet 50 mg (50 mg Oral $Given 11/6/24 0190)   cloNIDine (CATAPRES) tablet 0.1 mg  "(0.1 mg Oral $Given 11/6/24 5180)       Assessments & Plan (with Medical Decision Making)   This is a smiling and talkative well-appearing 83-year-old female who presented to the emergency department from the cardiology clinic for evaluation of uncontrolled hypertension.  The patient was noted to have a systolic blood pressure greater than 200.  She is entirely asymptomatic.  She tells me she has been experiencing these blood pressures for more than 1 year.  She was found to have rather impressive hypertension during her very recent cardiac catheterization.  She has no chest pains, dyspnea, dizziness, headaches, visual concerns, or other questions or concerns.  I had a long detailed discussion with the patient.  There is no reasonable indication for laboratory evaluation at this time.  She was given a dose of Cozaar that was prescribed today by cardiology as well as a 0.1 mg tablet of clonidine.  She was observed for 2 and half hours with improvement of her blood pressure.  Given the asymptomatic status as well as quite protracted history of significant hypertension, there is no reasonable or compelling medical indication for further emergent workup or treatment.  This can be followed in the clinic.  The patient was quite adamant on discharge and quite adamant on no workup in the emergency room.  She tells me \"my blood pressure has been like this for more than a year.\"  Strict return precautions were provided.    This document was prepared using a combination of typing and voice generated software.  While every attempt was made for accuracy, spelling and grammatical errors may exist.     I have reviewed the nursing notes.    I have reviewed the findings, diagnosis, plan and need for follow up with the patient.           Medical Decision Making  The patient's presentation was of moderate complexity (a chronic illness mild to moderate exacerbation, progression, or side effect of treatment).    The patient's evaluation " involved:  review of external note(s) from 1 sources (review of recent cardiac catheterization from [facility]'s)    The patient's management necessitated moderate risk (prescription drug management including medications given in the ED).        New Prescriptions    No medications on file       Final diagnoses:   Uncontrolled hypertension       11/6/2024   [facility] EMER[facility]       Ladarius Snowden PA-C  11/06/24 1751

## 2024-11-06 NOTE — ED NOTES
Patient discharged from ED. AVS reviewed and discussed with patient who verbalizes understanding. No further questions. Ambulatory with cane. VS charted. Denies pain.

## 2024-12-02 ENCOUNTER — OFFICE VISIT (OUTPATIENT)
Dept: CARDIOLOGY | Facility: OTHER | Age: 84
End: 2024-12-02
Attending: INTERNAL MEDICINE
Payer: COMMERCIAL

## 2024-12-02 VITALS
WEIGHT: 104 LBS | BODY MASS INDEX: 20.42 KG/M2 | SYSTOLIC BLOOD PRESSURE: 190 MMHG | HEART RATE: 69 BPM | TEMPERATURE: 98.5 F | OXYGEN SATURATION: 89 % | HEIGHT: 60 IN | DIASTOLIC BLOOD PRESSURE: 68 MMHG

## 2024-12-02 DIAGNOSIS — R06.09 DOE (DYSPNEA ON EXERTION): ICD-10-CM

## 2024-12-02 DIAGNOSIS — I10 BENIGN ESSENTIAL HYPERTENSION: ICD-10-CM

## 2024-12-02 DIAGNOSIS — I50.22 CHRONIC SYSTOLIC CONGESTIVE HEART FAILURE (H): Primary | ICD-10-CM

## 2024-12-02 DIAGNOSIS — N18.4 CKD (CHRONIC KIDNEY DISEASE) STAGE 4, GFR 15-29 ML/MIN (H): ICD-10-CM

## 2024-12-02 DIAGNOSIS — I50.32 CHRONIC DIASTOLIC CONGESTIVE HEART FAILURE (H): ICD-10-CM

## 2024-12-02 DIAGNOSIS — I50.22 CHRONIC SYSTOLIC CONGESTIVE HEART FAILURE (H): Chronic | ICD-10-CM

## 2024-12-02 LAB
ATRIAL RATE - MUSE: 64 BPM
DIASTOLIC BLOOD PRESSURE - MUSE: NORMAL MMHG
INTERPRETATION ECG - MUSE: NORMAL
P AXIS - MUSE: 76 DEGREES
PR INTERVAL - MUSE: 186 MS
QRS DURATION - MUSE: 84 MS
QT - MUSE: 444 MS
QTC - MUSE: 458 MS
R AXIS - MUSE: -14 DEGREES
SYSTOLIC BLOOD PRESSURE - MUSE: NORMAL MMHG
T AXIS - MUSE: 64 DEGREES
VENTRICULAR RATE- MUSE: 64 BPM

## 2024-12-02 PROCEDURE — 93005 ELECTROCARDIOGRAM TRACING: CPT | Performed by: INTERNAL MEDICINE

## 2024-12-02 PROCEDURE — G0463 HOSPITAL OUTPT CLINIC VISIT: HCPCS

## 2024-12-02 RX ORDER — CARVEDILOL 12.5 MG/1
12.5 TABLET ORAL 2 TIMES DAILY WITH MEALS
Qty: 180 TABLET | Refills: 3 | Status: SHIPPED | OUTPATIENT
Start: 2024-12-02 | End: 2024-12-02

## 2024-12-02 RX ORDER — FUROSEMIDE 20 MG/1
10 TABLET ORAL DAILY PRN
Qty: 45 TABLET | Refills: 1 | Status: SHIPPED | OUTPATIENT
Start: 2024-12-02

## 2024-12-02 RX ORDER — CARVEDILOL 6.25 MG/1
6.25 TABLET ORAL 2 TIMES DAILY WITH MEALS
Qty: 180 TABLET | Refills: 3 | Status: SHIPPED | OUTPATIENT
Start: 2024-12-02

## 2024-12-02 RX ORDER — LOSARTAN POTASSIUM 100 MG/1
100 TABLET ORAL DAILY
Qty: 90 TABLET | Refills: 3 | Status: SHIPPED | OUTPATIENT
Start: 2024-12-02

## 2024-12-02 RX ORDER — HYDROCHLOROTHIAZIDE 25 MG/1
25 TABLET ORAL DAILY
Qty: 90 TABLET | Refills: 3 | Status: SHIPPED | OUTPATIENT
Start: 2024-12-02

## 2024-12-02 ASSESSMENT — PAIN SCALES - GENERAL: PAINLEVEL_OUTOF10: NO PAIN (0)

## 2024-12-02 NOTE — PROGRESS NOTES
[facility] HEART CARE   CARDIOLOGY PROGRESS NOTE     No chief complaint on file.         Diagnosis:  1.  CHF-diastolic.    -55-60%/indeterminate on 11/26/23.  -Grade 1 on 12/5/2019. H/O ischemic cardiomyopathy, recovered EF.   - 60-65% on 12/5/2019.  -36% on 6/4/2019 stress test.  2.  MILLS.  3.  CAD.   -Normal stress test on 10/14/2024  -Abnormal stress test on 6/14/2019.  -Cardiac cath, 6/20/2019 at [facility].  -Stenting to o/mLCX.  4.  Carotid disease.     -Extensive plaque but no high-grade stenosis on 10/15/2024.  -B/L on 50-69% on 4/27/2023, [facility].    -50% stenosis on 2/28/2020.  5.  CVA on 1/1/2019.  6.  Tobacco abuse.  -Quit on 1/1/2019.   1/2-3/4/pack a day. Started at age 16.   -Exposed to 2nd hand smoke from .   7.  Hypertension-uncontrolled.  8.  CKD-3.  9.  Hyperlipidemia-controlled.  10.  COPD.  -Severe on 5/2/23.  11.  Postop A. fib on 6/29/19 at [facility].  12.  Cardioversion on 6/29/19 at [facility].  13.  PAD.     -Moderate on 7/18/2022.  -2/20/2020-moderate.    Assessment/Plan:   1.  Hypertension: She was seen in the ER on 11/6/2024.  She was referred by myself from the clinic because of severely elevated systolic blood pressure.  Her blood pressure today remains elevated at 215/75.  On repeat, systolically was in the 190s.  Taken on the right arm as she has subclavian artery stenosis on the left. I suggested that she stop propranolol 80 mg twice a day and start Coreg 6.25 mg twice a day.  Increase losartan from 50 mg once daily to 100 mg once daily.  Will start on hydrochlorothiazide 25 mg once daily and change Lasix from 10 mg every other day to 10 mg daily as needed for swelling.  Continue to check her blood pressure and will let us know if her blood pressure remains elevated.  I will see her back in a month follow-up to reassess blood pressure with these changes.  2.  Here for blood pressure recheck.  Increase losartan from 50 mg to 100 mg started on hydrochlorothiazide 25 mg daily,  change Lasix 10 mg from every other day scheduled to 10 mg daily as needed and stop propranolol and start Coreg 6.25 mg twice a day.      Interval history:  See above.    HPI:    Ms. Ocasio has a history of CAD s/p coronary angiography on 6/26/19 at [facility]. She was recently identified to have newly identified systolic heart failure.  Additionally, she has a history of hypertension, hyperlipidemia, nonrheumatic mitral regurgitation, central lobular emphysema, hypothyroidism, osteopenia, hyperparathyroidism, history of tobacco abuse and chronic rhinitis.     At initial visit patient was accompanied by her son who was very helpful in providing patients past medical history. He admitted approximately 20 years ago patient was being followed for MR which was noted to be improved and therefore, valve surgery was never recommended. He admitted that she was previously told it was suspected she had small MI's without coronary intervention or identified ACS. Finally, recent history just before new years of 2020 acute hemorrhagic stroke. It was following this, that she was taken off her beta blocker and lisinopril for HTN and possible past MI, she reported increased shortness of breath following this which has progressively worsened and likely multifactorial with COPD and HFrEF.      6/4/2019-Lexiscan stress test with abnormal imaging with reversible ischemia laterally and LVEF 36%. Additionally, in review of past CT chest imaging from one year ago, she was noted to have advanced atherosclerotic disease with bulky coronary and aortic calcifications. Given these findings, current symptoms and new acute systolic failure, recommended coronary angiography.     Patient underwent coronary angiography on 6/26/19 at [facility]. She was identified to have diffuse coronary atherosclerosis with single vessel obstructive CAD, ostial and mid LCX. Successful PCI with synergy LISS x2, mid LCX 2.5x16mm, ostial LCX 3.0 x 16 mm. LVEDP 26-29  mmHg. During revascularization she was identified to have AF for which she received DCCV x1 with return to sinus and then reverted back to AF. She was treated with IV beta blocker for rate control. Patient reported feeling good following coronary intervention. Breathing had significantly improved and increased energy.       Patient presented to the ED on 7/13/21 with reports of chest pressure, dizziness and increased dyspnea. She was noted to be playing [employer] ball that same day with symptoms. ECG revealing SR with SVE, no ST-T changes. Troponin negative x 2 and chest XR unremarkable. Lab evaluation with decline in renal function and suspected dehydration.         Relevant testing:  US carotids on 10/15/2024:  Extensive atherosclerotic plaque again seen. Currently, velocity  measurements do not suggest high-grade stenosis.  Unchanged appearance of heavily calcified plaques throughout the  common, internal and external carotid arteries.   Retrograde flow again seen within the left vertebral artery suggesting subclavian steal.    Stress test on 10/14/2024:    The nuclear stress test is negative for inducible myocardial ischemia.    There is a quesitonable small area of infarction in the distal inferolateral segment(s) of the left ventricle.    Left ventricular function is low normal.    The left ventricular ejection fraction at rest is 52%.  The left ventricular ejection fraction at stress is 52%.    A prior study was conducted on 6/14/2019.  This study has changes noted when compared with the prior study. Inferolateral hypoperfusion is improved, no longer reversible.    US carotids on 12/8/23:  1. Right side: Less than 50 % diameter stenosis by grayscale imaging  and sonographic velocity criteria.  2. Left side: 50 to 69% diameter stenosis overall by grayscale imaging  and sonographic velocity criteria. Increased since comparison.  3. Retrograde flow in the left vertebral artery, correlate for  clinical symptoms of  subclavian steal.    ECHO on 11/26/23:  Global and regional left ventricular function is normal with an EF of 55-60%.  Left ventricular diastolic function is indeterminate.   Mild concentric wall thickening consistent with left ventricular hypertrophy is present.  Global right ventricular function is normal.  Mild right ventricular dilation is present.  Moderate left atrial enlargement is present.  Mild right atrial enlargement is present.  Mild mitral annular calcification is present.  Mild mitral insufficiency is present.  No aortic stenosis is present.  Mild tricuspid insufficiency is present.  The right ventricular systolic pressure is 55mmHg above the right atrial pressure.      KESHAV on 7/18/22:  Moderate to severe disease bilaterally.    US carotids on 4/27/2022 through [facility]:  1. Atherosclerotic changes with sonographic data supportive of 50-69% stenosis bilaterally.   2. Retrograde flow in the left vertebral artery.     US abdominal aorta on 4/27/2022:  Diffuse atherosclerotic disease in the intra-abdominal aorta, without sonographic evidence of intra-abdominal aortic aneurysm.     Zio patch on 3/16/22:  Worn for 13 days and 20 hr's.  After removing artifact, total time was 13 days and 16 hr's. Placed on 3/16/2022 at 1:28 PM and completed on 3/30/2022 at 9:15 AM.  Underlying rhythm was sinus.  Hrt rate ranged from 41 bpm, maximum heart rate of 197 bmp, averaging 62 bmp.  No significant bradycardia, pauses, Mobitz type II or 3rd degree heart block.  No atrial fibrillation on this study.  x0 triggered events and x0 diary entries.  x0 runs of VT.    x7 runs of SVT lasting up to 8 beats with a maximum heart rate of 197 bmp.  Rare, <1% of PAC's, atrial couplets, atrial triplets, PVC's, ventricular couplets, and ventricular triplets.  + episodes of ventricular bigeminy lasting up to 4.4 sec's.  0 episodes of ventricular trigeminy lasting.    KESHAV on 2/28/20:  Moderate arterial occlusive disease in both  lower extremities.    US carotids on 2/28/20:  Heavy atherosclerotic plaquing in both carotid bifurcations with velocities consistent with less than 50% stenoses noted.    ECHO on 12/5/19:  No pericardial effusion is present.  Global and regional left ventricular function is normal with an EF of 60-65%.  Grade I or early diastolic dysfunction.  The right ventricle is normal size.  Global right ventricular function is normal.  Both atria appear normal.  Mild mitral insufficiency is present.  Aortic valve is normal in structure and function.  The aortic valve is tricuspid.  Trace tricuspid insufficiency is present.  Right ventricular systolic pressure is 3 mmHg above the right atrial pressure.  The pulmonic valve is normal.    Stress test on 6/14/19:   Reversible ischemia laterally. Abnormally low left ventricular ejection fraction                ICD-10-CM    1. Chronic systolic congestive heart failure (H)  I50.22 EKG 12-lead complete w/read - (Clinic Performed)     losartan (COZAAR) 100 MG tablet     hydrochlorothiazide (HYDRODIURIL) 25 MG tablet     furosemide (LASIX) 20 MG tablet     carvedilol (COREG) 6.25 MG tablet     DISCONTINUED: carvedilol (COREG) 12.5 MG tablet      2. Benign essential hypertension  I10 losartan (COZAAR) 100 MG tablet     hydrochlorothiazide (HYDRODIURIL) 25 MG tablet     furosemide (LASIX) 20 MG tablet     carvedilol (COREG) 6.25 MG tablet     DISCONTINUED: carvedilol (COREG) 12.5 MG tablet      3. Chronic systolic congestive heart failure (H)- Recovered  I50.22 losartan (COZAAR) 100 MG tablet      4. MILLS (dyspnea on exertion)  R06.09 losartan (COZAAR) 100 MG tablet     hydrochlorothiazide (HYDRODIURIL) 25 MG tablet     furosemide (LASIX) 20 MG tablet      5. CKD (chronic kidney disease) stage 4, GFR 15-29 ml/min (H)  N18.4 losartan (COZAAR) 100 MG tablet     hydrochlorothiazide (HYDRODIURIL) 25 MG tablet     furosemide (LASIX) 20 MG tablet      6. Chronic diastolic congestive heart  failure (H)  I50.32 hydrochlorothiazide (HYDRODIURIL) 25 MG tablet     furosemide (LASIX) 20 MG tablet                  Past Medical History:   Diagnosis Date    Acquired hypothyroidism 5/12/2019    Asthma     Benign essential hypertension 5/12/2019    Centrilobular emphysema (H) 5/12/2019    Chronic rhinitis 8/16/2013    Chronic systolic congestive heart failure (H) 5/12/2019    Constipation     Coronary artery disease     Hearing loss     Heart trouble     Hemorrhagic cerebrovascular accident (CVA) (H) 01/2019    Hyperlipidemia 5/12/2019    Hypertension     Nasal congestion     Non-rheumatic mitral regurgitation 5/12/2019    Osteopenia 5/12/2019    Osteoporosis     Parathyroid related hypercalcemia (H) 8/18/2013    Primary hyperparathyroidism (H) 9/5/2013    Ringing in ears     Sensorineural hearing loss, asymmetrical 8/16/2013    Sneezing     Snoring     Stented coronary artery     Thyroid disease     Weakness     Weight loss        Past Surgical History:   Procedure Laterality Date    CATARACT IOL, RT/LT Bilateral     COLONOSCOPY      COLONOSCOPY - HIM SCAN  01/01/2009    Colonoscopy - [facility]/NL  2009    ENDOSCOPY UPPER, COLONOSCOPY, COMBINED N/A 10/17/2019    Procedure: UPPER ENDOSCOPY WITH BIOPSY  AND COLONOSCOPY;  Surgeon: Julio Canales MD;  Location: [facility] OR    ENT SURGERY  2011    para thyroid surgery    ENT SURGERY  09/2013    Parathyroid    ESOPHAGOSCOPY, GASTROSCOPY, DUODENOSCOPY (EGD), COMBINED N/A 9/21/2019    Procedure: ESOPHAGOGASTRODUODENOSCOPY (EGD);  Surgeon: Julio Canales MD;  Location: [facility] OR    ESOPHAGOSCOPY, GASTROSCOPY, DUODENOSCOPY (EGD), COMBINED N/A 1/27/2020    Procedure: ESOPHAGOGASTRODUODENOSCOPY (EGD) WITH BIOPSY;  Surgeon: Isrrael Parish MD;  Location: [facility] OR    ESOPHAGOSCOPY, GASTROSCOPY, DUODENOSCOPY (EGD), COMBINED N/A 2/20/2020    Procedure: ESOPHAGOGASTRODUODENOSCOPY (EGD) WITH BIOPSY;  Surgeon: Pedro Luis Montoya DO;  Location: [facility] OR    PARATHYROIDECTOMY   9/10/2013    Procedure: PARATHYROIDECTOMY;  Reoperative Neck Exploration, Resection of right Parathyroid adenoma;  Surgeon: Thalia Muller MD;  Location: [facility] OR    TONSILLECTOMY      TUBAL LIGATION         Allergies   Allergen Reactions    Evista [Raloxifene] Other (See Comments)     hypercalcemia    Prednisone GI Disturbance    Combigan [Brimonidine Tartrate-Timolol] Other (See Comments)     Eye swelling and itchy    Cyclosporine      Pt reports using eye gtts and having red irritated eyes     Latex Rash    Levaquin [Levofloxacin] Muscle Pain (Myalgia)    Penicillins Rash       Current Outpatient Medications   Medication Sig Dispense Refill    carvedilol (COREG) 6.25 MG tablet Take 1 tablet (6.25 mg) by mouth 2 times daily (with meals). 180 tablet 3    furosemide (LASIX) 20 MG tablet Take 0.5 tablets (10 mg) by mouth daily as needed (swelling). 45 tablet 1    hydrochlorothiazide (HYDRODIURIL) 25 MG tablet Take 1 tablet (25 mg) by mouth daily. 90 tablet 3    losartan (COZAAR) 100 MG tablet Take 1 tablet (100 mg) by mouth daily. 90 tablet 3    aspirin (ASA) 81 MG chewable tablet Take 1 tablet (81 mg) by mouth daily. 90 tablet 3    atorvastatin (LIPITOR) 40 MG tablet Take 1 tablet (40 mg) by mouth at bedtime 90 tablet 3    calcitonin, salmon, (MIACALCIN) 200 UNIT/ACT nasal spray Spray 1 spray into one nostril alternating nostrils daily Alternate nostril each day. 3.7 mL 0    calcium carbonate (TUMS) 500 MG chewable tablet Take 1 chew tab by mouth at bedtime      clotrimazole-betamethasone (LOTRISONE) 1-0.05 % external cream Apply topically 2 times daily 90 g 3    fluticasone (FLONASE) 50 MCG/ACT spray Spray 2 sprays into both nostrils daily 1 Bottle 11    Fluticasone-Umeclidin-Vilanterol (TRELEGY ELLIPTA) 200-62.5-25 MCG/ACT oral inhaler Inhale 1 puff into the lungs daily 180 each 3    gabapentin (NEURONTIN) 100 MG capsule Take 1 capsule (100 mg) by mouth 3 times daily 180 capsule 3    ipratropium - albuterol  0.5 mg/2.5 mg/3 mL (DUONEB) 0.5-2.5 (3) MG/3ML neb solution Take 1 vial (3 mLs) by nebulization every 6 hours as needed for shortness of breath, wheezing or cough 90 mL 3    ipratropium-albuterol (COMBIVENT RESPIMAT)  MCG/ACT inhaler Inhale 1 puff into the lungs 4 times daily 8 g 3    latanoprost (XALATAN) 0.005 % ophthalmic solution       levothyroxine (SYNTHROID/LEVOTHROID) 50 MCG tablet Take 50 mcg by mouth every morning (before breakfast)      methocarbamol (ROBAXIN) 500 MG tablet Take 1 tablet (500 mg) by mouth 4 times daily as needed for muscle spasms 20 tablet 0    mirtazapine (REMERON) 15 MG tablet Take 1 tablet (15 mg) by mouth at bedtime 90 tablet 3    nitroGLYcerin (NITROSTAT) 0.4 MG sublingual tablet Place 1 tablet (0.4 mg) under the tongue every 5 minutes as needed for chest pain. For chest pain place 1 tablet under the tongue every 5 minutes for 3 doses. If symptoms persist 5 minutes after 1st dose call 911. 25 tablet 3    pantoprazole (PROTONIX) 40 MG EC tablet Take 1 tablet (40 mg) by mouth daily 90 tablet 3    SYNTHROID 50 MCG tablet TAKE ONE TABLET BY MOUTH EVERY DAY 90 tablet 1    Tafluprost 0.0015 % SOLN Place 1 drop into both eyes at bedtime -when remembered.      trolamine salicylate (ASPERCREME) 10 % external cream Apply topically daily as needed (lower back pain)         Social History     Socioeconomic History    Marital status:      Spouse name: Not on file    Number of children: Not on file    Years of education: Not on file    Highest education level: Not on file   Occupational History    Not on file   Tobacco Use    Smoking status: Former     Current packs/day: 0.00     Average packs/day: 1 pack/day for 50.0 years (50.0 ttl pk-yrs)     Types: Cigarettes     Start date: 1969     Quit date: 2019     Years since quittin.9     Passive exposure: Past    Smokeless tobacco: Never    Tobacco comments:     quit 2019   Vaping Use    Vaping status: Never Used    Substance and Sexual Activity    Alcohol use: Yes     Comment: social    Drug use: No    Sexual activity: Not Currently   Other Topics Concern    Parent/sibling w/ CABG, MI or angioplasty before 65F 55M? Yes   Social History Narrative    Not on file     Social Drivers of Health     Financial Resource Strain: Low Risk  (12/12/2023)    Financial Resource Strain     Within the past 12 months, have you or your family members you live with been unable to get utilities (heat, electricity) when it was really needed?: No   Food Insecurity: Low Risk  (12/12/2023)    Food Insecurity     Within the past 12 months, did you worry that your food would run out before you got money to buy more?: No     Within the past 12 months, did the food you bought just not last and you didn t have money to get more?: No   Transportation Needs: Low Risk  (12/12/2023)    Transportation Needs     Within the past 12 months, has lack of transportation kept you from medical appointments, getting your medicines, non-medical meetings or appointments, work, or from getting things that you need?: No   Physical Activity: Not on file   Stress: Not on file   Social Connections: Not on file   Interpersonal Safety: Low Risk  (5/13/2024)    Interpersonal Safety     Do you feel physically and emotionally safe where you currently live?: Yes     Within the past 12 months, have you been hit, slapped, kicked or otherwise physically hurt by someone?: No     Within the past 12 months, have you been humiliated or emotionally abused in other ways by your partner or ex-partner?: No   Housing Stability: Low Risk  (12/12/2023)    Housing Stability     Do you have housing? : Yes     Are you worried about losing your housing?: No       LAB RESULTS:   Office Visit on 01/26/2022   Component Date Value Ref Range Status    TSH 01/26/2022 1.84  0.40 - 4.00 mU/L Final    Sodium 01/26/2022 139  133 - 144 mmol/L Final    Potassium 01/26/2022 4.4  3.4 - 5.3 mmol/L Final     Chloride 01/26/2022 109  94 - 109 mmol/L Final    Carbon Dioxide (CO2) 01/26/2022 24  20 - 32 mmol/L Final    Anion Gap 01/26/2022 6  3 - 14 mmol/L Final    Urea Nitrogen 01/26/2022 14  7 - 30 mg/dL Final    Creatinine 01/26/2022 1.12 (A) 0.52 - 1.04 mg/dL Final    Calcium 01/26/2022 9.0  8.5 - 10.1 mg/dL Final    Glucose 01/26/2022 95  70 - 99 mg/dL Final    GFR Estimate 01/26/2022 49 (A) >60 mL/min/1.73m2 Final    Iron 01/26/2022 71  35 - 180 ug/dL Final    Iron Binding Capacity 01/26/2022 294  240 - 430 ug/dL Final    Iron Sat Index 01/26/2022 24  15 - 46 % Final    Ferritin 01/26/2022 633 (A) 8 - 252 ng/mL Final    WBC Count 01/26/2022 8.1  4.0 - 11.0 10e3/uL Final    RBC Count 01/26/2022 4.40  3.80 - 5.20 10e6/uL Final    Hemoglobin 01/26/2022 10.6 (A) 11.7 - 15.7 g/dL Final    Hematocrit 01/26/2022 35.8  35.0 - 47.0 % Final    MCV 01/26/2022 81  78 - 100 fL Final    MCH 01/26/2022 24.1 (A) 26.5 - 33.0 pg Final    MCHC 01/26/2022 29.6 (A) 31.5 - 36.5 g/dL Final    RDW 01/26/2022 24.9 (A) 10.0 - 15.0 % Final    Platelet Count 01/26/2022 405  150 - 450 10e3/uL Final    % Neutrophils 01/26/2022 65  % Final    % Lymphocytes 01/26/2022 23  % Final    % Monocytes 01/26/2022 9  % Final    % Eosinophils 01/26/2022 2  % Final    % Basophils 01/26/2022 1  % Final    % Immature Granulocytes 01/26/2022 0  % Final    NRBCs per 100 WBC 01/26/2022 0  <1 /100 Final    Absolute Neutrophils 01/26/2022 5.2  1.6 - 8.3 10e3/uL Final    Absolute Lymphocytes 01/26/2022 1.8  0.8 - 5.3 10e3/uL Final    Absolute Monocytes 01/26/2022 0.7  0.0 - 1.3 10e3/uL Final    Absolute Eosinophils 01/26/2022 0.2  0.0 - 0.7 10e3/uL Final    Absolute Basophils 01/26/2022 0.1  0.0 - 0.2 10e3/uL Final    Absolute Immature Granulocytes 01/26/2022 0.0  <=0.4 10e3/uL Final    Absolute NRBCs 01/26/2022 0.0  10e3/uL Final        Review of systems: Negative except that which was noted in the HPI.    Physical examination:  BP (!) 190/68   Pulse 69   Temp  98.5  F (36.9  C) (Tympanic)   Ht 1.524 m (5')   Wt 47.2 kg (104 lb)   SpO2 (!) 89%   BMI 20.31 kg/m      GENERAL APPEARANCE: healthy, alert and no distress  CHEST: lungs clear to auscultation.  CARDIOVASCULAR: regular rhythm, normal S1 with physiologic split S2, no S3 or S4 and no murmur, click or rub  EXTREMITIES: no clubbing, cyanosis with minimal edema      Total time spent on day of visit, including review of tests, obtaining/reviewing separately obtained history, ordering medications/tests/procedures, communicating with PCP/consultants, and documenting in electronic medical record: 20 minutes.           Thank you for allowing me to participate in the care of your patient. Please do not hesitate to contact me if you have any questions.     Sal Graham, DO

## 2024-12-02 NOTE — PATIENT INSTRUCTIONS
Thank you for allowing Dr ANIRUDH Graham and our  team to participate in your care. Please call our office at [phone] with scheduling questions or if you need to cancel or change your appointment. With any other questions or concerns you may call cardiology nurse at  [phone].       If you experience chest pain, chest pressure, chest tightness, shortness of breath, fainting, lightheadedness, nausea, vomiting, or other concerning symptoms, please report to the Emergency Department or call 911. These symptoms may be emergent, and best treated in the Emergency Department.

## 2025-01-20 ENCOUNTER — OFFICE VISIT (OUTPATIENT)
Dept: CARDIOLOGY | Facility: OTHER | Age: 85
End: 2025-01-20
Attending: PODIATRIST
Payer: COMMERCIAL

## 2025-01-20 VITALS
OXYGEN SATURATION: 97 % | BODY MASS INDEX: 20.24 KG/M2 | HEIGHT: 60 IN | TEMPERATURE: 97.5 F | HEART RATE: 77 BPM | RESPIRATION RATE: 17 BRPM | WEIGHT: 103.1 LBS

## 2025-01-20 DIAGNOSIS — I73.9 PAD (PERIPHERAL ARTERY DISEASE): ICD-10-CM

## 2025-01-20 DIAGNOSIS — Z92.89 HISTORY OF CARDIOVERSION: ICD-10-CM

## 2025-01-20 DIAGNOSIS — I48.91 POSTOPERATIVE ATRIAL FIBRILLATION (H): ICD-10-CM

## 2025-01-20 DIAGNOSIS — Z86.73 HISTORY OF CVA (CEREBROVASCULAR ACCIDENT): ICD-10-CM

## 2025-01-20 DIAGNOSIS — I50.22 CHRONIC SYSTOLIC CONGESTIVE HEART FAILURE (H): Chronic | ICD-10-CM

## 2025-01-20 DIAGNOSIS — I97.89 POSTOPERATIVE ATRIAL FIBRILLATION (H): ICD-10-CM

## 2025-01-20 DIAGNOSIS — R94.39 ABNORMAL CARDIOVASCULAR STRESS TEST: ICD-10-CM

## 2025-01-20 DIAGNOSIS — Z87.891 HISTORY OF TOBACCO ABUSE: ICD-10-CM

## 2025-01-20 DIAGNOSIS — Z71.6 TOBACCO ABUSE COUNSELING: ICD-10-CM

## 2025-01-20 DIAGNOSIS — R07.9 CHEST PAIN, UNSPECIFIED TYPE: ICD-10-CM

## 2025-01-20 DIAGNOSIS — I10 BENIGN ESSENTIAL HYPERTENSION: ICD-10-CM

## 2025-01-20 DIAGNOSIS — I25.10 ASCVD (ARTERIOSCLEROTIC CARDIOVASCULAR DISEASE): ICD-10-CM

## 2025-01-20 DIAGNOSIS — I25.10 CORONARY ARTERY DISEASE INVOLVING NATIVE CORONARY ARTERY OF NATIVE HEART WITHOUT ANGINA PECTORIS: ICD-10-CM

## 2025-01-20 DIAGNOSIS — R94.39 ABNORMAL STRESS TEST: ICD-10-CM

## 2025-01-20 DIAGNOSIS — E78.2 MIXED HYPERLIPIDEMIA: ICD-10-CM

## 2025-01-20 DIAGNOSIS — Z95.5 HISTORY OF CORONARY ARTERY STENT PLACEMENT: ICD-10-CM

## 2025-01-20 DIAGNOSIS — R06.09 DOE (DYSPNEA ON EXERTION): Primary | ICD-10-CM

## 2025-01-20 DIAGNOSIS — I77.1 SUBCLAVIAN ARTERIAL STENOSIS: ICD-10-CM

## 2025-01-20 DIAGNOSIS — N18.4 CKD (CHRONIC KIDNEY DISEASE) STAGE 4, GFR 15-29 ML/MIN (H): ICD-10-CM

## 2025-01-20 DIAGNOSIS — J44.9 COPD, SEVERE (H): ICD-10-CM

## 2025-01-20 PROCEDURE — 99213 OFFICE O/P EST LOW 20 MIN: CPT | Performed by: INTERNAL MEDICINE

## 2025-01-20 PROCEDURE — G0463 HOSPITAL OUTPT CLINIC VISIT: HCPCS

## 2025-01-20 RX ORDER — CARVEDILOL 12.5 MG/1
12.5 TABLET ORAL 2 TIMES DAILY WITH MEALS
Qty: 180 TABLET | Refills: 3 | Status: SHIPPED | OUTPATIENT
Start: 2025-01-20

## 2025-01-20 ASSESSMENT — PAIN SCALES - GENERAL: PAINLEVEL_OUTOF10: NO PAIN (0)

## 2025-01-20 NOTE — PATIENT INSTRUCTIONS
Thank you for allowing Dr ANIRUDH Graham and our  team to participate in your care. Please call our office at [phone] with scheduling questions or if you need to cancel or change your appointment. With any other questions or concerns you may call cardiology nurse at  [phone].       If you experience chest pain, chest pressure, chest tightness, shortness of breath, fainting, lightheadedness, nausea, vomiting, or other concerning symptoms, please report to the Emergency Department or call 911. These symptoms may be emergent, and best treated in the Emergency Department.

## 2025-01-20 NOTE — PROGRESS NOTES
[facility] HEART CARE   CARDIOLOGY PROGRESS NOTE     Chief Complaint   Patient presents with    RECHECK     1 month follow up after medication changes.           Diagnosis:  1.  CHF-diastolic.    -55-60%/indeterminate on 11/26/23.  -Grade 1 on 12/5/2019. H/O ischemic cardiomyopathy, recovered EF.   -60-65% on 12/5/2019.  -36% on 6/4/2019 stress test.  2.  MILLS.  3.  CAD.   -Normal stress test on 10/14/2024  -Abnormal stress test on 6/14/2019.  -Cardiac cath, 6/20/2019 at [facility].  -Stenting to o/mLCX.  4.  Carotid disease.     -Extensive plaque but no high-grade stenosis on 10/15/2024.  -B/L on 50-69% on 4/27/2023, [facility].    -50% stenosis on 2/28/2020.  5.  CVA on 1/1/2019.  6.  Tobacco abuse.  -Quit on 1/1/2019.   1/2-3/4/pack a day. Started at age 16.   -Exposed to 2nd hand smoke from .   7.  Hypertension-uncontrolled.  8.  CKD-3.  9.  Hyperlipidemia-controlled.  10.  COPD.  -Severe on 5/2/23.  11.  Postop A. fib on 6/29/19 at [facility].  12.  Cardioversion on 6/29/19 at [facility].  13.  PAD.     -Moderate on 7/18/2022.  -2/20/2020-moderate.    Assessment/Plan:   1.  CAD.  Patient had an abnormal stress test on 10/14/2024.  Findings discussed.  Patient unable to go to the [facility].  She does have a history of cardiac catheterization at [facility] with stenting to the ostial/mid LCx on 6/14/2019.  She had a cardiac cath at [facility].  She was found to have no significant lesions requiring stenting.  Will continue medical management.  Findings discussed.  2.  Hypertension: She was seen in the ER on 11/6/2024.  She was referred by myself from the clinic because of severely elevated systolic blood pressure.  Her blood pressure has improved.  Her blood pressure is 152/65.  She believes her blood pressure at home has been in the 120s which has been unusual for her.  At 1 point, her blood pressure was high as 260 mmHg systolically. She should have her blood pressure taken on her right arm and  not the left because there is concern for left subclavian artery stenosis.  Will increase Coreg from 6.25 mg twice a day to 12.5 mg twice a day.  She will continue on Lasix 10 mg as needed, hydrochlorothiazide 25 mg daily, and losartan 100 mg daily.  2.  Here for blood pressure recheck.  Blood pressure today was 152/65.  Increase Coreg from 6.25 mg twice a day to 12.5 mg twice a day.  Follow-up in 3 months to repeat evaluate blood pressure.      Interval history:  See above.    HPI:    Ms. Ocasio has a history of CAD s/p coronary angiography on 6/26/19 at [facility]. She was recently identified to have newly identified systolic heart failure.  Additionally, she has a history of hypertension, hyperlipidemia, nonrheumatic mitral regurgitation, central lobular emphysema, hypothyroidism, osteopenia, hyperparathyroidism, history of tobacco abuse and chronic rhinitis.     At initial visit patient was accompanied by her son who was very helpful in providing patients past medical history. He admitted approximately 20 years ago patient was being followed for MR which was noted to be improved and therefore, valve surgery was never recommended. He admitted that she was previously told it was suspected she had small MI's without coronary intervention or identified ACS. Finally, recent history just before new years of 2020 acute hemorrhagic stroke. It was following this, that she was taken off her beta blocker and lisinopril for HTN and possible past MI, she reported increased shortness of breath following this which has progressively worsened and likely multifactorial with COPD and HFrEF.      6/4/2019-Lexiscan stress test with abnormal imaging with reversible ischemia laterally and LVEF 36%. Additionally, in review of past CT chest imaging from one year ago, she was noted to have advanced atherosclerotic disease with bulky coronary and aortic calcifications. Given these findings, current symptoms and new acute systolic failure,  recommended coronary angiography.     Patient underwent coronary angiography on 6/26/19 at [facility]. She was identified to have diffuse coronary atherosclerosis with single vessel obstructive CAD, ostial and mid LCX. Successful PCI with synergy LISS x2, mid LCX 2.5x16mm, ostial LCX 3.0 x 16 mm. LVEDP 26-29 mmHg. During revascularization she was identified to have AF for which she received DCCV x1 with return to sinus and then reverted back to AF. She was treated with IV beta blocker for rate control. Patient reported feeling good following coronary intervention. Breathing had significantly improved and increased energy.       Patient presented to the ED on 7/13/21 with reports of chest pressure, dizziness and increased dyspnea. She was noted to be playing [employer] ball that same day with symptoms. ECG revealing SR with SVE, no ST-T changes. Troponin negative x 2 and chest XR unremarkable. Lab evaluation with decline in renal function and suspected dehydration.         Relevant testing:  US carotids on 10/15/2024:  Extensive atherosclerotic plaque again seen. Currently, velocity  measurements do not suggest high-grade stenosis.  Unchanged appearance of heavily calcified plaques throughout the  common, internal and external carotid arteries.   Retrograde flow again seen within the left vertebral artery suggesting subclavian steal.    Stress test on 10/14/2024:    The nuclear stress test is negative for inducible myocardial ischemia.    There is a quesitonable small area of infarction in the distal inferolateral segment(s) of the left ventricle.    Left ventricular function is low normal.    The left ventricular ejection fraction at rest is 52%.  The left ventricular ejection fraction at stress is 52%.    A prior study was conducted on 6/14/2019.  This study has changes noted when compared with the prior study. Inferolateral hypoperfusion is improved, no longer reversible.    US carotids on 12/8/23:  1. Right side: Less  than 50 % diameter stenosis by grayscale imaging  and sonographic velocity criteria.  2. Left side: 50 to 69% diameter stenosis overall by grayscale imaging  and sonographic velocity criteria. Increased since comparison.  3. Retrograde flow in the left vertebral artery, correlate for  clinical symptoms of subclavian steal.    ECHO on 11/26/23:  Global and regional left ventricular function is normal with an EF of 55-60%.  Left ventricular diastolic function is indeterminate.   Mild concentric wall thickening consistent with left ventricular hypertrophy is present.  Global right ventricular function is normal.  Mild right ventricular dilation is present.  Moderate left atrial enlargement is present.  Mild right atrial enlargement is present.  Mild mitral annular calcification is present.  Mild mitral insufficiency is present.  No aortic stenosis is present.  Mild tricuspid insufficiency is present.  The right ventricular systolic pressure is 55mmHg above the right atrial pressure.      KESHAV on 7/18/22:  Moderate to severe disease bilaterally.    US carotids on 4/27/2022 through [facility]:  1. Atherosclerotic changes with sonographic data supportive of 50-69% stenosis bilaterally.   2. Retrograde flow in the left vertebral artery.     US abdominal aorta on 4/27/2022:  Diffuse atherosclerotic disease in the intra-abdominal aorta, without sonographic evidence of intra-abdominal aortic aneurysm.     Zio patch on 3/16/22:  Worn for 13 days and 20 hr's.  After removing artifact, total time was 13 days and 16 hr's. Placed on 3/16/2022 at 1:28 PM and completed on 3/30/2022 at 9:15 AM.  Underlying rhythm was sinus.  Hrt rate ranged from 41 bpm, maximum heart rate of 197 bmp, averaging 62 bmp.  No significant bradycardia, pauses, Mobitz type II or 3rd degree heart block.  No atrial fibrillation on this study.  x0 triggered events and x0 diary entries.  x0 runs of VT.    x7 runs of SVT lasting up to 8 beats with a maximum  heart rate of 197 bmp.  Rare, <1% of PAC's, atrial couplets, atrial triplets, PVC's, ventricular couplets, and ventricular triplets.  + episodes of ventricular bigeminy lasting up to 4.4 sec's.  0 episodes of ventricular trigeminy lasting.    KESHAV on 2/28/20:  Moderate arterial occlusive disease in both lower extremities.    US carotids on 2/28/20:  Heavy atherosclerotic plaquing in both carotid bifurcations with velocities consistent with less than 50% stenoses noted.    ECHO on 12/5/19:  No pericardial effusion is present.  Global and regional left ventricular function is normal with an EF of 60-65%.  Grade I or early diastolic dysfunction.  The right ventricle is normal size.  Global right ventricular function is normal.  Both atria appear normal.  Mild mitral insufficiency is present.  Aortic valve is normal in structure and function.  The aortic valve is tricuspid.  Trace tricuspid insufficiency is present.  Right ventricular systolic pressure is 3 mmHg above the right atrial pressure.  The pulmonic valve is normal.    Stress test on 6/14/19:   Reversible ischemia laterally. Abnormally low left ventricular ejection fraction                ICD-10-CM    1. MILLS (dyspnea on exertion)  R06.09       2. COPD, severe (H)  J44.9       3. Mixed hyperlipidemia  E78.2       4. Subclavian arterial stenosis  I77.1       5. CKD (chronic kidney disease) stage 4, GFR 15-29 ml/min (H)  N18.4       6. History of tobacco abuse quitting on 1/1/2019  Z87.891       7. History of CVA on 1/1/2019  Z86.73       8. History of cardioversion on 6/28/2019  Z92.89 carvedilol (COREG) 12.5 MG tablet      9. History of coronary artery stent placement to the O/M LCX on 6/28/2019 at [facility]  Z95.5       10. Tobacco abuse counseling  Z71.6       11. Abnormal cardiovascular stress test on 6/14/2019  R94.39       12. Abnormal stress test  R94.39       13. ASCVD (arteriosclerotic cardiovascular disease)  I25.10       14. Benign essential  hypertension  I10 carvedilol (COREG) 12.5 MG tablet      15. Chronic systolic congestive heart failure (H)- Recovered  I50.22 carvedilol (COREG) 12.5 MG tablet      16. Coronary artery disease involving native coronary artery of native heart without angina pectoris  I25.10       17. PAD on 2/28/2020-moderate (H)  I73.9       18. Postoperative atrial fibrillation on 6/28/2019 (H)  I97.89     I48.91       19. Chest pain, unspecified type  R07.9                     Past Medical History:   Diagnosis Date    Acquired hypothyroidism 5/12/2019    Asthma     Benign essential hypertension 5/12/2019    Centrilobular emphysema (H) 5/12/2019    Chronic rhinitis 8/16/2013    Chronic systolic congestive heart failure (H) 5/12/2019    Constipation     Coronary artery disease     Hearing loss     Heart trouble     Hemorrhagic cerebrovascular accident (CVA) (H) 01/2019    Hyperlipidemia 5/12/2019    Hypertension     Nasal congestion     Non-rheumatic mitral regurgitation 5/12/2019    Osteopenia 5/12/2019    Osteoporosis     Parathyroid related hypercalcemia 8/18/2013    Primary hyperparathyroidism 9/5/2013    Ringing in ears     Sensorineural hearing loss, asymmetrical 8/16/2013    Sneezing     Snoring     Stented coronary artery     Thyroid disease     Weakness     Weight loss        Past Surgical History:   Procedure Laterality Date    CATARACT IOL, RT/LT Bilateral     COLONOSCOPY      COLONOSCOPY - HIM SCAN  01/01/2009    Colonoscopy - [facility]/NL  2009    ENDOSCOPY UPPER, COLONOSCOPY, COMBINED N/A 10/17/2019    Procedure: UPPER ENDOSCOPY WITH BIOPSY  AND COLONOSCOPY;  Surgeon: Julio Canales MD;  Location: [facility] OR    ENT SURGERY  2011    para thyroid surgery    ENT SURGERY  09/2013    Parathyroid    ESOPHAGOSCOPY, GASTROSCOPY, DUODENOSCOPY (EGD), COMBINED N/A 9/21/2019    Procedure: ESOPHAGOGASTRODUODENOSCOPY (EGD);  Surgeon: Julio Canales MD;  Location: [facility] OR    ESOPHAGOSCOPY, GASTROSCOPY, DUODENOSCOPY (EGD),  COMBINED N/A 1/27/2020    Procedure: ESOPHAGOGASTRODUODENOSCOPY (EGD) WITH BIOPSY;  Surgeon: Isrrael Parish MD;  Location: [facility] OR    ESOPHAGOSCOPY, GASTROSCOPY, DUODENOSCOPY (EGD), COMBINED N/A 2/20/2020    Procedure: ESOPHAGOGASTRODUODENOSCOPY (EGD) WITH BIOPSY;  Surgeon: Pedro Luis Montoya DO;  Location: [facility] OR    PARATHYROIDECTOMY  9/10/2013    Procedure: PARATHYROIDECTOMY;  Reoperative Neck Exploration, Resection of right Parathyroid adenoma;  Surgeon: Thalia Muller MD;  Location: [facility] OR    TONSILLECTOMY      TUBAL LIGATION         Allergies   Allergen Reactions    Evista [Raloxifene] Other (See Comments)     hypercalcemia    Prednisone GI Disturbance    Combigan [Brimonidine Tartrate-Timolol] Other (See Comments)     Eye swelling and itchy    Cyclosporine      Pt reports using eye gtts and having red irritated eyes     Latex Rash    Levaquin [Levofloxacin] Muscle Pain (Myalgia)    Penicillins Rash       Current Outpatient Medications   Medication Sig Dispense Refill    aspirin (ASA) 81 MG chewable tablet Take 1 tablet (81 mg) by mouth daily. 90 tablet 3    atorvastatin (LIPITOR) 40 MG tablet Take 1 tablet (40 mg) by mouth at bedtime 90 tablet 3    calcitonin, salmon, (MIACALCIN) 200 UNIT/ACT nasal spray Spray 1 spray into one nostril alternating nostrils daily Alternate nostril each day. 3.7 mL 0    calcium carbonate (TUMS) 500 MG chewable tablet Take 1 chew tab by mouth at bedtime      carvedilol (COREG) 12.5 MG tablet Take 1 tablet (12.5 mg) by mouth 2 times daily (with meals). 180 tablet 3    clotrimazole-betamethasone (LOTRISONE) 1-0.05 % external cream Apply topically 2 times daily 90 g 3    fluticasone (FLONASE) 50 MCG/ACT spray Spray 2 sprays into both nostrils daily 1 Bottle 11    Fluticasone-Umeclidin-Vilanterol (TRELEGY ELLIPTA) 200-62.5-25 MCG/ACT oral inhaler Inhale 1 puff into the lungs daily 180 each 3    furosemide (LASIX) 20 MG tablet Take 0.5 tablets (10 mg) by mouth daily as needed  (swelling). 45 tablet 1    gabapentin (NEURONTIN) 100 MG capsule Take 1 capsule (100 mg) by mouth 3 times daily 180 capsule 3    hydrochlorothiazide (HYDRODIURIL) 25 MG tablet Take 1 tablet (25 mg) by mouth daily. 90 tablet 3    ipratropium - albuterol 0.5 mg/2.5 mg/3 mL (DUONEB) 0.5-2.5 (3) MG/3ML neb solution Take 1 vial (3 mLs) by nebulization every 6 hours as needed for shortness of breath, wheezing or cough 90 mL 3    ipratropium-albuterol (COMBIVENT RESPIMAT)  MCG/ACT inhaler Inhale 1 puff into the lungs 4 times daily 8 g 3    latanoprost (XALATAN) 0.005 % ophthalmic solution       levothyroxine (SYNTHROID/LEVOTHROID) 50 MCG tablet Take 50 mcg by mouth every morning (before breakfast)      losartan (COZAAR) 100 MG tablet Take 1 tablet (100 mg) by mouth daily. 90 tablet 3    methocarbamol (ROBAXIN) 500 MG tablet Take 1 tablet (500 mg) by mouth 4 times daily as needed for muscle spasms 20 tablet 0    mirtazapine (REMERON) 15 MG tablet Take 1 tablet (15 mg) by mouth at bedtime 90 tablet 3    nitroGLYcerin (NITROSTAT) 0.4 MG sublingual tablet Place 1 tablet (0.4 mg) under the tongue every 5 minutes as needed for chest pain. For chest pain place 1 tablet under the tongue every 5 minutes for 3 doses. If symptoms persist 5 minutes after 1st dose call 911. 25 tablet 3    pantoprazole (PROTONIX) 40 MG EC tablet Take 1 tablet (40 mg) by mouth daily 90 tablet 3    SYNTHROID 50 MCG tablet TAKE ONE TABLET BY MOUTH EVERY DAY 90 tablet 1    Tafluprost 0.0015 % SOLN Place 1 drop into both eyes at bedtime -when remembered.      trolamine salicylate (ASPERCREME) 10 % external cream Apply topically daily as needed (lower back pain)         Social History     Socioeconomic History    Marital status:      Spouse name: Not on file    Number of children: Not on file    Years of education: Not on file    Highest education level: Not on file   Occupational History    Not on file   Tobacco Use    Smoking status: Former      Current packs/day: 0.00     Average packs/day: 1 pack/day for 50.0 years (50.0 ttl pk-yrs)     Types: Cigarettes     Start date: 1969     Quit date: 2019     Years since quittin.0     Passive exposure: Past    Smokeless tobacco: Never    Tobacco comments:     quit 2019   Vaping Use    Vaping status: Never Used   Substance and Sexual Activity    Alcohol use: Yes     Comment: social    Drug use: No    Sexual activity: Not Currently   Other Topics Concern    Parent/sibling w/ CABG, MI or angioplasty before 65F 55M? Yes   Social History Narrative    Not on file     Social Drivers of Health     Financial Resource Strain: Low Risk  (2023)    Financial Resource Strain     Within the past 12 months, have you or your family members you live with been unable to get utilities (heat, electricity) when it was really needed?: No   Food Insecurity: Low Risk  (2023)    Food Insecurity     Within the past 12 months, did you worry that your food would run out before you got money to buy more?: No     Within the past 12 months, did the food you bought just not last and you didn t have money to get more?: No   Transportation Needs: Low Risk  (2023)    Transportation Needs     Within the past 12 months, has lack of transportation kept you from medical appointments, getting your medicines, non-medical meetings or appointments, work, or from getting things that you need?: No   Physical Activity: Not on file   Stress: Not on file   Social Connections: Not on file   Interpersonal Safety: Low Risk  (2024)    Interpersonal Safety     Do you feel physically and emotionally safe where you currently live?: Yes     Within the past 12 months, have you been hit, slapped, kicked or otherwise physically hurt by someone?: No     Within the past 12 months, have you been humiliated or emotionally abused in other ways by your partner or ex-partner?: No   Housing Stability: Low Risk  (2023)    Housing Stability      Do you have housing? : Yes     Are you worried about losing your housing?: No       LAB RESULTS:   Office Visit on 01/26/2022   Component Date Value Ref Range Status    TSH 01/26/2022 1.84  0.40 - 4.00 mU/L Final    Sodium 01/26/2022 139  133 - 144 mmol/L Final    Potassium 01/26/2022 4.4  3.4 - 5.3 mmol/L Final    Chloride 01/26/2022 109  94 - 109 mmol/L Final    Carbon Dioxide (CO2) 01/26/2022 24  20 - 32 mmol/L Final    Anion Gap 01/26/2022 6  3 - 14 mmol/L Final    Urea Nitrogen 01/26/2022 14  7 - 30 mg/dL Final    Creatinine 01/26/2022 1.12 (A) 0.52 - 1.04 mg/dL Final    Calcium 01/26/2022 9.0  8.5 - 10.1 mg/dL Final    Glucose 01/26/2022 95  70 - 99 mg/dL Final    GFR Estimate 01/26/2022 49 (A) >60 mL/min/1.73m2 Final    Iron 01/26/2022 71  35 - 180 ug/dL Final    Iron Binding Capacity 01/26/2022 294  240 - 430 ug/dL Final    Iron Sat Index 01/26/2022 24  15 - 46 % Final    Ferritin 01/26/2022 633 (A) 8 - 252 ng/mL Final    WBC Count 01/26/2022 8.1  4.0 - 11.0 10e3/uL Final    RBC Count 01/26/2022 4.40  3.80 - 5.20 10e6/uL Final    Hemoglobin 01/26/2022 10.6 (A) 11.7 - 15.7 g/dL Final    Hematocrit 01/26/2022 35.8  35.0 - 47.0 % Final    MCV 01/26/2022 81  78 - 100 fL Final    MCH 01/26/2022 24.1 (A) 26.5 - 33.0 pg Final    MCHC 01/26/2022 29.6 (A) 31.5 - 36.5 g/dL Final    RDW 01/26/2022 24.9 (A) 10.0 - 15.0 % Final    Platelet Count 01/26/2022 405  150 - 450 10e3/uL Final    % Neutrophils 01/26/2022 65  % Final    % Lymphocytes 01/26/2022 23  % Final    % Monocytes 01/26/2022 9  % Final    % Eosinophils 01/26/2022 2  % Final    % Basophils 01/26/2022 1  % Final    % Immature Granulocytes 01/26/2022 0  % Final    NRBCs per 100 WBC 01/26/2022 0  <1 /100 Final    Absolute Neutrophils 01/26/2022 5.2  1.6 - 8.3 10e3/uL Final    Absolute Lymphocytes 01/26/2022 1.8  0.8 - 5.3 10e3/uL Final    Absolute Monocytes 01/26/2022 0.7  0.0 - 1.3 10e3/uL Final    Absolute Eosinophils 01/26/2022 0.2  0.0 - 0.7 10e3/uL  Final    Absolute Basophils 01/26/2022 0.1  0.0 - 0.2 10e3/uL Final    Absolute Immature Granulocytes 01/26/2022 0.0  <=0.4 10e3/uL Final    Absolute NRBCs 01/26/2022 0.0  10e3/uL Final        Review of systems: Negative except that which was noted in the HPI.    Physical examination:  Pulse 77   Temp 97.5  F (36.4  C) (Tympanic)   Resp 17   Ht 1.524 m (5')   Wt 46.8 kg (103 lb 1.6 oz)   SpO2 97%   BMI 20.14 kg/m      GENERAL APPEARANCE: healthy, alert and no distress  CHEST: lungs clear to auscultation.  CARDIOVASCULAR: regular rhythm, normal S1 with physiologic split S2, no S3 or S4 and no murmur, click or rub  EXTREMITIES: no clubbing, cyanosis with minimal edema            Thank you for allowing me to participate in the care of your patient. Please do not hesitate to contact me if you have any questions.     Sal Graham, DO

## 2025-02-08 ENCOUNTER — APPOINTMENT (OUTPATIENT)
Dept: CT IMAGING | Facility: HOSPITAL | Age: 85
DRG: 377 | End: 2025-02-08
Attending: PHYSICIAN ASSISTANT
Payer: COMMERCIAL

## 2025-02-08 ENCOUNTER — HOSPITAL ENCOUNTER (INPATIENT)
Facility: HOSPITAL | Age: 85
LOS: 3 days | Discharge: HOME | DRG: 377 | End: 2025-02-11
Attending: PHYSICIAN ASSISTANT | Admitting: INTERNAL MEDICINE
Payer: COMMERCIAL

## 2025-02-08 DIAGNOSIS — D72.829 LEUKOCYTOSIS: ICD-10-CM

## 2025-02-08 DIAGNOSIS — K92.2 GI BLEED: ICD-10-CM

## 2025-02-08 DIAGNOSIS — D64.9 ANEMIA: ICD-10-CM

## 2025-02-08 DIAGNOSIS — Z87.19 HISTORY OF GI BLEED: Primary | ICD-10-CM

## 2025-02-08 LAB
ABO + RH BLD: NORMAL
ALBUMIN SERPL BCG-MCNC: 4.1 G/DL (ref 3.5–5.2)
ALP SERPL-CCNC: 50 U/L (ref 40–150)
ALT SERPL W P-5'-P-CCNC: 19 U/L (ref 0–50)
ANION GAP SERPL CALCULATED.3IONS-SCNC: 10 MMOL/L (ref 7–15)
AST SERPL W P-5'-P-CCNC: 21 U/L (ref 0–45)
BASOPHILS # BLD AUTO: 0.1 10E3/UL (ref 0–0.2)
BASOPHILS NFR BLD AUTO: 0 %
BILIRUB SERPL-MCNC: 0.2 MG/DL
BLD GP AB SCN SERPL QL: NEGATIVE
BUN SERPL-MCNC: 47.1 MG/DL (ref 8–23)
CALCIUM SERPL-MCNC: 9.4 MG/DL (ref 8.8–10.4)
CHLORIDE SERPL-SCNC: 101 MMOL/L (ref 98–107)
CREAT SERPL-MCNC: 1.27 MG/DL (ref 0.51–0.95)
EGFRCR SERPLBLD CKD-EPI 2021: 41 ML/MIN/1.73M2
EOSINOPHIL # BLD AUTO: 0.2 10E3/UL (ref 0–0.7)
EOSINOPHIL NFR BLD AUTO: 1 %
ERYTHROCYTE [DISTWIDTH] IN BLOOD BY AUTOMATED COUNT: 13.5 % (ref 10–15)
GLUCOSE SERPL-MCNC: 107 MG/DL (ref 70–99)
HCO3 SERPL-SCNC: 26 MMOL/L (ref 22–29)
HCT VFR BLD AUTO: 29.4 % (ref 35–47)
HGB BLD-MCNC: 9 G/DL (ref 11.7–15.7)
HGB BLD-MCNC: 9.9 G/DL (ref 11.7–15.7)
HOLD SPECIMEN: NORMAL
IMM GRANULOCYTES # BLD: 0.2 10E3/UL
IMM GRANULOCYTES NFR BLD: 1 %
INR PPP: 1.05 (ref 0.85–1.15)
LYMPHOCYTES # BLD AUTO: 1.8 10E3/UL (ref 0.8–5.3)
LYMPHOCYTES NFR BLD AUTO: 10 %
MCH RBC QN AUTO: 30.7 PG (ref 26.5–33)
MCHC RBC AUTO-ENTMCNC: 33.7 G/DL (ref 31.5–36.5)
MCV RBC AUTO: 91 FL (ref 78–100)
MONOCYTES # BLD AUTO: 0.9 10E3/UL (ref 0–1.3)
MONOCYTES NFR BLD AUTO: 5 %
NEUTROPHILS # BLD AUTO: 14.9 10E3/UL (ref 1.6–8.3)
NEUTROPHILS NFR BLD AUTO: 83 %
NRBC # BLD AUTO: 0 10E3/UL
NRBC BLD AUTO-RTO: 0 /100
PLATELET # BLD AUTO: 312 10E3/UL (ref 150–450)
POTASSIUM SERPL-SCNC: 4 MMOL/L (ref 3.4–5.3)
PROT SERPL-MCNC: 6.9 G/DL (ref 6.4–8.3)
RBC # BLD AUTO: 3.22 10E6/UL (ref 3.8–5.2)
SODIUM SERPL-SCNC: 137 MMOL/L (ref 135–145)
SPECIMEN EXP DATE BLD: NORMAL
WBC # BLD AUTO: 18 10E3/UL (ref 4–11)

## 2025-02-08 PROCEDURE — 99285 EMERGENCY DEPT VISIT HI MDM: CPT | Mod: 25 | Performed by: PHYSICIAN ASSISTANT

## 2025-02-08 PROCEDURE — 250N000009 HC RX 250: Performed by: INTERNAL MEDICINE

## 2025-02-08 PROCEDURE — 94640 AIRWAY INHALATION TREATMENT: CPT

## 2025-02-08 PROCEDURE — 82040 ASSAY OF SERUM ALBUMIN: CPT | Performed by: STUDENT IN AN ORGANIZED HEALTH CARE EDUCATION/TRAINING PROGRAM

## 2025-02-08 PROCEDURE — 250N000011 HC RX IP 250 OP 636: Performed by: PHYSICIAN ASSISTANT

## 2025-02-08 PROCEDURE — 74177 CT ABD & PELVIS W/CONTRAST: CPT

## 2025-02-08 PROCEDURE — 250N000013 HC RX MED GY IP 250 OP 250 PS 637: Performed by: INTERNAL MEDICINE

## 2025-02-08 PROCEDURE — 258N000003 HC RX IP 258 OP 636: Performed by: PHYSICIAN ASSISTANT

## 2025-02-08 PROCEDURE — 999N000157 HC STATISTIC RCP TIME EA 10 MIN

## 2025-02-08 PROCEDURE — G0378 HOSPITAL OBSERVATION PER HR: HCPCS

## 2025-02-08 PROCEDURE — 86900 BLOOD TYPING SEROLOGIC ABO: CPT | Performed by: STUDENT IN AN ORGANIZED HEALTH CARE EDUCATION/TRAINING PROGRAM

## 2025-02-08 PROCEDURE — 96374 THER/PROPH/DIAG INJ IV PUSH: CPT | Mod: XU | Performed by: PHYSICIAN ASSISTANT

## 2025-02-08 PROCEDURE — 250N000009 HC RX 250: Performed by: PHYSICIAN ASSISTANT

## 2025-02-08 PROCEDURE — 36415 COLL VENOUS BLD VENIPUNCTURE: CPT | Performed by: INTERNAL MEDICINE

## 2025-02-08 PROCEDURE — 85004 AUTOMATED DIFF WBC COUNT: CPT | Performed by: STUDENT IN AN ORGANIZED HEALTH CARE EDUCATION/TRAINING PROGRAM

## 2025-02-08 PROCEDURE — 99285 EMERGENCY DEPT VISIT HI MDM: CPT | Performed by: PHYSICIAN ASSISTANT

## 2025-02-08 PROCEDURE — 96361 HYDRATE IV INFUSION ADD-ON: CPT | Performed by: PHYSICIAN ASSISTANT

## 2025-02-08 PROCEDURE — 120N000001 HC R&B MED SURG/OB

## 2025-02-08 PROCEDURE — 36415 COLL VENOUS BLD VENIPUNCTURE: CPT | Performed by: STUDENT IN AN ORGANIZED HEALTH CARE EDUCATION/TRAINING PROGRAM

## 2025-02-08 PROCEDURE — 85610 PROTHROMBIN TIME: CPT | Performed by: STUDENT IN AN ORGANIZED HEALTH CARE EDUCATION/TRAINING PROGRAM

## 2025-02-08 PROCEDURE — 250N000013 HC RX MED GY IP 250 OP 250 PS 637: Performed by: HOSPITALIST

## 2025-02-08 PROCEDURE — 85018 HEMOGLOBIN: CPT | Performed by: STUDENT IN AN ORGANIZED HEALTH CARE EDUCATION/TRAINING PROGRAM

## 2025-02-08 PROCEDURE — 94640 AIRWAY INHALATION TREATMENT: CPT | Mod: 76

## 2025-02-08 PROCEDURE — 99222 1ST HOSP IP/OBS MODERATE 55: CPT | Mod: AI | Performed by: INTERNAL MEDICINE

## 2025-02-08 PROCEDURE — 85018 HEMOGLOBIN: CPT | Performed by: INTERNAL MEDICINE

## 2025-02-08 PROCEDURE — 85041 AUTOMATED RBC COUNT: CPT | Performed by: STUDENT IN AN ORGANIZED HEALTH CARE EDUCATION/TRAINING PROGRAM

## 2025-02-08 RX ORDER — AMOXICILLIN 250 MG
2 CAPSULE ORAL 2 TIMES DAILY PRN
Status: DISCONTINUED | OUTPATIENT
Start: 2025-02-08 | End: 2025-02-11 | Stop reason: HOSPADM

## 2025-02-08 RX ORDER — FLUTICASONE PROPIONATE 50 MCG
2 SPRAY, SUSPENSION (ML) NASAL DAILY
Status: DISCONTINUED | OUTPATIENT
Start: 2025-02-09 | End: 2025-02-11 | Stop reason: HOSPADM

## 2025-02-08 RX ORDER — LOSARTAN POTASSIUM 50 MG/1
100 TABLET ORAL DAILY
Status: DISCONTINUED | OUTPATIENT
Start: 2025-02-08 | End: 2025-02-11 | Stop reason: HOSPADM

## 2025-02-08 RX ORDER — LATANOPROST 50 UG/ML
1 SOLUTION/ DROPS OPHTHALMIC AT BEDTIME
Status: DISCONTINUED | OUTPATIENT
Start: 2025-02-08 | End: 2025-02-08

## 2025-02-08 RX ORDER — MIRTAZAPINE 15 MG/1
15 TABLET, FILM COATED ORAL AT BEDTIME
Status: DISCONTINUED | OUTPATIENT
Start: 2025-02-08 | End: 2025-02-11 | Stop reason: HOSPADM

## 2025-02-08 RX ORDER — GABAPENTIN 100 MG/1
100 CAPSULE ORAL 3 TIMES DAILY
Status: DISCONTINUED | OUTPATIENT
Start: 2025-02-08 | End: 2025-02-11 | Stop reason: HOSPADM

## 2025-02-08 RX ORDER — AMOXICILLIN 250 MG
1 CAPSULE ORAL 2 TIMES DAILY PRN
Status: DISCONTINUED | OUTPATIENT
Start: 2025-02-08 | End: 2025-02-11 | Stop reason: HOSPADM

## 2025-02-08 RX ORDER — IPRATROPIUM BROMIDE AND ALBUTEROL SULFATE 2.5; .5 MG/3ML; MG/3ML
1 SOLUTION RESPIRATORY (INHALATION) EVERY 6 HOURS PRN
Status: DISCONTINUED | OUTPATIENT
Start: 2025-02-08 | End: 2025-02-11 | Stop reason: HOSPADM

## 2025-02-08 RX ORDER — SODIUM CHLORIDE 9 MG/ML
INJECTION, SOLUTION INTRAVENOUS CONTINUOUS
Status: DISCONTINUED | OUTPATIENT
Start: 2025-02-08 | End: 2025-02-08

## 2025-02-08 RX ORDER — ATORVASTATIN CALCIUM 20 MG/1
40 TABLET, FILM COATED ORAL AT BEDTIME
Status: DISCONTINUED | OUTPATIENT
Start: 2025-02-08 | End: 2025-02-11 | Stop reason: HOSPADM

## 2025-02-08 RX ORDER — LIDOCAINE 40 MG/G
CREAM TOPICAL
Status: DISCONTINUED | OUTPATIENT
Start: 2025-02-08 | End: 2025-02-11 | Stop reason: HOSPADM

## 2025-02-08 RX ORDER — FLUTICASONE FUROATE AND VILANTEROL 200; 25 UG/1; UG/1
1 POWDER RESPIRATORY (INHALATION) DAILY
Status: DISCONTINUED | OUTPATIENT
Start: 2025-02-09 | End: 2025-02-08

## 2025-02-08 RX ORDER — ACETAMINOPHEN 325 MG/1
650 TABLET ORAL EVERY 4 HOURS PRN
Status: DISCONTINUED | OUTPATIENT
Start: 2025-02-08 | End: 2025-02-11 | Stop reason: HOSPADM

## 2025-02-08 RX ORDER — SUCRALFATE 1 G/1
1 TABLET ORAL
Status: DISCONTINUED | OUTPATIENT
Start: 2025-02-08 | End: 2025-02-11 | Stop reason: HOSPADM

## 2025-02-08 RX ORDER — PANTOPRAZOLE SODIUM 40 MG/1
40 TABLET, DELAYED RELEASE ORAL
Status: DISCONTINUED | OUTPATIENT
Start: 2025-02-08 | End: 2025-02-09

## 2025-02-08 RX ORDER — METHOCARBAMOL 500 MG/1
500 TABLET, FILM COATED ORAL 4 TIMES DAILY PRN
Status: DISCONTINUED | OUTPATIENT
Start: 2025-02-08 | End: 2025-02-11 | Stop reason: HOSPADM

## 2025-02-08 RX ORDER — FLUTICASONE FUROATE AND VILANTEROL 200; 25 UG/1; UG/1
1 POWDER RESPIRATORY (INHALATION) DAILY
Status: DISCONTINUED | OUTPATIENT
Start: 2025-02-08 | End: 2025-02-11 | Stop reason: HOSPADM

## 2025-02-08 RX ORDER — SODIUM CHLORIDE 9 MG/ML
INJECTION, SOLUTION INTRAVENOUS CONTINUOUS
Status: ACTIVE | OUTPATIENT
Start: 2025-02-08 | End: 2025-02-09

## 2025-02-08 RX ORDER — CARVEDILOL 12.5 MG/1
12.5 TABLET ORAL 2 TIMES DAILY WITH MEALS
Status: DISCONTINUED | OUTPATIENT
Start: 2025-02-08 | End: 2025-02-11 | Stop reason: HOSPADM

## 2025-02-08 RX ORDER — ACETAMINOPHEN 650 MG/1
650 SUPPOSITORY RECTAL EVERY 4 HOURS PRN
Status: DISCONTINUED | OUTPATIENT
Start: 2025-02-08 | End: 2025-02-11 | Stop reason: HOSPADM

## 2025-02-08 RX ORDER — IOPAMIDOL 755 MG/ML
48 INJECTION, SOLUTION INTRAVASCULAR ONCE
Status: COMPLETED | OUTPATIENT
Start: 2025-02-08 | End: 2025-02-08

## 2025-02-08 RX ORDER — IPRATROPIUM BROMIDE AND ALBUTEROL SULFATE 2.5; .5 MG/3ML; MG/3ML
3 SOLUTION RESPIRATORY (INHALATION) ONCE
Status: COMPLETED | OUTPATIENT
Start: 2025-02-08 | End: 2025-02-08

## 2025-02-08 RX ORDER — LEVOTHYROXINE SODIUM 25 UG/1
50 TABLET ORAL
Status: DISCONTINUED | OUTPATIENT
Start: 2025-02-09 | End: 2025-02-11 | Stop reason: HOSPADM

## 2025-02-08 RX ORDER — CALCIUM CARBONATE 500 MG/1
1000 TABLET, CHEWABLE ORAL 4 TIMES DAILY PRN
Status: DISCONTINUED | OUTPATIENT
Start: 2025-02-08 | End: 2025-02-11 | Stop reason: HOSPADM

## 2025-02-08 RX ADMIN — IPRATROPIUM BROMIDE AND ALBUTEROL SULFATE 3 ML: .5; 3 SOLUTION RESPIRATORY (INHALATION) at 16:19

## 2025-02-08 RX ADMIN — IPRATROPIUM BROMIDE AND ALBUTEROL SULFATE 3 ML: .5; 3 SOLUTION RESPIRATORY (INHALATION) at 21:21

## 2025-02-08 RX ADMIN — PANTOPRAZOLE SODIUM 80 MG: 40 INJECTION, POWDER, FOR SOLUTION INTRAVENOUS at 13:56

## 2025-02-08 RX ADMIN — SUCRALFATE 1 G: 1 TABLET ORAL at 17:52

## 2025-02-08 RX ADMIN — LOSARTAN POTASSIUM 100 MG: 50 TABLET, FILM COATED ORAL at 17:52

## 2025-02-08 RX ADMIN — SUCRALFATE 1 G: 1 TABLET ORAL at 21:10

## 2025-02-08 RX ADMIN — SODIUM CHLORIDE: 9 INJECTION, SOLUTION INTRAVENOUS at 15:20

## 2025-02-08 RX ADMIN — GABAPENTIN 100 MG: 100 CAPSULE ORAL at 21:10

## 2025-02-08 RX ADMIN — MIRTAZAPINE 15 MG: 15 TABLET, FILM COATED ORAL at 21:10

## 2025-02-08 RX ADMIN — IOPAMIDOL 48 ML: 755 INJECTION, SOLUTION INTRAVENOUS at 14:59

## 2025-02-08 RX ADMIN — CALCIUM CARBONATE (ANTACID) CHEW TAB 500 MG 1000 MG: 500 CHEW TAB at 21:10

## 2025-02-08 RX ADMIN — ACETAMINOPHEN 650 MG: 325 TABLET, FILM COATED ORAL at 23:59

## 2025-02-08 RX ADMIN — CARVEDILOL 12.5 MG: 12.5 TABLET, FILM COATED ORAL at 17:52

## 2025-02-08 RX ADMIN — ATORVASTATIN CALCIUM 40 MG: 20 TABLET, FILM COATED ORAL at 21:10

## 2025-02-08 RX ADMIN — PANTOPRAZOLE SODIUM 40 MG: 40 TABLET, DELAYED RELEASE ORAL at 21:11

## 2025-02-08 ASSESSMENT — ACTIVITIES OF DAILY LIVING (ADL)
ADLS_ACUITY_SCORE: 49
ADLS_ACUITY_SCORE: 54
ADLS_ACUITY_SCORE: 55
ADLS_ACUITY_SCORE: 54
ADLS_ACUITY_SCORE: 51
PATIENT'S_PREFERRED_MEANS_OF_COMMUNICATION: VERBAL
WERE_AUXILIARY_AIDS_OFFERED?: NO
ADLS_ACUITY_SCORE: 55
ADLS_ACUITY_SCORE: 55
WALKING_OR_CLIMBING_STAIRS_DIFFICULTY: YES
DRESSING/BATHING_DIFFICULTY: YES
COMMUNICATION: OTHER (SEE COMMENTS)
USE_OF_HEARING_ASSISTIVE_DEVICES: BILATERAL HEARING AIDS
ADLS_ACUITY_SCORE: 55
DESCRIBE_HEARING_LOSS: BILATERAL HEARING LOSS
ADLS_ACUITY_SCORE: 55
TOILETING_ISSUES: NO
CONCENTRATING,_REMEMBERING_OR_MAKING_DECISIONS_DIFFICULTY: NO
ADLS_ACUITY_SCORE: 55
FALL_HISTORY_WITHIN_LAST_SIX_MONTHS: NO
DOING_ERRANDS_INDEPENDENTLY_DIFFICULTY: YES
HEARING_DIFFICULTY_OR_DEAF: YES
DIFFICULTY_COMMUNICATING: YES
DIFFICULTY_EATING/SWALLOWING: NO
WEAR_GLASSES_OR_BLIND: YES
CHANGE_IN_FUNCTIONAL_STATUS_SINCE_ONSET_OF_CURRENT_ILLNESS/INJURY: NO

## 2025-02-08 ASSESSMENT — COLUMBIA-SUICIDE SEVERITY RATING SCALE - C-SSRS
1. IN THE PAST MONTH, HAVE YOU WISHED YOU WERE DEAD OR WISHED YOU COULD GO TO SLEEP AND NOT WAKE UP?: NO
6. HAVE YOU EVER DONE ANYTHING, STARTED TO DO ANYTHING, OR PREPARED TO DO ANYTHING TO END YOUR LIFE?: NO
2. HAVE YOU ACTUALLY HAD ANY THOUGHTS OF KILLING YOURSELF IN THE PAST MONTH?: NO

## 2025-02-08 ASSESSMENT — ENCOUNTER SYMPTOMS
ROS GI COMMENTS: SEE HPI
FEVER: 0

## 2025-02-08 NOTE — H&P
[facility]    History and Physical - Hospitalist Service       Date of Admission:  2/8/2025    Assessment & Plan      Lita Ocasio is a 84 year old female admitted on 2/8/2025. She presents today with an episode of melena along with a episode of hematemesis.  With her evaluation her hemoglobin was found to be 9.9 which was previously normal.  However patient does have a history of GI bleeds with associated anemia and November 2023 and an 2020.  Patient denies use of any NSAIDs.  She remains on a PPI currently, she does use a baby aspirin daily and no longer is on Plavix.    1) GI/Heme: Patient presents once again with likely GI bleed.  Most likely source is upper GI as per her history with gastritis and stigmata of bleeding in 2020.  In 2023 she declined scope.  She will remain on twice daily PPI while here.  In addition Carafate will be given prior to meals.  Hemoglobin will be trended and if needed transfused if goes below 7.  If she has indication of more significant bleeding we will discuss with surgery and the patient as to EGD and/or colonoscopy.  Currently she is hemodynamically stable.  I would expect that her hemoglobin will drop a bit more due to dilutional component with IV fluids.    2) Endo: Will continue on her outpatient Synthroid.    3) COPD: Will continue on her outpatient inhalers and nebs.    4) CAD: Aspirin will be held at this time.  We will continue her losartan and Coreg.  Diuretics including hydrochlorothiazide and Lasix will be held.    5) CKD: Appears stable at this time as compared to previous values we will continue to monitor.    6) ID: Patient does not have any fevers however she does have an elevated white count at 18.  The exact reason for this is unknown.  At this point we will obtain a urine analysis just for completeness sake.  Otherwise consideration for chest x-ray may be warranted if white count persists or fevers develop.        Diet:  Regular as tolerated.  If any  recurrent emesis or hematemesis she will be made NPO.  DVT Prophylaxis: Due to hematemesis and melena no additional DVT prophylaxis will be given.  Fitzgerald Catheter: Not present  Lines: None     Cardiac Monitoring: None  Code Status:  Full code    Clinically Significant Risk Factors Present on Admission                 # Drug Induced Platelet Defect: home medication list includes an antiplatelet medication   # Hypertension: Noted on problem list  # Chronic heart failure with preserved ejection fraction: heart failure noted on problem list and last echo with EF >50%     # Anemia: based on hgb <11  # Anemia: based on hgb <11                  Disposition Plan     Medically Ready for Discharge: Anticipated in 2-4 Days           Francis Beck DO  Hospitalist Service  [facility]  Securely message with [employer] (more info)  Text page via [facility] Paging/Directory     ______________________________________________________________________    Chief Complaint   Hematemesis and melena    History is obtained from the patient and her daughter.    History of Present Illness   Lita Ocasio is a 84 year old female with a history of GI bleeds in the past in 2020 and again in late 2023.  She presents today after 1 episode of melena and 1 episode of hematemesis this morning.  She is only taking aspirin.  She denies any NSAID use.  Historically she was on Plavix however that was discontinued years ago.  In 2020 she did have an EGD which did show erosive gastritis and stigmata of bleeding.  In November 2023 she declined an EGD and was treated conservatively with PPIs and Carafate along with iron therapy.  Prior to today she states that she felt fine.  She denies any associated abdominal pain with this presentation.  She has not had any recurrent emesis in the emergency department.  She states that she is in fact hungry.  Her history is also remarkable for coronary artery disease, hypothyroidism, hypertension,  hyperlipidemia, chronic kidney disease stage III, COPD severe, along with peripheral vascular disease which results in difficulty obtaining blood pressure from her left arm.      Past Medical History    Past Medical History:   Diagnosis Date    Acquired hypothyroidism 5/12/2019    Asthma     Benign essential hypertension 5/12/2019    Centrilobular emphysema (H) 5/12/2019    Chronic rhinitis 8/16/2013    Chronic systolic congestive heart failure (H) 5/12/2019    Constipation     Coronary artery disease     Hearing loss     Heart trouble     Hemorrhagic cerebrovascular accident (CVA) (H) 01/2019    Hyperlipidemia 5/12/2019    Hypertension     Nasal congestion     Non-rheumatic mitral regurgitation 5/12/2019    Osteopenia 5/12/2019    Osteoporosis     Parathyroid related hypercalcemia 8/18/2013    Primary hyperparathyroidism 9/5/2013    Ringing in ears     Sensorineural hearing loss, asymmetrical 8/16/2013    Sneezing     Snoring     Stented coronary artery     Thyroid disease     Weakness     Weight loss        Past Surgical History   Past Surgical History:   Procedure Laterality Date    CATARACT IOL, RT/LT Bilateral     COLONOSCOPY      COLONOSCOPY - HIM SCAN  01/01/2009    Colonoscopy - [facility]/NL  2009    ENDOSCOPY UPPER, COLONOSCOPY, COMBINED N/A 10/17/2019    Procedure: UPPER ENDOSCOPY WITH BIOPSY  AND COLONOSCOPY;  Surgeon: Julio Canales MD;  Location: [facility] OR    ENT SURGERY  2011    para thyroid surgery    ENT SURGERY  09/2013    Parathyroid    ESOPHAGOSCOPY, GASTROSCOPY, DUODENOSCOPY (EGD), COMBINED N/A 9/21/2019    Procedure: ESOPHAGOGASTRODUODENOSCOPY (EGD);  Surgeon: Julio Canales MD;  Location: [facility] OR    ESOPHAGOSCOPY, GASTROSCOPY, DUODENOSCOPY (EGD), COMBINED N/A 1/27/2020    Procedure: ESOPHAGOGASTRODUODENOSCOPY (EGD) WITH BIOPSY;  Surgeon: Isrrael Parish MD;  Location: [facility] OR    ESOPHAGOSCOPY, GASTROSCOPY, DUODENOSCOPY (EGD), COMBINED N/A 2/20/2020    Procedure: ESOPHAGOGASTRODUODENOSCOPY  (EGD) WITH BIOPSY;  Surgeon: Pedro Luis Montoya DO;  Location: [facility] OR    PARATHYROIDECTOMY  9/10/2013    Procedure: PARATHYROIDECTOMY;  Reoperative Neck Exploration, Resection of right Parathyroid adenoma;  Surgeon: Thalia Muller MD;  Location: [facility] OR    TONSILLECTOMY      TUBAL LIGATION         Prior to Admission Medications   Prior to Admission Medications   Prescriptions Last Dose Informant Patient Reported? Taking?   Fluticasone-Umeclidin-Vilanterol (TRELEGY ELLIPTA) 200-62.5-25 MCG/ACT oral inhaler   No No   Sig: Inhale 1 puff into the lungs daily   SYNTHROID 50 MCG tablet   No No   Sig: TAKE ONE TABLET BY MOUTH EVERY DAY   Tafluprost 0.0015 % SOLN  Self Yes No   Sig: Place 1 drop into both eyes at bedtime -when remembered.   aspirin (ASA) 81 MG chewable tablet   No No   Sig: Take 1 tablet (81 mg) by mouth daily.   atorvastatin (LIPITOR) 40 MG tablet   No No   Sig: Take 1 tablet (40 mg) by mouth at bedtime   calcitonin, salmon, (MIACALCIN) 200 UNIT/ACT nasal spray   No No   Sig: Spray 1 spray into one nostril alternating nostrils daily Alternate nostril each day.   calcium carbonate (TUMS) 500 MG chewable tablet  Self Yes No   Sig: Take 1 chew tab by mouth at bedtime   carvedilol (COREG) 12.5 MG tablet   No No   Sig: Take 1 tablet (12.5 mg) by mouth 2 times daily (with meals).   clotrimazole-betamethasone (LOTRISONE) 1-0.05 % external cream   No No   Sig: Apply topically 2 times daily   fluticasone (FLONASE) 50 MCG/ACT spray  Self No No   Sig: Spray 2 sprays into both nostrils daily   furosemide (LASIX) 20 MG tablet   No No   Sig: Take 0.5 tablets (10 mg) by mouth daily as needed (swelling).   gabapentin (NEURONTIN) 100 MG capsule   No No   Sig: Take 1 capsule (100 mg) by mouth 3 times daily   hydrochlorothiazide (HYDRODIURIL) 25 MG tablet   No No   Sig: Take 1 tablet (25 mg) by mouth daily.   ipratropium - albuterol 0.5 mg/2.5 mg/3 mL (DUONEB) 0.5-2.5 (3) MG/3ML neb solution   No No   Sig: Take 1 vial  (3 mLs) by nebulization every 6 hours as needed for shortness of breath, wheezing or cough   ipratropium-albuterol (COMBIVENT RESPIMAT)  MCG/ACT inhaler   No No   Sig: Inhale 1 puff into the lungs 4 times daily   latanoprost (XALATAN) 0.005 % ophthalmic solution   Yes No   levothyroxine (SYNTHROID/LEVOTHROID) 50 MCG tablet   Yes No   Sig: Take 50 mcg by mouth every morning (before breakfast)   losartan (COZAAR) 100 MG tablet   No No   Sig: Take 1 tablet (100 mg) by mouth daily.   methocarbamol (ROBAXIN) 500 MG tablet   No No   Sig: Take 1 tablet (500 mg) by mouth 4 times daily as needed for muscle spasms   mirtazapine (REMERON) 15 MG tablet   No No   Sig: Take 1 tablet (15 mg) by mouth at bedtime   nitroGLYcerin (NITROSTAT) 0.4 MG sublingual tablet   No No   Sig: Place 1 tablet (0.4 mg) under the tongue every 5 minutes as needed for chest pain. For chest pain place 1 tablet under the tongue every 5 minutes for 3 doses. If symptoms persist 5 minutes after 1st dose call 911.   pantoprazole (PROTONIX) 40 MG EC tablet   No No   Sig: Take 1 tablet (40 mg) by mouth daily   trolamine salicylate (ASPERCREME) 10 % external cream   Yes No   Sig: Apply topically daily as needed (lower back pain)      Facility-Administered Medications: None        Review of Systems    The 10 point Review of Systems is negative other than noted in the HPI or here.     Social History   I have reviewed this patient's social history and updated it with pertinent information if needed.  Social History     Tobacco Use    Smoking status: Former     Current packs/day: 0.00     Average packs/day: 1 pack/day for 50.0 years (50.0 ttl pk-yrs)     Types: Cigarettes     Start date: 1969     Quit date: 2019     Years since quittin.1     Passive exposure: Past    Smokeless tobacco: Never    Tobacco comments:     quit 2019   Vaping Use    Vaping status: Never Used   Substance Use Topics    Alcohol use: Yes     Comment: social    Drug use:  No         Family History   I have reviewed this patient's family history and updated it with pertinent information if needed.  Family History   Problem Relation Age of Onset    Hearing Loss Mother     Heart Disease Mother     Myocardial Infarction Mother     Cerebrovascular Disease Father     Cancer Brother         throat    Cancer Sister     Myocardial Infarction Sister     Cancer Maternal Grandmother     Heart Disease Maternal Grandfather     Aortic aneurysm Son          Allergies   Allergies   Allergen Reactions    Evista [Raloxifene] Other (See Comments)     hypercalcemia    Prednisone GI Disturbance    Combigan [Brimonidine Tartrate-Timolol] Other (See Comments)     Eye swelling and itchy    Cyclosporine      Pt reports using eye gtts and having red irritated eyes     Latex Rash    Levaquin [Levofloxacin] Muscle Pain (Myalgia)    Penicillins Rash        Physical Exam   Vital Signs: Temp: 98.6  F (37  C)   BP: (!) 141/61 Pulse: 88   Resp: 21 SpO2: 93 % O2 Device: None (Room air)    Weight: 98 lbs 14.4 oz    Constitutional: awake, alert, cooperative, no apparent distress, and appears stated age  Eyes: Lids and lashes normal, pupils equal, round and reactive to light, extra ocular muscles intact, sclera clear, conjunctiva normal  Respiratory: No increased work of breathing, good air exchange, clear to auscultation bilaterally, no crackles or wheezing  Cardiovascular: Normal apical impulse, regular rate and rhythm, normal S1 and S2, no S3 or S4, and no murmur noted  GI: Soft nontender with positive bowel sounds.  Skin: no bruising or bleeding and no redness, warmth, or swelling  Musculoskeletal: No lower extremity edema.  Neurologic: Focal neurologic deficits noted.  Neuropsychiatric: General: normal, calm, and normal eye contact  Level of consciousness: alert / normal  Affect: normal  Orientation: oriented to self, place, time and situation    Medical Decision Making       45 MINUTES SPENT BY ME on the date of  service doing chart review, history, exam, documentation & further activities per the note.      Data     I have personally reviewed the following data over the past 24 hrs:    18.0 (H)  \   9.9 (L)   / 312     137 101 47.1 (H) /  107 (H)   4.0 26 1.27 (H) \     ALT: 19 AST: 21 AP: 50 TBILI: 0.2   ALB: 4.1 TOT PROTEIN: 6.9 LIPASE: N/A     INR:  1.05 PTT:  N/A   D-dimer:  N/A Fibrinogen:  N/A       Imaging results reviewed over the past 24 hrs:   Recent Results (from the past 24 hours)   CT Abdomen Pelvis w Contrast    Narrative    EXAM: CT ABDOMEN PELVIS W CONTRAST  LOCATION: [facility]  DATE: 2/8/2025    INDICATION: Leukocytosis, melena, hematemesis  COMPARISON: CT chest abdomen pelvis 11/26/2023  TECHNIQUE: CT scan of the abdomen and pelvis was performed following injection of IV contrast. Multiplanar reformats were obtained. Dose reduction techniques were used.  CONTRAST: 48 mL [id number]    FINDINGS:   LOWER CHEST: Normal.    HEPATOBILIARY: No significant findings.    PANCREAS: Normal.    SPLEEN: Normal.    ADRENAL GLANDS: Slightly nodular left adrenal gland unchanged. No significance.    KIDNEYS/BLADDER: Moderate smooth atrophy left kidney likely related to renal artery stenosis although the renal artery is poorly imaged on this study. Similar degree of atrophy 2 11/26/2023. No other significant findings.    BOWEL: No significant findings. No acute inflammatory change.    LYMPH NODES: Normal.    VASCULATURE: Severe calcified plaque. High-grade stenosis distal aorta at the bifurcation. Significant stenoses in the iliac arteries poorly characterized.    PELVIC ORGANS: Normal.    MUSCULOSKELETAL: Simple chronic compression fractures L2 and L5 mildly progressed since 3/28/2024.      Impression    IMPRESSION:   1.  No acute findings in the abdomen or pelvis.    2.  Severe atherosclerotic disease with probable significant left renal artery stenosis and homogeneous atrophy left kidney and severe stenoses  in the distal aorta and scattered significant stenoses in the iliac arteries.

## 2025-02-08 NOTE — ED PROVIDER NOTES
History     Chief Complaint   Patient presents with    Hematemesis     The history is provided by the patient.     Lita Ocasio is a 84 year old female who presented to the emergency department via EMS for evaluation of nausea, vomiting, and dark stools.  Symptoms began this morning.  No blood thinners.  No abdominal pain.  No fevers.    Allergies:  Allergies   Allergen Reactions    Evista [Raloxifene] Other (See Comments)     hypercalcemia    Prednisone GI Disturbance    Combigan [Brimonidine Tartrate-Timolol] Other (See Comments)     Eye swelling and itchy    Cyclosporine      Pt reports using eye gtts and having red irritated eyes     Latex Rash    Levaquin [Levofloxacin] Muscle Pain (Myalgia)    Penicillins Rash       Problem List:    Patient Active Problem List    Diagnosis Date Noted    GI bleed 02/08/2025     Priority: Medium    Leukocytosis 02/08/2025     Priority: Medium    Anemia 11/26/2023     Priority: Medium    Elevated brain natriuretic peptide (BNP) level 11/26/2023     Priority: Medium    COPD, severe (H) 09/06/2023     Priority: Medium    Chest pain, unspecified type 02/16/2023     Priority: Medium    Coronary artery disease involving native coronary artery of native heart without angina pectoris 03/14/2022     Priority: Medium    Abnormal cardiovascular stress test on 6/14/2019 03/14/2022     Priority: Medium    History of CVA on 1/1/2019 03/14/2022     Priority: Medium    Postoperative atrial fibrillation on 6/28/2019 (H) 03/14/2022     Priority: Medium    History of cardioversion on 6/28/2019 03/14/2022     Priority: Medium    PAD on 2/28/2020-moderate (H) 03/14/2022     Priority: Medium    Bilateral carotid artery stenosis 03/14/2022     Priority: Medium    History of tobacco abuse quitting on 1/1/2019 03/14/2022     Priority: Medium    Tobacco abuse counseling 03/14/2022     Priority: Medium    CKD (chronic kidney disease) stage 4, GFR 15-29 ml/min (H) 01/26/2022     Priority: Medium     Subclavian arterial stenosis 01/26/2022     Priority: Medium    Iron deficiency anemia secondary to inadequate dietary iron intake 01/07/2022     Priority: Medium    Weight loss 02/05/2020     Priority: Medium    History of GI bleed 09/20/2019     Priority: Medium    Protein-calorie malnutrition 09/04/2019     Priority: Medium    History of coronary artery stent placement to the O/M LCX on 6/28/2019 at [facility] 07/23/2019     Priority: Medium    Abnormal stress test 06/18/2019     Priority: Medium     Added automatically from request for surgery [id number]      MILLS (dyspnea on exertion) 06/18/2019     Priority: Medium     Added automatically from request for surgery [id number]      ASCVD (arteriosclerotic cardiovascular disease) 06/18/2019     Priority: Medium     Added automatically from request for surgery [id number]      Mixed hyperlipidemia 06/18/2019     Priority: Medium     Added automatically from request for surgery [id number]      Centrilobular emphysema (H) 05/12/2019     Priority: Medium    Acquired hypothyroidism 05/12/2019     Priority: Medium    Chronic systolic congestive heart failure (H)- Recovered 05/12/2019     Priority: Medium    Benign essential hypertension 05/12/2019     Priority: Medium    Osteopenia 05/12/2019     Priority: Medium    Non-rheumatic mitral regurgitation 05/12/2019     Priority: Medium    Primary hyperparathyroidism 09/05/2013     Priority: Medium    Chronic rhinitis 08/16/2013     Priority: Medium        Past Medical History:    Past Medical History:   Diagnosis Date    Acquired hypothyroidism 5/12/2019    Asthma     Benign essential hypertension 5/12/2019    Centrilobular emphysema (H) 5/12/2019    Chronic rhinitis 8/16/2013    Chronic systolic congestive heart failure (H) 5/12/2019    Constipation     Coronary artery disease     Hearing loss     Heart trouble     Hemorrhagic cerebrovascular accident (CVA) (H) 01/2019    Hyperlipidemia 5/12/2019    Hypertension     Nasal congestion      Non-rheumatic mitral regurgitation 5/12/2019    Osteopenia 5/12/2019    Osteoporosis     Parathyroid related hypercalcemia 8/18/2013    Primary hyperparathyroidism 9/5/2013    Ringing in ears     Sensorineural hearing loss, asymmetrical 8/16/2013    Sneezing     Snoring     Stented coronary artery     Thyroid disease     Weakness     Weight loss        Past Surgical History:    Past Surgical History:   Procedure Laterality Date    CATARACT IOL, RT/LT Bilateral     COLONOSCOPY      COLONOSCOPY - HIM SCAN  01/01/2009    Colonoscopy - [facility]/NL  2009    ENDOSCOPY UPPER, COLONOSCOPY, COMBINED N/A 10/17/2019    Procedure: UPPER ENDOSCOPY WITH BIOPSY  AND COLONOSCOPY;  Surgeon: Julio Canales MD;  Location: [facility] OR    ENT SURGERY  2011    para thyroid surgery    ENT SURGERY  09/2013    Parathyroid    ESOPHAGOSCOPY, GASTROSCOPY, DUODENOSCOPY (EGD), COMBINED N/A 9/21/2019    Procedure: ESOPHAGOGASTRODUODENOSCOPY (EGD);  Surgeon: Julio Canales MD;  Location: [facility] OR    ESOPHAGOSCOPY, GASTROSCOPY, DUODENOSCOPY (EGD), COMBINED N/A 1/27/2020    Procedure: ESOPHAGOGASTRODUODENOSCOPY (EGD) WITH BIOPSY;  Surgeon: Isrrael Parish MD;  Location: [facility] OR    ESOPHAGOSCOPY, GASTROSCOPY, DUODENOSCOPY (EGD), COMBINED N/A 2/20/2020    Procedure: ESOPHAGOGASTRODUODENOSCOPY (EGD) WITH BIOPSY;  Surgeon: Pedro Luis Montoya DO;  Location: [facility] OR    PARATHYROIDECTOMY  9/10/2013    Procedure: PARATHYROIDECTOMY;  Reoperative Neck Exploration, Resection of right Parathyroid adenoma;  Surgeon: Thalia Muller MD;  Location: [facility] OR    TONSILLECTOMY      TUBAL LIGATION         Family History:    Family History   Problem Relation Age of Onset    Hearing Loss Mother     Heart Disease Mother     Myocardial Infarction Mother     Cerebrovascular Disease Father     Cancer Brother         throat    Cancer Sister     Myocardial Infarction Sister     Cancer Maternal Grandmother     Heart Disease Maternal Grandfather     Aortic aneurysm Son         Social History:  Marital Status:   [5]  Social History     Tobacco Use    Smoking status: Former     Current packs/day: 0.00     Average packs/day: 1 pack/day for 50.0 years (50.0 ttl pk-yrs)     Types: Cigarettes     Start date: 1969     Quit date: 2019     Years since quittin.1     Passive exposure: Past    Smokeless tobacco: Never    Tobacco comments:     quit 2019   Vaping Use    Vaping status: Never Used   Substance Use Topics    Alcohol use: Yes     Comment: social    Drug use: No        Medications:    aspirin (ASA) 81 MG chewable tablet  atorvastatin (LIPITOR) 40 MG tablet  calcitonin, salmon, (MIACALCIN) 200 UNIT/ACT nasal spray  calcium carbonate (TUMS) 500 MG chewable tablet  carvedilol (COREG) 12.5 MG tablet  clotrimazole-betamethasone (LOTRISONE) 1-0.05 % external cream  fluticasone (FLONASE) 50 MCG/ACT spray  Fluticasone-Umeclidin-Vilanterol (TRELEGY ELLIPTA) 200-62.5-25 MCG/ACT oral inhaler  furosemide (LASIX) 20 MG tablet  gabapentin (NEURONTIN) 100 MG capsule  hydrochlorothiazide (HYDRODIURIL) 25 MG tablet  ipratropium - albuterol 0.5 mg/2.5 mg/3 mL (DUONEB) 0.5-2.5 (3) MG/3ML neb solution  ipratropium-albuterol (COMBIVENT RESPIMAT)  MCG/ACT inhaler  latanoprost (XALATAN) 0.005 % ophthalmic solution  levothyroxine (SYNTHROID/LEVOTHROID) 50 MCG tablet  losartan (COZAAR) 100 MG tablet  methocarbamol (ROBAXIN) 500 MG tablet  mirtazapine (REMERON) 15 MG tablet  nitroGLYcerin (NITROSTAT) 0.4 MG sublingual tablet  pantoprazole (PROTONIX) 40 MG EC tablet  SYNTHROID 50 MCG tablet  Tafluprost 0.0015 % SOLN  trolamine salicylate (ASPERCREME) 10 % external cream          Review of Systems   Constitutional:  Negative for fever.   Gastrointestinal:         See HPI       Physical Exam   BP: (!) 148/62  Pulse: 86  Temp: 98.6  F (37  C)  Resp: 22  Weight: 44.9 kg (98 lb 14.4 oz)  SpO2: 95 %      Physical Exam  Vitals and nursing note reviewed.   Constitutional:       General: She  is not in acute distress.     Appearance: She is not ill-appearing, toxic-appearing or diaphoretic.   Cardiovascular:      Rate and Rhythm: Normal rate and regular rhythm.   Pulmonary:      Effort: Pulmonary effort is normal.   Skin:     General: Skin is warm and dry.      Capillary Refill: Capillary refill takes less than 2 seconds.   Neurological:      General: No focal deficit present.      Mental Status: She is alert and oriented to person, place, and time.         ED Course        Procedures              Critical Care time:  none              Results for orders placed or performed during the hospital encounter of 02/08/25 (from the past 24 hours)   ABO/Rh type and screen    Narrative    The following orders were created for panel order ABO/Rh type and screen.  Procedure                               Abnormality         Status                     ---------                               -----------         ------                     Adult Type and Screen[[id number]]                            Final result                 Please view results for these tests on the individual orders.   INR   Result Value Ref Range    INR 1.05 0.85 - 1.15   CBC with Platelets & Differential    Narrative    The following orders were created for panel order CBC with Platelets & Differential.  Procedure                               Abnormality         Status                     ---------                               -----------         ------                     CBC with platelets and d...[[id number]]  Abnormal            Final result                 Please view results for these tests on the individual orders.   Comprehensive metabolic panel   Result Value Ref Range    Sodium 137 135 - 145 mmol/L    Potassium 4.0 3.4 - 5.3 mmol/L    Carbon Dioxide (CO2) 26 22 - 29 mmol/L    Anion Gap 10 7 - 15 mmol/L    Urea Nitrogen 47.1 (H) 8.0 - 23.0 mg/dL    Creatinine 1.27 (H) 0.51 - 0.95 mg/dL    GFR Estimate 41 (L) >60 mL/min/1.73m2    Calcium  9.4 8.8 - 10.4 mg/dL    Chloride 101 98 - 107 mmol/L    Glucose 107 (H) 70 - 99 mg/dL    Alkaline Phosphatase 50 40 - 150 U/L    AST 21 0 - 45 U/L    ALT 19 0 - 50 U/L    Protein Total 6.9 6.4 - 8.3 g/dL    Albumin 4.1 3.5 - 5.2 g/dL    Bilirubin Total 0.2 <=1.2 mg/dL   [place] Draw    Narrative    The following orders were created for panel order [place] Draw.  Procedure                               Abnormality         Status                     ---------                               -----------         ------                     Extra Red Top Tube[[id number]]                               Final result               Extra Green Top (Lithium...[[id number]]                      Final result               Extra Purple Top Tube[[id number]]                                                         Please view results for these tests on the individual orders.   Adult Type and Screen   Result Value Ref Range    ABO/RH(D) A POS     Antibody Screen Negative Negative    SPECIMEN EXPIRATION DATE [id number]    CBC with platelets and differential   Result Value Ref Range    WBC Count 18.0 (H) 4.0 - 11.0 10e3/uL    RBC Count 3.22 (L) 3.80 - 5.20 10e6/uL    Hemoglobin 9.9 (L) 11.7 - 15.7 g/dL    Hematocrit 29.4 (L) 35.0 - 47.0 %    MCV 91 78 - 100 fL    MCH 30.7 26.5 - 33.0 pg    MCHC 33.7 31.5 - 36.5 g/dL    RDW 13.5 10.0 - 15.0 %    Platelet Count 312 150 - 450 10e3/uL    % Neutrophils 83 %    % Lymphocytes 10 %    % Monocytes 5 %    % Eosinophils 1 %    % Basophils 0 %    % Immature Granulocytes 1 %    NRBCs per 100 WBC 0 <1 /100    Absolute Neutrophils 14.9 (H) 1.6 - 8.3 10e3/uL    Absolute Lymphocytes 1.8 0.8 - 5.3 10e3/uL    Absolute Monocytes 0.9 0.0 - 1.3 10e3/uL    Absolute Eosinophils 0.2 0.0 - 0.7 10e3/uL    Absolute Basophils 0.1 0.0 - 0.2 10e3/uL    Absolute Immature Granulocytes 0.2 <=0.4 10e3/uL    Absolute NRBCs 0.0 10e3/uL   Extra Red Top Tube   Result Value Ref Range    Hold Specimen [facility]    Extra Green Top  (Lithium Heparin) Tube   Result Value Ref Range    Hold Specimen [facility]    CT Abdomen Pelvis w Contrast    Narrative    EXAM: CT ABDOMEN PELVIS W CONTRAST  LOCATION: [facility]  DATE: 2/8/2025    INDICATION: Leukocytosis, melena, hematemesis  COMPARISON: CT chest abdomen pelvis 11/26/2023  TECHNIQUE: CT scan of the abdomen and pelvis was performed following injection of IV contrast. Multiplanar reformats were obtained. Dose reduction techniques were used.  CONTRAST: 48 mL Isovue-370    FINDINGS:   LOWER CHEST: Normal.    HEPATOBILIARY: No significant findings.    PANCREAS: Normal.    SPLEEN: Normal.    ADRENAL GLANDS: Slightly nodular left adrenal gland unchanged. No significance.    KIDNEYS/BLADDER: Moderate smooth atrophy left kidney likely related to renal artery stenosis although the renal artery is poorly imaged on this study. Similar degree of atrophy 2 11/26/2023. No other significant findings.    BOWEL: No significant findings. No acute inflammatory change.    LYMPH NODES: Normal.    VASCULATURE: Severe calcified plaque. High-grade stenosis distal aorta at the bifurcation. Significant stenoses in the iliac arteries poorly characterized.    PELVIC ORGANS: Normal.    MUSCULOSKELETAL: Simple chronic compression fractures L2 and L5 mildly progressed since 3/28/2024.      Impression    IMPRESSION:   1.  No acute findings in the abdomen or pelvis.    2.  Severe atherosclerotic disease with probable significant left renal artery stenosis and homogeneous atrophy left kidney and severe stenoses in the distal aorta and scattered significant stenoses in the iliac arteries.       Medications   sodium chloride 0.9 % infusion ( Intravenous $New Bag 2/8/25 1520)   ipratropium - albuterol 0.5 mg/2.5 mg/3 mL (DUONEB) neb solution 3 mL (has no administration in time range)   pantoprazole (PROTONIX) IV push injection 80 mg (80 mg Intravenous $Given 2/8/25 1356)   iopamidol (ISOVUE-370) solution 48 mL (48 mLs  Intravenous $Given 2/8/25 4362)   sodium chloride (PF) 0.9% PF flush 43 mL (43 mLs Intravenous $Given 2/8/25 8529)       Assessments & Plan (with Medical Decision Making)   84-year-old female with a likely GI bleed.  Recent drop in hemoglobin.  Obviously would fit any reasonable indication for admission for further investigation.  Protonix in the emergency department.  Accepted by Dr. Beck.     This document was prepared using a combination of typing and voice generated software.  While every attempt was made for accuracy, spelling and grammatical errors may exist.     I have reviewed the nursing notes.    I have reviewed the findings, diagnosis, plan and need for follow up with the patient.           Medical Decision Making  The patient's presentation was of moderate complexity (an undiagnosed new problem with uncertain prognosis).    The patient's evaluation involved:  ordering and/or review of 3+ test(s) in this encounter (multiple labs and CT scan)    The patient's management necessitated moderate risk (prescription drug management including medications given in the ED) and high risk (a decision regarding hospitalization).        New Prescriptions    No medications on file       Final diagnoses:   GI bleed   Anemia   Leukocytosis       2/8/2025   [facility] EM[facility]       Ladarius Snowden PA-C  02/08/25 [id number]

## 2025-02-08 NOTE — ED TRIAGE NOTES
One episode of vomiting with blood today and noticed formed dark stool today as well. No hx GI bleed. On aspirin, no blood thinners. Denies abd pain or nausea.

## 2025-02-08 NOTE — ED NOTES
Bed: ED03  Expected date: 2/8/25  Expected time: 1:09 PM  Means of arrival:   Comments:  [facility]

## 2025-02-08 NOTE — PLAN OF CARE
[facility] Inpatient Admission Note:    Patient admitted to 3212/3212-1 at approximately 1640 via wheel chair accompanied by daughter from emergency room . Report received from TRUNG Pillai in SBAR format at 1632 via telephone. Patient transferred to bed via self.. Patient is alert and oriented X 3, denies pain; rates at 0 on 0-10 scale.  Patient oriented to room, unit, hourly rounding, and plan of care. Explained admission packet and patient handbook with patient bill of rights brochure. Will continue to monitor and document as needed.     Inpatient Nursing criteria listed below was met:    Health care directives status obtained and documented: Yes    Patient identifies a surrogate decision maker: Yes If yes, who: daughter, Cris. Contact Information: see chart     If initial lactic acid greater than 2.0, repeat lactic acid drawn within one hour of arrival to unit: N/A. If no, state reason: N/A    Clergy visit ordered if patient requests: N/A    Skin issues/needs documented: N/A    Isolation Patient: No. Education given, correct sign in place and documentation row added to PCS: N/A    Fall Prevention Yes: Care plan updated, education given and documented, sticker and magnet in place: Yes    Care Plan initiated: Yes    Education Documented (including assessment): Yes    Patient has discharge needs: unknown at this time. If yes, please explain: N/A    VS and assessment as charted. Alert, oriented and able to make needs known. Denies pain. NS infusing at 75 ml/hr into PIV in (R) AC. Denies N/V. Ate 50% of supper meal. No bloody stools. Bilateral SCDs on. Call light within reach. Bed locked and low. Alarm on.    Face to face report given with opportunity to observe patient.  Report given to TRUNG More.    Cheyanne Del Rio RN  2/8/2025, 7:13 PM

## 2025-02-09 LAB
ALBUMIN UR-MCNC: NEGATIVE MG/DL
ANION GAP SERPL CALCULATED.3IONS-SCNC: 11 MMOL/L (ref 7–15)
APPEARANCE UR: CLEAR
BASOPHILS # BLD AUTO: 0.1 10E3/UL (ref 0–0.2)
BASOPHILS NFR BLD AUTO: 1 %
BILIRUB UR QL STRIP: NEGATIVE
BUN SERPL-MCNC: 42.6 MG/DL (ref 8–23)
CALCIUM SERPL-MCNC: 9 MG/DL (ref 8.8–10.4)
CHLORIDE SERPL-SCNC: 104 MMOL/L (ref 98–107)
COLOR UR AUTO: ABNORMAL
CREAT SERPL-MCNC: 1.3 MG/DL (ref 0.51–0.95)
EGFRCR SERPLBLD CKD-EPI 2021: 40 ML/MIN/1.73M2
EOSINOPHIL # BLD AUTO: 0.2 10E3/UL (ref 0–0.7)
EOSINOPHIL NFR BLD AUTO: 2 %
ERYTHROCYTE [DISTWIDTH] IN BLOOD BY AUTOMATED COUNT: 13.9 % (ref 10–15)
GLUCOSE SERPL-MCNC: 108 MG/DL (ref 70–99)
GLUCOSE UR STRIP-MCNC: NEGATIVE MG/DL
HCO3 SERPL-SCNC: 23 MMOL/L (ref 22–29)
HCT VFR BLD AUTO: 24.9 % (ref 35–47)
HGB BLD-MCNC: 8.3 G/DL (ref 11.7–15.7)
HGB BLD-MCNC: 8.4 G/DL (ref 11.7–15.7)
HGB UR QL STRIP: NEGATIVE
IMM GRANULOCYTES # BLD: 0.1 10E3/UL
IMM GRANULOCYTES NFR BLD: 1 %
KETONES UR STRIP-MCNC: NEGATIVE MG/DL
LEUKOCYTE ESTERASE UR QL STRIP: ABNORMAL
LYMPHOCYTES # BLD AUTO: 2.4 10E3/UL (ref 0.8–5.3)
LYMPHOCYTES NFR BLD AUTO: 27 %
MCH RBC QN AUTO: 30.7 PG (ref 26.5–33)
MCHC RBC AUTO-ENTMCNC: 33.7 G/DL (ref 31.5–36.5)
MCV RBC AUTO: 91 FL (ref 78–100)
MONOCYTES # BLD AUTO: 0.8 10E3/UL (ref 0–1.3)
MONOCYTES NFR BLD AUTO: 8 %
NEUTROPHILS # BLD AUTO: 5.7 10E3/UL (ref 1.6–8.3)
NEUTROPHILS NFR BLD AUTO: 62 %
NITRATE UR QL: NEGATIVE
NRBC # BLD AUTO: 0 10E3/UL
NRBC BLD AUTO-RTO: 0 /100
PH UR STRIP: 6 [PH] (ref 4.7–8)
PLATELET # BLD AUTO: 268 10E3/UL (ref 150–450)
POTASSIUM SERPL-SCNC: 3.9 MMOL/L (ref 3.4–5.3)
RBC # BLD AUTO: 2.74 10E6/UL (ref 3.8–5.2)
RBC URINE: <1 /HPF
SODIUM SERPL-SCNC: 138 MMOL/L (ref 135–145)
SP GR UR STRIP: 1.03 (ref 1–1.03)
SQUAMOUS EPITHELIAL: 0 /HPF
UROBILINOGEN UR STRIP-MCNC: NORMAL MG/DL
WBC # BLD AUTO: 9.2 10E3/UL (ref 4–11)
WBC URINE: 6 /HPF

## 2025-02-09 PROCEDURE — 94640 AIRWAY INHALATION TREATMENT: CPT

## 2025-02-09 PROCEDURE — 85018 HEMOGLOBIN: CPT | Performed by: INTERNAL MEDICINE

## 2025-02-09 PROCEDURE — 120N000001 HC R&B MED SURG/OB

## 2025-02-09 PROCEDURE — 36415 COLL VENOUS BLD VENIPUNCTURE: CPT | Performed by: INTERNAL MEDICINE

## 2025-02-09 PROCEDURE — 94640 AIRWAY INHALATION TREATMENT: CPT | Mod: 76

## 2025-02-09 PROCEDURE — 999N000157 HC STATISTIC RCP TIME EA 10 MIN

## 2025-02-09 PROCEDURE — 82310 ASSAY OF CALCIUM: CPT | Performed by: INTERNAL MEDICINE

## 2025-02-09 PROCEDURE — 85004 AUTOMATED DIFF WBC COUNT: CPT | Performed by: INTERNAL MEDICINE

## 2025-02-09 PROCEDURE — G0378 HOSPITAL OBSERVATION PER HR: HCPCS

## 2025-02-09 PROCEDURE — 87086 URINE CULTURE/COLONY COUNT: CPT | Performed by: INTERNAL MEDICINE

## 2025-02-09 PROCEDURE — 80048 BASIC METABOLIC PNL TOTAL CA: CPT | Performed by: INTERNAL MEDICINE

## 2025-02-09 PROCEDURE — 99232 SBSQ HOSP IP/OBS MODERATE 35: CPT | Performed by: INTERNAL MEDICINE

## 2025-02-09 PROCEDURE — 96376 TX/PRO/DX INJ SAME DRUG ADON: CPT

## 2025-02-09 PROCEDURE — 250N000009 HC RX 250: Performed by: INTERNAL MEDICINE

## 2025-02-09 PROCEDURE — 82565 ASSAY OF CREATININE: CPT | Performed by: INTERNAL MEDICINE

## 2025-02-09 PROCEDURE — 250N000013 HC RX MED GY IP 250 OP 250 PS 637: Performed by: INTERNAL MEDICINE

## 2025-02-09 PROCEDURE — 85041 AUTOMATED RBC COUNT: CPT | Performed by: INTERNAL MEDICINE

## 2025-02-09 PROCEDURE — 81003 URINALYSIS AUTO W/O SCOPE: CPT | Performed by: INTERNAL MEDICINE

## 2025-02-09 RX ADMIN — LOSARTAN POTASSIUM 100 MG: 50 TABLET, FILM COATED ORAL at 08:34

## 2025-02-09 RX ADMIN — CARVEDILOL 12.5 MG: 12.5 TABLET, FILM COATED ORAL at 17:11

## 2025-02-09 RX ADMIN — FLUTICASONE FUROATE AND VILANTEROL TRIFENATATE 1 PUFF: 200; 25 POWDER RESPIRATORY (INHALATION) at 09:31

## 2025-02-09 RX ADMIN — CARVEDILOL 12.5 MG: 12.5 TABLET, FILM COATED ORAL at 08:34

## 2025-02-09 RX ADMIN — ATORVASTATIN CALCIUM 40 MG: 20 TABLET, FILM COATED ORAL at 21:25

## 2025-02-09 RX ADMIN — PANTOPRAZOLE SODIUM 40 MG: 40 INJECTION, POWDER, FOR SOLUTION INTRAVENOUS at 21:25

## 2025-02-09 RX ADMIN — SUCRALFATE 1 G: 1 TABLET ORAL at 17:11

## 2025-02-09 RX ADMIN — MIRTAZAPINE 15 MG: 15 TABLET, FILM COATED ORAL at 21:25

## 2025-02-09 RX ADMIN — PANTOPRAZOLE SODIUM 40 MG: 40 INJECTION, POWDER, FOR SOLUTION INTRAVENOUS at 08:32

## 2025-02-09 RX ADMIN — IPRATROPIUM BROMIDE AND ALBUTEROL SULFATE 3 ML: .5; 3 SOLUTION RESPIRATORY (INHALATION) at 22:07

## 2025-02-09 RX ADMIN — SUCRALFATE 1 G: 1 TABLET ORAL at 12:30

## 2025-02-09 RX ADMIN — SUCRALFATE 1 G: 1 TABLET ORAL at 21:25

## 2025-02-09 RX ADMIN — LEVOTHYROXINE SODIUM 50 MCG: 0.03 TABLET ORAL at 05:12

## 2025-02-09 RX ADMIN — SUCRALFATE 1 G: 1 TABLET ORAL at 08:35

## 2025-02-09 RX ADMIN — GABAPENTIN 100 MG: 100 CAPSULE ORAL at 15:47

## 2025-02-09 RX ADMIN — IPRATROPIUM BROMIDE AND ALBUTEROL SULFATE 3 ML: .5; 3 SOLUTION RESPIRATORY (INHALATION) at 09:30

## 2025-02-09 RX ADMIN — GABAPENTIN 100 MG: 100 CAPSULE ORAL at 21:25

## 2025-02-09 RX ADMIN — UMECLIDINIUM 1 PUFF: 62.5 AEROSOL, POWDER ORAL at 09:31

## 2025-02-09 RX ADMIN — GABAPENTIN 100 MG: 100 CAPSULE ORAL at 08:35

## 2025-02-09 ASSESSMENT — ACTIVITIES OF DAILY LIVING (ADL)
ADLS_ACUITY_SCORE: 55

## 2025-02-09 NOTE — PROGRESS NOTES
[facility]  Hospitalist Progress Note          Assessment and Plan:     Assessment & Plan  Lita Ocasio is a 84 year old female admitted on 2/8/2025. She presents today with an episode of melena along with a episode of hematemesis.  With her evaluation her hemoglobin was found to be 9.9 which was previously normal.  However patient does have a history of GI bleeds with associated anemia and November 2023 and an 2020.  Patient denies use of any NSAIDs.  She remains on a PPI currently, she does use a baby aspirin daily and no longer is on Plavix.     1) GI/Heme: Patient presents once again with likely GI bleed.  Most likely source is upper GI as per her history with gastritis and stigmata of bleeding in 2020.  In 2023 she declined scope.  She will remain on twice daily PPI while here.  In addition Carafate will be given prior to meals.  Hemoglobin will be trended and if needed transfused if goes below 7.  If she has indication of more significant bleeding we will discuss with surgery and the patient as to EGD and/or colonoscopy.  Currently she is hemodynamically stable.  I would expect that her hemoglobin will drop a bit more due to dilutional component with IV fluids.  -2/9: Hemoglobin 8.4 today.  Will continue to monitor.  Patient unlikely to be willing to have an EGD hence we will continue to treat conservatively.     2) Endo: Will continue on her outpatient Synthroid.     3) COPD: Will continue on her outpatient inhalers and nebs.     4) CAD: Aspirin will be held at this time.  We will continue her losartan and Coreg.  Diuretics including hydrochlorothiazide and Lasix will be held.     5) CKD: Appears stable at this time as compared to previous values we will continue to monitor.     6) ID: Patient does not have any fevers however she does have an elevated white count at 18.  The exact reason for this is unknown.  At this point we will obtain a urine analysis just for completeness sake.  Otherwise  consideration for chest x-ray may be warranted if white count persists or fevers develop.  -2/9: UA pending however white count has normalized.        Diet:  Regular as tolerated.  If any recurrent emesis or hematemesis she will be made NPO.  DVT Prophylaxis: Due to hematemesis and melena no additional DVT prophylaxis will be given.  Fitzgerald Catheter: Not present  Lines: None     Cardiac Monitoring: None  Code Status:  Full code        Clinically Significant Risk Factors Present on Admission                 # Drug Induced Platelet Defect: home medication list includes an antiplatelet medication   # Hypertension: Noted on problem list  # Chronic heart failure with preserved ejection fraction: heart failure noted on problem list and last echo with EF >50%     # Anemia: based on hgb <11  # Anemia: based on hgb <11                          Disposition Plan     Medically Ready for Discharge: Anticipated in 2-4 Days       Clinically Significant Risk Factors Present on Admission                 # Drug Induced Platelet Defect: home medication list includes an antiplatelet medication   # Hypertension: Noted on problem list  # Chronic heart failure with preserved ejection fraction: heart failure noted on problem list and last echo with EF >50%     # Anemia: based on hgb <11  # Anemia: based on hgb <11                        Interval History:     Patient remains vitally stable.  Hemoglobin 8.4 today.  As expected some likely hemodilution contributing to the drop from 9.9.  Some abdominal discomfort reported this morning.              Medications:     Current Facility-Administered Medications   Medication Dose Route Frequency Provider Last Rate Last Admin    atorvastatin (LIPITOR) tablet 40 mg  40 mg Oral At Bedtime Francis Beck DO   40 mg at 02/08/25 2110    carvedilol (COREG) tablet 12.5 mg  12.5 mg Oral BID w/meals Francis Beck DO   12.5 mg at 02/08/25 1752    fluticasone (FLONASE) 50 MCG/ACT spray 2  spray  2 spray Both Nostrils Daily Basvinceelli, Francis P, DO        fluticasone-vilanterol (BREO ELLIPTA) 200-25 MCG/ACT inhaler 1 puff  1 puff Inhalation Daily Basvinceelli, Francis P, DO        And    umeclidinium (INCRUSE ELLIPTA) 62.5 MCG/ACT inhaler 1 puff  1 puff Inhalation Daily Baslynn, Francis P, DO        gabapentin (NEURONTIN) capsule 100 mg  100 mg Oral TID Baslynn, Francis P, DO   100 mg at 25    levothyroxine (SYNTHROID/LEVOTHROID) tablet 50 mcg  50 mcg Oral QAM AC Ebony, Francis P, DO   50 mcg at 25 0512    losartan (COZAAR) tablet 100 mg  100 mg Oral Daily Ebony, Francis P, DO   100 mg at 25    mirtazapine (REMERON) tablet 15 mg  15 mg Oral At Bedtime Yumiko Beckony P, DO   15 mg at 25    pantoprazole (PROTONIX) EC tablet 40 mg  40 mg Oral BID AC Ebony, Francis P, DO   40 mg at 25    sodium chloride (PF) 0.9% PF flush 3 mL  3 mL Intracatheter Q8H Ebony, Francis P, DO        sucralfate (CARAFATE) tablet 1 g  1 g Oral 4x Daily AC & HS Ebony, Francis P, DO   1 g at 25                  Physical Exam:     Vitals:    25 2121 25 2310 25 0505   BP: (!) 171/55  (!) 149/50 125/41   BP Location: Right arm   Right arm   Pulse: 62  79 71   Resp:  18   Temp: 100.1  F (37.8  C)  99.7  F (37.6  C) 98.6  F (37  C)   TempSrc: Tympanic  Tympanic Tympanic   SpO2: 95% 94%  92%   Weight:       Height:             Vital Sign Ranges  Temperature Temp  Av.4  F (37.4  C)  Min: 98.6  F (37  C)  Max: 100.1  F (37.8  C)   Blood pressure Systolic (24hrs), Av , Min:125 , Max:171        Diastolic (24hrs), Av, Min:41, Max:62      Pulse Pulse  Av.1  Min: 62  Max: 88   Respirations Resp  Av.6  Min: 18  Max: 30   Pulse oximetry SpO2  Av.8 %  Min: 92 %  Max: 95 %         Intake/Output Summary (Last 24 hours) at 2025 6956  Last data filed at 2025 0211  Gross  per 24 hour   Intake 1379 ml   Output 150 ml   Net 1229 ml       General:  Alert and Orientated.  NAD.  Heart:  RRR, S1 S2, No murmurs, no rubs, no gallops.  Resp: CTA bilaterally.  No wheezes, no rhonchi, no crackles.  Abd: Soft/tenderness with palpation.  Ext: No edema noted in either lower extremity  Neuro:  No focal Neurologic deficits noted.    Peripheral IV 02/08/25 Anterior;Right Upper forearm (Active)   Site Assessment WDL 02/09/25 0500   Line Status Saline locked 02/09/25 0500   Dressing Transparent 02/09/25 0500   Dressing Status clean;dry;intact 02/09/25 0500   Line Intervention Flushed 02/09/25 0500   Phlebitis Scale 0-->no symptoms 02/09/25 0500   Infiltration? no 02/09/25 0500   Number of days: 1     No line/device             Data:     Lab Results   Component Value Date    WBC 9.2 02/09/2025    WBC 18.0 (H) 02/08/2025    WBC 9.6 10/28/2024    HGB 8.4 (L) 02/09/2025    HGB 9.0 (L) 02/08/2025    HGB 9.9 (L) 02/08/2025    HCT 24.9 (L) 02/09/2025    HCT 29.4 (L) 02/08/2025    HCT 43.8 10/28/2024     02/09/2025     02/08/2025     10/28/2024     02/09/2025     02/08/2025     10/28/2024    POTASSIUM 3.9 02/09/2025    POTASSIUM 4.0 02/08/2025    POTASSIUM 4.3 10/28/2024    CHLORIDE 104 02/09/2025    CHLORIDE 101 02/08/2025    CHLORIDE 107 10/28/2024    CO2 23 02/09/2025    CO2 26 02/08/2025    CO2 22 10/28/2024    BUN 42.6 (H) 02/09/2025    BUN 47.1 (H) 02/08/2025    BUN 26.2 (H) 10/28/2024    CR 1.30 (H) 02/09/2025    CR 1.27 (H) 02/08/2025    CR 1.33 (H) 10/28/2024     (H) 02/09/2025     (H) 02/08/2025    GLC 94 10/28/2024    SED 36 (H) 09/20/2019    SED 8 04/24/2019    SED 7 12/31/2018    DD 2.51 (H) 11/26/2023    DD 3.2 (H) 05/28/2020    DIMER 524 (H) 05/12/2019    DIMER 332 (H) 04/24/2019    DIMER 248 05/26/2018    NTBNPI 13,527 (H) 11/26/2023    NTBNPI 1,739 06/08/2023    NTBNPI 593 07/13/2021    NTBNP 717 12/27/2023    NTBNP 5,035 (H) 12/12/2023     NTBNP 688 11/07/2023    TROPONIN <0.015 07/13/2021    TROPONIN <0.015 07/13/2021    TROPI 0.031 01/27/2020    TROPI <0.015 09/20/2019    TROPI 0.067 (H) 05/13/2019    TROPN  08/18/2013     <0.04  **Risk Stratification**   0.10 - 0.39   Elevated-may indicate increased                 risk of short term adverse                 cardiac events.      >=0.4      Possible cardiac injury.    AST 21 02/08/2025    AST 19 11/26/2023    AST 30 11/07/2023    ALT 19 02/08/2025    ALT 17 11/26/2023    ALT 20 11/07/2023    ALKPHOS 50 02/08/2025    ALKPHOS 70 11/26/2023    ALKPHOS 78 11/07/2023    BILITOTAL 0.2 02/08/2025    BILITOTAL 0.4 11/26/2023    BILITOTAL 0.2 11/07/2023    INR 1.05 02/08/2025    INR 1.02 11/26/2023    INR 0.96 02/19/2020     Lactic Acid   Date Value Ref Range Status   01/27/2020 2.2 (H) 0.7 - 2.0 mmol/L Final     Comment:     Significant value called to and read back by  MADONNA RATLIFF 0450 1/27/20 JS     01/27/2020 2.3 (H) 0.7 - 2.0 mmol/L Final     Comment:     Significant value called to and read back by  YE SANTILLAN 0226 1/27/20 JS     09/20/2019 2.0 0.7 - 2.0 mmol/L Final       Francis Beck, DO

## 2025-02-09 NOTE — PLAN OF CARE
Goal Outcome Evaluation:       Plan of Care Reviewed With: patient    Overall Patient Progress: progressing    Pt A&O, makes needs known. VSS, low-grade temp 100.1 this shift. Tylenol given x1 for abd pain of 6/10. PRN tums given x1 for heartburn. O2 sats stable on RA. LS clear. HRR. BS active, denied nausea. Pt had one loose, dark melena stool this shift. Assist of 1 to bathroom. IV SL. Call light within reach, alarms active.     Face to face report given with opportunity to observe patient.    Report given to TRUNG Hopkins RN   2/9/2025  7:02 AM

## 2025-02-09 NOTE — PROVIDER NOTIFICATION
Provider notified of patient having a large loose, dark, melena stool.    2312: Provider notified of patient complaining of 6/10 abd pain described as burning/aching and is requesting something for pain.

## 2025-02-09 NOTE — PLAN OF CARE
Plan of Care Reviewed With: Patient and daughter, Cris    Overall Patient Progress: Progressing    VS and assessments as charted. Tmax 99.2F. Pt remains alert, oriented and able to make needs known. Denies pain. PIV x1 SL. MILLS. Remains on RA. HRR. No reports of heartburn/indigestion, belching, epigastric and/or abdominal pain this shift. No hematemesis and/or melena. Soft, formed BM x1. Tolerated clear liquid diet; advanced to full liquid diet this evening. Voiding without difficulty. Urine sample sent this shift. Ambulates to the bathroom with SBA-1 and gait belt. Free from falls and/or injury this shift. Call light within reach. Bed locked and low. Bed alarm on.    Face to face report given with opportunity to observe patient.  Report given to TRUNG More.    Cheyanne Del Rio RN  2/9/2025, 7:05 PM

## 2025-02-10 PROBLEM — I50.22 CHRONIC SYSTOLIC CONGESTIVE HEART FAILURE (H): Chronic | Status: ACTIVE | Noted: 2019-05-12

## 2025-02-10 PROBLEM — I10 BENIGN ESSENTIAL HYPERTENSION: Status: ACTIVE | Noted: 2019-05-12

## 2025-02-10 PROBLEM — J43.2 CENTRILOBULAR EMPHYSEMA (H): Chronic | Status: ACTIVE | Noted: 2019-05-12

## 2025-02-10 LAB
BACTERIA UR CULT: NORMAL
HGB BLD-MCNC: 7.8 G/DL (ref 11.7–15.7)
HGB BLD-MCNC: 7.9 G/DL (ref 11.7–15.7)
HGB BLD-MCNC: 8.3 G/DL (ref 11.7–15.7)
HOLD SPECIMEN: NORMAL
HOLD SPECIMEN: NORMAL

## 2025-02-10 PROCEDURE — 96376 TX/PRO/DX INJ SAME DRUG ADON: CPT

## 2025-02-10 PROCEDURE — 250N000009 HC RX 250: Performed by: INTERNAL MEDICINE

## 2025-02-10 PROCEDURE — 999N000157 HC STATISTIC RCP TIME EA 10 MIN

## 2025-02-10 PROCEDURE — 99232 SBSQ HOSP IP/OBS MODERATE 35: CPT | Performed by: NURSE PRACTITIONER

## 2025-02-10 PROCEDURE — 250N000013 HC RX MED GY IP 250 OP 250 PS 637: Performed by: INTERNAL MEDICINE

## 2025-02-10 PROCEDURE — 36415 COLL VENOUS BLD VENIPUNCTURE: CPT | Performed by: INTERNAL MEDICINE

## 2025-02-10 PROCEDURE — 85018 HEMOGLOBIN: CPT | Performed by: NURSE PRACTITIONER

## 2025-02-10 PROCEDURE — G0378 HOSPITAL OBSERVATION PER HR: HCPCS

## 2025-02-10 PROCEDURE — 120N000001 HC R&B MED SURG/OB

## 2025-02-10 PROCEDURE — 250N000013 HC RX MED GY IP 250 OP 250 PS 637: Performed by: NURSE PRACTITIONER

## 2025-02-10 PROCEDURE — 85018 HEMOGLOBIN: CPT | Performed by: INTERNAL MEDICINE

## 2025-02-10 PROCEDURE — 36415 COLL VENOUS BLD VENIPUNCTURE: CPT | Performed by: NURSE PRACTITIONER

## 2025-02-10 RX ORDER — HYDROCHLOROTHIAZIDE 25 MG/1
25 TABLET ORAL DAILY
Status: DISCONTINUED | OUTPATIENT
Start: 2025-02-10 | End: 2025-02-11 | Stop reason: HOSPADM

## 2025-02-10 RX ORDER — PNV NO.95/FERROUS FUM/FOLIC AC 28MG-0.8MG
1 TABLET ORAL EVERY MORNING
COMMUNITY

## 2025-02-10 RX ORDER — ACETAMINOPHEN 325 MG/1
650 TABLET ORAL 2 TIMES DAILY
COMMUNITY

## 2025-02-10 RX ADMIN — HYDROCHLOROTHIAZIDE 25 MG: 25 TABLET ORAL at 12:25

## 2025-02-10 RX ADMIN — CARVEDILOL 12.5 MG: 12.5 TABLET, FILM COATED ORAL at 09:39

## 2025-02-10 RX ADMIN — CARVEDILOL 12.5 MG: 12.5 TABLET, FILM COATED ORAL at 16:10

## 2025-02-10 RX ADMIN — SUCRALFATE 1 G: 1 TABLET ORAL at 16:10

## 2025-02-10 RX ADMIN — SUCRALFATE 1 G: 1 TABLET ORAL at 21:02

## 2025-02-10 RX ADMIN — ATORVASTATIN CALCIUM 40 MG: 20 TABLET, FILM COATED ORAL at 21:02

## 2025-02-10 RX ADMIN — SUCRALFATE 1 G: 1 TABLET ORAL at 11:05

## 2025-02-10 RX ADMIN — FLUTICASONE FUROATE AND VILANTEROL TRIFENATATE 1 PUFF: 200; 25 POWDER RESPIRATORY (INHALATION) at 08:31

## 2025-02-10 RX ADMIN — PANTOPRAZOLE SODIUM 40 MG: 40 INJECTION, POWDER, FOR SOLUTION INTRAVENOUS at 21:02

## 2025-02-10 RX ADMIN — MIRTAZAPINE 15 MG: 15 TABLET, FILM COATED ORAL at 21:02

## 2025-02-10 RX ADMIN — GABAPENTIN 100 MG: 100 CAPSULE ORAL at 14:03

## 2025-02-10 RX ADMIN — GABAPENTIN 100 MG: 100 CAPSULE ORAL at 21:02

## 2025-02-10 RX ADMIN — PANTOPRAZOLE SODIUM 40 MG: 40 INJECTION, POWDER, FOR SOLUTION INTRAVENOUS at 08:31

## 2025-02-10 RX ADMIN — LEVOTHYROXINE SODIUM 50 MCG: 0.03 TABLET ORAL at 05:36

## 2025-02-10 RX ADMIN — SUCRALFATE 1 G: 1 TABLET ORAL at 08:31

## 2025-02-10 RX ADMIN — UMECLIDINIUM 1 PUFF: 62.5 AEROSOL, POWDER ORAL at 08:31

## 2025-02-10 RX ADMIN — GABAPENTIN 100 MG: 100 CAPSULE ORAL at 08:31

## 2025-02-10 ASSESSMENT — ACTIVITIES OF DAILY LIVING (ADL)
ADLS_ACUITY_SCORE: 59
ADLS_ACUITY_SCORE: 59
ADLS_ACUITY_SCORE: 55
ADLS_ACUITY_SCORE: 59
ADLS_ACUITY_SCORE: 55
ADLS_ACUITY_SCORE: 59
ADLS_ACUITY_SCORE: 55
ADLS_ACUITY_SCORE: 59
ADLS_ACUITY_SCORE: 59
ADLS_ACUITY_SCORE: 55

## 2025-02-10 NOTE — PROGRESS NOTES
[facility]    Hospitalist Progress Note    Date of Service (when I saw the patient): 02/10/2025    Assessment & Plan         Probable GI bleed with blood loss anemia: No stools overnight or vomiting. Small drop in Hgb this morning-recheck at noon.       Centrilobular emphysema (H): Chronic, not requiring any supplemental oxygen at this time but she often does require short term oxygen therapy      Chronic systolic congestive heart failure (H)- Recovered      Benign essential hypertension: diastolic BP running quite low in the 30's with activity-holding cozaar                   # Hypertension: Noted on problem list  # Chronic heart failure with preserved ejection fraction: heart failure noted on problem list and last echo with EF >50%        DVT Prophylaxis: Pneumatic Compression Devices  Code Status: Full Code    Disposition: Expected discharge in 1-2 days once blood counts stable .    Mindy Ware CNP    Interval History   Denies any abdominal pain, nausea or vomiting. Feels she slept well. No dyspnea or chest pain.    -Data reviewed today: I reviewed all new labs and imaging results over the last 24 hours.     Physical Exam   Temp: 99.2  F (37.3  C) Temp src: Tympanic BP: (!) 128/33 Pulse: 68   Resp: 16 SpO2: (!) 90 % O2 Device: None (Room air)    Vitals:    02/08/25 1321 02/08/25 1645   Weight: 44.9 kg (98 lb 14.4 oz) 42.6 kg (94 lb)     Vital Signs with Ranges  Temp:  [98  F (36.7  C)-99.3  F (37.4  C)] 99.2  F (37.3  C)  Pulse:  [64-80] 68  Resp:  [16-20] 16  BP: (114-133)/(33-42) 128/33  SpO2:  [90 %-94 %] 90 %  I/O last 3 completed shifts:  In: 520 [P.O.:520]  Out: 550 [Urine:550]    Peripheral IV 02/08/25 Anterior;Right Upper forearm (Active)   Site Assessment WDL 02/10/25 0843   Line Status Saline locked 02/10/25 0843   Dressing Transparent 02/10/25 0843   Dressing Status clean;dry;intact 02/10/25 0843   Line Intervention Flushed 02/10/25 0843   Phlebitis Scale 0-->no symptoms 02/10/25 0843    Infiltration? no 02/10/25 0843   Number of days: 2     Line/device assessment(s) completed for medical necessity    Constitutional: Sleeps unless aroused this morning-arouses easily to voice.   Respiratory: Clear bilaterally without crackles, wheezes or rhonchi  Cardiovascular: HRR, no murmurs,rubs,thrills   GI: Soft,nontender, bowel sounds hypoactive  Skin/Integumen: No rashes, open areas or bruising  Other:      Medications   Current Facility-Administered Medications   Medication Dose Route Frequency Provider Last Rate Last Admin     Current Facility-Administered Medications   Medication Dose Route Frequency Provider Last Rate Last Admin    atorvastatin (LIPITOR) tablet 40 mg  40 mg Oral At Bedtime Francis Beck P, DO   40 mg at 02/09/25 2125    carvedilol (COREG) tablet 12.5 mg  12.5 mg Oral BID w/meals Francis Beck P, DO   12.5 mg at 02/10/25 0939    fluticasone (FLONASE) 50 MCG/ACT spray 2 spray  2 spray Both Nostrils Daily Francis Beck P, DO        fluticasone-vilanterol (BREO ELLIPTA) 200-25 MCG/ACT inhaler 1 puff  1 puff Inhalation Daily Francis Beck P, DO   1 puff at 02/10/25 0831    And    umeclidinium (INCRUSE ELLIPTA) 62.5 MCG/ACT inhaler 1 puff  1 puff Inhalation Daily Francis Beck P, DO   1 puff at 02/10/25 0831    gabapentin (NEURONTIN) capsule 100 mg  100 mg Oral TID Francis Beck P, DO   100 mg at 02/10/25 0831    levothyroxine (SYNTHROID/LEVOTHROID) tablet 50 mcg  50 mcg Oral QAM AC Francis Beck P, DO   50 mcg at 02/10/25 0536    losartan (COZAAR) tablet 100 mg  100 mg Oral Daily Francis Beck P, DO   100 mg at 02/09/25 0834    mirtazapine (REMERON) tablet 15 mg  15 mg Oral At Bedtime Francis Beck P, DO   15 mg at 02/09/25 2125    pantoprazole (PROTONIX) IV push injection 40 mg  40 mg Intravenous BID Francis Beck P, DO   40 mg at 02/10/25 0831    sodium chloride (PF) 0.9% PF flush 3 mL  3 mL Intracatheter Q8H  Francis Beck DO   3 mL at 02/10/25 0832    sucralfate (CARAFATE) tablet 1 g  1 g Oral 4x Daily AC & HS Francis Beck DO   1 g at 02/10/25 1105       Data   Recent Labs   Lab 02/10/25  0618 02/09/25  1756 02/09/25  0544 02/08/25  2059 02/08/25  1332   WBC  --   --  9.2  --  18.0*   HGB 7.8* 8.3* 8.4*   < > 9.9*   MCV  --   --  91  --  91   PLT  --   --  268  --  312   INR  --   --   --   --  1.05   NA  --   --  138  --  137   POTASSIUM  --   --  3.9  --  4.0   CHLORIDE  --   --  104  --  101   CO2  --   --  23  --  26   BUN  --   --  42.6*  --  47.1*   CR  --   --  1.30*  --  1.27*   ANIONGAP  --   --  11  --  10   BECKY  --   --  9.0  --  9.4   GLC  --   --  108*  --  107*   ALBUMIN  --   --   --   --  4.1   PROTTOTAL  --   --   --   --  6.9   BILITOTAL  --   --   --   --  0.2   ALKPHOS  --   --   --   --  50   ALT  --   --   --   --  19   AST  --   --   --   --  21    < > = values in this interval not displayed.       No results found for this or any previous visit (from the past 24 hours).

## 2025-02-10 NOTE — PROGRESS NOTES
"CLINICAL NUTRITION SERVICES  -  ASSESSMENT NOTE    REASON FOR ASSESSMENT:  Admission Nutrition Risk Screen - 2-13lb weight loss, reduced appetite/intake      NUTRITION HISTORY  Lita Ocasio is a 84 year old female admitted for GI bleed with blood loss anemia. PMH includes centrilobular emphysema, HTN, CHF, moderate malnutrition. Pt reports poor appetite for months. Trying to gain weight, PCP prescribed appetite stimulant but she doesn't think it helps. Snacks throughout the day, drinks 2 Ensure/Boost Plus. Family provides and encourages her to drink supplements. She gain about 8lbs from July to December but lost ~9lbs in the last 3 weeks.     Allergies     Allergies   Allergen Reactions    Evista [Raloxifene] Other (See Comments)     hypercalcemia    Prednisone GI Disturbance    Combigan [Brimonidine Tartrate-Timolol] Other (See Comments)     Eye swelling and itchy    Cyclosporine      Pt reports using eye gtts and having red irritated eyes     Latex Rash    Levaquin [Levofloxacin] Muscle Pain (Myalgia)    Penicillins Rash       CURRENT NUTRITION ORDERS  Diet Order:   Orders Placed This Encounter      Low Fiber Diet  Current Intake/Tolerance: 75% full liquid    ANTHROPOMETRICS  Height: 4' 10\"  Weight: 94 lbs 0 oz  Body mass index is 19.65 kg/m .  Weight Status:  Normal BMI  Weight History: weight is down 9lbs in 3 weeks  Wt Readings from Last 10 Encounters:   02/08/25 42.6 kg (94 lb)   01/20/25 46.8 kg (103 lb 1.6 oz)   12/02/24 47.2 kg (104 lb)   11/06/24 45.4 kg (100 lb)   09/25/24 46.3 kg (102 lb)   09/23/24 45.6 kg (100 lb 8 oz)   07/16/24 43.8 kg (96 lb 8 oz)   06/11/24 46.1 kg (101 lb 9.6 oz)   05/13/24 43.8 kg (96 lb 8 oz)   04/08/24 44.9 kg (99 lb)        LABS  Labs reviewed    MEDICATIONS  Medications reviewed    ASSESSED NUTRITION NEEDS: 42.6kg  Estimated Energy Needs: 3687-3570 kcals (30-35 Kcal/Kg)  Estimated Protein Needs: 45-50 grams protein (1-1.2 g pro/Kg)      MALNUTRITION:  % Weight Loss:  > 5% in " 1 month (severe malnutrition)  % Intake:  </= 75% for >/= 1 month (moderate malnutrition)  Muscle and Subcutaneous Fat Loss:  moderate- severe    Malnutrition Diagnosis: Severe malnutrition  In Context of:  Chronic illness or disease    NUTRITION INTERVENTIONS  Recommendations / Nutrition Prescription  Encourage intake during meal times. Encourage snacks between meals  Pt did not want Ensure ordered. She will order it when she wants. Encouraged 2-3 per day.    MONITORING AND EVALUATION:  Intake, weight, labs

## 2025-02-10 NOTE — MEDICATION SCRIBE - ADMISSION MEDICATION HISTORY
Medication Scribe Admission Medication History    Admission medication history is complete. The information provided in this note is only as accurate as the sources available at the time of the update.    Information Source(s): Patient and CareEverywhere/SureScripts via in-person    Pertinent Information:   Combivent- using PRN  Lotrisone- not currently using but wants left in medication list.   Tafluprost- no recent fill hx, pt reports still has and uses PRN    Changes made to PTA medication list:  Added: apap, prenatal + iron  Deleted: None  Changed: lotrisone from scheduled to PRN, combivent from scheduled to PRN    Allergies reviewed with patient and updates made in EHR: no    Medication History Completed By: Arabella Alamo 2/10/2025 9:48 AM    PTA Med List   Medication Sig Note Last Dose/Taking    acetaminophen (TYLENOL) 325 MG tablet Take 650 mg by mouth 2 times daily. For back pain.  Past Week    aspirin (ASA) 81 MG chewable tablet Take 1 tablet (81 mg) by mouth daily.  2/7/2025 Morning    atorvastatin (LIPITOR) 40 MG tablet Take 1 tablet (40 mg) by mouth at bedtime  2/7/2025 Bedtime    calcium carbonate (TUMS) 500 MG chewable tablet Take 1 chew tab by mouth at bedtime  2/7/2025 Bedtime    carvedilol (COREG) 12.5 MG tablet Take 1 tablet (12.5 mg) by mouth 2 times daily (with meals).  2/7/2025 Evening    fluticasone (FLONASE) 50 MCG/ACT spray Spray 2 sprays into both nostrils daily  Past Week Morning    Fluticasone-Umeclidin-Vilanterol (TRELEGY ELLIPTA) 200-62.5-25 MCG/ACT oral inhaler Inhale 1 puff into the lungs daily  2/7/2025 Morning    furosemide (LASIX) 20 MG tablet Take 0.5 tablets (10 mg) by mouth daily as needed (swelling).  2/7/2025    gabapentin (NEURONTIN) 100 MG capsule Take 1 capsule (100 mg) by mouth 3 times daily  2/7/2025 Bedtime    hydrochlorothiazide (HYDRODIURIL) 25 MG tablet Take 1 tablet (25 mg) by mouth daily.  2/7/2025 Morning    ipratropium - albuterol 0.5 mg/2.5 mg/3 mL (DUONEB) 0.5-2.5  (3) MG/3ML neb solution Take 1 vial (3 mLs) by nebulization every 6 hours as needed for shortness of breath, wheezing or cough  Taking As Needed    ipratropium-albuterol (COMBIVENT RESPIMAT)  MCG/ACT inhaler Inhale 1 puff into the lungs 4 times daily (Patient taking differently: Inhale 1 puff into the lungs 4 times daily as needed.)  Unknown    latanoprost (XALATAN) 0.005 % ophthalmic solution Place 1 drop into both eyes every evening.  Past Week    losartan (COZAAR) 100 MG tablet Take 1 tablet (100 mg) by mouth daily.  2/7/2025 Morning    methocarbamol (ROBAXIN) 500 MG tablet Take 1 tablet (500 mg) by mouth 4 times daily as needed for muscle spasms  More than a month    nitroGLYcerin (NITROSTAT) 0.4 MG sublingual tablet Place 1 tablet (0.4 mg) under the tongue every 5 minutes as needed for chest pain. For chest pain place 1 tablet under the tongue every 5 minutes for 3 doses. If symptoms persist 5 minutes after 1st dose call 911.  Unknown    pantoprazole (PROTONIX) 40 MG EC tablet Take 1 tablet (40 mg) by mouth daily  2/7/2025 Morning    Prenatal Multivit-Min-Fe-FA (PRENATAL/IRON) TABS Take 1 tablet by mouth every morning.  Past Week Morning    SYNTHROID 50 MCG tablet TAKE ONE TABLET BY MOUTH EVERY DAY  2/8/2025 Morning    Tafluprost 0.0015 % SOLN Place 1 drop into both eyes at bedtime -when remembered. 2/10/2025: No rx hx, but pt reports still using occasionally.  2/7/2025    trolamine salicylate (ASPERCREME) 10 % external cream Apply topically daily as needed (lower back pain)  Past Month

## 2025-02-10 NOTE — PLAN OF CARE
Goal Outcome Evaluation:       Plan of Care Reviewed With: patient    Overall Patient Progress: progressing    Pt A&O, VSS, afebrile. Denied pain. LS clear. HRR. BS hypoactive. No stools this shift. IV SL. Call light within reach.    Face to face report given with opportunity to observe patient.    Report given to TRUNG Guardado RN   2/10/2025  6:59 AM

## 2025-02-11 ENCOUNTER — APPOINTMENT (OUTPATIENT)
Dept: PHYSICAL THERAPY | Facility: HOSPITAL | Age: 85
DRG: 377 | End: 2025-02-11
Attending: NURSE PRACTITIONER
Payer: COMMERCIAL

## 2025-02-11 ENCOUNTER — APPOINTMENT (OUTPATIENT)
Dept: OCCUPATIONAL THERAPY | Facility: HOSPITAL | Age: 85
DRG: 377 | End: 2025-02-11
Attending: NURSE PRACTITIONER
Payer: COMMERCIAL

## 2025-02-11 VITALS
HEIGHT: 58 IN | TEMPERATURE: 98.1 F | WEIGHT: 94 LBS | BODY MASS INDEX: 19.73 KG/M2 | SYSTOLIC BLOOD PRESSURE: 136 MMHG | OXYGEN SATURATION: 90 % | RESPIRATION RATE: 16 BRPM | DIASTOLIC BLOOD PRESSURE: 33 MMHG | HEART RATE: 62 BPM

## 2025-02-11 LAB
ANION GAP SERPL CALCULATED.3IONS-SCNC: 9 MMOL/L (ref 7–15)
BUN SERPL-MCNC: 21.3 MG/DL (ref 8–23)
CALCIUM SERPL-MCNC: 8.5 MG/DL (ref 8.8–10.4)
CHLORIDE SERPL-SCNC: 107 MMOL/L (ref 98–107)
CREAT SERPL-MCNC: 1.25 MG/DL (ref 0.51–0.95)
EGFRCR SERPLBLD CKD-EPI 2021: 42 ML/MIN/1.73M2
ERYTHROCYTE [DISTWIDTH] IN BLOOD BY AUTOMATED COUNT: 14.1 % (ref 10–15)
GLUCOSE SERPL-MCNC: 103 MG/DL (ref 70–99)
HCO3 SERPL-SCNC: 23 MMOL/L (ref 22–29)
HCT VFR BLD AUTO: 23.7 % (ref 35–47)
HGB BLD-MCNC: 7.6 G/DL (ref 11.7–15.7)
HGB BLD-MCNC: 7.6 G/DL (ref 11.7–15.7)
HGB BLD-MCNC: 8.5 G/DL (ref 11.7–15.7)
HOLD SPECIMEN: NORMAL
MCH RBC QN AUTO: 29.9 PG (ref 26.5–33)
MCHC RBC AUTO-ENTMCNC: 32.1 G/DL (ref 31.5–36.5)
MCV RBC AUTO: 93 FL (ref 78–100)
PLATELET # BLD AUTO: 231 10E3/UL (ref 150–450)
POTASSIUM SERPL-SCNC: 4.1 MMOL/L (ref 3.4–5.3)
RBC # BLD AUTO: 2.54 10E6/UL (ref 3.8–5.2)
SODIUM SERPL-SCNC: 139 MMOL/L (ref 135–145)
WBC # BLD AUTO: 11.3 10E3/UL (ref 4–11)

## 2025-02-11 PROCEDURE — 36415 COLL VENOUS BLD VENIPUNCTURE: CPT | Performed by: NURSE PRACTITIONER

## 2025-02-11 PROCEDURE — 85018 HEMOGLOBIN: CPT | Performed by: NURSE PRACTITIONER

## 2025-02-11 PROCEDURE — G0378 HOSPITAL OBSERVATION PER HR: HCPCS

## 2025-02-11 PROCEDURE — 96376 TX/PRO/DX INJ SAME DRUG ADON: CPT

## 2025-02-11 PROCEDURE — 97166 OT EVAL MOD COMPLEX 45 MIN: CPT | Mod: GO

## 2025-02-11 PROCEDURE — 250N000009 HC RX 250: Performed by: INTERNAL MEDICINE

## 2025-02-11 PROCEDURE — 85027 COMPLETE CBC AUTOMATED: CPT | Performed by: NURSE PRACTITIONER

## 2025-02-11 PROCEDURE — 80051 ELECTROLYTE PANEL: CPT | Performed by: NURSE PRACTITIONER

## 2025-02-11 PROCEDURE — 250N000013 HC RX MED GY IP 250 OP 250 PS 637: Performed by: INTERNAL MEDICINE

## 2025-02-11 PROCEDURE — 97530 THERAPEUTIC ACTIVITIES: CPT | Mod: GO

## 2025-02-11 PROCEDURE — 80048 BASIC METABOLIC PNL TOTAL CA: CPT | Performed by: NURSE PRACTITIONER

## 2025-02-11 PROCEDURE — 82565 ASSAY OF CREATININE: CPT | Performed by: NURSE PRACTITIONER

## 2025-02-11 PROCEDURE — 250N000013 HC RX MED GY IP 250 OP 250 PS 637: Performed by: NURSE PRACTITIONER

## 2025-02-11 PROCEDURE — 99239 HOSP IP/OBS DSCHRG MGMT >30: CPT | Performed by: NURSE PRACTITIONER

## 2025-02-11 PROCEDURE — 999N000157 HC STATISTIC RCP TIME EA 10 MIN

## 2025-02-11 PROCEDURE — 97161 PT EVAL LOW COMPLEX 20 MIN: CPT | Mod: GP

## 2025-02-11 PROCEDURE — 97530 THERAPEUTIC ACTIVITIES: CPT | Mod: GP

## 2025-02-11 RX ORDER — PANTOPRAZOLE SODIUM 40 MG/1
40 TABLET, DELAYED RELEASE ORAL 2 TIMES DAILY
Qty: 90 TABLET | Refills: 0 | Status: SHIPPED | OUTPATIENT
Start: 2025-02-11 | End: 2025-03-28

## 2025-02-11 RX ADMIN — PANTOPRAZOLE SODIUM 40 MG: 40 INJECTION, POWDER, FOR SOLUTION INTRAVENOUS at 08:00

## 2025-02-11 RX ADMIN — CARVEDILOL 12.5 MG: 12.5 TABLET, FILM COATED ORAL at 08:00

## 2025-02-11 RX ADMIN — GABAPENTIN 100 MG: 100 CAPSULE ORAL at 08:00

## 2025-02-11 RX ADMIN — SUCRALFATE 1 G: 1 TABLET ORAL at 08:00

## 2025-02-11 RX ADMIN — LEVOTHYROXINE SODIUM 50 MCG: 0.03 TABLET ORAL at 06:27

## 2025-02-11 RX ADMIN — SUCRALFATE 1 G: 1 TABLET ORAL at 10:51

## 2025-02-11 RX ADMIN — HYDROCHLOROTHIAZIDE 25 MG: 25 TABLET ORAL at 08:00

## 2025-02-11 ASSESSMENT — ACTIVITIES OF DAILY LIVING (ADL)
ADLS_ACUITY_SCORE: 59
ADLS_ACUITY_SCORE: 59
ADLS_ACUITY_SCORE: 57
ADLS_ACUITY_SCORE: 57
ADLS_ACUITY_SCORE: 59
ADLS_ACUITY_SCORE: 57
ADLS_ACUITY_SCORE: 59
ADLS_ACUITY_SCORE: 59
PREVIOUS_RESPONSIBILITIES: MEAL PREP;LAUNDRY;MEDICATION MANAGEMENT;FINANCES;DRIVING
ADLS_ACUITY_SCORE: 59
ADLS_ACUITY_SCORE: 57
ADLS_ACUITY_SCORE: 59

## 2025-02-11 NOTE — PROGRESS NOTES
Name: Lita Ocasio    MRN#: [id number]    Reason for Hospitalization: Anemia [D64.9]  GI bleed [K92.2]  Leukocytosis [D72.829]    Discharge Date: 2/11/2025    Patient / Family response to discharge plan: in agreement    Follow-Up Appt:   Future Appointments   Date Time Provider Department Center   2/17/2025  8:00 AM Maritza Amaro APRN CNP [facility]   3/25/2025  2:00 PM Ophelia Troncoso MD [facility]   4/23/2025  1:00 PM Sal Graham,  [facility]       Other Providers (Care Coordinator, County Services, PCA services etc): Yes: [facility] skilled nursing and PT    Discharge Disposition: home    Cynthia Hanna CM

## 2025-02-11 NOTE — PROGRESS NOTES
02/11/25 1200   Appointment Info   Signing Clinician's Name / Credentials (PT) CECILE Jeronimo   Student Supervision Direct supervision provided;Direct Patient Contact Provided;Therapy services provided with the co-signing licensed therapist guiding and directing the services, and providing the skilled judgement and assessment throughout the session   Living Environment   People in Home alone   Current Living Arrangements house   Home Accessibility stairs to enter home   Number of Stairs, Main Entrance 3   Stair Railings, Main Entrance railings on both sides of stairs   Transportation Anticipated car, drives self   Living Environment Comments Lives in single story house with a basement that pt has not gone down to in years. Has a tub/shower combo with grab bars. Regular height toilet, no grab bars.   Self-Care   Usual Activity Tolerance moderate   Current Activity Tolerance fair   Equipment Currently Used at Home cane, quad;grab bar, tub/shower;other (see comments)  (Notes she has a walker at home that she does not use and can't identify which type of walker it is.)   Fall history within last six months no   Activity/Exercise/Self-Care Comment Indep with all self cares and cooking. Has a cleaning lady. Uses a quad cane in the community and occasionally at home.   General Information   Onset of Illness/Injury or Date of Surgery 02/08/25   Referring Physician Mindy Ware NP   Patient/Family Therapy Goals Statement (PT) Pt would like to return home.   Pertinent History of Current Problem (include personal factors and/or comorbidities that impact the POC) Pt presented to the ED on 2/8/25 due to nausea, vomiting, and dark stools. Found to have a potential GI bleed. Has history of chronic centrilobular emphysema not requiring any supplemental oxygen at this time but often does require short term oxygen therapy.   Cognition   Affect/Mental Status (Cognition) WFL   Orientation Status (Cognition) oriented x  4;oriented to;person;place;situation;time   Follows Commands (Cognition) WFL   Pain Assessment   Patient Currently in Pain No   Integumentary/Edema   Integumentary/Edema no deficits were identifed   Posture    Posture Forward head position;Protracted shoulders   Range of Motion (ROM)   Range of Motion ROM is [facility]   ROM Comment WFL for ambulation, transfers and stair negotiation.   Strength (Manual Muscle Testing)   Strength (Manual Muscle Testing) strength is WFL   Strength Comments Seated hip flexor MMTs 4-/5 bilat. All other LE motions grossly 5/5 when assessed in sitting.   Transfers   Transfers sit-stand transfer   Impairments Contributing to Impaired Transfers decreased strength   Sit-Stand Transfer   Sit-Stand [place] (Transfers) contact guard   Assistive Device (Sit-Stand Transfers) walker, front-wheeled   Gait/Stairs (Locomotion)   [place] Level (Gait) contact guard   Distance in Feet (Gait) 90  (Split into 2 bouts - 40'x1 & 50'x1 with seated rest breaks in between)   Pattern (Gait) step-to   Deviations/Abnormal Patterns (Gait) gait speed decreased;stride length decreased;base of support, narrow  (Shortened step length and height with intermittent toe scuff. Mild path deviation.)   Negotiation (Stairs) stairs independence;stairs assistive device;handrail location;number of steps   [place] Level (Stairs) contact guard   Handrail Location (Stairs) both sides   Number of Steps (Stairs) 4   Comment, (Gait/Stairs) SpO2 drops around 86-87% with ambulation and stairs. Returns to low 90s after approx 1-2 min. Fatigued after ambulating and stairs.   Balance   Balance other (describe)   Balance Comments No losses of balance during the session, but pt ambulates with slow speed and has mild path deviation with narrow base of support when ambulating without a device.   Clinical Impression   Criteria for Skilled Therapeutic Intervention Yes, treatment indicated   PT Diagnosis (PT) Reduced functional  mobility, balance, strength and endurance   Influenced by the following impairments Decreased activity tolerance   Functional limitations due to impairments Reduced ambulation endurance and overall functional mobility tolerance.   Clinical Presentation (PT Evaluation Complexity) stable   Clinical Presentation Rationale Clinical reasoning   Clinical Decision Making (Complexity) low complexity   Planned Therapy Interventions (PT) balance training;neuromuscular re-education;patient/family education;stair training;strengthening;transfer training;gait training   Risk & Benefits of therapy have been explained care plan/treatment goals reviewed;evaluation/treatment results reviewed;risks/benefits reviewed;current/potential barriers reviewed;participants voiced agreement with care plan;participants included;patient   Clinical Impression Comments Pt presents with weakness following a potential GI bleed and anemia. PT evaluation completed today indicates pt is likely slightly below her functional mobility baseline, with reduced balance, strength, and gait endurance. She would benefit from a short-term rehab stay to progress her functional mobility before discharging home as she lives alone. If pt declines, home with home care physical therapy is indicated to progress functional mobility. Continued skilled physical therapy warranted until pt discharges.   PT Total Evaluation Time   PT Damien Low Complexity Minutes (73807) 12   Physical Therapy Goals   PT Frequency 6x/week   PT Predicted Duration/Target Date for Goal Attainment 02/18/25   PT Goals Gait;Stairs   PT: Gait 100 feet;Supervision/stand-by assist;Standard walker   PT: Stairs Supervision/stand-by assist;3 stairs;Rail on both sides   Interventions   Interventions Quick Adds Therapeutic Activity   Therapeutic Activity   Therapeutic Activities: dynamic activities to improve functional performance Minutes (77373) 8   Symptoms Noted During/After Treatment Fatigue;Shortness  of breath   Treatment Detail/Skilled Intervention Pt ambulated additional 90' without resting using the FWW and CGA for balance. Verbal cues to increase step length/height moderately reduced pt's toe scuff. Emphasized the benefit of the FWW as pt's strength is not at it's baseline.   PT Discharge Planning   PT Plan Progress functional mobility, strength, and endurance   PT Discharge Recommendation (DC Rec) Transitional Care Facility;home with home care physical therapy   PT Rationale for DC Rec Pt would benefit from a short-term rehab stay to progress her functional activity tolerance, strength, and endurance. Although, if she declines, home with home care physical therapy would also be adequate.   PT Brief overview of current status CGA for amb with FWW, transfers, and stairs   PT Total Distance Amb During Session (feet) 180   Physical Therapy Time and Intention   Timed Code Treatment Minutes 8   Total Session Time (sum of timed and untimed services) 20

## 2025-02-11 NOTE — PLAN OF CARE
Plan of Care Reviewed With: patient    Overall Patient Progress: improving    Alert and oriented x4. Diastolic BP low, provider aware. Low grade temps 99.2 and 99.6. Denies pain. No stools this shift. Bowel sounds active. Advanced to low fiber diet, tolerating well. Standby assist.    Face to face report given with opportunity to observe patient.    Report given to Karissa Calero RN   2/10/2025  7:13 PM

## 2025-02-11 NOTE — PROGRESS NOTES
02/11/25 1000   Appointment Info   Signing Clinician's Name / Credentials (OT) Brisa Canales, OTR/L   Living Environment   People in Home alone   Current Living Arrangements house   Home Accessibility stairs to enter home   Number of Stairs, Main Entrance 3   Transportation Anticipated car, drives self   Living Environment Comments Bathroom has tub with a grab bar and a regular toilet   Self-Care   Usual Activity Tolerance moderate   Current Activity Tolerance fair   Equipment Currently Used at Home cane, quad;grab bar, tub/shower;other (see comments)   Fall history within last six months no   Activity/Exercise/Self-Care Comment Pt reports she is independent with ADLs and sometimes uses a cane for mobility. Her daughter comes over when she bathes   Instrumental Activities of Daily Living (IADL)   Previous Responsibilities meal prep;laundry;medication management;finances;driving   IADL Comments Pt has a cleaning lady; her granddaughter completes her shopping; and assist with yard work   General Information   Onset of Illness/Injury or Date of Surgery 02/08/25   Referring Physician Dr. Franklin   Patient/Family Therapy Goal Statement (OT) Return home; agreeable to home therapy   Additional Occupational Profile Info/Pertinent History of Current Problem Pt admitted due to probable GI bleed, emphysema   Cognitive Status Examination   Orientation Status orientation to person, place and time   Pain Assessment   Patient Currently in Pain No   Range of Motion Comprehensive   General Range of Motion no range of motion deficits identified   Strength Comprehensive (MMT)   Comment, General Manual Muscle Testing (MMT) Assessment generalized weakness noted   Bed Mobility   Bed Mobility supine-sit   Supine-Sit [place] (Bed Mobility) supervision   Transfers   Transfers sit-stand transfer;bed-chair transfer   Transfer Skill: Bed to Chair/Chair to Bed   Bed-Chair [place] (Transfers) supervision   Sit-Stand Transfer   Sit-Stand  [place] (Transfers) contact guard   Balance   Balance Comments Pt ambulated to/from the BR with SBA-CGA   Activities of Daily Living   BADL Assessment/Intervention lower body dressing;toileting;grooming   Lower Body Dressing Assessment/Training   Position (Lower Body Dressing) unsupported sitting   [place] Level (Lower Body Dressing) don;shoes/slippers   Grooming Assessment/Training   Position (Grooming) unsupported standing   [place] Level (Grooming) wash face, hands;supervision   Toileting   Comment, (Toileting) SpO2 after BR ADLs was 89%on RA, increased to 90-93% with rest and pursed lip breathing   [place] Level (Toileting) adjust/manage clothing;perform perineal hygiene;modified independence   Clinical Impression   Criteria for Skilled Therapeutic Interventions Met (OT) Yes, treatment indicated   OT Diagnosis mild deficits with self-cares   Influenced by the following impairments mild deficits with strength and activity tolerance   Assessment of Occupational Performance 1-3 Performance Deficits   Identified Performance Deficits ADLs, IADLs   Planned Therapy Interventions (OT) ADL retraining;IADL retraining;strengthening;home program guidelines;progressive activity/exercise;risk factor education   Clinical Decision Making Complexity (OT) detailed assessment/moderate complexity   Risk & Benefits of therapy have been explained evaluation/treatment results reviewed;care plan/treatment goals reviewed;risks/benefits reviewed;current/potential barriers reviewed;participants voiced agreement with care plan;participants included;patient   Clinical Impression Comments Pt completed ADLs today mostly with SBA, occasional CGA. She does exibit mild deficits with strength and activity tolerance. Discussed nursing home placement if pt didn't feel quite back to her baseline, but she reports feeling fairly close to baseline and wants to return home. Pt has good support from family and no significant concerns  identified with home accessibility. Pt would benefit from home therapy, which she was agreeable to.   OT Total Evaluation Time   OT Eval, Moderate Complexity Minutes (11942) 12   OT Goals   Therapy Frequency (OT) 5 times/week   OT Predicted Duration/Target Date for Goal Attainment 02/18/25   OT Goals Hygiene/Grooming;Lower Body Dressing;Toilet Transfer/Toileting;Aerobic Activity   OT: Hygiene/Grooming modified independent   OT: Lower Body Dressing Modified independent   OT: Toilet Transfer/Toileting Modified independent   OT: Perform aerobic activity with stable cardiovascular response 15 minutes   Interventions   Interventions Quick Adds Therapeutic Activity   Therapeutic Activities   Therapeutic Activity Minutes (56560) 8   Treatment Detail/Skilled Intervention Discussed d/c options, SNF vs home care and pt wants to return home with home care   OT Discharge Planning   OT Plan Progress ADLs, strength, and activity tolerance   OT Discharge Recommendation (DC Rec) home with assist;home with home care occupational therapy   OT Rationale for DC Rec Pt completed ADLs today mostly with SBA, occasional CGA. She does exibit mild deficits with strength and activity tolerance. Discussed nursing home placement if pt didn't feel quite back to her baseline, but she reports feeling fairly close to baseline and wants to return home. Pt has good support from family and no significant concerns identified with home accessibility. Pt would benefit from home therapy, which she was agreeable to.   OT Brief overview of current status SBA, occasional CGA; SpO2 after BR ADLs was 89% on RA, increased to 90-93% with rest and pursed lip breathing   Total Session Time   Timed Code Treatment Minutes 8   Total Session Time (sum of timed and untimed services) 20

## 2025-02-11 NOTE — PLAN OF CARE
Patient discharged at 1:00 PM via wheel chair accompanied by other:katrina and staff. Prescriptions sent to patients preferred pharmacy. All belongings sent with patient.     Discharge instructions reviewed with patient. Listed belongings gathered and returned to patient. Clothing, hearing aides, hearing aide , phone  and phone    Patient discharged to home.   Report called to n/a    Surgical Patient   Surgical Procedures during stay: n/a  Did patient receive discharge instruction on wound care and recognition of infection symptoms? N/A    MISC  Follow up appointment made:  Yes  Home medications returned to patient: N/A  Patient reports pain was well managed at discharge: Yes

## 2025-02-11 NOTE — DISCHARGE SUMMARY
"[facility]    Discharge Summary  Hospitalist    Date of Admission:  2/8/2025  Date of Discharge:  {DISCHARGE DATE:859813}  Discharging Provider: Mindy Ware NP  Date of Service (when I saw the patient): {Date of Service:569127}    Discharge Diagnoses     Principal Problem:    GI bleed  Active Problems:    Centrilobular emphysema (H)    Chronic systolic congestive heart failure (H)- Recovered    Benign essential hypertension    Anemia    Leukocytosis      History of Present Illness   Lita Ocasio is an 84 year old female who presented with ***    Hospital Course     ***    Mindy Ware CNP      Pending Results     Unresulted Labs Ordered in the Past 30 Days of this Admission       No orders found from 1/9/2025 to 2/9/2025.            Code Status   {CODE STATUS      :800726}       Primary Care Physician   Ophelia Troncoso     {Do you want to add a physical exam section?:385990}      Discharge Disposition   {                 :0708198::\"Discharged to home\"}  Condition at discharge: {CONDITION:078742::\"Stable\"}    Consultations This Hospital Stay   CARE MANAGEMENT / SOCIAL WORK IP CONSULT  PHYSICAL THERAPY ADULT IP CONSULT  OCCUPATIONAL THERAPY ADULT IP CONSULT    Time Spent on this Encounter   {How much time did you spend on discharge?:67275574}    Discharge Orders      Home Care Referral      Reason for your hospital stay    GI bleed     Follow-up and recommended labs and tests     Follow up with primary care provider, Ophelia Troncoso, within 7 days for hospital follow- up.  The following labs/tests are recommended: Hgb.     Activity    Your activity upon discharge: activity as tolerated     Diet    Follow this diet upon discharge: Current Diet:Orders Placed This Encounter      Low Fiber Diet x1 week then increase fiber     Discharge Medications   Current Discharge Medication List        CONTINUE these medications which have CHANGED    Details   pantoprazole (PROTONIX) 40 MG EC tablet Take 1 " tablet (40 mg) by mouth 2 times daily.  Qty: 90 tablet, Refills: 0    Associated Diagnoses: History of GI bleed           CONTINUE these medications which have NOT CHANGED    Details   acetaminophen (TYLENOL) 325 MG tablet Take 650 mg by mouth 2 times daily. For back pain.      aspirin (ASA) 81 MG chewable tablet Take 1 tablet (81 mg) by mouth daily.  Qty: 90 tablet, Refills: 3    Associated Diagnoses: Chest pain, unspecified type; MILLS (dyspnea on exertion); Subclavian arterial stenosis; Postoperative atrial fibrillation (H); Coronary artery disease involving native coronary artery of native heart without angina pectoris; Bilateral carotid artery stenosis; ASCVD (arteriosclerotic cardiovascular disease); Abnormal cardiovascular stress test      atorvastatin (LIPITOR) 40 MG tablet Take 1 tablet (40 mg) by mouth at bedtime  Qty: 90 tablet, Refills: 3    Associated Diagnoses: Hyperlipidemia, unspecified hyperlipidemia type; S/P drug eluting coronary stent placement      calcium carbonate (TUMS) 500 MG chewable tablet Take 1 chew tab by mouth at bedtime      carvedilol (COREG) 12.5 MG tablet Take 1 tablet (12.5 mg) by mouth 2 times daily (with meals).  Qty: 180 tablet, Refills: 3    Associated Diagnoses: History of cardioversion; Benign essential hypertension; Chronic systolic congestive heart failure (H)      fluticasone (FLONASE) 50 MCG/ACT spray Spray 2 sprays into both nostrils daily  Qty: 1 Bottle, Refills: 11      Fluticasone-Umeclidin-Vilanterol (TRELEGY ELLIPTA) 200-62.5-25 MCG/ACT oral inhaler Inhale 1 puff into the lungs daily  Qty: 180 each, Refills: 3    Associated Diagnoses: Pulmonary emphysema, unspecified emphysema type (H)      furosemide (LASIX) 20 MG tablet Take 0.5 tablets (10 mg) by mouth daily as needed (swelling).  Qty: 45 tablet, Refills: 1    Associated Diagnoses: Chronic systolic congestive heart failure (H); Benign essential hypertension; Chronic systolic congestive heart failure (H); MILLS  (dyspnea on exertion); CKD (chronic kidney disease) stage 4, GFR 15-29 ml/min (H); Chronic diastolic congestive heart failure (H)      gabapentin (NEURONTIN) 100 MG capsule Take 1 capsule (100 mg) by mouth 3 times daily  Qty: 180 capsule, Refills: 3    Associated Diagnoses: Chronic midline low back pain with bilateral sciatica      hydrochlorothiazide (HYDRODIURIL) 25 MG tablet Take 1 tablet (25 mg) by mouth daily.  Qty: 90 tablet, Refills: 3    Associated Diagnoses: Chronic systolic congestive heart failure (H); Benign essential hypertension; Chronic systolic congestive heart failure (H); MILLS (dyspnea on exertion); CKD (chronic kidney disease) stage 4, GFR 15-29 ml/min (H); Chronic diastolic congestive heart failure (H)      ipratropium - albuterol 0.5 mg/2.5 mg/3 mL (DUONEB) 0.5-2.5 (3) MG/3ML neb solution Take 1 vial (3 mLs) by nebulization every 6 hours as needed for shortness of breath, wheezing or cough  Qty: 90 mL, Refills: 3    Associated Diagnoses: Pulmonary emphysema, unspecified emphysema type (H)      ipratropium-albuterol (COMBIVENT RESPIMAT)  MCG/ACT inhaler Inhale 1 puff into the lungs 4 times daily  Qty: 8 g, Refills: 3    Associated Diagnoses: Pulmonary emphysema, unspecified emphysema type (H)      latanoprost (XALATAN) 0.005 % ophthalmic solution Place 1 drop into both eyes every evening.      losartan (COZAAR) 100 MG tablet Take 1 tablet (100 mg) by mouth daily.  Qty: 90 tablet, Refills: 3    Associated Diagnoses: Chronic systolic congestive heart failure (H); Benign essential hypertension; Chronic systolic congestive heart failure (H); MILLS (dyspnea on exertion); CKD (chronic kidney disease) stage 4, GFR 15-29 ml/min (H)      methocarbamol (ROBAXIN) 500 MG tablet Take 1 tablet (500 mg) by mouth 4 times daily as needed for muscle spasms  Qty: 20 tablet, Refills: 0    Associated Diagnoses: Closed compression fracture of L5 lumbar vertebra, sequela      nitroGLYcerin (NITROSTAT) 0.4 MG  sublingual tablet Place 1 tablet (0.4 mg) under the tongue every 5 minutes as needed for chest pain. For chest pain place 1 tablet under the tongue every 5 minutes for 3 doses. If symptoms persist 5 minutes after 1st dose call 911.  Qty: 25 tablet, Refills: 3    Associated Diagnoses: History of coronary artery stent placement; History of cardioversion; Coronary artery disease involving native coronary artery of native heart without angina pectoris; Chest pain, unspecified type; Abnormal cardiovascular stress test; History of coronary artery stent placement      Prenatal Multivit-Min-Fe-FA (PRENATAL/IRON) TABS Take 1 tablet by mouth every morning.      SYNTHROID 50 MCG tablet TAKE ONE TABLET BY MOUTH EVERY DAY  Qty: 90 tablet, Refills: 1    Associated Diagnoses: Acquired hypothyroidism      Tafluprost 0.0015 % SOLN Place 1 drop into both eyes at bedtime -when remembered.      trolamine salicylate (ASPERCREME) 10 % external cream Apply topically daily as needed (lower back pain)      calcitonin, salmon, (MIACALCIN) 200 UNIT/ACT nasal spray Spray 1 spray into one nostril alternating nostrils daily Alternate nostril each day.  Qty: 3.7 mL, Refills: 0    Associated Diagnoses: Closed compression fracture of L5 lumbar vertebra, sequela      clotrimazole-betamethasone (LOTRISONE) 1-0.05 % external cream Apply topically 2 times daily  Qty: 90 g, Refills: 3    Associated Diagnoses: Yeast vaginitis      mirtazapine (REMERON) 15 MG tablet Take 1 tablet (15 mg) by mouth at bedtime  Qty: 90 tablet, Refills: 3    Associated Diagnoses: Weight loss; Mild protein-calorie malnutrition           Allergies   Allergies   Allergen Reactions    Evista [Raloxifene] Other (See Comments)     hypercalcemia    Prednisone GI Disturbance    Combigan [Brimonidine Tartrate-Timolol] Other (See Comments)     Eye swelling and itchy    Cyclosporine      Pt reports using eye gtts and having red irritated eyes     Latex Rash    Levaquin [Levofloxacin]  Muscle Pain (Myalgia)    Penicillins Rash     Data   {What data do you want to display?:132236}     231  --   --  268  --  312    < > = values in this interval not displayed.      Most Recent 3 BMP's:  Recent Labs   Lab Test 02/11/25  0535 02/09/25  0544 02/08/25  1332    138 137   POTASSIUM 4.1 3.9 4.0   CHLORIDE 107 104 101   CO2 23 23 26   BUN 21.3 42.6* 47.1*   CR 1.25* 1.30* 1.27*   ANIONGAP 9 11 10   BECKY 8.5* 9.0 9.4   * 108* 107*     Most Recent 2 LFT's:  Recent Labs   Lab Test 02/08/25  1332 11/26/23  1243   AST 21 19   ALT 19 17   ALKPHOS 50 70   BILITOTAL 0.2 0.4     Most Recent INR's and Anticoagulation Dosing History:  Anticoagulation Dose History  More data may exist         Latest Ref Rng & Units 12/31/2018 5/12/2019 9/20/2019 1/27/2020 2/19/2020 11/26/2023 2/8/2025   Recent Dosing and Labs   INR 0.85 - 1.15 0.94  0.99  1.30  1.11  0.96  1.02  1.05      Most Recent 3 Troponin's:  Recent Labs   Lab Test 07/13/21  2109 07/13/21  1916 01/27/20  0216 09/20/19  1550 05/13/19  0442   TROPI  --   --  0.031 <0.015 0.067*   TROPONIN <0.015 <0.015  --   --   --      Most Recent Cholesterol Panel:  Recent Labs   Lab Test 05/13/24  1605   CHOL 133   LDL 64   HDL 53   TRIG 80     Most Recent 6 Bacteria Isolates From Any Culture (See EPIC Reports for Culture Details):  Recent Labs   Lab Test 02/19/20  2355 01/27/20  0430 09/20/19  2000 05/12/19  0945 05/12/19  0555 12/31/18  2258   CULT No MRSA isolated No MRSA isolated No MRSA isolated No MRSA isolated Heavy growth  Normal respiratory ash    No Pathogens Isolated No growth after 6 days     Most Recent TSH, T4 and A1c Labs:  Recent Labs   Lab Test 09/25/24  1508 05/13/24  1605   TSH 4.01 5.14*   T4  --  1.06     Results for orders placed or performed during the hospital encounter of 02/08/25   CT Abdomen Pelvis w Contrast    Narrative    EXAM: CT ABDOMEN PELVIS W CONTRAST  LOCATION: [facility]  DATE: 2/8/2025    INDICATION: Leukocytosis, melena, hematemesis  COMPARISON: CT chest abdomen pelvis 11/26/2023  TECHNIQUE: CT scan of the  abdomen and pelvis was performed following injection of IV contrast. Multiplanar reformats were obtained. Dose reduction techniques were used.  CONTRAST: 48 mL Isovue-370    FINDINGS:   LOWER CHEST: Normal.    HEPATOBILIARY: No significant findings.    PANCREAS: Normal.    SPLEEN: Normal.    ADRENAL GLANDS: Slightly nodular left adrenal gland unchanged. No significance.    KIDNEYS/BLADDER: Moderate smooth atrophy left kidney likely related to renal artery stenosis although the renal artery is poorly imaged on this study. Similar degree of atrophy 2 11/26/2023. No other significant findings.    BOWEL: No significant findings. No acute inflammatory change.    LYMPH NODES: Normal.    VASCULATURE: Severe calcified plaque. High-grade stenosis distal aorta at the bifurcation. Significant stenoses in the iliac arteries poorly characterized.    PELVIC ORGANS: Normal.    MUSCULOSKELETAL: Simple chronic compression fractures L2 and L5 mildly progressed since 3/28/2024.      Impression    IMPRESSION:   1.  No acute findings in the abdomen or pelvis.    2.  Severe atherosclerotic disease with probable significant left renal artery stenosis and homogeneous atrophy left kidney and severe stenoses in the distal aorta and scattered significant stenoses in the iliac arteries.

## 2025-02-11 NOTE — PLAN OF CARE
Plan of Care Reviewed With: Patient     Overall Patient Progress: Progressing    Pt is alert and oriented, VS and assessment as charted, afebrile, denies pain. On RA. No Stools this shift. BS active. Voiding without difficulty. IV saline locked, SBA for transfers, able to make needs known, call light remains within reach.    Face to face report given with opportunity to observe patient.    Report given to Debby RN    Tonja Triplett RN   2/11/2025  7:03 AM

## 2025-02-12 ENCOUNTER — PATIENT OUTREACH (OUTPATIENT)
Dept: CARE COORDINATION | Facility: OTHER | Age: 85
End: 2025-02-12

## 2025-02-12 NOTE — PROGRESS NOTES
Clinic Care Coordination Contact  Transitions of Care Outreach  Chief Complaint   Patient presents with    Clinic Care Coordination - Initial       Most Recent Admission Date: 2/8/2025   Most Recent Admission Diagnosis: Anemia - D64.9  GI bleed - K92.2  Leukocytosis - D72.829     Most Recent Discharge Date: 2/11/2025   Most Recent Discharge Diagnosis: GI bleed - K92.2  Anemia - D64.9  Leukocytosis - D72.829  History of GI bleed - Z87.19     Transitions of Care Assessment    Discharge Assessment  How are you doing now that you are home?: patient said she is feeling better, LBM 02/11/2025. Patient reports she is able to drink and eat with no issues  How are your symptoms? (Red Flag symptoms escalate to triage hotline per guidelines): Improved  Do you know how to contact your clinic care team if you have future questions or changes to your health status? : Yes  Does the patient have their discharge instructions? : Yes  Does the patient have questions regarding their discharge instructions? : No  Were you started on any new medications or were there changes to any of your previous medications? : No  Does the patient have all of their medications?: Yes  Do you have questions regarding any of your medications? : No  Do you have all of your needed medical supplies or equipment (DME)?  (i.e. oxygen tank, CPAP, cane, etc.): Yes         Post-op (Clinicians Only)  Fever: No  Chills: No  Eating & Drinking: eating and drinking without complaints/concerns  Bowel Function: normal  Date of last BM: 02/11/25  Urinary Status: voiding without complaint/concerns    Care Management   TCM complete. No further contact by this RN.    Care Mgmt General Assessment                         Follow up Plan     Discharge Follow-Up  Discharge follow up appointment scheduled in alignment with recommended follow up timeframe or Transitions of Risk Category? (Low = within 30 days; Moderate= within 14 days; High= within 7 days): Yes  Discharge Follow  Up Appointment Date: 02/21/25  Discharge Follow Up Appointment Scheduled with?: Primary Care Provider    Future Appointments   Date Time Provider Department Center   2/21/2025 11:30 AM Ophelia Troncoso MD [facility]   3/25/2025  2:00 PM Ophelia Troncoso MD [facility]   4/23/2025  1:00 PM Sal Graham, DO [facility]       Outpatient Plan as outlined on AVS reviewed with patient.    For any urgent concerns, please contact our 24 hour nurse triage line: [phone] ([phone])       Kimberly J Boecker, RN

## 2025-02-17 ENCOUNTER — MEDICAL CORRESPONDENCE (OUTPATIENT)
Dept: HEALTH INFORMATION MANAGEMENT | Facility: CLINIC | Age: 85
End: 2025-02-17

## 2025-02-17 ENCOUNTER — TELEPHONE (OUTPATIENT)
Dept: FAMILY MEDICINE | Facility: OTHER | Age: 85
End: 2025-02-17

## 2025-02-17 DIAGNOSIS — Z53.9 PERSONS ENCOUNTERING HEALTH SERVICES FOR SPECIFIC PROCEDURES, NOT CARRIED OUT: Primary | ICD-10-CM

## 2025-02-17 PROCEDURE — G0180 MD CERTIFICATION HHA PATIENT: HCPCS | Performed by: FAMILY MEDICINE

## 2025-02-17 NOTE — TELEPHONE ENCOUNTER
Symptom or reason needing to speak to RN: verbal orders      Best number to return call: Taisha  [phone]    Best time to return call: anytime

## 2025-02-18 ENCOUNTER — TELEPHONE (OUTPATIENT)
Dept: CARE COORDINATION | Facility: OTHER | Age: 85
End: 2025-02-18

## 2025-02-18 NOTE — TELEPHONE ENCOUNTER
[facility] [phone] calling for VO for SN [id number]. Verbal orders given by this RN.

## 2025-02-18 NOTE — Clinical Note
[facility] [phone] calling for VO for SN [id number]. Verbal orders given by this RN.

## 2025-02-21 DIAGNOSIS — L30.9 ECZEMA, UNSPECIFIED TYPE: ICD-10-CM

## 2025-02-24 RX ORDER — HYDROCORTISONE VALERATE CREAM 2 MG/G
CREAM TOPICAL 2 TIMES DAILY
Qty: 45 G | Refills: 3 | Status: SHIPPED | OUTPATIENT
Start: 2025-02-24

## 2025-02-24 RX ORDER — HYDROCORTISONE VALERATE CREAM 2 MG/G
CREAM TOPICAL 2 TIMES DAILY
Qty: 45 G | Refills: 3 | OUTPATIENT
Start: 2025-02-24

## 2025-02-24 NOTE — TELEPHONE ENCOUNTER
I am still getting an error:    The following medications were refills electronically requested by the pharmacy listed after each medication. You must change the selected pharmacy back to that pharmacy to sign these orders:         hydrocortisone (WESTCORT) 0.2 % external cream: [facility], [facility]. - [address]

## 2025-02-24 NOTE — TELEPHONE ENCOUNTER
Patient called regarding the cream below. Patient states that [facility] in [place], [place] is where she wants the medication sent. Patient states that the pills were sent there but still has not received the cream.   Please call patient with update   [phone], OK to leave detailed voicemail

## 2025-03-04 ENCOUNTER — TELEPHONE (OUTPATIENT)
Dept: FAMILY MEDICINE | Facility: OTHER | Age: 85
End: 2025-03-04

## 2025-03-04 DIAGNOSIS — E44.1 MILD PROTEIN-CALORIE MALNUTRITION: ICD-10-CM

## 2025-03-04 DIAGNOSIS — R63.4 WEIGHT LOSS: ICD-10-CM

## 2025-03-04 DIAGNOSIS — E03.9 ACQUIRED HYPOTHYROIDISM: ICD-10-CM

## 2025-03-04 DIAGNOSIS — Z87.19 HISTORY OF GI BLEED: ICD-10-CM

## 2025-03-04 RX ORDER — LEVOTHYROXINE SODIUM 50 MCG
50 TABLET ORAL DAILY
Qty: 90 TABLET | Refills: 3 | Status: SHIPPED | OUTPATIENT
Start: 2025-03-04

## 2025-03-04 RX ORDER — PANTOPRAZOLE SODIUM 40 MG/1
40 TABLET, DELAYED RELEASE ORAL 2 TIMES DAILY
Qty: 90 TABLET | Refills: 3 | Status: SHIPPED | OUTPATIENT
Start: 2025-03-04

## 2025-03-04 RX ORDER — MIRTAZAPINE 15 MG/1
15 TABLET, FILM COATED ORAL AT BEDTIME
Qty: 90 TABLET | Refills: 3 | Status: SHIPPED | OUTPATIENT
Start: 2025-03-04

## 2025-03-04 NOTE — TELEPHONE ENCOUNTER
Remeron  Last Written Prescription Date: 6/11/24  Last Fill Quantity: 90 # of Refills: 3  Last Office Visit: 2/21/25

## 2025-03-04 NOTE — TELEPHONE ENCOUNTER
Atypical Antidepressants Protocol Failed03/04/2025 12:05 PM   Protocol Details Medication indicated for associated diagnosis

## 2025-03-04 NOTE — TELEPHONE ENCOUNTER
Reason for call:  Medication      Have you contacted your pharmacy? Yes   If patient has contacted Pharmacy and it has been over 72hrs, continue to #2  Medication SYNTHROID 50 MCG tablet , pantoprazole (PROTONIX) 40 MG EC tablet   What [facility] do you use? [facility]      (Please note that the turn-around-time for prescriptions is 72 business hours; I am sending your request at this time. SEND TO appropriate Care Team Pool )

## 2025-03-27 ENCOUNTER — APPOINTMENT (OUTPATIENT)
Dept: GENERAL RADIOLOGY | Facility: HOSPITAL | Age: 85
End: 2025-03-27
Attending: STUDENT IN AN ORGANIZED HEALTH CARE EDUCATION/TRAINING PROGRAM
Payer: COMMERCIAL

## 2025-03-27 ENCOUNTER — HOSPITAL ENCOUNTER (INPATIENT)
Facility: HOSPITAL | Age: 85
End: 2025-03-27
Attending: STUDENT IN AN ORGANIZED HEALTH CARE EDUCATION/TRAINING PROGRAM | Admitting: INTERNAL MEDICINE
Payer: COMMERCIAL

## 2025-03-27 ENCOUNTER — APPOINTMENT (OUTPATIENT)
Dept: CT IMAGING | Facility: HOSPITAL | Age: 85
End: 2025-03-27
Attending: STUDENT IN AN ORGANIZED HEALTH CARE EDUCATION/TRAINING PROGRAM
Payer: COMMERCIAL

## 2025-03-27 VITALS
BODY MASS INDEX: 17.7 KG/M2 | WEIGHT: 90.17 LBS | HEIGHT: 60 IN | SYSTOLIC BLOOD PRESSURE: 122 MMHG | RESPIRATION RATE: 18 BRPM | TEMPERATURE: 100.1 F | DIASTOLIC BLOOD PRESSURE: 43 MMHG | OXYGEN SATURATION: 94 % | HEART RATE: 80 BPM

## 2025-03-27 DIAGNOSIS — K92.0 GASTROINTESTINAL HEMORRHAGE WITH HEMATEMESIS: ICD-10-CM

## 2025-03-27 DIAGNOSIS — M54.41 CHRONIC MIDLINE LOW BACK PAIN WITH BILATERAL SCIATICA: ICD-10-CM

## 2025-03-27 DIAGNOSIS — G89.29 CHRONIC MIDLINE LOW BACK PAIN WITH BILATERAL SCIATICA: ICD-10-CM

## 2025-03-27 DIAGNOSIS — M54.42 CHRONIC MIDLINE LOW BACK PAIN WITH BILATERAL SCIATICA: ICD-10-CM

## 2025-03-27 PROBLEM — I25.10 ASCVD (ARTERIOSCLEROTIC CARDIOVASCULAR DISEASE): Status: ACTIVE | Noted: 2019-06-18

## 2025-03-27 LAB
ABO + RH BLD: NORMAL
ALBUMIN SERPL BCG-MCNC: 3.8 G/DL (ref 3.5–5.2)
ALP SERPL-CCNC: 51 U/L (ref 40–150)
ALT SERPL W P-5'-P-CCNC: 19 U/L (ref 0–50)
ANION GAP SERPL CALCULATED.3IONS-SCNC: 11 MMOL/L (ref 7–15)
AST SERPL W P-5'-P-CCNC: 41 U/L (ref 0–45)
BASOPHILS # BLD AUTO: 0.1 10E3/UL (ref 0–0.2)
BASOPHILS NFR BLD AUTO: 1 %
BILIRUB SERPL-MCNC: 0.2 MG/DL
BLD GP AB SCN SERPL QL: NEGATIVE
BUN SERPL-MCNC: 20.6 MG/DL (ref 8–23)
CALCIUM SERPL-MCNC: 8.9 MG/DL (ref 8.8–10.4)
CHLORIDE SERPL-SCNC: 96 MMOL/L (ref 98–107)
CREAT SERPL-MCNC: 1.43 MG/DL (ref 0.51–0.95)
EGFRCR SERPLBLD CKD-EPI 2021: 36 ML/MIN/1.73M2
EOSINOPHIL # BLD AUTO: 0.3 10E3/UL (ref 0–0.7)
EOSINOPHIL NFR BLD AUTO: 3 %
ERYTHROCYTE [DISTWIDTH] IN BLOOD BY AUTOMATED COUNT: 13.3 % (ref 10–15)
FERRITIN SERPL-MCNC: 44 NG/ML (ref 11–328)
GLUCOSE BLDC GLUCOMTR-MCNC: 145 MG/DL (ref 70–99)
GLUCOSE SERPL-MCNC: 173 MG/DL (ref 70–99)
HCO3 SERPL-SCNC: 22 MMOL/L (ref 22–29)
HCT VFR BLD AUTO: 28.1 % (ref 35–47)
HGB BLD-MCNC: 8.1 G/DL (ref 11.7–15.7)
HGB BLD-MCNC: 9.1 G/DL (ref 11.7–15.7)
HOLD SPECIMEN: NORMAL
IMM GRANULOCYTES # BLD: 0.1 10E3/UL
IMM GRANULOCYTES NFR BLD: 1 %
INR PPP: 1.01 (ref 0.85–1.15)
IRON BINDING CAPACITY (ROCHE): NORMAL
IRON SATN MFR SERPL: NORMAL %
IRON SERPL-MCNC: 44 UG/DL (ref 37–145)
LIPASE SERPL-CCNC: 50 U/L (ref 13–60)
LYMPHOCYTES # BLD AUTO: 1.3 10E3/UL (ref 0.8–5.3)
LYMPHOCYTES NFR BLD AUTO: 10 %
MAGNESIUM SERPL-MCNC: 1.7 MG/DL (ref 1.7–2.3)
MCH RBC QN AUTO: 29.8 PG (ref 26.5–33)
MCHC RBC AUTO-ENTMCNC: 32.4 G/DL (ref 31.5–36.5)
MCV RBC AUTO: 92 FL (ref 78–100)
MONOCYTES # BLD AUTO: 0.8 10E3/UL (ref 0–1.3)
MONOCYTES NFR BLD AUTO: 6 %
NEUTROPHILS # BLD AUTO: 10.7 10E3/UL (ref 1.6–8.3)
NEUTROPHILS NFR BLD AUTO: 81 %
NRBC # BLD AUTO: 0 10E3/UL
NRBC BLD AUTO-RTO: 0 /100
PLATELET # BLD AUTO: 303 10E3/UL (ref 150–450)
POTASSIUM SERPL-SCNC: 4.4 MMOL/L (ref 3.4–5.3)
PROT SERPL-MCNC: 6.1 G/DL (ref 6.4–8.3)
RBC # BLD AUTO: 3.05 10E6/UL (ref 3.8–5.2)
SODIUM SERPL-SCNC: 129 MMOL/L (ref 135–145)
SPECIMEN EXP DATE BLD: NORMAL
WBC # BLD AUTO: 13.3 10E3/UL (ref 4–11)

## 2025-03-27 PROCEDURE — 83550 IRON BINDING TEST: CPT | Performed by: INTERNAL MEDICINE

## 2025-03-27 PROCEDURE — G0378 HOSPITAL OBSERVATION PER HR: HCPCS

## 2025-03-27 PROCEDURE — 36415 COLL VENOUS BLD VENIPUNCTURE: CPT | Performed by: STUDENT IN AN ORGANIZED HEALTH CARE EDUCATION/TRAINING PROGRAM

## 2025-03-27 PROCEDURE — 85610 PROTHROMBIN TIME: CPT | Performed by: STUDENT IN AN ORGANIZED HEALTH CARE EDUCATION/TRAINING PROGRAM

## 2025-03-27 PROCEDURE — 99285 EMERGENCY DEPT VISIT HI MDM: CPT | Mod: 25

## 2025-03-27 PROCEDURE — 250N000013 HC RX MED GY IP 250 OP 250 PS 637: Performed by: INTERNAL MEDICINE

## 2025-03-27 PROCEDURE — 36415 COLL VENOUS BLD VENIPUNCTURE: CPT | Performed by: INTERNAL MEDICINE

## 2025-03-27 PROCEDURE — 999N000157 HC STATISTIC RCP TIME EA 10 MIN

## 2025-03-27 PROCEDURE — 74174 CTA ABD&PLVS W/CONTRAST: CPT

## 2025-03-27 PROCEDURE — 99285 EMERGENCY DEPT VISIT HI MDM: CPT | Performed by: STUDENT IN AN ORGANIZED HEALTH CARE EDUCATION/TRAINING PROGRAM

## 2025-03-27 PROCEDURE — 71046 X-RAY EXAM CHEST 2 VIEWS: CPT

## 2025-03-27 PROCEDURE — 94640 AIRWAY INHALATION TREATMENT: CPT

## 2025-03-27 PROCEDURE — 83735 ASSAY OF MAGNESIUM: CPT | Performed by: STUDENT IN AN ORGANIZED HEALTH CARE EDUCATION/TRAINING PROGRAM

## 2025-03-27 PROCEDURE — 120N000001 HC R&B MED SURG/OB

## 2025-03-27 PROCEDURE — 85018 HEMOGLOBIN: CPT | Performed by: INTERNAL MEDICINE

## 2025-03-27 PROCEDURE — 99222 1ST HOSP IP/OBS MODERATE 55: CPT | Mod: 25 | Performed by: SURGERY

## 2025-03-27 PROCEDURE — 99222 1ST HOSP IP/OBS MODERATE 55: CPT | Mod: AI | Performed by: INTERNAL MEDICINE

## 2025-03-27 PROCEDURE — 258N000003 HC RX IP 258 OP 636: Performed by: INTERNAL MEDICINE

## 2025-03-27 PROCEDURE — 96374 THER/PROPH/DIAG INJ IV PUSH: CPT

## 2025-03-27 PROCEDURE — 86923 COMPATIBILITY TEST ELECTRIC: CPT | Performed by: INTERNAL MEDICINE

## 2025-03-27 PROCEDURE — 85004 AUTOMATED DIFF WBC COUNT: CPT | Performed by: STUDENT IN AN ORGANIZED HEALTH CARE EDUCATION/TRAINING PROGRAM

## 2025-03-27 PROCEDURE — 250N000009 HC RX 250: Performed by: INTERNAL MEDICINE

## 2025-03-27 PROCEDURE — 82962 GLUCOSE BLOOD TEST: CPT

## 2025-03-27 PROCEDURE — 250N000011 HC RX IP 250 OP 636: Performed by: RADIOLOGY

## 2025-03-27 PROCEDURE — 250N000011 HC RX IP 250 OP 636: Performed by: STUDENT IN AN ORGANIZED HEALTH CARE EDUCATION/TRAINING PROGRAM

## 2025-03-27 PROCEDURE — 83690 ASSAY OF LIPASE: CPT | Performed by: STUDENT IN AN ORGANIZED HEALTH CARE EDUCATION/TRAINING PROGRAM

## 2025-03-27 PROCEDURE — 82728 ASSAY OF FERRITIN: CPT | Performed by: INTERNAL MEDICINE

## 2025-03-27 PROCEDURE — 86901 BLOOD TYPING SEROLOGIC RH(D): CPT | Performed by: STUDENT IN AN ORGANIZED HEALTH CARE EDUCATION/TRAINING PROGRAM

## 2025-03-27 PROCEDURE — 94640 AIRWAY INHALATION TREATMENT: CPT | Mod: 76

## 2025-03-27 PROCEDURE — 80053 COMPREHEN METABOLIC PANEL: CPT | Performed by: STUDENT IN AN ORGANIZED HEALTH CARE EDUCATION/TRAINING PROGRAM

## 2025-03-27 RX ORDER — IPRATROPIUM BROMIDE AND ALBUTEROL SULFATE 2.5; .5 MG/3ML; MG/3ML
1 SOLUTION RESPIRATORY (INHALATION) EVERY 6 HOURS PRN
Status: DISPENSED | OUTPATIENT
Start: 2025-03-27

## 2025-03-27 RX ORDER — LATANOPROST 50 UG/ML
1 SOLUTION/ DROPS OPHTHALMIC EVERY EVENING
Status: DISPENSED | OUTPATIENT
Start: 2025-03-27

## 2025-03-27 RX ORDER — AMOXICILLIN 250 MG
2 CAPSULE ORAL 2 TIMES DAILY PRN
Status: ACTIVE | OUTPATIENT
Start: 2025-03-27

## 2025-03-27 RX ORDER — CALCIUM CARBONATE 500 MG/1
1000 TABLET, CHEWABLE ORAL 4 TIMES DAILY PRN
Status: ACTIVE | OUTPATIENT
Start: 2025-03-27

## 2025-03-27 RX ORDER — GABAPENTIN 100 MG/1
100-200 CAPSULE ORAL 3 TIMES DAILY
Status: DISCONTINUED | OUTPATIENT
Start: 2025-03-27 | End: 2025-03-27

## 2025-03-27 RX ORDER — SODIUM CHLORIDE 9 MG/ML
INJECTION, SOLUTION INTRAVENOUS CONTINUOUS
Status: ACTIVE | OUTPATIENT
Start: 2025-03-27

## 2025-03-27 RX ORDER — CARVEDILOL 12.5 MG/1
12.5 TABLET ORAL 2 TIMES DAILY WITH MEALS
Status: ACTIVE | OUTPATIENT
Start: 2025-03-27

## 2025-03-27 RX ORDER — FLUTICASONE FUROATE AND VILANTEROL 200; 25 UG/1; UG/1
1 POWDER RESPIRATORY (INHALATION) DAILY
Status: DISPENSED | OUTPATIENT
Start: 2025-03-27

## 2025-03-27 RX ORDER — ACETAMINOPHEN 650 MG/1
650 SUPPOSITORY RECTAL EVERY 4 HOURS PRN
Status: ACTIVE | OUTPATIENT
Start: 2025-03-27

## 2025-03-27 RX ORDER — MIRTAZAPINE 15 MG/1
15 TABLET, FILM COATED ORAL AT BEDTIME
Status: DISPENSED | OUTPATIENT
Start: 2025-03-27

## 2025-03-27 RX ORDER — LIDOCAINE 40 MG/G
CREAM TOPICAL
Status: ACTIVE | OUTPATIENT
Start: 2025-03-27

## 2025-03-27 RX ORDER — AMOXICILLIN 250 MG
1 CAPSULE ORAL 2 TIMES DAILY PRN
Status: ACTIVE | OUTPATIENT
Start: 2025-03-27

## 2025-03-27 RX ORDER — LEVOTHYROXINE SODIUM 25 UG/1
50 TABLET ORAL DAILY
Status: ACTIVE | OUTPATIENT
Start: 2025-03-28

## 2025-03-27 RX ORDER — ACETAMINOPHEN 325 MG/1
650 TABLET ORAL EVERY 4 HOURS PRN
Status: ACTIVE | OUTPATIENT
Start: 2025-03-27

## 2025-03-27 RX ORDER — GABAPENTIN 100 MG/1
100 CAPSULE ORAL 2 TIMES DAILY
Status: DISPENSED | OUTPATIENT
Start: 2025-03-27

## 2025-03-27 RX ORDER — IOPAMIDOL 755 MG/ML
45 INJECTION, SOLUTION INTRAVASCULAR ONCE
Status: COMPLETED | OUTPATIENT
Start: 2025-03-27 | End: 2025-03-27

## 2025-03-27 RX ADMIN — UMECLIDINIUM 1 PUFF: 62.5 AEROSOL, POWDER ORAL at 14:44

## 2025-03-27 RX ADMIN — IPRATROPIUM BROMIDE AND ALBUTEROL SULFATE 3 ML: .5; 3 SOLUTION RESPIRATORY (INHALATION) at 21:48

## 2025-03-27 RX ADMIN — MIRTAZAPINE 15 MG: 15 TABLET, FILM COATED ORAL at 21:23

## 2025-03-27 RX ADMIN — PANTOPRAZOLE SODIUM 40 MG: 40 INJECTION, POWDER, FOR SOLUTION INTRAVENOUS at 10:45

## 2025-03-27 RX ADMIN — FLUTICASONE FUROATE AND VILANTEROL TRIFENATATE 1 PUFF: 200; 25 POWDER RESPIRATORY (INHALATION) at 14:43

## 2025-03-27 RX ADMIN — LATANOPROST 1 DROP: 50 SOLUTION OPHTHALMIC at 21:23

## 2025-03-27 RX ADMIN — IPRATROPIUM BROMIDE AND ALBUTEROL SULFATE 3 ML: .5; 3 SOLUTION RESPIRATORY (INHALATION) at 14:20

## 2025-03-27 RX ADMIN — IOPAMIDOL 45 ML: 755 INJECTION, SOLUTION INTRAVENOUS at 11:13

## 2025-03-27 RX ADMIN — GABAPENTIN 100 MG: 100 CAPSULE ORAL at 21:23

## 2025-03-27 RX ADMIN — SODIUM CHLORIDE: 9 INJECTION, SOLUTION INTRAVENOUS at 14:34

## 2025-03-27 ASSESSMENT — ACTIVITIES OF DAILY LIVING (ADL)
ADLS_ACUITY_SCORE: 57
ADLS_ACUITY_SCORE: 40
FALL_HISTORY_WITHIN_LAST_SIX_MONTHS: NO
ADLS_ACUITY_SCORE: 40
ADLS_ACUITY_SCORE: 57
ADLS_ACUITY_SCORE: 57
CHANGE_IN_FUNCTIONAL_STATUS_SINCE_ONSET_OF_CURRENT_ILLNESS/INJURY: NO
ADLS_ACUITY_SCORE: 40
ADLS_ACUITY_SCORE: 40
ADLS_ACUITY_SCORE: 57
ADLS_ACUITY_SCORE: 57

## 2025-03-27 NOTE — PLAN OF CARE
[facility] Inpatient Admission Note:    Patient admitted to 3110/3110-1 at approximately 1533 via cart accompanied by transport tech from emergency room . Report received from Rebeca in SBAR format at 1353 via telephone. Patient transferred to bed via slide board.. Patient is alert and oriented X 3, reports pain; rates at 0 on 0-10 scale.  Patient oriented to room, unit, hourly rounding, and plan of care. Explained admission packet and patient handbook with patient bill of rights brochure. Will continue to monitor and document as needed.     Inpatient Nursing criteria listed below was met:    Health care directives status obtained and documented: Yes    Patient identifies a surrogate decision maker: Yes If yes, who:Cris Contact Information:[phone]     If initial lactic acid greater than 2.0, repeat lactic acid drawn within one hour of arrival to unit: NA.     Clergy visit ordered if patient requests: N/A    Skin issues/needs documented: No    Isolation Patient: no Education given, correct sign in place and documentation row added to PCS:  Yes    Fall Prevention Yes: Care plan updated, education given and documented, sticker and magnet in place: Yes    Care Plan initiated: Yes    Education Documented (including assessment): Yes    Patient has discharge needs : No    Patient A&Ox4, makes needs known. VSS. 0.9 NS at 50 mL/hr. Denies pain and nausea. LS clear, on RA. Patient stating SOB, PRN Neb given x1. Abd soft, nontender. NPO, ice chips only. Alarms on, call light within reach.    Face to face report given with opportunity to observe patient.    Report given to Luis NINO.    Nas Ferraro RN   3/27/2025  3:59 PM

## 2025-03-27 NOTE — MEDICATION SCRIBE - ADMISSION MEDICATION HISTORY
Medication Scribe Admission Medication History    Admission medication history is complete. The information provided in this note is only as accurate as the sources available at the time of the update.    Information Source(s): Patient, Family member, and CareEverywhere/SureScripts via phone    Pertinent Information:   Patient manages her own medications but does not know them and reported her daughter would be the best source to review with- daughter was uncertain of some medications d/t many changes but was able to confirm most of them. Patient reports only taking her thyroid pill this morning. Unable to confirm time of day pt usually takes medications.   Daughter reports that approx 1 month ago, pt was taking additional dosing of carvedilol (6.25 +12.6 mg BID) for a few days, but daughter assisted her in taking the correct dose once this was realized.       Changes made to PTA medication list:  Added: None  Deleted: tafluprost- no fill hx- pt reports only being on one prescribed eye drop  Changed:   Gabapentin- pt reports there are no medications she takes TID and believes she is only taking it BID, she also thinks she only takes 1 capsule at a time.   Topicals/nasal sprays/ combivent- daughter believes pt only uses PRN    Allergies reviewed with patient and updates made in EHR: no    Medication History Completed By: Arabella Alamo 3/27/2025 3:11 PM    PTA Med List   Medication Sig Last Dose/Taking    acetaminophen (TYLENOL) 325 MG tablet Take 650 mg by mouth 2 times daily. For back pain. 3/26/2025    aspirin (ASA) 81 MG chewable tablet Take 1 tablet (81 mg) by mouth daily. 3/26/2025    atorvastatin (LIPITOR) 40 MG tablet Take 1 tablet (40 mg) by mouth at bedtime 3/26/2025 Bedtime    calcium carbonate (TUMS) 500 MG chewable tablet Take 1 chew tab by mouth at bedtime 3/26/2025 Bedtime    carvedilol (COREG) 12.5 MG tablet Take 1 tablet (12.5 mg) by mouth 2 times daily (with meals). 3/26/2025 Evening    fluticasone  (FLONASE) 50 MCG/ACT spray Spray 2 sprays into both nostrils daily (Patient taking differently: Spray 2 sprays into both nostrils daily as needed.) Unknown    Fluticasone-Umeclidin-Vilanterol (TRELEGY ELLIPTA) 200-62.5-25 MCG/ACT oral inhaler Inhale 1 puff into the lungs daily 3/26/2025    furosemide (LASIX) 20 MG tablet Take 0.5 tablets (10 mg) by mouth daily as needed (swelling). Unknown    gabapentin (NEURONTIN) 100 MG capsule Take 1-2 capsules (100-200 mg) by mouth 3 times daily. (Patient taking differently: Take 100 mg by mouth 2 times daily.) 3/26/2025    hydrochlorothiazide (HYDRODIURIL) 25 MG tablet Take 1 tablet (25 mg) by mouth daily. 3/26/2025    hydrocortisone (WESTCORT) 0.2 % external cream Apply topically 2 times daily. (Patient taking differently: Apply topically 2 times daily as needed.) Taking Differently    ipratropium - albuterol 0.5 mg/2.5 mg/3 mL (DUONEB) 0.5-2.5 (3) MG/3ML neb solution Take 1 vial (3 mLs) by nebulization every 6 hours as needed for shortness of breath, wheezing or cough Taking As Needed    latanoprost (XALATAN) 0.005 % ophthalmic solution Place 1 drop into both eyes every evening. 3/26/2025 Bedtime    losartan (COZAAR) 100 MG tablet Take 1 tablet (100 mg) by mouth daily. 3/26/2025    methocarbamol (ROBAXIN) 500 MG tablet Take 1 tablet (500 mg) by mouth 4 times daily as needed for muscle spasms Unknown    mirtazapine (REMERON) 15 MG tablet Take 1 tablet (15 mg) by mouth at bedtime 3/26/2025 Bedtime    nitroGLYcerin (NITROSTAT) 0.4 MG sublingual tablet Place 1 tablet (0.4 mg) under the tongue every 5 minutes as needed for chest pain. For chest pain place 1 tablet under the tongue every 5 minutes for 3 doses. If symptoms persist 5 minutes after 1st dose call 911. Unknown    pantoprazole (PROTONIX) 40 MG EC tablet Take 1 tablet (40 mg) by mouth 2 times daily. 3/26/2025    Prenatal Multivit-Min-Fe-FA (PRENATAL/IRON) TABS Take 1 tablet by mouth every morning. 3/26/2025    SYNTHROID  50 MCG tablet Take 1 tablet (50 mcg) by mouth daily. 3/27/2025 Morning    trolamine salicylate (ASPERCREME) 10 % external cream Apply topically daily as needed (lower back pain) Unknown

## 2025-03-27 NOTE — PLAN OF CARE
Doris Briseno was seen and treated in our emergency department on 11/10/2021. She may return to work on 11/19/2021. Return to work 10 days after start of symptoms. If you have any questions or concerns, please don't hesitate to call.       Yessenia Perry MD Pt A&O, VSS, Afebrile. Pt is a bit frustrated that she can not eat, but manageable. Ice chips provided. Very faint blanchable redness to sacral coccyx area. Tele is in place. Heart rhythm is a bit irregular at times and murmur noted. She was up to the bathroom A x 1 d/t IV pole. She did urinate x 1. She was a bit dyspneic upon exertion, but only mildly.  Bed is in lowest position, wheels locked and alarms in place. Call light and personal items within reach. Room near nurses station w/ door open.   Face to face report given with opportunity to observe patient.    Report given to ***    Luis Aranda RN   3/27/2025  7:10 PM

## 2025-03-27 NOTE — ED TRIAGE NOTES
Brought in by EMS for vomiting blood.  They stated it was a fairly good amount.  Patient A&O, pale in color.  Oxygen at 2L NC; fingers cold and unable to get a good reading. Ear oximeter on.

## 2025-03-27 NOTE — PROGRESS NOTES
Follow-up hemoglobin is 8.1.  Will continue to do serial hemoglobins.  If drops less than 7 will transfuse.

## 2025-03-27 NOTE — ED PROVIDER NOTES
[facility]  ED Provider Note    Chief Complaint   Patient presents with    Hematemesis     History:  Lita Ocasio is a 84 year old female with history of coronary artery disease, stroke and upper GI bleed secondary to erosive gastritis presents to the emergency department today complaining of hematemesis.  She was last admitted to the hospital a month ago for the same.  She vomited blood this morning twice.  She says there was a copious amount.  EMS reports a large volume of blood on the ground and the person who accompanies her gestured to what less to been a very large puddle of blood on the ground.  No lightheadedness.  Still taking aspirin.  Denies alcohol     Review of Systems   Performed; see HPI for pertinent positives and negatives.     Medical history, surgical history, and social history was reviewed.  Nursing documentation, triage note, and vitals were reviewed.    Vitals:  BP: 134/70  Pulse: 75  Temp: 97.5  F (36.4  C)  Weight: 40.8 kg (90 lb)  SpO2: 95 %    Physical Exam:  Constitutional: Alert and conversant. NAD   HENT: NCAT   Eyes: Normal pupils   Neck: supple   CV: No pallor  Pulmonary/Chest: Non-labored respirations  Abdominal: non-distended soft nontender no rebound or guarding  MSK: GOFF.   Neuro: Alert and appropriate   Skin: Warm and dry. No diaphoresis. No rashes on exposed skin    Psych: Appropriate mood and affect       MDM:      ED Course as of 03/27/25 1247   Thu Mar 27, 2025   1220 84-year-old female with previous history of erosive gastritis and upper GI hemorrhage presents to the emergency department today after vomiting blood twice.  Report makes it sound like there is a fairly copious amount.  Very much likely that she will require admission.  Hemoglobin stable at 9.  IV Protonix given.  Denies alcoholism, no clear indication for octreotide or ceftriaxone.  I have informed the surgeon, he will come and speak with me about her shortly   1246 Spoke with surgeon, he  will follow the case   1246 Spoke with the hospitalist who has accepted the admission to the hospitalist service   1246 Patient is amenable with plan for admission       Procedures:  Procedures        Impression:  Final diagnoses:   Gastrointestinal hemorrhage with hematemesis            Juan C Trevino MD  03/27/25 1241

## 2025-03-27 NOTE — ED NOTES
Bed: ED02  Expected date:   Expected time:   Means of arrival:   Comments:  Sylvia WATSON Hemoptosis

## 2025-03-27 NOTE — H&P
[facility]    History and Physical - Hospitalist Service       Date of Admission:  3/27/2025    Assessment & Plan      Lita Ocasio is a 84 year old female admitted on 3/27/2025.      1.  Hematemesis: Patient presented with a couple episodes of hematemesis this morning reported by EMS and her friend.  She presents hemodynamically stable.  Hemoglobin is at 9.1.  Was 9.2 upon discharge in 2/21.  She has had no subsequent episodes since she has been here.  Will continue with the Protonix.  Keep her n.p.o. here overnight will do serial hemoglobins she has been typed and screened.  Transfuse if she drops less than 7.  Or has evidence of active bleeding.  If she remains stable would recommend endoscopy tomorrow morning.  Will touch base with surgery.  Apparently she is just not really recovered since that last hospital stay.  Has been taking his prenatal vitamin.  Hemoglobin is been in the 9 range.  Iron studies have been ordered.    2.  Coronary artery disease: Has had a stent in the past.  Is followed by Dr. Graham.  No recent cardiac issues at all.  Denies any chest pains.  Certainly no evidence of any congestive heart failure.  Has had some A-fib in the distant past during a surgical procedure but no recurrences.  Does take an aspirin daily.  Has been taking with food.    3.  Hypothyroid: Last TSH checked was back in September 2024.  Will recheck was 4.1 at that time.    4.  Hyponatremia: Sodium level 129.  Has been generally in the 135 range.  Will recheck tomorrow.  Probably increased free water intake decreased food intake although she states she been eating okay.    5.  Chronic kidney disease stage III: Creatinine is close to her baseline 1.43.  The CT of her abdomen does show her left kidney is somewhat atrophic likely due to atherosclerotic disease.        Diet:  N.p.o.  DVT Prophylaxis: Pneumatic Compression Devices  Fitzgerald Catheter: Not present  Lines: None     Cardiac Monitoring: None  Code Status:   Full code    Clinically Significant Risk Factors Present on Admission         # Hyponatremia: Lowest Na = 129 mmol/L in last 2 days, will monitor as appropriate  # Hypochloremia: Lowest Cl = 96 mmol/L in last 2 days, will monitor as appropriate        # Drug Induced Platelet Defect: home medication list includes an antiplatelet medication   # Hypertension: Noted on problem list  # Chronic heart failure with preserved ejection fraction: heart failure noted on problem list and last echo with EF >50%     # Anemia: based on hgb <11  # Anemia: based on hgb <11                  Disposition Plan     Medically Ready for Discharge: Anticipated in 2-4 Days           Ariel Steen MD  Hospitalist Service  [facility]  Securely message with [employer] (more info)  Text page via [facility] Paging/Directory     ______________________________________________________________________    Chief Complaint   Throwing up blood    History is obtained from the patient, electronic health record, emergency department physician, and patient's friend    History of Present Illness   Lita Ocasio is a 84 year old female who lives independently.  Was doing relatively well up until this morning where she had 2 episodes of hematemesis.  Her friend saw along with EMS who were called to the scene.  She denies any abdominal pain.  No lightheadedness.  No nausea vomiting.  She has not any black stools tarry stools at all.  Denies chest pain shortness of breath headache palpitations at all.  She was hospitalized here back in February 8 through 11.  During that hospital stay she did have an episode of hematemesis and a episode of melanotic stools.  She had previous episode in 2020 and also November 2023.  2020 she had endoscopy which showed erosive gastritis with stigmata of bleeding.  In November 2023 she declined endoscopy.  Apparently also declined during this recent episode.  Her hemoglobin at that time on admission was 9.9 did drop down to  7.6 but at time of discharge was 8.5.  She had been on aspirin daily was placed on Protonix.  June 2023 her hemoglobin was 12.1.  And she presented in November 2023 was down to 7.1.  She was transfused 1 unit of packed cells back in November 2023.  Her hemoglobin did get back up to 14 on October 2024.  And since then has been in the 9 range since her admission last month.  Upon to the ER today blood pressure 134/70 heart rate was 75 temp was 97.5 sats were 92% on room air.  She has had no further episodes of emesis.  She denies any lightheadedness at all.  She is alert and appropriate.  Lab work showed hemoglobin 9.1 white count 13,300.  Platelet count is 303,000.  MCV was 92.  Sodium was 129 potassium 4.4 BUN/creatinine were 20.6 and 1.43.  Liver profile normal.  Was typed and crossed.  CT scan was performed of her abdomen pelvis.  There is no evidence of any acute bleeding.  She does have significant atherosclerotic disease especially of her aorta and bifurcation.  No occlusion.  Her left kidney is noted to be somewhat atrophied with likely decreased blood flow.    Her friend who is with her states that she really not been getting out of the house since her last hospital stay.  He just does not want to leave.  She has not felt her usual self at all.  She states her appetites been okay.  She been able to get up and move around the house okay she has had no falls at home.      Past Medical History    Past Medical History:   Diagnosis Date    Acquired hypothyroidism 5/12/2019    Asthma     Benign essential hypertension 5/12/2019    Centrilobular emphysema (H) 5/12/2019    Chronic rhinitis 8/16/2013    Chronic systolic congestive heart failure (H) 5/12/2019    Constipation     Coronary artery disease     Hearing loss     Heart trouble     Hemorrhagic cerebrovascular accident (CVA) (H) 01/2019    Hyperlipidemia 5/12/2019    Hypertension     Nasal congestion     Non-rheumatic mitral regurgitation 5/12/2019    Osteopenia  5/12/2019    Osteoporosis     Parathyroid related hypercalcemia 8/18/2013    Primary hyperparathyroidism 9/5/2013    Ringing in ears     Sensorineural hearing loss, asymmetrical 8/16/2013    Sneezing     Snoring     Stented coronary artery     Thyroid disease     Weakness     Weight loss        Past Surgical History   Past Surgical History:   Procedure Laterality Date    CATARACT IOL, RT/LT Bilateral     COLONOSCOPY      COLONOSCOPY - HIM SCAN  01/01/2009    Colonoscopy - [facility]/NL  2009    ENDOSCOPY UPPER, COLONOSCOPY, COMBINED N/A 10/17/2019    Procedure: UPPER ENDOSCOPY WITH BIOPSY  AND COLONOSCOPY;  Surgeon: Julio Canales MD;  Location: [facility] OR    ENT SURGERY  2011    para thyroid surgery    ENT SURGERY  09/2013    Parathyroid    ESOPHAGOSCOPY, GASTROSCOPY, DUODENOSCOPY (EGD), COMBINED N/A 9/21/2019    Procedure: ESOPHAGOGASTRODUODENOSCOPY (EGD);  Surgeon: Julio Canales MD;  Location: [facility] OR    ESOPHAGOSCOPY, GASTROSCOPY, DUODENOSCOPY (EGD), COMBINED N/A 1/27/2020    Procedure: ESOPHAGOGASTRODUODENOSCOPY (EGD) WITH BIOPSY;  Surgeon: Isrrael Parish MD;  Location: [facility] OR    ESOPHAGOSCOPY, GASTROSCOPY, DUODENOSCOPY (EGD), COMBINED N/A 2/20/2020    Procedure: ESOPHAGOGASTRODUODENOSCOPY (EGD) WITH BIOPSY;  Surgeon: Pedro Luis Montoya DO;  Location: [facility] OR    PARATHYROIDECTOMY  9/10/2013    Procedure: PARATHYROIDECTOMY;  Reoperative Neck Exploration, Resection of right Parathyroid adenoma;  Surgeon: Thalia Muller MD;  Location: [facility] OR    TONSILLECTOMY      TUBAL LIGATION         Prior to Admission Medications   Prior to Admission Medications   Prescriptions Last Dose Informant Patient Reported? Taking?   Fluticasone-Umeclidin-Vilanterol (TRELEGY ELLIPTA) 200-62.5-25 MCG/ACT oral inhaler   No No   Sig: Inhale 1 puff into the lungs daily   Prenatal Multivit-Min-Fe-FA (PRENATAL/IRON) TABS   Yes No   Sig: Take 1 tablet by mouth every morning.   SYNTHROID 50 MCG tablet   No No   Sig: Take 1 tablet (50  mcg) by mouth daily.   Tafluprost 0.0015 % SOLN  Self Yes No   Sig: Place 1 drop into both eyes at bedtime -when remembered.   acetaminophen (TYLENOL) 325 MG tablet   Yes No   Sig: Take 650 mg by mouth 2 times daily. For back pain.   aspirin (ASA) 81 MG chewable tablet   No No   Sig: Take 1 tablet (81 mg) by mouth daily.   atorvastatin (LIPITOR) 40 MG tablet   No No   Sig: Take 1 tablet (40 mg) by mouth at bedtime   calcitonin, salmon, (MIACALCIN) 200 UNIT/ACT nasal spray   No No   Sig: Spray 1 spray into one nostril alternating nostrils daily Alternate nostril each day.   Patient not taking: Reported on 2/21/2025   calcium carbonate (TUMS) 500 MG chewable tablet  Self Yes No   Sig: Take 1 chew tab by mouth at bedtime   carvedilol (COREG) 12.5 MG tablet   No No   Sig: Take 1 tablet (12.5 mg) by mouth 2 times daily (with meals).   clotrimazole-betamethasone (LOTRISONE) 1-0.05 % external cream   No No   Sig: Apply topically 2 times daily   Patient taking differently: Apply topically 2 times daily as needed.   fluticasone (FLONASE) 50 MCG/ACT spray  Self No No   Sig: Spray 2 sprays into both nostrils daily   furosemide (LASIX) 20 MG tablet   No No   Sig: Take 0.5 tablets (10 mg) by mouth daily as needed (swelling).   gabapentin (NEURONTIN) 100 MG capsule   No No   Sig: Take 1-2 capsules (100-200 mg) by mouth 3 times daily.   hydrochlorothiazide (HYDRODIURIL) 25 MG tablet   No No   Sig: Take 1 tablet (25 mg) by mouth daily.   hydrocortisone (WESTCORT) 0.2 % external cream   No No   Sig: Apply topically 2 times daily.   ipratropium - albuterol 0.5 mg/2.5 mg/3 mL (DUONEB) 0.5-2.5 (3) MG/3ML neb solution   No No   Sig: Take 1 vial (3 mLs) by nebulization every 6 hours as needed for shortness of breath, wheezing or cough   ipratropium-albuterol (COMBIVENT RESPIMAT)  MCG/ACT inhaler   No No   Sig: Inhale 1 puff into the lungs 4 times daily   Patient taking differently: Inhale 1 puff into the lungs 4 times daily as  needed.   latanoprost (XALATAN) 0.005 % ophthalmic solution   Yes No   Sig: Place 1 drop into both eyes every evening.   losartan (COZAAR) 100 MG tablet   No No   Sig: Take 1 tablet (100 mg) by mouth daily.   methocarbamol (ROBAXIN) 500 MG tablet   No No   Sig: Take 1 tablet (500 mg) by mouth 4 times daily as needed for muscle spasms   mirtazapine (REMERON) 15 MG tablet   No No   Sig: Take 1 tablet (15 mg) by mouth at bedtime   nitroGLYcerin (NITROSTAT) 0.4 MG sublingual tablet   No No   Sig: Place 1 tablet (0.4 mg) under the tongue every 5 minutes as needed for chest pain. For chest pain place 1 tablet under the tongue every 5 minutes for 3 doses. If symptoms persist 5 minutes after 1st dose call 911.   pantoprazole (PROTONIX) 40 MG EC tablet   No No   Sig: Take 1 tablet (40 mg) by mouth 2 times daily.   trolamine salicylate (ASPERCREME) 10 % external cream   Yes No   Sig: Apply topically daily as needed (lower back pain)      Facility-Administered Medications: None        Review of Systems    The 10 point Review of Systems is negative other than noted in the HPI or here.      Social History   I have reviewed this patient's social history and updated it with pertinent information if needed.  Social History     Tobacco Use    Smoking status: Former     Current packs/day: 0.00     Average packs/day: 1 pack/day for 50.0 years (50.0 ttl pk-yrs)     Types: Cigarettes     Start date: 1969     Quit date: 2019     Years since quittin.2     Passive exposure: Past    Smokeless tobacco: Never    Tobacco comments:     quit 2019   Vaping Use    Vaping status: Never Used   Substance Use Topics    Alcohol use: Yes     Comment: social    Drug use: No         Family History   I have reviewed this patient's family history and updated it with pertinent information if needed.  Family History   Problem Relation Age of Onset    Hearing Loss Mother     Heart Disease Mother     Myocardial Infarction Mother      Cerebrovascular Disease Father     Cancer Brother         throat    Cancer Sister     Myocardial Infarction Sister     Cancer Maternal Grandmother     Heart Disease Maternal Grandfather     Aortic aneurysm Son          Allergies   Allergies   Allergen Reactions    Evista [Raloxifene] Other (See Comments)     hypercalcemia    Prednisone GI Disturbance    Combigan [Brimonidine Tartrate-Timolol] Other (See Comments)     Eye swelling and itchy    Cyclosporine      Pt reports using eye gtts and having red irritated eyes     Latex Rash    Levaquin [Levofloxacin] Muscle Pain (Myalgia)    Penicillins Rash        Physical Exam   Vital Signs: Temp: 97.5  F (36.4  C) Temp src: Tympanic BP: 116/52 Pulse: 91     SpO2: 92 % O2 Device: None (Room air)    Weight: 90 lbs 0 oz    Constitutional: awake, alert, cooperative, no apparent distress, and appears stated age and somewhat cachectic looking.  ENT: Normocephalic, without obvious abnormality, atraumatic, sinuses nontender on palpation, external ears without lesions, oral pharynx with moist mucous membranes, tonsils without erythema or exudates, gums normal and good dentition.  Respiratory: No increased work of breathing, good air exchange, clear to auscultation bilaterally, no crackles or wheezing  Cardiovascular: Normal apical impulse, regular rate and rhythm, normal S1 and S2, no S3 or S4, and no murmur noted and neck veins are flat  GI: No scars, normal bowel sounds, soft, non-distended, non-tender, no masses palpated, no hepatosplenomegally  Skin: no bruising or bleeding  Musculoskeletal: There is no redness, warmth, or swelling of the joints.  Full range of motion noted.  Motor strength is 5 out of 5 all extremities bilaterally.  Tone is normal.  Neurologic: Awake, alert, oriented to name, place and time.  Cranial nerves II-XII are grossly intact.  Motor is 5 out of 5 bilaterally.  Cerebellar finger to nose, heel to shin intact.  Sensory is intact.  Babinski down going,  Romberg negative, and gait is normal.    Medical Decision Making       60 MINUTES SPENT BY ME on the date of service doing chart review, history, exam, documentation & further activities per the note.      Data     I have personally reviewed the following data over the past 24 hrs:    13.3 (H)  \   9.1 (L)   / 303     129 (L) 96 (L) 20.6 /  173 (H)   4.4 22 1.43 (H) \     ALT: 19 AST: 41 AP: 51 TBILI: 0.2   ALB: 3.8 TOT PROTEIN: 6.1 (L) LIPASE: 50     INR:  1.01 PTT:  N/A   D-dimer:  N/A Fibrinogen:  N/A       Imaging results reviewed over the past 24 hrs:   Recent Results (from the past 24 hours)   CTA Abdomen Pelvis with Contrast    Narrative    Exam: CTA ABDOMEN PELVIS WITH CONTRAST    Exam reason: hematemesis - GI bleed protocol    Technique: Initial noncontrast images of the abdomen and pelvis were  obtained. Using helical CT technique, axial images of the abdomen and  pelvis were obtained with IV contrast in the late arterial and venous  phases.  Coronal and sagittal reconstructions also performed. This CT  was performed using one or more of the following dose reduction  techniques: automated exposure control, adjustment of the mA and/or kV  according to patient size, and/or use of iterative reconstruction  technique.    Meds/Contrast: ISOVUE 370  45mL    Comparison: 2/8/2025     FINDINGS:    ABDOMEN:    Liver: No mass or any significant abnormality.  Gallbladder: No calcified gallstones.   Bile Ducts: No biliary ductal dilation.   Spleen:  No splenomegaly.  Pancreas: No mass, ductal dilatation, or inflammatory changes.  Kidneys: There is atrophy of the left kidney. No enhancing mass. No  hydronephrosis.   Adrenals:  No nodules.   Lymph Nodes: No adenopathy.   Vascular: No aortic aneurysm. There is heavy calcified atherosclerosis  of the abdominal aorta and iliac arteries with associated stenosis.  Abdominal Wall: No acute findings.     PELVIS:   No mass or adenopathy.     Bowel/Mesentery/Peritoneum:   -No  findings to suggest active GI bleeding.  -No bowel obstruction.   -No acute inflammatory findings.  -No ascites.    Visualized portions of the Chest: No consolidation or pleural  effusion.  Musculoskeletal: No acute osseous abnormalities. Unchanged lumbar  compression fractures.      Impression    IMPRESSION:  No findings to suggest active GI bleed. No acute findings in the  abdomen or pelvis.    MOLLY SALGADO MD         SYSTEM ID:  [id number]   XR Chest 2 Views    Narrative    XR CHEST 2 VIEWS    HISTORY: 84 years Female possible hypoxia    COMPARISON: 11/26/2023    TECHNIQUE: 2 views of the chest were obtained.    FINDINGS: Lung volumes are high suggesting air trapping. There is an  old rib fracture deformity of the right lateral seventh rib.    Heart size and pulmonary vascularity is normal limits. Lungs are  clear.        Impression    IMPRESSION: High lung volumes suggesting air trapping. Lungs are  otherwise clear.    RAFFI LOPEZ MD         SYSTEM ID:  [id number]

## 2025-03-28 LAB
ANION GAP SERPL CALCULATED.3IONS-SCNC: 13 MMOL/L (ref 7–15)
BASOPHILS # BLD AUTO: 0.1 10E3/UL (ref 0–0.2)
BASOPHILS NFR BLD AUTO: 1 %
BLD PROD TYP BPU: NORMAL
BLOOD COMPONENT TYPE: NORMAL
BUN SERPL-MCNC: 30.9 MG/DL (ref 8–23)
CALCIUM SERPL-MCNC: 8.4 MG/DL (ref 8.8–10.4)
CHLORIDE SERPL-SCNC: 102 MMOL/L (ref 98–107)
CODING SYSTEM: NORMAL
CREAT SERPL-MCNC: 1.45 MG/DL (ref 0.51–0.95)
CROSSMATCH: NORMAL
EGFRCR SERPLBLD CKD-EPI 2021: 35 ML/MIN/1.73M2
EOSINOPHIL # BLD AUTO: 0.2 10E3/UL (ref 0–0.7)
EOSINOPHIL NFR BLD AUTO: 3 %
ERYTHROCYTE [DISTWIDTH] IN BLOOD BY AUTOMATED COUNT: 13.3 % (ref 10–15)
GLUCOSE SERPL-MCNC: 71 MG/DL (ref 70–99)
HCO3 SERPL-SCNC: 21 MMOL/L (ref 22–29)
HCT VFR BLD AUTO: 23.5 % (ref 35–47)
HGB BLD-MCNC: 10 G/DL (ref 11.7–15.7)
HGB BLD-MCNC: 7.6 G/DL (ref 11.7–15.7)
HGB BLD-MCNC: 7.7 G/DL (ref 11.7–15.7)
IMM GRANULOCYTES # BLD: 0.1 10E3/UL
IMM GRANULOCYTES NFR BLD: 1 %
ISSUE DATE AND TIME: NORMAL
LYMPHOCYTES # BLD AUTO: 2.2 10E3/UL (ref 0.8–5.3)
LYMPHOCYTES NFR BLD AUTO: 28 %
MCH RBC QN AUTO: 30.3 PG (ref 26.5–33)
MCHC RBC AUTO-ENTMCNC: 32.8 G/DL (ref 31.5–36.5)
MCV RBC AUTO: 93 FL (ref 78–100)
MONOCYTES # BLD AUTO: 0.7 10E3/UL (ref 0–1.3)
MONOCYTES NFR BLD AUTO: 9 %
NEUTROPHILS # BLD AUTO: 4.6 10E3/UL (ref 1.6–8.3)
NEUTROPHILS NFR BLD AUTO: 58 %
NRBC # BLD AUTO: 0 10E3/UL
NRBC BLD AUTO-RTO: 0 /100
PLATELET # BLD AUTO: 231 10E3/UL (ref 150–450)
POTASSIUM SERPL-SCNC: 3.9 MMOL/L (ref 3.4–5.3)
RBC # BLD AUTO: 2.54 10E6/UL (ref 3.8–5.2)
SODIUM SERPL-SCNC: 136 MMOL/L (ref 135–145)
TSH SERPL DL<=0.005 MIU/L-ACNC: 1.8 UIU/ML (ref 0.3–4.2)
UNIT ABO/RH: NORMAL
UNIT NUMBER: NORMAL
UNIT STATUS: NORMAL
UNIT TYPE ISBT: 6200
WBC # BLD AUTO: 7.9 10E3/UL (ref 4–11)

## 2025-03-28 PROCEDURE — 120N000001 HC R&B MED SURG/OB

## 2025-03-28 PROCEDURE — 370N000017 HC ANESTHESIA TECHNICAL FEE, PER MIN: Performed by: SURGERY

## 2025-03-28 PROCEDURE — 360N000075 HC SURGERY LEVEL 2, PER MIN: Performed by: SURGERY

## 2025-03-28 PROCEDURE — 710N000010 HC RECOVERY PHASE 1, LEVEL 2, PER MIN: Performed by: SURGERY

## 2025-03-28 PROCEDURE — 36415 COLL VENOUS BLD VENIPUNCTURE: CPT | Performed by: INTERNAL MEDICINE

## 2025-03-28 PROCEDURE — 88305 TISSUE EXAM BY PATHOLOGIST: CPT | Mod: TC | Performed by: SURGERY

## 2025-03-28 PROCEDURE — 30233N1 TRANSFUSION OF NONAUTOLOGOUS RED BLOOD CELLS INTO PERIPHERAL VEIN, PERCUTANEOUS APPROACH: ICD-10-PCS | Performed by: INTERNAL MEDICINE

## 2025-03-28 PROCEDURE — G0378 HOSPITAL OBSERVATION PER HR: HCPCS

## 2025-03-28 PROCEDURE — 272N000001 HC OR GENERAL SUPPLY STERILE: Performed by: SURGERY

## 2025-03-28 PROCEDURE — 88305 TISSUE EXAM BY PATHOLOGIST: CPT | Mod: 26 | Performed by: PATHOLOGY

## 2025-03-28 PROCEDURE — 43239 EGD BIOPSY SINGLE/MULTIPLE: CPT | Performed by: SURGERY

## 2025-03-28 PROCEDURE — 85018 HEMOGLOBIN: CPT | Performed by: INTERNAL MEDICINE

## 2025-03-28 PROCEDURE — 0DB78ZX EXCISION OF STOMACH, PYLORUS, VIA NATURAL OR ARTIFICIAL OPENING ENDOSCOPIC, DIAGNOSTIC: ICD-10-PCS | Performed by: SURGERY

## 2025-03-28 PROCEDURE — 99232 SBSQ HOSP IP/OBS MODERATE 35: CPT | Performed by: INTERNAL MEDICINE

## 2025-03-28 PROCEDURE — 999N000141 HC STATISTIC PRE-PROCEDURE NURSING ASSESSMENT: Performed by: SURGERY

## 2025-03-28 PROCEDURE — 250N000013 HC RX MED GY IP 250 OP 250 PS 637: Performed by: INTERNAL MEDICINE

## 2025-03-28 PROCEDURE — 0DB68ZX EXCISION OF STOMACH, VIA NATURAL OR ARTIFICIAL OPENING ENDOSCOPIC, DIAGNOSTIC: ICD-10-PCS | Performed by: SURGERY

## 2025-03-28 PROCEDURE — 94640 AIRWAY INHALATION TREATMENT: CPT

## 2025-03-28 PROCEDURE — 250N000009 HC RX 250: Performed by: INTERNAL MEDICINE

## 2025-03-28 PROCEDURE — P9016 RBC LEUKOCYTES REDUCED: HCPCS | Performed by: INTERNAL MEDICINE

## 2025-03-28 PROCEDURE — 84443 ASSAY THYROID STIM HORMONE: CPT | Performed by: INTERNAL MEDICINE

## 2025-03-28 PROCEDURE — 0DB98ZX EXCISION OF DUODENUM, VIA NATURAL OR ARTIFICIAL OPENING ENDOSCOPIC, DIAGNOSTIC: ICD-10-PCS | Performed by: SURGERY

## 2025-03-28 PROCEDURE — 250N000011 HC RX IP 250 OP 636: Performed by: INTERNAL MEDICINE

## 2025-03-28 PROCEDURE — 999N000157 HC STATISTIC RCP TIME EA 10 MIN

## 2025-03-28 PROCEDURE — 80048 BASIC METABOLIC PNL TOTAL CA: CPT | Performed by: INTERNAL MEDICINE

## 2025-03-28 PROCEDURE — 85004 AUTOMATED DIFF WBC COUNT: CPT | Performed by: INTERNAL MEDICINE

## 2025-03-28 RX ORDER — SUCRALFATE ORAL 1 G/10ML
1 SUSPENSION ORAL
Status: DISCONTINUED | OUTPATIENT
Start: 2025-03-28 | End: 2025-03-28

## 2025-03-28 RX ORDER — PANTOPRAZOLE SODIUM 40 MG/1
40 TABLET, DELAYED RELEASE ORAL
Status: DISCONTINUED | OUTPATIENT
Start: 2025-03-29 | End: 2025-03-29 | Stop reason: HOSPADM

## 2025-03-28 RX ADMIN — PANTOPRAZOLE SODIUM 40 MG: 40 INJECTION, POWDER, FOR SOLUTION INTRAVENOUS at 08:57

## 2025-03-28 RX ADMIN — CARVEDILOL 12.5 MG: 12.5 TABLET, FILM COATED ORAL at 17:22

## 2025-03-28 RX ADMIN — LATANOPROST 1 DROP: 50 SOLUTION OPHTHALMIC at 22:21

## 2025-03-28 RX ADMIN — MIRTAZAPINE 15 MG: 15 TABLET, FILM COATED ORAL at 22:21

## 2025-03-28 RX ADMIN — GABAPENTIN 100 MG: 100 CAPSULE ORAL at 22:21

## 2025-03-28 RX ADMIN — IPRATROPIUM BROMIDE AND ALBUTEROL SULFATE 3 ML: .5; 3 SOLUTION RESPIRATORY (INHALATION) at 20:07

## 2025-03-28 RX ADMIN — LEVOTHYROXINE SODIUM 50 MCG: 0.03 TABLET ORAL at 06:37

## 2025-03-28 ASSESSMENT — ACTIVITIES OF DAILY LIVING (ADL)
ADLS_ACUITY_SCORE: 40

## 2025-03-28 NOTE — PLAN OF CARE
Plan of Care Reviewed With: Patient    Overall Patient Progress: Progressing    Patient A&O, VS and assessments as charted. Denies pain. No signs or symptoms of bleeding this shift. No nausea and vomiting. Down to surgery for EGD this shift. 1 unit RBC transfused this AM, unit finished transfusing while patient down in surgery. Advanced to regular diet this afternoon. Tolerating well. Bowel sounds active. IV SL. SBA to the bathroom. Free from falls. Bed alarms active, call light within reach. Makes her needs known.     Face to face report given with opportunity to observe patient.    Report given to Luis Dominguez RN   3/28/2025  7:02 PM

## 2025-03-28 NOTE — OP NOTE
REPORT OF OPERATION  DATE OF PROCEDURE: 3/28/2025    PATIENT: Lita Ocasio    SURGERY PERFORMED: Esophagogastroduodenoscopy with biopsies     PREOPERATIVE DIAGNOSIS: Hematemesis    POSTOPERATIVE DIAGNOSIS:    Gastritis, without bleeding   Squamous columnar junction at 35 cm    SURGEON: Eliel Christianson MD    ASSISTANTS: None    ANESTHESIA: Monitored Anesthesia Care    COMPLICATIONS: None apparent    ESTIMATED BLOOD LOSS: * No values recorded between 3/28/2025 12:04 PM and 3/28/2025 12:14 PM *    TRANSFUSIONS: None    TISSUE TO PATHOLOGY: Duodenal, Antral, Distal Esophageal, and Greater curve of the stomach    FINDINGS: Gastritis, without bleeding    INDICATIONS: This is a 84 year old female in need of an Esophagogastroduodenoscopy for Hematemesis.  The patient will be taken to the endoscopy suite for that procedure.    DESCRIPTIONS OF PROCEDURE IN DETAIL: After consent was obtained the patient was taken to the operative suite and alexander in the left lateral decubitus position.  The patient was identified and the correct patient was confirmed. Monitored Anesthesia Care was given by anesthesia. A time out was performed verifying the correct patient and the correct procedure.  The entire operative team was in agreement.  All necessary equipment and supplies were in the room.    The endoscope was inserted into the mouth and passed without difficulty to the third portion of the duodenum.  Duodenal biopsies were taken.  The endoscope was then withdrawn through the duodenum, the duodenal bulb and pyloric channel and no abnormalities were noted.  The endoscope was brought back into the stomach and antral biopsies were obtained.  There was some mild, patchy areas of gastritis seen at the antrum and gastric body.  The endoscope was straightened back out and brought into the distal esophagus and a well-defined squamocolumnar junction was identified at 35 cm. Patient had a small hiatal hernia present.  Biopsies of the distal  esophagus were taken.  The endoscope was slowly withdrawn through the remaining esophagus there was a very small traction diverticulum seen at the mid esophagus.  The endoscope was withdrawn from the patient the patient was then taken to the recovery room in stable condition tolerated the procedure well.      Recommend advancing patient's diet and continue omeprazole.

## 2025-03-28 NOTE — OR NURSING
Went to get patient; bedside report received.  Patient up to the bathroom with minimal assist x 1;  patient with increase sob with exertion; patient denies any pain.  Patient able to change gown and into bed.  Patient denies any pain or discomfort at this time.  Patient to [facility] for pre-op cares.  Blood transfusion in progress via pump.

## 2025-03-28 NOTE — PROGRESS NOTES
[facility]    Hospitalist Progress Note    Lita Ocasio is a 84 year old female who lives independently.  Was doing relatively well up until this morning where she had 2 episodes of hematemesis.  Her friend saw along with EMS who were called to the scene.  She denies any abdominal pain.  No lightheadedness.  No nausea vomiting.  She has not any black stools tarry stools at all.  Denies chest pain shortness of breath headache palpitations at all.  She was hospitalized here back in February 8 through 11.  During that hospital stay she did have an episode of hematemesis and a episode of melanotic stools.  She had previous episode in 2020 and also November 2023.  2020 she had endoscopy which showed erosive gastritis with stigmata of bleeding.  In November 2023 she declined endoscopy.  Apparently also declined during this recent episode.  Her hemoglobin at that time on admission was 9.9 did drop down to 7.6 but at time of discharge was 8.5.  She had been on aspirin daily was placed on Protonix.  June 2023 her hemoglobin was 12.1.  And she presented in November 2023 was down to 7.1.  She was transfused 1 unit of packed cells back in November 2023.  Her hemoglobin did get back up to 14 on October 2024.  And since then has been in the 9 range since her admission last month.  Upon to the ER today blood pressure 134/70 heart rate was 75 temp was 97.5 sats were 92% on room air.  She has had no further episodes of emesis.  She denies any lightheadedness at all.  She is alert and appropriate.  Lab work showed hemoglobin 9.1 white count 13,300.  Platelet count is 303,000.  MCV was 92.  Sodium was 129 potassium 4.4 BUN/creatinine were 20.6 and 1.43.  Liver profile normal.  Was typed and crossed.  CT scan was performed of her abdomen pelvis.  There is no evidence of any acute bleeding.  She does have significant atherosclerotic disease especially of her aorta and bifurcation.  No occlusion.  Her left kidney is noted to  be somewhat atrophied with likely decreased blood flow.     Her friend who is with her states that she really not been getting out of the house since her last hospital stay.  He just does not want to leave.  She has not felt her usual self at all.  She states her appetites been okay.  She been able to get up and move around the house okay she has had no falls at home.          Assessment & Plan  Lita Ocasio is a 84 year old female admitted on 3/27/2025.       1.  Hematemesis: Patient presented with a couple episodes of hematemesis this morning reported by EMS and her friend.  She presents hemodynamically stable.  Hemoglobin is at 9.1.  Was 9.2 upon discharge in 2/21.  She has had no subsequent episodes since she has been here.  Will continue with the Protonix.  Keep her n.p.o. here overnight will do serial hemoglobins she has been typed and screened.  Transfuse if she drops less than 7.  Or has evidence of active bleeding.  If she remains stable would recommend endoscopy tomorrow morning.  Will touch base with surgery.  Apparently she is just not really recovered since that last hospital stay.  Has been taking his prenatal vitamin.  Hemoglobin is been in the 9 range.  Iron studies have been ordered.  -3/28: Patient has not had any further hematemesis.  Has not had any bowel movements during her hospital stay.  Her hemoglobin was 9.1 on admission is going up down to 7.7.  Remains hemodynamically stable.  Have decided to give her 1 unit of packed red blood cells just given her other comorbidities her age history of coronary artery disease.  She is on Protonix.  She has been NPO.  Tentative plan is for endoscopy this morning.  Feel it is probably indicated given her persistent low hemoglobin and multiple previous GI bleeds.  She has not been scoped for the last couple of years.     2.  Coronary artery disease: Has had a stent in the past.  Is followed by Dr. Graham.  No recent cardiac issues at all.  Denies any chest  pains.  Certainly no evidence of any congestive heart failure.  Has had some A-fib in the distant past during a surgical procedure but no recurrences.  Does take an aspirin daily.  Has been taking with food.  -3/28: Patient remains hemodynamically stable.  O2 sats are stable.  No evidence of any heart failure.  She had no dysrhythmias on telemetry overnight.  Feel in terms of her cardiac status she is as optimized as she will be to undergo any procedures.     3.  Hypothyroid: Last TSH checked was back in September 2024.  Will recheck was 4.1 at that time.  -3/28: Repeat TSH is pending.     4.  Hyponatremia: Sodium level 129.  Has been generally in the 135 range.  Will recheck tomorrow.  Probably increased free water intake decreased food intake although she states she been eating okay.  -3/28: Sodium is improved to 136 today.     5.  Chronic kidney disease stage III: Creatinine is close to her baseline 1.43.  The CT of her abdomen does show her left kidney is somewhat atrophic likely due to atherosclerotic disease.  -3/28: BUN/creatinine are 31/1.45 today.  Baseline for her.  BUN is up a little bit which may be some GI blood load.  She has been getting IV fluids overnight she is minimal 50 cc an hour.    Diet: NPO for Medical/Clinical Reasons Except for: Meds, Ice Chips  Fluids: Saline 50 cc an hour    DVT Prophylaxis: Pneumatic Compression Devices  Code Status: Full Code    Disposition: Expected discharge in 1-2 days once once stable hemoglobin and endoscopies performed.    Ariel Steen MD    Interval History   Patient's morning is alert pleasant no real distress.  Denies any nausea vomiting abdominal pain.  No chest pains.  No dyspnea.  Hemoglobin is drifted down to 7.7.  No evidence of any further hematemesis.  No bowel movements.  Blood pressures have been stable.  Plan on giving her 1 unit of packed cells.  She remains on Protonix and n.p.o. status.  Tentative plan is for endoscopy today.    -Data reviewed  today: I reviewed all new labs and imaging results over the last 24 hours. I personally reviewed no images or EKG's today.    Physical Exam   Temp: 97  F (36.1  C) Temp src: Tympanic BP: 135/40 Pulse: 66   Resp: 16 SpO2: 92 % O2 Device: None (Room air)    Vitals:    03/27/25 0938 03/27/25 1411 03/28/25 0546   Weight: 40.8 kg (90 lb) 40.9 kg (90 lb 2.7 oz) 39.5 kg (87 lb 1.3 oz)     Vital Signs with Ranges  Temp:  [97  F (36.1  C)-100.1  F (37.8  C)] 97  F (36.1  C)  Pulse:  [65-94] 66  Resp:  [16-18] 16  BP: (102-139)/(32-70) 135/40  SpO2:  [92 %-97 %] 92 %  I/O last 3 completed shifts:  In: 620 [I.V.:620]  Out: 300 [Urine:300]    Peripheral IV 03/27/25 Anterior;Left Upper forearm (Active)   Site Assessment WDL 03/28/25 0546   Line Status Infusing 03/28/25 0546   Dressing Transparent 03/28/25 0546   Dressing Status clean;dry;intact 03/28/25 0546   Line Intervention Flushed 03/27/25 1411   Line Necessity Yes, meets criteria 03/28/25 0546   Phlebitis Scale 0-->no symptoms 03/28/25 0546   Infiltration? no 03/28/25 0546   Number of days: 1     No line/device    Constitutional: Alert and oriented x3. No distress    HEENT: Normocephalic/atraumatic, PERRL, EOMI, mouth moist, neck supple, no LN.     Cardiovascular: RRR.  No murmur, no  rubs, or gallops.  JVD flat. .  Pulses 2    Respiratory: Clear to auscultation bilaterally    Abdomen: Soft, nontender, nondistended, no organomegaly. Bowel sounds present    Extremities: Warm/dry.  No edema    Neuro:   Non focal, cranial nerves intact, Moves all extremities.  Medications   Current Facility-Administered Medications   Medication Dose Route Frequency Provider Last Rate Last Admin    sodium chloride 0.9 % infusion   Intravenous Continuous Ariel Steen MD 50 mL/hr at 03/27/25 1434 New Bag at 03/27/25 1434     Current Facility-Administered Medications   Medication Dose Route Frequency Provider Last Rate Last Admin    [Held by provider] carvedilol (COREG) tablet 12.5 mg  12.5  mg Oral BID w/meals Ariel Steen MD        fluticasone-vilanterol (BREO ELLIPTA) 200-25 MCG/ACT inhaler 1 puff  1 puff Inhalation Daily Ariel Steen MD   1 puff at 03/27/25 1443    And    umeclidinium (INCRUSE ELLIPTA) 62.5 MCG/ACT inhaler 1 puff  1 puff Inhalation Daily Ariel Steen MD   1 puff at 03/27/25 1444    gabapentin (NEURONTIN) capsule 100 mg  100 mg Oral BID Ariel Steen MD   100 mg at 03/27/25 2123    latanoprost (XALATAN) 0.005 % ophthalmic solution 1 drop  1 drop Both Eyes QPM Ariel Steen MD   1 drop at 03/27/25 2123    levothyroxine (SYNTHROID/LEVOTHROID) tablet 50 mcg  50 mcg Oral Daily Ariel Steen MD   50 mcg at 03/28/25 0637    mirtazapine (REMERON) tablet 15 mg  15 mg Oral At Bedtime Ariel Steen MD   15 mg at 03/27/25 2123    pantoprazole (PROTONIX) IV push injection 40 mg  40 mg Intravenous Daily with breakfast Ariel Steen MD        sodium chloride (PF) 0.9% PF flush 3 mL  3 mL Intracatheter Q8H STIVEN Ariel Steen MD   3 mL at 03/27/25 1434       Data   Recent Labs   Lab 03/28/25  0509 03/28/25  0207 03/27/25  2145 03/27/25  1648 03/27/25  0952 03/27/25  0941   WBC 7.9  --   --   --  13.3*  --    HGB 7.7* 7.6* 8.1*   < > 9.1*  --    MCV 93  --   --   --  92  --      --   --   --  303  --    INR  --   --   --   --  1.01  --      --   --   --  129*  --    POTASSIUM 3.9  --   --   --  4.4  --    CHLORIDE 102  --   --   --  96*  --    CO2 21*  --   --   --  22  --    BUN 30.9*  --   --   --  20.6  --    CR 1.45*  --   --   --  1.43*  --    ANIONGAP 13  --   --   --  11  --    BECKY 8.4*  --   --   --  8.9  --    GLC 71  --   --   --  173* 145*   ALBUMIN  --   --   --   --  3.8  --    PROTTOTAL  --   --   --   --  6.1*  --    BILITOTAL  --   --   --   --  0.2  --    ALKPHOS  --   --   --   --  51  --    ALT  --   --   --   --  19  --    AST  --   --   --   --  41  --    LIPASE  --   --   --   --  50  --     < > = values in this interval not  displayed.       Recent Results (from the past 24 hours)   CTA Abdomen Pelvis with Contrast    Narrative    Exam: CTA ABDOMEN PELVIS WITH CONTRAST    Exam reason: hematemesis - GI bleed protocol    Technique: Initial noncontrast images of the abdomen and pelvis were  obtained. Using helical CT technique, axial images of the abdomen and  pelvis were obtained with IV contrast in the late arterial and venous  phases.  Coronal and sagittal reconstructions also performed. This CT  was performed using one or more of the following dose reduction  techniques: automated exposure control, adjustment of the mA and/or kV  according to patient size, and/or use of iterative reconstruction  technique.    Meds/Contrast: ISOVUE 370  45mL    Comparison: 2/8/2025     FINDINGS:    ABDOMEN:    Liver: No mass or any significant abnormality.  Gallbladder: No calcified gallstones.   Bile Ducts: No biliary ductal dilation.   Spleen:  No splenomegaly.  Pancreas: No mass, ductal dilatation, or inflammatory changes.  Kidneys: There is atrophy of the left kidney. No enhancing mass. No  hydronephrosis.   Adrenals:  No nodules.   Lymph Nodes: No adenopathy.   Vascular: No aortic aneurysm. There is heavy calcified atherosclerosis  of the abdominal aorta and iliac arteries with associated stenosis.  Abdominal Wall: No acute findings.     PELVIS:   No mass or adenopathy.     Bowel/Mesentery/Peritoneum:   -No findings to suggest active GI bleeding.  -No bowel obstruction.   -No acute inflammatory findings.  -No ascites.    Visualized portions of the Chest: No consolidation or pleural  effusion.  Musculoskeletal: No acute osseous abnormalities. Unchanged lumbar  compression fractures.      Impression    IMPRESSION:  No findings to suggest active GI bleed. No acute findings in the  abdomen or pelvis.    MOLLY SALGADO MD         SYSTEM ID:  [id number]   XR Chest 2 Views    Narrative    XR CHEST 2 VIEWS    HISTORY: 84 years Female possible  hypoxia    COMPARISON: 11/26/2023    TECHNIQUE: 2 views of the chest were obtained.    FINDINGS: Lung volumes are high suggesting air trapping. There is an  old rib fracture deformity of the right lateral seventh rib.    Heart size and pulmonary vascularity is normal limits. Lungs are  clear.        Impression    IMPRESSION: High lung volumes suggesting air trapping. Lungs are  otherwise clear.    RAFFI LOPEZ MD         SYSTEM ID:  [id number]

## 2025-03-28 NOTE — PROGRESS NOTES
Chart reviewed and pt discussed in interdisciplinary rounds. No anticipated discharge needs at this time. CTS team will continue to monitor daily in interdisciplinary rounds and will intervene as needed/appropriate.    Checked in with patient.  Patient denies questions or concerns at this time.  CTS will continue to remain available for support and resources.     Met with patient and she did not have any concerns or issues.  She will make her own follow-up with the clinic and specifically did not want the hospital to do that for her.    If patient is still here in Monday, this SW will check in with her.

## 2025-03-28 NOTE — OR NURSING
Patient's daughter called and updated.  She notes that she is at work and will call later for an update.

## 2025-03-28 NOTE — CONSULTS
Hospital Note- CONSULT  3/27/2025    Patient:Lita Ocasio  Referring Physician:  Dr. Steen    Reason for Consult: Upper GI bleed    HPI:  Lita Ocasio is a very pleasant 84 year old female PMH COPD, stable CAD disease, admitted to the hospital for UGI.    Patient had an episode of copious hematemesis x2 this morning.  She was brought to the ER where her Hgb was 9.1 which is about her baseline.  She has not had hematemesis since.  She was admitted and started on IV PPI.      Patient denies heartburn, dyphagia, and melena.  She was on prilosec and stopped this at some point.  She was admitted for an UGI in 2/25.  She was started on protonix following this admission.  Patient does take a daily baby aspirin.  Her last EGD was in 2020 which found gastritis with bleeding stigmata.  She has never had abdominal surgery.  She lives at home alone.    Past Medical History:  Past Medical History:   Diagnosis Date    Acquired hypothyroidism 5/12/2019    Asthma     Benign essential hypertension 5/12/2019    Centrilobular emphysema (H) 5/12/2019    Chronic rhinitis 8/16/2013    Chronic systolic congestive heart failure (H) 5/12/2019    Constipation     Coronary artery disease     Hearing loss     Heart trouble     Hemorrhagic cerebrovascular accident (CVA) (H) 01/2019    Hyperlipidemia 5/12/2019    Hypertension     Nasal congestion     Non-rheumatic mitral regurgitation 5/12/2019    Osteopenia 5/12/2019    Osteoporosis     Parathyroid related hypercalcemia 8/18/2013    Primary hyperparathyroidism 9/5/2013    Ringing in ears     Sensorineural hearing loss, asymmetrical 8/16/2013    Sneezing     Snoring     Stented coronary artery     Thyroid disease     Weakness     Weight loss        Past Surgical History:  Past Surgical History:   Procedure Laterality Date    CATARACT IOL, RT/LT Bilateral     COLONOSCOPY      COLONOSCOPY - HIM SCAN  01/01/2009    Colonoscopy - [facility]/NL  2009    ENDOSCOPY UPPER, COLONOSCOPY, COMBINED N/A  10/17/2019    Procedure: UPPER ENDOSCOPY WITH BIOPSY  AND COLONOSCOPY;  Surgeon: Julio Canales MD;  Location: [facility] OR    ENT SURGERY  2011    para thyroid surgery    ENT SURGERY  09/2013    Parathyroid    ESOPHAGOSCOPY, GASTROSCOPY, DUODENOSCOPY (EGD), COMBINED N/A 9/21/2019    Procedure: ESOPHAGOGASTRODUODENOSCOPY (EGD);  Surgeon: Julio Canales MD;  Location: [facility] OR    ESOPHAGOSCOPY, GASTROSCOPY, DUODENOSCOPY (EGD), COMBINED N/A 1/27/2020    Procedure: ESOPHAGOGASTRODUODENOSCOPY (EGD) WITH BIOPSY;  Surgeon: Isrrael Parish MD;  Location: [facility] OR    ESOPHAGOSCOPY, GASTROSCOPY, DUODENOSCOPY (EGD), COMBINED N/A 2/20/2020    Procedure: ESOPHAGOGASTRODUODENOSCOPY (EGD) WITH BIOPSY;  Surgeon: Pedro Luis Montoya DO;  Location: [facility] OR    PARATHYROIDECTOMY  9/10/2013    Procedure: PARATHYROIDECTOMY;  Reoperative Neck Exploration, Resection of right Parathyroid adenoma;  Surgeon: Thalia Muller MD;  Location: [facility] OR    TONSILLECTOMY      TUBAL LIGATION         Family History History:  Family History   Problem Relation Age of Onset    Hearing Loss Mother     Heart Disease Mother     Myocardial Infarction Mother     Cerebrovascular Disease Father     Cancer Brother         throat    Cancer Sister     Myocardial Infarction Sister     Cancer Maternal Grandmother     Heart Disease Maternal Grandfather     Aortic aneurysm Son        History of Tobacco Use:  History   Smoking Status    Former    Types: Cigarettes   Smokeless Tobacco    Never       Current Medications:  No current outpatient medications on file.       Allergies:  Allergies   Allergen Reactions    Evista [Raloxifene] Other (See Comments)     hypercalcemia    Prednisone GI Disturbance    Combigan [Brimonidine Tartrate-Timolol] Other (See Comments)     Eye swelling and itchy    Cyclosporine      Pt reports using eye gtts and having red irritated eyes     Latex Rash    Levaquin [Levofloxacin] Muscle Pain (Myalgia)    Penicillins Rash        ROS:  Pertinent items are noted in HPI.    PHYSICAL EXAM:     Vital signs: /47 (BP Location: Right arm, Patient Position: Semi-Matson's, Cuff Size: Adult Small)   Pulse 94   Temp 98.9  F (37.2  C) (Tympanic)   Resp 18   Ht 1.524 m (5')   Wt 40.9 kg (90 lb 2.7 oz)   SpO2 93%   BMI 17.61 kg/m     Weight: [unfilled]   BMI: Body mass index is 17.61 kg/m .   General: No apparent distress   Skin: No rashes or readily discernible lesions   Neck: Neck supple, symmetric and without palpable adenopathy or masses   Lungs: Nonlabored breathing on room air.  Clear to auscultation bilaterally.   CV: Regular rate and rhythm on auscultation.   Abdominal: Soft, nontender, nondistended   Extremities: No peripheral edema    Labs:  Reviewed    Imaging:  Reviewed CTA.    ASSESSMENT & Plan:  Lita Ocasio is a very pleasant 84 year old female presenting with upper GI bleed.    Patient is currently stable without any repeat episodes of hematemesis.  Discussed risks and benefits of EGD including bleeding and perforation.  Patient demonstrated understanding and wishes to proceed with EGD.  Will try to get this done tomorrow morning.

## 2025-03-29 VITALS
HEIGHT: 60 IN | SYSTOLIC BLOOD PRESSURE: 146 MMHG | WEIGHT: 87.08 LBS | BODY MASS INDEX: 17.1 KG/M2 | OXYGEN SATURATION: 90 % | RESPIRATION RATE: 16 BRPM | HEART RATE: 64 BPM | TEMPERATURE: 97.6 F | DIASTOLIC BLOOD PRESSURE: 39 MMHG

## 2025-03-29 LAB
ANION GAP SERPL CALCULATED.3IONS-SCNC: 9 MMOL/L (ref 7–15)
BUN SERPL-MCNC: 23.4 MG/DL (ref 8–23)
CALCIUM SERPL-MCNC: 8.2 MG/DL (ref 8.8–10.4)
CHLORIDE SERPL-SCNC: 105 MMOL/L (ref 98–107)
CREAT SERPL-MCNC: 1.31 MG/DL (ref 0.51–0.95)
EGFRCR SERPLBLD CKD-EPI 2021: 40 ML/MIN/1.73M2
ERYTHROCYTE [DISTWIDTH] IN BLOOD BY AUTOMATED COUNT: 13.9 % (ref 10–15)
GLUCOSE SERPL-MCNC: 84 MG/DL (ref 70–99)
HCO3 SERPL-SCNC: 23 MMOL/L (ref 22–29)
HCT VFR BLD AUTO: 28.4 % (ref 35–47)
HGB BLD-MCNC: 9.4 G/DL (ref 11.7–15.7)
MCH RBC QN AUTO: 30.1 PG (ref 26.5–33)
MCHC RBC AUTO-ENTMCNC: 33.1 G/DL (ref 31.5–36.5)
MCV RBC AUTO: 91 FL (ref 78–100)
PLATELET # BLD AUTO: 203 10E3/UL (ref 150–450)
POTASSIUM SERPL-SCNC: 3.8 MMOL/L (ref 3.4–5.3)
RBC # BLD AUTO: 3.12 10E6/UL (ref 3.8–5.2)
SODIUM SERPL-SCNC: 137 MMOL/L (ref 135–145)
WBC # BLD AUTO: 10.1 10E3/UL (ref 4–11)

## 2025-03-29 PROCEDURE — 999N000157 HC STATISTIC RCP TIME EA 10 MIN

## 2025-03-29 PROCEDURE — 36415 COLL VENOUS BLD VENIPUNCTURE: CPT | Performed by: INTERNAL MEDICINE

## 2025-03-29 PROCEDURE — G0378 HOSPITAL OBSERVATION PER HR: HCPCS

## 2025-03-29 PROCEDURE — 99239 HOSP IP/OBS DSCHRG MGMT >30: CPT | Performed by: INTERNAL MEDICINE

## 2025-03-29 PROCEDURE — 85018 HEMOGLOBIN: CPT | Performed by: INTERNAL MEDICINE

## 2025-03-29 PROCEDURE — 80048 BASIC METABOLIC PNL TOTAL CA: CPT | Performed by: INTERNAL MEDICINE

## 2025-03-29 PROCEDURE — 250N000013 HC RX MED GY IP 250 OP 250 PS 637: Performed by: INTERNAL MEDICINE

## 2025-03-29 RX ORDER — GABAPENTIN 100 MG/1
100 CAPSULE ORAL 2 TIMES DAILY
COMMUNITY
Start: 2025-03-29

## 2025-03-29 RX ADMIN — CARVEDILOL 12.5 MG: 12.5 TABLET, FILM COATED ORAL at 08:14

## 2025-03-29 RX ADMIN — UMECLIDINIUM 1 PUFF: 62.5 AEROSOL, POWDER ORAL at 08:14

## 2025-03-29 RX ADMIN — GABAPENTIN 100 MG: 100 CAPSULE ORAL at 08:14

## 2025-03-29 RX ADMIN — FLUTICASONE FUROATE AND VILANTEROL TRIFENATATE 1 PUFF: 200; 25 POWDER RESPIRATORY (INHALATION) at 08:14

## 2025-03-29 RX ADMIN — LEVOTHYROXINE SODIUM 50 MCG: 0.03 TABLET ORAL at 08:14

## 2025-03-29 RX ADMIN — PANTOPRAZOLE SODIUM 40 MG: 40 TABLET, DELAYED RELEASE ORAL at 08:14

## 2025-03-29 ASSESSMENT — ACTIVITIES OF DAILY LIVING (ADL)
ADLS_ACUITY_SCORE: 40
ADLS_ACUITY_SCORE: 45
ADLS_ACUITY_SCORE: 40
ADLS_ACUITY_SCORE: 45
ADLS_ACUITY_SCORE: 45

## 2025-03-29 NOTE — PLAN OF CARE
Pt A&O , VSS, afebrile. SBA and up to the bathroom this shift. IV is SL. All assessments as charted. He had no N/V/D this shift.  Call light and personal items within reach and makes needs known. Room near nurses station. Bed in lowest position, wheels locked and alarms in place.   BP (!) 128/33 (BP Location: Right arm, Patient Position: Left side, Cuff Size: Adult Regular)   Pulse 60   Temp 99.1  F (37.3  C) (Tympanic)   Resp 18   Ht 1.524 m (5')   Wt 39.5 kg (87 lb 1.3 oz)   SpO2 (!) 90%   BMI 17.01 kg/m    Face to face report given with opportunity to observe patient.    Report given to TRUNG Echevarria RN   3/29/2025  1:41 AM

## 2025-03-29 NOTE — DISCHARGE SUMMARY
[facility]  Hospitalist Discharge Summary      Date of Admission:  3/27/2025  Date of Discharge:  3/29/2025  Discharging Provider: Ariel Steen MD  Discharge Service: Hospitalist Service    Discharge Diagnoses     Gastrointestinal hemorrhage with hematemesis  Acute on chronic anemia  Gastritis  Chronic kidney disease stage IV  Hypertension  Coronary artery disease      Clinically Significant Risk Factors     # Cachexia: Estimated body mass index is 17.01 kg/m  as calculated from the following:    Height as of this encounter: 1.524 m (5').    Weight as of this encounter: 39.5 kg (87 lb 1.3 oz).       Follow-ups Needed After Discharge   Follow-up Appointments       Follow-up and recommended labs and tests       Follow up with primary care provider, Ophelia Troncoso, within 7 days for hospital follow- up.  The following labs/tests are recommended: CBC plus BMP.            None    Unresulted Labs Ordered in the Past 30 Days of this Admission       Date and Time Order Name Status Description    3/28/2025 12:06 PM Surgical Pathology Exam In process         These results will be followed up by surgery    Discharge Disposition   Discharged to home  Condition at discharge: Stable    Hospital Course     Lita Ocasio is a 84 year old female who lives independently.  Was doing relatively well up until this morning where she had 2 episodes of hematemesis.  Her friend saw along with EMS who were called to the scene.  She denies any abdominal pain.  No lightheadedness.  No nausea vomiting.  She has not any black stools tarry stools at all.  Denies chest pain shortness of breath headache palpitations at all.  She was hospitalized here back in February 8 through 11.  During that hospital stay she did have an episode of hematemesis and a episode of melanotic stools.  She had previous episode in 2020 and also November 2023.  2020 she had endoscopy which showed erosive gastritis with stigmata of bleeding.  In November  2023 she declined endoscopy.  Apparently also declined during this recent episode.  Her hemoglobin at that time on admission was 9.9 did drop down to 7.6 but at time of discharge was 8.5.  She had been on aspirin daily was placed on Protonix.  June 2023 her hemoglobin was 12.1.  And she presented in November 2023 was down to 7.1.  She was transfused 1 unit of packed cells back in November 2023.  Her hemoglobin did get back up to 14 on October 2024.  And since then has been in the 9 range since her admission last month.  Upon to the ER today blood pressure 134/70 heart rate was 75 temp was 97.5 sats were 92% on room air.  She has had no further episodes of emesis.  She denies any lightheadedness at all.  She is alert and appropriate.  Lab work showed hemoglobin 9.1 white count 13,300.  Platelet count is 303,000.  MCV was 92.  Sodium was 129 potassium 4.4 BUN/creatinine were 20.6 and 1.43.  Liver profile normal.  Was typed and crossed.  CT scan was performed of her abdomen pelvis.  There is no evidence of any acute bleeding.  She does have significant atherosclerotic disease especially of her aorta and bifurcation.  No occlusion.  Her left kidney is noted to be somewhat atrophied with likely decreased blood flow.     Her friend who is with her states that she really not been getting out of the house since her last hospital stay.  He just does not want to leave.  She has not felt her usual self at all.  She states her appetites been okay.  She been able to get up and move around the house okay she has had no falls at home.            Assessment & Plan  Lita Ocasio is a 84 year old female admitted on 3/27/2025.       1.  Hematemesis: Patient presented with a couple episodes of hematemesis this morning reported by EMS and her friend.  She presents hemodynamically stable.  Hemoglobin is at 9.1.  Was 9.2 upon discharge in 2/21.  She has had no subsequent episodes since she has been here.  Will continue with the Protonix.   Keep her n.p.o. here overnight will do serial hemoglobins she has been typed and screened.  Transfuse if she drops less than 7.  Or has evidence of active bleeding.  If she remains stable would recommend endoscopy tomorrow morning.  Will touch base with surgery.  Apparently she is just not really recovered since that last hospital stay.  Has been taking his prenatal vitamin.  Hemoglobin is been in the 9 range.  Iron studies have been ordered.  -3/28: Patient has not had any further hematemesis.  Has not had any bowel movements during her hospital stay.  Her hemoglobin was 9.1 on admission is going up down to 7.7.  Remains hemodynamically stable.  Have decided to give her 1 unit of packed red blood cells just given her other comorbidities her age history of coronary artery disease.  She is on Protonix.  She has been NPO.  Tentative plan is for endoscopy this morning.  Feel it is probably indicated given her persistent low hemoglobin and multiple previous GI bleeds.  She has not been scoped for the last couple of years.  -3/28: Patient underwent the endoscopy had no evidence of acute bleeding there was some gastritis noted.  Biopsies were taken.  Hemoglobin posttransfusion was 10 recheck this morning was 9.4.  She had no further episodes of hematemesis.  She had no abnormal stools.  No abdominal pain.  Tentative plan is just to continue with the Protonix and make sure she takes it daily.  She does take an aspirin daily and but there is mandatory she take that with meals.  She should have follow-up with her primary care provider in the next week to recheck her hemoglobin.     2.  Coronary artery disease: Has had a stent in the past.  Is followed by Dr. Graham.  No recent cardiac issues at all.  Denies any chest pains.  Certainly no evidence of any congestive heart failure.  Has had some A-fib in the distant past during a surgical procedure but no recurrences.  Does take an aspirin daily.  Has been taking with  food.  -3/28: Patient remains hemodynamically stable.  O2 sats are stable.  No evidence of any heart failure.  She had no dysrhythmias on telemetry overnight.  Feel in terms of her cardiac status she is as optimized as she will be to undergo any procedures.  -3/28: Hemodynamically stable pressures fine no arrhythmias sats normal room air.     3.  Hypothyroid: Last TSH checked was back in September 2024.  Will recheck was 4.1 at that time.  -3/28: Repeat TSH is pending.  -3/29: We did check her TSH and it was within normal range at 1.80     4.  Hyponatremia: Sodium level 129.  Has been generally in the 135 range.  Will recheck tomorrow.  Probably increased free water intake decreased food intake although she states she been eating okay.  -3/28: Sodium is improved to 136 today.  -3/29: Sodium stable at 137.     5.  Chronic kidney disease stage III: Creatinine is close to her baseline 1.43.  The CT of her abdomen does show her left kidney is somewhat atrophic likely due to atherosclerotic disease.  -3/28: BUN/creatinine are 31/1.45 today.  Baseline for her.  BUN is up a little bit which may be some GI blood load.  She has been getting IV fluids overnight she is minimal 50 cc an hour.  -3/29 she has chronic kidney disease it was 1.31 today with a BUN of 23.    Consultations This Hospital Stay   SURGERY GENERAL IP CONSULT  CARE MANAGEMENT / SOCIAL WORK IP CONSULT    Code Status   Full Code    Time Spent on this Encounter   I, Ariel Steen MD, personally saw the patient today and spent greater than 30 minutes discharging this patient.       Ariel Steen MD  [facility]  [address] [place] [id number]  Phone: [phone]  Fax: [phone]  ______________________________________________________________________    Physical Exam   Vital Signs: Temp: 97.6  F (36.4  C) Temp src: Tympanic BP: (!) 146/39 Pulse: 64   Resp: 16 SpO2: (!) 90 % O2 Device: None (Room air) Oxygen Delivery: 2 LPM  Weight: 87  lbs 1.31 oz  Constitutional: awake, alert, cooperative, no apparent distress, and appears stated age and very thin somewhat cachectic.  ENT: Normocephalic, without obvious abnormality, atraumatic, sinuses nontender on palpation, external ears without lesions, oral pharynx with moist mucous membranes, tonsils without erythema or exudates, gums normal and good dentition.  Respiratory: No increased work of breathing, good air exchange, clear to auscultation bilaterally, no crackles or wheezing  Cardiovascular: Normal apical impulse, regular rate and rhythm, normal S1 and S2, no S3 or S4, and no murmur noted  GI: No scars, normal bowel sounds, soft, non-distended, non-tender, no masses palpated, no hepatosplenomegally  Musculoskeletal: There is no redness, warmth, or swelling of the joints.  Full range of motion noted.  Motor strength is 5 out of 5 all extremities bilaterally.  Tone is normal.       Primary Care Physician   Ophelia Troncoso    Discharge Orders      Reason for your hospital stay    Patient is a 84-year-old female who presented to the emergency room after having a couple episodes of hematemesis at home.  She does have a previous history of this over the years.  Has not had an endoscopy for a couple of years.  Her initial 1 did show some gastritis.  When she came in her hemoglobin is in the 9 range which she has been in for the last month or so.  She was hemodynamically stable when she came in admitted she had no further episodes of hematemesis.  Her hemoglobin slowly did drift down to about 7.6.  Elected to give her 1 unit of packed red blood cells given her age and history of coronary artery disease.  She underwent endoscopy which showed just some minimal gastritis.  No active bleeding or ulcers.  She was started back on her regular diet.  Her hemoglobin posttransfusion was up to 10.  At discharge it is 9.4.  She is remains hemodynamically stable.  She is able to get up and ambulate with minimal  assistance to the bathroom.  She will be discharged home will continue with the Protonix.  Should make sure her aspirin is taken with food daily.     Follow-up and recommended labs and tests     Follow up with primary care provider, Ophelia Troncoso, within 7 days for hospital follow- up.  The following labs/tests are recommended: CBC plus BMP.     Activity    Your activity upon discharge: activity as tolerated     Diet    Follow this diet upon discharge: Current Diet:Orders Placed This Encounter      Regular Diet Adult       Significant Results and Procedures   Most Recent 3 CBC's:  Recent Labs   Lab Test 03/29/25  0512 03/28/25  1535 03/28/25  0509 03/27/25  1648 03/27/25  0952   WBC 10.1  --  7.9  --  13.3*   HGB 9.4* 10.0* 7.7*   < > 9.1*   MCV 91  --  93  --  92     --  231  --  303    < > = values in this interval not displayed.     Most Recent 3 BMP's:  Recent Labs   Lab Test 03/29/25  0512 03/28/25  0509 03/27/25  0952    136 129*   POTASSIUM 3.8 3.9 4.4   CHLORIDE 105 102 96*   CO2 23 21* 22   BUN 23.4* 30.9* 20.6   CR 1.31* 1.45* 1.43*   ANIONGAP 9 13 11   BECKY 8.2* 8.4* 8.9   GLC 84 71 173*     Most Recent 2 LFT's:  Recent Labs   Lab Test 03/27/25  0952 02/21/25  1245   AST 41 22   ALT 19 20   ALKPHOS 51 65   BILITOTAL 0.2 0.2     Most Recent TSH and T4:  Recent Labs   Lab Test 03/28/25  0509 09/25/24  1508 05/13/24  1605   TSH 1.80   < > 5.14*   T4  --   --  1.06    < > = values in this interval not displayed.     Most Recent ESR & CRP:  Recent Labs   Lab Test 12/04/23  1146 06/08/23  2259 09/20/19  1550   SED  --   --  36*   CRP  --   --  <2.9   CRPI 12.02*   < >  --     < > = values in this interval not displayed.     Most Recent Anemia Panel:  Recent Labs   Lab Test 03/29/25  0512 03/27/25  1648 03/27/25  0952 12/12/23  1332 12/04/23  1146 07/09/21  1356 06/15/21  1643   WBC 10.1   < > 13.3*   < > 8.8   < > 8.3   HGB 9.4*   < > 9.1*   < > 9.9*   < > 10.0*   HCT 28.4*   < > 28.1*   < >  31.3*   < > 32.3*   MCV 91   < > 92   < > 91   < > 80      < > 303   < > 327   < > 375   IRON  --   --  44  --  45   < >  --    IRONSAT  --   --   --   --  16   < >  --    FEB  --   --   --   --  273   < >  --    DEREK  --   --  44  --  609*   < >  --    B12  --   --   --   --   --   --  274    < > = values in this interval not displayed.   ,   Results for orders placed or performed during the hospital encounter of 03/27/25   XR Chest 2 Views    Narrative    XR CHEST 2 VIEWS    HISTORY: 84 years Female possible hypoxia    COMPARISON: 11/26/2023    TECHNIQUE: 2 views of the chest were obtained.    FINDINGS: Lung volumes are high suggesting air trapping. There is an  old rib fracture deformity of the right lateral seventh rib.    Heart size and pulmonary vascularity is normal limits. Lungs are  clear.        Impression    IMPRESSION: High lung volumes suggesting air trapping. Lungs are  otherwise clear.    RAFFI LOPEZ MD         SYSTEM ID:  [id number]   CTA Abdomen Pelvis with Contrast    Narrative    Exam: CTA ABDOMEN PELVIS WITH CONTRAST    Exam reason: hematemesis - GI bleed protocol    Technique: Initial noncontrast images of the abdomen and pelvis were  obtained. Using helical CT technique, axial images of the abdomen and  pelvis were obtained with IV contrast in the late arterial and venous  phases.  Coronal and sagittal reconstructions also performed. This CT  was performed using one or more of the following dose reduction  techniques: automated exposure control, adjustment of the mA and/or kV  according to patient size, and/or use of iterative reconstruction  technique.    Meds/Contrast: ISOVUE 370  45mL    Comparison: 2/8/2025     FINDINGS:    ABDOMEN:    Liver: No mass or any significant abnormality.  Gallbladder: No calcified gallstones.   Bile Ducts: No biliary ductal dilation.   Spleen:  No splenomegaly.  Pancreas: No mass, ductal dilatation, or inflammatory changes.  Kidneys: There is atrophy of  the left kidney. No enhancing mass. No  hydronephrosis.   Adrenals:  No nodules.   Lymph Nodes: No adenopathy.   Vascular: No aortic aneurysm. There is heavy calcified atherosclerosis  of the abdominal aorta and iliac arteries with associated stenosis.  Abdominal Wall: No acute findings.     PELVIS:   No mass or adenopathy.     Bowel/Mesentery/Peritoneum:   -No findings to suggest active GI bleeding.  -No bowel obstruction.   -No acute inflammatory findings.  -No ascites.    Visualized portions of the Chest: No consolidation or pleural  effusion.  Musculoskeletal: No acute osseous abnormalities. Unchanged lumbar  compression fractures.      Impression    IMPRESSION:  No findings to suggest active GI bleed. No acute findings in the  abdomen or pelvis.    MOLLY SALGADO MD         SYSTEM ID:  [id number]       Discharge Medications   Current Discharge Medication List        CONTINUE these medications which have CHANGED    Details   gabapentin (NEURONTIN) 100 MG capsule Take 1 capsule (100 mg) by mouth 2 times daily.    Associated Diagnoses: Chronic midline low back pain with bilateral sciatica           CONTINUE these medications which have NOT CHANGED    Details   acetaminophen (TYLENOL) 325 MG tablet Take 650 mg by mouth 2 times daily. For back pain.      aspirin (ASA) 81 MG chewable tablet Take 1 tablet (81 mg) by mouth daily.  Qty: 90 tablet, Refills: 3    Associated Diagnoses: Chest pain, unspecified type; MILLS (dyspnea on exertion); Subclavian arterial stenosis; Postoperative atrial fibrillation (H); Coronary artery disease involving native coronary artery of native heart without angina pectoris; Bilateral carotid artery stenosis; ASCVD (arteriosclerotic cardiovascular disease); Abnormal cardiovascular stress test      atorvastatin (LIPITOR) 40 MG tablet Take 1 tablet (40 mg) by mouth at bedtime  Qty: 90 tablet, Refills: 3    Associated Diagnoses: Hyperlipidemia, unspecified hyperlipidemia type; S/P drug eluting  coronary stent placement      calcium carbonate (TUMS) 500 MG chewable tablet Take 1 chew tab by mouth at bedtime      carvedilol (COREG) 12.5 MG tablet Take 1 tablet (12.5 mg) by mouth 2 times daily (with meals).  Qty: 180 tablet, Refills: 3    Associated Diagnoses: History of cardioversion; Benign essential hypertension; Chronic systolic congestive heart failure (H)      fluticasone (FLONASE) 50 MCG/ACT spray Spray 2 sprays into both nostrils daily  Qty: 1 Bottle, Refills: 11      Fluticasone-Umeclidin-Vilanterol (TRELEGY ELLIPTA) 200-62.5-25 MCG/ACT oral inhaler Inhale 1 puff into the lungs daily  Qty: 180 each, Refills: 3    Associated Diagnoses: Pulmonary emphysema, unspecified emphysema type (H)      furosemide (LASIX) 20 MG tablet Take 0.5 tablets (10 mg) by mouth daily as needed (swelling).  Qty: 45 tablet, Refills: 1    Associated Diagnoses: Chronic systolic congestive heart failure (H); Benign essential hypertension; Chronic systolic congestive heart failure (H); MILLS (dyspnea on exertion); CKD (chronic kidney disease) stage 4, GFR 15-29 ml/min (H); Chronic diastolic congestive heart failure (H)      hydrochlorothiazide (HYDRODIURIL) 25 MG tablet Take 1 tablet (25 mg) by mouth daily.  Qty: 90 tablet, Refills: 3    Associated Diagnoses: Chronic systolic congestive heart failure (H); Benign essential hypertension; Chronic systolic congestive heart failure (H); MILLS (dyspnea on exertion); CKD (chronic kidney disease) stage 4, GFR 15-29 ml/min (H); Chronic diastolic congestive heart failure (H)      hydrocortisone (WESTCORT) 0.2 % external cream Apply topically 2 times daily.  Qty: 45 g, Refills: 3    Associated Diagnoses: Eczema, unspecified type      ipratropium - albuterol 0.5 mg/2.5 mg/3 mL (DUONEB) 0.5-2.5 (3) MG/3ML neb solution Take 1 vial (3 mLs) by nebulization every 6 hours as needed for shortness of breath, wheezing or cough  Qty: 90 mL, Refills: 3    Associated Diagnoses: Pulmonary emphysema,  unspecified emphysema type (H)      ipratropium-albuterol (COMBIVENT RESPIMAT)  MCG/ACT inhaler Inhale 1 puff into the lungs 4 times daily  Qty: 8 g, Refills: 3    Associated Diagnoses: Pulmonary emphysema, unspecified emphysema type (H)      latanoprost (XALATAN) 0.005 % ophthalmic solution Place 1 drop into both eyes every evening.      losartan (COZAAR) 100 MG tablet Take 1 tablet (100 mg) by mouth daily.  Qty: 90 tablet, Refills: 3    Associated Diagnoses: Chronic systolic congestive heart failure (H); Benign essential hypertension; Chronic systolic congestive heart failure (H); MILLS (dyspnea on exertion); CKD (chronic kidney disease) stage 4, GFR 15-29 ml/min (H)      methocarbamol (ROBAXIN) 500 MG tablet Take 1 tablet (500 mg) by mouth 4 times daily as needed for muscle spasms  Qty: 20 tablet, Refills: 0    Associated Diagnoses: Closed compression fracture of L5 lumbar vertebra, sequela      mirtazapine (REMERON) 15 MG tablet Take 1 tablet (15 mg) by mouth at bedtime  Qty: 90 tablet, Refills: 3    Comments: This prescription was filled on 3/4/2025. Any refills authorized will be placed on file.  Associated Diagnoses: Weight loss; Mild protein-calorie malnutrition      nitroGLYcerin (NITROSTAT) 0.4 MG sublingual tablet Place 1 tablet (0.4 mg) under the tongue every 5 minutes as needed for chest pain. For chest pain place 1 tablet under the tongue every 5 minutes for 3 doses. If symptoms persist 5 minutes after 1st dose call 911.  Qty: 25 tablet, Refills: 3    Associated Diagnoses: History of coronary artery stent placement; History of cardioversion; Coronary artery disease involving native coronary artery of native heart without angina pectoris; Chest pain, unspecified type; Abnormal cardiovascular stress test; History of coronary artery stent placement      pantoprazole (PROTONIX) 40 MG EC tablet Take 1 tablet (40 mg) by mouth 2 times daily.  Qty: 90 tablet, Refills: 3    Associated Diagnoses: History of  GI bleed      Prenatal Multivit-Min-Fe-FA (PRENATAL/IRON) TABS Take 1 tablet by mouth every morning.      SYNTHROID 50 MCG tablet Take 1 tablet (50 mcg) by mouth daily.  Qty: 90 tablet, Refills: 3    Associated Diagnoses: Acquired hypothyroidism      trolamine salicylate (ASPERCREME) 10 % external cream Apply topically daily as needed (lower back pain)           STOP taking these medications       calcitonin, salmon, (MIACALCIN) 200 UNIT/ACT nasal spray Comments:   Reason for Stopping:         clotrimazole-betamethasone (LOTRISONE) 1-0.05 % external cream Comments:   Reason for Stopping:             Allergies   Allergies   Allergen Reactions    Evista [Raloxifene] Other (See Comments)     hypercalcemia    Prednisone GI Disturbance    Combigan [Brimonidine Tartrate-Timolol] Other (See Comments)     Eye swelling and itchy    Cyclosporine      Pt reports using eye gtts and having red irritated eyes     Latex Rash    Levaquin [Levofloxacin] Muscle Pain (Myalgia)    Penicillins Rash

## 2025-03-29 NOTE — PLAN OF CARE
Plan of Care Reviewed With: Patient    Overall Patient Progress: Progressing    Pt A&Ox3. Slept throughout shift. VSS. IV saline lock. Assessment as charted. Up with standby assist. Call light within reach and pt calls appropriately.     Face to face report given with opportunity to observe patient.    Report given to TRUNG Pillai RN   3/29/2025  7:30 AM

## 2025-03-29 NOTE — PLAN OF CARE
Patient discharged at 1139  via wheel chair accompanied by daughter and staff. Prescriptions - None ordered for discharge. All belongings sent with patient.     Discharge instructions reviewed with pt and pt daughter. Listed belongings gathered and returned to patient.     Patient discharged to home.       [facility] [place]  Follow up appointment made:   No, pt will make appointment Monday as pt discharged on weekend  Home medications returned to patient: N/A  Patient reports pain was well managed at discharge: Yes     VSS and assessments as charted. Denies pain. Denies any nausea. IV removed per discharge orders, pt tolerated well. Ambulated with staff before discharge, tolertated well, able to walk IND.

## 2025-03-31 ENCOUNTER — TELEPHONE (OUTPATIENT)
Dept: CARE COORDINATION | Facility: OTHER | Age: 85
End: 2025-03-31

## 2025-03-31 LAB
PATH REPORT.COMMENTS IMP SPEC: NORMAL
PATH REPORT.FINAL DX SPEC: NORMAL
PATH REPORT.GROSS SPEC: NORMAL
PATH REPORT.MICROSCOPIC SPEC OTHER STN: NORMAL
PATH REPORT.RELEVANT HX SPEC: NORMAL
PHOTO IMAGE: NORMAL

## 2025-03-31 NOTE — Clinical Note
Patient called to schedule hospital follow up.  Date of Admission:  3/27/2025 Date of Discharge:  3/29/2025 Discharge Diagnoses Gastrointestinal hemorrhage with hematemesis Acute on chronic anemia Gastritis Chronic kidney disease stage IV Hypertension Coronary artery disease  Patient said she is doing well. She denies any N/V, bleeding, pain, fevers.   RN sent not to [facility] to call and schedule Hospital f/unit(s) visit.

## 2025-03-31 NOTE — TELEPHONE ENCOUNTER
Patient called to schedule hospital follow up.   Date of Admission:  3/27/2025  Date of Discharge:  3/29/2025  Discharge Diagnoses  Gastrointestinal hemorrhage with hematemesis  Acute on chronic anemia  Gastritis  Chronic kidney disease stage IV  Hypertension  Coronary artery disease    Patient said she is doing well. She denies any N/V, bleeding, pain, fevers.     RN sent not to [facility] to call and schedule Hospital f/unit(s) visit.

## 2025-04-01 ENCOUNTER — MEDICAL CORRESPONDENCE (OUTPATIENT)
Dept: HEALTH INFORMATION MANAGEMENT | Facility: CLINIC | Age: 85
End: 2025-04-01

## 2025-04-01 ENCOUNTER — PATIENT OUTREACH (OUTPATIENT)
Dept: CARE COORDINATION | Facility: OTHER | Age: 85
End: 2025-04-01

## 2025-04-01 NOTE — TELEPHONE ENCOUNTER
Clinic Care Coordination Contact  Transitions of Care Outreach  Chief Complaint   Patient presents with    Hospital F/U       Most Recent Admission Date: 3/27/2025   Most Recent Admission Diagnosis: Gastrointestinal hemorrhage with hematemesis - K92.0     Most Recent Discharge Date: 3/29/2025   Most Recent Discharge Diagnosis: Gastrointestinal hemorrhage with hematemesis - K92.0  Chronic midline low back pain with bilateral sciatica - M54.41, M54.42, G89.29     Transitions of Care Assessment    Discharge Assessment  How are your symptoms? (Red Flag symptoms escalate to triage hotline per guidelines): Improved  Do you know how to contact your clinic care team if you have future questions or changes to your health status? : Yes  Does the patient have their discharge instructions? : Yes  Does the patient have questions regarding their discharge instructions? : Yes (see comment)  Were you started on any new medications or were there changes to any of your previous medications? : Yes  Does the patient have all of their medications?: Yes  Do you have questions regarding any of your medications? : No              Care Management   TCM complete    Care Mgmt General Assessment                         Follow up Plan     Discharge Follow-Up  Discharge follow up appointment scheduled in alignment with recommended follow up timeframe or Transitions of Risk Category? (Low = within 30 days; Moderate= within 14 days; High= within 7 days): Yes  Discharge Follow Up Appointment Date: 04/11/25  Discharge Follow Up Appointment Scheduled with?: Primary Care Provider    Future Appointments   Date Time Provider Department Center   4/11/2025  2:00 PM Ophelia Troncoso MD [facility]   4/23/2025  1:00 PM Sal Graham, DO [facility]       Outpatient Plan as outlined on AVS reviewed with patient.    For any urgent concerns, please contact our 24 hour nurse triage line: [phone] ([phone])       Shellie PARKER  Boecker, RN

## 2025-04-04 ENCOUNTER — MEDICAL CORRESPONDENCE (OUTPATIENT)
Dept: HEALTH INFORMATION MANAGEMENT | Facility: HOSPITAL | Age: 85
End: 2025-04-04

## 2025-04-14 ENCOUNTER — TELEPHONE (OUTPATIENT)
Dept: FAMILY MEDICINE | Facility: OTHER | Age: 85
End: 2025-04-14

## 2025-04-14 DIAGNOSIS — E03.9 ACQUIRED HYPOTHYROIDISM: ICD-10-CM

## 2025-04-14 NOTE — TELEPHONE ENCOUNTER
10:05 AM    Reason for Call: Phone Call    Description: Patient was seen 04/11 medication, sucralfate (CARAFATE) 1 GM tablet was sent to [facility]. Patient checked with pharmacy and was told that they never received a prescription request. Please contact patient.    Was an appointment offered for this call? No  If yes : Appointment type              Date    Preferred method for responding to this message: Telephone Call  What is your phone number ?  [phone]     If we cannot reach you directly, may we leave a detailed response at the number you provided? Yes    Can this message wait until your PCP/provider returns, if available today? Not applicable    Diane Rusesll

## 2025-04-23 ENCOUNTER — OFFICE VISIT (OUTPATIENT)
Dept: CARDIOLOGY | Facility: OTHER | Age: 85
End: 2025-04-23
Attending: INTERNAL MEDICINE
Payer: COMMERCIAL

## 2025-04-23 VITALS — WEIGHT: 96 LBS | HEIGHT: 60 IN | BODY MASS INDEX: 18.85 KG/M2 | OXYGEN SATURATION: 92 % | HEART RATE: 85 BPM

## 2025-04-23 DIAGNOSIS — Z71.6 TOBACCO ABUSE COUNSELING: ICD-10-CM

## 2025-04-23 DIAGNOSIS — I77.1 SUBCLAVIAN ARTERIAL STENOSIS: ICD-10-CM

## 2025-04-23 DIAGNOSIS — R94.39 ABNORMAL CARDIOVASCULAR STRESS TEST: ICD-10-CM

## 2025-04-23 DIAGNOSIS — Z86.73 HISTORY OF CVA (CEREBROVASCULAR ACCIDENT): ICD-10-CM

## 2025-04-23 DIAGNOSIS — I34.0 NON-RHEUMATIC MITRAL REGURGITATION: Chronic | ICD-10-CM

## 2025-04-23 DIAGNOSIS — I25.10 CORONARY ARTERY DISEASE INVOLVING NATIVE CORONARY ARTERY OF NATIVE HEART WITHOUT ANGINA PECTORIS: ICD-10-CM

## 2025-04-23 DIAGNOSIS — I25.10 ASCVD (ARTERIOSCLEROTIC CARDIOVASCULAR DISEASE): Primary | ICD-10-CM

## 2025-04-23 DIAGNOSIS — I10 BENIGN ESSENTIAL HYPERTENSION: ICD-10-CM

## 2025-04-23 DIAGNOSIS — I73.9 PAD (PERIPHERAL ARTERY DISEASE): ICD-10-CM

## 2025-04-23 DIAGNOSIS — I97.89 POSTOPERATIVE ATRIAL FIBRILLATION (H): ICD-10-CM

## 2025-04-23 DIAGNOSIS — I50.22 CHRONIC SYSTOLIC CONGESTIVE HEART FAILURE (H): Chronic | ICD-10-CM

## 2025-04-23 DIAGNOSIS — Z92.89 HISTORY OF CARDIOVERSION: ICD-10-CM

## 2025-04-23 DIAGNOSIS — N18.4 CKD (CHRONIC KIDNEY DISEASE) STAGE 4, GFR 15-29 ML/MIN (H): ICD-10-CM

## 2025-04-23 DIAGNOSIS — R06.09 DOE (DYSPNEA ON EXERTION): ICD-10-CM

## 2025-04-23 DIAGNOSIS — E78.2 MIXED HYPERLIPIDEMIA: ICD-10-CM

## 2025-04-23 DIAGNOSIS — I48.91 POSTOPERATIVE ATRIAL FIBRILLATION (H): ICD-10-CM

## 2025-04-23 DIAGNOSIS — Z95.5 HISTORY OF CORONARY ARTERY STENT PLACEMENT: ICD-10-CM

## 2025-04-23 DIAGNOSIS — I65.23 BILATERAL CAROTID ARTERY STENOSIS: ICD-10-CM

## 2025-04-23 PROCEDURE — G0463 HOSPITAL OUTPT CLINIC VISIT: HCPCS

## 2025-04-23 RX ORDER — CARVEDILOL 25 MG/1
25 TABLET ORAL 2 TIMES DAILY WITH MEALS
Qty: 180 TABLET | Refills: 3 | Status: SHIPPED | OUTPATIENT
Start: 2025-04-23

## 2025-04-23 ASSESSMENT — PAIN SCALES - GENERAL: PAINLEVEL_OUTOF10: NO PAIN (0)

## 2025-04-23 NOTE — PROGRESS NOTES
[facility] HEART CARE   CARDIOLOGY PROGRESS NOTE     Chief Complaint   Patient presents with    Follow Up     3 month fu          Diagnosis:  1.  CHF-diastolic.    -55-60%/indeterminate on 11/26/23.  -Grade 1 on 12/5/2019. H/O ischemic cardiomyopathy, recovered EF.   -60-65% on 12/5/2019.  -36% on 6/4/2019 stress test.  2.  MILLS.  3.  CAD.   -Normal stress test on 10/14/2024  -Abnormal stress test on 6/14/2019.  -Cardiac cath, 6/20/2019 at [facility].  -Stenting to o/mLCX.  4.  Carotid disease.     -Extensive plaque but no high-grade stenosis on 10/15/2024.  -B/L on 50-69% on 4/27/2023, [facility].    -50% stenosis on 2/28/2020.  5.  CVA on 1/1/2019.  6.  Tobacco abuse.  -Quit on 1/1/2019.   1/2-3/4/pack a day. Started at age 16.   -Exposed to 2nd hand smoke from .   7.  Hypertension-uncontrolled.  8.  CKD-3.  9.  Hyperlipidemia-controlled.  10.  COPD.  -Severe on 5/2/23.  11.  Postop A. fib on 6/29/19 at [facility].  12.  Cardioversion on 6/29/19 at [facility].  13.  PAD.     -Moderate on 7/18/2022.  -2/20/2020-moderate.    Assessment/Plan:   1.  CAD.  Continues to deny chest pain or anginal symptoms.  No changes since she was last seen.  Patient had an abnormal stress test on 10/14/2024.  Findings discussed.  Patient unable to go to the [facility].  She does have a history of cardiac catheterization at [facility] with stenting to the ostial/mid LCx on 6/14/2019.  She had a cardiac cath at [facility].  She was found to have no significant lesions requiring stenting.  Will continue medical management.  Findings discussed.  2.  Hypertension: Now better controlled.  Blood pressure today 136/78.  Her blood pressure first thing in the morning today was in the 150s systolically.  Previously, she was seen in the ER on 11/6/2024.  She was referred by myself from the clinic because of severely elevated systolic blood pressure. At 1 point, her blood pressure was high as 260 mmHg systolically. She should have  her blood pressure taken on her right arm and not the left because there is concern for left subclavian artery stenosis.  Coronary 25 mg twice a day, Lasix 10 mg as needed, hydrochlorothiazide 25 mg daily, and losartan 100 mg daily.  3.  Carotid artery disease: Extensive plaque but no stenosis on 10/15/2024.  4.  CKD-3: Patient aware, following.  5.  A-fib: Was postop.  Denies symptoms.  6.  Follow-up 1 year or sooner if issues.      Interval history:  See above.    HPI:    Ms. Ocasio has a history of CAD s/p coronary angiography on 6/26/19 at [facility]. She was recently identified to have newly identified systolic heart failure.  Additionally, she has a history of hypertension, hyperlipidemia, nonrheumatic mitral regurgitation, central lobular emphysema, hypothyroidism, osteopenia, hyperparathyroidism, history of tobacco abuse and chronic rhinitis.     At initial visit patient was accompanied by her son who was very helpful in providing patients past medical history. He admitted approximately 20 years ago patient was being followed for MR which was noted to be improved and therefore, valve surgery was never recommended. He admitted that she was previously told it was suspected she had small MI's without coronary intervention or identified ACS. Finally, recent history just before new years of 2020 acute hemorrhagic stroke. It was following this, that she was taken off her beta blocker and lisinopril for HTN and possible past MI, she reported increased shortness of breath following this which has progressively worsened and likely multifactorial with COPD and HFrEF.      6/4/2019-Lexiscan stress test with abnormal imaging with reversible ischemia laterally and LVEF 36%. Additionally, in review of past CT chest imaging from one year ago, she was noted to have advanced atherosclerotic disease with bulky coronary and aortic calcifications. Given these findings, current symptoms and new acute systolic failure, recommended  coronary angiography.     Patient underwent coronary angiography on 6/26/19 at [facility]. She was identified to have diffuse coronary atherosclerosis with single vessel obstructive CAD, ostial and mid LCX. Successful PCI with synergy LISS x2, mid LCX 2.5x16mm, ostial LCX 3.0 x 16 mm. LVEDP 26-29 mmHg. During revascularization she was identified to have AF for which she received DCCV x1 with return to sinus and then reverted back to AF. She was treated with IV beta blocker for rate control. Patient reported feeling good following coronary intervention. Breathing had significantly improved and increased energy.       Patient presented to the ED on 7/13/21 with reports of chest pressure, dizziness and increased dyspnea. She was noted to be playing [employer] ball that same day with symptoms. ECG revealing SR with SVE, no ST-T changes. Troponin negative x 2 and chest XR unremarkable. Lab evaluation with decline in renal function and suspected dehydration.         Relevant testing:  US carotids on 10/15/2024:  Extensive atherosclerotic plaque again seen. Currently, velocity  measurements do not suggest high-grade stenosis.  Unchanged appearance of heavily calcified plaques throughout the  common, internal and external carotid arteries.   Retrograde flow again seen within the left vertebral artery suggesting subclavian steal.    Stress test on 10/14/2024:    The nuclear stress test is negative for inducible myocardial ischemia.    There is a quesitonable small area of infarction in the distal inferolateral segment(s) of the left ventricle.    Left ventricular function is low normal.    The left ventricular ejection fraction at rest is 52%.  The left ventricular ejection fraction at stress is 52%.    A prior study was conducted on 6/14/2019.  This study has changes noted when compared with the prior study. Inferolateral hypoperfusion is improved, no longer reversible.    US carotids on 12/8/23:  1. Right side: Less than 50 %  diameter stenosis by grayscale imaging  and sonographic velocity criteria.  2. Left side: 50 to 69% diameter stenosis overall by grayscale imaging  and sonographic velocity criteria. Increased since comparison.  3. Retrograde flow in the left vertebral artery, correlate for  clinical symptoms of subclavian steal.    ECHO on 11/26/23:  Global and regional left ventricular function is normal with an EF of 55-60%.  Left ventricular diastolic function is indeterminate.   Mild concentric wall thickening consistent with left ventricular hypertrophy is present.  Global right ventricular function is normal.  Mild right ventricular dilation is present.  Moderate left atrial enlargement is present.  Mild right atrial enlargement is present.  Mild mitral annular calcification is present.  Mild mitral insufficiency is present.  No aortic stenosis is present.  Mild tricuspid insufficiency is present.  The right ventricular systolic pressure is 55mmHg above the right atrial pressure.      KESHAV on 7/18/22:  Moderate to severe disease bilaterally.    US carotids on 4/27/2022 through [facility]:  1. Atherosclerotic changes with sonographic data supportive of 50-69% stenosis bilaterally.   2. Retrograde flow in the left vertebral artery.     US abdominal aorta on 4/27/2022:  Diffuse atherosclerotic disease in the intra-abdominal aorta, without sonographic evidence of intra-abdominal aortic aneurysm.     Zio patch on 3/16/22:  Worn for 13 days and 20 hr's.  After removing artifact, total time was 13 days and 16 hr's. Placed on 3/16/2022 at 1:28 PM and completed on 3/30/2022 at 9:15 AM.  Underlying rhythm was sinus.  Hrt rate ranged from 41 bpm, maximum heart rate of 197 bmp, averaging 62 bmp.  No significant bradycardia, pauses, Mobitz type II or 3rd degree heart block.  No atrial fibrillation on this study.  x0 triggered events and x0 diary entries.  x0 runs of VT.    x7 runs of SVT lasting up to 8 beats with a maximum heart rate  of 197 bmp.  Rare, <1% of PAC's, atrial couplets, atrial triplets, PVC's, ventricular couplets, and ventricular triplets.  + episodes of ventricular bigeminy lasting up to 4.4 sec's.  0 episodes of ventricular trigeminy lasting.    KESHAV on 2/28/20:  Moderate arterial occlusive disease in both lower extremities.    US carotids on 2/28/20:  Heavy atherosclerotic plaquing in both carotid bifurcations with velocities consistent with less than 50% stenoses noted.    ECHO on 12/5/19:  No pericardial effusion is present.  Global and regional left ventricular function is normal with an EF of 60-65%.  Grade I or early diastolic dysfunction.  The right ventricle is normal size.  Global right ventricular function is normal.  Both atria appear normal.  Mild mitral insufficiency is present.  Aortic valve is normal in structure and function.  The aortic valve is tricuspid.  Trace tricuspid insufficiency is present.  Right ventricular systolic pressure is 3 mmHg above the right atrial pressure.  The pulmonic valve is normal.    Stress test on 6/14/19:   Reversible ischemia laterally. Abnormally low left ventricular ejection fraction                ICD-10-CM    1. ASCVD (arteriosclerotic cardiovascular disease)  I25.10 CANCELED: EKG 12-lead complete w/read - (Clinic Performed)      2. MILLS (dyspnea on exertion)  R06.09       3. Benign essential hypertension  I10 carvedilol (COREG) 25 MG tablet      4. History of cardioversion on 6/28/2019  Z92.89 carvedilol (COREG) 25 MG tablet      5. Chronic systolic congestive heart failure (H)- Recovered  I50.22 carvedilol (COREG) 25 MG tablet      6. Mixed hyperlipidemia  E78.2       7. Subclavian arterial stenosis  I77.1       8. Postoperative atrial fibrillation on 6/28/2019 (H)  I97.89     I48.91       9. PAD on 2/28/2020-moderate (H)  I73.9       10. Non-rheumatic mitral regurgitation  I34.0       11. Coronary artery disease involving native coronary artery of native heart without angina  pectoris  I25.10       12. Bilateral carotid artery stenosis  I65.23       13. Abnormal cardiovascular stress test on 6/14/2019  R94.39       14. CKD (chronic kidney disease) stage 4, GFR 15-29 ml/min (H)  N18.4       15. History of CVA on 1/1/2019  Z86.73       16. Tobacco abuse counseling  Z71.6       17. History of coronary artery stent placement to the O/M LCX on 6/28/2019 at [facility]  Z95.5                       Past Medical History:   Diagnosis Date    Acquired hypothyroidism 5/12/2019    Asthma     Benign essential hypertension 5/12/2019    Centrilobular emphysema (H) 5/12/2019    Chronic rhinitis 8/16/2013    Chronic systolic congestive heart failure (H) 5/12/2019    Constipation     Coronary artery disease     Hearing loss     Heart trouble     Hemorrhagic cerebrovascular accident (CVA) (H) 01/2019    Hyperlipidemia 5/12/2019    Hypertension     Nasal congestion     Non-rheumatic mitral regurgitation 5/12/2019    Osteopenia 5/12/2019    Osteoporosis     Parathyroid related hypercalcemia 8/18/2013    Primary hyperparathyroidism 9/5/2013    Ringing in ears     Sensorineural hearing loss, asymmetrical 8/16/2013    Sneezing     Snoring     Stented coronary artery     Thyroid disease     Weakness     Weight loss        Past Surgical History:   Procedure Laterality Date    CATARACT IOL, RT/LT Bilateral     COLONOSCOPY      COLONOSCOPY - HIM SCAN  01/01/2009    Colonoscopy - [facility]/NL  2009    ENDOSCOPY UPPER, COLONOSCOPY, COMBINED N/A 10/17/2019    Procedure: UPPER ENDOSCOPY WITH BIOPSY  AND COLONOSCOPY;  Surgeon: Julio Canales MD;  Location: [facility] OR    ENT SURGERY  2011    para thyroid surgery    ENT SURGERY  09/2013    Parathyroid    ESOPHAGOSCOPY, GASTROSCOPY, DUODENOSCOPY (EGD), COMBINED N/A 9/21/2019    Procedure: ESOPHAGOGASTRODUODENOSCOPY (EGD);  Surgeon: Julio Canales MD;  Location: [facility] OR    ESOPHAGOSCOPY, GASTROSCOPY, DUODENOSCOPY (EGD), COMBINED N/A 1/27/2020    Procedure:  ESOPHAGOGASTRODUODENOSCOPY (EGD) WITH BIOPSY;  Surgeon: Isrrael Parish MD;  Location: [facility] OR    ESOPHAGOSCOPY, GASTROSCOPY, DUODENOSCOPY (EGD), COMBINED N/A 2/20/2020    Procedure: ESOPHAGOGASTRODUODENOSCOPY (EGD) WITH BIOPSY;  Surgeon: Pedro Luis Montoya DO;  Location: [facility] OR    ESOPHAGOSCOPY, GASTROSCOPY, DUODENOSCOPY (EGD), COMBINED N/A 3/28/2025    Procedure: ESOPHAGOGASTRODUODENOSCOPY with biopsy;  Surgeon: Eliel Christianson MD;  Location: [facility] OR    PARATHYROIDECTOMY  9/10/2013    Procedure: PARATHYROIDECTOMY;  Reoperative Neck Exploration, Resection of right Parathyroid adenoma;  Surgeon: Thalia Muller MD;  Location: [facility] OR    TONSILLECTOMY      TUBAL LIGATION         Allergies   Allergen Reactions    Evista [Raloxifene] Other (See Comments)     hypercalcemia    Prednisone GI Disturbance    Combigan [Brimonidine Tartrate-Timolol] Other (See Comments)     Eye swelling and itchy    Cyclosporine      Pt reports using eye gtts and having red irritated eyes     Latex Rash    Levaquin [Levofloxacin] Muscle Pain (Myalgia)    Penicillins Rash       Current Outpatient Medications   Medication Sig Dispense Refill    acetaminophen (TYLENOL) 325 MG tablet Take 2 tablets (650 mg) by mouth 2 times daily. For back pain. 360 tablet 3    aspirin (ASA) 81 MG chewable tablet Take 1 tablet (81 mg) by mouth daily. 90 tablet 3    atorvastatin (LIPITOR) 40 MG tablet Take 1 tablet (40 mg) by mouth at bedtime 90 tablet 3    calcium carbonate (TUMS) 500 MG chewable tablet Take 1 chew tab by mouth at bedtime      carvedilol (COREG) 25 MG tablet Take 1 tablet (25 mg) by mouth 2 times daily (with meals). 180 tablet 3    fluticasone (FLONASE) 50 MCG/ACT spray Spray 2 sprays into both nostrils daily 1 Bottle 11    Fluticasone-Umeclidin-Vilanterol (TRELEGY ELLIPTA) 200-62.5-25 MCG/ACT oral inhaler Inhale 1 puff into the lungs daily 180 each 3    furosemide (LASIX) 20 MG tablet Take 0.5 tablets (10 mg) by mouth daily as needed  (swelling). 45 tablet 1    gabapentin (NEURONTIN) 100 MG capsule Take 1 capsule (100 mg) by mouth 2 times daily.      hydrochlorothiazide (HYDRODIURIL) 25 MG tablet Take 1 tablet (25 mg) by mouth daily. 90 tablet 3    hydrocortisone (WESTCORT) 0.2 % external cream Apply topically 2 times daily. (Patient taking differently: Apply topically 2 times daily as needed.) 45 g 3    ipratropium - albuterol 0.5 mg/2.5 mg/3 mL (DUONEB) 0.5-2.5 (3) MG/3ML neb solution Take 1 vial (3 mLs) by nebulization every 6 hours as needed for shortness of breath, wheezing or cough 90 mL 3    ipratropium-albuterol (COMBIVENT RESPIMAT)  MCG/ACT inhaler Inhale 1 puff into the lungs 4 times daily (Patient taking differently: Inhale 1 puff into the lungs 4 times daily as needed.) 8 g 3    latanoprost (XALATAN) 0.005 % ophthalmic solution Place 1 drop into both eyes every evening.      lidocaine (LMX4) 4 % external cream Apply topically once as needed for mild pain. 28 g 3    losartan (COZAAR) 100 MG tablet Take 1 tablet (100 mg) by mouth daily. 90 tablet 3    methocarbamol (ROBAXIN) 500 MG tablet Take 1 tablet (500 mg) by mouth 4 times daily as needed for muscle spasms 20 tablet 0    mirtazapine (REMERON) 15 MG tablet Take 1 tablet (15 mg) by mouth at bedtime 90 tablet 3    nitroGLYcerin (NITROSTAT) 0.4 MG sublingual tablet Place 1 tablet (0.4 mg) under the tongue every 5 minutes as needed for chest pain. For chest pain place 1 tablet under the tongue every 5 minutes for 3 doses. If symptoms persist 5 minutes after 1st dose call 911. 25 tablet 3    pantoprazole (PROTONIX) 40 MG EC tablet Take 1 tablet (40 mg) by mouth 2 times daily. 90 tablet 3    Prenatal Multivit-Min-Fe-FA (PRENATAL/IRON) TABS Take 1 tablet by mouth every morning.      sucralfate (CARAFATE) 1 GM tablet Take 1 tablet (1 g) by mouth 4 times daily as needed for nausea. 120 tablet 3    SYNTHROID 50 MCG tablet Take 1 tablet (50 mcg) by mouth daily. 90 tablet 3    trolamine  salicylate (ASPERCREME) 10 % external cream Apply topically daily as needed (lower back pain)         Social History     Socioeconomic History    Marital status:      Spouse name: Not on file    Number of children: Not on file    Years of education: Not on file    Highest education level: Not on file   Occupational History    Not on file   Tobacco Use    Smoking status: Former     Current packs/day: 0.00     Average packs/day: 1 pack/day for 50.0 years (50.0 ttl pk-yrs)     Types: Cigarettes     Start date: 1969     Quit date: 2019     Years since quittin.3     Passive exposure: Past    Smokeless tobacco: Never    Tobacco comments:     quit 2019   Vaping Use    Vaping status: Never Used   Substance and Sexual Activity    Alcohol use: Yes     Comment: social    Drug use: No    Sexual activity: Not Currently   Other Topics Concern    Parent/sibling w/ CABG, MI or angioplasty before 65F 55M? Yes   Social History Narrative    Not on file     Social Drivers of Health     Financial Resource Strain: Low Risk  (3/27/2025)    Financial Resource Strain     Within the past 12 months, have you or your family members you live with been unable to get utilities (heat, electricity) when it was really needed?: No   Food Insecurity: Low Risk  (3/27/2025)    Food Insecurity     Within the past 12 months, did you worry that your food would run out before you got money to buy more?: No     Within the past 12 months, did the food you bought just not last and you didn t have money to get more?: No   Transportation Needs: High Risk (3/27/2025)    Transportation Needs     Within the past 12 months, has lack of transportation kept you from medical appointments, getting your medicines, non-medical meetings or appointments, work, or from getting things that you need?: Yes   Physical Activity: Not on file   Stress: Not on file   Social Connections: Not on file   Interpersonal Safety: Low Risk  (3/28/2025)    Interpersonal  Safety     Do you feel physically and emotionally safe where you currently live?: Yes     Within the past 12 months, have you been hit, slapped, kicked or otherwise physically hurt by someone?: No     Within the past 12 months, have you been humiliated or emotionally abused in other ways by your partner or ex-partner?: No   Housing Stability: Low Risk  (3/27/2025)    Housing Stability     Do you have housing? : Yes     Are you worried about losing your housing?: No       LAB RESULTS:   Office Visit on 01/26/2022   Component Date Value Ref Range Status    TSH 01/26/2022 1.84  0.40 - 4.00 mU/L Final    Sodium 01/26/2022 139  133 - 144 mmol/L Final    Potassium 01/26/2022 4.4  3.4 - 5.3 mmol/L Final    Chloride 01/26/2022 109  94 - 109 mmol/L Final    Carbon Dioxide (CO2) 01/26/2022 24  20 - 32 mmol/L Final    Anion Gap 01/26/2022 6  3 - 14 mmol/L Final    Urea Nitrogen 01/26/2022 14  7 - 30 mg/dL Final    Creatinine 01/26/2022 1.12 (A) 0.52 - 1.04 mg/dL Final    Calcium 01/26/2022 9.0  8.5 - 10.1 mg/dL Final    Glucose 01/26/2022 95  70 - 99 mg/dL Final    GFR Estimate 01/26/2022 49 (A) >60 mL/min/1.73m2 Final    Iron 01/26/2022 71  35 - 180 ug/dL Final    Iron Binding Capacity 01/26/2022 294  240 - 430 ug/dL Final    Iron Sat Index 01/26/2022 24  15 - 46 % Final    Ferritin 01/26/2022 633 (A) 8 - 252 ng/mL Final    WBC Count 01/26/2022 8.1  4.0 - 11.0 10e3/uL Final    RBC Count 01/26/2022 4.40  3.80 - 5.20 10e6/uL Final    Hemoglobin 01/26/2022 10.6 (A) 11.7 - 15.7 g/dL Final    Hematocrit 01/26/2022 35.8  35.0 - 47.0 % Final    MCV 01/26/2022 81  78 - 100 fL Final    MCH 01/26/2022 24.1 (A) 26.5 - 33.0 pg Final    MCHC 01/26/2022 29.6 (A) 31.5 - 36.5 g/dL Final    RDW 01/26/2022 24.9 (A) 10.0 - 15.0 % Final    Platelet Count 01/26/2022 405  150 - 450 10e3/uL Final    % Neutrophils 01/26/2022 65  % Final    % Lymphocytes 01/26/2022 23  % Final    % Monocytes 01/26/2022 9  % Final    % Eosinophils 01/26/2022 2  %  Final    % Basophils 01/26/2022 1  % Final    % Immature Granulocytes 01/26/2022 0  % Final    NRBCs per 100 WBC 01/26/2022 0  <1 /100 Final    Absolute Neutrophils 01/26/2022 5.2  1.6 - 8.3 10e3/uL Final    Absolute Lymphocytes 01/26/2022 1.8  0.8 - 5.3 10e3/uL Final    Absolute Monocytes 01/26/2022 0.7  0.0 - 1.3 10e3/uL Final    Absolute Eosinophils 01/26/2022 0.2  0.0 - 0.7 10e3/uL Final    Absolute Basophils 01/26/2022 0.1  0.0 - 0.2 10e3/uL Final    Absolute Immature Granulocytes 01/26/2022 0.0  <=0.4 10e3/uL Final    Absolute NRBCs 01/26/2022 0.0  10e3/uL Final        Review of systems: Negative except that which was noted in the HPI.    Physical examination:  Pulse 85   Ht 1.524 m (5')   Wt 43.5 kg (96 lb)   SpO2 92%   BMI 18.75 kg/m      GENERAL APPEARANCE: healthy, alert and no distress  CHEST: lungs clear to auscultation.  CARDIOVASCULAR: regular rhythm, normal S1 with physiologic split S2, no S3 or S4 and no murmur, click or rub  EXTREMITIES: no clubbing, cyanosis with minimal edema            Thank you for allowing me to participate in the care of your patient. Please do not hesitate to contact me if you have any questions.     Sal Graham, DO

## 2025-05-07 ENCOUNTER — HOSPITAL ENCOUNTER (INPATIENT)
Facility: HOSPITAL | Age: 85
End: 2025-05-07
Attending: STUDENT IN AN ORGANIZED HEALTH CARE EDUCATION/TRAINING PROGRAM | Admitting: HOSPITALIST
Payer: COMMERCIAL

## 2025-05-07 DIAGNOSIS — K92.0 GASTROINTESTINAL HEMORRHAGE WITH HEMATEMESIS: ICD-10-CM

## 2025-05-07 DIAGNOSIS — K92.0 HEMATEMESIS, UNSPECIFIED WHETHER NAUSEA PRESENT: Primary | ICD-10-CM

## 2025-05-07 DIAGNOSIS — K92.0 HEMATEMESIS WITH NAUSEA: ICD-10-CM

## 2025-05-07 PROBLEM — K29.70 GASTRITIS: Status: ACTIVE | Noted: 2025-05-07

## 2025-05-07 PROBLEM — E46 PROTEIN-CALORIE MALNUTRITION: Status: ACTIVE | Noted: 2019-09-04

## 2025-05-07 PROBLEM — J44.9 COPD, SEVERE (H): Status: ACTIVE | Noted: 2023-09-06

## 2025-05-07 PROBLEM — Z87.19 HISTORY OF GI BLEED: Status: ACTIVE | Noted: 2019-09-20

## 2025-05-07 PROBLEM — E03.9 ACQUIRED HYPOTHYROIDISM: Chronic | Status: ACTIVE | Noted: 2019-05-12

## 2025-05-07 LAB
ALBUMIN SERPL BCG-MCNC: 3.6 G/DL (ref 3.5–5.2)
ALP SERPL-CCNC: 52 U/L (ref 40–150)
ALT SERPL W P-5'-P-CCNC: 23 U/L (ref 0–50)
ANION GAP SERPL CALCULATED.3IONS-SCNC: 14 MMOL/L (ref 7–15)
APTT PPP: 26 SECONDS (ref 22–38)
AST SERPL W P-5'-P-CCNC: 26 U/L (ref 0–45)
BASOPHILS # BLD AUTO: 0.1 10E3/UL (ref 0–0.2)
BASOPHILS NFR BLD AUTO: 1 %
BILIRUB SERPL-MCNC: 0.2 MG/DL
BUN SERPL-MCNC: 15.9 MG/DL (ref 8–23)
CALCIUM SERPL-MCNC: 8.7 MG/DL (ref 8.8–10.4)
CHLORIDE SERPL-SCNC: 97 MMOL/L (ref 98–107)
CREAT SERPL-MCNC: 1.24 MG/DL (ref 0.51–0.95)
EGFRCR SERPLBLD CKD-EPI 2021: 43 ML/MIN/1.73M2
EOSINOPHIL # BLD AUTO: 0.5 10E3/UL (ref 0–0.7)
EOSINOPHIL NFR BLD AUTO: 5 %
ERYTHROCYTE [DISTWIDTH] IN BLOOD BY AUTOMATED COUNT: 13.4 % (ref 10–15)
GLUCOSE SERPL-MCNC: 184 MG/DL (ref 70–99)
HCO3 SERPL-SCNC: 23 MMOL/L (ref 22–29)
HCT VFR BLD AUTO: 29.2 % (ref 35–47)
HGB BLD-MCNC: 8 G/DL (ref 11.7–15.7)
HGB BLD-MCNC: 9.6 G/DL (ref 11.7–15.7)
HOLD SPECIMEN: NORMAL
IMM GRANULOCYTES # BLD: 0.1 10E3/UL
IMM GRANULOCYTES NFR BLD: 1 %
INR PPP: 0.99 (ref 0.85–1.15)
LYMPHOCYTES # BLD AUTO: 1.6 10E3/UL (ref 0.8–5.3)
LYMPHOCYTES NFR BLD AUTO: 16 %
MAGNESIUM SERPL-MCNC: 1.6 MG/DL (ref 1.7–2.3)
MCH RBC QN AUTO: 29.8 PG (ref 26.5–33)
MCHC RBC AUTO-ENTMCNC: 32.9 G/DL (ref 31.5–36.5)
MCV RBC AUTO: 91 FL (ref 78–100)
MONOCYTES # BLD AUTO: 0.7 10E3/UL (ref 0–1.3)
MONOCYTES NFR BLD AUTO: 7 %
NEUTROPHILS # BLD AUTO: 7.1 10E3/UL (ref 1.6–8.3)
NEUTROPHILS NFR BLD AUTO: 71 %
NRBC # BLD AUTO: 0 10E3/UL
NRBC BLD AUTO-RTO: 0 /100
PLATELET # BLD AUTO: 286 10E3/UL (ref 150–450)
POTASSIUM SERPL-SCNC: 3.8 MMOL/L (ref 3.4–5.3)
PROT SERPL-MCNC: 5.9 G/DL (ref 6.4–8.3)
PROTHROMBIN TIME: 13.3 SECONDS (ref 11.8–14.8)
RBC # BLD AUTO: 3.22 10E6/UL (ref 3.8–5.2)
SODIUM SERPL-SCNC: 134 MMOL/L (ref 135–145)
WBC # BLD AUTO: 10 10E3/UL (ref 4–11)

## 2025-05-07 PROCEDURE — 36415 COLL VENOUS BLD VENIPUNCTURE: CPT | Performed by: HOSPITALIST

## 2025-05-07 PROCEDURE — 83735 ASSAY OF MAGNESIUM: CPT | Performed by: HOSPITALIST

## 2025-05-07 PROCEDURE — 80053 COMPREHEN METABOLIC PANEL: CPT | Performed by: STUDENT IN AN ORGANIZED HEALTH CARE EDUCATION/TRAINING PROGRAM

## 2025-05-07 PROCEDURE — 250N000013 HC RX MED GY IP 250 OP 250 PS 637: Performed by: HOSPITALIST

## 2025-05-07 PROCEDURE — 120N000001 HC R&B MED SURG/OB

## 2025-05-07 PROCEDURE — 250N000011 HC RX IP 250 OP 636: Performed by: STUDENT IN AN ORGANIZED HEALTH CARE EDUCATION/TRAINING PROGRAM

## 2025-05-07 PROCEDURE — 99222 1ST HOSP IP/OBS MODERATE 55: CPT | Mod: AI | Performed by: HOSPITALIST

## 2025-05-07 PROCEDURE — 85610 PROTHROMBIN TIME: CPT | Performed by: STUDENT IN AN ORGANIZED HEALTH CARE EDUCATION/TRAINING PROGRAM

## 2025-05-07 PROCEDURE — 85730 THROMBOPLASTIN TIME PARTIAL: CPT | Performed by: STUDENT IN AN ORGANIZED HEALTH CARE EDUCATION/TRAINING PROGRAM

## 2025-05-07 PROCEDURE — 258N000003 HC RX IP 258 OP 636: Performed by: HOSPITALIST

## 2025-05-07 PROCEDURE — 99285 EMERGENCY DEPT VISIT HI MDM: CPT | Performed by: STUDENT IN AN ORGANIZED HEALTH CARE EDUCATION/TRAINING PROGRAM

## 2025-05-07 PROCEDURE — 85025 COMPLETE CBC W/AUTO DIFF WBC: CPT | Performed by: STUDENT IN AN ORGANIZED HEALTH CARE EDUCATION/TRAINING PROGRAM

## 2025-05-07 PROCEDURE — 250N000011 HC RX IP 250 OP 636: Performed by: HOSPITALIST

## 2025-05-07 PROCEDURE — 96375 TX/PRO/DX INJ NEW DRUG ADDON: CPT | Performed by: STUDENT IN AN ORGANIZED HEALTH CARE EDUCATION/TRAINING PROGRAM

## 2025-05-07 PROCEDURE — 36415 COLL VENOUS BLD VENIPUNCTURE: CPT | Performed by: STUDENT IN AN ORGANIZED HEALTH CARE EDUCATION/TRAINING PROGRAM

## 2025-05-07 PROCEDURE — 99291 CRITICAL CARE FIRST HOUR: CPT | Mod: 25 | Performed by: STUDENT IN AN ORGANIZED HEALTH CARE EDUCATION/TRAINING PROGRAM

## 2025-05-07 PROCEDURE — 96374 THER/PROPH/DIAG INJ IV PUSH: CPT | Performed by: STUDENT IN AN ORGANIZED HEALTH CARE EDUCATION/TRAINING PROGRAM

## 2025-05-07 PROCEDURE — 85018 HEMOGLOBIN: CPT | Performed by: HOSPITALIST

## 2025-05-07 RX ORDER — LIDOCAINE 40 MG/G
CREAM TOPICAL
Status: ACTIVE | OUTPATIENT
Start: 2025-05-07

## 2025-05-07 RX ORDER — FLUTICASONE PROPIONATE 50 MCG
2 SPRAY, SUSPENSION (ML) NASAL DAILY
Status: DISCONTINUED | OUTPATIENT
Start: 2025-05-08 | End: 2025-05-07

## 2025-05-07 RX ORDER — MAGNESIUM SULFATE HEPTAHYDRATE 40 MG/ML
2 INJECTION, SOLUTION INTRAVENOUS ONCE
Status: COMPLETED | OUTPATIENT
Start: 2025-05-07 | End: 2025-05-07

## 2025-05-07 RX ORDER — ATORVASTATIN CALCIUM 20 MG/1
40 TABLET, FILM COATED ORAL AT BEDTIME
Status: DISPENSED | OUTPATIENT
Start: 2025-05-07

## 2025-05-07 RX ORDER — CARVEDILOL 12.5 MG/1
25 TABLET ORAL 2 TIMES DAILY WITH MEALS
Status: DISPENSED | OUTPATIENT
Start: 2025-05-08

## 2025-05-07 RX ORDER — LATANOPROST 50 UG/ML
1 SOLUTION/ DROPS OPHTHALMIC EVERY EVENING
Status: DISPENSED | OUTPATIENT
Start: 2025-05-07

## 2025-05-07 RX ORDER — AMOXICILLIN 250 MG
2 CAPSULE ORAL 2 TIMES DAILY PRN
Status: ACTIVE | OUTPATIENT
Start: 2025-05-07

## 2025-05-07 RX ORDER — CALCIUM CARBONATE 500 MG/1
1000 TABLET, CHEWABLE ORAL 4 TIMES DAILY PRN
Status: DISPENSED | OUTPATIENT
Start: 2025-05-07

## 2025-05-07 RX ORDER — GABAPENTIN 100 MG/1
100 CAPSULE ORAL 2 TIMES DAILY
Status: DISPENSED | OUTPATIENT
Start: 2025-05-07

## 2025-05-07 RX ORDER — CALCIUM CARBONATE 500 MG/1
500 TABLET, CHEWABLE ORAL AT BEDTIME
Status: DISPENSED | OUTPATIENT
Start: 2025-05-07

## 2025-05-07 RX ORDER — IPRATROPIUM BROMIDE AND ALBUTEROL SULFATE 2.5; .5 MG/3ML; MG/3ML
1 SOLUTION RESPIRATORY (INHALATION) EVERY 6 HOURS PRN
Status: ACTIVE | OUTPATIENT
Start: 2025-05-07

## 2025-05-07 RX ORDER — ONDANSETRON 4 MG/1
4 TABLET, ORALLY DISINTEGRATING ORAL EVERY 6 HOURS PRN
Status: ACTIVE | OUTPATIENT
Start: 2025-05-07

## 2025-05-07 RX ORDER — AMOXICILLIN 250 MG
1 CAPSULE ORAL 2 TIMES DAILY PRN
Status: ACTIVE | OUTPATIENT
Start: 2025-05-07

## 2025-05-07 RX ORDER — FLUTICASONE FUROATE AND VILANTEROL 200; 25 UG/1; UG/1
1 POWDER RESPIRATORY (INHALATION) DAILY
Status: DISPENSED | OUTPATIENT
Start: 2025-05-08

## 2025-05-07 RX ORDER — MIRTAZAPINE 15 MG/1
15 TABLET, FILM COATED ORAL AT BEDTIME
Status: DISPENSED | OUTPATIENT
Start: 2025-05-07

## 2025-05-07 RX ORDER — ACETAMINOPHEN 325 MG/1
650 TABLET ORAL 2 TIMES DAILY
Status: DISCONTINUED | OUTPATIENT
Start: 2025-05-07 | End: 2025-05-07

## 2025-05-07 RX ORDER — ONDANSETRON 2 MG/ML
4 INJECTION INTRAMUSCULAR; INTRAVENOUS EVERY 6 HOURS PRN
Status: ACTIVE | OUTPATIENT
Start: 2025-05-07

## 2025-05-07 RX ORDER — ONDANSETRON 2 MG/ML
4 INJECTION INTRAMUSCULAR; INTRAVENOUS ONCE
Status: COMPLETED | OUTPATIENT
Start: 2025-05-07 | End: 2025-05-07

## 2025-05-07 RX ORDER — POTASSIUM CHLORIDE 750 MG/1
10 TABLET, EXTENDED RELEASE ORAL ONCE
Status: COMPLETED | OUTPATIENT
Start: 2025-05-07 | End: 2025-05-07

## 2025-05-07 RX ORDER — SODIUM CHLORIDE 9 MG/ML
INJECTION, SOLUTION INTRAVENOUS CONTINUOUS
Status: DISCONTINUED | OUTPATIENT
Start: 2025-05-07 | End: 2025-05-08

## 2025-05-07 RX ORDER — LEVOTHYROXINE SODIUM 25 UG/1
50 TABLET ORAL DAILY
Status: DISPENSED | OUTPATIENT
Start: 2025-05-08

## 2025-05-07 RX ADMIN — SODIUM CHLORIDE: 9 INJECTION, SOLUTION INTRAVENOUS at 17:41

## 2025-05-07 RX ADMIN — ATORVASTATIN CALCIUM 40 MG: 20 TABLET, FILM COATED ORAL at 23:06

## 2025-05-07 RX ADMIN — PANTOPRAZOLE SODIUM 80 MG: 40 INJECTION, POWDER, FOR SOLUTION INTRAVENOUS at 16:42

## 2025-05-07 RX ADMIN — LATANOPROST 1 DROP: 50 SOLUTION OPHTHALMIC at 23:12

## 2025-05-07 RX ADMIN — CALCIUM CARBONATE (ANTACID) CHEW TAB 500 MG 500 MG: 500 CHEW TAB at 23:08

## 2025-05-07 RX ADMIN — GABAPENTIN 100 MG: 100 CAPSULE ORAL at 23:09

## 2025-05-07 RX ADMIN — POTASSIUM CHLORIDE 10 MEQ: 750 TABLET, FILM COATED, EXTENDED RELEASE ORAL at 21:18

## 2025-05-07 RX ADMIN — MAGNESIUM SULFATE HEPTAHYDRATE 2 G: 40 INJECTION, SOLUTION INTRAVENOUS at 21:20

## 2025-05-07 RX ADMIN — ONDANSETRON 4 MG: 2 INJECTION INTRAMUSCULAR; INTRAVENOUS at 17:04

## 2025-05-07 RX ADMIN — MIRTAZAPINE 15 MG: 15 TABLET, FILM COATED ORAL at 23:07

## 2025-05-07 ASSESSMENT — COLUMBIA-SUICIDE SEVERITY RATING SCALE - C-SSRS
2. HAVE YOU ACTUALLY HAD ANY THOUGHTS OF KILLING YOURSELF IN THE PAST MONTH?: NO
1. IN THE PAST MONTH, HAVE YOU WISHED YOU WERE DEAD OR WISHED YOU COULD GO TO SLEEP AND NOT WAKE UP?: NO
6. HAVE YOU EVER DONE ANYTHING, STARTED TO DO ANYTHING, OR PREPARED TO DO ANYTHING TO END YOUR LIFE?: NO

## 2025-05-07 ASSESSMENT — ACTIVITIES OF DAILY LIVING (ADL)
DIFFICULTY_COMMUNICATING: NO
ADLS_ACUITY_SCORE: 42
DIFFICULTY_EATING/SWALLOWING: NO
DRESSING/BATHING_DIFFICULTY: NO
WERE_AUXILIARY_AIDS_OFFERED?: NO
FALL_HISTORY_WITHIN_LAST_SIX_MONTHS: NO
DOING_ERRANDS_INDEPENDENTLY_DIFFICULTY: NO
CONCENTRATING,_REMEMBERING_OR_MAKING_DECISIONS_DIFFICULTY: NO
ADLS_ACUITY_SCORE: 59
WEAR_GLASSES_OR_BLIND: YES
TOILETING_ISSUES: NO
WALKING_OR_CLIMBING_STAIRS_DIFFICULTY: YES
HEARING_DIFFICULTY_OR_DEAF: YES
WALKING_OR_CLIMBING_STAIRS: STAIR CLIMBING DIFFICULTY, ASSISTANCE 1 PERSON
ADLS_ACUITY_SCORE: 57
DESCRIBE_HEARING_LOSS: BILATERAL HEARING LOSS
PATIENT'S_PREFERRED_MEANS_OF_COMMUNICATION: VERBAL
ADLS_ACUITY_SCORE: 40
USE_OF_HEARING_ASSISTIVE_DEVICES: BILATERAL HEARING AIDS
ADLS_ACUITY_SCORE: 42
ADLS_ACUITY_SCORE: 57
ADLS_ACUITY_SCORE: 57
CHANGE_IN_FUNCTIONAL_STATUS_SINCE_ONSET_OF_CURRENT_ILLNESS/INJURY: NO

## 2025-05-07 NOTE — ED NOTES
Bed: ED10a  Expected date:   Expected time:   Means of arrival:   Comments:  [place] Unresponsive

## 2025-05-07 NOTE — ED PROVIDER NOTES
ED PROVIDER NOTE  Patient Name: Lita Ocasio  MRN: [id number]    CC:    Chief Complaint   Patient presents with    Hematemesis         MDM  84 year old female presenting with hematemesis.      In the ED, /59   Pulse 69   Temp 97.7  F (36.5  C) (Tympanic)   Resp 28     Physical exam revealed clear auscultation bilaterally.  Benign abdominal exam.  Grossly neurologically intact.  In no acute distress, no accessory muscle use.  Overall does not look critically ill or toxic .      I have independently reviewed and interpreted the patients test results which I have ordered this visit which are the following:      ED Course as of 05/07/25 1704   Wed May 07, 2025   1645 WBC: 10.0   1645 Hemoglobin(!): 9.6   1655 INR: 0.99   1656 Consult to Dr. Parish, possible EGD    1703 Sodium(!): 134   1703 Potassium: 3.8   1703 Creatinine(!): 1.24   1703 GFR Estimate(!): 43   1703 Calcium(!): 8.7   1703 Chloride(!): 97         Hematemesis  Hx of GI bleed  Patient presents for acute onset nausea and hematemesis, maroon-colored describes as significant volume that occurred suddenly today.  She denies any alcohol use, no NSAIDs.  She takes aspirin but no anticoagulants, she has no abdominal pain.  She otherwise feels well no chest pain no shortness of breath no fevers or chills coughing or recent retching.  I discussed the case with on-call general surgery Dr. Parish, will follow and trend hemoglobins, possible EGD in the morning.  - Consult general surgery  - Protonix 80 mg IV  - Zofran 4 mg IV    Clinical impression  Hematemesis    Disposition  Admission        HPI    Lita Ocasio is a 84 year old female with PMH of emphysema/, hypothyroidism, CHF, hypertension, CAD status post PCI, malnutrition, history of GI bleed, history of CVA, who presents with hematemesis.     History of obtained by: Patient    Patient presents for acute onset hematemesis and nausea starting today while in clinic.  She denies any prodromal illness, she  denies any abdominal pain.  She has no melena or hematochezia.  She has no flank pain or back pain.  She does have a history of similar symptoms in March where she received an EGD which showed gastritis.  Does not take anticoagulants, but she does take aspirin she has no fevers chills or recent prodromal illness    PMH and SH reviewed.    10 point ROS reviewed and negative except as stated in HPI.    Past medical history:  Past Medical History:   Diagnosis Date    Acquired hypothyroidism 2019    Asthma     Benign essential hypertension 2019    Centrilobular emphysema (H) 2019    Chronic rhinitis 2013    Chronic systolic congestive heart failure (H) 2019    Constipation     Coronary artery disease     Hearing loss     Heart trouble     Hemorrhagic cerebrovascular accident (CVA) (H) 2019    Hyperlipidemia 2019    Hypertension     Nasal congestion     Non-rheumatic mitral regurgitation 2019    Osteopenia 2019    Osteoporosis     Parathyroid related hypercalcemia 2013    Primary hyperparathyroidism 2013    Ringing in ears     Sensorineural hearing loss, asymmetrical 2013    Sneezing     Snoring     Stented coronary artery     Thyroid disease     Weakness     Weight loss        Social history:  Social Connections: Not on file     Social History     Socioeconomic History    Marital status:    Tobacco Use    Smoking status: Former     Current packs/day: 0.00     Average packs/day: 1 pack/day for 50.0 years (50.0 ttl pk-yrs)     Types: Cigarettes     Start date: 1969     Quit date: 2019     Years since quittin.3     Passive exposure: Past    Smokeless tobacco: Never    Tobacco comments:     quit 2019   Vaping Use    Vaping status: Never Used   Substance and Sexual Activity    Alcohol use: Yes     Comment: social    Drug use: No    Sexual activity: Not Currently   Other Topics Concern    Parent/sibling w/ CABG, MI or angioplasty before 65F 55M?  Yes     Social Drivers of Health     Financial Resource Strain: Low Risk  (3/27/2025)    Financial Resource Strain     Within the past 12 months, have you or your family members you live with been unable to get utilities (heat, electricity) when it was really needed?: No   Food Insecurity: Low Risk  (3/27/2025)    Food Insecurity     Within the past 12 months, did you worry that your food would run out before you got money to buy more?: No     Within the past 12 months, did the food you bought just not last and you didn t have money to get more?: No   Transportation Needs: High Risk (3/27/2025)    Transportation Needs     Within the past 12 months, has lack of transportation kept you from medical appointments, getting your medicines, non-medical meetings or appointments, work, or from getting things that you need?: Yes   Interpersonal Safety: Low Risk  (3/28/2025)    Interpersonal Safety     Do you feel physically and emotionally safe where you currently live?: Yes     Within the past 12 months, have you been hit, slapped, kicked or otherwise physically hurt by someone?: No     Within the past 12 months, have you been humiliated or emotionally abused in other ways by your partner or ex-partner?: No   Housing Stability: Low Risk  (3/27/2025)    Housing Stability     Do you have housing? : Yes     Are you worried about losing your housing?: No         I have reviewed the patients prescribed medications:  No current facility-administered medications for this encounter.    Current Outpatient Medications:     acetaminophen (TYLENOL) 325 MG tablet, Take 2 tablets (650 mg) by mouth 2 times daily. For back pain., Disp: 360 tablet, Rfl: 3    aspirin (ASA) 81 MG chewable tablet, Take 1 tablet (81 mg) by mouth daily., Disp: 90 tablet, Rfl: 3    atorvastatin (LIPITOR) 40 MG tablet, Take 1 tablet (40 mg) by mouth at bedtime, Disp: 90 tablet, Rfl: 3    calcium carbonate (TUMS) 500 MG chewable tablet, Take 1 chew tab by mouth at  bedtime, Disp: , Rfl:     carvedilol (COREG) 25 MG tablet, Take 1 tablet (25 mg) by mouth 2 times daily (with meals)., Disp: 180 tablet, Rfl: 3    fluticasone (FLONASE) 50 MCG/ACT spray, Spray 2 sprays into both nostrils daily, Disp: 1 Bottle, Rfl: 11    Fluticasone-Umeclidin-Vilanterol (TRELEGY ELLIPTA) 200-62.5-25 MCG/ACT oral inhaler, Inhale 1 puff into the lungs daily, Disp: 180 each, Rfl: 3    furosemide (LASIX) 20 MG tablet, Take 0.5 tablets (10 mg) by mouth daily as needed (swelling)., Disp: 45 tablet, Rfl: 1    gabapentin (NEURONTIN) 100 MG capsule, Take 1 capsule (100 mg) by mouth 2 times daily., Disp: , Rfl:     hydrochlorothiazide (HYDRODIURIL) 25 MG tablet, Take 1 tablet (25 mg) by mouth daily., Disp: 90 tablet, Rfl: 3    hydrocortisone (WESTCORT) 0.2 % external cream, Apply topically 2 times daily. (Patient taking differently: Apply topically 2 times daily as needed.), Disp: 45 g, Rfl: 3    ipratropium - albuterol 0.5 mg/2.5 mg/3 mL (DUONEB) 0.5-2.5 (3) MG/3ML neb solution, Take 1 vial (3 mLs) by nebulization every 6 hours as needed for shortness of breath, wheezing or cough, Disp: 90 mL, Rfl: 3    ipratropium-albuterol (COMBIVENT RESPIMAT)  MCG/ACT inhaler, Inhale 1 puff into the lungs 4 times daily (Patient taking differently: Inhale 1 puff into the lungs 4 times daily as needed.), Disp: 8 g, Rfl: 3    latanoprost (XALATAN) 0.005 % ophthalmic solution, Place 1 drop into both eyes every evening., Disp: , Rfl:     lidocaine (LMX4) 4 % external cream, Apply topically once as needed for mild pain., Disp: 28 g, Rfl: 3    losartan (COZAAR) 100 MG tablet, Take 1 tablet (100 mg) by mouth daily., Disp: 90 tablet, Rfl: 3    methocarbamol (ROBAXIN) 500 MG tablet, Take 1 tablet (500 mg) by mouth 4 times daily as needed for muscle spasms, Disp: 20 tablet, Rfl: 0    mirtazapine (REMERON) 15 MG tablet, Take 1 tablet (15 mg) by mouth at bedtime, Disp: 90 tablet, Rfl: 3    nitroGLYcerin (NITROSTAT) 0.4 MG  sublingual tablet, Place 1 tablet (0.4 mg) under the tongue every 5 minutes as needed for chest pain. For chest pain place 1 tablet under the tongue every 5 minutes for 3 doses. If symptoms persist 5 minutes after 1st dose call 911., Disp: 25 tablet, Rfl: 3    pantoprazole (PROTONIX) 40 MG EC tablet, Take 1 tablet (40 mg) by mouth 2 times daily., Disp: 90 tablet, Rfl: 3    Prenatal Multivit-Min-Fe-FA (PRENATAL/IRON) TABS, Take 1 tablet by mouth every morning., Disp: , Rfl:     sucralfate (CARAFATE) 1 GM tablet, Take 1 tablet (1 g) by mouth 4 times daily as needed for nausea., Disp: 120 tablet, Rfl: 3    SYNTHROID 50 MCG tablet, Take 1 tablet (50 mcg) by mouth daily., Disp: 90 tablet, Rfl: 3    trolamine salicylate (ASPERCREME) 10 % external cream, Apply topically daily as needed (lower back pain), Disp: , Rfl:     Allergies:  Allergies   Allergen Reactions    Evista [Raloxifene] Other (See Comments)     hypercalcemia    Prednisone GI Disturbance    Combigan [Brimonidine Tartrate-Timolol] Other (See Comments)     Eye swelling and itchy    Cyclosporine      Pt reports using eye gtts and having red irritated eyes     Latex Rash    Levaquin [Levofloxacin] Muscle Pain (Myalgia)    Penicillins Rash         Vitals:    05/07/25 1607   BP: 131/59   Pulse: 69   Resp: 28   Temp: 97.7  F (36.5  C)   TempSrc: Tympanic       Physical Exam  Vitals: /59   Pulse 69   Temp 97.7  F (36.5  C) (Tympanic)   Resp 28   Constitutional: awake, NAD  Eyes: No conjunctival injection and normal lids, PERRL, EOMI  ENT: external nose and ears atraumatic  Neck: Symmetric, trachea midline, Supple  CV: RRR, no murmurs appreciated.  Pulm: Unlabored respiratory effort, good air movement, CTAB, no w/c/r appreciated.  GI: Soft, nontender, nondistended.  No rebound or guarding  MSK: No deformities.  No cyanosis.  Neuro: AOx4, normal speech, following commands, grossly symmetric strength in all extremities.  Cranial nerves III-XII grossly intact.     Skin: warm & dry, no rashes or lesions  Psych: Appropriate mood & affect    Past medical history, past surgical history, problem list, family history, social history, medication list, and allergies were reviewed as documented in epic snapshot.  Relevant review of systems are documented within the HPI above.    The below information is for billing purposes only.  There are examples under each underlined title, that are not necessary reflective of the patient's care or history, but are represent examples of the level of care for billing purposes only.     Complexity of the Problems Addressed    5 (High) - 1 or more chronic illnesses with severe exacerbation, progression, or side effects of treatment or 1 acute chronic illness or injury that poses threat to live or bodily function    Complexity of Data  I have reviewed the following pertinent historical labs, diagnoses, tests, and/or imaging which contribute to complexity of this patient's care.    5 - (Extensive) - (2 of three above)    Complication Risk  Based on my interpretation of the current labs, historical tests and/or external tests. Patient does have a risk level as stated below of morbidity, threat to life, and bodily function, and severe exacerbation if not treated.   5 - High (drug therapy requiring intensive monitoring, elective major surgery with risk factors, emergency major surgery, hospitalization or excalation of hospital level care, decision not to resuscitate or to de-escalate care because of poor prognosis, parenteral controlled substances)        ED Events, Labs and Imaging:  ED Course as of 05/07/25 1704   Wed May 07, 2025   1645 WBC: 10.0   1645 Hemoglobin(!): 9.6   1655 INR: 0.99   1656 Consult to Dr. Parish, possible EGD    1703 Sodium(!): 134   1703 Potassium: 3.8   1703 Creatinine(!): 1.24   1703 GFR Estimate(!): 43   1703 Calcium(!): 8.7   1703 Chloride(!): 97       Jono Douglas MD  Emergency Medicine    Dictation Disclaimer: Some notes are  completed with voice-recognition dictation software. Errors are generally corrected in real time. Please contact me via Epic staff message if you note any errors requiring clarification.     Tom Douglas MD  05/07/25 9599

## 2025-05-07 NOTE — H&P
[facility]    History and Physical - Hospitalist Service       Date of Admission:  5/7/2025    Assessment & Plan      Lita Ocasio is a 84 year old female admitted on 5/7/2025. She established diagnosis of systolic CHF, acquired hypothyroidism, benign essential hypertension, atherosclerotic cardiovascular disease, mixed hyperlipidemia, history of coronary artery stent placement to the OM left circumflex in June 2019 at [facility], history of GI bleed, CKD stage IV, subclavian artery stenosis with asymmetric blood pressures in both arms, history of CVA in 2019, peripheral artery disease, bilateral lateral carotid artery stenosis, severe COPD.  Recent hospital admission with hematemesis underwent upper endoscopy showing gastritis was on PPI.  Patient's hemoglobin has been baseline 8-9.  Presented to the emergency room with hematemesis suspected to have GI bleed.  She was discussed with general surgery Dr.Skaja mercedes to scope her.  She did receive PPI IV in the emergency room will be n.p.o. after midnight and monitor hemoglobin is.  She is a full code from prior.  Principal Problem:    Hematemesis with nausea    Hx of recent   Gastritis    History of GI bleed    -NP.o.  -IV fluids  -Hemoglobin every 6 hours  -Continue Protonix 40 mg twice daily  -General Surgery consulted  -Hold aspirin  -Hold all anticoagulation  -Transfuse if hemoglobin less than 7, patient ok with blood transfusion     Active Problems:       Acquired hypothyroidism   -Home Synthroid      Chronic systolic congestive heart failure (H)- Recovered  - continue home meds      Benign essential hypertension   -ok for home coreg  -hold home losartan      Mixed hyperlipidemia   -home statin       Protein-calorie malnutrition     -noted       CKD (chronic kidney disease) stage 4, GFR 15-29 ml/min (H)   -Daily Labs  -Avoid nephrotoxic medications      Coronary artery disease involving native coronary artery of native heart without angina pectoris     History of CVA on 1/1/2019    History of cardioversion on 6/28/2019    PAD on 2/28/2020-moderate (H)    Bilateral carotid artery stenosis  - On aspirin outpatient but holding since GI bleed      History of tobacco abuse quitting on 1/1/2019    - Continue tobacco cessation      COPD, severe (H)    Centrilobular emphysema (H)   -home inhalers  Not in exacerbation          Diet: NPO for Medical/Clinical Reasons Except for: MedsNPO  DVT Prophylaxis: Anti-embolisim stockings (TEDs)  Fitzgerald Catheter: Not present  Lines: None     Cardiac Monitoring: None  Code Status:  Full Code     Clinically Significant Risk Factors Present on Admission         # Hyponatremia: Lowest Na = 134 mmol/L in last 2 days, will monitor as appropriate  # Hypochloremia: Lowest Cl = 97 mmol/L in last 2 days, will monitor as appropriate        # Drug Induced Platelet Defect: home medication list includes an antiplatelet medication   # Hypertension: Noted on problem list    # Chronic heart failure with preserved ejection fraction: heart failure noted on problem list and last echo with EF >50%     # Anemia: based on hgb <11  # Anemia: based on hgb <11                  Disposition Plan     Medically Ready for Discharge: Anticipated in 2-4 Days           Lisandro Salomon DO  Hospitalist Service  [facility]  Securely message with [employer] (more info)  Text page via [employer] Paging/Directory     ______________________________________________________________________    Chief Complaint   Vomiting blood     History is obtained from the patient, electronic health record, and emergency department physician    History of Present Illness   Lita Ocasio is a 84 year old female who established diagnosis of systolic CHF, acquired hypothyroidism, benign essential hypertension, atherosclerotic cardiovascular disease, mixed hyperlipidemia, history of coronary artery stent placement to the OM left circumflex in June 2019 at [facility], history of GI bleed,  CKD stage IV, subclavian artery stenosis with asymmetric blood pressures in both arms, history of CVA in 2019, peripheral artery disease, bilateral lateral carotid artery stenosis, severe COPD.  Recent hospital admission with hematemesis underwent upper endoscopy showing gastritis was on PPI.  Patient's hemoglobin has been baseline 8-9.  She presented to the emergency room complaint today.  Her hemoglobin was 9.6 previously was 9.8, platelets 286, sodium 134, glucose 184, creatinine 1.24, AST 26 ALT 23, INR 0.99 PTT 26.  Patient did have episode of hematemesis maroon-colored per description.  She was discussed with  patient for upper endoscopy tomorrow.  Patient was seen in the ER she denies chest pain or shortness of breath she felt weak and stated that she has been taking her aspirin.  No alcohol use no tobacco use not on ibuprofen.  She is a full code.      Past Medical History    Past Medical History:   Diagnosis Date    Acquired hypothyroidism 5/12/2019    Asthma     Benign essential hypertension 5/12/2019    Centrilobular emphysema (H) 5/12/2019    Chronic rhinitis 8/16/2013    Chronic systolic congestive heart failure (H) 5/12/2019    Constipation     Coronary artery disease     Hearing loss     Heart trouble     Hemorrhagic cerebrovascular accident (CVA) (H) 01/2019    Hyperlipidemia 5/12/2019    Hypertension     Nasal congestion     Non-rheumatic mitral regurgitation 5/12/2019    Osteopenia 5/12/2019    Osteoporosis     Parathyroid related hypercalcemia 8/18/2013    Primary hyperparathyroidism 9/5/2013    Ringing in ears     Sensorineural hearing loss, asymmetrical 8/16/2013    Sneezing     Snoring     Stented coronary artery     Thyroid disease     Weakness     Weight loss        Past Surgical History   Past Surgical History:   Procedure Laterality Date    CATARACT IOL, RT/LT Bilateral     COLONOSCOPY      COLONOSCOPY - HIM SCAN  01/01/2009    Colonoscopy - [facility]  2009    ENDOSCOPY UPPER,  COLONOSCOPY, COMBINED N/A 10/17/2019    Procedure: UPPER ENDOSCOPY WITH BIOPSY  AND COLONOSCOPY;  Surgeon: Julio Canales MD;  Location: [facility] OR    ENT SURGERY  2011    para thyroid surgery    ENT SURGERY  09/2013    Parathyroid    ESOPHAGOSCOPY, GASTROSCOPY, DUODENOSCOPY (EGD), COMBINED N/A 9/21/2019    Procedure: ESOPHAGOGASTRODUODENOSCOPY (EGD);  Surgeon: Julio Canales MD;  Location: [facility] OR    ESOPHAGOSCOPY, GASTROSCOPY, DUODENOSCOPY (EGD), COMBINED N/A 1/27/2020    Procedure: ESOPHAGOGASTRODUODENOSCOPY (EGD) WITH BIOPSY;  Surgeon: Isrrael Parish MD;  Location: [facility] OR    ESOPHAGOSCOPY, GASTROSCOPY, DUODENOSCOPY (EGD), COMBINED N/A 2/20/2020    Procedure: ESOPHAGOGASTRODUODENOSCOPY (EGD) WITH BIOPSY;  Surgeon: Pedro Luis Montoya DO;  Location: [facility] OR    ESOPHAGOSCOPY, GASTROSCOPY, DUODENOSCOPY (EGD), COMBINED N/A 3/28/2025    Procedure: ESOPHAGOGASTRODUODENOSCOPY with biopsy;  Surgeon: Eliel Christianson MD;  Location: [facility] OR    PARATHYROIDECTOMY  9/10/2013    Procedure: PARATHYROIDECTOMY;  Reoperative Neck Exploration, Resection of right Parathyroid adenoma;  Surgeon: Thalia Muller MD;  Location: [facility] OR    TONSILLECTOMY      TUBAL LIGATION         Prior to Admission Medications   Prior to Admission Medications   Prescriptions Last Dose Informant Patient Reported? Taking?   Fluticasone-Umeclidin-Vilanterol (TRELEGY ELLIPTA) 200-62.5-25 MCG/ACT oral inhaler   No No   Sig: Inhale 1 puff into the lungs daily   Prenatal Multivit-Min-Fe-FA (PRENATAL/IRON) TABS   Yes No   Sig: Take 1 tablet by mouth every morning.   SYNTHROID 50 MCG tablet   No No   Sig: Take 1 tablet (50 mcg) by mouth daily.   acetaminophen (TYLENOL) 325 MG tablet   No No   Sig: Take 2 tablets (650 mg) by mouth 2 times daily. For back pain.   aspirin (ASA) 81 MG chewable tablet   No No   Sig: Take 1 tablet (81 mg) by mouth daily.   atorvastatin (LIPITOR) 40 MG tablet   No No   Sig: Take 1 tablet (40 mg) by mouth at bedtime    calcium carbonate (TUMS) 500 MG chewable tablet  Self Yes No   Sig: Take 1 chew tab by mouth at bedtime   carvedilol (COREG) 25 MG tablet   No No   Sig: Take 1 tablet (25 mg) by mouth 2 times daily (with meals).   fluticasone (FLONASE) 50 MCG/ACT spray  Self No No   Sig: Spray 2 sprays into both nostrils daily   furosemide (LASIX) 20 MG tablet   No No   Sig: Take 0.5 tablets (10 mg) by mouth daily as needed (swelling).   gabapentin (NEURONTIN) 100 MG capsule   Yes No   Sig: Take 1 capsule (100 mg) by mouth 2 times daily.   hydrochlorothiazide (HYDRODIURIL) 25 MG tablet   No No   Sig: Take 1 tablet (25 mg) by mouth daily.   hydrocortisone (WESTCORT) 0.2 % external cream   No No   Sig: Apply topically 2 times daily.   Patient taking differently: Apply topically 2 times daily as needed.   ipratropium - albuterol 0.5 mg/2.5 mg/3 mL (DUONEB) 0.5-2.5 (3) MG/3ML neb solution   No No   Sig: Take 1 vial (3 mLs) by nebulization every 6 hours as needed for shortness of breath, wheezing or cough   ipratropium-albuterol (COMBIVENT RESPIMAT)  MCG/ACT inhaler   No No   Sig: Inhale 1 puff into the lungs 4 times daily   Patient taking differently: Inhale 1 puff into the lungs 4 times daily as needed.   latanoprost (XALATAN) 0.005 % ophthalmic solution   Yes No   Sig: Place 1 drop into both eyes every evening.   lidocaine (LMX4) 4 % external cream   No No   Sig: Apply topically once as needed for mild pain.   losartan (COZAAR) 100 MG tablet   No No   Sig: Take 1 tablet (100 mg) by mouth daily.   methocarbamol (ROBAXIN) 500 MG tablet   No No   Sig: Take 1 tablet (500 mg) by mouth 4 times daily as needed for muscle spasms   mirtazapine (REMERON) 15 MG tablet   No No   Sig: Take 1 tablet (15 mg) by mouth at bedtime   nitroGLYcerin (NITROSTAT) 0.4 MG sublingual tablet   No No   Sig: Place 1 tablet (0.4 mg) under the tongue every 5 minutes as needed for chest pain. For chest pain place 1 tablet under the tongue every 5 minutes for  3 doses. If symptoms persist 5 minutes after 1st dose call 911.   pantoprazole (PROTONIX) 40 MG EC tablet   No No   Sig: Take 1 tablet (40 mg) by mouth 2 times daily.   sucralfate (CARAFATE) 1 GM tablet   No No   Sig: Take 1 tablet (1 g) by mouth 4 times daily as needed for nausea.   trolamine salicylate (ASPERCREME) 10 % external cream   Yes No   Sig: Apply topically daily as needed (lower back pain)      Facility-Administered Medications: None        Review of Systems    The 10 point Review of Systems is negative other than noted in the HPI or here.      Social History   I have reviewed this patient's social history and updated it with pertinent information if needed.  Social History     Tobacco Use    Smoking status: Former     Current packs/day: 0.00     Average packs/day: 1 pack/day for 50.0 years (50.0 ttl pk-yrs)     Types: Cigarettes     Start date: 1969     Quit date: 2019     Years since quittin.3     Passive exposure: Past    Smokeless tobacco: Never    Tobacco comments:     quit 2019   Vaping Use    Vaping status: Never Used   Substance Use Topics    Alcohol use: Yes     Comment: social    Drug use: No         Family History   I have reviewed this patient's family history and updated it with pertinent information if needed.  Family History   Problem Relation Age of Onset    Hearing Loss Mother     Heart Disease Mother     Myocardial Infarction Mother     Cerebrovascular Disease Father     Cancer Brother         throat    Cancer Sister     Myocardial Infarction Sister     Cancer Maternal Grandmother     Heart Disease Maternal Grandfather     Aortic aneurysm Son          Allergies   Allergies   Allergen Reactions    Evista [Raloxifene] Other (See Comments)     hypercalcemia    Prednisone GI Disturbance    Combigan [Brimonidine Tartrate-Timolol] Other (See Comments)     Eye swelling and itchy    Cyclosporine      Pt reports using eye gtts and having red irritated eyes     Latex Rash     Levaquin [Levofloxacin] Muscle Pain (Myalgia)    Penicillins Rash        Physical Exam   Vital Signs: Temp: 97.7  F (36.5  C) Temp src: Tympanic BP: 89/50 Pulse: 61   Resp: 19 SpO2: 93 % O2 Device: None (Room air)    Weight: 0 lbs 0 oz    Physical Exam  Constitutional:       Comments: Frail appearing stated age female    HENT:      Right Ear: External ear normal.      Left Ear: External ear normal.      Nose: Nose normal.      Mouth/Throat:      Pharynx: Oropharynx is clear.   Eyes:      Conjunctiva/sclera: Conjunctivae normal.   Cardiovascular:      Rate and Rhythm: Normal rate.   Pulmonary:      Effort: Pulmonary effort is normal.   Abdominal:      General: Abdomen is flat.   Musculoskeletal:         General: No swelling.   Skin:     Coloration: Skin is not jaundiced.   Neurological:      Mental Status: She is alert. Mental status is at baseline.         Medical Decision Making       64 MINUTES SPENT BY ME on the date of service doing chart review, history, exam, documentation & further activities per the note.      Data     I have personally reviewed the following data over the past 24 hrs:    10.0  \   9.6 (L)   / 286     134 (L) 97 (L) 15.9 /  184 (H)   3.8 23 1.24 (H) \     ALT: 23 AST: 26 AP: 52 TBILI: 0.2   ALB: 3.6 TOT PROTEIN: 5.9 (L) LIPASE: N/A     INR:  0.99 PTT:  26   D-dimer:  N/A Fibrinogen:  N/A       Imaging results reviewed over the past 24 hrs:   No results found for this or any previous visit (from the past 24 hours).

## 2025-05-08 ENCOUNTER — ANESTHESIA (OUTPATIENT)
Dept: SURGERY | Facility: HOSPITAL | Age: 85
End: 2025-05-08
Payer: COMMERCIAL

## 2025-05-08 ENCOUNTER — ANESTHESIA EVENT (OUTPATIENT)
Dept: SURGERY | Facility: HOSPITAL | Age: 85
End: 2025-05-08
Payer: COMMERCIAL

## 2025-05-08 VITALS
HEART RATE: 71 BPM | OXYGEN SATURATION: 95 % | WEIGHT: 89.5 LBS | TEMPERATURE: 99.1 F | RESPIRATION RATE: 18 BRPM | BODY MASS INDEX: 17.48 KG/M2 | SYSTOLIC BLOOD PRESSURE: 128 MMHG | DIASTOLIC BLOOD PRESSURE: 35 MMHG

## 2025-05-08 LAB
ANION GAP SERPL CALCULATED.3IONS-SCNC: 13 MMOL/L (ref 7–15)
BASOPHILS # BLD AUTO: 0 10E3/UL (ref 0–0.2)
BASOPHILS NFR BLD AUTO: 0 %
BUN SERPL-MCNC: 27.2 MG/DL (ref 8–23)
CALCIUM SERPL-MCNC: 8.4 MG/DL (ref 8.8–10.4)
CHLORIDE SERPL-SCNC: 104 MMOL/L (ref 98–107)
CREAT SERPL-MCNC: 1.12 MG/DL (ref 0.51–0.95)
EGFRCR SERPLBLD CKD-EPI 2021: 48 ML/MIN/1.73M2
EOSINOPHIL # BLD AUTO: 0.2 10E3/UL (ref 0–0.7)
EOSINOPHIL NFR BLD AUTO: 2 %
ERYTHROCYTE [DISTWIDTH] IN BLOOD BY AUTOMATED COUNT: 13.3 % (ref 10–15)
GLUCOSE SERPL-MCNC: 80 MG/DL (ref 70–99)
HCO3 SERPL-SCNC: 20 MMOL/L (ref 22–29)
HCT VFR BLD AUTO: 24 % (ref 35–47)
HGB BLD-MCNC: 7.5 G/DL (ref 11.7–15.7)
HGB BLD-MCNC: 7.6 G/DL (ref 11.7–15.7)
HGB BLD-MCNC: 7.8 G/DL (ref 11.7–15.7)
HGB BLD-MCNC: 8.5 G/DL (ref 11.7–15.7)
HOLD SPECIMEN: NORMAL
IMM GRANULOCYTES # BLD: 0 10E3/UL
IMM GRANULOCYTES NFR BLD: 0 %
LYMPHOCYTES # BLD AUTO: 1.8 10E3/UL (ref 0.8–5.3)
LYMPHOCYTES NFR BLD AUTO: 22 %
MAGNESIUM SERPL-MCNC: 2.3 MG/DL (ref 1.7–2.3)
MCH RBC QN AUTO: 29.5 PG (ref 26.5–33)
MCHC RBC AUTO-ENTMCNC: 32.5 G/DL (ref 31.5–36.5)
MCV RBC AUTO: 91 FL (ref 78–100)
MONOCYTES # BLD AUTO: 0.6 10E3/UL (ref 0–1.3)
MONOCYTES NFR BLD AUTO: 7 %
NEUTROPHILS # BLD AUTO: 5.5 10E3/UL (ref 1.6–8.3)
NEUTROPHILS NFR BLD AUTO: 69 %
NRBC # BLD AUTO: 0 10E3/UL
NRBC BLD AUTO-RTO: 0 /100
PLATELET # BLD AUTO: 210 10E3/UL (ref 150–450)
POTASSIUM SERPL-SCNC: 4.1 MMOL/L (ref 3.4–5.3)
RBC # BLD AUTO: 2.64 10E6/UL (ref 3.8–5.2)
SODIUM SERPL-SCNC: 137 MMOL/L (ref 135–145)
WBC # BLD AUTO: 8.1 10E3/UL (ref 4–11)

## 2025-05-08 PROCEDURE — 250N000009 HC RX 250: Performed by: NURSE ANESTHETIST, CERTIFIED REGISTERED

## 2025-05-08 PROCEDURE — 83735 ASSAY OF MAGNESIUM: CPT | Performed by: HOSPITALIST

## 2025-05-08 PROCEDURE — 250N000009 HC RX 250: Performed by: HOSPITALIST

## 2025-05-08 PROCEDURE — 82565 ASSAY OF CREATININE: CPT | Performed by: HOSPITALIST

## 2025-05-08 PROCEDURE — 85014 HEMATOCRIT: CPT | Performed by: HOSPITALIST

## 2025-05-08 PROCEDURE — 999N000157 HC STATISTIC RCP TIME EA 10 MIN

## 2025-05-08 PROCEDURE — 710N000010 HC RECOVERY PHASE 1, LEVEL 2, PER MIN: Performed by: SURGERY

## 2025-05-08 PROCEDURE — 43255 EGD CONTROL BLEEDING ANY: CPT | Performed by: SURGERY

## 2025-05-08 PROCEDURE — 999N000141 HC STATISTIC PRE-PROCEDURE NURSING ASSESSMENT: Performed by: SURGERY

## 2025-05-08 PROCEDURE — 250N000011 HC RX IP 250 OP 636: Performed by: NURSE ANESTHETIST, CERTIFIED REGISTERED

## 2025-05-08 PROCEDURE — 85018 HEMOGLOBIN: CPT | Performed by: HOSPITALIST

## 2025-05-08 PROCEDURE — 0W3P8ZZ CONTROL BLEEDING IN GASTROINTESTINAL TRACT, VIA NATURAL OR ARTIFICIAL OPENING ENDOSCOPIC: ICD-10-PCS | Performed by: SURGERY

## 2025-05-08 PROCEDURE — 258N000003 HC RX IP 258 OP 636: Performed by: NURSE ANESTHETIST, CERTIFIED REGISTERED

## 2025-05-08 PROCEDURE — 370N000017 HC ANESTHESIA TECHNICAL FEE, PER MIN: Performed by: SURGERY

## 2025-05-08 PROCEDURE — 272N000001 HC OR GENERAL SUPPLY STERILE: Performed by: SURGERY

## 2025-05-08 PROCEDURE — 360N000077 HC SURGERY LEVEL 4, PER MIN: Performed by: SURGERY

## 2025-05-08 PROCEDURE — 36415 COLL VENOUS BLD VENIPUNCTURE: CPT | Performed by: HOSPITALIST

## 2025-05-08 PROCEDURE — 99233 SBSQ HOSP IP/OBS HIGH 50: CPT | Performed by: HOSPITALIST

## 2025-05-08 PROCEDURE — 250N000011 HC RX IP 250 OP 636: Performed by: HOSPITALIST

## 2025-05-08 PROCEDURE — 258N000003 HC RX IP 258 OP 636: Performed by: HOSPITALIST

## 2025-05-08 PROCEDURE — 250N000013 HC RX MED GY IP 250 OP 250 PS 637: Performed by: HOSPITALIST

## 2025-05-08 PROCEDURE — 120N000001 HC R&B MED SURG/OB

## 2025-05-08 PROCEDURE — 94640 AIRWAY INHALATION TREATMENT: CPT

## 2025-05-08 PROCEDURE — 250N000011 HC RX IP 250 OP 636: Performed by: SURGERY

## 2025-05-08 PROCEDURE — 250N000013 HC RX MED GY IP 250 OP 250 PS 637: Performed by: SURGERY

## 2025-05-08 RX ORDER — SODIUM CHLORIDE, SODIUM LACTATE, POTASSIUM CHLORIDE, CALCIUM CHLORIDE 600; 310; 30; 20 MG/100ML; MG/100ML; MG/100ML; MG/100ML
INJECTION, SOLUTION INTRAVENOUS CONTINUOUS
Status: DISCONTINUED | OUTPATIENT
Start: 2025-05-08 | End: 2025-05-08 | Stop reason: HOSPADM

## 2025-05-08 RX ORDER — PROPOFOL 10 MG/ML
INJECTION, EMULSION INTRAVENOUS PRN
Status: DISCONTINUED | OUTPATIENT
Start: 2025-05-08 | End: 2025-05-08

## 2025-05-08 RX ORDER — HYDROMORPHONE HYDROCHLORIDE 1 MG/ML
0.4 INJECTION, SOLUTION INTRAMUSCULAR; INTRAVENOUS; SUBCUTANEOUS EVERY 5 MIN PRN
Status: DISCONTINUED | OUTPATIENT
Start: 2025-05-08 | End: 2025-05-08

## 2025-05-08 RX ORDER — NALOXONE HYDROCHLORIDE 0.4 MG/ML
0.1 INJECTION, SOLUTION INTRAMUSCULAR; INTRAVENOUS; SUBCUTANEOUS
Status: DISCONTINUED | OUTPATIENT
Start: 2025-05-08 | End: 2025-05-08

## 2025-05-08 RX ORDER — FENTANYL CITRATE 50 UG/ML
25 INJECTION, SOLUTION INTRAMUSCULAR; INTRAVENOUS EVERY 5 MIN PRN
Status: DISCONTINUED | OUTPATIENT
Start: 2025-05-08 | End: 2025-05-08

## 2025-05-08 RX ORDER — HYDROMORPHONE HYDROCHLORIDE 1 MG/ML
0.2 INJECTION, SOLUTION INTRAMUSCULAR; INTRAVENOUS; SUBCUTANEOUS EVERY 5 MIN PRN
Status: DISCONTINUED | OUTPATIENT
Start: 2025-05-08 | End: 2025-05-08

## 2025-05-08 RX ORDER — EPINEPHRINE 1 MG/ML
INJECTION, SOLUTION INTRAMUSCULAR; SUBCUTANEOUS
Status: DISCONTINUED
Start: 2025-05-08 | End: 2025-05-08 | Stop reason: HOSPADM

## 2025-05-08 RX ORDER — ALBUTEROL SULFATE 0.83 MG/ML
2.5 SOLUTION RESPIRATORY (INHALATION) EVERY 4 HOURS PRN
Status: DISCONTINUED | OUTPATIENT
Start: 2025-05-08 | End: 2025-05-08

## 2025-05-08 RX ORDER — ONDANSETRON 4 MG/1
4 TABLET, ORALLY DISINTEGRATING ORAL EVERY 30 MIN PRN
Status: DISCONTINUED | OUTPATIENT
Start: 2025-05-08 | End: 2025-05-08

## 2025-05-08 RX ORDER — LIDOCAINE HYDROCHLORIDE 20 MG/ML
INJECTION, SOLUTION INFILTRATION; PERINEURAL PRN
Status: DISCONTINUED | OUTPATIENT
Start: 2025-05-08 | End: 2025-05-08

## 2025-05-08 RX ORDER — ONDANSETRON 2 MG/ML
4 INJECTION INTRAMUSCULAR; INTRAVENOUS EVERY 30 MIN PRN
Status: DISCONTINUED | OUTPATIENT
Start: 2025-05-08 | End: 2025-05-08

## 2025-05-08 RX ORDER — SODIUM CHLORIDE, SODIUM LACTATE, POTASSIUM CHLORIDE, CALCIUM CHLORIDE 600; 310; 30; 20 MG/100ML; MG/100ML; MG/100ML; MG/100ML
INJECTION, SOLUTION INTRAVENOUS CONTINUOUS
Status: DISCONTINUED | OUTPATIENT
Start: 2025-05-08 | End: 2025-05-08

## 2025-05-08 RX ORDER — DEXAMETHASONE SODIUM PHOSPHATE 4 MG/ML
4 INJECTION, SOLUTION INTRA-ARTICULAR; INTRALESIONAL; INTRAMUSCULAR; INTRAVENOUS; SOFT TISSUE
Status: DISCONTINUED | OUTPATIENT
Start: 2025-05-08 | End: 2025-05-08

## 2025-05-08 RX ORDER — IPRATROPIUM BROMIDE AND ALBUTEROL SULFATE 2.5; .5 MG/3ML; MG/3ML
3 SOLUTION RESPIRATORY (INHALATION) ONCE
Status: COMPLETED | OUTPATIENT
Start: 2025-05-08 | End: 2025-05-08

## 2025-05-08 RX ORDER — LOSARTAN POTASSIUM 50 MG/1
100 TABLET ORAL DAILY
Status: DISPENSED | OUTPATIENT
Start: 2025-05-08

## 2025-05-08 RX ORDER — HYDRALAZINE HYDROCHLORIDE 20 MG/ML
2.5-5 INJECTION INTRAMUSCULAR; INTRAVENOUS EVERY 10 MIN PRN
Status: DISCONTINUED | OUTPATIENT
Start: 2025-05-08 | End: 2025-05-08

## 2025-05-08 RX ORDER — IPRATROPIUM BROMIDE AND ALBUTEROL SULFATE 2.5; .5 MG/3ML; MG/3ML
SOLUTION RESPIRATORY (INHALATION)
Status: COMPLETED
Start: 2025-05-08 | End: 2025-05-08

## 2025-05-08 RX ORDER — FENTANYL CITRATE 50 UG/ML
50 INJECTION, SOLUTION INTRAMUSCULAR; INTRAVENOUS EVERY 5 MIN PRN
Status: DISCONTINUED | OUTPATIENT
Start: 2025-05-08 | End: 2025-05-08

## 2025-05-08 RX ORDER — LIDOCAINE 40 MG/G
CREAM TOPICAL
Status: DISCONTINUED | OUTPATIENT
Start: 2025-05-08 | End: 2025-05-08 | Stop reason: HOSPADM

## 2025-05-08 RX ADMIN — PANTOPRAZOLE SODIUM 40 MG: 40 INJECTION, POWDER, FOR SOLUTION INTRAVENOUS at 18:13

## 2025-05-08 RX ADMIN — PROPOFOL 20 MG: 10 INJECTION, EMULSION INTRAVENOUS at 11:56

## 2025-05-08 RX ADMIN — MIRTAZAPINE 15 MG: 15 TABLET, FILM COATED ORAL at 20:59

## 2025-05-08 RX ADMIN — LATANOPROST 1 DROP: 50 SOLUTION OPHTHALMIC at 20:59

## 2025-05-08 RX ADMIN — CARVEDILOL 25 MG: 12.5 TABLET, FILM COATED ORAL at 09:53

## 2025-05-08 RX ADMIN — GABAPENTIN 100 MG: 100 CAPSULE ORAL at 20:59

## 2025-05-08 RX ADMIN — GABAPENTIN 100 MG: 100 CAPSULE ORAL at 09:53

## 2025-05-08 RX ADMIN — PANTOPRAZOLE SODIUM 40 MG: 40 INJECTION, POWDER, FOR SOLUTION INTRAVENOUS at 05:25

## 2025-05-08 RX ADMIN — SODIUM CHLORIDE: 9 INJECTION, SOLUTION INTRAVENOUS at 05:25

## 2025-05-08 RX ADMIN — PROPOFOL 10 MG: 10 INJECTION, EMULSION INTRAVENOUS at 11:54

## 2025-05-08 RX ADMIN — PROPOFOL 10 MG: 10 INJECTION, EMULSION INTRAVENOUS at 12:00

## 2025-05-08 RX ADMIN — IPRATROPIUM BROMIDE AND ALBUTEROL SULFATE 3 ML: .5; 3 SOLUTION RESPIRATORY (INHALATION) at 11:18

## 2025-05-08 RX ADMIN — PROPOFOL 40 MG: 10 INJECTION, EMULSION INTRAVENOUS at 11:45

## 2025-05-08 RX ADMIN — LEVOTHYROXINE SODIUM 50 MCG: 0.03 TABLET ORAL at 05:25

## 2025-05-08 RX ADMIN — PROPOFOL 10 MG: 10 INJECTION, EMULSION INTRAVENOUS at 11:49

## 2025-05-08 RX ADMIN — PROPOFOL 20 MG: 10 INJECTION, EMULSION INTRAVENOUS at 11:57

## 2025-05-08 RX ADMIN — CALCIUM CARBONATE (ANTACID) CHEW TAB 500 MG 500 MG: 500 CHEW TAB at 20:59

## 2025-05-08 RX ADMIN — CARVEDILOL 25 MG: 12.5 TABLET, FILM COATED ORAL at 18:13

## 2025-05-08 RX ADMIN — UMECLIDINIUM 1 PUFF: 62.5 AEROSOL, POWDER ORAL at 09:55

## 2025-05-08 RX ADMIN — ATORVASTATIN CALCIUM 40 MG: 20 TABLET, FILM COATED ORAL at 20:59

## 2025-05-08 RX ADMIN — SODIUM CHLORIDE, SODIUM LACTATE, POTASSIUM CHLORIDE, AND CALCIUM CHLORIDE: .6; .31; .03; .02 INJECTION, SOLUTION INTRAVENOUS at 11:32

## 2025-05-08 RX ADMIN — FLUTICASONE FUROATE AND VILANTEROL TRIFENATATE 1 PUFF: 200; 25 POWDER RESPIRATORY (INHALATION) at 09:55

## 2025-05-08 RX ADMIN — LIDOCAINE HYDROCHLORIDE 80 MG: 20 INJECTION, SOLUTION INFILTRATION; PERINEURAL at 11:45

## 2025-05-08 RX ADMIN — PROPOFOL 20 MG: 10 INJECTION, EMULSION INTRAVENOUS at 11:59

## 2025-05-08 ASSESSMENT — ACTIVITIES OF DAILY LIVING (ADL)
ADLS_ACUITY_SCORE: 40
ADLS_ACUITY_SCORE: 42
ADLS_ACUITY_SCORE: 42
ADLS_ACUITY_SCORE: 40
ADLS_ACUITY_SCORE: 44
ADLS_ACUITY_SCORE: 44
ADLS_ACUITY_SCORE: 40
ADLS_ACUITY_SCORE: 40
ADLS_ACUITY_SCORE: 42
ADLS_ACUITY_SCORE: 44
ADLS_ACUITY_SCORE: 44
ADLS_ACUITY_SCORE: 40
ADLS_ACUITY_SCORE: 40
ADLS_ACUITY_SCORE: 44
ADLS_ACUITY_SCORE: 40
ADLS_ACUITY_SCORE: 40
ADLS_ACUITY_SCORE: 44
ADLS_ACUITY_SCORE: 40
ADLS_ACUITY_SCORE: 44
ADLS_ACUITY_SCORE: 40
ADLS_ACUITY_SCORE: 44

## 2025-05-08 ASSESSMENT — LIFESTYLE VARIABLES: TOBACCO_USE: 1

## 2025-05-08 ASSESSMENT — COPD QUESTIONNAIRES
COPD: 1
CAT_SEVERITY: SEVERE

## 2025-05-08 NOTE — ANESTHESIA CARE TRANSFER NOTE
Patient: Lita Ocasio    Procedure: Procedure(s):  Combined Esophagoscopy, Gastroscopy, Duodenoscopy (Egd) With Argon Plasma Coagulator (Apc), Resolution Clip Application       Diagnosis: Hematemesis, unspecified whether nausea present [K92.0]  Diagnosis Additional Information: No value filed.    Anesthesia Type:   No value filed.     Note:      Level of Consciousness: drowsy  Oxygen Supplementation: nasal cannula  Level of Supplemental Oxygen (L/min / FiO2): 2  Independent Airway: airway patency satisfactory and stable  Dentition: dentition unchanged  Vital Signs Stable: post-procedure vital signs reviewed and stable  Report to RN Given: handoff report given  Patient transferred to: PACU    Handoff Report: Identifed the Patient, Identified the Reponsible Provider, Reviewed the pertinent medical history, Discussed the surgical course, Reviewed Intra-OP anesthesia mangement and issues during anesthesia, Set expectations for post-procedure period and Allowed opportunity for questions and acknowledgement of understanding  Vitals:  Vitals Value Taken Time   /47 05/08/25 12:09   Temp     Pulse 68 05/08/25 12:09   Resp     SpO2 96 % 05/08/25 12:11   Vitals shown include unfiled device data.    Electronically Signed By: COCO Lucas CRNA  May 8, 2025  12:13 PM

## 2025-05-08 NOTE — OP NOTE
Lita Ocasio MRN# [id number]   YOB: 1940 Age: 84 year old      Date of Admission:  5/7/2025    Primary care provider: Ophelia Troncoso    PREOPERATIVE DIAGNOSIS:  Hematemesis       POSTOPERATIVE DIAGNOSES: Superficial ulcerations           PROCEDURE:  Esophagogastroduodenoscopy with APC and clip placement .       HISTORY OF PRESENT ILLNESS:  see admission note      OPERATIVE FINDINGS:  There were some linear ulcerations superficial in the antrum. There were three areas near the cardia of stomach with some evidence of reent bleeding, possible minor ulcerations. APC was applied and one clip. There was no evidence of recent vigorous bleeding.     Specimen(s) submitted to pathology:  none as she had biopsies at her last endoscopy in March.     PROCEDURE:  With the patient in the supine position on the transport cart, IV sedation was administered by the nurse anesthetist.  The patient's correct identity and planned procedure were confirmed during a requisite timeout pause.  A bite block was placed and the fiberoptic endoscope was introduced and negotiated through the cricopharyngeus without difficulty.  The length of the esophagus was examined without findings.  The gastroesophageal junction was noted 35 cm from the incisors; the Z line was regular without ulceration, bleeding source or stricture.  There was no  evidence of Hernandez's change.  The stomach was entered and the pylorus was traversed easily.  The gastric mucosa was noted as above;  The duodenum was similarly without finding.  Did use the straight probe APC in short burst on area of ulceration with mild hemosiderin staining uin the upper portions of the stomach. Question if she has a sliding hiatal hernia that causes bleeding or not. One area started to bleed with the APC and clip was applied. Gastri mucosa did bleed quite easily with manipulation.      Random cold forceps biopsies were obtained of the antrum for H. pylori.  Hemostasis was  judged adequate on visualization.   The endoscope was returned to the GE junction A second circumferential examination of the esophagus was performed on slow withdrawal of the endoscope without additional finding. NO bleeding source in the esophagus.   The procedure was satisfactorially tolerated; there was no appreciable blood loss and the patient was returned to Day Surgery in good condition.      Isrrael Parish MD  5/8/2025  12:09 PM

## 2025-05-08 NOTE — ANESTHESIA PREPROCEDURE EVALUATION
Anesthesia Pre-Procedure Evaluation    Patient: Lita Ocasio   MRN: [id number] : 1940          Procedure : Procedure(s):  ESOPHAGOGASTRODUODENOSCOPY, WITH HEMORRHAGE CONTROL         Past Medical History:   Diagnosis Date    Acquired hypothyroidism 2019    Asthma     Benign essential hypertension 2019    Centrilobular emphysema (H) 2019    Chronic rhinitis 2013    Chronic systolic congestive heart failure (H) 2019    Constipation     Coronary artery disease     Hearing loss     Heart trouble     Hemorrhagic cerebrovascular accident (CVA) (H) 2019    Hyperlipidemia 2019    Hypertension     Nasal congestion     Non-rheumatic mitral regurgitation 2019    Osteopenia 2019    Osteoporosis     Parathyroid related hypercalcemia 2013    Primary hyperparathyroidism 2013    Ringing in ears     Sensorineural hearing loss, asymmetrical 2013    Sneezing     Snoring     Stented coronary artery     Thyroid disease     Weakness     Weight loss       Past Surgical History:   Procedure Laterality Date    CATARACT IOL, RT/LT Bilateral     COLONOSCOPY      COLONOSCOPY - HIM SCAN  2009    Colonoscopy - [facility]/NL      ENDOSCOPY UPPER, COLONOSCOPY, COMBINED N/A 10/17/2019    Procedure: UPPER ENDOSCOPY WITH BIOPSY  AND COLONOSCOPY;  Surgeon: Julio Canales MD;  Location: [facility] OR    ENT SURGERY      para thyroid surgery    ENT SURGERY  2013    Parathyroid    ESOPHAGOSCOPY, GASTROSCOPY, DUODENOSCOPY (EGD), COMBINED N/A 2019    Procedure: ESOPHAGOGASTRODUODENOSCOPY (EGD);  Surgeon: Julio Canales MD;  Location: [facility]    ESOPHAGOSCOPY, GASTROSCOPY, DUODENOSCOPY (EGD), COMBINED N/A 2020    Procedure: ESOPHAGOGASTRODUODENOSCOPY (EGD) WITH BIOPSY;  Surgeon: Isrrael Parish MD;  Location: [facility]    ESOPHAGOSCOPY, GASTROSCOPY, DUODENOSCOPY (EGD), COMBINED N/A 2020    Procedure: ESOPHAGOGASTRODUODENOSCOPY (EGD) WITH BIOPSY;  Surgeon:  Pedro Luis Montoya, DO;  Location: [facility]    ESOPHAGOSCOPY, GASTROSCOPY, DUODENOSCOPY (EGD), COMBINED N/A 3/28/2025    Procedure: ESOPHAGOGASTRODUODENOSCOPY with biopsy;  Surgeon: Eliel Christianson MD;  Location: [facility]    PARATHYROIDECTOMY  9/10/2013    Procedure: PARATHYROIDECTOMY;  Reoperative Neck Exploration, Resection of right Parathyroid adenoma;  Surgeon: Thalia Muller MD;  Location: [facility] OR    TONSILLECTOMY      TUBAL LIGATION        Allergies   Allergen Reactions    Evista [Raloxifene] Other (See Comments)     hypercalcemia    Prednisone GI Disturbance    Combigan [Brimonidine Tartrate-Timolol] Other (See Comments)     Eye swelling and itchy    Cyclosporine      Pt reports using eye gtts and having red irritated eyes     Latex Rash    Levaquin [Levofloxacin] Muscle Pain (Myalgia)    Penicillins Rash      Social History     Tobacco Use    Smoking status: Former     Current packs/day: 0.00     Average packs/day: 1 pack/day for 50.0 years (50.0 ttl pk-yrs)     Types: Cigarettes     Start date: 1969     Quit date: 2019     Years since quittin.3     Passive exposure: Past    Smokeless tobacco: Never    Tobacco comments:     quit 2019   Substance Use Topics    Alcohol use: Yes     Comment: social      Wt Readings from Last 1 Encounters:   25 40.6 kg (89 lb 8 oz)        Anesthesia Evaluation   Pt has had prior anesthetic. Type: General and MAC.        ROS/MED HX  ENT/Pulmonary:     (+)                tobacco use, Past use,     asthma   severe,  COPD, O2 dependent,             Neurologic: Comment: Hemorrhagic cerebrovascular accident   History of CVA on 2019    (+)          CVA, date: , without deficits,                    Cardiovascular: Comment: Saw Santos 25 to see again in one year  He noted:   She should have her blood pressure taken on her right arm and not the left because there is concern for left subclavian artery stenosis.        Abnormal cardiovascular stress  "test on 6/14/2019    History of coronary artery stent placement to the O/M LCX on 6/28/2019 at [facility]      Carotid US:-Extensive atherosclerotic plaque again seen. Currently, velocity  measurements do not suggest high-grade stenosis.  -Unchanged appearance of heavily calcified plaques throughout the  common, internal and external carotid arteries.    -Retrograde flow again seen within the left vertebral artery suggesting  subclavian steal.    Known subclavian artery stenosis asymmetric blood pressures  Carotid artery stenosis bilateral  Extensive plaque but no high-grade stenosis on 10/15/2024.  -B/L on 50-69% on 4/27/2023, [facility].    -50% stenosis on 2/28/2020      (+) Dyslipidemia hypertension- Peripheral Vascular Disease-- Carotid Stenosis.  CAD -  - stent-2019.   Taking blood thinners  Instructions Given to patient: asa. CHF     MILLS.                valvular problems/murmurs type: MR     Previous cardiac testing   Echo: Date: Results:    Stress Test:  Date: 10/1/24 Results:  The nuclear stress test is negative for inducible myocardial ischemia.  ECG Reviewed:  Date: Results:    Cath:  Date: Results:      METS/Exercise Tolerance:  Comment: Four steps to get into home does ok per patient, lives alone, drives. Was at clinic getting her eyes fixed at [facility] and \"passed out\" ambulance called and brought to her   Hematologic:     (+)      anemia,          Musculoskeletal:  - neg musculoskeletal ROS     GI/Hepatic: Comment: ER Gi Bleed 5/7/25 Hgb 9.6  EGD 3/28/25  Denies nausea  Denies heartburn    (+) GERD, Asymptomatic on medication,                  Renal/Genitourinary:     (+) renal disease, type: CRI,            Endo: Comment: Parathyroid related hypercalcemia    (+)          thyroid problem, hypothyroidism,           Psychiatric/Substance Use:  - neg psychiatric ROS     Infectious Disease:       Malignancy:       Other:      (+)  , ,, other significant disability Disability list: Hearing loss. "           Physical Exam  Airway  Mallampati: II  TM distance: >3 FB  Neck ROM: full  Mouth opening: >= 4 cm    Cardiovascular   Rhythm: regular  Rate: normal rate   Comments: Saw Santos 4/23/25 to see again in one year  He noted:   She should have her blood pressure taken on her right arm and not the left because there is concern for left subclavian artery stenosis.        Abnormal cardiovascular stress test on 6/14/2019    History of coronary artery stent placement to the O/M LCX on 6/28/2019 at [facility] US:-Extensive atherosclerotic plaque again seen. Currently, velocity  measurements do not suggest high-grade stenosis.  -Unchanged appearance of heavily calcified plaques throughout the  common, internal and external carotid arteries.    -Retrograde flow again seen within the left vertebral artery suggesting  subclavian steal.    Known subclavian artery stenosis asymmetric blood pressures  Carotid artery stenosis bilateral  Extensive plaque but no high-grade stenosis on 10/15/2024.  -B/L on 50-69% on 4/27/2023, [facility].    -50% stenosis on 2/28/2020    Dental   (+) Modest Abnormalities - crowns, retainers, 1 or 2 missing teeth      Pulmonary Breath sounds clear to auscultation        Neurological   She appears awake.    Other Findings Patient says she feels well and ready to go home, denies SOB. Reports she takes duoneb at home once in morning and once before bed. She reports that she is not short of breath at hospital and feels good. On arrival to [facility] her sats were 89%, placed on 2L NC at quickly went to 99%. Weaned oxygen off and remains high 90's. Will provide duoneb pre procedure.       OUTSIDE LABS:  CBC:   Lab Results   Component Value Date    WBC 8.1 05/08/2025    WBC 10.0 05/07/2025    HGB 7.8 (L) 05/08/2025    HGB 8.5 (L) 05/08/2025    HCT 24.0 (L) 05/08/2025    HCT 29.2 (L) 05/07/2025     05/08/2025     05/07/2025     BMP:   Lab Results   Component Value Date      05/08/2025     (L) 05/07/2025    POTASSIUM 4.1 05/08/2025    POTASSIUM 3.8 05/07/2025    CHLORIDE 104 05/08/2025    CHLORIDE 97 (L) 05/07/2025    CO2 20 (L) 05/08/2025    CO2 23 05/07/2025    BUN 27.2 (H) 05/08/2025    BUN 15.9 05/07/2025    CR 1.12 (H) 05/08/2025    CR 1.24 (H) 05/07/2025    GLC 80 05/08/2025     (H) 05/07/2025     COAGS:   Lab Results   Component Value Date    PTT 26 05/07/2025    INR 0.99 05/07/2025     POC:   Lab Results   Component Value Date     (H) 12/31/2018     HEPATIC:   Lab Results   Component Value Date    ALBUMIN 3.6 05/07/2025    PROTTOTAL 5.9 (L) 05/07/2025    ALT 23 05/07/2025    AST 26 05/07/2025    ALKPHOS 52 05/07/2025    BILITOTAL 0.2 05/07/2025     OTHER:   Lab Results   Component Value Date    PH 7.47 (H) 11/26/2023    LACT 2.2 (H) 01/27/2020    BECKY 8.4 (L) 05/08/2025    PHOS 2.7 08/06/2021    MAG 2.3 05/08/2025    LIPASE 50 03/27/2025    TSH 1.80 03/28/2025    T4 1.06 05/13/2024    CRP <2.9 09/20/2019    SED 36 (H) 09/20/2019       Anesthesia Plan    ASA Status:  4, emergent    Anesthesia Type: MAC.   Induction: intravenous.   Techniques and Equipment:       - Monitoring Plan: standard ASA monitoring.     Consents    Anesthesia Plan(s) and associated risks, benefits, and realistic alternatives discussed. Questions answered and patient/representative(s) expressed understanding.     - Discussed: CRNA     - Discussed with:  Patient       Blood Consent:      - Discussed with: patient.     - Consented: consented to blood products     Postoperative Care    Pain management: non-narcotic analgesics.        Comments:                 COCO Brian CRNA    I have reviewed the pertinent notes and labs in the chart from the past 30 days and (re)examined the patient.  Any updates or changes from those notes are reflected in this note.    Clinically Significant Risk Factors Present on Admission         # Hyponatremia: Lowest Na = 134 mmol/L in last 2 days, will  monitor as appropriate  # Hypochloremia: Lowest Cl = 97 mmol/L in last 2 days, will monitor as appropriate  # Hypocalcemia: Lowest Ca = 8.4 mg/dL in last 2 days, will monitor and replace as appropriate   # Hypomagnesemia: Lowest Mg = 1.6 mg/dL in last 2 days, will replace as needed     # Drug Induced Platelet Defect: home medication list includes an antiplatelet medication   # Hypertension: Noted on problem list  # Chronic heart failure with preserved ejection fraction: heart failure noted on problem list and last echo with EF >50%     # Anemia: based on hgb <11  # Anemia: based on hgb <11       # Cachexia: Estimated body mass index is 17.48 kg/m  as calculated from the following:    Height as of 4/23/25: 1.524 m (5').    Weight as of this encounter: 40.6 kg (89 lb 8 oz).

## 2025-05-08 NOTE — OR NURSING
Pt alert and oriented.  Denies pain.  Pt brought up to acute care via cart on 1 LPM via nasal cannula.  Report given to TRUNG Gu

## 2025-05-08 NOTE — PLAN OF CARE
Goal Outcome Evaluation:      Plan of Care Reviewed With: patient    Overall Patient Progress: no changeOverall Patient Progress: no change    Pt alert and oriented x3. VS and assessment as charted. Denies pain. IV infusing NS @ 75/hr, remains NPO. No S/S bleeding. Abd soft, non tender. Independently positions self. Up in room with SBA, gait belt, ambulated to bathroom, denied dizziness. SP02 85% on RA when asleep so placed on oxygen 2 LPM via nc and SP02 improved to >92%. . Free from falls/ injury, call light within reach, makes needs known.     Face to face report given with opportunity to observe patient.    Report given to TRUNG Gu RN   5/8/2025  7:20 AM

## 2025-05-08 NOTE — OR NURSING
Pt stable  Criteria met for transfer back to acute care.  Lab drawing ordered HGB. After lab draw- pt will be wheeled up to room.

## 2025-05-08 NOTE — PLAN OF CARE
Goal Outcome Evaluation:  [facility] Inpatient Admission Note:    Patient admitted to 3226/3226-1 at approximately 1930 via cart accompanied by transport tech from emergency room . Report received from Janet in SBAR format at 1900 via telephone. Patient transferred to bed via slide board.. Patient is alert and oriented X 3, denies pain; rates at 0 on 0-10 scale.  Patient oriented to room, unit, hourly rounding, and plan of care. Explained admission packet and patient handbook with patient bill of rights brochure. Will continue to monitor and document as needed.     Inpatient Nursing criteria listed below was met:    Health care directives status obtained and documented: Yes    Patient identifies a surrogate decision maker: Yes If yes, who:daughter Contact Information:see index     If initial lactic acid greater than 2.0, repeat lactic acid drawn within one hour of arrival to unit: NA. If no, state reason:     Clergy visit ordered if patient requests: N/A    Skin issues/needs documented: N/A    Isolation Patient: no Education given, correct sign in place and documentation row added to PCS:  Yes    Fall Prevention Yes: Care plan updated, education given and documented, sticker and magnet in place: Yes    Care Plan initiated: Yes    Education Documented (including assessment): Yes    Patient has discharge needs : No If yes, please explain:

## 2025-05-08 NOTE — MEDICATION SCRIBE - ADMISSION MEDICATION HISTORY
Medication Scribe Admission Medication History    Admission medication history is complete. The information provided in this note is only as accurate as the sources available at the time of the update.    Information Source(s): Patient and CareEverywhere/SureScripts via phone    Pertinent Information:   Patient manages her own medications. When asked if she knew her medications well, she responded no, but was able to confirm most of them if listed off. She is unsure if she takes gabapentin and reported many of the PRN's that she hasn't been taking. Did not remove any medications or make changes. She did report she took her AM meds today.     Changes made to PTA medication list:  Added: None  Deleted: None  Changed:   Marked medications reported not taking    Allergies reviewed with patient and updates made in EHR: no    Medication History Completed By: Arabella Alamo 5/7/2025 8:21 PM    PTA Med List   Medication Sig Note Last Dose/Taking    aspirin (ASA) 81 MG chewable tablet Take 1 tablet (81 mg) by mouth daily.  5/7/2025 Morning    atorvastatin (LIPITOR) 40 MG tablet Take 1 tablet (40 mg) by mouth at bedtime  5/6/2025 Bedtime    calcium carbonate (TUMS) 500 MG chewable tablet Take 1 chew tab by mouth at bedtime  5/6/2025 Bedtime    carvedilol (COREG) 25 MG tablet Take 1 tablet (25 mg) by mouth 2 times daily (with meals). (Patient taking differently: Take 25 mg by mouth 2 times daily.)  5/7/2025 Morning    Fluticasone-Umeclidin-Vilanterol (TRELEGY ELLIPTA) 200-62.5-25 MCG/ACT oral inhaler Inhale 1 puff into the lungs daily  5/7/2025 Morning    furosemide (LASIX) 20 MG tablet Take 0.5 tablets (10 mg) by mouth daily as needed (swelling).  5/7/2025 Morning    gabapentin (NEURONTIN) 100 MG capsule Take 1 capsule (100 mg) by mouth 2 times daily. 5/7/2025: Patient unsure if taking.  Taking    hydrochlorothiazide (HYDRODIURIL) 25 MG tablet Take 1 tablet (25 mg) by mouth daily.  5/7/2025 Morning    ipratropium - albuterol  0.5 mg/2.5 mg/3 mL (DUONEB) 0.5-2.5 (3) MG/3ML neb solution Take 1 vial (3 mLs) by nebulization every 6 hours as needed for shortness of breath, wheezing or cough  5/7/2025 Morning    ipratropium-albuterol (COMBIVENT RESPIMAT)  MCG/ACT inhaler Inhale 1 puff into the lungs 4 times daily (Patient taking differently: Inhale 1 puff into the lungs 4 times daily as needed.)  5/6/2025 Bedtime    latanoprost (XALATAN) 0.005 % ophthalmic solution Place 1 drop into both eyes every evening.  5/6/2025 Bedtime    losartan (COZAAR) 100 MG tablet Take 1 tablet (100 mg) by mouth daily.  5/7/2025 Morning    mirtazapine (REMERON) 15 MG tablet Take 1 tablet (15 mg) by mouth at bedtime  5/6/2025 Bedtime    nitroGLYcerin (NITROSTAT) 0.4 MG sublingual tablet Place 1 tablet (0.4 mg) under the tongue every 5 minutes as needed for chest pain. For chest pain place 1 tablet under the tongue every 5 minutes for 3 doses. If symptoms persist 5 minutes after 1st dose call 911.  Unknown    pantoprazole (PROTONIX) 40 MG EC tablet Take 1 tablet (40 mg) by mouth 2 times daily.  5/7/2025 Morning    Prenatal Multivit-Min-Fe-FA (PRENATAL/IRON) TABS Take 1 tablet by mouth every morning.  5/7/2025 Morning    SYNTHROID 50 MCG tablet Take 1 tablet (50 mcg) by mouth daily.  5/7/2025 Morning    trolamine salicylate (ASPERCREME) 10 % external cream Apply topically daily as needed (lower back pain)  More than a month        Patient seen/evaluated at bedside 1/28/2018. I agree with the resident progress note/outlined plan of care. My independent findings and conclusions are documented.    Currently stable. pending rehab

## 2025-05-08 NOTE — PLAN OF CARE
Goal Outcome Evaluation:      Plan of Care Reviewed With: patient    Overall Patient Progress: no changeOverall Patient Progress: no change  Arrived to this unit at 1935. No emesis this shift. Denies nausea. IVF infusing at 75/hr RAC. Also has SL to L) hand. Has slight bruise at site. Denies pain. Remains NPO. Did have PO K+ replacement and IV Mg replacement. Both to be rechecked in am. Serial Hgb-Q6H. Hgb at 2200: 8.0. No active bleeding. Pt has hearing aides out and in  for night.     Face to face report given with opportunity to observe patient.    Report given to Keily Carrizales RN   5/7/2025  11:49 PM

## 2025-05-08 NOTE — PROGRESS NOTES
[facility]    Medicine Progress Note - Hospitalist Service    Date of Admission:  5/7/2025    Assessment & Plan      Lita Ocasio is a 84 year old female admitted on 5/7/2025. She established diagnosis of systolic CHF, acquired hypothyroidism, benign essential hypertension, atherosclerotic cardiovascular disease, mixed hyperlipidemia, history of coronary artery stent placement to the OM left circumflex in June 2019 at [facility], history of GI bleed, CKD stage IV, subclavian artery stenosis with asymmetric blood pressures in both arms, history of CVA in 2019, peripheral artery disease, bilateral lateral carotid artery stenosis, severe COPD.  Recent hospital admission with hematemesis underwent upper endoscopy showing gastritis was on PPI.  Patient's hemoglobin has been baseline 8-9.  Presented to the emergency room with hematemesis suspected to have GI bleed.  She was discussed with general surgery Dr.Skaja mercedes to scope her.  She did receive PPI IV in the emergency room will be n.p.o. after midnight and monitor hemoglobin is.  She is a full code from prior.  Principal Problem:    Hematemesis with nausea    Hx of recent   Gastritis    History of GI bleed    -NP.o.  -IV fluids  -Hemoglobin every 6 hours  -Continue Protonix 40 mg twice daily  -General Surgery consulted  -Hold aspirin  -Hold all anticoagulation  -Transfuse if hemoglobin less than 7, patient ok with blood transfusion   On 5/8 hgb slightly down,  Having egd today    Active Problems:       Acquired hypothyroidism   -Home Synthroid      Chronic systolic congestive heart failure (H)- Recovered  - continue home meds      Benign essential hypertension   -ok for home coreg  -hold home losartan      Mixed hyperlipidemia   -home statin       Protein-calorie malnutrition     -noted       CKD (chronic kidney disease) stage 4, GFR 15-29 ml/min (H)   -Daily Labs  -Avoid nephrotoxic medications      Coronary artery disease involving native coronary  artery of native heart without angina pectoris    History of CVA on 1/1/2019    History of cardioversion on 6/28/2019    PAD on 2/28/2020-moderate (H)    Bilateral carotid artery stenosis  - On aspirin outpatient but holding since GI bleed      History of tobacco abuse quitting on 1/1/2019    - Continue tobacco cessation      COPD, severe (H)    Centrilobular emphysema (H)   -home inhalers  Not in exacerbation          Diet: NPO for Medical/Clinical Reasons Except for: Meds    DVT Prophylaxis: Pneumatic Compression Devices  Fitzgerald Catheter: Not present  Lines: None     Cardiac Monitoring: None  Code Status: Full Code      Clinically Significant Risk Factors Present on Admission         # Hyponatremia: Lowest Na = 134 mmol/L in last 2 days, will monitor as appropriate  # Hypochloremia: Lowest Cl = 97 mmol/L in last 2 days, will monitor as appropriate  # Hypocalcemia: Lowest Ca = 8.4 mg/dL in last 2 days, will monitor and replace as appropriate   # Hypomagnesemia: Lowest Mg = 1.6 mg/dL in last 2 days, will replace as needed     # Drug Induced Platelet Defect: home medication list includes an antiplatelet medication   # Hypertension: Noted on problem list    # Chronic heart failure with preserved ejection fraction: heart failure noted on problem list and last echo with EF >50%     # Anemia: based on hgb <11  # Anemia: based on hgb <11       # Cachexia: Estimated body mass index is 17.48 kg/m  as calculated from the following:    Height as of 4/23/25: 1.524 m (5').    Weight as of this encounter: 40.6 kg (89 lb 8 oz).              Social Drivers of Health    Tobacco Use: Medium Risk (5/7/2025)    Received from [facility] [employer]    Patient History     Smoking Tobacco Use: Former     Smokeless Tobacco Use: Never   Transportation Needs: High Risk (5/7/2025)    Transportation Needs     Within the past 12 months, has lack of transportation kept you from medical appointments, getting your medicines, non-medical meetings or appointments,  work, or from getting things that you need?: Yes          Disposition Plan     Medically Ready for Discharge: Anticipated in 2-4 Days             Lisanrdo Salomon,   Hospitalist Service  [facility]  Securely message with [employer] (more info)  Text page via [employer] Paging/Directory   ______________________________________________________________________    Interval History   Seen this morning for medical rounds denies chest pain shortness of breath awaiting EGD.  I did discuss with general surgery we will have the endoscopy done at noon.    Physical Exam   Vital Signs: Temp: 99.1  F (37.3  C) Temp src: Tympanic BP: (!) 143/38 Pulse: 66   Resp: 18 SpO2: 97 % O2 Device: None (Room air) Oxygen Delivery: 2 LPM  Weight: 89 lbs 8 oz    Physical Exam  HENT:      Right Ear: External ear normal.      Left Ear: External ear normal.      Nose: Nose normal.      Mouth/Throat:      Pharynx: Oropharynx is clear.   Eyes:      Conjunctiva/sclera: Conjunctivae normal.   Cardiovascular:      Rate and Rhythm: Normal rate.   Pulmonary:      Effort: Pulmonary effort is normal.   Abdominal:      General: Abdomen is flat.   Musculoskeletal:         General: No swelling.   Skin:     Coloration: Skin is not jaundiced.   Neurological:      Mental Status: She is alert. Mental status is at baseline.         Medical Decision Making       64 MINUTES SPENT BY ME on the date of service doing chart review, history, exam, documentation & further activities per the note.      Data     I have personally reviewed the following data over the past 24 hrs:    8.1  \   7.8 (L)   / 210     137 104 27.2 (H) /  80   4.1 20 (L) 1.12 (H) \     ALT: 23 AST: 26 AP: 52 TBILI: 0.2   ALB: 3.6 TOT PROTEIN: 5.9 (L) LIPASE: N/A     INR:  0.99 PTT:  26   D-dimer:  N/A Fibrinogen:  N/A       Imaging results reviewed over the past 24 hrs:   No results found for this or any previous visit (from the past 24 hours).

## 2025-05-08 NOTE — PLAN OF CARE
Plan of Care Reviewed With: Patient    Overall Patient Progress: Progressing    BP (!) 123/37 (BP Location: Right arm, Patient Position: Semi-Matson's, Cuff Size: Adult Regular)   Pulse 68   Temp 99.1  F (37.3  C) (Tympanic)   Resp 19   Wt 40.6 kg (89 lb 8 oz)   SpO2 92%   BMI 17.48 kg/m      Pt is A&Ox4 and makes her needs known appropriately by using the call light. Call light within reach, bed alarm is on and regular rounding was done on pt during the shift. Pt has hearing aids in room and dentures that were used by patient today. Pt gets up to the bathroom via SB into the bathroom and voided urine once before going down for esophagoscopy. After scope, pt was adjusted to a a full liquid diet and tolerated Without Nausea. *Patients BP was low and Cozaar was help.* There has been no signs of bleeding. Hemoglobin is been monitored.     Face to face report given with opportunity to observe patient.    Report given to TRUNG Smith RN   5/8/2025  7:08 PM

## 2025-05-08 NOTE — ANESTHESIA POSTPROCEDURE EVALUATION
Patient: Lita Ocasio    Procedure: Procedure(s):  Combined Esophagoscopy, Gastroscopy, Duodenoscopy (Egd) With Argon Plasma Coagulator (Apc), Resolution Clip Application       Anesthesia Type:  No value filed.    Note:  Disposition: Outpatient   Postop Pain Control: Uneventful            Sign Out: Well controlled pain   PONV: No   Neuro/Psych: Uneventful            Sign Out: Acceptable/Baseline neuro status   Airway/Respiratory: Uneventful            Sign Out: Acceptable/Baseline resp. status   CV/Hemodynamics: Uneventful            Sign Out: Acceptable CV status; No obvious hypovolemia; No obvious fluid overload   Other NRE: NONE   DID A NON-ROUTINE EVENT OCCUR? No           Last vitals:  Vitals Value Taken Time   /49 05/08/25 12:25   Temp 98.3  F (36.8  C) 05/08/25 12:25   Pulse 66 05/08/25 12:26   Resp 11 05/08/25 12:26   SpO2 95 % 05/08/25 12:26   Vitals shown include unfiled device data.    Electronically Signed By: COCO MOHR CRNA  May 8, 2025  1:20 PM

## 2025-05-09 LAB
ABO + RH BLD: NORMAL
BLD GP AB SCN SERPL QL: NEGATIVE
BLD PROD TYP BPU: NORMAL
BLOOD COMPONENT TYPE: NORMAL
CODING SYSTEM: NORMAL
CROSSMATCH: NORMAL
HGB BLD-MCNC: 6.8 G/DL (ref 11.7–15.7)
HGB BLD-MCNC: 9.5 G/DL (ref 11.7–15.7)
ISSUE DATE AND TIME: NORMAL
MAGNESIUM SERPL-MCNC: 1.8 MG/DL (ref 1.7–2.3)
POTASSIUM SERPL-SCNC: 4.6 MMOL/L (ref 3.4–5.3)
SPECIMEN EXP DATE BLD: NORMAL
UNIT ABO/RH: NORMAL
UNIT NUMBER: NORMAL
UNIT STATUS: NORMAL
UNIT TYPE ISBT: 6200

## 2025-05-09 PROCEDURE — 43255 EGD CONTROL BLEEDING ANY: CPT | Performed by: SURGERY

## 2025-05-09 PROCEDURE — 36415 COLL VENOUS BLD VENIPUNCTURE: CPT | Performed by: INTERNAL MEDICINE

## 2025-05-09 PROCEDURE — 258N000003 HC RX IP 258 OP 636: Performed by: SURGERY

## 2025-05-09 PROCEDURE — 710N000010 HC RECOVERY PHASE 1, LEVEL 2, PER MIN: Performed by: SURGERY

## 2025-05-09 PROCEDURE — 250N000011 HC RX IP 250 OP 636: Performed by: SURGERY

## 2025-05-09 PROCEDURE — 99233 SBSQ HOSP IP/OBS HIGH 50: CPT | Performed by: HOSPITALIST

## 2025-05-09 PROCEDURE — 30233N1 TRANSFUSION OF NONAUTOLOGOUS RED BLOOD CELLS INTO PERIPHERAL VEIN, PERCUTANEOUS APPROACH: ICD-10-PCS | Performed by: SURGERY

## 2025-05-09 PROCEDURE — 120N000001 HC R&B MED SURG/OB

## 2025-05-09 PROCEDURE — 999N000141 HC STATISTIC PRE-PROCEDURE NURSING ASSESSMENT: Performed by: SURGERY

## 2025-05-09 PROCEDURE — 370N000017 HC ANESTHESIA TECHNICAL FEE, PER MIN: Performed by: SURGERY

## 2025-05-09 PROCEDURE — 272N000001 HC OR GENERAL SUPPLY STERILE: Performed by: SURGERY

## 2025-05-09 PROCEDURE — 0W3P8ZZ CONTROL BLEEDING IN GASTROINTESTINAL TRACT, VIA NATURAL OR ARTIFICIAL OPENING ENDOSCOPIC: ICD-10-PCS | Performed by: SURGERY

## 2025-05-09 PROCEDURE — 250N000013 HC RX MED GY IP 250 OP 250 PS 637: Performed by: SURGERY

## 2025-05-09 PROCEDURE — 85018 HEMOGLOBIN: CPT | Performed by: SURGERY

## 2025-05-09 PROCEDURE — 360N000075 HC SURGERY LEVEL 2, PER MIN: Performed by: SURGERY

## 2025-05-09 PROCEDURE — 86923 COMPATIBILITY TEST ELECTRIC: CPT | Performed by: INTERNAL MEDICINE

## 2025-05-09 PROCEDURE — 84132 ASSAY OF SERUM POTASSIUM: CPT | Performed by: SURGERY

## 2025-05-09 PROCEDURE — P9016 RBC LEUKOCYTES REDUCED: HCPCS | Performed by: INTERNAL MEDICINE

## 2025-05-09 PROCEDURE — 83735 ASSAY OF MAGNESIUM: CPT | Performed by: SURGERY

## 2025-05-09 PROCEDURE — 999N000157 HC STATISTIC RCP TIME EA 10 MIN

## 2025-05-09 PROCEDURE — 85018 HEMOGLOBIN: CPT | Performed by: HOSPITALIST

## 2025-05-09 PROCEDURE — 94640 AIRWAY INHALATION TREATMENT: CPT | Mod: 76

## 2025-05-09 PROCEDURE — 94640 AIRWAY INHALATION TREATMENT: CPT

## 2025-05-09 PROCEDURE — 250N000009 HC RX 250: Performed by: SURGERY

## 2025-05-09 PROCEDURE — 86900 BLOOD TYPING SEROLOGIC ABO: CPT | Performed by: INTERNAL MEDICINE

## 2025-05-09 PROCEDURE — 36415 COLL VENOUS BLD VENIPUNCTURE: CPT | Performed by: SURGERY

## 2025-05-09 RX ORDER — SODIUM CHLORIDE 9 MG/ML
INJECTION, SOLUTION INTRAVENOUS CONTINUOUS
Status: DISCONTINUED | OUTPATIENT
Start: 2025-05-09 | End: 2025-05-09

## 2025-05-09 RX ADMIN — PANTOPRAZOLE SODIUM 40 MG: 40 INJECTION, POWDER, FOR SOLUTION INTRAVENOUS at 17:56

## 2025-05-09 RX ADMIN — UMECLIDINIUM 1 PUFF: 62.5 AEROSOL, POWDER ORAL at 08:39

## 2025-05-09 RX ADMIN — MIRTAZAPINE 15 MG: 15 TABLET, FILM COATED ORAL at 21:29

## 2025-05-09 RX ADMIN — UMECLIDINIUM BROMIDE AND VILANTEROL TRIFENATATE 1 PUFF: 62.5; 25 POWDER RESPIRATORY (INHALATION) at 08:39

## 2025-05-09 RX ADMIN — LEVOTHYROXINE SODIUM 50 MCG: 0.03 TABLET ORAL at 05:57

## 2025-05-09 RX ADMIN — CALCIUM CARBONATE (ANTACID) CHEW TAB 500 MG 500 MG: 500 CHEW TAB at 21:29

## 2025-05-09 RX ADMIN — PANTOPRAZOLE SODIUM 40 MG: 40 INJECTION, POWDER, FOR SOLUTION INTRAVENOUS at 05:57

## 2025-05-09 RX ADMIN — SODIUM CHLORIDE: 9 INJECTION, SOLUTION INTRAVENOUS at 15:05

## 2025-05-09 RX ADMIN — CARVEDILOL 25 MG: 12.5 TABLET, FILM COATED ORAL at 18:00

## 2025-05-09 RX ADMIN — FLUTICASONE FUROATE AND VILANTEROL TRIFENATATE 1 PUFF: 200; 25 POWDER RESPIRATORY (INHALATION) at 08:39

## 2025-05-09 RX ADMIN — IPRATROPIUM BROMIDE AND ALBUTEROL SULFATE 3 ML: .5; 3 SOLUTION RESPIRATORY (INHALATION) at 21:45

## 2025-05-09 RX ADMIN — LATANOPROST 1 DROP: 50 SOLUTION OPHTHALMIC at 21:34

## 2025-05-09 RX ADMIN — ATORVASTATIN CALCIUM 40 MG: 20 TABLET, FILM COATED ORAL at 21:29

## 2025-05-09 RX ADMIN — GABAPENTIN 100 MG: 100 CAPSULE ORAL at 21:29

## 2025-05-09 ASSESSMENT — ACTIVITIES OF DAILY LIVING (ADL)
ADLS_ACUITY_SCORE: 44
ADLS_ACUITY_SCORE: 43
ADLS_ACUITY_SCORE: 44
ADLS_ACUITY_SCORE: 43
ADLS_ACUITY_SCORE: 44
ADLS_ACUITY_SCORE: 43
ADLS_ACUITY_SCORE: 44
ADLS_ACUITY_SCORE: 43
ADLS_ACUITY_SCORE: 43
ADLS_ACUITY_SCORE: 44

## 2025-05-09 NOTE — PROGRESS NOTES
[facility]    Medicine Progress Note - Hospitalist Service    Date of Admission:  5/7/2025    Assessment & Plan   Lita Ocasio is a 84 year old female admitted on 5/7/2025. She established diagnosis of systolic CHF, acquired hypothyroidism, benign essential hypertension, atherosclerotic cardiovascular disease, mixed hyperlipidemia, history of coronary artery stent placement to the OM left circumflex in June 2019 at [facility], history of GI bleed, CKD stage IV, subclavian artery stenosis with asymmetric blood pressures in both arms, history of CVA in 2019, peripheral artery disease, bilateral lateral carotid artery stenosis, severe COPD.  Recent hospital admission with hematemesis underwent upper endoscopy showing gastritis was on PPI.  Patient's hemoglobin has been baseline 8-9.  Presented to the emergency room with hematemesis suspected to have GI bleed.  She was discussed with general surgery Dr.Skaja mercedes to scope her.  She did receive PPI IV in the emergency room will be n.p.o. after midnight and monitor hemoglobin is.  She is a full code from prior.  Principal Problem:    Hematemesis with nausea    Hx of recent   Gastritis    History of GI bleed  S/P 1 units of PRBC's     -NP.o.  -IV fluids  -Hemoglobin every 12 hours  -Continue Protonix 40 mg twice daily  -General Surgery consulted  -Hold aspirin  -Hold all anticoagulation  -Transfuse if hemoglobin less than 7, patient ok with blood transfusion   On 5/8 hgb slightly down,    egd done showing some  Superficial ulcerations       On 5/9 hgb dropped to 6.8 1 unit of PRBC's ordered  EGD planned again.    Active Problems:       Acquired hypothyroidism   -Home Synthroid      Chronic systolic congestive heart failure (H)- Recovered  - continue home meds      Benign essential hypertension   -ok for home coreg  -hold home losartan      Mixed hyperlipidemia   -home statin       Protein-calorie malnutrition     -noted       CKD (chronic kidney disease) stage  4, GFR 15-29 ml/min (H)   -Daily Labs  -Avoid nephrotoxic medications      Coronary artery disease involving native coronary artery of native heart without angina pectoris    History of CVA on 1/1/2019    History of cardioversion on 6/28/2019    PAD on 2/28/2020-moderate (H)    Bilateral carotid artery stenosis  - On aspirin outpatient but holding since GI bleed      History of tobacco abuse quitting on 1/1/2019    - Continue tobacco cessation      COPD, severe (H)    Centrilobular emphysema (H)   -home inhalers  Not in exacerbation          Diet: NPO for Medical/Clinical Reasons Except for: Meds, Ice Chips    DVT Prophylaxis: Pneumatic Compression Devices  Fitzgerald Catheter: Not present  Lines: None     Cardiac Monitoring: None  Code Status: Full Code      Clinically Significant Risk Factors         # Hyponatremia: Lowest Na = 134 mmol/L in last 2 days, will monitor as appropriate  # Hypochloremia: Lowest Cl = 97 mmol/L in last 2 days, will monitor as appropriate  # Hypocalcemia: Lowest Ca = 8.4 mg/dL in last 2 days, will monitor and replace as appropriate   # Hypomagnesemia: Lowest Mg = 1.6 mg/dL in last 2 days, will replace as needed       # Hypertension: Noted on problem list  # Chronic heart failure with preserved ejection fraction: heart failure noted on problem list and last echo with EF >50%           # Cachexia: Estimated body mass index is 17.48 kg/m  as calculated from the following:    Height as of 4/23/25: 1.524 m (5').    Weight as of this encounter: 40.6 kg (89 lb 8 oz)., PRESENT ON ADMISSION            Social Drivers of Health    Tobacco Use: Medium Risk (5/8/2025)    Patient History     Smoking Tobacco Use: Former     Smokeless Tobacco Use: Never     Passive Exposure: Past   Transportation Needs: High Risk (5/7/2025)    Transportation Needs     Within the past 12 months, has lack of transportation kept you from medical appointments, getting your medicines, non-medical meetings or appointments, work, or  from getting things that you need?: Yes          Disposition Plan     Medically Ready for Discharge: Anticipated in 2-4 Days             Lisandro Salomon DO  Hospitalist Service  [facility]  Securely message with [employer] (more info)  Text page via [employer] Paging/Directory   ______________________________________________________________________    Interval History   Patient was  seen this morning for medical rounds she was lethargic but did awaken we did discuss regarding plan of care.  Patient's hemoglobin did drop to 6.8 and the plan was to be repeat another upper endoscopy.  I did discuss with general surgery also 1 unit of packed red blood cells ordered discussed with patient regarding risk and benefit.  She will be n.p.o. receiving IV fluids for now.    Physical Exam   Vital Signs: Temp: 98.3  F (36.8  C) Temp src: Temporal BP: (!) 125/31 Pulse: 55   Resp: 24 SpO2: 98 % O2 Device: Nasal cannula Oxygen Delivery: 1/2 LPM  Weight: 89 lbs 8 oz    Physical Exam  Constitutional:       Comments: Frail appearing stated age female    HENT:      Right Ear: External ear normal.      Left Ear: External ear normal.      Nose: Nose normal.      Mouth/Throat:      Pharynx: Oropharynx is clear.   Eyes:      Conjunctiva/sclera: Conjunctivae normal.   Cardiovascular:      Rate and Rhythm: Normal rate.   Pulmonary:      Effort: Pulmonary effort is normal.   Abdominal:      General: Abdomen is flat.   Musculoskeletal:         General: No swelling.   Skin:     Coloration: Skin is not jaundiced.   Neurological:      Mental Status: She is alert. Mental status is at baseline.         Medical Decision Making       61 MINUTES SPENT BY ME on the date of service doing chart review, history, exam, documentation & further activities per the note.      Data     I have personally reviewed the following data over the past 24 hrs:    N/A  \   6.8 (LL)   / N/A     N/A N/A N/A /  N/A   4.6 N/A N/A \       Imaging results reviewed  over the past 24 hrs:   No results found for this or any previous visit (from the past 24 hours).   1.58

## 2025-05-09 NOTE — OP NOTE
Lita Ocasio MRN# [id number]   YOB: 1940 Age: 84 year old      Date of Admission:  5/7/2025     PREOPERATIVE DIAGNOSIS:  Hematemesis       POSTOPERATIVE DIAGNOSES: Superficial ulcerations           PROCEDURE:  Esophagogastroduodenoscopy with APC     HISTORY OF PRESENT ILLNESS:  see admission note      OPERATIVE FINDINGS:  There were some linear ulcerations superficial in the antrum. One site in corpus of stomach with slight ooze, two other areas of possible dieulafoy vs ectatic lesion were cauterized with APC. There was no bleeding at conclusion of case.       Specimen(s) submitted to pathology:  none as she had biopsies at her last endoscopy in March.      PROCEDURE:  With the patient in the supine position on the transport cart, IV sedation was administered by the nurse anesthetist.  The patient's correct identity and planned procedure were confirmed during a requisite timeout pause.  A bite block was placed and the fiberoptic endoscope was introduced and negotiated through the cricopharyngeus without difficulty.  The length of the esophagus was examined without findings.  The gastroesophageal junction was noted 35 cm from the incisors; the Z line was regular without ulceration, bleeding source or stricture.  There was no  evidence of Hernandez's change.  The stomach was entered and the pylorus was traversed easily.  The gastric mucosa was noted as above;  The duodenum was similarly without finding.  Did use the circumferential probe APC in short burst on 3 punctate lesions one with slight oozing noted. .      The endoscope was returned to the GE junction A second circumferential examination of the esophagus was performed on slow withdrawal of the endoscope without additional finding. NO bleeding source in the esophagus.   The procedure was satisfactorially tolerated; there was no appreciable blood loss and the patient was returned to Day Surgery in good condition.       Isrrael Parish MD

## 2025-05-09 NOTE — PLAN OF CARE
Reason for hospital stay:  Hematemesis w/ nausea  Living situation PTA: Home Alone, daughter assists.    Goal outcome evaluation:     Plan of Care Reviewed With:  Patient    Overall Patient Progress:  Progressing      Neuro:  A&O x 4  CV:  HRR reg, BP stable.  Respiratory:  Lungs clear and equal bilat. Dim in basesO2 95% on 1 lpm via nc  GI:  Normoactive BS x 4, no N/V/D this shift.  :  Voiding spontaneously w/o difficulty. Up to bathroom.  Skin:  Scattered bruising present, no adjustments needed.  Lines/Drains:  SL.  Nutrition: Full Liquid Diet  Mobility: SBA    Safety: Bed in lowest position, wheels locked and alarms in place. Call light and personal items within reach and makes needs known.    Pain Management:  Pt denies pain this shift.    Most recent vitals: BP (!) 116/32 (BP Location: Right arm, Patient Position: Semi-Matson's, Cuff Size: Adult Regular)   Pulse 58   Temp 97.3  F (36.3  C) (Tympanic)   Resp 18   Wt 40.6 kg (89 lb 8 oz)   SpO2 98%   BMI 17.48 kg/m       Face to face report given with opportunity to observe patient.    Report given to TRUNG Krishnamurthy RN   5/9/2025  7:15 AM

## 2025-05-09 NOTE — PROGRESS NOTES
Did drop hbg below 7 overnight, will plan to take back for repeat endoscopy to look for source as she will get a unit of blood.     Isrrael Parish MD

## 2025-05-09 NOTE — PLAN OF CARE
Plan of Care Reviewed With: Patient    Overall Patient Progress: Progressing    BP (!) 158/41 (BP Location: Right arm, Patient Position: Semi-Matson's, Cuff Size: Adult Regular)   Pulse 64   Temp 98.7  F (37.1  C) (Temporal)   Resp 22   Ht 1.524 m (5')   Wt 40.4 kg (89 lb)   SpO2 97%   BMI 17.38 kg/m      Patient is A&Ox4 and make her needs know by using the call light. Patient got one unit of blood with a hgb of 6.8 and labs were redrawn and came back at hgb of 9.5. patient has been tolerating a mechanical soft diet with no nausea.     Face to face report given with opportunity to observe patient.    Report given to TRUNG Smith RN   5/9/2025  7:12 PM

## 2025-05-09 NOTE — PROGRESS NOTES
Pt 99% on 1L NC, not chronic. Transitioned to RA with SpO2 97%. Pt not SOB, BS diminished. Morning inhalers given. Rebeca Moore, RRT

## 2025-05-10 VITALS
TEMPERATURE: 97 F | BODY MASS INDEX: 17.47 KG/M2 | WEIGHT: 89 LBS | OXYGEN SATURATION: 93 % | SYSTOLIC BLOOD PRESSURE: 146 MMHG | DIASTOLIC BLOOD PRESSURE: 38 MMHG | HEIGHT: 60 IN | HEART RATE: 68 BPM | RESPIRATION RATE: 18 BRPM

## 2025-05-10 LAB
ANION GAP SERPL CALCULATED.3IONS-SCNC: 10 MMOL/L (ref 7–15)
BASOPHILS # BLD AUTO: 0 10E3/UL (ref 0–0.2)
BASOPHILS NFR BLD AUTO: 1 %
BUN SERPL-MCNC: 10.7 MG/DL (ref 8–23)
CALCIUM SERPL-MCNC: 8.1 MG/DL (ref 8.8–10.4)
CHLORIDE SERPL-SCNC: 105 MMOL/L (ref 98–107)
CREAT SERPL-MCNC: 1.02 MG/DL (ref 0.51–0.95)
EGFRCR SERPLBLD CKD-EPI 2021: 54 ML/MIN/1.73M2
EOSINOPHIL # BLD AUTO: 0.4 10E3/UL (ref 0–0.7)
EOSINOPHIL NFR BLD AUTO: 4 %
ERYTHROCYTE [DISTWIDTH] IN BLOOD BY AUTOMATED COUNT: 13.9 % (ref 10–15)
GLUCOSE SERPL-MCNC: 92 MG/DL (ref 70–99)
HCO3 SERPL-SCNC: 23 MMOL/L (ref 22–29)
HCT VFR BLD AUTO: 26.6 % (ref 35–47)
HGB BLD-MCNC: 10.8 G/DL (ref 11.7–15.7)
HGB BLD-MCNC: 8.7 G/DL (ref 11.7–15.7)
HOLD SPECIMEN: NORMAL
IMM GRANULOCYTES # BLD: 0 10E3/UL
IMM GRANULOCYTES NFR BLD: 0 %
LYMPHOCYTES # BLD AUTO: 1.6 10E3/UL (ref 0.8–5.3)
LYMPHOCYTES NFR BLD AUTO: 19 %
MCH RBC QN AUTO: 29.4 PG (ref 26.5–33)
MCHC RBC AUTO-ENTMCNC: 32.7 G/DL (ref 31.5–36.5)
MCV RBC AUTO: 90 FL (ref 78–100)
MONOCYTES # BLD AUTO: 0.9 10E3/UL (ref 0–1.3)
MONOCYTES NFR BLD AUTO: 10 %
NEUTROPHILS # BLD AUTO: 5.8 10E3/UL (ref 1.6–8.3)
NEUTROPHILS NFR BLD AUTO: 66 %
NRBC # BLD AUTO: 0 10E3/UL
NRBC BLD AUTO-RTO: 0 /100
PLATELET # BLD AUTO: 158 10E3/UL (ref 150–450)
POTASSIUM SERPL-SCNC: 4.3 MMOL/L (ref 3.4–5.3)
RBC # BLD AUTO: 2.96 10E6/UL (ref 3.8–5.2)
SODIUM SERPL-SCNC: 138 MMOL/L (ref 135–145)
WBC # BLD AUTO: 8.8 10E3/UL (ref 4–11)

## 2025-05-10 PROCEDURE — 85018 HEMOGLOBIN: CPT | Performed by: SURGERY

## 2025-05-10 PROCEDURE — 99239 HOSP IP/OBS DSCHRG MGMT >30: CPT | Performed by: NURSE PRACTITIONER

## 2025-05-10 PROCEDURE — 36415 COLL VENOUS BLD VENIPUNCTURE: CPT | Performed by: SURGERY

## 2025-05-10 PROCEDURE — 250N000013 HC RX MED GY IP 250 OP 250 PS 637: Performed by: SURGERY

## 2025-05-10 PROCEDURE — 250N000011 HC RX IP 250 OP 636: Performed by: SURGERY

## 2025-05-10 PROCEDURE — 250N000013 HC RX MED GY IP 250 OP 250 PS 637: Performed by: HOSPITALIST

## 2025-05-10 PROCEDURE — 82435 ASSAY OF BLOOD CHLORIDE: CPT | Performed by: SURGERY

## 2025-05-10 RX ORDER — MAGNESIUM OXIDE 400 MG/1
400 TABLET ORAL EVERY 4 HOURS
Status: COMPLETED | OUTPATIENT
Start: 2025-05-10 | End: 2025-05-10

## 2025-05-10 RX ADMIN — MAGNESIUM OXIDE TAB 400 MG (241.3 MG ELEMENTAL MG) 400 MG: 400 (241.3 MG) TAB at 01:06

## 2025-05-10 RX ADMIN — LEVOTHYROXINE SODIUM 50 MCG: 0.03 TABLET ORAL at 06:39

## 2025-05-10 RX ADMIN — PANTOPRAZOLE SODIUM 40 MG: 40 INJECTION, POWDER, FOR SOLUTION INTRAVENOUS at 06:39

## 2025-05-10 RX ADMIN — CARVEDILOL 25 MG: 12.5 TABLET, FILM COATED ORAL at 09:28

## 2025-05-10 RX ADMIN — UMECLIDINIUM 1 PUFF: 62.5 AEROSOL, POWDER ORAL at 11:12

## 2025-05-10 RX ADMIN — GABAPENTIN 100 MG: 100 CAPSULE ORAL at 09:28

## 2025-05-10 RX ADMIN — FLUTICASONE FUROATE AND VILANTEROL TRIFENATATE 1 PUFF: 200; 25 POWDER RESPIRATORY (INHALATION) at 11:12

## 2025-05-10 RX ADMIN — MAGNESIUM OXIDE TAB 400 MG (241.3 MG ELEMENTAL MG) 400 MG: 400 (241.3 MG) TAB at 09:28

## 2025-05-10 RX ADMIN — LOSARTAN POTASSIUM 100 MG: 50 TABLET, FILM COATED ORAL at 09:28

## 2025-05-10 ASSESSMENT — ACTIVITIES OF DAILY LIVING (ADL)
ADLS_ACUITY_SCORE: 44

## 2025-05-10 NOTE — PROGRESS NOTES
Slight drift down in hbg overnight to 8.7 from 9.5 last night, will check hbg at noon and if stable can consider discharge.     BID PPI and Carafate for discharge.     Isrrael Parish MD

## 2025-05-10 NOTE — PLAN OF CARE
Patient discharged at 1:30 PM via wheel chair accompanied by daughter and staff. Prescriptions - None ordered for discharge. All belongings sent with patient.     Discharge instructions reviewed with patient and daughter.   Patient discharged to home.       Surgical Patient   Surgical Procedures during stay: yes  Did patient receive discharge instruction on wound care and recognition of infection symptoms? Yes    MISC  Follow up appointment made:  No - pt to schedule on Monday 05/12/25  Patient reports pain was well managed at discharge: Yes

## 2025-05-10 NOTE — PLAN OF CARE
Reason for hospital stay:  Hematemesis w/ nausea  Living situation PTA: Home Alone, daughter assists.    Goal outcome evaluation:     Plan of Care Reviewed With:  Patient    Overall Patient Progress:  Progressing      Neuro:  A&O x 4  CV:  HRR reg, BP stable.  Respiratory:  Lungs clear and equal bilat. Dim in basesO2 95% on 1 lpm via nc  GI:  Normoactive BS x 4, no N/V/D this shift.  :  Voiding spontaneously w/o difficulty. Up to bathroom.  Skin:  Scattered bruising present, no adjustments needed.  Lines/Drains:  SL.  Nutrition: Mechanical Soft diet  Mobility: SBA    Safety: Bed in lowest position, wheels locked and alarms in place. Call light and personal items within reach and makes needs known.    Pain Management:  Pt denies pain this shift.    Most recent vitals: /40 (BP Location: Right arm, Patient Position: Semi-Matson's, Cuff Size: Adult Regular)   Pulse 74   Temp 98.1  F (36.7  C) (Tympanic)   Resp 18   Ht 1.524 m (5')   Wt 40.4 kg (89 lb)   SpO2 96%   BMI 17.38 kg/m       Face to face report given with opportunity to observe patient.    Report given to TRUNG Velez RN   5/10/2025  7:15 AM

## 2025-05-10 NOTE — DISCHARGE SUMMARY
[facility]    Discharge Summary  Hospitalist    Date of Admission:  5/7/2025  Date of Discharge:  5/10/2025  Discharging Provider: Mindy Ware NP  Date of Service (when I saw the patient): 05/10/25    Discharge Diagnoses     Principal Problem:    Hematemesis with nausea  Active Problems:    Centrilobular emphysema (H)    Acquired hypothyroidism    Chronic systolic congestive heart failure (H)- Recovered    Benign essential hypertension    Mixed hyperlipidemia    Protein-calorie malnutrition    History of GI bleed    CKD (chronic kidney disease) stage 4, GFR 15-29 ml/min (H)    Coronary artery disease involving native coronary artery of native heart without angina pectoris    History of CVA on 1/1/2019    History of cardioversion on 6/28/2019    PAD on 2/28/2020-moderate (H)    Bilateral carotid artery stenosis    History of tobacco abuse quitting on 1/1/2019    COPD, severe (H)    Gastritis      History of Present Illness   From admission:Lita Ocasio is a 84 year old female admitted on 5/7/2025. She established diagnosis of systolic CHF, acquired hypothyroidism, benign essential hypertension, atherosclerotic cardiovascular disease, mixed hyperlipidemia, history of coronary artery stent placement to the OM left circumflex in June 2019 at [facility], history of GI bleed, CKD stage IV, subclavian artery stenosis with asymmetric blood pressures in both arms, history of CVA in 2019, peripheral artery disease, bilateral lateral carotid artery stenosis, severe COPD.  Recent hospital admission with hematemesis underwent upper endoscopy showing gastritis was on PPI.  Patient's hemoglobin has been baseline 8-9.  Presented to the emergency room with hematemesis suspected to have GI bleed.  She was discussed with general surgery  plan to scope her.  She did receive PPI IV in the emergency room will be n.p.o. after midnight and monitor hemoglobin is.      Hospital Course       Hematemesis with  nausea: Upper endoscopy done 5/8 showed multiple superficial ulcerations with some bleeding, a clip was placed at one site and other areas were cauterized. Hemoglobin dropped down to 6.8 and she was transfused 1 unit PRBC's on 5/9. Repeat scope showed ulcerations with some oozing so was cauterized. Hemoglobin has now been stable and she does not have any pain or nausea. She has already been on both carafate and BID PPI. Discharge home, follow-up with PCP with repeat Hgb check.     Mindy Ware CNP      Pending Results     Unresulted Labs Ordered in the Past 30 Days of this Admission       No orders found from 4/7/2025 to 5/8/2025.            Code Status   Full Code       Primary Care Physician   Ophelia Troncoso           Discharge Disposition   Discharged to home  Condition at discharge: Stable    Consultations This Hospital Stay   SURGERY GENERAL IP CONSULT  CARE MANAGEMENT / SOCIAL WORK IP CONSULT    Time Spent on this Encounter   IMindy NP, personally saw the patient today and spent greater than 30 minutes discharging this patient.    Discharge Orders      Reason for your hospital stay    Gastric bleeding     Follow-up and recommended labs and tests     Follow up with primary care provider, Ophelia Troncoso, within 7 days for hospital follow- up.  The following labs/tests are recommended: CBC.     Activity    Your activity upon discharge: activity as tolerated     Diet    Follow this diet upon discharge: Current Diet:Orders Placed This Encounter      Mechanical/Dental Soft Diet     Discharge Medications   Current Discharge Medication List        CONTINUE these medications which have NOT CHANGED    Details   aspirin (ASA) 81 MG chewable tablet Take 1 tablet (81 mg) by mouth daily.  Qty: 90 tablet, Refills: 3    Associated Diagnoses: Chest pain, unspecified type; MILLS (dyspnea on exertion); Subclavian arterial stenosis; Postoperative atrial fibrillation (H); Coronary artery disease involving  native coronary artery of native heart without angina pectoris; Bilateral carotid artery stenosis; ASCVD (arteriosclerotic cardiovascular disease); Abnormal cardiovascular stress test      atorvastatin (LIPITOR) 40 MG tablet Take 1 tablet (40 mg) by mouth at bedtime  Qty: 90 tablet, Refills: 3    Associated Diagnoses: Hyperlipidemia, unspecified hyperlipidemia type; S/P drug eluting coronary stent placement      calcium carbonate (TUMS) 500 MG chewable tablet Take 1 chew tab by mouth at bedtime      carvedilol (COREG) 25 MG tablet Take 1 tablet (25 mg) by mouth 2 times daily (with meals).  Qty: 180 tablet, Refills: 3    Associated Diagnoses: Benign essential hypertension; History of cardioversion; Chronic systolic congestive heart failure (H)      Fluticasone-Umeclidin-Vilanterol (TRELEGY ELLIPTA) 200-62.5-25 MCG/ACT oral inhaler Inhale 1 puff into the lungs daily  Qty: 180 each, Refills: 3    Associated Diagnoses: Pulmonary emphysema, unspecified emphysema type (H)      furosemide (LASIX) 20 MG tablet Take 0.5 tablets (10 mg) by mouth daily as needed (swelling).  Qty: 45 tablet, Refills: 1    Associated Diagnoses: Chronic systolic congestive heart failure (H); Benign essential hypertension; Chronic systolic congestive heart failure (H); MILLS (dyspnea on exertion); CKD (chronic kidney disease) stage 4, GFR 15-29 ml/min (H); Chronic diastolic congestive heart failure (H)      gabapentin (NEURONTIN) 100 MG capsule Take 1 capsule (100 mg) by mouth 2 times daily.    Associated Diagnoses: Chronic midline low back pain with bilateral sciatica      hydrochlorothiazide (HYDRODIURIL) 25 MG tablet Take 1 tablet (25 mg) by mouth daily.  Qty: 90 tablet, Refills: 3    Associated Diagnoses: Chronic systolic congestive heart failure (H); Benign essential hypertension; Chronic systolic congestive heart failure (H); MILLS (dyspnea on exertion); CKD (chronic kidney disease) stage 4, GFR 15-29 ml/min (H); Chronic diastolic congestive  heart failure (H)      ipratropium - albuterol 0.5 mg/2.5 mg/3 mL (DUONEB) 0.5-2.5 (3) MG/3ML neb solution Take 1 vial (3 mLs) by nebulization every 6 hours as needed for shortness of breath, wheezing or cough  Qty: 90 mL, Refills: 3    Associated Diagnoses: Pulmonary emphysema, unspecified emphysema type (H)      ipratropium-albuterol (COMBIVENT RESPIMAT)  MCG/ACT inhaler Inhale 1 puff into the lungs 4 times daily  Qty: 8 g, Refills: 3    Associated Diagnoses: Pulmonary emphysema, unspecified emphysema type (H)      latanoprost (XALATAN) 0.005 % ophthalmic solution Place 1 drop into both eyes every evening.      losartan (COZAAR) 100 MG tablet Take 1 tablet (100 mg) by mouth daily.  Qty: 90 tablet, Refills: 3    Associated Diagnoses: Chronic systolic congestive heart failure (H); Benign essential hypertension; Chronic systolic congestive heart failure (H); MILLS (dyspnea on exertion); CKD (chronic kidney disease) stage 4, GFR 15-29 ml/min (H)      mirtazapine (REMERON) 15 MG tablet Take 1 tablet (15 mg) by mouth at bedtime  Qty: 90 tablet, Refills: 3    Comments: This prescription was filled on 3/4/2025. Any refills authorized will be placed on file.  Associated Diagnoses: Weight loss; Mild protein-calorie malnutrition      nitroGLYcerin (NITROSTAT) 0.4 MG sublingual tablet Place 1 tablet (0.4 mg) under the tongue every 5 minutes as needed for chest pain. For chest pain place 1 tablet under the tongue every 5 minutes for 3 doses. If symptoms persist 5 minutes after 1st dose call 911.  Qty: 25 tablet, Refills: 3    Associated Diagnoses: History of coronary artery stent placement; History of cardioversion; Coronary artery disease involving native coronary artery of native heart without angina pectoris; Chest pain, unspecified type; Abnormal cardiovascular stress test; History of coronary artery stent placement      pantoprazole (PROTONIX) 40 MG EC tablet Take 1 tablet (40 mg) by mouth 2 times daily.  Qty: 90  tablet, Refills: 3    Associated Diagnoses: History of GI bleed      Prenatal Multivit-Min-Fe-FA (PRENATAL/IRON) TABS Take 1 tablet by mouth every morning.      SYNTHROID 50 MCG tablet Take 1 tablet (50 mcg) by mouth daily.  Qty: 90 tablet, Refills: 3    Associated Diagnoses: Acquired hypothyroidism      trolamine salicylate (ASPERCREME) 10 % external cream Apply topically daily as needed (lower back pain)      acetaminophen (TYLENOL) 325 MG tablet Take 2 tablets (650 mg) by mouth 2 times daily. For back pain.  Qty: 360 tablet, Refills: 3    Associated Diagnoses: Pain      fluticasone (FLONASE) 50 MCG/ACT spray Spray 2 sprays into both nostrils daily  Qty: 1 Bottle, Refills: 11      hydrocortisone (WESTCORT) 0.2 % external cream Apply topically 2 times daily.  Qty: 45 g, Refills: 3    Associated Diagnoses: Eczema, unspecified type      lidocaine (LMX4) 4 % external cream Apply topically once as needed for mild pain.  Qty: 28 g, Refills: 3    Associated Diagnoses: Pain      methocarbamol (ROBAXIN) 500 MG tablet Take 1 tablet (500 mg) by mouth 4 times daily as needed for muscle spasms  Qty: 20 tablet, Refills: 0    Associated Diagnoses: Closed compression fracture of L5 lumbar vertebra, sequela      sucralfate (CARAFATE) 1 GM tablet Take 1 tablet (1 g) by mouth 4 times daily as needed for nausea.  Qty: 120 tablet, Refills: 3    Associated Diagnoses: Gastrointestinal hemorrhage with hematemesis           Allergies   Allergies   Allergen Reactions    Evista [Raloxifene] Other (See Comments)     hypercalcemia    Prednisone GI Disturbance    Combigan [Brimonidine Tartrate-Timolol] Other (See Comments)     Eye swelling and itchy    Cyclosporine      Pt reports using eye gtts and having red irritated eyes     Latex Rash    Levaquin [Levofloxacin] Muscle Pain (Myalgia)    Penicillins Rash     Data   Most Recent 3 CBC's:  Recent Labs   Lab Test 05/10/25  1203 05/10/25  0526 05/09/25  1805 05/08/25  1232 05/08/25  0513  05/07/25  2219 05/07/25  1635   WBC  --  8.8  --   --  8.1  --  10.0   HGB 10.8* 8.7* 9.5*   < > 7.8*   < > 9.6*   MCV  --  90  --   --  91  --  91   PLT  --  158  --   --  210  --  286    < > = values in this interval not displayed.      Most Recent 3 BMP's:  Recent Labs   Lab Test 05/10/25  0526 05/09/25  0545 05/08/25  0513 05/07/25  1635     --  137 134*   POTASSIUM 4.3 4.6 4.1 3.8   CHLORIDE 105  --  104 97*   CO2 23  --  20* 23   BUN 10.7  --  27.2* 15.9   CR 1.02*  --  1.12* 1.24*   ANIONGAP 10  --  13 14   BECKY 8.1*  --  8.4* 8.7*   GLC 92  --  80 184*     Most Recent 2 LFT's:  Recent Labs   Lab Test 05/07/25  1635 03/27/25  0952   AST 26 41   ALT 23 19   ALKPHOS 52 51   BILITOTAL 0.2 0.2     Most Recent INR's and Anticoagulation Dosing History:  Anticoagulation Dose History  More data exists         Latest Ref Rng & Units 9/20/2019 1/27/2020 2/19/2020 11/26/2023 2/8/2025 3/27/2025 5/7/2025   Recent Dosing and Labs   INR 0.85 - 1.15 1.30  1.11  0.96  1.02  1.05  1.01  0.99      Most Recent 3 Troponin's:  Recent Labs   Lab Test 07/13/21 2109 07/13/21  1916 01/27/20  0216 09/20/19  1550 05/13/19  0442   TROPI  --   --  0.031 <0.015 0.067*   TROPONIN <0.015 <0.015  --   --   --      Most Recent Cholesterol Panel:  Recent Labs   Lab Test 05/13/24  1605   CHOL 133   LDL 64   HDL 53   TRIG 80     Most Recent 6 Bacteria Isolates From Any Culture (See EPIC Reports for Culture Details):  Recent Labs   Lab Test 02/19/20  2355 01/27/20  0430 09/20/19 2000 05/12/19  0945 05/12/19  0555 12/31/18  2258   CULT No MRSA isolated No MRSA isolated No MRSA isolated No MRSA isolated Heavy growth  Normal respiratory ash    No Pathogens Isolated No growth after 6 days     Most Recent TSH, T4 and A1c Labs:  Recent Labs   Lab Test 03/28/25  0509 09/25/24  1508 05/13/24  1605   TSH 1.80   < > 5.14*   T4  --   --  1.06    < > = values in this interval not displayed.     Results for orders placed or performed during the  hospital encounter of 03/27/25   XR Chest 2 Views    Narrative    XR CHEST 2 VIEWS    HISTORY: 84 years Female possible hypoxia    COMPARISON: 11/26/2023    TECHNIQUE: 2 views of the chest were obtained.    FINDINGS: Lung volumes are high suggesting air trapping. There is an  old rib fracture deformity of the right lateral seventh rib.    Heart size and pulmonary vascularity is normal limits. Lungs are  clear.        Impression    IMPRESSION: High lung volumes suggesting air trapping. Lungs are  otherwise clear.    RAFFI LOPEZ MD         SYSTEM ID:  [id number]   CTA Abdomen Pelvis with Contrast    Narrative    Exam: CTA ABDOMEN PELVIS WITH CONTRAST    Exam reason: hematemesis - GI bleed protocol    Technique: Initial noncontrast images of the abdomen and pelvis were  obtained. Using helical CT technique, axial images of the abdomen and  pelvis were obtained with IV contrast in the late arterial and venous  phases.  Coronal and sagittal reconstructions also performed. This CT  was performed using one or more of the following dose reduction  techniques: automated exposure control, adjustment of the mA and/or kV  according to patient size, and/or use of iterative reconstruction  technique.    Meds/Contrast: ISOVUE 370  45mL    Comparison: 2/8/2025     FINDINGS:    ABDOMEN:    Liver: No mass or any significant abnormality.  Gallbladder: No calcified gallstones.   Bile Ducts: No biliary ductal dilation.   Spleen:  No splenomegaly.  Pancreas: No mass, ductal dilatation, or inflammatory changes.  Kidneys: There is atrophy of the left kidney. No enhancing mass. No  hydronephrosis.   Adrenals:  No nodules.   Lymph Nodes: No adenopathy.   Vascular: No aortic aneurysm. There is heavy calcified atherosclerosis  of the abdominal aorta and iliac arteries with associated stenosis.  Abdominal Wall: No acute findings.     PELVIS:   No mass or adenopathy.     Bowel/Mesentery/Peritoneum:   -No findings to suggest active GI  bleeding.  -No bowel obstruction.   -No acute inflammatory findings.  -No ascites.    Visualized portions of the Chest: No consolidation or pleural  effusion.  Musculoskeletal: No acute osseous abnormalities. Unchanged lumbar  compression fractures.      Impression    IMPRESSION:  No findings to suggest active GI bleed. No acute findings in the  abdomen or pelvis.    MOLLY SALGADO MD         SYSTEM ID:  [id number]

## 2025-05-10 NOTE — DISCHARGE INSTRUCTIONS
An appt with Dr. Troncoso will need to be made in the next 7 days. Please call scheduling on Monday 05/12/24 at [phone].

## 2025-05-11 DIAGNOSIS — Z95.5 S/P DRUG ELUTING CORONARY STENT PLACEMENT: ICD-10-CM

## 2025-05-11 DIAGNOSIS — E78.5 HYPERLIPIDEMIA, UNSPECIFIED HYPERLIPIDEMIA TYPE: ICD-10-CM

## 2025-05-12 ENCOUNTER — PATIENT OUTREACH (OUTPATIENT)
Dept: CARE COORDINATION | Facility: OTHER | Age: 85
End: 2025-05-12

## 2025-05-12 RX ORDER — ATORVASTATIN CALCIUM 40 MG/1
40 TABLET, FILM COATED ORAL AT BEDTIME
Qty: 90 TABLET | Refills: 1 | Status: SHIPPED | OUTPATIENT
Start: 2025-05-12

## 2025-05-12 NOTE — PROGRESS NOTES
Clinic Care Coordination Contact  Transitions of Care Outreach  Chief Complaint   Patient presents with    Clinic Care Coordination - Post Hospital       Most Recent Admission Date: 5/7/2025   Most Recent Admission Diagnosis: Hematemesis with nausea - K92.0     Most Recent Discharge Date: 5/10/2025   Most Recent Discharge Diagnosis: Hematemesis with nausea - K92.0  Hematemesis, unspecified whether nausea present - K92.0  Gastrointestinal hemorrhage with hematemesis - K92.0     Transitions of Care Assessment    Discharge Assessment  How are you doing now that you are home?: Patient report she is feeling good. She said she has never had any symptoms from her high blood pressures.  How are your symptoms? (Red Flag symptoms escalate to triage hotline per guidelines): Improved  Do you know how to contact your clinic care team if you have future questions or changes to your health status? : Yes  Does the patient have their discharge instructions? : Yes  Does the patient have questions regarding their discharge instructions? : No  Were you started on any new medications or were there changes to any of your previous medications? : No  Does the patient have all of their medications?: Yes  Do you have questions regarding any of your medications? : No              Care Management   TCM complete    Care Mgmt General Assessment                         Follow up Plan     Discharge Follow-Up  Discharge follow up appointment scheduled in alignment with recommended follow up timeframe or Transitions of Risk Category? (Low = within 30 days; Moderate= within 14 days; High= within 7 days): No  Patient's follow up appointment not scheduled: Patient accepted scheduling support. Appt scheduled/requested per protocol.    Future Appointments   Date Time Provider Department Center   7/22/2025  2:30 PM Ophelia Troncoso MD [facility]   4/20/2026  1:00 PM Sal Graham, DO [facility]       Outpatient Plan as outlined on  AVS reviewed with patient.    For any urgent concerns, please contact our 24 hour nurse triage line: [phone] ([phone])       Kimberly J Boecker, RN

## 2025-05-13 ENCOUNTER — TELEPHONE (OUTPATIENT)
Dept: FAMILY MEDICINE | Facility: OTHER | Age: 85
End: 2025-05-13

## 2025-05-13 NOTE — TELEPHONE ENCOUNTER
Attempt # 1  Outcome: Talked with Patient    Comment: Pt is firm on seeing Dr. Troncoso or Maritza Amaro. Politlely refused covering providers. Overbook request sent to Dr. Troncoso and Maritza. Will be contacting the patient again.

## 2025-05-13 NOTE — TELEPHONE ENCOUNTER
Attempt # 2  Outcome: Talked with Patient    Comment: Pt declined morning appt. Said she can't make it except in the afternoon.

## 2025-05-13 NOTE — TELEPHONE ENCOUNTER
Pt needs a hospital stay follow up appt. She specifically requested either Dr. Troncoso or Maritza Amaro and politely declined covering providers. Could either of you fit her in sometime?

## 2025-05-14 NOTE — TELEPHONE ENCOUNTER
Attempt # 2  Outcome: Talked with Patient    Comment: Pt scheduled Mon 5/19 at 4:30, but told to check in at 1:45

## 2025-05-19 ENCOUNTER — RESULTS FOLLOW-UP (OUTPATIENT)
Dept: FAMILY MEDICINE | Facility: OTHER | Age: 85
End: 2025-05-19

## 2025-05-19 ENCOUNTER — OFFICE VISIT (OUTPATIENT)
Dept: FAMILY MEDICINE | Facility: OTHER | Age: 85
End: 2025-05-19
Payer: COMMERCIAL

## 2025-05-19 VITALS
TEMPERATURE: 97.2 F | WEIGHT: 93.6 LBS | HEIGHT: 60 IN | BODY MASS INDEX: 18.38 KG/M2 | DIASTOLIC BLOOD PRESSURE: 60 MMHG | SYSTOLIC BLOOD PRESSURE: 130 MMHG | RESPIRATION RATE: 18 BRPM | OXYGEN SATURATION: 93 % | HEART RATE: 69 BPM

## 2025-05-19 DIAGNOSIS — I10 ESSENTIAL HYPERTENSION: ICD-10-CM

## 2025-05-19 DIAGNOSIS — Z09 HOSPITAL DISCHARGE FOLLOW-UP: Primary | ICD-10-CM

## 2025-05-19 DIAGNOSIS — N18.4 CKD (CHRONIC KIDNEY DISEASE) STAGE 4, GFR 15-29 ML/MIN (H): ICD-10-CM

## 2025-05-19 DIAGNOSIS — D50.9 IRON DEFICIENCY ANEMIA, UNSPECIFIED IRON DEFICIENCY ANEMIA TYPE: ICD-10-CM

## 2025-05-19 DIAGNOSIS — Z87.19 HISTORY OF GI BLEED: ICD-10-CM

## 2025-05-19 LAB
ALBUMIN SERPL BCG-MCNC: 3.9 G/DL (ref 3.5–5.2)
ALP SERPL-CCNC: 59 U/L (ref 40–150)
ALT SERPL W P-5'-P-CCNC: 19 U/L (ref 0–50)
ANION GAP SERPL CALCULATED.3IONS-SCNC: 12 MMOL/L (ref 7–15)
AST SERPL W P-5'-P-CCNC: 22 U/L (ref 0–45)
BASOPHILS # BLD AUTO: 0.1 10E3/UL (ref 0–0.2)
BASOPHILS NFR BLD AUTO: 1 %
BILIRUB SERPL-MCNC: 0.2 MG/DL
BUN SERPL-MCNC: 15.5 MG/DL (ref 8–23)
CALCIUM SERPL-MCNC: 9.4 MG/DL (ref 8.8–10.4)
CHLORIDE SERPL-SCNC: 102 MMOL/L (ref 98–107)
CREAT SERPL-MCNC: 1.29 MG/DL (ref 0.51–0.95)
EGFRCR SERPLBLD CKD-EPI 2021: 41 ML/MIN/1.73M2
EOSINOPHIL # BLD AUTO: 0.3 10E3/UL (ref 0–0.7)
EOSINOPHIL NFR BLD AUTO: 3 %
ERYTHROCYTE [DISTWIDTH] IN BLOOD BY AUTOMATED COUNT: 13.4 % (ref 10–15)
GLUCOSE SERPL-MCNC: 108 MG/DL (ref 70–99)
HCO3 SERPL-SCNC: 23 MMOL/L (ref 22–29)
HCT VFR BLD AUTO: 34.3 % (ref 35–47)
HGB BLD-MCNC: 11.1 G/DL (ref 11.7–15.7)
IMM GRANULOCYTES # BLD: 0 10E3/UL
IMM GRANULOCYTES NFR BLD: 0 %
LYMPHOCYTES # BLD AUTO: 1.6 10E3/UL (ref 0.8–5.3)
LYMPHOCYTES NFR BLD AUTO: 15 %
MCH RBC QN AUTO: 29.4 PG (ref 26.5–33)
MCHC RBC AUTO-ENTMCNC: 32.4 G/DL (ref 31.5–36.5)
MCV RBC AUTO: 91 FL (ref 78–100)
MONOCYTES # BLD AUTO: 0.7 10E3/UL (ref 0–1.3)
MONOCYTES NFR BLD AUTO: 7 %
NEUTROPHILS # BLD AUTO: 7.7 10E3/UL (ref 1.6–8.3)
NEUTROPHILS NFR BLD AUTO: 74 %
NRBC # BLD AUTO: 0 10E3/UL
NRBC BLD AUTO-RTO: 0 /100
PLATELET # BLD AUTO: 331 10E3/UL (ref 150–450)
POTASSIUM SERPL-SCNC: 3.9 MMOL/L (ref 3.4–5.3)
PROT SERPL-MCNC: 6.7 G/DL (ref 6.4–8.3)
RBC # BLD AUTO: 3.77 10E6/UL (ref 3.8–5.2)
SODIUM SERPL-SCNC: 137 MMOL/L (ref 135–145)
WBC # BLD AUTO: 10.4 10E3/UL (ref 4–11)

## 2025-05-19 PROCEDURE — 84155 ASSAY OF PROTEIN SERUM: CPT | Mod: ZL

## 2025-05-19 PROCEDURE — 36415 COLL VENOUS BLD VENIPUNCTURE: CPT | Mod: ZL

## 2025-05-19 PROCEDURE — G0463 HOSPITAL OUTPT CLINIC VISIT: HCPCS

## 2025-05-19 PROCEDURE — 99495 TRANSJ CARE MGMT MOD F2F 14D: CPT

## 2025-05-19 PROCEDURE — 85004 AUTOMATED DIFF WBC COUNT: CPT | Mod: ZL

## 2025-05-19 NOTE — PROGRESS NOTES
Assessment & Plan     Hospital discharge follow-up/History of GI bleed  Overall stable since discharge. Hgb stable today at 11.1. Continue PPI BID, carafate (typically using BID prn). Will plan to repeat CBC again in 2 weeks. Monitor for dark/bloody stools, N/V, worsening shortness of breath, abdominal pain etc. Patient and family mention plan for GI in [place] if this reoccurs.   - Comprehensive metabolic panel (BMP + Alb, Alk Phos, ALT, AST, Total. Bili, TP)  - CBC with platelets and differential    CKD (chronic kidney disease) stage 4, GFR 15-29 ml/min (H)  Repeat CMP pending.     Essential hypertension  Initial elevated with repeat controlled.         MED REC REQUIRED  Post Medication Reconciliation Status:  Discharge medications reconciled, continue medications without change      Joann London is a 84 year old, presenting for the following health issues:  Hospital F/U        5/19/2025     1:51 PM   Additional Questions   Roomed by judith VICENTE          5/12/2025   Post Discharge Outreach   How are you doing now that you are home? Patient report she is feeling good. She said she has never had any symptoms from her high blood pressures.   How are your symptoms? (Red Flag symptoms escalate to triage hotline per guidelines) Improved   Does the patient have their discharge instructions?  Yes   Does the patient have questions regarding their discharge instructions?  No   Were you started on any new medications or were there changes to any of your previous medications?  No   Does the patient have all of their medications? Yes   Do you have questions regarding any of your medications?  No       Hospital Follow-up Visit:    Hospital/Nursing Home/IP Rehab Facility: [facility]  Most Recent Admission Date: 5/7/2025   Most Recent Admission Diagnosis: Hematemesis with nausea - K92.0     Most Recent Discharge Date: 5/10/2025   Most Recent Discharge Diagnosis: Hematemesis with nausea -  K92.0  Hematemesis, unspecified whether nausea present - K92.0  Gastrointestinal hemorrhage with hematemesis - K92.0   Was the patient in the ICU or did the patient experience delirium during hospitalization?  No  Do you have any other stressors you would like to discuss with your provider? No    Problems taking medications regularly:  None  Medication changes since discharge: None  Problems adhering to non-medication therapy:  None    Summary of hospitalization:  [facility] discharge summary reviewed  Diagnostic Tests/Treatments reviewed.  Follow up needed: CBC  Other Healthcare Providers Involved in Patient s Care:         None  Update since discharge: stable.       Plan of care communicated with patient and family     Admitted 5/7 to 5/10 for GI bleed. Upper endoscopy on 5/8 showed multiple superficial ulcerations with some bleeding, clip placed at one site and others cauterized. Hemoglobin drifted to 6.8, 1 unit PRBC given on 5/9. Repeat scope showed ulcerations and were cauterized. Discharged home on 5/10. Hgb of 10.8 on discharge.    Drinking ensure shakes once per day.         Review of Systems  Constitutional, neuro, ENT, endocrine, pulmonary, cardiac, gastrointestinal, genitourinary, musculoskeletal, integument and psychiatric systems are negative, except as otherwise noted.      Objective    BP (!) 165/62 (BP Location: Right arm, Patient Position: Sitting, Cuff Size: Adult Regular)   Pulse 69   Temp 97.2  F (36.2  C) (Tympanic)   Resp 18   Ht 1.524 m (5')   Wt 42.5 kg (93 lb 9.6 oz)   SpO2 93%   BMI 18.28 kg/m    Body mass index is 18.28 kg/m .    Physical Exam   GENERAL: alert and no distress  EYES: Eyes grossly normal to inspection, PERRL and conjunctivae and sclerae normal  HENT: ear canals and TM's normal, nose and mouth without ulcers or lesions  NECK: no adenopathy, no asymmetry, masses, or scars  RESP: lungs clear to auscultation - no rales, rhonchi or wheezes  CV: regular  rate and rhythm, normal S1 S2, no S3 or S4, no murmur, click or rub, no peripheral edema  ABDOMEN: soft, nontender, no hepatosplenomegaly, no masses and bowel sounds normal  MS: no gross musculoskeletal defects noted, no edema  SKIN: no suspicious lesions or rashes  NEURO: Normal strength and tone, mentation intact and speech normal  PSYCH: mentation appears normal, affect normal/bright        Signed Electronically by: COCO Ta CNP

## 2025-06-05 ENCOUNTER — LAB (OUTPATIENT)
Dept: LAB | Facility: OTHER | Age: 85
End: 2025-06-05
Payer: COMMERCIAL

## 2025-06-05 DIAGNOSIS — Z87.19 HISTORY OF GI BLEED: ICD-10-CM

## 2025-06-05 DIAGNOSIS — D50.9 IRON DEFICIENCY ANEMIA, UNSPECIFIED IRON DEFICIENCY ANEMIA TYPE: ICD-10-CM

## 2025-06-05 LAB
BASOPHILS # BLD AUTO: 0.1 10E3/UL (ref 0–0.2)
BASOPHILS NFR BLD AUTO: 0 %
EOSINOPHIL # BLD AUTO: 0.5 10E3/UL (ref 0–0.7)
EOSINOPHIL NFR BLD AUTO: 4 %
ERYTHROCYTE [DISTWIDTH] IN BLOOD BY AUTOMATED COUNT: 13 % (ref 10–15)
FERRITIN SERPL-MCNC: 64 NG/ML (ref 11–328)
HCT VFR BLD AUTO: 35 % (ref 35–47)
HGB BLD-MCNC: 12.3 G/DL (ref 11.7–15.7)
IMM GRANULOCYTES # BLD: 0 10E3/UL
IMM GRANULOCYTES NFR BLD: 0 %
IRON BINDING CAPACITY (ROCHE): 318 UG/DL (ref 240–430)
IRON SATN MFR SERPL: 14 % (ref 15–46)
IRON SERPL-MCNC: 46 UG/DL (ref 37–145)
LYMPHOCYTES # BLD AUTO: 1.7 10E3/UL (ref 0.8–5.3)
LYMPHOCYTES NFR BLD AUTO: 15 %
MCH RBC QN AUTO: 29.2 PG (ref 26.5–33)
MCHC RBC AUTO-ENTMCNC: 35.1 G/DL (ref 31.5–36.5)
MCV RBC AUTO: 83 FL (ref 78–100)
MONOCYTES # BLD AUTO: 0.8 10E3/UL (ref 0–1.3)
MONOCYTES NFR BLD AUTO: 7 %
NEUTROPHILS # BLD AUTO: 8.2 10E3/UL (ref 1.6–8.3)
NEUTROPHILS NFR BLD AUTO: 73 %
NRBC # BLD AUTO: 0 10E3/UL
NRBC BLD AUTO-RTO: 0 /100
PLATELET # BLD AUTO: 318 10E3/UL (ref 150–450)
RBC # BLD AUTO: 4.21 10E6/UL (ref 3.8–5.2)
WBC # BLD AUTO: 11.2 10E3/UL (ref 4–11)

## 2025-06-05 PROCEDURE — 83550 IRON BINDING TEST: CPT | Mod: ZL

## 2025-06-05 PROCEDURE — 82728 ASSAY OF FERRITIN: CPT | Mod: ZL

## 2025-06-05 PROCEDURE — 36415 COLL VENOUS BLD VENIPUNCTURE: CPT | Mod: ZL

## 2025-06-05 PROCEDURE — 85048 AUTOMATED LEUKOCYTE COUNT: CPT | Mod: ZL

## 2025-06-05 PROCEDURE — 85025 COMPLETE CBC W/AUTO DIFF WBC: CPT | Mod: ZL

## 2025-06-05 PROCEDURE — 83540 ASSAY OF IRON: CPT | Mod: ZL

## 2025-06-09 ENCOUNTER — APPOINTMENT (OUTPATIENT)
Dept: CT IMAGING | Facility: HOSPITAL | Age: 85
End: 2025-06-09
Attending: FAMILY MEDICINE
Payer: COMMERCIAL

## 2025-06-09 ENCOUNTER — HOSPITAL ENCOUNTER (EMERGENCY)
Facility: HOSPITAL | Age: 85
Discharge: OTHER INSTITUTION | End: 2025-06-09
Attending: FAMILY MEDICINE
Payer: COMMERCIAL

## 2025-06-09 ENCOUNTER — TELEPHONE (OUTPATIENT)
Dept: FAMILY MEDICINE | Facility: OTHER | Age: 85
End: 2025-06-09

## 2025-06-09 ENCOUNTER — TELEPHONE (OUTPATIENT)
Dept: CARDIOLOGY | Facility: OTHER | Age: 85
End: 2025-06-09

## 2025-06-09 VITALS
RESPIRATION RATE: 16 BRPM | HEART RATE: 64 BPM | DIASTOLIC BLOOD PRESSURE: 47 MMHG | TEMPERATURE: 97.4 F | OXYGEN SATURATION: 94 % | SYSTOLIC BLOOD PRESSURE: 123 MMHG | HEIGHT: 60 IN | BODY MASS INDEX: 17.92 KG/M2 | WEIGHT: 91.3 LBS

## 2025-06-09 DIAGNOSIS — S22.49XA MULTIPLE RIB FRACTURES INVOLVING FOUR OR MORE RIBS: ICD-10-CM

## 2025-06-09 DIAGNOSIS — R29.6 FALLS FREQUENTLY: ICD-10-CM

## 2025-06-09 DIAGNOSIS — E87.1 HYPONATREMIA: ICD-10-CM

## 2025-06-09 DIAGNOSIS — I31.39 PERICARDIAL EFFUSION: ICD-10-CM

## 2025-06-09 DIAGNOSIS — E87.6 HYPOKALEMIA: ICD-10-CM

## 2025-06-09 LAB
ALBUMIN UR-MCNC: NEGATIVE MG/DL
ANION GAP SERPL CALCULATED.3IONS-SCNC: 14 MMOL/L (ref 7–15)
APPEARANCE UR: CLEAR
BASOPHILS # BLD AUTO: 0 10E3/UL (ref 0–0.2)
BASOPHILS NFR BLD AUTO: 0 %
BILIRUB UR QL STRIP: NEGATIVE
BUN SERPL-MCNC: 41 MG/DL (ref 8–23)
CALCIUM SERPL-MCNC: 8.5 MG/DL (ref 8.8–10.4)
CHLORIDE SERPL-SCNC: 90 MMOL/L (ref 98–107)
COLOR UR AUTO: NORMAL
CREAT SERPL-MCNC: 1.5 MG/DL (ref 0.51–0.95)
EGFRCR SERPLBLD CKD-EPI 2021: 34 ML/MIN/1.73M2
EOSINOPHIL # BLD AUTO: 0 10E3/UL (ref 0–0.7)
EOSINOPHIL NFR BLD AUTO: 0 %
ERYTHROCYTE [DISTWIDTH] IN BLOOD BY AUTOMATED COUNT: 13.3 % (ref 10–15)
GLUCOSE SERPL-MCNC: 142 MG/DL (ref 70–99)
GLUCOSE UR STRIP-MCNC: NEGATIVE MG/DL
HCO3 SERPL-SCNC: 20 MMOL/L (ref 22–29)
HCT VFR BLD AUTO: 32.1 % (ref 35–47)
HGB BLD-MCNC: 11 G/DL (ref 11.7–15.7)
HGB UR QL STRIP: NEGATIVE
HOLD SPECIMEN: NORMAL
IMM GRANULOCYTES # BLD: 0.1 10E3/UL
IMM GRANULOCYTES NFR BLD: 1 %
KETONES UR STRIP-MCNC: NEGATIVE MG/DL
LEUKOCYTE ESTERASE UR QL STRIP: NEGATIVE
LYMPHOCYTES # BLD AUTO: 1 10E3/UL (ref 0.8–5.3)
LYMPHOCYTES NFR BLD AUTO: 6 %
MCH RBC QN AUTO: 29.4 PG (ref 26.5–33)
MCHC RBC AUTO-ENTMCNC: 34.3 G/DL (ref 31.5–36.5)
MCV RBC AUTO: 86 FL (ref 78–100)
MONOCYTES # BLD AUTO: 0.8 10E3/UL (ref 0–1.3)
MONOCYTES NFR BLD AUTO: 5 %
NEUTROPHILS # BLD AUTO: 13.6 10E3/UL (ref 1.6–8.3)
NEUTROPHILS NFR BLD AUTO: 88 %
NITRATE UR QL: NEGATIVE
NRBC # BLD AUTO: 0 10E3/UL
NRBC BLD AUTO-RTO: 0 /100
PH UR STRIP: 5.5 [PH] (ref 4.7–8)
PLATELET # BLD AUTO: 219 10E3/UL (ref 150–450)
POTASSIUM SERPL-SCNC: 2.6 MMOL/L (ref 3.4–5.3)
RBC # BLD AUTO: 3.74 10E6/UL (ref 3.8–5.2)
RBC URINE: 0 /HPF
SODIUM SERPL-SCNC: 124 MMOL/L (ref 135–145)
SP GR UR STRIP: 1.01 (ref 1–1.03)
SQUAMOUS EPITHELIAL: 0 /HPF
TROPONIN T SERPL HS-MCNC: 62 NG/L
UROBILINOGEN UR STRIP-MCNC: NORMAL MG/DL
WBC # BLD AUTO: 15.5 10E3/UL (ref 4–11)
WBC URINE: 2 /HPF

## 2025-06-09 PROCEDURE — 99285 EMERGENCY DEPT VISIT HI MDM: CPT | Performed by: FAMILY MEDICINE

## 2025-06-09 PROCEDURE — 93005 ELECTROCARDIOGRAM TRACING: CPT

## 2025-06-09 PROCEDURE — 80048 BASIC METABOLIC PNL TOTAL CA: CPT | Performed by: FAMILY MEDICINE

## 2025-06-09 PROCEDURE — 36415 COLL VENOUS BLD VENIPUNCTURE: CPT | Performed by: FAMILY MEDICINE

## 2025-06-09 PROCEDURE — 99285 EMERGENCY DEPT VISIT HI MDM: CPT | Mod: 25

## 2025-06-09 PROCEDURE — 72125 CT NECK SPINE W/O DYE: CPT | Mod: 26 | Performed by: RADIOLOGY

## 2025-06-09 PROCEDURE — 71250 CT THORAX DX C-: CPT

## 2025-06-09 PROCEDURE — 72125 CT NECK SPINE W/O DYE: CPT

## 2025-06-09 PROCEDURE — 250N000013 HC RX MED GY IP 250 OP 250 PS 637: Performed by: FAMILY MEDICINE

## 2025-06-09 PROCEDURE — 70450 CT HEAD/BRAIN W/O DYE: CPT

## 2025-06-09 PROCEDURE — 81001 URINALYSIS AUTO W/SCOPE: CPT | Performed by: FAMILY MEDICINE

## 2025-06-09 PROCEDURE — 70450 CT HEAD/BRAIN W/O DYE: CPT | Mod: 26 | Performed by: RADIOLOGY

## 2025-06-09 PROCEDURE — 84484 ASSAY OF TROPONIN QUANT: CPT | Performed by: FAMILY MEDICINE

## 2025-06-09 PROCEDURE — 85025 COMPLETE CBC W/AUTO DIFF WBC: CPT | Performed by: FAMILY MEDICINE

## 2025-06-09 PROCEDURE — 93010 ELECTROCARDIOGRAM REPORT: CPT | Performed by: INTERNAL MEDICINE

## 2025-06-09 PROCEDURE — 71250 CT THORAX DX C-: CPT | Mod: 26 | Performed by: RADIOLOGY

## 2025-06-09 RX ORDER — POTASSIUM CHLORIDE 1.5 G/1.58G
40 POWDER, FOR SOLUTION ORAL ONCE
Status: COMPLETED | OUTPATIENT
Start: 2025-06-09 | End: 2025-06-09

## 2025-06-09 RX ORDER — ACETAMINOPHEN 325 MG/1
975 TABLET ORAL ONCE
Status: COMPLETED | OUTPATIENT
Start: 2025-06-09 | End: 2025-06-09

## 2025-06-09 RX ORDER — POTASSIUM CHLORIDE 1.5 G/1.58G
20 POWDER, FOR SOLUTION ORAL ONCE
Status: COMPLETED | OUTPATIENT
Start: 2025-06-09 | End: 2025-06-09

## 2025-06-09 RX ADMIN — POTASSIUM CHLORIDE 40 MEQ: 1.5 POWDER, FOR SOLUTION ORAL at 17:32

## 2025-06-09 RX ADMIN — POTASSIUM CHLORIDE 20 MEQ: 1.5 POWDER, FOR SOLUTION ORAL at 19:16

## 2025-06-09 RX ADMIN — ACETAMINOPHEN 975 MG: 325 TABLET, FILM COATED ORAL at 19:16

## 2025-06-09 ASSESSMENT — ENCOUNTER SYMPTOMS
POLYPHAGIA: 0
SORE THROAT: 0
POLYDIPSIA: 0
FALLS: 1
SHORTNESS OF BREATH: 1
DIARRHEA: 1
COUGH: 0
EYE REDNESS: 0
VOMITING: 0
APPETITE CHANGE: 0
MYALGIAS: 0
ACTIVITY CHANGE: 0
FEVER: 0
DIZZINESS: 0
HEADACHES: 0
HEMATURIA: 0
LIGHT-HEADEDNESS: 1
NAUSEA: 0
ARTHRALGIAS: 0
ABDOMINAL PAIN: 1
NECK STIFFNESS: 0
FATIGUE: 1
DYSURIA: 0
RHINORRHEA: 0
CHILLS: 0

## 2025-06-09 ASSESSMENT — ACTIVITIES OF DAILY LIVING (ADL)
ADLS_ACUITY_SCORE: 59

## 2025-06-09 ASSESSMENT — COLUMBIA-SUICIDE SEVERITY RATING SCALE - C-SSRS
2. HAVE YOU ACTUALLY HAD ANY THOUGHTS OF KILLING YOURSELF IN THE PAST MONTH?: NO
1. IN THE PAST MONTH, HAVE YOU WISHED YOU WERE DEAD OR WISHED YOU COULD GO TO SLEEP AND NOT WAKE UP?: NO

## 2025-06-09 NOTE — TELEPHONE ENCOUNTER
Son and Daughter in law are calling stating patient is having low BP's has fallen several times. Patient is having rib pain.  RN for  family practice come to report she is in contact with them also. Reports per family BPs 88/43, 98/59. Has had a recent hospital admission for a GI bleed with EGD. Please review and advise. There is a message out to PCP though Dr Troncoso is out today.

## 2025-06-09 NOTE — TELEPHONE ENCOUNTER
Symptom or reason needing to speak to RN: Patient has been dizzy and falling twice in past week.     Best number to return call: Call Zack altamirano (Consent to communicate on file)  [phone]     Best time to return call: Anytime

## 2025-06-09 NOTE — TELEPHONE ENCOUNTER
Spoke with daughter in law and son. Did advised patient should go to the ER per Dr Graham. Per Daughter in law and Son whom are on speaker phone. They were told by PCP not to go to the ER for these things and PCP will fit them in. Advised I do not have any documentation of this I can find and can only advise per Dr Graham instructions. Did schedule an appt for next week to follow up with Dr Graham if PCP doesn't feel this is needed they may cancel. They are declining advise to go to ER at this time by Dr Graham.

## 2025-06-09 NOTE — ED TRIAGE NOTES
Pt arrived by [facility]. Pt fell on Thursday. Has had some weakness. Living home alone. Family reported pt had some hypotension today.      Triage Assessment (Adult)       Row Name 06/09/25 1545          Triage Assessment    Airway WDL WDL

## 2025-06-09 NOTE — TELEPHONE ENCOUNTER
Patients family called me on speaker phone to discuss concerns with dizziness and multiple falls. RN spoke with Dr Graham nurse to see if we can squeeze patient in. She will discuss with Dr Graham and call patient family to give them update. If not able to get in with [facility]. RN to get approval to add patient to Dr Willis tomorrow with Dr Troncoso.

## 2025-06-09 NOTE — ED NOTES
STAT Doc called to state patient has been accepted for ER to ER transfer by Dr. George.    Report #  [phone]

## 2025-06-09 NOTE — ED PROVIDER NOTES
History     Chief Complaint   Patient presents with    Generalized Weakness     The history is provided by the patient, medical records and the EMS personnel.   Generalized Weakness  Severity:  Moderate  Onset quality:  Gradual  Timing:  Constant  Progression:  Unchanged  Chronicity:  Recurrent  Context: change in medication    Relieved by:  Nothing  Exacerbated by: Activity.  Associated symptoms: abdominal pain, diarrhea, falls (She reports falling on Thursday followed by another fall yesterday.  Thursday's fall was witnessed.) and shortness of breath (History of COPD)    Associated symptoms: no arthralgias, no chest pain, no cough, no dizziness, no dysuria, no fever, no headaches, no melena, no myalgias, no nausea and no vomiting    Abdominal pain:     Location:  Generalized    Quality: dull      Severity:  Mild    Onset quality:  Unable to specify    Timing:  Constant    Progression:  Unchanged    Chronicity:  Recurrent  Diarrhea:     Quality:  Semi-solid    Severity:  Mild    Duration:  4 days    Timing:  Intermittent    Progression:  Unchanged  Risk factors: anemia, congestive heart failure, coronary artery disease and new medications (Recent increase in carvedilol dosing)          Allergies:  Allergies   Allergen Reactions    Evista [Raloxifene] Other (See Comments)     hypercalcemia    Prednisone GI Disturbance    Combigan [Brimonidine Tartrate-Timolol] Other (See Comments)     Eye swelling and itchy    Cyclosporine      Pt reports using eye gtts and having red irritated eyes     Latex Rash    Levaquin [Levofloxacin] Muscle Pain (Myalgia)    Penicillins Rash       Problem List:    Patient Active Problem List    Diagnosis Date Noted    Hematemesis with nausea 05/07/2025     Priority: Medium    Gastritis 05/07/2025     Priority: Medium    Hematemesis 03/27/2025     Priority: Medium    Gastrointestinal hemorrhage with hematemesis 03/27/2025     Priority: Medium    GI bleed 02/08/2025     Priority: Medium     Leukocytosis 02/08/2025     Priority: Medium    Anemia 11/26/2023     Priority: Medium    Elevated brain natriuretic peptide (BNP) level 11/26/2023     Priority: Medium    COPD, severe (H) 09/06/2023     Priority: Medium    Chest pain, unspecified type 02/16/2023     Priority: Medium    Coronary artery disease involving native coronary artery of native heart without angina pectoris 03/14/2022     Priority: Medium    Abnormal cardiovascular stress test on 6/14/2019 03/14/2022     Priority: Medium    History of CVA on 1/1/2019 03/14/2022     Priority: Medium    Postoperative atrial fibrillation on 6/28/2019 (H) 03/14/2022     Priority: Medium    History of cardioversion on 6/28/2019 03/14/2022     Priority: Medium    PAD on 2/28/2020-moderate (H) 03/14/2022     Priority: Medium    Bilateral carotid artery stenosis 03/14/2022     Priority: Medium    History of tobacco abuse quitting on 1/1/2019 03/14/2022     Priority: Medium    Tobacco abuse counseling 03/14/2022     Priority: Medium    CKD (chronic kidney disease) stage 4, GFR 15-29 ml/min (H) 01/26/2022     Priority: Medium    Subclavian arterial stenosis 01/26/2022     Priority: Medium    Iron deficiency anemia secondary to inadequate dietary iron intake 01/07/2022     Priority: Medium    Weight loss 02/05/2020     Priority: Medium    History of GI bleed 09/20/2019     Priority: Medium    Protein-calorie malnutrition 09/04/2019     Priority: Medium    History of coronary artery stent placement to the O/M LCX on 6/28/2019 at [facility] 07/23/2019     Priority: Medium    Abnormal stress test 06/18/2019     Priority: Medium     Added automatically from request for surgery [id number]      MILLS (dyspnea on exertion) 06/18/2019     Priority: Medium     Added automatically from request for surgery [id number]      ASCVD (arteriosclerotic cardiovascular disease) 06/18/2019     Priority: Medium     Added automatically from request for surgery [id number]      Mixed hyperlipidemia  06/18/2019     Priority: Medium     Added automatically from request for surgery [id number]      Centrilobular emphysema (H) 05/12/2019     Priority: Medium    Acquired hypothyroidism 05/12/2019     Priority: Medium    Chronic systolic congestive heart failure (H)- Recovered 05/12/2019     Priority: Medium    Benign essential hypertension 05/12/2019     Priority: Medium    Osteopenia 05/12/2019     Priority: Medium    Non-rheumatic mitral regurgitation 05/12/2019     Priority: Medium    Primary hyperparathyroidism 09/05/2013     Priority: Medium    Chronic rhinitis 08/16/2013     Priority: Medium        Past Medical History:    Past Medical History:   Diagnosis Date    Acquired hypothyroidism 5/12/2019    Asthma     Benign essential hypertension 5/12/2019    Centrilobular emphysema (H) 5/12/2019    Chronic rhinitis 8/16/2013    Chronic systolic congestive heart failure (H) 5/12/2019    Constipation     Coronary artery disease     Hearing loss     Heart trouble     Hemorrhagic cerebrovascular accident (CVA) (H) 01/2019    Hyperlipidemia 5/12/2019    Hypertension     Nasal congestion     Non-rheumatic mitral regurgitation 5/12/2019    Osteopenia 5/12/2019    Osteoporosis     Parathyroid related hypercalcemia 8/18/2013    Primary hyperparathyroidism 9/5/2013    Ringing in ears     Sensorineural hearing loss, asymmetrical 8/16/2013    Sneezing     Snoring     Stented coronary artery     Thyroid disease     Weakness     Weight loss        Past Surgical History:    Past Surgical History:   Procedure Laterality Date    CATARACT IOL, RT/LT Bilateral     COLONOSCOPY      COLONOSCOPY - HIM SCAN  01/01/2009    Colonoscopy - [facility]/[place]  2009    COMBINED ESOPHAGOSCOPY, GASTROSCOPY, DUODENOSCOPY (EGD) WITH ARGON PLASMA COAGULATOR (APC) N/A 5/8/2025    Procedure: Combined Esophagoscopy, Gastroscopy, Duodenoscopy (Egd) With Argon Plasma Coagulator (Apc), Resolution Clip Application;  Surgeon: Isrrael Parish MD;  Location: [facility] OR     ENDOSCOPY UPPER, COLONOSCOPY, COMBINED N/A 10/17/2019    Procedure: UPPER ENDOSCOPY WITH BIOPSY  AND COLONOSCOPY;  Surgeon: Julio Canales MD;  Location: [facility] OR    ENT SURGERY      para thyroid surgery    ENT SURGERY  2013    Parathyroid    ESOPHAGOSCOPY, GASTROSCOPY, DUODENOSCOPY (EGD), COMBINED N/A 2019    Procedure: ESOPHAGOGASTRODUODENOSCOPY (EGD);  Surgeon: Julio Canales MD;  Location: [facility] OR    ESOPHAGOSCOPY, GASTROSCOPY, DUODENOSCOPY (EGD), COMBINED N/A 2020    Procedure: ESOPHAGOGASTRODUODENOSCOPY (EGD) WITH BIOPSY;  Surgeon: Isrrael Parish MD;  Location: [facility]    ESOPHAGOSCOPY, GASTROSCOPY, DUODENOSCOPY (EGD), COMBINED N/A 2020    Procedure: ESOPHAGOGASTRODUODENOSCOPY (EGD) WITH BIOPSY;  Surgeon: Pedro Luis Montoya DO;  Location: [facility]    ESOPHAGOSCOPY, GASTROSCOPY, DUODENOSCOPY (EGD), COMBINED N/A 3/28/2025    Procedure: ESOPHAGOGASTRODUODENOSCOPY with biopsy;  Surgeon: Eliel Christianson MD;  Location: [facility]    PARATHYROIDECTOMY  9/10/2013    Procedure: PARATHYROIDECTOMY;  Reoperative Neck Exploration, Resection of right Parathyroid adenoma;  Surgeon: Thalia Muller MD;  Location: [facility] OR    TONSILLECTOMY      TUBAL LIGATION         Family History:    Family History   Problem Relation Age of Onset    Hearing Loss Mother     Heart Disease Mother     Myocardial Infarction Mother     Cerebrovascular Disease Father     Cancer Brother         throat    Cancer Sister     Myocardial Infarction Sister     Cancer Maternal Grandmother     Heart Disease Maternal Grandfather     Aortic aneurysm Son        Social History:  Marital Status:   [5]  Social History     Tobacco Use    Smoking status: Former     Current packs/day: 0.00     Average packs/day: 1 pack/day for 50.0 years (50.0 ttl pk-yrs)     Types: Cigarettes     Start date: 1969     Quit date: 2019     Years since quittin.4     Passive exposure: Past    Smokeless tobacco: Never    Tobacco  comments:     quit Jan 2019   Vaping Use    Vaping status: Never Used   Substance Use Topics    Alcohol use: Yes     Comment: social    Drug use: No        Medications:    acetaminophen (TYLENOL) 325 MG tablet  aspirin (ASA) 81 MG chewable tablet  atorvastatin (LIPITOR) 40 MG tablet  calcium carbonate (TUMS) 500 MG chewable tablet  carvedilol (COREG) 25 MG tablet  fluticasone (FLONASE) 50 MCG/ACT spray  Fluticasone-Umeclidin-Vilanterol (TRELEGY ELLIPTA) 200-62.5-25 MCG/ACT oral inhaler  furosemide (LASIX) 20 MG tablet  gabapentin (NEURONTIN) 100 MG capsule  hydrochlorothiazide (HYDRODIURIL) 25 MG tablet  hydrocortisone (WESTCORT) 0.2 % external cream  ipratropium - albuterol 0.5 mg/2.5 mg/3 mL (DUONEB) 0.5-2.5 (3) MG/3ML neb solution  ipratropium-albuterol (COMBIVENT RESPIMAT)  MCG/ACT inhaler  latanoprost (XALATAN) 0.005 % ophthalmic solution  lidocaine (LMX4) 4 % external cream  losartan (COZAAR) 100 MG tablet  methocarbamol (ROBAXIN) 500 MG tablet  mirtazapine (REMERON) 15 MG tablet  nitroGLYcerin (NITROSTAT) 0.4 MG sublingual tablet  pantoprazole (PROTONIX) 40 MG EC tablet  Prenatal Multivit-Min-Fe-FA (PRENATAL/IRON) TABS  sucralfate (CARAFATE) 1 GM tablet  SYNTHROID 50 MCG tablet  trolamine salicylate (ASPERCREME) 10 % external cream          Review of Systems   Constitutional:  Positive for fatigue. Negative for activity change, appetite change, chills and fever.   HENT:  Negative for congestion, rhinorrhea and sore throat.    Eyes:  Negative for redness.   Respiratory:  Positive for shortness of breath (History of COPD). Negative for cough.    Cardiovascular:  Negative for chest pain.   Gastrointestinal:  Positive for abdominal pain and diarrhea. Negative for melena, nausea and vomiting.   Endocrine: Negative for polydipsia, polyphagia and polyuria.   Genitourinary:  Negative for dysuria and hematuria.   Musculoskeletal:  Positive for falls (She reports falling on Thursday followed by another fall  yesterday.  Thursday's fall was witnessed.). Negative for arthralgias, myalgias and neck stiffness.   Skin:  Negative for rash.   Neurological:  Positive for light-headedness. Negative for dizziness and headaches.       Physical Exam   BP: (!) 174/58  Pulse: 60  Temp: 97.4  F (36.3  C)  Resp: 16  Height: 152.4 cm (5')  Weight: 41.4 kg (91 lb 4.8 oz)  SpO2: 94 %      Physical Exam  Vitals and nursing note reviewed. Exam conducted with a chaperone present.   Constitutional:       General: She is not in acute distress.     Appearance: She is not diaphoretic.      Comments: Appropriate for age   HENT:      Head: Normocephalic and atraumatic.   Eyes:      Pupils: Pupils are equal, round, and reactive to light.   Cardiovascular:      Rate and Rhythm: Normal rate and regular rhythm.      Pulses: Normal pulses.      Heart sounds: Normal heart sounds.   Pulmonary:      Effort: No respiratory distress.      Breath sounds: Normal breath sounds.   Chest:      Chest wall: Tenderness (Left lateral chest) present. No crepitus.   Abdominal:      General: Bowel sounds are normal. There is no distension.      Palpations: Abdomen is soft.      Tenderness: There is no abdominal tenderness. There is no guarding.   Musculoskeletal:         General: No tenderness. Normal range of motion.      Cervical back: No rigidity or tenderness.      Thoracic back: No tenderness.      Lumbar back: No tenderness.   Skin:     Findings: Bruising (Left lateral chest) present. No abrasion or laceration.   Neurological:      Mental Status: She is alert and oriented to person, place, and time.         ED Course     ED Course as of 06/09/25 1941 Mon Jun 09, 2025   1700 Potassium(!!): 2.6  Plan to start replacement.    1700 Sodium(!): 124   1901 Multiple rib fractures identified in the left chest.  I contacted the local surgeon who recommended transfer to [place] given that there is also a new small pericardial effusion noted on CT.   1901 I discussed the  patient's case with     Procedures           Results for orders placed or performed during the hospital encounter of 06/09/25 (from the past 24 hours)   EKG 12-lead, tracing only   Result Value Ref Range    Systolic Blood Pressure  mmHg    Diastolic Blood Pressure  mmHg    Ventricular Rate 61 BPM    Atrial Rate 61 BPM    NC Interval 214 ms    QRS Duration 86 ms     ms    QTc 442 ms    P Axis 49 degrees    R AXIS 27 degrees    T Axis 61 degrees    Interpretation ECG       Sinus rhythm with 1st degree A-V block  Cannot rule out Anteroseptal infarct , age undetermined  Abnormal ECG  When compared with ECG of 02-Dec-2024 13:33,  Minimal criteria for Anteroseptal infarct are now Present  T wave inversion now evident in Anterior leads     CBC with platelets differential    Narrative    The following orders were created for panel order CBC with platelets differential.  Procedure                               Abnormality         Status                     ---------                               -----------         ------                     CBC with platelets and ...[[id number]]  Abnormal            Final result                 Please view results for these tests on the individual orders.   Basic metabolic panel   Result Value Ref Range    Sodium 124 (L) 135 - 145 mmol/L    Potassium 2.6 (LL) 3.4 - 5.3 mmol/L    Chloride 90 (L) 98 - 107 mmol/L    Carbon Dioxide (CO2) 20 (L) 22 - 29 mmol/L    Anion Gap 14 7 - 15 mmol/L    Urea Nitrogen 41.0 (H) 8.0 - 23.0 mg/dL    Creatinine 1.50 (H) 0.51 - 0.95 mg/dL    GFR Estimate 34 (L) >60 mL/min/1.73m2    Calcium 8.5 (L) 8.8 - 10.4 mg/dL    Glucose 142 (H) 70 - 99 mg/dL   Troponin T, High Sensitivity   Result Value Ref Range    Troponin T, High Sensitivity 62 (H) <=14 ng/L   CBC with platelets and differential   Result Value Ref Range    WBC Count 15.5 (H) 4.0 - 11.0 10e3/uL    RBC Count 3.74 (L) 3.80 - 5.20 10e6/uL    Hemoglobin 11.0 (L) 11.7 - 15.7 g/dL    Hematocrit 32.1 (L)  35.0 - 47.0 %    MCV 86 78 - 100 fL    MCH 29.4 26.5 - 33.0 pg    MCHC 34.3 31.5 - 36.5 g/dL    RDW 13.3 10.0 - 15.0 %    Platelet Count 219 150 - 450 10e3/uL    % Neutrophils 88 %    % Lymphocytes 6 %    % Monocytes 5 %    % Eosinophils 0 %    % Basophils 0 %    % Immature Granulocytes 1 %    NRBCs per 100 WBC 0 <1 /100    Absolute Neutrophils 13.6 (H) 1.6 - 8.3 10e3/uL    Absolute Lymphocytes 1.0 0.8 - 5.3 10e3/uL    Absolute Monocytes 0.8 0.0 - 1.3 10e3/uL    Absolute Eosinophils 0.0 0.0 - 0.7 10e3/uL    Absolute Basophils 0.0 0.0 - 0.2 10e3/uL    Absolute Immature Granulocytes 0.1 <=0.4 10e3/uL    Absolute NRBCs 0.0 10e3/uL   Extra Tube    Narrative    The following orders were created for panel order Extra Tube.  Procedure                               Abnormality         Status                     ---------                               -----------         ------                     Extra Blue Top Tube[[id number]]                             Final result               Extra Red Top Tube[[id number]]                              Final result               Extra Heparinized Syringe[[id number]]                       Final result                 Please view results for these tests on the individual orders.   Extra Blue Top Tube   Result Value Ref Range    Hold Specimen JIC    Extra Red Top Tube   Result Value Ref Range    Hold Specimen JIC    Extra Heparinized Syringe   Result Value Ref Range    Hold Specimen JIC    UA with Microscopic   Result Value Ref Range    Color Urine Light Yellow Colorless, Straw, Light Yellow, Yellow    Appearance Urine Clear Clear    Glucose Urine Negative Negative mg/dL    Bilirubin Urine Negative Negative    Ketones Urine Negative Negative mg/dL    Specific Gravity Urine 1.012 1.003 - 1.035    Blood Urine Negative Negative    pH Urine 5.5 4.7 - 8.0    Protein Albumin Urine Negative Negative mg/dL    Urobilinogen Urine Normal Normal mg/dL    Nitrite Urine Negative Negative    Leukocyte  Esterase Urine Negative Negative    RBC Urine 0 <=2 /HPF    WBC Urine 2 <=5 /HPF    Squamous Epithelials Urine 0 <=1 /HPF   Chest CT w/o contrast    Narrative    EXAM: CT CHEST W/O CONTRAST  LOCATION: [facility]  DATE: 6/9/2025    INDICATION: Fall with left rib pain and concern for fracture.  COMPARISON: CT dated 11/26/2023  TECHNIQUE: CT chest without IV contrast. Multiplanar reformats were obtained. Dose reduction techniques were used.  CONTRAST: None.    FINDINGS:   LUNGS AND PLEURA: Diffuse changes of centrilobular emphysema. New 7.6 x 5.0 mm nodule in the right upper lobe, image 72 series 4. Nearby angular nodule, 5.7 x 2.4 mm, image 59, also new. Incidental calcified granulomas. Left basilar scarring or   atelectasis. Trace left pleural effusion. No pneumothorax. Interval resolution of right pleural effusion.    MEDIASTINUM/AXILLAE: New small to moderate pericardial effusion. Extensive atherosclerotic calcification. Normal heart size. Densely calcified mediastinal nodes.    CORONARY ARTERY CALCIFICATION: Severe.    UPPER ABDOMEN: Atrophic left kidney    MUSCULOSKELETAL: Nondisplaced 6-8 anterolateral rib fractures near the costochondral junctions. Minimally displaced left ninth and 11th posterior rib fracture fractures. Moderately displaced left posterior 10th rib fracture. There are several old right   rib fractures. Chronic greater than 50% L2 compression fracture. T3-T6 mild thoracic compression fractures are also unchanged.      Impression    IMPRESSION:   1.  Left sixth through 11th rib fractures.  2.  New right upper lobe pulmonary nodules. 3-6 month follow-up CT recommended per Fleischner Society guidelines.  3.  New small to moderate pericardial effusion. Trace left pleural effusion.     Head CT w/o contrast    Narrative    EXAM: CT HEAD W/O CONTRAST  LOCATION: [facility]  DATE: 6/9/2025    INDICATION: Falls x2.  COMPARISON: CT brain 9/20/2019.  TECHNIQUE: Routine CT Head  without IV contrast. Multiplanar reformats. Dose reduction techniques were used.    FINDINGS:  INTRACRANIAL CONTENTS: No finding for intracranial hemorrhage, mass, or acute infarct. Area of chronic encephalomalacic change and gliosis is again seen involving the left insula and frontal operculum and compatible with sequela of prior ischemia. There   is mild to moderate prominence of the lateral ventricles and mild prominence of the sulci. Mild presumed sequela chronic microvascular ischemic change. No new transcortical areas of low-attenuation change. No mass effect or midline shift. Cerebellar   tonsils are normally positioned. Sella is unremarkable for technique. Corpus callosum is normally formed.    VISUALIZED ORBITS/SINUSES/MASTOIDS: Prior cataract surgeries. Left maxillary sinus retention cysts. No middle ear or mastoid effusion.    BONES/SOFT TISSUES: Calvarium is intact, without suspicious lytic or blastic foci.      Impression    IMPRESSION:  1.  Chronic infarct left insula and frontal operculum.    2.  Mild to moderate volume loss and mild presumed sequela chronic microvascular ischemic change.   CT Cervical Spine w/o Contrast    Narrative    EXAM: CT CERVICAL SPINE W/O CONTRAST  LOCATION: [facility]  DATE: 6/9/2025    INDICATION: fall with injury  COMPARISON: None.  TECHNIQUE: Routine CT Cervical Spine without IV contrast. Multiplanar reformats. Dose reduction techniques were used.    FINDINGS:  VERTEBRA: 2 mm spondylolisthesis of C7 on T1. The craniocervical junction is within normal limits. Age indeterminate mild superior endplate vertebral body height loss of T3. The rest of the vertebral body heights are maintained. No fractures.    CANAL/FORAMINA: Moderate intervertebral disc height loss at C6-C7. Mild spinal canal narrowing at C2-C3. Small left paracentral disc osteophyte complex at C4-C5 causing moderate spinal canal narrowing and high-grade narrowing of the left lateral recess.    Circumferential disc osteophyte complex at C5-C6 causing moderate spinal canal narrowing.    Bilateral neuroforaminal narrowing at C2-C3. Severe left and moderate right neuroforaminal narrowing at C3-C4. Severe left and moderate severe right neuroforaminal narrowing at C4-C5. Severe bilateral neuroforaminal narrowing at C5-C6. Moderate severe   bilateral neuroforaminal narrowing at C6-C7.    PARASPINAL: Mild emphysematous changes of the lung apices. Moderate atherosclerotic calcification of the carotid bulbs.      Impression    IMPRESSION:  1.  Age indeterminate mild superior endplate vertebral body height loss of T3. Favor this to be long-standing. Please correlate with point tenderness over this level.  2.  No cervical spine fractures or posttraumatic subluxations.         Medications   potassium chloride (KLOR-CON) Packet 40 mEq (40 mEq Oral or Feeding Tube $Given 6/9/25 1732)   potassium chloride (KLOR-CON) Packet 20 mEq (20 mEq Oral or Feeding Tube $Given 6/9/25 1916)   acetaminophen (TYLENOL) tablet 975 mg (975 mg Oral $Given 6/9/25 1916)       Assessments & Plan (with Medical Decision Making)     I have reviewed the nursing notes.    I have reviewed the findings, diagnosis, plan and need for follow up with the patient.  The patient is an 84-year-old female with a past medical history significant for COPD, hypoparathyroidism, hypertension, CHF, and CAD with stent placement and 2019.  She presents to the emergency department by EMS with concerns for shortness of breath and a recent fall.  After it was discovered her evaluation that she fell twice.  She has not been eating or drinking well.  She complained of pain on the left side.  Imaging shows multiple rib fractures on the left.  She has a small new pericardial effusion of unknown significance.  She is otherwise hemodynamically stable.  Electrolyte abnormalities include hyponatremia and hypokalemia.  She is getting potassium replacement now.  I discussed  her case with the trauma surgeon on-call at [facility].  They are in agreement that the patient would be best served by transfer to their facility which will occur via ALS ambulance.  1 g of Tylenol was given.  She received 1 L of normal saline via the ambulance crew.  Further care will depend on clinical course.        Medical Decision Making  Differential diagnosis: Gait and Balance Disorders, Medications, Conditions like heart failure, renal insufficiency, Orthostatic Hypotension, Cognitive Impairment.          New Prescriptions    No medications on file       Final diagnoses:   Multiple rib fractures involving four or more ribs   Hyponatremia   Hypokalemia   Falls frequently   Pericardial effusion       6/9/2025   [facility]       Mihir Cordova MD  06/09/25 1941

## 2025-06-10 LAB
ATRIAL RATE - MUSE: 61 BPM
DIASTOLIC BLOOD PRESSURE - MUSE: NORMAL MMHG
INTERPRETATION ECG - MUSE: NORMAL
P AXIS - MUSE: 49 DEGREES
PR INTERVAL - MUSE: 214 MS
QRS DURATION - MUSE: 86 MS
QT - MUSE: 440 MS
QTC - MUSE: 442 MS
R AXIS - MUSE: 27 DEGREES
SYSTOLIC BLOOD PRESSURE - MUSE: NORMAL MMHG
T AXIS - MUSE: 61 DEGREES
VENTRICULAR RATE- MUSE: 61 BPM

## 2025-06-17 ENCOUNTER — MEDICAL CORRESPONDENCE (OUTPATIENT)
Dept: HEALTH INFORMATION MANAGEMENT | Facility: HOSPITAL | Age: 85
End: 2025-06-17

## 2025-06-19 ENCOUNTER — MEDICAL CORRESPONDENCE (OUTPATIENT)
Dept: HEALTH INFORMATION MANAGEMENT | Facility: HOSPITAL | Age: 85
End: 2025-06-19

## 2025-06-20 ENCOUNTER — MEDICAL CORRESPONDENCE (OUTPATIENT)
Dept: HEALTH INFORMATION MANAGEMENT | Facility: HOSPITAL | Age: 85
End: 2025-06-20

## 2025-06-24 ENCOUNTER — NURSING HOME VISIT (OUTPATIENT)
Dept: GERIATRICS | Facility: OTHER | Age: 85
End: 2025-06-24
Attending: FAMILY MEDICINE
Payer: COMMERCIAL

## 2025-06-24 DIAGNOSIS — E78.2 MIXED HYPERLIPIDEMIA: ICD-10-CM

## 2025-06-24 DIAGNOSIS — I10 BENIGN ESSENTIAL HYPERTENSION: ICD-10-CM

## 2025-06-24 DIAGNOSIS — I77.1 SUBCLAVIAN ARTERIAL STENOSIS: ICD-10-CM

## 2025-06-24 DIAGNOSIS — I25.10 CORONARY ARTERY DISEASE INVOLVING NATIVE CORONARY ARTERY OF NATIVE HEART WITHOUT ANGINA PECTORIS: ICD-10-CM

## 2025-06-24 DIAGNOSIS — I65.23 BILATERAL CAROTID ARTERY STENOSIS: ICD-10-CM

## 2025-06-24 DIAGNOSIS — J44.9 COPD, SEVERE (H): ICD-10-CM

## 2025-06-24 DIAGNOSIS — E44.0 MODERATE PROTEIN-CALORIE MALNUTRITION: ICD-10-CM

## 2025-06-24 DIAGNOSIS — D50.8 IRON DEFICIENCY ANEMIA SECONDARY TO INADEQUATE DIETARY IRON INTAKE: ICD-10-CM

## 2025-06-24 DIAGNOSIS — S22.42XA: ICD-10-CM

## 2025-06-24 DIAGNOSIS — E03.9 ACQUIRED HYPOTHYROIDISM: Chronic | ICD-10-CM

## 2025-06-24 DIAGNOSIS — M85.89 OSTEOPENIA OF MULTIPLE SITES: Chronic | ICD-10-CM

## 2025-06-24 DIAGNOSIS — N18.4 CKD (CHRONIC KIDNEY DISEASE) STAGE 4, GFR 15-29 ML/MIN (H): ICD-10-CM

## 2025-06-24 DIAGNOSIS — I73.9 PAD (PERIPHERAL ARTERY DISEASE): ICD-10-CM

## 2025-06-24 DIAGNOSIS — R55 SYNCOPE, UNSPECIFIED SYNCOPE TYPE: ICD-10-CM

## 2025-06-24 DIAGNOSIS — Z78.9 NURSING HOME RESIDENT: Primary | ICD-10-CM

## 2025-06-24 DIAGNOSIS — E21.0 PRIMARY HYPERPARATHYROIDISM: ICD-10-CM

## 2025-06-24 DIAGNOSIS — I34.0 NON-RHEUMATIC MITRAL REGURGITATION: Chronic | ICD-10-CM

## 2025-06-24 DIAGNOSIS — Z86.73 HISTORY OF CVA (CEREBROVASCULAR ACCIDENT): ICD-10-CM
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
## 2025-06-26 ENCOUNTER — MEDICAL CORRESPONDENCE (OUTPATIENT)
Dept: HEALTH INFORMATION MANAGEMENT | Facility: HOSPITAL | Age: 85
End: 2025-06-26
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
## 2025-06-26 PROBLEM — R55 SYNCOPE: Status: ACTIVE | Noted: 2025-06-09

## 2025-06-26 PROBLEM — H52.13 MYOPIA OF BOTH EYES: Status: ACTIVE | Noted: 2018-03-12

## 2025-06-26 PROBLEM — H18.529 CORNEAL EPITHELIAL BASEMENT MEMBRANE DYSTROPHY: Status: ACTIVE | Noted: 2018-03-12

## 2025-06-26 PROBLEM — H02.003: Status: ACTIVE | Noted: 2025-05-07

## 2025-06-26 PROBLEM — H02.006: Status: ACTIVE | Noted: 2025-05-07

## 2025-06-26 PROBLEM — Z96.1 PSEUDOPHAKIA OF BOTH EYES: Status: ACTIVE | Noted: 2025-05-07

## 2025-06-26 PROBLEM — H40.053 OCULAR HYPERTENSION, BILATERAL: Status: ACTIVE | Noted: 2018-03-12

## 2025-06-26 PROBLEM — I31.39 PERICARDIAL EFFUSION: Status: ACTIVE | Noted: 2025-06-09

## 2025-06-26 PROBLEM — H04.123 DRY EYE SYNDROME OF BILATERAL LACRIMAL GLANDS: Status: ACTIVE | Noted: 2018-03-12

## 2025-06-26 PROBLEM — S22.42XA: Status: ACTIVE | Noted: 2025-06-09

## 2025-06-28 DIAGNOSIS — Z53.9 PERSONS ENCOUNTERING HEALTH SERVICES FOR SPECIFIC PROCEDURES, NOT CARRIED OUT: Primary | ICD-10-CM

## 2025-06-28 PROCEDURE — G0180 MD CERTIFICATION HHA PATIENT: HCPCS | Performed by: FAMILY MEDICINE

## 2025-07-01 ENCOUNTER — TELEPHONE (OUTPATIENT)
Dept: FAMILY MEDICINE | Facility: OTHER | Age: 85
End: 2025-07-01

## 2025-07-01 NOTE — TELEPHONE ENCOUNTER
TRUNG Veronica calling for verbal orders for skilled nursing 2XW1W then 1XW9W. Verbal orders given by this RN.

## 2025-07-01 NOTE — TELEPHONE ENCOUNTER
Darrick calling for VO for PT 1XW4W for therapeutic exercise and activity. Verbal Orders given by this RN.

## 2025-07-03 ENCOUNTER — MEDICAL CORRESPONDENCE (OUTPATIENT)
Dept: HEALTH INFORMATION MANAGEMENT | Facility: HOSPITAL | Age: 85
End: 2025-07-03
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
## 2025-07-03 PROBLEM — Z78.9 NURSING HOME RESIDENT: Status: ACTIVE | Noted: 2025-07-03

## 2025-07-03 ASSESSMENT — ENCOUNTER SYMPTOMS
SLEEP DISTURBANCE: 0
LEG SWELLING: 0
DIARRHEA: 0
HEMATURIA: 0
SEIZURES: 0
EYE PROBLEMS: 0
VOICE CHANGE: 0
ABDOMINAL PAIN: 0
EXTREMITY WEAKNESS: 0
PALPITATIONS: 0
DEPRESSION: 0
NUMBNESS: 0
ARTHRALGIAS: 1
TROUBLE SWALLOWING: 0
BLOOD IN STOOL: 0
FEVER: 0
SHORTNESS OF BREATH: 1
MYALGIAS: 0
DIZZINESS: 1
WOUND: 0
FATIGUE: 1
APPETITE CHANGE: 1
LIGHT-HEADEDNESS: 0
SPEECH DIFFICULTY: 0
FREQUENCY: 1
UNEXPECTED WEIGHT CHANGE: 0
BACK PAIN: 1
CONFUSION: 0
NERVOUS/ANXIOUS: 0
WHEEZING: 1
COUGH: 1
DYSURIA: 0
CONSTIPATION: 1
ADENOPATHY: 0

## 2025-07-03 NOTE — PROGRESS NOTES
Nursing Home Initial Visit    HISTORY OF PRESENT ILLNESS:  Lita is a 84 year old female (1940)  resident of [facility] who is being seen today for initial Nursing Home Visit.     She has a past medical history significant for, but not limited to, coronary heart disease with previous stenting, VHD (valvular heart disease) with mitral regurgitation, bilateral carotid stenosis, subclavian artery stenosis, PVD (peripheral vascular disease), hypertension , previous cerebrovascular accident, chronic kidney disease 4, COPD (chronic obstructive pulmonary disease), primary hyperparathyroidism, acquired hypothyroidism, as well as protein-calorie malnutrition (PCM).    She was admitted from [facility]  after being hospitalized with syncope resulting in multiple falls.  Lita sustained multiple rib fractures left and was also noted to have pericardial effusion.  Follow up echocardiogram, however, showed no effusion and was stable.  Her Coreg was reduced and empiric antibiotic therapy was given for 5 days. She was then transferred for short term rehabilitation.    Advanced Directives:  POLST Full Code    The patient notes controlled pain.  No further syncopal episodes.  She will be discharging to home..    Discussed with nursing staff who note not concerns.    The patient is seen in her room accompanied by facility nurse.  Family is not present.     Medical records are reviewed.    Current medications, allergies, and interdisciplinary care plan are reviewed.      Patient Active Problem List    Diagnosis Date Noted    COPD, severe (H) 09/06/2023     Priority: High    Coronary artery disease involving native coronary artery of native heart without angina pectoris 03/14/2022     Priority: High    History of CVA on 1/1/2019 03/14/2022     Priority: High    PAD on 2/28/2020-moderate (H) 03/14/2022     Priority: High    Bilateral carotid artery stenosis 03/14/2022     Priority: High    CKD  (chronic kidney disease) stage 4, GFR 15-29 ml/min (H) 01/26/2022     Priority: High    Subclavian arterial stenosis 01/26/2022     Priority: High    History of coronary artery stent placement to the O/M LCX on 6/28/2019 at [facility] 07/23/2019     Priority: High    ASCVD (arteriosclerotic cardiovascular disease) 06/18/2019     Priority: High     Added automatically from request for surgery [id number]      Centrilobular emphysema (H) 05/12/2019     Priority: High    Chronic systolic congestive heart failure (H)- Recovered 05/12/2019     Priority: High    Benign essential hypertension 05/12/2019     Priority: High    Non-rheumatic mitral regurgitation 05/12/2019     Priority: High    Falls, initial encounter 06/10/2025     Priority: Medium    Closed traumatic displaced fracture of five ribs of left side, initial encounter 06/09/2025     Priority: Medium    Pericardial effusion 06/09/2025     Priority: Medium    Syncope 06/09/2025     Priority: Medium    Hematemesis with nausea 05/07/2025     Priority: Medium    Gastritis 05/07/2025     Priority: Medium    Entropion of both eyes 05/07/2025     Priority: Medium    Pseudophakia of both eyes 05/07/2025     Priority: Medium    Hematemesis 03/27/2025     Priority: Medium    Gastrointestinal hemorrhage with hematemesis 03/27/2025     Priority: Medium    GI bleed 02/08/2025     Priority: Medium    Leukocytosis 02/08/2025     Priority: Medium    Anemia 11/26/2023     Priority: Medium    Elevated brain natriuretic peptide (BNP) level 11/26/2023     Priority: Medium    Chest pain, unspecified type 02/16/2023     Priority: Medium    Abnormal cardiovascular stress test on 6/14/2019 03/14/2022     Priority: Medium    Postoperative atrial fibrillation on 6/28/2019 (H) 03/14/2022     Priority: Medium    History of cardioversion on 6/28/2019 03/14/2022     Priority: Medium    History of tobacco abuse quitting on 1/1/2019 03/14/2022     Priority: Medium    Iron deficiency anemia  secondary to inadequate dietary iron intake 2022     Priority: Medium    Weight loss 2020     Priority: Medium    History of GI bleed 2019     Priority: Medium    Protein-calorie malnutrition 2019     Priority: Medium    Abnormal stress test 2019     Priority: Medium     Added automatically from request for surgery [id number]      MILLS (dyspnea on exertion) 2019     Priority: Medium     Added automatically from request for surgery [id number]      Mixed hyperlipidemia 2019     Priority: Medium     Added automatically from request for surgery [id number]      Acquired hypothyroidism 2019     Priority: Medium    Osteopenia 2019     Priority: Medium    Corneal epithelial basement membrane dystrophy 2018     Priority: Medium    Dry eye syndrome of bilateral lacrimal glands 2018     Priority: Medium    Myopia of both eyes 2018     Priority: Medium    Ocular hypertension, bilateral 2018     Priority: Medium    Primary hyperparathyroidism 2013     Priority: Medium    Chronic rhinitis 2013     Priority: Medium    Nursing Home Visit 2025     Priority: Low    Tobacco abuse counseling 2022     Priority: Low          Social History     Socioeconomic History    Marital status:      Spouse name: Not on file    Number of children: Not on file    Years of education: Not on file    Highest education level: Not on file   Occupational History    Not on file   Tobacco Use    Smoking status: Former     Current packs/day: 0.00     Average packs/day: 1 pack/day for 50.0 years (50.0 ttl pk-yrs)     Types: Cigarettes     Start date: 1969     Quit date: 2019     Years since quittin.5     Passive exposure: Past    Smokeless tobacco: Never    Tobacco comments:     quit 2019   Vaping Use    Vaping status: Never Used   Substance and Sexual Activity    Alcohol use: Yes     Comment: social    Drug use: No    Sexual activity: Not  Currently   Other Topics Concern    Parent/sibling w/ CABG, MI or angioplasty before 65F 55M? Yes   Social History Narrative    Not on file     Social Drivers of Health     Financial Resource Strain: Low Risk  (5/7/2025)    Financial Resource Strain     Within the past 12 months, have you or your family members you live with been unable to get utilities (heat, electricity) when it was really needed?: No   Food Insecurity: No Food Insecurity (6/10/2025)    Received from [facility]    Hunger Vital Sign     Worried About Running Out of Food in the Last Year: Never true     Ran Out of Food in the Last Year: Never true   Transportation Needs: No Transportation Needs (6/10/2025)    Received from [facility]    PRAPARE - Transportation     Lack of Transportation (Medical): No     Lack of Transportation (Non-Medical): No   Recent Concern: Transportation Needs - High Risk (5/7/2025)    Transportation Needs     Within the past 12 months, has lack of transportation kept you from medical appointments, getting your medicines, non-medical meetings or appointments, work, or from getting things that you need?: Yes   Physical Activity: Not on file   Stress: Not on file   Social Connections: Not on file   Interpersonal Safety: Not At Risk (6/9/2025)    Received from [facility]    EH IP Custom IPV     Do you feel UNSAFE in any of your personal relationships with your family members or any other acquaintances?: No   Housing Stability: Unknown (6/10/2025)    Received from [facility]    Housing Stability Vital Sign     Unable to Pay for Housing in the Last Year: No     Number of Times Moved in the Last Year: Not on file     Homeless in the Last Year: No        Current Outpatient Medications   Medication Sig Dispense Refill    acetaminophen (TYLENOL) 325 MG tablet Take 2 tablets (650 mg) by mouth 2  times daily. For back pain. 360 tablet 3    aspirin (ASA) 81 MG chewable tablet Take 1 tablet (81 mg) by mouth daily. 90 tablet 3    atorvastatin (LIPITOR) 40 MG tablet TAKE 1 TABLET BY MOUTH AT BEDTIME 90 tablet 1    calcium carbonate (TUMS) 500 MG chewable tablet Take 1 chew tab by mouth at bedtime      carvedilol (COREG) 25 MG tablet Take 1 tablet (25 mg) by mouth 2 times daily (with meals). (Patient taking differently: Take 25 mg by mouth 2 times daily.) 180 tablet 3    fluticasone (FLONASE) 50 MCG/ACT spray Spray 2 sprays into both nostrils daily 1 Bottle 11    Fluticasone-Umeclidin-Vilanterol (TRELEGY ELLIPTA) 200-62.5-25 MCG/ACT oral inhaler Inhale 1 puff into the lungs daily 180 each 3    furosemide (LASIX) 20 MG tablet Take 0.5 tablets (10 mg) by mouth daily as needed (swelling). 45 tablet 1    gabapentin (NEURONTIN) 100 MG capsule Take 1 capsule (100 mg) by mouth 2 times daily.      hydrochlorothiazide (HYDRODIURIL) 25 MG tablet Take 1 tablet (25 mg) by mouth daily. 90 tablet 3    hydrocortisone (WESTCORT) 0.2 % external cream Apply topically 2 times daily. 45 g 3    ipratropium - albuterol 0.5 mg/2.5 mg/3 mL (DUONEB) 0.5-2.5 (3) MG/3ML neb solution Take 1 vial (3 mLs) by nebulization every 6 hours as needed for shortness of breath, wheezing or cough 90 mL 3    ipratropium-albuterol (COMBIVENT RESPIMAT)  MCG/ACT inhaler Inhale 1 puff into the lungs 4 times daily (Patient taking differently: Inhale 1 puff into the lungs 4 times daily as needed.) 8 g 3    latanoprost (XALATAN) 0.005 % ophthalmic solution Place 1 drop into both eyes every evening.      lidocaine (LMX4) 4 % external cream Apply topically once as needed for mild pain. 28 g 3    losartan (COZAAR) 100 MG tablet Take 1 tablet (100 mg) by mouth daily. 90 tablet 3    methocarbamol (ROBAXIN) 500 MG tablet Take 1 tablet (500 mg) by mouth 4 times daily as needed for muscle spasms 20 tablet 0    mirtazapine (REMERON) 15 MG tablet Take 1 tablet (15  mg) by mouth at bedtime 90 tablet 3    nitroGLYcerin (NITROSTAT) 0.4 MG sublingual tablet Place 1 tablet (0.4 mg) under the tongue every 5 minutes as needed for chest pain. For chest pain place 1 tablet under the tongue every 5 minutes for 3 doses. If symptoms persist 5 minutes after 1st dose call 911. 25 tablet 3    pantoprazole (PROTONIX) 40 MG EC tablet Take 1 tablet (40 mg) by mouth 2 times daily. 90 tablet 3    Prenatal Multivit-Min-Fe-FA (PRENATAL/IRON) TABS Take 1 tablet by mouth every morning.      sucralfate (CARAFATE) 1 GM tablet Take 1 tablet (1 g) by mouth 4 times daily as needed for nausea. 120 tablet 3    SYNTHROID 50 MCG tablet Take 1 tablet (50 mcg) by mouth daily. 90 tablet 3    trolamine salicylate (ASPERCREME) 10 % external cream Apply topically daily as needed (lower back pain)       No current facility-administered medications for this visit.       Allergies   Allergen Reactions    Evista [Raloxifene] Other (See Comments)     hypercalcemia    Prednisone GI Disturbance    Brimonidine Swelling    Timolol Other (See Comments)    Combigan [Brimonidine Tartrate-Timolol] Other (See Comments)     Eye swelling and itchy    Cyclosporine      Pt reports using eye gtts and having red irritated eyes     Cyclosporine Other (See Comments)     Pt reports using eye gtts and having red irritated eyes    Latex Rash    Levaquin [Levofloxacin] Muscle Pain (Myalgia)    Penicillins Rash       I have reviewed the MDS and I have reviewed the care plan and do agree with the plan.      ROS:  Review of Systems   Constitutional:  Positive for appetite change and fatigue. Negative for fever and unexpected weight change.   HENT:   Negative for hearing loss, trouble swallowing and voice change.    Eyes:  Negative for eye problems.   Respiratory:  Positive for cough, shortness of breath and wheezing.    Cardiovascular:  Negative for chest pain, leg swelling and palpitations.   Gastrointestinal:  Positive for constipation.  Negative for abdominal pain, blood in stool and diarrhea.   Genitourinary:  Positive for frequency. Negative for bladder incontinence, dysuria and hematuria.    Musculoskeletal:  Positive for arthralgias and back pain. Negative for gait problem and myalgias.   Skin:  Negative for itching, rash and wound.   Neurological:  Positive for dizziness. Negative for extremity weakness, gait problem, light-headedness, numbness, seizures and speech difficulty.   Hematological:  Negative for adenopathy.   Psychiatric/Behavioral:  Negative for confusion, depression and sleep disturbance. The patient is not nervous/anxious.    All other systems reviewed and are negative.        OBJECTIVE:  There were no vitals taken for this visit.    Physical Exam  Vitals and nursing note reviewed.   Constitutional:       General: She is not in acute distress.     Appearance: She is well-developed. She is not ill-appearing or toxic-appearing.   HENT:      Head: Normocephalic.      Right Ear: External ear normal.      Left Ear: External ear normal.      Nose: Nose normal.      Mouth/Throat:      Mouth: Mucous membranes are moist.   Eyes:      Extraocular Movements: Extraocular movements intact.      Conjunctiva/sclera: Conjunctivae normal.      Pupils: Pupils are equal, round, and reactive to light.   Cardiovascular:      Rate and Rhythm: Normal rate and regular rhythm.      Pulses: Normal pulses.      Heart sounds: Murmur heard.      No friction rub. No gallop.   Pulmonary:      Effort: Pulmonary effort is normal.      Breath sounds: Rales present. No wheezing or rhonchi.   Musculoskeletal:      Right lower leg: No edema.      Left lower leg: No edema.   Skin:     General: Skin is warm and dry.      Findings: No bruising, erythema, lesion or rash.   Neurological:      General: No focal deficit present.      Mental Status: She is alert and oriented to person, place, and time. Mental status is at baseline.   Psychiatric:         Mood and Affect:  Mood normal.         Behavior: Behavior normal.         Thought Content: Thought content normal.         Lab/Diagnostic data:    Reviewed    ASSESSMENT/ORDERS:  Nursing Home Visit  Discussed with staff. Care plan reviewed and orders updated.  Chronic issues are stable. She will be admitted short term rehabilitation.  Face to Face completed.        Documentation of Face-to-Face and Certification for Home Health Services     Patient: Lita Ocasio   YOB: 1940  MR Number: [id number]  Today's Date: Jun 24, 2025      I certify that patient: Lita Ocasio is under my care and that I, or a nurse practitioner or physician's assistant working with me, had a face-to-face encounter that meets the physician face-to-face encounter requirements with this patient on: 6/24/2025.    This encounter with the patient was in whole, or in part, for the following medical condition, which is the primary reason for home health care: Recurrent falls, syncope.    I certify that, based on my findings, the following services are medically necessary home health services: Nursing, Occupational Therapy, and Physical Therapy.    My clinical findings support the need for the above services because: Nurse is needed: To provide assessment and oversight required in the home to assure adherence to the medical plan due to: multiple comorbidities.., Occupational Therapy Services are needed to assess and treat cognitive ability and address ADL safety due to impairment in mobility and ambulation., and Physical Therapy Services are needed to assess and treat the following functional impairments: mobility and ambulation.    Further, I certify that my clinical findings support that this patient is homebound (i.e. absences from home require considerable and taxing effort and are for medical reasons or [employer] services or infrequently or of short duration when for other reasons) because: Requires assistance of another person or specialized  equipment to access medical services because patient: Requires supervision of another for safe transfer...    Based on the above findings. I certify that this patient is confined to the home and needs intermittent skilled nursing care, physical therapy and/or speech therapy.  The patient is under my care, and I have initiated the establishment of the plan of care.  This patient will be followed by a physician who will periodically review the plan of care.  Physician/Provider to provide follow up care: Ophelia Troncoso    Responsible Medicare certified PECOS Physician: Ney Savage MD    Physician Signature: See electronic signature associated with these discharge orders.  Date: 7/3/2025      Falls, subsequent encounter  Physical Therapy and Occupational Therapy consultation    Syncope, unspecified syncope type  No further episodes. Her coreg was decreased.  Follow.     Closed traumatic displaced fracture of five ribs of left side, initial encounter  Pain controlled.     Coronary artery disease involving native coronary artery of native heart without angina pectoris  Stable. No signs or symptoms of exacerbation.    Non-rheumatic mitral regurgitation  Stable.  No signs of decompensation.    Bilateral carotid artery stenosis  Asymptomatic.  Reviewed hospital notes.     Subclavian arterial stenosis  Stable    Benign essential hypertension  Most recent blood pressure reviewed and within goal.     History of CVA on 1/1/2019  No residual    PAD on 2/28/2020-moderate (H)  Stable.     COPD, severe (H)  Continue current bronchodilator regimen    CKD (chronic kidney disease) stage 4, GFR 15-29 ml/min (H)  Follow creatinine and glomerular filtration rate (GFR)     Moderate protein-calorie malnutrition  Follow.     Mixed hyperlipidemia  Continue atorvastatin (Lipitor) 40 mg daily    Iron deficiency anemia secondary to inadequate dietary iron intake  Follow hemoglobin    Acquired hypothyroidism  Continue levothyroxine current  dose    Osteopenia of multiple sites  Fracture risk reviewed.     Primary hyperparathyroidism  Follow calcium.        Total time spent on the day of service was 56 minutes including patient visit, review of pertinent clinical information, treatment plan, and documentation.      Ney Savage MD FAAFP DC CMD  Geriatrics  Hospice and Palliative Care

## 2025-07-07 ENCOUNTER — TELEPHONE (OUTPATIENT)
Dept: FAMILY MEDICINE | Facility: OTHER | Age: 85
End: 2025-07-07

## 2025-07-07 ENCOUNTER — MEDICAL CORRESPONDENCE (OUTPATIENT)
Dept: HEALTH INFORMATION MANAGEMENT | Facility: HOSPITAL | Age: 85
End: 2025-07-07

## 2025-07-07 NOTE — TELEPHONE ENCOUNTER
2:23 PM    Reason for Call: Phone Call    Description: VO/OT1 time a wk for 4 wks    Was an appointment offered for this call? No  If yes : Appointment type              Date    Preferred method for responding to this message: Telephone Call  What is your phone number ?mitch/[phone]    If we cannot reach you directly, may we leave a detailed response at the number you provided? Yes    Can this message wait until your PCP/provider returns, if available today? Not applicable    Love Munson

## 2025-07-09 ENCOUNTER — TELEPHONE (OUTPATIENT)
Dept: FAMILY MEDICINE | Facility: OTHER | Age: 85
End: 2025-07-09

## 2025-07-09 ENCOUNTER — TELEPHONE (OUTPATIENT)
Dept: CARE COORDINATION | Facility: OTHER | Age: 85
End: 2025-07-09

## 2025-07-09 NOTE — TELEPHONE ENCOUNTER
Symptom or reason needing to speak to RN: verbal orders     Best number to return call: [phone]     Best time to return call: anytime

## 2025-07-10 ENCOUNTER — TRANSFERRED RECORDS (OUTPATIENT)
Dept: HEALTH INFORMATION MANAGEMENT | Facility: CLINIC | Age: 85
End: 2025-07-10

## 2025-07-11 DIAGNOSIS — I10 BENIGN ESSENTIAL HYPERTENSION: Primary | ICD-10-CM

## 2025-07-14 NOTE — PROGRESS NOTES
Potassium Chloride  Last signed not on current medication list.  Last discontinued on 2/7/24.  Potassium was low in hospital on 6/9 and was given inpatient. Discharge from [facility] continue Potassium Chloride CR 10 meq tablet says start taking on 6/17/25 once daily.  Last office visit 6/24 [facility]   Gely Khoury, RN  Care Coordination

## 2025-07-15 RX ORDER — POTASSIUM CHLORIDE 750 MG/1
10 TABLET, EXTENDED RELEASE ORAL DAILY
Qty: 30 TABLET | Refills: 0 | Status: SHIPPED | OUTPATIENT
Start: 2025-07-15

## 2025-07-17 ENCOUNTER — MEDICAL CORRESPONDENCE (OUTPATIENT)
Dept: HEALTH INFORMATION MANAGEMENT | Facility: CLINIC | Age: 85
End: 2025-07-17

## 2025-07-22 ENCOUNTER — OFFICE VISIT (OUTPATIENT)
Dept: FAMILY MEDICINE | Facility: OTHER | Age: 85
End: 2025-07-22
Attending: FAMILY MEDICINE
Payer: COMMERCIAL

## 2025-07-22 VITALS
SYSTOLIC BLOOD PRESSURE: 118 MMHG | RESPIRATION RATE: 16 BRPM | TEMPERATURE: 98 F | DIASTOLIC BLOOD PRESSURE: 56 MMHG | BODY MASS INDEX: 17.23 KG/M2 | OXYGEN SATURATION: 97 % | HEART RATE: 70 BPM | WEIGHT: 88.2 LBS

## 2025-07-22 DIAGNOSIS — S22.42XD CLOSED FRACTURE OF MULTIPLE RIBS OF LEFT SIDE WITH ROUTINE HEALING, SUBSEQUENT ENCOUNTER: ICD-10-CM

## 2025-07-22 DIAGNOSIS — Z91.81 PERSONAL HISTORY OF FALL: ICD-10-CM

## 2025-07-22 DIAGNOSIS — D50.9 IRON DEFICIENCY ANEMIA, UNSPECIFIED IRON DEFICIENCY ANEMIA TYPE: Primary | ICD-10-CM

## 2025-07-22 LAB
HGB BLD-MCNC: 11.9 G/DL (ref 11.7–15.7)
MCV RBC AUTO: 87 FL (ref 78–100)

## 2025-07-22 PROCEDURE — 36415 COLL VENOUS BLD VENIPUNCTURE: CPT | Mod: ZL | Performed by: FAMILY MEDICINE

## 2025-07-22 PROCEDURE — G0463 HOSPITAL OUTPT CLINIC VISIT: HCPCS

## 2025-07-22 PROCEDURE — 85018 HEMOGLOBIN: CPT | Mod: ZL | Performed by: FAMILY MEDICINE

## 2025-07-22 RX ORDER — ASPIRIN 81 MG
TABLET, DELAYED RELEASE (ENTERIC COATED) ORAL
COMMUNITY
Start: 2025-06-17

## 2025-07-22 RX ORDER — TAFLUPROST OPTHALMIC 0 MG/.3ML
1 SOLUTION/ DROPS OPHTHALMIC
COMMUNITY
Start: 2025-06-30 | End: 2025-09-29

## 2025-07-22 NOTE — PROGRESS NOTES
Assessment & Plan     Iron deficiency anemia, unspecified iron deficiency anemia type  Recurrent GI bleeding. Last egd done in May, no bleeding noted since. Hgb trending down in the hospital after fall, suspect related to trauma, but will follow up today. Discharge hgb was 9.4, baseline 12  - Hemoglobin    Pulmonary nodule  Incidentally noted on CT, will order repeat imaging at follow up in 3 mo. Discussed with pt    Personal history of fall / Closed fracture of multiple ribs of left side with routine healing, subsequent encounter  No further falls. Finishing up rehab at home. She is working on good pulmonary toilet, some ongoing back pain. This is manageable with otc pain killers at this time. Continue to monitor      ollow-up  No follow-ups on file.    Subjective   Lita is a 84 year old, presenting for the following health issues:  Follow Up        7/22/2025     2:19 PM   Additional Questions   Roomed by Uvaldo lpn   Accompanied by self     HPI        Hospital Follow-up Visit:    Hospital/Nursing Home/IP Rehab Facility: [facility]  Most Recent Admission Date: 6/9/2025   Most Recent Admission Diagnosis:      Most Recent Discharge Date: 6/9/2025   Most Recent Discharge Diagnosis: Multiple rib fractures involving four or more ribs - S22.49XA  Hyponatremia - E87.1  Hypokalemia - E87.6  Falls frequently - R29.6  Pericardial effusion - I31.39   Do you have any other stressors you would like to discuss with your provider? OTHER: back/rib pain    Problems taking medications regularly:  None  Medication changes since discharge: None  Problems adhering to non-medication therapy:  None    Summary of hospitalization:  CareEverywhere information obtained and reviewed.     Hospital Summary:   Lita Ocasio is a 84 year old female who was admitted to the hospital on 6/9 after multiple recent falls. She was found to have 5 x left sided rib fractures as well as pericardial effusion. No respiratory  distress or hypoxia, did well with pulmonary hygiene. Echo performed, no cardiac effusion, remained hemodynamically stable, decreased coreg. During hospitalization, YOANTAN and hyponatremia resolved/treated, completed empiric abx x 5 day course.     On the date of discharge, 6/16/2025, the patient was seen and examined, labs and vitals were reviewed and the patient was in stable condition. Pain is controlled with PO medications, she is tolerating a general diet, voiding, having bowel function, and is mobilizing with assist. Ok to discharge, see instructions below.     Diagnostic Tests/Treatments reviewed.  Follow up needed: none  Other Healthcare Providers Involved in Patient s Care:         None  Update since discharge: improved.  Plan of care communicated with patient     Review of Systems  Constitutional, neuro, ENT, endocrine, pulmonary, cardiac, gastrointestinal, genitourinary, musculoskeletal, integument and psychiatric systems are negative, except as otherwise noted.      Objective    /56 (BP Location: Right arm, Patient Position: Sitting, Cuff Size: Adult Regular)   Pulse 70   Temp 98  F (36.7  C) (Tympanic)   Resp 16   Wt 40 kg (88 lb 3.2 oz)   SpO2 97%   BMI 17.23 kg/m    Body mass index is 17.23 kg/m .    Physical Exam     GENERAL: alert and no distress  RESP: lungs clear to auscultation - no rales, rhonchi or wheezes  CV: regular rates and rhythm, normal S1 S2, no S3 or S4, no murmur, click or rub, and no peripheral edema  MS: no gross musculoskeletal defects noted, no edema  PSYCH: mentation appears normal, affect normal/bright    No results found for any visits on 07/22/25.        Signed Electronically by: Ophelia Troncoso MD    The longitudinal plan of care for the diagnosis(es)/condition(s) as documented were addressed during this visit. Due to the added complexity in care, I will continue to support Lita in the subsequent management and with ongoing continuity of care.

## 2025-08-04 DIAGNOSIS — H91.93 DECREASED HEARING OF BOTH EARS: Primary | ICD-10-CM

## 2025-08-11 ENCOUNTER — OFFICE VISIT (OUTPATIENT)
Dept: AUDIOLOGY | Facility: OTHER | Age: 85
End: 2025-08-11
Attending: AUDIOLOGIST
Payer: COMMERCIAL

## 2025-08-11 DIAGNOSIS — H91.93 DECREASED HEARING OF BOTH EARS: ICD-10-CM

## 2025-08-11 DIAGNOSIS — H90.3 SENSORINEURAL HEARING LOSS (SNHL) OF BOTH EARS: Primary | ICD-10-CM

## 2025-08-11 PROCEDURE — 92567 TYMPANOMETRY: CPT | Performed by: AUDIOLOGIST

## 2025-08-11 PROCEDURE — 92557 COMPREHENSIVE HEARING TEST: CPT | Performed by: AUDIOLOGIST

## 2025-08-22 DIAGNOSIS — I10 BENIGN ESSENTIAL HYPERTENSION: ICD-10-CM

## 2025-08-25 RX ORDER — POTASSIUM CHLORIDE 750 MG/1
10 TABLET, EXTENDED RELEASE ORAL DAILY
Qty: 30 TABLET | Refills: 0 | Status: SHIPPED | OUTPATIENT
Start: 2025-08-25

## 2025-09-02 ENCOUNTER — TELEPHONE (OUTPATIENT)
Dept: FAMILY MEDICINE | Facility: OTHER | Age: 85
End: 2025-09-02

## 2025-09-02 DIAGNOSIS — J43.9 PULMONARY EMPHYSEMA, UNSPECIFIED EMPHYSEMA TYPE (H): ICD-10-CM

## 2025-09-02 RX ORDER — FLUTICASONE FUROATE, UMECLIDINIUM BROMIDE AND VILANTEROL TRIFENATATE 200; 62.5; 25 UG/1; UG/1; UG/1
1 POWDER RESPIRATORY (INHALATION) DAILY
Qty: 180 EACH | Refills: 3 | Status: SHIPPED | OUTPATIENT
Start: 2025-09-02

## 2025-09-03 ENCOUNTER — TELEPHONE (OUTPATIENT)
Dept: FAMILY MEDICINE | Facility: OTHER | Age: 85
End: 2025-09-03

## (undated) DEVICE — SYRINGE-30CC SLIP TIP

## (undated) DEVICE — CONNECTOR-ERBEFLO 2 PORT

## (undated) DEVICE — SOL WATER IRRIG 1000ML BOTTLE 2F7114

## (undated) DEVICE — MOUTHPIECE W/GUARD FOR ENDOSCOPY

## (undated) DEVICE — FORCEP-COLON BIOPSY STD W/NEEDLE 160CM

## (undated) DEVICE — CLIP ENDO RESOLUTION 360 2.8,,X235CM M00521230

## (undated) DEVICE — CANISTER SUCTION MEDI-VAC GUARDIAN 2000ML 90D 65651-220

## (undated) DEVICE — IRRIGATION-H2O 1000ML

## (undated) DEVICE — FORCEP COLON BIOPSY STD W/NEEDLE 160CM M00513390

## (undated) DEVICE — TUBING SUCTION 20FT N620A

## (undated) DEVICE — ENDO BITE BLOCK ADULT OLYMPUS LATEX FREE MAJ-1632

## (undated) DEVICE — SUCTION MANIFOLD NEPTUNE 2 SYS 1 PORT 702-025-000

## (undated) DEVICE — SYR 30ML SLIP TIP W/O NDL 302833

## (undated) DEVICE — CANISTER-SUCTION 2000CC

## (undated) DEVICE — TUBING-SUCTION 20FT

## (undated) DEVICE — LUBRICANT JELLY 2OZ. TUBE

## (undated) DEVICE — IRRIGATION-H2O 1000ML BAG

## (undated) DEVICE — CONNECTOR ERBEFLO 2 PORT 20325-215

## (undated) DEVICE — ESU ERBE FIAPC PROBE 2.3MMX220CM

## (undated) DEVICE — JELLY LUBRICATING SURGILUBE 2OZ TUBE

## (undated) DEVICE — LIGHT HANDLE COVER

## (undated) DEVICE — FORCEP-COLON BIOPSY LARGE W/NEEDLE 240CM

## (undated) DEVICE — ESU PROBE CIRCUMFERENTIAL FIRE FLEX 2.3MMX220CM 20132-218

## (undated) DEVICE — APPLICATOR-CHLORAPREP 26ML TINTED CHG 2%+ 70% IPA-SURGICAL

## (undated) RX ORDER — METOPROLOL TARTRATE 1 MG/ML
INJECTION, SOLUTION INTRAVENOUS
Status: DISPENSED
Start: 2020-02-20

## (undated) RX ORDER — NICARDIPINE HYDROCHLORIDE 2.5 MG/ML
INJECTION INTRAVENOUS
Status: DISPENSED
Start: 2019-06-20

## (undated) RX ORDER — PROPOFOL 10 MG/ML
INJECTION, EMULSION INTRAVENOUS
Status: DISPENSED
Start: 2020-02-20

## (undated) RX ORDER — PROPOFOL 10 MG/ML
INJECTION, EMULSION INTRAVENOUS
Status: DISPENSED
Start: 2025-03-28

## (undated) RX ORDER — LIDOCAINE HYDROCHLORIDE 20 MG/ML
INJECTION, SOLUTION EPIDURAL; INFILTRATION; INTRACAUDAL; PERINEURAL
Status: DISPENSED
Start: 2020-02-20

## (undated) RX ORDER — PROPOFOL 10 MG/ML
INJECTION, EMULSION INTRAVENOUS
Status: DISPENSED
Start: 2019-09-21

## (undated) RX ORDER — LIDOCAINE HYDROCHLORIDE 20 MG/ML
INJECTION, SOLUTION EPIDURAL; INFILTRATION; INTRACAUDAL; PERINEURAL
Status: DISPENSED
Start: 2019-09-21